# Patient Record
Sex: MALE | Race: WHITE | Employment: OTHER | URBAN - METROPOLITAN AREA
[De-identification: names, ages, dates, MRNs, and addresses within clinical notes are randomized per-mention and may not be internally consistent; named-entity substitution may affect disease eponyms.]

---

## 2017-01-09 ENCOUNTER — TELEPHONE (OUTPATIENT)
Dept: FAMILY MEDICINE CLINIC | Facility: CLINIC | Age: 62
End: 2017-01-09

## 2017-01-10 RX ORDER — FLUTICASONE PROPIONATE 50 MCG
SPRAY, SUSPENSION (ML) NASAL
Qty: 3 INHALER | Refills: 0 | Status: SHIPPED | OUTPATIENT
Start: 2017-01-10 | End: 2017-03-30

## 2017-01-10 NOTE — TELEPHONE ENCOUNTER
Refill Protocol Appointment Criteria  · Appointment scheduled in the past 12 months or in the next 3 months  Recent Visits       Provider Department Primary Dx    4 months ago Charmayne Maser, [place], [address] [facility], [facility], [place] Essential hypertension,

## 2017-01-31 RX ORDER — BUDESONIDE AND FORMOTEROL FUMARATE DIHYDRATE 160; 4.5 UG/1; UG/1
AEROSOL RESPIRATORY (INHALATION)
Qty: 30.6 INHALER | Refills: 0 | Status: SHIPPED | OUTPATIENT
Start: 2017-01-31 | End: 2017-03-20

## 2017-01-31 NOTE — TELEPHONE ENCOUNTER
Refill Protocol Appointment Criteria  · Appointment scheduled in the past 6 months or in the next 3 months  Recent Visits       Provider Department Primary Dx    4 months ago [address] [facility], [place], 303 [facility], [facility], [place] Essential hypertension,

## 2017-02-07 ENCOUNTER — OFFICE VISIT (OUTPATIENT)
Dept: SURGERY | Facility: CLINIC | Age: 62
End: 2017-02-07

## 2017-02-07 VITALS
SYSTOLIC BLOOD PRESSURE: 130 MMHG | BODY MASS INDEX: 21.34 KG/M2 | HEART RATE: 113 BPM | HEIGHT: 64 IN | TEMPERATURE: 98 F | DIASTOLIC BLOOD PRESSURE: 80 MMHG | RESPIRATION RATE: 16 BRPM | WEIGHT: 125 LBS

## 2017-02-07 DIAGNOSIS — N40.1 BENIGN NON-NODULAR PROSTATIC HYPERPLASIA WITH LOWER URINARY TRACT SYMPTOMS: Primary | ICD-10-CM

## 2017-02-07 DIAGNOSIS — N52.9 ERECTILE DYSFUNCTION, UNSPECIFIED ERECTILE DYSFUNCTION TYPE: ICD-10-CM

## 2017-02-07 PROCEDURE — 99212 OFFICE O/P EST SF 10 MIN: CPT | Performed by: UROLOGY

## 2017-02-07 PROCEDURE — 99213 OFFICE O/P EST LOW 20 MIN: CPT | Performed by: UROLOGY

## 2017-02-07 NOTE — PROGRESS NOTES
Gissell Richardson is a 59year old male.     HPI:   Patient presents with:  Urinary Symptoms (urologic)    58-year-old male with BPH and erectile dysfunction managed with Cialis 5 mg daily and finasteride 5 mg daily presents in follow-up to a previous visit No (5 mg total) by mouth daily. Disp: 90 tablet Rfl: 3   FLUVIRIN Intramuscular Suspension ADM 0.5ML IM UTD Disp:  Rfl: 0   tadalafil (CIALIS) 5 MG Oral Tab Take 1 tablet (5 mg total) by mouth daily as needed for Erectile Dysfunction.  Disp: 30 tablet Rfl: 11

## 2017-02-14 ENCOUNTER — LAB REQUISITION (OUTPATIENT)
Dept: LAB | Facility: HOSPITAL | Age: 62
End: 2017-02-14
Payer: COMMERCIAL

## 2017-02-14 DIAGNOSIS — Z01.89 ENCOUNTER FOR OTHER SPECIFIED SPECIAL EXAMINATIONS: ICD-10-CM

## 2017-02-14 PROCEDURE — 88305 TISSUE EXAM BY PATHOLOGIST: CPT | Performed by: INTERNAL MEDICINE

## 2017-02-17 ENCOUNTER — TELEPHONE (OUTPATIENT)
Dept: GASTROENTEROLOGY | Facility: CLINIC | Age: 62
End: 2017-02-17

## 2017-02-17 NOTE — TELEPHONE ENCOUNTER
The patient was contacted and results of his colonoscopy discussed. He had internal hemorrhoids, diverticulosis and 5 polyps removed. I would advise repeat colonoscopy within 3 years time. He is agreeable to this plan.     The patient will call if he has

## 2017-03-20 ENCOUNTER — OFFICE VISIT (OUTPATIENT)
Dept: FAMILY MEDICINE CLINIC | Facility: CLINIC | Age: 62
End: 2017-03-20

## 2017-03-20 VITALS
TEMPERATURE: 99 F | BODY MASS INDEX: 20.25 KG/M2 | RESPIRATION RATE: 14 BRPM | WEIGHT: 118.63 LBS | SYSTOLIC BLOOD PRESSURE: 123 MMHG | HEART RATE: 88 BPM | DIASTOLIC BLOOD PRESSURE: 77 MMHG | HEIGHT: 64 IN

## 2017-03-20 DIAGNOSIS — E78.2 MIXED HYPERLIPIDEMIA: ICD-10-CM

## 2017-03-20 DIAGNOSIS — K63.5 POLYP OF COLON, UNSPECIFIED PART OF COLON, UNSPECIFIED TYPE: ICD-10-CM

## 2017-03-20 DIAGNOSIS — J44.9 CHRONIC OBSTRUCTIVE PULMONARY DISEASE, UNSPECIFIED COPD TYPE (HCC): Primary | ICD-10-CM

## 2017-03-20 DIAGNOSIS — Z72.0 TOBACCO USE: ICD-10-CM

## 2017-03-20 DIAGNOSIS — I10 ESSENTIAL HYPERTENSION WITH GOAL BLOOD PRESSURE LESS THAN 140/90: ICD-10-CM

## 2017-03-20 PROCEDURE — 99214 OFFICE O/P EST MOD 30 MIN: CPT | Performed by: FAMILY MEDICINE

## 2017-03-20 PROCEDURE — 99406 BEHAV CHNG SMOKING 3-10 MIN: CPT | Performed by: FAMILY MEDICINE

## 2017-03-20 PROCEDURE — 99212 OFFICE O/P EST SF 10 MIN: CPT | Performed by: FAMILY MEDICINE

## 2017-03-20 RX ORDER — AMLODIPINE BESYLATE AND BENAZEPRIL HYDROCHLORIDE 10; 40 MG/1; MG/1
1 CAPSULE ORAL
Qty: 90 CAPSULE | Refills: 2 | Status: SHIPPED | OUTPATIENT
Start: 2017-03-20 | End: 2017-12-08

## 2017-03-20 RX ORDER — ATORVASTATIN CALCIUM 10 MG/1
10 TABLET, FILM COATED ORAL
Qty: 90 TABLET | Refills: 2 | Status: SHIPPED | OUTPATIENT
Start: 2017-03-20 | End: 2017-12-08

## 2017-03-20 RX ORDER — BUDESONIDE AND FORMOTEROL FUMARATE DIHYDRATE 160; 4.5 UG/1; UG/1
AEROSOL RESPIRATORY (INHALATION)
Qty: 3 INHALER | Refills: 3 | Status: SHIPPED | OUTPATIENT
Start: 2017-03-20 | End: 2018-03-25

## 2017-03-20 NOTE — PROGRESS NOTES
Patient ID: Carmella Wong is a 59year old male. HPI  Patient presents with:  Test Results: ct scan done on 9-22-16, colonoscopy done on 02-14-17     He had a CAT scan done of his lungs. Showed no cancer but did show some mild emphysema.   He has been tadalafil (CIALIS) 5 MG Oral Tab Take 1 tablet (5 mg total) by mouth daily as needed for Erectile Dysfunction. Disp: 30 tablet Rfl: 11   Atorvastatin Calcium 10 MG Oral Tab Take 1 tablet (10 mg total) by mouth once daily.  Disp: 90 tablet Rfl: 2   Amlodip 160-4.5 MCG/ACT Inhalation Aerosol; INHALE 2 PUFFS INTO THE LUNGS TWICE A DAY    Polyp of colon, unspecified part of colon, unspecified type  Repeat colonoscopy in 3 years  Tobacco use  -     Varenicline Tartrate (CHANTIX STARTING MONTH PAK) 0.5 MG X 11 &

## 2017-03-20 NOTE — PATIENT INSTRUCTIONS
See Dr. Nicole Riggs, the pulmonologist and get your cardiac stress test done. Once that is all done go ahead and make an appointment to see me for a full physical exam and we can do blood work on the same day if you come in fasting for 10 hours.   You can have w

## 2017-03-31 RX ORDER — FLUTICASONE PROPIONATE 50 MCG
SPRAY, SUSPENSION (ML) NASAL
Qty: 48 G | Refills: 0 | Status: SHIPPED | OUTPATIENT
Start: 2017-03-31 | End: 2017-06-21

## 2017-03-31 NOTE — TELEPHONE ENCOUNTER
Refill Protocol Appointment Criteria  · Appointment scheduled in the past 12 months or in the next 3 months  Recent Visits       Provider Department Primary Dx    1 week ago Riley Chairez, [address] [place] [address], [place] Chronic obstructive pulmo

## 2017-04-24 NOTE — TELEPHONE ENCOUNTER
PLEASE ADVISE ON REFILL. THANKS.   Recent Visits       Provider Department Primary Dx    1 month ago Hazel Lynne, [address] [facility], [facility], [place] Chronic obstructive pulmonary disease, unspecified COPD type ([facility]    7 months ago Hazel Lynne,

## 2017-04-24 NOTE — TELEPHONE ENCOUNTER
i would have him discuss this with Dr Silvestre as he has already been on starter pack or does he need continuation?

## 2017-04-25 RX ORDER — VARENICLINE TARTRATE 1 MG/1
1 TABLET, FILM COATED ORAL 2 TIMES DAILY
Qty: 1 PACKAGE | Refills: 2 | Status: SHIPPED | OUTPATIENT
Start: 2017-04-25 | End: 2018-04-17

## 2017-04-25 RX ORDER — VARENICLINE TARTRATE 0.5 (11)-1
KIT ORAL
Refills: 0 | OUTPATIENT
Start: 2017-04-25

## 2017-04-25 NOTE — TELEPHONE ENCOUNTER
Chart reviewed, 3/20/17  last office visit notes: Tobacco use  -     Varenicline Tartrate (CHANTIX STARTING MONTH AMEYA) 0.5 MG X 11 & 1 MG X 42 Oral Misc; Take as directed.   3 minute discussion on tobacco cessation.  We discussed options.  He has been on Ch

## 2017-06-21 RX ORDER — FLUTICASONE PROPIONATE 50 MCG
SPRAY, SUSPENSION (ML) NASAL
Qty: 1 BOTTLE | Refills: 2 | Status: SHIPPED | OUTPATIENT
Start: 2017-06-21 | End: 2017-09-12

## 2017-06-21 NOTE — TELEPHONE ENCOUNTER
Refilled per written protocol.     Refill Protocol Appointment Criteria Allergy Medication:  · Appointment scheduled in the past 12 months or in the next 3 months  Recent Visits       Provider Department Primary Dx    3 months ago DO Tigist Potter

## 2017-08-14 ENCOUNTER — TELEPHONE (OUTPATIENT)
Dept: FAMILY MEDICINE CLINIC | Facility: CLINIC | Age: 62
End: 2017-08-14

## 2017-08-14 NOTE — TELEPHONE ENCOUNTER
Roxi Rangel from patient accounts is requesting office notes that is leading to lung scan pt had on 9/22/16. Cecillia Cisco is not covering test so need to send an letter of necessity with office notes proving lung scan was needed.           fax # [phone]

## 2017-08-17 NOTE — TELEPHONE ENCOUNTER
Records requests are not completed by [facility], but by our copy service [employer]. For records request please forward patients or Ins Companies to them at ph. [phone], their fax number is [phone].  I believe they need to have request in writing faxed to

## 2017-09-12 RX ORDER — FLUTICASONE PROPIONATE 50 MCG
SPRAY, SUSPENSION (ML) NASAL
Qty: 1 INHALER | Refills: 2 | Status: SHIPPED | OUTPATIENT
Start: 2017-09-12 | End: 2017-12-08

## 2017-09-12 NOTE — TELEPHONE ENCOUNTER
Refill Protocol Appointment Criteria: Refilled per protocol    · Appointment scheduled in the past 12 months or in the next 3 months  Recent Outpatient Visits            5 months ago Chronic obstructive pulmonary disease, unspecified [facility] type [facility]    Arianna Handley

## 2017-09-17 ENCOUNTER — TELEPHONE (OUTPATIENT)
Dept: FAMILY MEDICINE CLINIC | Facility: CLINIC | Age: 62
End: 2017-09-17

## 2017-09-17 DIAGNOSIS — Z00.00 ADULT GENERAL MEDICAL EXAM: Primary | ICD-10-CM

## 2017-09-18 NOTE — TELEPHONE ENCOUNTER
Pt contacted and is asking to please have labs ordered and placed onto his chart. Pt would like to get labs done prior to making an appt. Please contact pt once labs have been ordered, please and thank you.

## 2017-09-18 NOTE — TELEPHONE ENCOUNTER
ROBYN- Please call and schedule an appointment for patient with Dr. Chichi Varela. See message below.

## 2017-09-18 NOTE — TELEPHONE ENCOUNTER
I have not seen him since March. I need to see him every 6 months I will go ahead and please make him an appointment.

## 2017-10-10 NOTE — TELEPHONE ENCOUNTER
Dr. Gilbert Camacho, pt Mouna Collins 2/7/17 pt pharmacy requesting refill for finasteride if you agree please review and sign med, thank you. I copied and pasted part of your last note below. Reviewed findings at length with patient. Recommended continued Cialis and finasteride use for the time being.

## 2017-10-12 RX ORDER — FINASTERIDE 5 MG/1
5 TABLET, FILM COATED ORAL DAILY
Qty: 90 TABLET | Refills: 3 | Status: SHIPPED | OUTPATIENT
Start: 2017-10-12 | End: 2018-07-09

## 2017-12-08 DIAGNOSIS — I10 ESSENTIAL HYPERTENSION WITH GOAL BLOOD PRESSURE LESS THAN 140/90: ICD-10-CM

## 2017-12-08 DIAGNOSIS — E78.2 MIXED HYPERLIPIDEMIA: ICD-10-CM

## 2017-12-08 RX ORDER — FLUTICASONE PROPIONATE 50 MCG
SPRAY, SUSPENSION (ML) NASAL
Qty: 1 BOTTLE | Refills: 2 | Status: SHIPPED | OUTPATIENT
Start: 2017-12-08 | End: 2018-02-27

## 2017-12-09 RX ORDER — ATORVASTATIN CALCIUM 10 MG/1
TABLET, FILM COATED ORAL
Qty: 90 TABLET | Refills: 0 | Status: SHIPPED | OUTPATIENT
Start: 2017-12-09 | End: 2018-03-23

## 2017-12-09 RX ORDER — AMLODIPINE AND BENAZEPRIL HYDROCHLORIDE 10; 40 MG/1; MG/1
1 CAPSULE ORAL
Qty: 90 CAPSULE | Refills: 0 | Status: SHIPPED | OUTPATIENT
Start: 2017-12-09 | End: 2018-03-23

## 2017-12-09 NOTE — TELEPHONE ENCOUNTER
Refill times 1 but needs appt for next script. Please call patient. I ordered labs for him in September. He is still not gotten these done. Have him get them done and then come and see me. He is long overdue for an appointment.

## 2018-02-28 RX ORDER — FLUTICASONE PROPIONATE 50 MCG
SPRAY, SUSPENSION (ML) NASAL
Qty: 1 BOTTLE | Refills: 0 | Status: SHIPPED | OUTPATIENT
Start: 2018-02-28 | End: 2018-03-25

## 2018-02-28 NOTE — TELEPHONE ENCOUNTER
Refill Protocol Appointment Criteria. Refilled x 1; needs appt.   · Appointment scheduled in the past 12 months or in the next 3 months  Recent Outpatient Visits            11 months ago Chronic obstructive pulmonary disease, unspecified COPD type ([facility]

## 2018-03-23 ENCOUNTER — TELEPHONE (OUTPATIENT)
Dept: FAMILY MEDICINE CLINIC | Facility: CLINIC | Age: 63
End: 2018-03-23

## 2018-03-23 DIAGNOSIS — I10 ESSENTIAL HYPERTENSION WITH GOAL BLOOD PRESSURE LESS THAN 140/90: ICD-10-CM

## 2018-03-23 DIAGNOSIS — E78.2 MIXED HYPERLIPIDEMIA: ICD-10-CM

## 2018-03-25 DIAGNOSIS — J44.9 CHRONIC OBSTRUCTIVE PULMONARY DISEASE, UNSPECIFIED COPD TYPE (HCC): ICD-10-CM

## 2018-03-26 RX ORDER — AMLODIPINE AND BENAZEPRIL HYDROCHLORIDE 10; 40 MG/1; MG/1
1 CAPSULE ORAL
Qty: 90 CAPSULE | Refills: 0 | Status: SHIPPED | OUTPATIENT
Start: 2018-03-26 | End: 2018-04-17

## 2018-03-26 RX ORDER — ATORVASTATIN CALCIUM 10 MG/1
TABLET, FILM COATED ORAL
Qty: 90 TABLET | Refills: 0 | Status: SHIPPED | OUTPATIENT
Start: 2018-03-26 | End: 2018-04-17

## 2018-03-26 NOTE — TELEPHONE ENCOUNTER
ROBYN- Please call and schedule an appointment for patient with Dr. Nayan George. See message below.

## 2018-03-27 ENCOUNTER — LAB ENCOUNTER (OUTPATIENT)
Dept: LAB | Age: 63
End: 2018-03-27
Attending: FAMILY MEDICINE
Payer: COMMERCIAL

## 2018-03-27 DIAGNOSIS — Z00.00 ADULT GENERAL MEDICAL EXAM: ICD-10-CM

## 2018-03-27 LAB
ALBUMIN SERPL BCP-MCNC: 4.5 G/DL (ref 3.5–4.8)
ALBUMIN/GLOB SERPL: 1.7 {RATIO} (ref 1–2)
ALP SERPL-CCNC: 50 U/L (ref 32–100)
ALT SERPL-CCNC: 16 U/L (ref 17–63)
ANION GAP SERPL CALC-SCNC: 9 MMOL/L (ref 0–18)
AST SERPL-CCNC: 23 U/L (ref 15–41)
BASOPHILS # BLD: 0 K/UL (ref 0–0.2)
BASOPHILS NFR BLD: 1 %
BILIRUB SERPL-MCNC: 0.5 MG/DL (ref 0.3–1.2)
BUN SERPL-MCNC: 9 MG/DL (ref 8–20)
BUN/CREAT SERPL: 10.8 (ref 10–20)
CALCIUM SERPL-MCNC: 9.8 MG/DL (ref 8.5–10.5)
CHLORIDE SERPL-SCNC: 99 MMOL/L (ref 95–110)
CHOLEST SERPL-MCNC: 160 MG/DL (ref 110–200)
CO2 SERPL-SCNC: 28 MMOL/L (ref 22–32)
CREAT SERPL-MCNC: 0.83 MG/DL (ref 0.5–1.5)
EOSINOPHIL # BLD: 0.5 K/UL (ref 0–0.7)
EOSINOPHIL NFR BLD: 7 %
ERYTHROCYTE [DISTWIDTH] IN BLOOD BY AUTOMATED COUNT: 13.1 % (ref 11–15)
GLOBULIN PLAS-MCNC: 2.7 G/DL (ref 2.5–3.7)
GLUCOSE SERPL-MCNC: 78 MG/DL (ref 70–99)
HCT VFR BLD AUTO: 47.5 % (ref 41–52)
HDLC SERPL-MCNC: 75 MG/DL
HGB BLD-MCNC: 16.1 G/DL (ref 13.5–17.5)
LDLC SERPL CALC-MCNC: 71 MG/DL (ref 0–99)
LYMPHOCYTES # BLD: 1.1 K/UL (ref 1–4)
LYMPHOCYTES NFR BLD: 13 %
MCH RBC QN AUTO: 30.9 PG (ref 27–32)
MCHC RBC AUTO-ENTMCNC: 33.9 G/DL (ref 32–37)
MCV RBC AUTO: 91.3 FL (ref 80–100)
MONOCYTES # BLD: 1 K/UL (ref 0–1)
MONOCYTES NFR BLD: 12 %
NEUTROPHILS # BLD AUTO: 5.6 K/UL (ref 1.8–7.7)
NEUTROPHILS NFR BLD: 67 %
NONHDLC SERPL-MCNC: 85 MG/DL
OSMOLALITY UR CALC.SUM OF ELEC: 280 MOSM/KG (ref 275–295)
PATIENT FASTING: YES
PLATELET # BLD AUTO: 319 K/UL (ref 140–400)
PMV BLD AUTO: 8.9 FL (ref 7.4–10.3)
POTASSIUM SERPL-SCNC: 4.5 MMOL/L (ref 3.3–5.1)
PROT SERPL-MCNC: 7.2 G/DL (ref 5.9–8.4)
PSA SERPL-MCNC: 1.7 NG/ML (ref 0–4)
RBC # BLD AUTO: 5.2 M/UL (ref 4.5–5.9)
SODIUM SERPL-SCNC: 136 MMOL/L (ref 136–144)
TRIGL SERPL-MCNC: 70 MG/DL (ref 1–149)
TSH SERPL-ACNC: 7.2 UIU/ML (ref 0.45–5.33)
WBC # BLD AUTO: 8.2 K/UL (ref 4–11)

## 2018-03-27 PROCEDURE — 80050 GENERAL HEALTH PANEL: CPT

## 2018-03-27 PROCEDURE — 80053 COMPREHEN METABOLIC PANEL: CPT

## 2018-03-27 PROCEDURE — 80061 LIPID PANEL: CPT

## 2018-03-27 PROCEDURE — 85025 COMPLETE CBC W/AUTO DIFF WBC: CPT

## 2018-03-27 PROCEDURE — 36415 COLL VENOUS BLD VENIPUNCTURE: CPT

## 2018-03-27 NOTE — TELEPHONE ENCOUNTER
Refill Protocol Appointment Criteria  · Appointment scheduled in the past 12 months or in the next 3 months  Recent Outpatient Visits            1 year ago Chronic obstructive pulmonary disease, unspecified COPD type [facility])    [address], [facility] for Fabian Velez MD    Office Visit    1 year ago Benign non-nodular prostatic hyperplasia with lower urinary tract symptoms    [facility] for Fabian eVlez, [place]

## 2018-03-29 DIAGNOSIS — J44.9 CHRONIC OBSTRUCTIVE PULMONARY DISEASE, UNSPECIFIED COPD TYPE (HCC): ICD-10-CM

## 2018-03-29 RX ORDER — BUDESONIDE AND FORMOTEROL FUMARATE DIHYDRATE 160; 4.5 UG/1; UG/1
AEROSOL RESPIRATORY (INHALATION)
Qty: 30.6 INHALER | Refills: 0 | Status: SHIPPED | OUTPATIENT
Start: 2018-03-29 | End: 2018-04-17

## 2018-03-29 RX ORDER — FLUTICASONE PROPIONATE 50 MCG
SPRAY, SUSPENSION (ML) NASAL
Qty: 1 INHALER | Refills: 0 | Status: SHIPPED | OUTPATIENT
Start: 2018-03-29 | End: 2018-04-17

## 2018-03-30 RX ORDER — BUDESONIDE AND FORMOTEROL FUMARATE DIHYDRATE 160; 4.5 UG/1; UG/1
AEROSOL RESPIRATORY (INHALATION)
Qty: 30.6 INHALER | Refills: 2 | OUTPATIENT
Start: 2018-03-30

## 2018-04-17 ENCOUNTER — HOSPITAL ENCOUNTER (OUTPATIENT)
Dept: GENERAL RADIOLOGY | Age: 63
Discharge: HOME | End: 2018-04-17
Attending: FAMILY MEDICINE
Payer: COMMERCIAL

## 2018-04-17 ENCOUNTER — APPOINTMENT (OUTPATIENT)
Dept: LAB | Age: 63
End: 2018-04-17
Attending: FAMILY MEDICINE
Payer: COMMERCIAL

## 2018-04-17 ENCOUNTER — OFFICE VISIT (OUTPATIENT)
Dept: FAMILY MEDICINE CLINIC | Facility: CLINIC | Age: 63
End: 2018-04-17

## 2018-04-17 VITALS
HEIGHT: 64 IN | DIASTOLIC BLOOD PRESSURE: 73 MMHG | BODY MASS INDEX: 20.66 KG/M2 | SYSTOLIC BLOOD PRESSURE: 130 MMHG | TEMPERATURE: 98 F | HEART RATE: 86 BPM | WEIGHT: 121 LBS

## 2018-04-17 DIAGNOSIS — I10 ESSENTIAL HYPERTENSION WITH GOAL BLOOD PRESSURE LESS THAN 140/90: ICD-10-CM

## 2018-04-17 DIAGNOSIS — E78.2 MIXED HYPERLIPIDEMIA: ICD-10-CM

## 2018-04-17 DIAGNOSIS — N40.1 BENIGN NON-NODULAR PROSTATIC HYPERPLASIA WITH LOWER URINARY TRACT SYMPTOMS: ICD-10-CM

## 2018-04-17 DIAGNOSIS — H91.91 DECREASED HEARING OF RIGHT EAR: ICD-10-CM

## 2018-04-17 DIAGNOSIS — R79.89 ELEVATED TSH: ICD-10-CM

## 2018-04-17 DIAGNOSIS — J30.1 ALLERGIC RHINITIS DUE TO POLLEN, UNSPECIFIED SEASONALITY: ICD-10-CM

## 2018-04-17 DIAGNOSIS — I10 ESSENTIAL HYPERTENSION, BENIGN: ICD-10-CM

## 2018-04-17 DIAGNOSIS — Z00.00 ADULT GENERAL MEDICAL EXAM: Primary | ICD-10-CM

## 2018-04-17 DIAGNOSIS — R14.2 ERUCTATION: ICD-10-CM

## 2018-04-17 DIAGNOSIS — J44.9 CHRONIC OBSTRUCTIVE PULMONARY DISEASE, UNSPECIFIED COPD TYPE (HCC): ICD-10-CM

## 2018-04-17 DIAGNOSIS — F17.200 TOBACCO USE DISORDER: ICD-10-CM

## 2018-04-17 DIAGNOSIS — R09.89 RHONCHI: ICD-10-CM

## 2018-04-17 DIAGNOSIS — H61.21 IMPACTED CERUMEN OF RIGHT EAR: ICD-10-CM

## 2018-04-17 PROCEDURE — 83013 H PYLORI (C-13) BREATH: CPT

## 2018-04-17 PROCEDURE — 99396 PREV VISIT EST AGE 40-64: CPT | Performed by: FAMILY MEDICINE

## 2018-04-17 PROCEDURE — 84443 ASSAY THYROID STIM HORMONE: CPT

## 2018-04-17 PROCEDURE — 84439 ASSAY OF FREE THYROXINE: CPT

## 2018-04-17 PROCEDURE — 93010 ELECTROCARDIOGRAM REPORT: CPT | Performed by: FAMILY MEDICINE

## 2018-04-17 PROCEDURE — 99212 OFFICE O/P EST SF 10 MIN: CPT | Performed by: FAMILY MEDICINE

## 2018-04-17 PROCEDURE — 93005 ELECTROCARDIOGRAM TRACING: CPT

## 2018-04-17 PROCEDURE — 99215 OFFICE O/P EST HI 40 MIN: CPT | Performed by: FAMILY MEDICINE

## 2018-04-17 PROCEDURE — 36415 COLL VENOUS BLD VENIPUNCTURE: CPT

## 2018-04-17 PROCEDURE — 69210 REMOVE IMPACTED EAR WAX UNI: CPT | Performed by: FAMILY MEDICINE

## 2018-04-17 PROCEDURE — 99406 BEHAV CHNG SMOKING 3-10 MIN: CPT | Performed by: FAMILY MEDICINE

## 2018-04-17 PROCEDURE — 71046 X-RAY EXAM CHEST 2 VIEWS: CPT | Performed by: FAMILY MEDICINE

## 2018-04-17 RX ORDER — FLUTICASONE PROPIONATE 50 MCG
SPRAY, SUSPENSION (ML) NASAL
Qty: 3 INHALER | Refills: 1 | Status: SHIPPED | OUTPATIENT
Start: 2018-04-17 | End: 2018-07-09

## 2018-04-17 RX ORDER — BUDESONIDE AND FORMOTEROL FUMARATE DIHYDRATE 160; 4.5 UG/1; UG/1
AEROSOL RESPIRATORY (INHALATION)
Qty: 3 INHALER | Refills: 1 | Status: SHIPPED | OUTPATIENT
Start: 2018-04-17 | End: 2018-06-21

## 2018-04-17 RX ORDER — NICOTINE POLACRILEX 4 MG/1
20 GUM, CHEWING ORAL DAILY
Qty: 90 TABLET | Refills: 0 | Status: SHIPPED | OUTPATIENT
Start: 2018-04-17 | End: 2018-05-01

## 2018-04-17 RX ORDER — ALBUTEROL SULFATE 90 UG/1
AEROSOL, METERED RESPIRATORY (INHALATION)
Qty: 1 INHALER | Refills: 2 | Status: SHIPPED | OUTPATIENT
Start: 2018-04-17 | End: 2018-10-23

## 2018-04-17 RX ORDER — VARENICLINE TARTRATE 1 MG/1
1 TABLET, FILM COATED ORAL 2 TIMES DAILY
Qty: 1 PACKAGE | Refills: 1 | Status: SHIPPED | OUTPATIENT
Start: 2018-04-17 | End: 2018-08-19

## 2018-04-17 RX ORDER — LEVOTHYROXINE SODIUM 0.03 MG/1
25 TABLET ORAL
Qty: 90 TABLET | Refills: 0 | Status: SHIPPED | OUTPATIENT
Start: 2018-04-17 | End: 2018-05-01

## 2018-04-17 RX ORDER — AMLODIPINE BESYLATE AND BENAZEPRIL HYDROCHLORIDE 10; 40 MG/1; MG/1
1 CAPSULE ORAL
Qty: 90 CAPSULE | Refills: 1 | Status: SHIPPED | OUTPATIENT
Start: 2018-04-17 | End: 2018-10-23

## 2018-04-17 RX ORDER — ATORVASTATIN CALCIUM 10 MG/1
10 TABLET, FILM COATED ORAL
Qty: 90 TABLET | Refills: 1 | Status: SHIPPED | OUTPATIENT
Start: 2018-04-17 | End: 2018-10-23

## 2018-04-17 NOTE — PROGRESS NOTES
Patient ID: Johnson Reyez is a 57year old male. HPI  Patient presents with:  Physical    I have not seen him in some time. He would like a physical but he has numerous other issues that we need to take care of.   He did have a colonoscopy by Dr. Merlin Dickens 06/23/2017  Colonoscopy,3 Years due on 02/14/2020  PSA due on 03/27/2020  Influenza Vaccine Completed  Pneumococcal PPSV23 Medium Risk Adult Completed            Lab Results  Component Value Date   WBC 8.2 03/27/2018   RBC 5.20 03/27/2018   HGB 16.1 03/27/ Results  Component Value Date   CHOLEST 160 03/27/2018   TRIG 70 03/27/2018   HDL 75 03/27/2018   LDL 71 03/27/2018   NONHDLC 85 03/27/2018   CALCNONHDL 91 03/08/2016   CALCNONHDL 82 02/23/2015   CALCNONHDL 106 09/29/2014       T4,FREE (S) (ng/dL)   Date V nausea, rectal pain and vomiting. Genitourinary: Positive for difficulty urinating. Negative for dysuria, flank pain, frequency and hematuria. Sometimes he does have a slower stream periodically at night.    Musculoskeletal: Negative for back pain 2 PUFFS INTO THE LUNGS TWICE A DAY Disp: 30.6 Inhaler Rfl: 0   ATORVASTATIN 10 MG Oral Tab TAKE 1 TABLET BY MOUTH EVERY DAY Disp: 90 tablet Rfl: 0   AMLODIPINE BESY-BENAZEPRIL HCL 10-40 MG Oral Cap TAKE 1 CAPSULE BY MOUTH ONCE DAILY.  Disp: 90 capsule Rfl: rhonchi and some slight scattered wheezing as well. Otherwise good air entry. He is not retracting. There is no nasal flaring. No respiratory distress. Abdominal: Soft. Bowel sounds are normal. There is no hepatosplenomegaly. There is no tenderness. Aero Soln; INHALE 2 PUFF BY INHALATION ROUTE EVERY 4 - 6 HOURS AS NEEDED  4 minute discussion on tobacco cessation. He really needs to quit and he cannot even pick a one cigarette after he quits.   We will start him on the Chantix starter pack and then the emphysema and numerous years of smoking. Elevated TSH  -     ASSAY, THYROID STIM HORMONE; Future  -     FREE T4 (FREE THYROXINE);  Future  We will recheck the TSH to see if he truly needs Levoxyl  Essential hypertension with goal blood pressure less than 1 CARDIOLOGY          Number of Visits Requested: 3      Audiology          Order Comments:              Referral for Evaluation              Comprehensive Hearing Evaluation              Call [phone]          Referral Priority: Routine          Request

## 2018-04-20 ENCOUNTER — TELEPHONE (OUTPATIENT)
Dept: OTHER | Age: 63
End: 2018-04-20

## 2018-04-20 NOTE — TELEPHONE ENCOUNTER
----- Message from Jessica Calabrese DO sent at 4/17/2018 11:26 PM CDT -----  Your TSH is again elevated meaning that you do have a underactive thyroid. We need to start you on a small dose of thyroid medication which I will start immediately. Start Levoxyl 25 mcg daily. Get some of the other testing done and then I will go ahead and have you come back to recheck the thyroid in the near future.

## 2018-04-21 RX ORDER — LEVOTHYROXINE SODIUM 0.03 MG/1
25 TABLET ORAL
Qty: 90 TABLET | Refills: 0 | Status: SHIPPED | OUTPATIENT
Start: 2018-04-21 | End: 2018-10-10

## 2018-04-23 ENCOUNTER — HOSPITAL ENCOUNTER (OUTPATIENT)
Dept: CT IMAGING | Age: 63
Discharge: HOME | End: 2018-04-23
Attending: FAMILY MEDICINE
Payer: COMMERCIAL

## 2018-04-23 DIAGNOSIS — J44.9 CHRONIC OBSTRUCTIVE PULMONARY DISEASE, UNSPECIFIED COPD TYPE (HCC): ICD-10-CM

## 2018-04-23 DIAGNOSIS — R09.89 RHONCHI: ICD-10-CM

## 2018-04-23 DIAGNOSIS — F17.200 TOBACCO USE DISORDER: ICD-10-CM

## 2018-04-23 PROCEDURE — 71250 CT THORAX DX C-: CPT | Performed by: FAMILY MEDICINE

## 2018-04-23 NOTE — TELEPHONE ENCOUNTER
Dr Joel Calabrese, please advise on the results from the CT chest.    Advised patient on Dr. Jeaneth Sharp information and recommendations for chest x-ray and for TSH. Patient verbalized understanding. He had the CT of chest done today. Has audiology on 5/1/18 and urology on 5/21/18. Still has to schedule with cardiology.

## 2018-04-24 PROBLEM — R91.1 PULMONARY NODULE, RIGHT: Status: ACTIVE | Noted: 2018-04-24

## 2018-04-24 PROBLEM — J84.9 INTERSTITIAL LUNG DISEASE (HCC): Status: ACTIVE | Noted: 2018-04-24

## 2018-04-24 RX ORDER — FLUTICASONE PROPIONATE 50 MCG
SPRAY, SUSPENSION (ML) NASAL
Refills: 0 | OUTPATIENT
Start: 2018-04-24

## 2018-04-27 ENCOUNTER — TELEPHONE (OUTPATIENT)
Dept: FAMILY MEDICINE CLINIC | Facility: CLINIC | Age: 63
End: 2018-04-27

## 2018-04-27 NOTE — TELEPHONE ENCOUNTER
LMTCB. May transfer to Triage. Notes recorded by Maty Stoll RN on 4/25/2018 at 4:14 PM CDT  LMTCB. May transfer to Triage.      ------    Notes recorded by Carol Engle DO on 4/24/2018 at 12:36 PM CDT  CAT scan of the chest shows a 6 mm

## 2018-05-01 ENCOUNTER — OFFICE VISIT (OUTPATIENT)
Dept: AUDIOLOGY | Facility: CLINIC | Age: 63
End: 2018-05-01

## 2018-05-01 ENCOUNTER — OFFICE VISIT (OUTPATIENT)
Dept: OTOLARYNGOLOGY | Facility: CLINIC | Age: 63
End: 2018-05-01

## 2018-05-01 VITALS
WEIGHT: 126 LBS | TEMPERATURE: 98 F | BODY MASS INDEX: 21.51 KG/M2 | DIASTOLIC BLOOD PRESSURE: 77 MMHG | SYSTOLIC BLOOD PRESSURE: 138 MMHG | HEIGHT: 64 IN

## 2018-05-01 DIAGNOSIS — H90.A21 SENSORINEURAL HEARING LOSS (SNHL) OF RIGHT EAR WITH RESTRICTED HEARING OF LEFT EAR: Primary | ICD-10-CM

## 2018-05-01 DIAGNOSIS — H90.3 SENSORINEURAL HEARING LOSS, BILATERAL: Primary | ICD-10-CM

## 2018-05-01 PROCEDURE — 99213 OFFICE O/P EST LOW 20 MIN: CPT | Performed by: OTOLARYNGOLOGY

## 2018-05-01 PROCEDURE — 92557 COMPREHENSIVE HEARING TEST: CPT | Performed by: AUDIOLOGIST

## 2018-05-01 PROCEDURE — 92567 TYMPANOMETRY: CPT | Performed by: AUDIOLOGIST

## 2018-05-01 PROCEDURE — 99212 OFFICE O/P EST SF 10 MIN: CPT | Performed by: OTOLARYNGOLOGY

## 2018-05-01 RX ORDER — OMEPRAZOLE 20 MG/1
20 CAPSULE, DELAYED RELEASE ORAL
Refills: 0 | COMMUNITY
Start: 2018-04-17 | End: 2018-10-23

## 2018-05-01 NOTE — PROGRESS NOTES
AUDIOGRAM     Aleksandra Guaman was referred for testing by Dr Gayatri Guzman due to hearing loss. 12/10/1955  [id number]    Otoscopic Inspection:  Right ear:  No cerumen  Left ear:  No cerumen    Audiometric Test Results:   Audiometric thresholds indicated a mild

## 2018-05-01 NOTE — TELEPHONE ENCOUNTER
Okay.  Have him call every other day to see if there is any cancellations to just to see if he can get in sooner.

## 2018-05-01 NOTE — TELEPHONE ENCOUNTER
Advised patient on Dr. Thor Larose information and recommendations. Patient verbalized understanding. He stated he needs to schedule with the cardiologist too, will do so. Given the phone # for pulmonary to schedule; he stated he will do so.  Advised to call b

## 2018-05-01 NOTE — PROGRESS NOTES
Johnna Hardy is a 57year old male. Patient presents with:  Hearing Loss: right ear for over 1 year       HISTORY OF PRESENT ILLNESS    He presents with a one-year history of a sensation of having more difficulty hearing through his right ear.   No other vision changes. Respiratory Negative Dyspnea and wheezing. Cardio Negative Chest pain, irregular heartbeat/palpitations and syncope. GI Negative Abdominal pain and diarrhea. Endocrine Negative Cold intolerance and heat intolerance.    Neuro Negative Release, Take 20 mg by mouth once daily. , Disp: , Rfl: 0  •  Levothyroxine Sodium (LEVOXYL) 25 MCG Oral Tab, Take 1 tablet (25 mcg total) by mouth before breakfast., Disp: 90 tablet, Rfl: 0  •  Varenicline Tartrate (CHANTIX CONTINUING MONTH AMEYA) 1 MG Oral

## 2018-05-21 ENCOUNTER — OFFICE VISIT (OUTPATIENT)
Dept: SURGERY | Facility: CLINIC | Age: 63
End: 2018-05-21

## 2018-05-21 VITALS
BODY MASS INDEX: 21.34 KG/M2 | HEART RATE: 85 BPM | DIASTOLIC BLOOD PRESSURE: 74 MMHG | WEIGHT: 125 LBS | TEMPERATURE: 98 F | HEIGHT: 64 IN | SYSTOLIC BLOOD PRESSURE: 130 MMHG

## 2018-05-21 DIAGNOSIS — N52.9 ERECTILE DYSFUNCTION, UNSPECIFIED ERECTILE DYSFUNCTION TYPE: ICD-10-CM

## 2018-05-21 DIAGNOSIS — N40.1 BENIGN NON-NODULAR PROSTATIC HYPERPLASIA WITH LOWER URINARY TRACT SYMPTOMS: Primary | ICD-10-CM

## 2018-05-21 PROCEDURE — 99212 OFFICE O/P EST SF 10 MIN: CPT | Performed by: UROLOGY

## 2018-05-21 PROCEDURE — 99213 OFFICE O/P EST LOW 20 MIN: CPT | Performed by: UROLOGY

## 2018-05-21 RX ORDER — TADALAFIL 20 MG/1
20 TABLET ORAL
Qty: 6 TABLET | Refills: 11 | Status: ON HOLD | OUTPATIENT
Start: 2018-05-21 | End: 2019-12-16

## 2018-05-22 NOTE — PROGRESS NOTES
Anthony Fernandez is a 57year old male. HPI:   Patient presents with:  Erectile Dysfuntion  Urinary Symptoms (urologic): Nocturia 2-4x per night. Patient taking finasteride and Cialis.        20-year-old male in follow-up to a previous visit February 7, 20 Sodium (LEVOXYL) 25 MCG Oral Tab Take 1 tablet (25 mcg total) by mouth before breakfast. Disp: 90 tablet Rfl: 0   Varenicline Tartrate (CHANTIX CONTINUING MONTH AMEYA) 1 MG Oral Tab Take 1 tablet (1 mg total) by mouth 2 (two) times daily.  Disp: 1 Package Rfl 1 tablet (20 mg total) by mouth daily as needed for Erectile Dysfunction.            Imaging & Referrals:  None     5/22/2018  Scott White MD

## 2018-06-14 ENCOUNTER — OFFICE VISIT (OUTPATIENT)
Dept: CARDIOLOGY CLINIC | Facility: CLINIC | Age: 63
End: 2018-06-14

## 2018-06-14 VITALS
HEART RATE: 76 BPM | DIASTOLIC BLOOD PRESSURE: 70 MMHG | BODY MASS INDEX: 20 KG/M2 | WEIGHT: 118 LBS | RESPIRATION RATE: 12 BRPM | SYSTOLIC BLOOD PRESSURE: 100 MMHG

## 2018-06-14 DIAGNOSIS — I10 ESSENTIAL HYPERTENSION, BENIGN: Primary | ICD-10-CM

## 2018-06-14 DIAGNOSIS — E78.2 MIXED HYPERLIPIDEMIA: ICD-10-CM

## 2018-06-14 DIAGNOSIS — R06.02 SHORTNESS OF BREATH: ICD-10-CM

## 2018-06-14 PROCEDURE — 99244 OFF/OP CNSLTJ NEW/EST MOD 40: CPT | Performed by: INTERNAL MEDICINE

## 2018-06-14 PROCEDURE — 99212 OFFICE O/P EST SF 10 MIN: CPT | Performed by: INTERNAL MEDICINE

## 2018-06-14 NOTE — PATIENT INSTRUCTIONS
Schedule echocardiogram  Schedule heart scan for calcium score and call us with results  After above tests are reviewed we will contact you regarding additional testing if needed

## 2018-06-14 NOTE — H&P
Tamy Fagan is a 57year old male. HPI:   This is a pleasant 23-year-old gentleman with hypertension elevated cholesterol and tobacco use who presents for cardiac assessment. We are asked by Dr. Tess Reynoso to assess with risk factors and vague dyspnea.   P AMEYA) 1 MG Oral Tab Take 1 tablet (1 mg total) by mouth 2 (two) times daily.  Disp: 1 Package Rfl: 1     Family History:  Family History   Problem Relation Age of Onset   • Dementia Mother    • Diabetes Paternal Grandmother    • Cancer Sister       Past Medi also help us assess what other tests may be useful to rule out coronary disease with risk factors. 3. Shortness of breath  He has some shortness of breath with exertion which can be explained by his tobacco use.   With risk factors however I would recomm

## 2018-06-18 ENCOUNTER — OFFICE VISIT (OUTPATIENT)
Dept: PULMONOLOGY | Facility: CLINIC | Age: 63
End: 2018-06-18

## 2018-06-18 VITALS
HEIGHT: 64 IN | RESPIRATION RATE: 18 BRPM | HEART RATE: 88 BPM | SYSTOLIC BLOOD PRESSURE: 111 MMHG | DIASTOLIC BLOOD PRESSURE: 67 MMHG | OXYGEN SATURATION: 97 % | WEIGHT: 116.38 LBS | BODY MASS INDEX: 19.87 KG/M2

## 2018-06-18 DIAGNOSIS — Z72.0 TOBACCO USE: ICD-10-CM

## 2018-06-18 DIAGNOSIS — R23.8 EASY BRUISING: ICD-10-CM

## 2018-06-18 DIAGNOSIS — R12 HEARTBURN: ICD-10-CM

## 2018-06-18 DIAGNOSIS — J30.1 SEASONAL ALLERGIC RHINITIS DUE TO POLLEN: ICD-10-CM

## 2018-06-18 DIAGNOSIS — J43.2 CENTRILOBULAR EMPHYSEMA (HCC): ICD-10-CM

## 2018-06-18 DIAGNOSIS — R93.89 ABNORMAL CT OF THE CHEST: Primary | ICD-10-CM

## 2018-06-18 PROBLEM — R06.02 SHORTNESS OF BREATH: Status: RESOLVED | Noted: 2018-06-14 | Resolved: 2018-06-18

## 2018-06-18 PROBLEM — J84.9 INTERSTITIAL LUNG DISEASE (HCC): Status: RESOLVED | Noted: 2018-04-24 | Resolved: 2018-06-18

## 2018-06-18 PROCEDURE — 99212 OFFICE O/P EST SF 10 MIN: CPT | Performed by: INTERNAL MEDICINE

## 2018-06-18 PROCEDURE — 99244 OFF/OP CNSLTJ NEW/EST MOD 40: CPT | Performed by: INTERNAL MEDICINE

## 2018-06-18 NOTE — PROGRESS NOTES
Pulmonary Consult Note    HPI:   Fulton Phalen is a 57year old male with Patient presents with:  Consult: COPD     Earmon Rist, DO    Pt states recent extensive w/u based on age per pt. Recent multiple tests and consultations.     Notes bloating and jeff 3 Inhaler Rfl: 1   atorvastatin 10 MG Oral Tab Take 1 tablet (10 mg total) by mouth once daily. Disp: 90 tablet Rfl: 1   Amlodipine Besy-Benazepril HCl 10-40 MG Oral Cap Take 1 capsule by mouth once daily.  Disp: 90 capsule Rfl: 1   Choline Fenofibrate (TRI with new 5x6 mm RML  + high risk  Counseling re ddx and possible diagnostic tests  Rec f/u CT chest 3 mo from last  Also interstitial change  No crackles  slightly worse form 2016  Plan: f/u PFTs   F/u CT chest 3 mo    (J43.2) Centrilobular emphysema ([address]

## 2018-06-20 ENCOUNTER — TELEPHONE (OUTPATIENT)
Dept: SURGERY | Facility: CLINIC | Age: 63
End: 2018-06-20

## 2018-06-20 NOTE — TELEPHONE ENCOUNTER
pt called. He needs to resched 6/22/18 uroflow with [facility]. He is going out of town for work. Please call to resched. Thank you.

## 2018-06-21 DIAGNOSIS — J44.9 CHRONIC OBSTRUCTIVE PULMONARY DISEASE, UNSPECIFIED COPD TYPE (HCC): ICD-10-CM

## 2018-06-21 RX ORDER — BUDESONIDE AND FORMOTEROL FUMARATE DIHYDRATE 160; 4.5 UG/1; UG/1
AEROSOL RESPIRATORY (INHALATION)
Qty: 30.6 INHALER | Refills: 0 | Status: SHIPPED | OUTPATIENT
Start: 2018-06-21 | End: 2018-10-09

## 2018-06-21 NOTE — TELEPHONE ENCOUNTER
Refill Protocol Appointment Criteria  · Appointment scheduled in the past 6 months or in the next 3 months  Recent Outpatient Visits            3 days ago Abnormal CT of the chest    [address] [facility] 86, [place] MD [place]    Office Visit

## 2018-06-25 ENCOUNTER — HOSPITAL ENCOUNTER (OUTPATIENT)
Dept: CARDIOLOGY CLINIC | Age: 63
Discharge: HOME | End: 2018-06-25
Attending: INTERNAL MEDICINE
Payer: COMMERCIAL

## 2018-06-25 DIAGNOSIS — I10 ESSENTIAL HYPERTENSION, BENIGN: ICD-10-CM

## 2018-06-25 DIAGNOSIS — R06.02 SHORTNESS OF BREATH: ICD-10-CM

## 2018-06-25 PROCEDURE — 93306 TTE W/DOPPLER COMPLETE: CPT | Performed by: INTERNAL MEDICINE

## 2018-06-26 ENCOUNTER — TELEPHONE (OUTPATIENT)
Dept: INTERNAL MEDICINE CLINIC | Facility: CLINIC | Age: 63
End: 2018-06-26

## 2018-06-26 NOTE — TELEPHONE ENCOUNTER
----- Message from DORY Wyman sent at 6/25/2018  4:00 PM CDT -----  Echo appears normal, if dyspnea continues recommend stress echo

## 2018-06-27 NOTE — TELEPHONE ENCOUNTER
Discussed results of echo normal and to call us if he has SOB on exertion which, he denies at this time, and then the MD may order further testing. Expressed understanding.

## 2018-07-09 DIAGNOSIS — R14.2 ERUCTATION: ICD-10-CM

## 2018-07-09 DIAGNOSIS — J30.1 ALLERGIC RHINITIS DUE TO POLLEN, UNSPECIFIED SEASONALITY: ICD-10-CM

## 2018-07-10 RX ORDER — FLUTICASONE PROPIONATE 50 MCG
SPRAY, SUSPENSION (ML) NASAL
Qty: 3 BOTTLE | Refills: 0 | Status: SHIPPED | OUTPATIENT
Start: 2018-07-10 | End: 2018-10-10

## 2018-07-10 RX ORDER — OMEPRAZOLE 20 MG/1
CAPSULE, DELAYED RELEASE ORAL
Qty: 90 CAPSULE | Refills: 0 | Status: SHIPPED | OUTPATIENT
Start: 2018-07-10 | End: 2018-10-10

## 2018-07-10 NOTE — TELEPHONE ENCOUNTER
Dr. Vianca Goodman, pt [address] [facility] 5/21/18 pt pharmacy requesting refill on finasteride if you agree please review and sign med. I copied and pasted part of your last note below. 67-year-old male in follow-up to a previous visit February 7, 2017.   He had seen me for BP

## 2018-07-10 NOTE — TELEPHONE ENCOUNTER
Refill Protocol Appointment Criteria  · Appointment scheduled in the past 12 months or in the next 3 months  Recent Outpatient Visits            3 weeks ago Abnormal CT of the chest    [address] [facility] 86, Cipriano Jones MD    Office Visi

## 2018-07-11 RX ORDER — FINASTERIDE 5 MG/1
5 TABLET, FILM COATED ORAL DAILY
Qty: 90 TABLET | Refills: 3 | Status: SHIPPED | OUTPATIENT
Start: 2018-07-11 | End: 2021-03-01

## 2018-07-16 ENCOUNTER — HOSPITAL ENCOUNTER (OUTPATIENT)
Dept: CT IMAGING | Age: 63
Discharge: HOME | End: 2018-07-16
Attending: FAMILY MEDICINE

## 2018-07-16 DIAGNOSIS — Z13.9 ENCOUNTER FOR SCREENING: ICD-10-CM

## 2018-07-16 NOTE — PROGRESS NOTES
Pt seen at [facility], [facility] for CTHS:  PRELIMINARY SCORE= 218.91  BP= 140/68  Cholestec labs as follows: nonfasting  TC= 144  HDL= 57  LDL= 52  TG= 175  GLUCOSE= 73  All results and risk factors discussed with patient; all questions and concerns addressed.

## 2018-07-17 ENCOUNTER — TELEPHONE (OUTPATIENT)
Dept: PULMONOLOGY | Facility: CLINIC | Age: 63
End: 2018-07-17

## 2018-07-18 NOTE — TELEPHONE ENCOUNTER
Informed pt of below. Explained he will need prior auth for CT Chest due 7/23/18, [facility] will obtain pa, & to make sure pa obtained prior to scheduling CT. He was given their phone #. Pt voiced understanding.

## 2018-07-20 ENCOUNTER — HOSPITAL ENCOUNTER (OUTPATIENT)
Dept: ULTRASOUND IMAGING | Age: 63
Discharge: HOME | End: 2018-07-20
Attending: FAMILY MEDICINE

## 2018-07-20 DIAGNOSIS — Z13.9 ENCOUNTER FOR SCREENING: ICD-10-CM

## 2018-07-20 NOTE — PROGRESS NOTES
Pt seen at [facility], [facility] [place] Stroke Screening tests:  PRELIMINARY results as follows:  Carotid US = right-2 mild  Left-2 mild  Abdominal Aortic US= WNL; no aneurysm noted. RJ=241/68    Bloodwork recently done on 7/16/18 with [facility].     All results and

## 2018-07-25 ENCOUNTER — HOSPITAL ENCOUNTER (OUTPATIENT)
Dept: CT IMAGING | Age: 63
Discharge: HOME | End: 2018-07-25
Attending: INTERNAL MEDICINE
Payer: COMMERCIAL

## 2018-07-25 DIAGNOSIS — R93.89 ABNORMAL CT OF THE CHEST: ICD-10-CM

## 2018-07-25 PROCEDURE — 71250 CT THORAX DX C-: CPT | Performed by: INTERNAL MEDICINE

## 2018-08-03 ENCOUNTER — TELEPHONE (OUTPATIENT)
Dept: PULMONOLOGY | Facility: CLINIC | Age: 63
End: 2018-08-03

## 2018-08-03 DIAGNOSIS — R91.1 PULMONARY NODULE: Primary | ICD-10-CM

## 2018-08-03 NOTE — TELEPHONE ENCOUNTER
----- Message from Quentin Ferro MD sent at 8/1/2018  9:05 PM CDT -----  Can you let pt know the new nodule has improved?     Can we order a f/u CT scan for pulmonary nodule(s) in 6 month from the last?  thx

## 2018-08-06 NOTE — TELEPHONE ENCOUNTER
Pt is returning call and would like a call back at work direct line please call thank you [phone]

## 2018-08-06 NOTE — TELEPHONE ENCOUNTER
LMTCB x 2, letter mailed to patient home address, repeat CT order placed in January 2019 Chronic Calendar.

## 2018-08-19 DIAGNOSIS — F17.200 TOBACCO USE DISORDER: ICD-10-CM

## 2018-08-19 DIAGNOSIS — J44.9 CHRONIC OBSTRUCTIVE PULMONARY DISEASE, UNSPECIFIED COPD TYPE (HCC): ICD-10-CM

## 2018-08-20 RX ORDER — VARENICLINE TARTRATE 1 MG/1
1 TABLET, FILM COATED ORAL 2 TIMES DAILY
Qty: 56 TABLET | Refills: 1 | Status: SHIPPED | OUTPATIENT
Start: 2018-08-20 | End: 2018-10-14

## 2018-08-20 NOTE — TELEPHONE ENCOUNTER
LOV: 4-17-18 Last Rx: 4-17-18     No protocol     Please advise in regards to refill request. Thank You

## 2018-10-09 DIAGNOSIS — J44.9 CHRONIC OBSTRUCTIVE PULMONARY DISEASE, UNSPECIFIED COPD TYPE (HCC): ICD-10-CM

## 2018-10-10 DIAGNOSIS — E78.2 MIXED HYPERLIPIDEMIA: ICD-10-CM

## 2018-10-10 DIAGNOSIS — R14.2 ERUCTATION: ICD-10-CM

## 2018-10-10 DIAGNOSIS — J30.1 ALLERGIC RHINITIS DUE TO POLLEN, UNSPECIFIED SEASONALITY: ICD-10-CM

## 2018-10-10 RX ORDER — BUDESONIDE AND FORMOTEROL FUMARATE DIHYDRATE 160; 4.5 UG/1; UG/1
AEROSOL RESPIRATORY (INHALATION)
Qty: 30.6 INHALER | Refills: 1 | Status: SHIPPED | OUTPATIENT
Start: 2018-10-10 | End: 2019-09-03

## 2018-10-11 NOTE — TELEPHONE ENCOUNTER
Refilled per protocol    Refill Protocol Appointment Criteria  · Appointment scheduled in the past 6 months or in the next 3 months  Recent Outpatient Visits            3 months ago Abnormal CT of the chest    [address], Donis REYEZ

## 2018-10-12 RX ORDER — FENOFIBRIC ACID 135 MG/1
CAPSULE, DELAYED RELEASE ORAL
Qty: 90 CAPSULE | Refills: 0 | Status: SHIPPED | OUTPATIENT
Start: 2018-10-12 | End: 2019-03-28

## 2018-10-12 RX ORDER — FLUTICASONE PROPIONATE 50 MCG
SPRAY, SUSPENSION (ML) NASAL
Qty: 3 BOTTLE | Refills: 0 | Status: SHIPPED | OUTPATIENT
Start: 2018-10-12 | End: 2018-12-18

## 2018-10-12 RX ORDER — OMEPRAZOLE 20 MG/1
CAPSULE, DELAYED RELEASE ORAL
Qty: 90 CAPSULE | Refills: 0 | Status: SHIPPED | OUTPATIENT
Start: 2018-10-12 | End: 2018-10-23

## 2018-10-13 RX ORDER — LEVOTHYROXINE SODIUM 0.03 MG/1
25 TABLET ORAL
Qty: 90 TABLET | Refills: 0 | Status: SHIPPED | OUTPATIENT
Start: 2018-10-13 | End: 2018-10-23

## 2018-10-13 NOTE — TELEPHONE ENCOUNTER
Refilled times 1 but needs appointment before the next prescription will be filled. Please call patient and set up an office visit as overdue.

## 2018-10-14 DIAGNOSIS — J44.9 CHRONIC OBSTRUCTIVE PULMONARY DISEASE, UNSPECIFIED COPD TYPE (HCC): ICD-10-CM

## 2018-10-14 DIAGNOSIS — F17.200 TOBACCO USE DISORDER: ICD-10-CM

## 2018-10-16 NOTE — TELEPHONE ENCOUNTER
Review pended refill request as it does not fall under a protocol.     Last Rx: 08/20/18  LOV: 04/17/18

## 2018-10-18 RX ORDER — VARENICLINE TARTRATE 1 MG/1
1 TABLET, FILM COATED ORAL 2 TIMES DAILY
Qty: 56 TABLET | Refills: 0 | Status: SHIPPED | OUTPATIENT
Start: 2018-10-18 | End: 2019-09-03

## 2018-10-23 ENCOUNTER — OFFICE VISIT (OUTPATIENT)
Dept: FAMILY MEDICINE CLINIC | Facility: CLINIC | Age: 63
End: 2018-10-23
Payer: COMMERCIAL

## 2018-10-23 ENCOUNTER — LAB ENCOUNTER (OUTPATIENT)
Dept: LAB | Age: 63
End: 2018-10-23
Attending: FAMILY MEDICINE
Payer: COMMERCIAL

## 2018-10-23 ENCOUNTER — HOSPITAL ENCOUNTER (OUTPATIENT)
Dept: GENERAL RADIOLOGY | Age: 63
Discharge: HOME | End: 2018-10-23
Attending: FAMILY MEDICINE
Payer: COMMERCIAL

## 2018-10-23 VITALS
HEART RATE: 103 BPM | SYSTOLIC BLOOD PRESSURE: 118 MMHG | TEMPERATURE: 98 F | HEIGHT: 64 IN | BODY MASS INDEX: 19.94 KG/M2 | DIASTOLIC BLOOD PRESSURE: 69 MMHG | WEIGHT: 116.81 LBS | RESPIRATION RATE: 16 BRPM

## 2018-10-23 DIAGNOSIS — J44.9 CHRONIC OBSTRUCTIVE PULMONARY DISEASE, UNSPECIFIED COPD TYPE (HCC): ICD-10-CM

## 2018-10-23 DIAGNOSIS — I10 ESSENTIAL HYPERTENSION, BENIGN: ICD-10-CM

## 2018-10-23 DIAGNOSIS — I10 ESSENTIAL HYPERTENSION WITH GOAL BLOOD PRESSURE LESS THAN 140/90: ICD-10-CM

## 2018-10-23 DIAGNOSIS — J30.1 ALLERGIC RHINITIS DUE TO POLLEN, UNSPECIFIED SEASONALITY: ICD-10-CM

## 2018-10-23 DIAGNOSIS — E03.9 ACQUIRED HYPOTHYROIDISM: ICD-10-CM

## 2018-10-23 DIAGNOSIS — K21.9 CHRONIC GERD: ICD-10-CM

## 2018-10-23 DIAGNOSIS — E78.2 MIXED HYPERLIPIDEMIA: ICD-10-CM

## 2018-10-23 DIAGNOSIS — R05.9 COUGH: ICD-10-CM

## 2018-10-23 DIAGNOSIS — N40.1 BENIGN NON-NODULAR PROSTATIC HYPERPLASIA WITH LOWER URINARY TRACT SYMPTOMS: ICD-10-CM

## 2018-10-23 DIAGNOSIS — F17.200 TOBACCO USE DISORDER: ICD-10-CM

## 2018-10-23 DIAGNOSIS — J44.9 CHRONIC OBSTRUCTIVE PULMONARY DISEASE, UNSPECIFIED COPD TYPE (HCC): Primary | ICD-10-CM

## 2018-10-23 PROCEDURE — 99215 OFFICE O/P EST HI 40 MIN: CPT | Performed by: FAMILY MEDICINE

## 2018-10-23 PROCEDURE — 84460 ALANINE AMINO (ALT) (SGPT): CPT

## 2018-10-23 PROCEDURE — 80048 BASIC METABOLIC PNL TOTAL CA: CPT

## 2018-10-23 PROCEDURE — 71046 X-RAY EXAM CHEST 2 VIEWS: CPT | Performed by: FAMILY MEDICINE

## 2018-10-23 PROCEDURE — 80061 LIPID PANEL: CPT

## 2018-10-23 PROCEDURE — 85025 COMPLETE CBC W/AUTO DIFF WBC: CPT

## 2018-10-23 PROCEDURE — 36415 COLL VENOUS BLD VENIPUNCTURE: CPT

## 2018-10-23 PROCEDURE — 84443 ASSAY THYROID STIM HORMONE: CPT

## 2018-10-23 PROCEDURE — 99212 OFFICE O/P EST SF 10 MIN: CPT | Performed by: FAMILY MEDICINE

## 2018-10-23 PROCEDURE — 84450 TRANSFERASE (AST) (SGOT): CPT

## 2018-10-23 RX ORDER — AMLODIPINE BESYLATE AND BENAZEPRIL HYDROCHLORIDE 10; 40 MG/1; MG/1
1 CAPSULE ORAL
Qty: 90 CAPSULE | Refills: 1 | Status: SHIPPED | OUTPATIENT
Start: 2018-10-23 | End: 2019-09-03

## 2018-10-23 RX ORDER — ALBUTEROL SULFATE 90 UG/1
AEROSOL, METERED RESPIRATORY (INHALATION)
Qty: 1 INHALER | Refills: 2 | Status: SHIPPED | OUTPATIENT
Start: 2018-10-23 | End: 2019-07-05

## 2018-10-23 RX ORDER — OMEPRAZOLE 20 MG/1
20 CAPSULE, DELAYED RELEASE ORAL
Qty: 90 CAPSULE | Refills: 1 | Status: SHIPPED | OUTPATIENT
Start: 2018-10-23 | End: 2019-09-03

## 2018-10-23 RX ORDER — ATORVASTATIN CALCIUM 10 MG/1
10 TABLET, FILM COATED ORAL
Qty: 90 TABLET | Refills: 1 | Status: SHIPPED | OUTPATIENT
Start: 2018-10-23 | End: 2019-05-23

## 2018-10-23 RX ORDER — BENZONATATE 200 MG/1
200 CAPSULE ORAL 3 TIMES DAILY PRN
Qty: 30 CAPSULE | Refills: 0 | Status: SHIPPED | OUTPATIENT
Start: 2018-10-23 | End: 2018-11-02

## 2018-10-23 RX ORDER — LEVOTHYROXINE SODIUM 0.03 MG/1
25 TABLET ORAL
Qty: 90 TABLET | Refills: 1 | Status: SHIPPED | OUTPATIENT
Start: 2018-10-23 | End: 2019-09-03

## 2018-10-23 NOTE — PROGRESS NOTES
Patient ID: Sherita Malcolm is a 57year old male. HPI  Patient presents with:  Test Results    He smoking 1/2 packs/day. He states that the Chantix does help but he is not as compliant as he should be.   He gets off the Chantix so he can start smoking 03/27/2018    CREATSERUM 0.83 03/27/2018    BUNCREA 10.8 03/27/2018    ANIONGAP 9 03/27/2018    GFRAA >60 03/27/2018    GFRNAA >60 03/27/2018    CA 9.8 03/27/2018     03/27/2018    K 4.5 03/27/2018    CL 99 03/27/2018    CO2 28 03/27/2018    All 496 100/70  05/21/18 : 130/74  05/01/18 : 138/77  04/17/18 : 130/73        Review of Systems   Constitutional: Negative for diaphoresis and fever. HENT: Positive for congestion, postnasal drip and rhinorrhea. Negative for sinus pressure and sore throat.     R 10 MG Oral Tab Take 1 tablet (10 mg total) by mouth once daily. Disp: 90 tablet Rfl: 1   Amlodipine Besy-Benazepril HCl 10-40 MG Oral Cap Take 1 capsule by mouth once daily.  Disp: 90 capsule Rfl: 1   Albuterol Sulfate HFA (PROAIR HFA) 108 (90 Base) MCG/ACT rigidity, no rebound, no guarding. Skin: Thick leathery skin due to years of smoking. Vitals reviewed.          ASSESSMENT/PLAN:     Diagnoses and all orders for this visit:    Chronic obstructive pulmonary disease, unspecified COPD type ([facility]  -     XR hypertension with goal blood pressure less than 140/90  -     Amlodipine Besy-Benazepril HCl 10-40 MG Oral Cap; Take 1 capsule by mouth once daily. Chronic GERD  -     omeprazole 20 MG Oral Capsule Delayed Release;  Take 1 capsule (20 mg total) by mouth

## 2018-12-18 DIAGNOSIS — J30.1 ALLERGIC RHINITIS DUE TO POLLEN, UNSPECIFIED SEASONALITY: ICD-10-CM

## 2018-12-18 RX ORDER — FLUTICASONE PROPIONATE 50 MCG
SPRAY, SUSPENSION (ML) NASAL
Qty: 3 BOTTLE | Refills: 0 | Status: SHIPPED | OUTPATIENT
Start: 2018-12-18 | End: 2019-04-01

## 2018-12-27 ENCOUNTER — TELEPHONE (OUTPATIENT)
Dept: PULMONOLOGY | Facility: CLINIC | Age: 63
End: 2018-12-27

## 2019-02-01 ENCOUNTER — HOSPITAL ENCOUNTER (EMERGENCY)
Facility: HOSPITAL | Age: 64
Discharge: HOME | End: 2019-02-01
Attending: PHYSICIAN ASSISTANT
Payer: COMMERCIAL

## 2019-02-01 ENCOUNTER — APPOINTMENT (OUTPATIENT)
Dept: GENERAL RADIOLOGY | Facility: HOSPITAL | Age: 64
End: 2019-02-01
Attending: PHYSICIAN ASSISTANT
Payer: COMMERCIAL

## 2019-02-01 VITALS
TEMPERATURE: 99 F | BODY MASS INDEX: 22.2 KG/M2 | SYSTOLIC BLOOD PRESSURE: 135 MMHG | WEIGHT: 130 LBS | HEIGHT: 64 IN | DIASTOLIC BLOOD PRESSURE: 95 MMHG | OXYGEN SATURATION: 96 % | HEART RATE: 95 BPM | RESPIRATION RATE: 16 BRPM

## 2019-02-01 DIAGNOSIS — M25.461 EFFUSION OF RIGHT KNEE: ICD-10-CM

## 2019-02-01 DIAGNOSIS — S86.911A KNEE STRAIN, RIGHT, INITIAL ENCOUNTER: Primary | ICD-10-CM

## 2019-02-01 DIAGNOSIS — R03.0 ELEVATED BLOOD PRESSURE READING: ICD-10-CM

## 2019-02-01 PROCEDURE — 73560 X-RAY EXAM OF KNEE 1 OR 2: CPT | Performed by: PHYSICIAN ASSISTANT

## 2019-02-01 PROCEDURE — 99283 EMERGENCY DEPT VISIT LOW MDM: CPT

## 2019-02-01 NOTE — ED PROVIDER NOTES
Patient Seen in: [facility] AND [facility] Emergency Department    History   Patient presents with:  Lower Extremity Injury (musculoskeletal)    Stated Complaint: left knee pain     HPI    HPI: Coit Pilling is a 70year old male who presents with chief complai TABLET (1 MG TOTAL) BY MOUTH 2 (TWO) TIMES DAILY. FENOFIBRIC ACID 135 MG Oral Capsule Delayed Release,  TAKE 1 CAPSULE (135 MG TOTAL) BY MOUTH ONCE DAILY.    SYMBICORT 160-4.5 MCG/ACT Inhalation Aerosol,  INHALE 2 PUFFS BY MOUTH TWICE A DAY   FINASTERIDE tenderness to the chest wall. Respiratory: Respiratory effort was normal.  There is no rales, wheezes, or rhonchi. There is no stridor. Air entry is equal.  Cardiovascular: Regular rate and rhythm, no murmurs, gallops, rubs. Capillary refill is brisk. stable over serial reexaminations as previously documented. Results reviewed and need for follow-up discussed with patient. The patient was informed of their elevated blood pressure reading in the Emergency Department.   They were informed of the danger

## 2019-02-02 ENCOUNTER — TELEPHONE (OUTPATIENT)
Dept: FAMILY MEDICINE CLINIC | Facility: CLINIC | Age: 64
End: 2019-02-02

## 2019-02-02 NOTE — TELEPHONE ENCOUNTER
Pt called in said he went to ER for his knee on 2/1 and he was advised to go to an orthopedic doctor    Hospital give him a referral already for   Dr. Mcnamara Raw would like to know if PCP can recommend a  Orthopedic doctor

## 2019-02-05 ENCOUNTER — OFFICE VISIT (OUTPATIENT)
Dept: ORTHOPEDICS CLINIC | Facility: CLINIC | Age: 64
End: 2019-02-05
Payer: COMMERCIAL

## 2019-02-05 DIAGNOSIS — M25.562 ACUTE PAIN OF LEFT KNEE: Primary | ICD-10-CM

## 2019-02-05 PROCEDURE — 99243 OFF/OP CNSLTJ NEW/EST LOW 30: CPT | Performed by: ORTHOPAEDIC SURGERY

## 2019-02-05 PROCEDURE — 99212 OFFICE O/P EST SF 10 MIN: CPT | Performed by: ORTHOPAEDIC SURGERY

## 2019-02-05 NOTE — PROGRESS NOTES
2/5/2019  Eilleen Car  12/10/1955  70year old   male  Margaret Tamayo MD    HPI:   Patient presents with:  Knee Pain: Left knee pain consult post fall on 1/30/19 and went to ER 2/1/19 with xray taken.  Pt has left knee immobilizer but takes off when FINASTERIDE 5 MG Oral Tab TAKE 1 TABLET (5 MG TOTAL) BY MOUTH DAILY. Disp: 90 tablet Rfl: 3   Tadalafil 20 MG Oral Tab Take 1 tablet (20 mg total) by mouth daily as needed for Erectile Dysfunction.  Disp: 6 tablet Rfl: 11   CHANTIX CONTINUING MONTH AMEYA 1 intact to light touch in superficial peroneal, deep peroneal, sural, saphenous, and tibial nerve distributions. The patient has 5/5 strength in tibialis anterior, gastrocsoleus complex, EHL, and FHL.   The patient has tenderness to palpation along the [facility]

## 2019-02-11 ENCOUNTER — TELEPHONE (OUTPATIENT)
Dept: ORTHOPEDICS CLINIC | Facility: CLINIC | Age: 64
End: 2019-02-11

## 2019-02-12 NOTE — TELEPHONE ENCOUNTER
Called EAMON and s/w Beverley Carlton and she states no PA required for MRI left knee, but it does require RQI through [facility].   Call VRA#[phone]    Pt procedure/Testing: MRI Left knee  Pt insurance/number to contact: [employer]  Insurance ID# and group: [id number]  Dx:

## 2019-02-14 ENCOUNTER — HOSPITAL ENCOUNTER (OUTPATIENT)
Dept: MRI IMAGING | Age: 64
Discharge: HOME | End: 2019-02-14
Attending: ORTHOPAEDIC SURGERY
Payer: COMMERCIAL

## 2019-02-14 DIAGNOSIS — M25.562 ACUTE PAIN OF LEFT KNEE: ICD-10-CM

## 2019-02-14 PROCEDURE — 73721 MRI JNT OF LWR EXTRE W/O DYE: CPT | Performed by: ORTHOPAEDIC SURGERY

## 2019-02-18 NOTE — TELEPHONE ENCOUNTER
Per referral notes: via Ryan Santana 2/14/19  Status: Authorized  Status Check Method: aim  Contact Name, Number: online   Approved CPTs: 54386  LDY#[id number]    No further action required as pt completed 2/14/19.

## 2019-02-22 ENCOUNTER — OFFICE VISIT (OUTPATIENT)
Dept: ORTHOPEDICS CLINIC | Facility: CLINIC | Age: 64
End: 2019-02-22
Payer: COMMERCIAL

## 2019-02-22 DIAGNOSIS — S83.412A SPRAIN OF MEDIAL COLLATERAL LIGAMENT OF LEFT KNEE, INITIAL ENCOUNTER: ICD-10-CM

## 2019-02-22 DIAGNOSIS — S83.242A ACUTE MEDIAL MENISCUS TEAR, LEFT, INITIAL ENCOUNTER: Primary | ICD-10-CM

## 2019-02-22 PROCEDURE — 99212 OFFICE O/P EST SF 10 MIN: CPT | Performed by: ORTHOPAEDIC SURGERY

## 2019-02-22 PROCEDURE — 99213 OFFICE O/P EST LOW 20 MIN: CPT | Performed by: ORTHOPAEDIC SURGERY

## 2019-02-25 ENCOUNTER — TELEPHONE (OUTPATIENT)
Dept: ORTHOPEDICS CLINIC | Facility: CLINIC | Age: 64
End: 2019-02-25

## 2019-02-25 DIAGNOSIS — S83.242A ACUTE MEDIAL MENISCUS TEAR, LEFT, INITIAL ENCOUNTER: Primary | ICD-10-CM

## 2019-03-01 ENCOUNTER — OFFICE VISIT (OUTPATIENT)
Dept: ORTHOPEDICS CLINIC | Facility: CLINIC | Age: 64
End: 2019-03-01
Payer: COMMERCIAL

## 2019-03-01 ENCOUNTER — TELEPHONE (OUTPATIENT)
Dept: ORTHOPEDICS CLINIC | Facility: CLINIC | Age: 64
End: 2019-03-01

## 2019-03-01 DIAGNOSIS — S83.242A ACUTE MEDIAL MENISCUS TEAR, LEFT, INITIAL ENCOUNTER: Primary | ICD-10-CM

## 2019-03-01 PROCEDURE — 99212 OFFICE O/P EST SF 10 MIN: CPT | Performed by: ORTHOPAEDIC SURGERY

## 2019-03-01 PROCEDURE — 99213 OFFICE O/P EST LOW 20 MIN: CPT | Performed by: ORTHOPAEDIC SURGERY

## 2019-03-01 NOTE — TELEPHONE ENCOUNTER
pt brought in a physician statement to be completed. [facility]. Scanned and originals sent to [facility].

## 2019-03-01 NOTE — H&P
3/1/2019  Sherita Malcolm  12/10/1955  70year old   male  Daria Willett MD    HPI:   Patient presents with: Follow - Up: left knee- pt would like ot discuss sx. This is a pleasant 15-year-old male with a chief complaint of left knee pain.   He ha • ED (erectile dysfunction)    • Esophageal reflux    • Hyperlipidemia    • Hypertension    • Hypothyroidism    • Pulmonary nodules     first noted 2016on LD screen CT   • Tobacco abuse       Past Surgical History:   Procedure Laterality Date   • COLONOS tear, left, initial encounter  (primary encounter diagnosis)    The risks, indications, benefits, and procedures of both operative and non-operative treatment were discussed with the patient.     This is a pleasant 38-year-old male that sustained a left kne

## 2019-03-01 NOTE — H&P (VIEW-ONLY)
3/1/2019  Donaldeliudjulio cesar Huff  12/10/1955  70year old   male  Alona Cockayne, MD    HPI:   Patient presents with: Follow - Up: left knee- pt would like ot discuss sx. This is a pleasant 75-year-old male with a chief complaint of left knee pain.   He ha • ED (erectile dysfunction)    • Esophageal reflux    • Hyperlipidemia    • Hypertension    • Hypothyroidism    • Pulmonary nodules     first noted 2016on LD screen CT   • Tobacco abuse       Past Surgical History:   Procedure Laterality Date   • COLONOS tear, left, initial encounter  (primary encounter diagnosis)    The risks, indications, benefits, and procedures of both operative and non-operative treatment were discussed with the patient.     This is a pleasant 77-year-old male that sustained a left kne

## 2019-03-02 NOTE — TELEPHONE ENCOUNTER
[address] form for Dr. Ning Andre received in Forms dept+ FCR+ Signed release, paid $25 w/ . Logged for processing.  NK

## 2019-03-04 ENCOUNTER — TELEPHONE (OUTPATIENT)
Dept: ORTHOPEDICS CLINIC | Facility: CLINIC | Age: 64
End: 2019-03-04

## 2019-03-04 NOTE — TELEPHONE ENCOUNTER
Call to [facility]   Surgery 3-13-19    [facility]  Out patient    Left knee arthroscopy, Partial  Medial meniscectomy  [address] O09.594J  Spoke to representative Renee  No prior authorization required  [id number] call reference number

## 2019-03-04 NOTE — TELEPHONE ENCOUNTER
Explained that I would be doing the pts insurance authorization from Chris Baker and given phone number for forms completions. For his [facility] paperwork.  Pt verbalizes understanding

## 2019-03-05 ENCOUNTER — APPOINTMENT (OUTPATIENT)
Dept: LAB | Age: 64
End: 2019-03-05
Attending: FAMILY MEDICINE
Payer: COMMERCIAL

## 2019-03-05 ENCOUNTER — OFFICE VISIT (OUTPATIENT)
Dept: FAMILY MEDICINE CLINIC | Facility: CLINIC | Age: 64
End: 2019-03-05
Payer: COMMERCIAL

## 2019-03-05 ENCOUNTER — LAB ENCOUNTER (OUTPATIENT)
Dept: LAB | Age: 64
End: 2019-03-05
Attending: FAMILY MEDICINE
Payer: COMMERCIAL

## 2019-03-05 ENCOUNTER — HOSPITAL ENCOUNTER (OUTPATIENT)
Dept: GENERAL RADIOLOGY | Age: 64
Discharge: HOME | End: 2019-03-05
Attending: FAMILY MEDICINE
Payer: COMMERCIAL

## 2019-03-05 VITALS
HEIGHT: 64 IN | SYSTOLIC BLOOD PRESSURE: 106 MMHG | BODY MASS INDEX: 19.63 KG/M2 | WEIGHT: 115 LBS | RESPIRATION RATE: 16 BRPM | TEMPERATURE: 98 F | DIASTOLIC BLOOD PRESSURE: 63 MMHG | HEART RATE: 86 BPM

## 2019-03-05 DIAGNOSIS — Z23 NEED FOR VACCINATION: ICD-10-CM

## 2019-03-05 DIAGNOSIS — I10 ESSENTIAL HYPERTENSION, BENIGN: ICD-10-CM

## 2019-03-05 DIAGNOSIS — Z01.818 PREOP EXAMINATION: ICD-10-CM

## 2019-03-05 DIAGNOSIS — E03.9 ACQUIRED HYPOTHYROIDISM: ICD-10-CM

## 2019-03-05 DIAGNOSIS — E78.2 MIXED HYPERLIPIDEMIA: ICD-10-CM

## 2019-03-05 DIAGNOSIS — S83.242A TEAR OF MEDIAL MENISCUS OF LEFT KNEE, CURRENT, UNSPECIFIED TEAR TYPE, INITIAL ENCOUNTER: Primary | ICD-10-CM

## 2019-03-05 DIAGNOSIS — F17.200 TOBACCO USE DISORDER: ICD-10-CM

## 2019-03-05 DIAGNOSIS — S83.412A SPRAIN OF MEDIAL COLLATERAL LIGAMENT OF LEFT KNEE, INITIAL ENCOUNTER: ICD-10-CM

## 2019-03-05 DIAGNOSIS — J44.9 CHRONIC OBSTRUCTIVE PULMONARY DISEASE, UNSPECIFIED COPD TYPE (HCC): ICD-10-CM

## 2019-03-05 DIAGNOSIS — M25.562 ACUTE PAIN OF LEFT KNEE: ICD-10-CM

## 2019-03-05 LAB
ALBUMIN SERPL-MCNC: 4.6 G/DL (ref 3.4–5)
ALBUMIN/GLOB SERPL: 1.4 {RATIO} (ref 1–2)
ALP LIVER SERPL-CCNC: 59 U/L (ref 45–117)
ALT SERPL-CCNC: 19 U/L (ref 16–61)
ANION GAP SERPL CALC-SCNC: 8 MMOL/L (ref 0–18)
APTT PPP: 28.1 SECONDS (ref 23.2–35.3)
AST SERPL-CCNC: 17 U/L (ref 15–37)
BACTERIA UR QL AUTO: NEGATIVE /HPF
BASOPHILS # BLD AUTO: 0.08 X10(3) UL (ref 0–0.2)
BASOPHILS NFR BLD AUTO: 0.8 %
BILIRUB SERPL-MCNC: 0.4 MG/DL (ref 0.1–2)
BILIRUB UR QL: NEGATIVE
BUN BLD-MCNC: 15 MG/DL (ref 7–18)
BUN/CREAT SERPL: 19.5 (ref 10–20)
CALCIUM BLD-MCNC: 9.5 MG/DL (ref 8.5–10.1)
CHLORIDE SERPL-SCNC: 99 MMOL/L (ref 98–107)
CLARITY UR: CLEAR
CO2 SERPL-SCNC: 27 MMOL/L (ref 21–32)
COLOR UR: YELLOW
CREAT BLD-MCNC: 0.77 MG/DL (ref 0.7–1.3)
DEPRECATED RDW RBC AUTO: 43.1 FL (ref 35.1–46.3)
EOSINOPHIL # BLD AUTO: 0.4 X10(3) UL (ref 0–0.7)
EOSINOPHIL NFR BLD AUTO: 3.9 %
ERYTHROCYTE [DISTWIDTH] IN BLOOD BY AUTOMATED COUNT: 13.2 % (ref 11–15)
GLOBULIN PLAS-MCNC: 3.2 G/DL (ref 2.8–4.4)
GLUCOSE BLD-MCNC: 91 MG/DL (ref 70–99)
GLUCOSE UR-MCNC: NEGATIVE MG/DL
HCT VFR BLD AUTO: 45.4 % (ref 39–53)
HGB BLD-MCNC: 14.9 G/DL (ref 13–17.5)
HGB UR QL STRIP.AUTO: NEGATIVE
IMM GRANULOCYTES # BLD AUTO: 0.05 X10(3) UL (ref 0–1)
IMM GRANULOCYTES NFR BLD: 0.5 %
INR BLD: 0.93 (ref 0.9–1.2)
KETONES UR-MCNC: NEGATIVE MG/DL
LEUKOCYTE ESTERASE UR QL STRIP.AUTO: NEGATIVE
LYMPHOCYTES # BLD AUTO: 1.2 X10(3) UL (ref 1–4)
LYMPHOCYTES NFR BLD AUTO: 11.8 %
M PROTEIN MFR SERPL ELPH: 7.8 G/DL (ref 6.4–8.2)
MCH RBC QN AUTO: 29.4 PG (ref 26–34)
MCHC RBC AUTO-ENTMCNC: 32.8 G/DL (ref 31–37)
MCV RBC AUTO: 89.5 FL (ref 80–100)
MONOCYTES # BLD AUTO: 0.91 X10(3) UL (ref 0.1–1)
MONOCYTES NFR BLD AUTO: 8.9 %
NEUTROPHILS # BLD AUTO: 7.54 X10 (3) UL (ref 1.5–7.7)
NEUTROPHILS # BLD AUTO: 7.54 X10(3) UL (ref 1.5–7.7)
NEUTROPHILS NFR BLD AUTO: 74.1 %
NITRITE UR QL STRIP.AUTO: NEGATIVE
OSMOLALITY SERPL CALC.SUM OF ELEC: 278 MOSM/KG (ref 275–295)
PH UR: 6 [PH] (ref 5–8)
PLATELET # BLD AUTO: 341 10(3)UL (ref 150–450)
POTASSIUM SERPL-SCNC: 4.3 MMOL/L (ref 3.5–5.1)
PROT UR-MCNC: NEGATIVE MG/DL
PROTHROMBIN TIME: 12.3 SECONDS (ref 11.8–14.5)
RBC # BLD AUTO: 5.07 X10(6)UL (ref 3.8–5.8)
RBC #/AREA URNS AUTO: <1 /HPF
SODIUM SERPL-SCNC: 134 MMOL/L (ref 136–145)
SP GR UR STRIP: 1.01 (ref 1–1.03)
TSI SER-ACNC: 3.27 MIU/ML (ref 0.36–3.74)
UROBILINOGEN UR STRIP-ACNC: <2
VIT C UR-MCNC: 40 MG/DL
WBC # BLD AUTO: 10.2 X10(3) UL (ref 4–11)
WBC #/AREA URNS AUTO: 1 /HPF

## 2019-03-05 PROCEDURE — 85610 PROTHROMBIN TIME: CPT

## 2019-03-05 PROCEDURE — 84443 ASSAY THYROID STIM HORMONE: CPT

## 2019-03-05 PROCEDURE — 36415 COLL VENOUS BLD VENIPUNCTURE: CPT

## 2019-03-05 PROCEDURE — 81003 URINALYSIS AUTO W/O SCOPE: CPT

## 2019-03-05 PROCEDURE — 99212 OFFICE O/P EST SF 10 MIN: CPT | Performed by: FAMILY MEDICINE

## 2019-03-05 PROCEDURE — 80053 COMPREHEN METABOLIC PANEL: CPT

## 2019-03-05 PROCEDURE — 85025 COMPLETE CBC W/AUTO DIFF WBC: CPT

## 2019-03-05 PROCEDURE — 99244 OFF/OP CNSLTJ NEW/EST MOD 40: CPT | Performed by: FAMILY MEDICINE

## 2019-03-05 PROCEDURE — 85730 THROMBOPLASTIN TIME PARTIAL: CPT

## 2019-03-05 PROCEDURE — 93010 ELECTROCARDIOGRAM REPORT: CPT | Performed by: FAMILY MEDICINE

## 2019-03-05 PROCEDURE — 90670 PCV13 VACCINE IM: CPT | Performed by: FAMILY MEDICINE

## 2019-03-05 PROCEDURE — 87081 CULTURE SCREEN ONLY: CPT

## 2019-03-05 PROCEDURE — 90471 IMMUNIZATION ADMIN: CPT | Performed by: FAMILY MEDICINE

## 2019-03-05 PROCEDURE — 93005 ELECTROCARDIOGRAM TRACING: CPT

## 2019-03-05 PROCEDURE — 71046 X-RAY EXAM CHEST 2 VIEWS: CPT | Performed by: FAMILY MEDICINE

## 2019-03-05 NOTE — PROGRESS NOTES
Patient ID: Osvaldo Coats is a 70year old male. HPI  Patient presents with:  Pre-Op Exam: 3/13/19 left knee arthroscopy. Pain 4/10 to left knee.  Pt requesting refill for Finasteride    Patient is here for preoperative clearance by Dr. Radha Isabel for lef Cardiovascular: Negative for chest pain. Gastrointestinal: Negative for abdominal pain. Skin: Negative for color change. Neurological: Negative for speech difficulty and weakness. Psychiatric/Behavioral: The patient is not nervous/anxious. FINASTERIDE 5 MG Oral Tab TAKE 1 TABLET (5 MG TOTAL) BY MOUTH DAILY. Disp: 90 tablet Rfl: 3   Tadalafil 20 MG Oral Tab Take 1 tablet (20 mg total) by mouth daily as needed for Erectile Dysfunction.  Disp: 6 tablet Rfl: 11     Allergies:No Known Allergies Future  -     PROTHROMBIN TIME (PT); Future  -     COMP METABOLIC PANEL (14); Future  -     MRSA CULTURE ONLY; Future    Tobacco use disorder  unfortunately continues to smoke. He seen pulmonology, cardiology.   He will continue to try to abstain from toba 3/5/2019, 1:46 PM.    I, Leonid Ford DO,  personally performed the services described in this documentation. All medical record entries made by the scribe were at my direction and in my presence.   I have reviewed the chart and discharge instructions (if

## 2019-03-06 NOTE — TELEPHONE ENCOUNTER
Call to [facility]. No answer. Left voice message. Insurance prior auth for surgery not required. Requeted call back if any questions.

## 2019-03-07 NOTE — TELEPHONE ENCOUNTER
Call to [facility]. Notified that his surgery insurance authorization is completed. No auth required.  Pt verbalizes understanding

## 2019-03-08 ENCOUNTER — TELEPHONE (OUTPATIENT)
Dept: FAMILY MEDICINE CLINIC | Facility: CLINIC | Age: 64
End: 2019-03-08

## 2019-03-08 RX ORDER — IBUPROFEN 400 MG/1
400 TABLET ORAL EVERY 6 HOURS PRN
Status: ON HOLD | COMMUNITY
End: 2019-03-13

## 2019-03-08 NOTE — TELEPHONE ENCOUNTER
Dr. Mtz Flank form: 1st day off 3/13/19  Please sign off on form:  -Highlight the patient and hit \"Chart\" button. -In Chart Review, w/in the Encounter tab - click 1 time on the Telephone call encounter for  3/8/19.  Scroll down the telephone en

## 2019-03-09 NOTE — TELEPHONE ENCOUNTER
Len,    Labs and MRSA are all stable. Patient has been approved for surgery and is medically cleared.     Thank you,    Marvin

## 2019-03-10 DIAGNOSIS — J44.9 CHRONIC OBSTRUCTIVE PULMONARY DISEASE, UNSPECIFIED COPD TYPE (HCC): ICD-10-CM

## 2019-03-10 RX ORDER — BUDESONIDE AND FORMOTEROL FUMARATE DIHYDRATE 160; 4.5 UG/1; UG/1
AEROSOL RESPIRATORY (INHALATION)
Qty: 30.6 INHALER | Refills: 1 | Status: SHIPPED | OUTPATIENT
Start: 2019-03-10 | End: 2019-09-03

## 2019-03-10 NOTE — TELEPHONE ENCOUNTER
Please review; protocol failed.   Not on active med list      Asthma & COPD:   Refill Protocol Appointment Criteria  · Appointment scheduled in the past 6 months or in the next 3 months  Recent Outpatient Visits            5 days ago Tear of medial meniscus

## 2019-03-11 NOTE — TELEPHONE ENCOUNTER
Disability faxed to [employer] [facility]. Emailed 2 separate emails to pt: 1 with just disab form, 1 with notes b/c pt needs to send Disab form to HR. Pt aware.

## 2019-03-13 ENCOUNTER — ANESTHESIA EVENT (OUTPATIENT)
Dept: SURGERY | Facility: HOSPITAL | Age: 64
End: 2019-03-13
Payer: COMMERCIAL

## 2019-03-13 ENCOUNTER — ANESTHESIA (OUTPATIENT)
Dept: SURGERY | Facility: HOSPITAL | Age: 64
End: 2019-03-13
Payer: COMMERCIAL

## 2019-03-13 ENCOUNTER — HOSPITAL ENCOUNTER (OUTPATIENT)
Facility: HOSPITAL | Age: 64
Setting detail: HOSPITAL OUTPATIENT SURGERY
Discharge: HOME | End: 2019-03-13
Attending: ORTHOPAEDIC SURGERY | Admitting: ORTHOPAEDIC SURGERY
Payer: COMMERCIAL

## 2019-03-13 VITALS
OXYGEN SATURATION: 96 % | RESPIRATION RATE: 18 BRPM | BODY MASS INDEX: 19.46 KG/M2 | TEMPERATURE: 98 F | HEART RATE: 77 BPM | SYSTOLIC BLOOD PRESSURE: 123 MMHG | HEIGHT: 64 IN | DIASTOLIC BLOOD PRESSURE: 70 MMHG | WEIGHT: 114 LBS

## 2019-03-13 DIAGNOSIS — S83.242A ACUTE MEDIAL MENISCUS TEAR OF LEFT KNEE, INITIAL ENCOUNTER: ICD-10-CM

## 2019-03-13 PROCEDURE — 0SBD4ZZ EXCISION OF LEFT KNEE JOINT, PERCUTANEOUS ENDOSCOPIC APPROACH: ICD-10-PCS | Performed by: ORTHOPAEDIC SURGERY

## 2019-03-13 RX ORDER — METOCLOPRAMIDE 10 MG/1
10 TABLET ORAL ONCE
Status: DISCONTINUED | OUTPATIENT
Start: 2019-03-13 | End: 2019-03-13 | Stop reason: HOSPADM

## 2019-03-13 RX ORDER — ONDANSETRON 2 MG/ML
4 INJECTION INTRAMUSCULAR; INTRAVENOUS EVERY 6 HOURS PRN
Status: DISCONTINUED | OUTPATIENT
Start: 2019-03-13 | End: 2019-03-13

## 2019-03-13 RX ORDER — SODIUM CHLORIDE, SODIUM LACTATE, POTASSIUM CHLORIDE, CALCIUM CHLORIDE 600; 310; 30; 20 MG/100ML; MG/100ML; MG/100ML; MG/100ML
INJECTION, SOLUTION INTRAVENOUS CONTINUOUS
Status: DISCONTINUED | OUTPATIENT
Start: 2019-03-13 | End: 2019-03-13

## 2019-03-13 RX ORDER — ONDANSETRON 2 MG/ML
INJECTION INTRAMUSCULAR; INTRAVENOUS AS NEEDED
Status: DISCONTINUED | OUTPATIENT
Start: 2019-03-13 | End: 2019-03-13 | Stop reason: SURG

## 2019-03-13 RX ORDER — BUPIVACAINE HYDROCHLORIDE AND EPINEPHRINE 2.5; 5 MG/ML; UG/ML
INJECTION, SOLUTION INFILTRATION; PERINEURAL AS NEEDED
Status: DISCONTINUED | OUTPATIENT
Start: 2019-03-13 | End: 2019-03-13 | Stop reason: HOSPADM

## 2019-03-13 RX ORDER — CEFAZOLIN SODIUM/WATER 2 G/20 ML
2 SYRINGE (ML) INTRAVENOUS ONCE
Status: COMPLETED | OUTPATIENT
Start: 2019-03-13 | End: 2019-03-13

## 2019-03-13 RX ORDER — ASPIRIN 325 MG
325 TABLET ORAL DAILY
Qty: 14 TABLET | Refills: 0 | Status: SHIPPED | OUTPATIENT
Start: 2019-03-13 | End: 2019-09-03

## 2019-03-13 RX ORDER — FAMOTIDINE 20 MG/1
20 TABLET ORAL ONCE
Status: DISCONTINUED | OUTPATIENT
Start: 2019-03-13 | End: 2019-03-13 | Stop reason: HOSPADM

## 2019-03-13 RX ORDER — MORPHINE SULFATE 2 MG/ML
2 INJECTION, SOLUTION INTRAMUSCULAR; INTRAVENOUS EVERY 10 MIN PRN
Status: DISCONTINUED | OUTPATIENT
Start: 2019-03-13 | End: 2019-03-13

## 2019-03-13 RX ORDER — MORPHINE SULFATE 10 MG/ML
6 INJECTION, SOLUTION INTRAMUSCULAR; INTRAVENOUS EVERY 10 MIN PRN
Status: DISCONTINUED | OUTPATIENT
Start: 2019-03-13 | End: 2019-03-13

## 2019-03-13 RX ORDER — ACETAMINOPHEN 500 MG
1000 TABLET ORAL ONCE
Status: COMPLETED | OUTPATIENT
Start: 2019-03-13 | End: 2019-03-13

## 2019-03-13 RX ORDER — HYDROCODONE BITARTRATE AND ACETAMINOPHEN 5; 325 MG/1; MG/1
1 TABLET ORAL AS NEEDED
Status: DISCONTINUED | OUTPATIENT
Start: 2019-03-13 | End: 2019-03-13

## 2019-03-13 RX ORDER — MORPHINE SULFATE 4 MG/ML
4 INJECTION, SOLUTION INTRAMUSCULAR; INTRAVENOUS EVERY 10 MIN PRN
Status: DISCONTINUED | OUTPATIENT
Start: 2019-03-13 | End: 2019-03-13

## 2019-03-13 RX ORDER — MAGNESIUM HYDROXIDE 1200 MG/15ML
LIQUID ORAL CONTINUOUS PRN
Status: COMPLETED | OUTPATIENT
Start: 2019-03-13 | End: 2019-03-13

## 2019-03-13 RX ORDER — HYDROCODONE BITARTRATE AND ACETAMINOPHEN 5; 325 MG/1; MG/1
2 TABLET ORAL AS NEEDED
Status: DISCONTINUED | OUTPATIENT
Start: 2019-03-13 | End: 2019-03-13

## 2019-03-13 RX ORDER — ONDANSETRON 2 MG/ML
4 INJECTION INTRAMUSCULAR; INTRAVENOUS ONCE AS NEEDED
Status: DISCONTINUED | OUTPATIENT
Start: 2019-03-13 | End: 2019-03-13

## 2019-03-13 RX ORDER — HYDROCODONE BITARTRATE AND ACETAMINOPHEN 5; 325 MG/1; MG/1
1 TABLET ORAL EVERY 6 HOURS PRN
Qty: 10 TABLET | Refills: 0 | Status: SHIPPED | OUTPATIENT
Start: 2019-03-13 | End: 2019-09-03

## 2019-03-13 RX ORDER — NALOXONE HYDROCHLORIDE 0.4 MG/ML
80 INJECTION, SOLUTION INTRAMUSCULAR; INTRAVENOUS; SUBCUTANEOUS AS NEEDED
Status: DISCONTINUED | OUTPATIENT
Start: 2019-03-13 | End: 2019-03-13

## 2019-03-13 RX ORDER — HALOPERIDOL 5 MG/ML
0.25 INJECTION INTRAMUSCULAR ONCE AS NEEDED
Status: DISCONTINUED | OUTPATIENT
Start: 2019-03-13 | End: 2019-03-13

## 2019-03-13 RX ADMIN — CEFAZOLIN SODIUM/WATER 2 G: 2 G/20 ML SYRINGE (ML) INTRAVENOUS at 07:42:00

## 2019-03-13 RX ADMIN — ONDANSETRON 4 MG: 2 INJECTION INTRAMUSCULAR; INTRAVENOUS at 07:40:00

## 2019-03-13 NOTE — BRIEF OP NOTE
Pre-Operative Diagnosis: acute medial meniscus tear of left knee intial encounter     Post-Operative Diagnosis: partial medial meniscus tear      Procedure Performed:   Procedure(s):  LEFT KNEE ARTHROSCOPY, PARTIAL MEDIAL MENISCECTOMY    Surgeon(s) and Rol

## 2019-03-13 NOTE — ANESTHESIA PREPROCEDURE EVALUATION
Anesthesia PreOp Note    HPI:     Jackie Foss is a 70year old male who presents for preoperative consultation requested by: Harshad Sher MD    Date of Surgery: 3/13/2019    Procedure(s):  KNEE ARTHROSCOPY  Indication: acute medial meniscus tear SURGERY     • OTHER SURGICAL HISTORY  2009    cyst removal, side of head         Medications Prior to Admission:  Budesonide-Formoterol Fumarate (SYMBICORT) 160-4.5 MCG/ACT Inhalation Aerosol INHALE 2 PUFFS INTO THE LUNGS TWICE A DAY Disp: 30.6 Inhaler Rfl Dysfunction.  Disp: 6 tablet Rfl: 11 Past Month at Unknown time       Current Facility-Administered Medications Ordered in Epic:  lactated ringers infusion  Intravenous Continuous Jean Vasquez MD Last Rate: 20 mL/hr at 03/13/19 0715   ceFAZolin adeel organizations: Not on file        Relationship status: Not on file      Intimate partner violence:        Fear of current or ex partner: Not on file        Emotionally abused: Not on file        Physically abused: Not on file        Forced sexual activity: Exam     Patient summary reviewed and Nursing notes reviewed    Airway   Mallampati: I  Dental - normal exam     Pulmonary - negative ROS and normal exam   Cardiovascular - negative ROS and normal exam  Exercise tolerance: good    Neuro/Psych - negative RO

## 2019-03-13 NOTE — ANESTHESIA POSTPROCEDURE EVALUATION
Patient: Romeo Flores    Procedure Summary     Date:  03/13/19 Room / Location:  [address] [place] [address] MAIN OR 12 / [address] [place] [address] MAIN OR    Anesthesia Start:  5009 Anesthesia Stop:      Procedure:  KNEE ARTHROSCOPY (Left Knee) Diagnosis:       Acute medial meniscus tear of left kn

## 2019-03-13 NOTE — OPERATIVE REPORT
[facility]    PATIENT'S NAME: Tobykaren Javed   ATTENDING PHYSICIAN: Merline Dash MD   OPERATING PHYSICIAN: Merline Dash MD   PATIENT ACCOUNT#:   [id number]    LOCATION:  [place] [address]  MEDICAL RECORD #:   [id number]       DATE OF compartment. There were no loose bodies in the medial or lateral gutters. The medial compartment was entered and there was a tear in the posterior horn of the medial meniscus that made the meniscus unstable.   The meniscus was debrided back to a stable ri

## 2019-03-13 NOTE — ANESTHESIA PROCEDURE NOTES
ANESTHESIA INTUBATION  Date/Time: 3/13/2019 7:43 AM  Urgency: elective    Airway not difficult    General Information and Staff    Patient location during procedure: OR  Anesthesiologist: Hope Fatima MD  Performed: anesthesiologist     Indications a

## 2019-03-13 NOTE — INTERVAL H&P NOTE
Pre-op Diagnosis: acute medial meniscus tear of left knee intial encounter    The above referenced H&P was reviewed by Yolanda Zambrano MD on 3/13/2019, the patient was examined and no significant changes have occurred in the patient's condition since t

## 2019-03-19 ENCOUNTER — OFFICE VISIT (OUTPATIENT)
Dept: ORTHOPEDICS CLINIC | Facility: CLINIC | Age: 64
End: 2019-03-19
Payer: COMMERCIAL

## 2019-03-19 DIAGNOSIS — S83.242A ACUTE MEDIAL MENISCUS TEAR, LEFT, INITIAL ENCOUNTER: Primary | ICD-10-CM

## 2019-03-19 PROCEDURE — 99024 POSTOP FOLLOW-UP VISIT: CPT | Performed by: ORTHOPAEDIC SURGERY

## 2019-03-19 PROCEDURE — 99212 OFFICE O/P EST SF 10 MIN: CPT | Performed by: ORTHOPAEDIC SURGERY

## 2019-03-19 NOTE — PROGRESS NOTES
This is a pleasant 68-year-old male that is 6 days status post left knee arthroscopy and partial medial meniscectomy. He had no chest pain or shortness of breath. He reports having minimal to no pain in the left knee.   He comes in today for repeat evalua

## 2019-03-28 DIAGNOSIS — E78.2 MIXED HYPERLIPIDEMIA: ICD-10-CM

## 2019-03-28 RX ORDER — FENOFIBRIC ACID 135 MG/1
CAPSULE, DELAYED RELEASE ORAL
Qty: 90 CAPSULE | Refills: 0 | Status: SHIPPED | OUTPATIENT
Start: 2019-03-28 | End: 2019-06-24

## 2019-04-01 DIAGNOSIS — J30.1 ALLERGIC RHINITIS DUE TO POLLEN, UNSPECIFIED SEASONALITY: ICD-10-CM

## 2019-04-01 RX ORDER — FLUTICASONE PROPIONATE 50 MCG
SPRAY, SUSPENSION (ML) NASAL
Qty: 3 BOTTLE | Refills: 0 | Status: SHIPPED | OUTPATIENT
Start: 2019-04-01 | End: 2019-06-27

## 2019-04-02 ENCOUNTER — OFFICE VISIT (OUTPATIENT)
Dept: PHYSICAL THERAPY | Age: 64
End: 2019-04-02
Attending: ORTHOPAEDIC SURGERY
Payer: COMMERCIAL

## 2019-04-02 DIAGNOSIS — S83.242A ACUTE MEDIAL MENISCUS TEAR, LEFT, INITIAL ENCOUNTER: ICD-10-CM

## 2019-04-02 PROCEDURE — 97162 PT EVAL MOD COMPLEX 30 MIN: CPT

## 2019-04-02 PROCEDURE — 97110 THERAPEUTIC EXERCISES: CPT

## 2019-04-02 NOTE — PROGRESS NOTES
LOWER EXTREMITY EVALUATION:   Referring Physician: Dr. Reed Gordon  Date of Onset: 3/13/19 Date of Service: 4/2/2019   Diagnosis: Acute medial meniscus tear, left, initial encounter (U60.881C)  PATIENT SUMMARY:   Gissell Richardson is a 70year old y/o male who p along medial joint line  Sensation: intact to light touch  Circumferential measurements:  Joint line: R 32 cm, L 34 cm                                      AROM:  Knee   Flexion: R 140; L 132  Extension: R 0; L -3          Accessory motion: NT    Flexibili certify the need for these services furnished under this plan of treatment and while under my care.     X___________________________________________________Date______________    Certification From: 6/1/9539  To:7/1/2019

## 2019-04-04 ENCOUNTER — OFFICE VISIT (OUTPATIENT)
Dept: PHYSICAL THERAPY | Age: 64
End: 2019-04-04
Attending: ORTHOPAEDIC SURGERY
Payer: COMMERCIAL

## 2019-04-04 DIAGNOSIS — S83.242A ACUTE MEDIAL MENISCUS TEAR, LEFT, INITIAL ENCOUNTER: ICD-10-CM

## 2019-04-04 PROCEDURE — 97110 THERAPEUTIC EXERCISES: CPT

## 2019-04-04 NOTE — PROGRESS NOTES
Diagnosis: Acute medial meniscus tear, left, initial encounter (D47.394L)  Authorized # of Visits:  8 [employer] PPO         Next MD visit: none scheduled  Referring physician: Ezio Johnson  Fall Risk: standard         Precautions:         Medication Changes since

## 2019-04-09 ENCOUNTER — OFFICE VISIT (OUTPATIENT)
Dept: PHYSICAL THERAPY | Age: 64
End: 2019-04-09
Attending: ORTHOPAEDIC SURGERY
Payer: COMMERCIAL

## 2019-04-09 DIAGNOSIS — S83.242A ACUTE MEDIAL MENISCUS TEAR, LEFT, INITIAL ENCOUNTER: ICD-10-CM

## 2019-04-09 PROCEDURE — 97110 THERAPEUTIC EXERCISES: CPT

## 2019-04-09 NOTE — PROGRESS NOTES
Diagnosis: Acute medial meniscus tear, left, initial encounter (N40.977K)  Authorized # of Visits:  8 [employer] PPO         Next MD visit: none scheduled  Referring physician: Shelly Mcgee  Fall Risk: standard         Precautions:         Medication Changes since

## 2019-04-11 ENCOUNTER — OFFICE VISIT (OUTPATIENT)
Dept: PHYSICAL THERAPY | Age: 64
End: 2019-04-11
Attending: ORTHOPAEDIC SURGERY
Payer: COMMERCIAL

## 2019-04-11 DIAGNOSIS — S83.242A ACUTE MEDIAL MENISCUS TEAR, LEFT, INITIAL ENCOUNTER: ICD-10-CM

## 2019-04-11 PROCEDURE — 97110 THERAPEUTIC EXERCISES: CPT

## 2019-04-11 NOTE — PROGRESS NOTES
Diagnosis: Acute medial meniscus tear, left, initial encounter (O76.603H)  Authorized # of Visits:  8 [employer] PPO         Next MD visit: none scheduled  Referring physician: Kannan Matta  Fall Risk: standard         Precautions:         Medication Changes since

## 2019-04-16 ENCOUNTER — OFFICE VISIT (OUTPATIENT)
Dept: PHYSICAL THERAPY | Age: 64
End: 2019-04-16
Attending: ORTHOPAEDIC SURGERY
Payer: COMMERCIAL

## 2019-04-16 DIAGNOSIS — S83.242A ACUTE MEDIAL MENISCUS TEAR, LEFT, INITIAL ENCOUNTER: ICD-10-CM

## 2019-04-16 PROCEDURE — 97110 THERAPEUTIC EXERCISES: CPT

## 2019-04-16 NOTE — PROGRESS NOTES
Diagnosis: Acute medial meniscus tear, left, initial encounter (W66.520N)  Authorized # of Visits:  8 [employer] PPO         Next MD visit: none scheduled  Referring physician: Kannan Matta  Fall Risk: standard         Precautions:         Medication Changes since with walking on uneven surfaces.      Charges: TEx3 Total Timed Treatment: 45 min  Total Treatment Time: 45 min

## 2019-04-18 ENCOUNTER — OFFICE VISIT (OUTPATIENT)
Dept: PHYSICAL THERAPY | Age: 64
End: 2019-04-18
Attending: ORTHOPAEDIC SURGERY
Payer: COMMERCIAL

## 2019-04-18 DIAGNOSIS — S83.242A ACUTE MEDIAL MENISCUS TEAR, LEFT, INITIAL ENCOUNTER: ICD-10-CM

## 2019-04-18 PROCEDURE — 97110 THERAPEUTIC EXERCISES: CPT

## 2019-04-18 NOTE — PROGRESS NOTES
Diagnosis: Acute medial meniscus tear, left, initial encounter (I28.977S)  Authorized # of Visits:  8 [employer] PPO         Next MD visit: none scheduled  Referring physician: Valentino Jacobson  Fall Risk: standard         Precautions:         Medication Changes since

## 2019-04-23 ENCOUNTER — OFFICE VISIT (OUTPATIENT)
Dept: PHYSICAL THERAPY | Age: 64
End: 2019-04-23
Attending: ORTHOPAEDIC SURGERY
Payer: COMMERCIAL

## 2019-04-23 DIAGNOSIS — S83.242A ACUTE MEDIAL MENISCUS TEAR, LEFT, INITIAL ENCOUNTER: ICD-10-CM

## 2019-04-23 PROCEDURE — 97110 THERAPEUTIC EXERCISES: CPT

## 2019-04-23 NOTE — PROGRESS NOTES
Diagnosis: Acute medial meniscus tear, left, initial encounter (B82.350F)  Authorized # of Visits:  8 [employer] PPO         Next MD visit: 4/26/19  Referring physician: Leida Syed  Fall Risk: standard         Precautions:         Medication Changes since last v

## 2019-04-25 ENCOUNTER — OFFICE VISIT (OUTPATIENT)
Dept: PHYSICAL THERAPY | Age: 64
End: 2019-04-25
Attending: ORTHOPAEDIC SURGERY
Payer: COMMERCIAL

## 2019-04-25 DIAGNOSIS — S83.242A ACUTE MEDIAL MENISCUS TEAR, LEFT, INITIAL ENCOUNTER: ICD-10-CM

## 2019-04-25 PROCEDURE — 97110 THERAPEUTIC EXERCISES: CPT

## 2019-04-25 NOTE — PROGRESS NOTES
Diagnosis: Acute medial meniscus tear, left, initial encounter (A30.019Y)  Authorized # of Visits:  8 [employer] PPO         Next MD visit: 4/26/19  Referring physician: Malaika Srivastava  Fall Risk: standard         Precautions:         Medication Changes since last v with HEP to improve therapy outcome. - MET  2. Pt will be able to tolerate walking >1 hour without left knee pain. -NOT ASSESSED, pt states he has not attempted to walk that long but is able to walk 30 min without pain  3.  Pt will improve hip/knee strength

## 2019-04-26 ENCOUNTER — OFFICE VISIT (OUTPATIENT)
Dept: ORTHOPEDICS CLINIC | Facility: CLINIC | Age: 64
End: 2019-04-26
Payer: COMMERCIAL

## 2019-04-26 DIAGNOSIS — S83.242A ACUTE MEDIAL MENISCUS TEAR, LEFT, INITIAL ENCOUNTER: Primary | ICD-10-CM

## 2019-04-26 PROCEDURE — 99212 OFFICE O/P EST SF 10 MIN: CPT | Performed by: ORTHOPAEDIC SURGERY

## 2019-04-26 PROCEDURE — 99024 POSTOP FOLLOW-UP VISIT: CPT | Performed by: ORTHOPAEDIC SURGERY

## 2019-04-26 NOTE — PROGRESS NOTES
This is a pleasant 75-year-old male that is 4 weeks status post left knee arthroscopy and partial medial meniscectomy. He has had no chest pain or shortness of breath. He completed a course of physical therapy and reports that he is pain-free.   He comes

## 2019-05-23 DIAGNOSIS — E78.2 MIXED HYPERLIPIDEMIA: ICD-10-CM

## 2019-05-23 RX ORDER — ATORVASTATIN CALCIUM 10 MG/1
TABLET, FILM COATED ORAL
Qty: 90 TABLET | Refills: 1 | Status: SHIPPED | OUTPATIENT
Start: 2019-05-23 | End: 2019-09-03

## 2019-05-31 ENCOUNTER — TELEPHONE (OUTPATIENT)
Dept: PULMONOLOGY | Facility: CLINIC | Age: 64
End: 2019-05-31

## 2019-06-24 DIAGNOSIS — E78.2 MIXED HYPERLIPIDEMIA: ICD-10-CM

## 2019-06-24 RX ORDER — FENOFIBRIC ACID 135 MG/1
CAPSULE, DELAYED RELEASE ORAL
Qty: 90 CAPSULE | Refills: 1 | Status: SHIPPED | OUTPATIENT
Start: 2019-06-24 | End: 2019-09-03

## 2019-06-24 NOTE — TELEPHONE ENCOUNTER
Refill passed per [facility] protocol.   Cholesterol Medications  Protocol Criteria:  · Appointment scheduled in the past 12 months or in the next 3 months  · ALT & LDL on file in the past 12 months  · ALT result < 80  · LDL result <130   Recent Outpat

## 2019-06-27 DIAGNOSIS — J30.1 ALLERGIC RHINITIS DUE TO POLLEN, UNSPECIFIED SEASONALITY: ICD-10-CM

## 2019-06-27 RX ORDER — FLUTICASONE PROPIONATE 50 MCG
SPRAY, SUSPENSION (ML) NASAL
Qty: 3 BOTTLE | Refills: 2 | Status: SHIPPED | OUTPATIENT
Start: 2019-06-27 | End: 2019-09-03

## 2019-06-27 NOTE — TELEPHONE ENCOUNTER
Refill Protocol Appointment Criteria  · Appointment scheduled in the past 6 months or in the next 3 months  Recent Outpatient Visits            2 months ago Acute medial meniscus tear, left, initial encounter    [facility] [place]

## 2019-07-05 DIAGNOSIS — J44.9 CHRONIC OBSTRUCTIVE PULMONARY DISEASE, UNSPECIFIED COPD TYPE (HCC): ICD-10-CM

## 2019-07-06 RX ORDER — ALBUTEROL SULFATE 90 UG/1
AEROSOL, METERED RESPIRATORY (INHALATION)
Qty: 3 INHALER | Refills: 1 | Status: SHIPPED | OUTPATIENT
Start: 2019-07-06 | End: 2019-09-03

## 2019-07-06 NOTE — TELEPHONE ENCOUNTER
Refill passed per [facility] protocol.     Refill Protocol Appointment Criteria  · Appointment scheduled in the past 6 months or in the next 3 months  Recent Outpatient Visits            2 months ago Acute medial meniscus tear, left, initial encounter

## 2019-07-31 DIAGNOSIS — J44.9 CHRONIC OBSTRUCTIVE PULMONARY DISEASE, UNSPECIFIED COPD TYPE (HCC): ICD-10-CM

## 2019-07-31 RX ORDER — ALBUTEROL SULFATE 90 UG/1
AEROSOL, METERED RESPIRATORY (INHALATION)
Qty: 8.5 INHALER | Refills: 2 | OUTPATIENT
Start: 2019-07-31

## 2019-08-01 NOTE — TELEPHONE ENCOUNTER
Duplicate request, previously addressed.         Requested Prescriptions   Pending Prescriptions Disp Refills   • Albuterol Sulfate HFA (PROAIR HFA) 108 (90 Base) MCG/ACT Inhalation Aero Soln [Pharmacy Med Name: PROAIR HFA 90 MCG INHALER] 8.5 Inhaler 2

## 2019-08-21 ENCOUNTER — TELEPHONE (OUTPATIENT)
Dept: FAMILY MEDICINE CLINIC | Facility: CLINIC | Age: 64
End: 2019-08-21

## 2019-08-21 DIAGNOSIS — Z00.00 ADULT GENERAL MEDICAL EXAM: Primary | ICD-10-CM

## 2019-08-21 NOTE — TELEPHONE ENCOUNTER
Pt is requesting for blood works order prior to his scheduled px appt on 9/3. Please call pt when order is in.

## 2019-08-27 ENCOUNTER — LAB ENCOUNTER (OUTPATIENT)
Dept: LAB | Age: 64
End: 2019-08-27
Attending: FAMILY MEDICINE
Payer: COMMERCIAL

## 2019-08-27 DIAGNOSIS — Z00.00 ADULT GENERAL MEDICAL EXAM: ICD-10-CM

## 2019-08-27 LAB
ALBUMIN SERPL-MCNC: 4.2 G/DL (ref 3.4–5)
ALBUMIN/GLOB SERPL: 1.3 {RATIO} (ref 1–2)
ALP LIVER SERPL-CCNC: 59 U/L (ref 45–117)
ALT SERPL-CCNC: 21 U/L (ref 16–61)
ANION GAP SERPL CALC-SCNC: 7 MMOL/L (ref 0–18)
AST SERPL-CCNC: 21 U/L (ref 15–37)
BASOPHILS # BLD AUTO: 0.1 X10(3) UL (ref 0–0.2)
BASOPHILS NFR BLD AUTO: 1.2 %
BILIRUB SERPL-MCNC: 0.4 MG/DL (ref 0.1–2)
BUN BLD-MCNC: 11 MG/DL (ref 7–18)
BUN/CREAT SERPL: 12.5 (ref 10–20)
CALCIUM BLD-MCNC: 9.9 MG/DL (ref 8.5–10.1)
CHLORIDE SERPL-SCNC: 99 MMOL/L (ref 98–112)
CHOLEST SMN-MCNC: 148 MG/DL (ref ?–200)
CO2 SERPL-SCNC: 29 MMOL/L (ref 21–32)
COMPLEXED PSA SERPL-MCNC: 0.86 NG/ML (ref ?–4)
CREAT BLD-MCNC: 0.88 MG/DL (ref 0.7–1.3)
DEPRECATED RDW RBC AUTO: 43.1 FL (ref 35.1–46.3)
EOSINOPHIL # BLD AUTO: 0.46 X10(3) UL (ref 0–0.7)
EOSINOPHIL NFR BLD AUTO: 5.7 %
ERYTHROCYTE [DISTWIDTH] IN BLOOD BY AUTOMATED COUNT: 13 % (ref 11–15)
GLOBULIN PLAS-MCNC: 3.3 G/DL (ref 2.8–4.4)
GLUCOSE BLD-MCNC: 90 MG/DL (ref 70–99)
HCT VFR BLD AUTO: 44.2 % (ref 39–53)
HDLC SERPL-MCNC: 71 MG/DL (ref 40–59)
HGB BLD-MCNC: 15 G/DL (ref 13–17.5)
IMM GRANULOCYTES # BLD AUTO: 0.07 X10(3) UL (ref 0–1)
IMM GRANULOCYTES NFR BLD: 0.9 %
LDLC SERPL CALC-MCNC: 66 MG/DL (ref ?–100)
LYMPHOCYTES # BLD AUTO: 0.8 X10(3) UL (ref 1–4)
LYMPHOCYTES NFR BLD AUTO: 9.9 %
M PROTEIN MFR SERPL ELPH: 7.5 G/DL (ref 6.4–8.2)
MCH RBC QN AUTO: 30.7 PG (ref 26–34)
MCHC RBC AUTO-ENTMCNC: 33.9 G/DL (ref 31–37)
MCV RBC AUTO: 90.4 FL (ref 80–100)
MONOCYTES # BLD AUTO: 1.06 X10(3) UL (ref 0.1–1)
MONOCYTES NFR BLD AUTO: 13.1 %
NEUTROPHILS # BLD AUTO: 5.59 X10 (3) UL (ref 1.5–7.7)
NEUTROPHILS # BLD AUTO: 5.59 X10(3) UL (ref 1.5–7.7)
NEUTROPHILS NFR BLD AUTO: 69.2 %
NONHDLC SERPL-MCNC: 77 MG/DL (ref ?–130)
OSMOLALITY SERPL CALC.SUM OF ELEC: 279 MOSM/KG (ref 275–295)
PATIENT FASTING: YES
PATIENT FASTING: YES
PLATELET # BLD AUTO: 358 10(3)UL (ref 150–450)
POTASSIUM SERPL-SCNC: 4.4 MMOL/L (ref 3.5–5.1)
RBC # BLD AUTO: 4.89 X10(6)UL (ref 3.8–5.8)
SODIUM SERPL-SCNC: 135 MMOL/L (ref 136–145)
TRIGL SERPL-MCNC: 54 MG/DL (ref 30–149)
TSI SER-ACNC: 3.12 MIU/ML (ref 0.36–3.74)
VLDLC SERPL CALC-MCNC: 11 MG/DL (ref 0–30)
WBC # BLD AUTO: 8.1 X10(3) UL (ref 4–11)

## 2019-08-27 PROCEDURE — 80053 COMPREHEN METABOLIC PANEL: CPT

## 2019-08-27 PROCEDURE — 85025 COMPLETE CBC W/AUTO DIFF WBC: CPT

## 2019-08-27 PROCEDURE — 36415 COLL VENOUS BLD VENIPUNCTURE: CPT

## 2019-08-27 PROCEDURE — 80061 LIPID PANEL: CPT

## 2019-08-27 PROCEDURE — 84443 ASSAY THYROID STIM HORMONE: CPT

## 2019-09-03 ENCOUNTER — OFFICE VISIT (OUTPATIENT)
Dept: FAMILY MEDICINE CLINIC | Facility: CLINIC | Age: 64
End: 2019-09-03
Payer: COMMERCIAL

## 2019-09-03 VITALS
HEIGHT: 64 IN | TEMPERATURE: 98 F | HEART RATE: 88 BPM | DIASTOLIC BLOOD PRESSURE: 70 MMHG | SYSTOLIC BLOOD PRESSURE: 124 MMHG | BODY MASS INDEX: 19.67 KG/M2 | WEIGHT: 115.19 LBS

## 2019-09-03 DIAGNOSIS — K21.9 CHRONIC GERD: ICD-10-CM

## 2019-09-03 DIAGNOSIS — J44.9 CHRONIC OBSTRUCTIVE PULMONARY DISEASE, UNSPECIFIED COPD TYPE (HCC): ICD-10-CM

## 2019-09-03 DIAGNOSIS — E78.2 MIXED HYPERLIPIDEMIA: ICD-10-CM

## 2019-09-03 DIAGNOSIS — E03.9 ACQUIRED HYPOTHYROIDISM: ICD-10-CM

## 2019-09-03 DIAGNOSIS — R91.1 PULMONARY NODULE, RIGHT: ICD-10-CM

## 2019-09-03 DIAGNOSIS — J30.89 OTHER ALLERGIC RHINITIS: ICD-10-CM

## 2019-09-03 DIAGNOSIS — I10 ESSENTIAL HYPERTENSION WITH GOAL BLOOD PRESSURE LESS THAN 140/90: ICD-10-CM

## 2019-09-03 DIAGNOSIS — N40.1 BENIGN NON-NODULAR PROSTATIC HYPERPLASIA WITH LOWER URINARY TRACT SYMPTOMS: ICD-10-CM

## 2019-09-03 DIAGNOSIS — J84.9 ILD (INTERSTITIAL LUNG DISEASE) (HCC): ICD-10-CM

## 2019-09-03 DIAGNOSIS — J30.1 ALLERGIC RHINITIS DUE TO POLLEN, UNSPECIFIED SEASONALITY: ICD-10-CM

## 2019-09-03 DIAGNOSIS — I10 ESSENTIAL HYPERTENSION, BENIGN: ICD-10-CM

## 2019-09-03 DIAGNOSIS — F17.200 TOBACCO USE DISORDER: ICD-10-CM

## 2019-09-03 DIAGNOSIS — Z00.00 ADULT GENERAL MEDICAL EXAM: Primary | ICD-10-CM

## 2019-09-03 DIAGNOSIS — R35.1 NOCTURIA: ICD-10-CM

## 2019-09-03 PROCEDURE — 99397 PER PM REEVAL EST PAT 65+ YR: CPT | Performed by: FAMILY MEDICINE

## 2019-09-03 RX ORDER — AMLODIPINE BESYLATE AND BENAZEPRIL HYDROCHLORIDE 10; 40 MG/1; MG/1
1 CAPSULE ORAL
Qty: 90 CAPSULE | Refills: 1 | Status: SHIPPED | OUTPATIENT
Start: 2019-09-03 | End: 2019-12-18

## 2019-09-03 RX ORDER — ATORVASTATIN CALCIUM 10 MG/1
10 TABLET, FILM COATED ORAL
Qty: 90 TABLET | Refills: 1 | Status: SHIPPED | OUTPATIENT
Start: 2019-09-03 | End: 2021-01-04

## 2019-09-03 RX ORDER — ALBUTEROL SULFATE 90 UG/1
AEROSOL, METERED RESPIRATORY (INHALATION)
Qty: 3 INHALER | Refills: 1 | Status: SHIPPED | OUTPATIENT
Start: 2019-09-03 | End: 2020-04-25

## 2019-09-03 RX ORDER — LEVOTHYROXINE SODIUM 0.03 MG/1
25 TABLET ORAL
Qty: 90 TABLET | Refills: 1 | Status: SHIPPED | OUTPATIENT
Start: 2019-09-03 | End: 2020-02-11

## 2019-09-03 RX ORDER — BUDESONIDE AND FORMOTEROL FUMARATE DIHYDRATE 160; 4.5 UG/1; UG/1
AEROSOL RESPIRATORY (INHALATION)
Qty: 3 INHALER | Refills: 1 | Status: SHIPPED | OUTPATIENT
Start: 2019-09-03 | End: 2020-05-08

## 2019-09-03 RX ORDER — FENOFIBRIC ACID 135 MG/1
135 CAPSULE, DELAYED RELEASE ORAL DAILY
Qty: 90 CAPSULE | Refills: 1 | Status: SHIPPED | OUTPATIENT
Start: 2019-09-03 | End: 2019-12-30

## 2019-09-03 RX ORDER — FLUTICASONE PROPIONATE 50 MCG
SPRAY, SUSPENSION (ML) NASAL
Qty: 3 BOTTLE | Refills: 2 | Status: SHIPPED | OUTPATIENT
Start: 2019-09-03 | End: 2020-10-26

## 2019-09-03 RX ORDER — OMEPRAZOLE 20 MG/1
20 CAPSULE, DELAYED RELEASE ORAL
Qty: 90 CAPSULE | Refills: 1 | Status: SHIPPED | OUTPATIENT
Start: 2019-09-03 | End: 2020-02-16

## 2019-09-03 NOTE — PROGRESS NOTES
Patient ID: Gissell Richardson is a 70year old male. HPI  Patient presents with:  Physical    Pt is  for a year since 9/7/18. He is retired and is going to switch to [employer] as well as [employer].      He smokes 2 packs p 08/27/2019    RBC 4.89 08/27/2019    HGB 15.0 08/27/2019    HCT 44.2 08/27/2019    .0 08/27/2019    MPV 8.9 10/23/2018    MCV 90.4 08/27/2019    MCH 30.7 08/27/2019    MCHC 33.9 08/27/2019    RDW 13.0 08/27/2019    NEPRELIM 5.59 08/27/2019    NEUT 7 [address] Microscopic not indicated 03/05/2019    WBCUR 1 03/05/2019    RBCUR <1 03/05/2019    BACUR Negative 03/05/2019       Lab Results   Component Value Date    CHOLEST 148 08/27/2019    TRIG 54 08/27/2019    HDL 71 (H) 08/27/2019    LDL 66 08/27/2019    VL Past Medical History:   Diagnosis Date   • Allergic rhinitis     Flonase   • BPH (benign prostatic hyperplasia)    • COPD (chronic obstructive pulmonary disease) (HCC)     moderate-severe    • ED (erectile dysfunction)    • Esophageal reflux    • H Gets together: Not on file        Attends Anabaptism service: Not on file        Active member of club or organization: Not on file        Attends meetings of clubs or organizations: Not on file        Relationship status: Not on file      Intimate partner Rfl: 1   Levothyroxine Sodium 25 MCG Oral Tab Take 1 tablet (25 mcg total) by mouth before breakfast. Disp: 90 tablet Rfl: 1   SYMBICORT 160-4.5 MCG/ACT Inhalation Aerosol INHALE 2 PUFFS BY MOUTH TWICE A DAY Disp: 30.6 Inhaler Rfl: 1   FINASTERIDE 5 MG Ora Height: 5' 4\" (1.626 m)           ASSESSMENT/PLAN:     Diagnoses and all orders for this visit:    Adult general medical exam  Labs were reviewed today.   Chronic obstructive pulmonary disease, unspecified COPD type (HCC)  -     PULMONARY - INTERNAL  - 140/90        Referrals (if applicable)  Orders Placed This Encounter      Pulmonary ([place]) - Dr Rasheed Banks          Order Comments:              Roni Kuhn.           Referral Priority:Routine          Referral Type:OFFICE VISIT

## 2019-09-13 ENCOUNTER — TELEPHONE (OUTPATIENT)
Dept: SURGERY | Facility: CLINIC | Age: 64
End: 2019-09-13

## 2019-09-13 NOTE — TELEPHONE ENCOUNTER
CHARLIE for RN - Pt. no longer wants to see WAGNER. Pt. has sched Consult appt. to see SHIMONK on 11/11/19. Pt. Was last seen by WAGNER on 5/21/18.

## 2019-10-01 ENCOUNTER — TELEPHONE (OUTPATIENT)
Dept: PULMONOLOGY | Facility: CLINIC | Age: 64
End: 2019-10-01

## 2019-10-01 DIAGNOSIS — R91.1 PULMONARY NODULE: Primary | ICD-10-CM

## 2019-10-01 NOTE — TELEPHONE ENCOUNTER
Pt requesting order for CT CHEST.  Initial order  in August. Please call once order has been entered [phone]

## 2019-10-07 NOTE — TELEPHONE ENCOUNTER
Pt informed chest CT order has been signed off by Dr. Rhonda Triplett and to call [phone] to schedule. Pt voiced understanding.

## 2019-10-12 ENCOUNTER — HOSPITAL ENCOUNTER (OUTPATIENT)
Dept: CT IMAGING | Age: 64
Discharge: HOME | End: 2019-10-12
Attending: INTERNAL MEDICINE
Payer: COMMERCIAL

## 2019-10-12 DIAGNOSIS — R91.1 PULMONARY NODULE: ICD-10-CM

## 2019-10-12 PROCEDURE — 71250 CT THORAX DX C-: CPT | Performed by: INTERNAL MEDICINE

## 2019-10-17 ENCOUNTER — TELEPHONE (OUTPATIENT)
Dept: PULMONOLOGY | Facility: CLINIC | Age: 64
End: 2019-10-17

## 2019-10-17 DIAGNOSIS — R91.8 PULMONARY NODULES: Primary | ICD-10-CM

## 2019-11-16 ENCOUNTER — HOSPITAL ENCOUNTER (OUTPATIENT)
Dept: ULTRASOUND IMAGING | Age: 64
Discharge: HOME | End: 2019-11-16
Attending: UROLOGY
Payer: COMMERCIAL

## 2019-11-16 DIAGNOSIS — N13.9 OBSTRUCTIVE UROPATHY: ICD-10-CM

## 2019-11-16 PROCEDURE — 76770 US EXAM ABDO BACK WALL COMP: CPT | Performed by: UROLOGY

## 2019-11-27 NOTE — H&P
[facility]    PATIENT'S NAME: Carolyne Zhu   ATTENDING PHYSICIAN: Zachary Navarro MD   PATIENT ACCOUNT#:   [de-identified]    LOCATION:    MEDICAL RECORD #:   [id number]       YOB: 1955  ADMISSION DATE:       12/16/2019    HISTORY A pulmonary nodule. No chest pain or shortness of breath. Gastroesophageal reflux disease. Occasional loose stools. He has nocturia, erectile dysfunction, BPH, and now a bladder mass. He has a history of osteoarthritis.   Denies any skin, neurologic, psy 19:02:49  [facility] [id number]  GJK/    cc: Jerald Negron DO

## 2019-12-13 RX ORDER — TAMSULOSIN HYDROCHLORIDE 0.4 MG/1
0.4 CAPSULE ORAL DAILY
COMMUNITY
End: 2021-03-01

## 2019-12-15 DIAGNOSIS — I10 ESSENTIAL HYPERTENSION WITH GOAL BLOOD PRESSURE LESS THAN 140/90: ICD-10-CM

## 2019-12-15 RX ORDER — AMLODIPINE BESYLATE AND BENAZEPRIL HYDROCHLORIDE 10; 40 MG/1; MG/1
CAPSULE ORAL
Qty: 30 CAPSULE | Refills: 5 | OUTPATIENT
Start: 2019-12-15

## 2019-12-16 ENCOUNTER — HOSPITAL ENCOUNTER (INPATIENT)
Facility: HOSPITAL | Age: 64
LOS: 1 days | Discharge: HOME | DRG: 669 | End: 2019-12-17
Attending: UROLOGY | Admitting: UROLOGY
Payer: COMMERCIAL

## 2019-12-16 ENCOUNTER — APPOINTMENT (OUTPATIENT)
Dept: GENERAL RADIOLOGY | Facility: HOSPITAL | Age: 64
DRG: 669 | End: 2019-12-16
Attending: UROLOGY
Payer: COMMERCIAL

## 2019-12-16 ENCOUNTER — TELEPHONE (OUTPATIENT)
Dept: GASTROENTEROLOGY | Facility: CLINIC | Age: 64
End: 2019-12-16

## 2019-12-16 ENCOUNTER — ANESTHESIA EVENT (OUTPATIENT)
Dept: SURGERY | Facility: HOSPITAL | Age: 64
DRG: 669 | End: 2019-12-16
Payer: COMMERCIAL

## 2019-12-16 ENCOUNTER — ANESTHESIA (OUTPATIENT)
Dept: SURGERY | Facility: HOSPITAL | Age: 64
DRG: 669 | End: 2019-12-16
Payer: COMMERCIAL

## 2019-12-16 PROBLEM — C67.9 BLADDER CANCER (HCC): Status: ACTIVE | Noted: 2019-12-16

## 2019-12-16 PROCEDURE — 99232 SBSQ HOSP IP/OBS MODERATE 35: CPT | Performed by: HOSPITALIST

## 2019-12-16 PROCEDURE — BT141ZZ FLUOROSCOPY OF KIDNEYS, URETERS AND BLADDER USING LOW OSMOLAR CONTRAST: ICD-10-PCS | Performed by: UROLOGY

## 2019-12-16 PROCEDURE — 0TBB8ZZ EXCISION OF BLADDER, VIA NATURAL OR ARTIFICIAL OPENING ENDOSCOPIC: ICD-10-PCS | Performed by: UROLOGY

## 2019-12-16 PROCEDURE — 3E0K805 INTRODUCTION OF OTHER ANTINEOPLASTIC INTO GENITOURINARY TRACT, VIA NATURAL OR ARTIFICIAL OPENING ENDOSCOPIC: ICD-10-PCS | Performed by: UROLOGY

## 2019-12-16 PROCEDURE — 74420 UROGRAPHY RTRGR +-KUB: CPT | Performed by: UROLOGY

## 2019-12-16 RX ORDER — FAMOTIDINE 20 MG/1
20 TABLET ORAL 2 TIMES DAILY
Status: DISCONTINUED | OUTPATIENT
Start: 2019-12-16 | End: 2019-12-16 | Stop reason: ALTCHOICE

## 2019-12-16 RX ORDER — ROCURONIUM BROMIDE 10 MG/ML
INJECTION, SOLUTION INTRAVENOUS AS NEEDED
Status: DISCONTINUED | OUTPATIENT
Start: 2019-12-16 | End: 2019-12-16 | Stop reason: SURG

## 2019-12-16 RX ORDER — MORPHINE SULFATE 4 MG/ML
4 INJECTION, SOLUTION INTRAMUSCULAR; INTRAVENOUS EVERY 2 HOUR PRN
Status: DISCONTINUED | OUTPATIENT
Start: 2019-12-16 | End: 2019-12-17

## 2019-12-16 RX ORDER — ATORVASTATIN CALCIUM 10 MG/1
10 TABLET, FILM COATED ORAL NIGHTLY
Status: DISCONTINUED | OUTPATIENT
Start: 2019-12-16 | End: 2019-12-17

## 2019-12-16 RX ORDER — HYDROCODONE BITARTRATE AND ACETAMINOPHEN 5; 325 MG/1; MG/1
1 TABLET ORAL AS NEEDED
Status: DISCONTINUED | OUTPATIENT
Start: 2019-12-16 | End: 2019-12-16 | Stop reason: HOSPADM

## 2019-12-16 RX ORDER — CIPROFLOXACIN HCL 100 MG
100 TABLET ORAL 2 TIMES DAILY
COMMUNITY
End: 2019-12-24

## 2019-12-16 RX ORDER — MORPHINE SULFATE 4 MG/ML
6 INJECTION, SOLUTION INTRAMUSCULAR; INTRAVENOUS EVERY 2 HOUR PRN
Status: DISCONTINUED | OUTPATIENT
Start: 2019-12-16 | End: 2019-12-17

## 2019-12-16 RX ORDER — MORPHINE SULFATE 4 MG/ML
4 INJECTION, SOLUTION INTRAMUSCULAR; INTRAVENOUS EVERY 10 MIN PRN
Status: DISCONTINUED | OUTPATIENT
Start: 2019-12-16 | End: 2019-12-16 | Stop reason: HOSPADM

## 2019-12-16 RX ORDER — HYDROMORPHONE HYDROCHLORIDE 1 MG/ML
0.2 INJECTION, SOLUTION INTRAMUSCULAR; INTRAVENOUS; SUBCUTANEOUS EVERY 5 MIN PRN
Status: DISCONTINUED | OUTPATIENT
Start: 2019-12-16 | End: 2019-12-16 | Stop reason: HOSPADM

## 2019-12-16 RX ORDER — DIPHENHYDRAMINE HYDROCHLORIDE 50 MG/ML
12.5 INJECTION INTRAMUSCULAR; INTRAVENOUS NIGHTLY PRN
Status: DISCONTINUED | OUTPATIENT
Start: 2019-12-16 | End: 2019-12-17

## 2019-12-16 RX ORDER — PANTOPRAZOLE SODIUM 20 MG/1
20 TABLET, DELAYED RELEASE ORAL
Status: DISCONTINUED | OUTPATIENT
Start: 2019-12-17 | End: 2019-12-17

## 2019-12-16 RX ORDER — POLYETHYLENE GLYCOL 3350 17 G/17G
17 POWDER, FOR SOLUTION ORAL DAILY PRN
Status: DISCONTINUED | OUTPATIENT
Start: 2019-12-16 | End: 2019-12-17

## 2019-12-16 RX ORDER — SODIUM CHLORIDE, SODIUM LACTATE, POTASSIUM CHLORIDE, CALCIUM CHLORIDE 600; 310; 30; 20 MG/100ML; MG/100ML; MG/100ML; MG/100ML
INJECTION, SOLUTION INTRAVENOUS CONTINUOUS
Status: DISCONTINUED | OUTPATIENT
Start: 2019-12-16 | End: 2019-12-17

## 2019-12-16 RX ORDER — HYDROCODONE BITARTRATE AND ACETAMINOPHEN 5; 325 MG/1; MG/1
2 TABLET ORAL AS NEEDED
Status: DISCONTINUED | OUTPATIENT
Start: 2019-12-16 | End: 2019-12-16 | Stop reason: HOSPADM

## 2019-12-16 RX ORDER — DEXTROSE, SODIUM CHLORIDE, AND POTASSIUM CHLORIDE 5; .9; .15 G/100ML; G/100ML; G/100ML
INJECTION INTRAVENOUS CONTINUOUS
Status: DISCONTINUED | OUTPATIENT
Start: 2019-12-16 | End: 2019-12-17

## 2019-12-16 RX ORDER — BISACODYL 10 MG
10 SUPPOSITORY, RECTAL RECTAL
Status: DISCONTINUED | OUTPATIENT
Start: 2019-12-16 | End: 2019-12-17

## 2019-12-16 RX ORDER — HALOPERIDOL 5 MG/ML
0.25 INJECTION INTRAMUSCULAR ONCE AS NEEDED
Status: DISCONTINUED | OUTPATIENT
Start: 2019-12-16 | End: 2019-12-16 | Stop reason: HOSPADM

## 2019-12-16 RX ORDER — FINASTERIDE 5 MG/1
5 TABLET, FILM COATED ORAL DAILY
Status: DISCONTINUED | OUTPATIENT
Start: 2019-12-16 | End: 2019-12-17

## 2019-12-16 RX ORDER — DEXTROSE, SODIUM CHLORIDE, AND POTASSIUM CHLORIDE 5; .45; .15 G/100ML; G/100ML; G/100ML
INJECTION INTRAVENOUS CONTINUOUS
Status: DISCONTINUED | OUTPATIENT
Start: 2019-12-16 | End: 2019-12-16

## 2019-12-16 RX ORDER — ONDANSETRON 4 MG/1
4 TABLET, FILM COATED ORAL EVERY 6 HOURS PRN
Status: DISCONTINUED | OUTPATIENT
Start: 2019-12-16 | End: 2019-12-17

## 2019-12-16 RX ORDER — AMLODIPINE BESYLATE AND BENAZEPRIL HYDROCHLORIDE 10; 40 MG/1; MG/1
1 CAPSULE ORAL
Status: DISCONTINUED | OUTPATIENT
Start: 2019-12-16 | End: 2019-12-16 | Stop reason: RX

## 2019-12-16 RX ORDER — ATROPA BELLADONNA AND OPIUM 16.2; 6 MG/1; MG/1
1 SUPPOSITORY RECTAL EVERY 6 HOURS PRN
Status: DISCONTINUED | OUTPATIENT
Start: 2019-12-16 | End: 2019-12-17

## 2019-12-16 RX ORDER — ONDANSETRON 2 MG/ML
INJECTION INTRAMUSCULAR; INTRAVENOUS AS NEEDED
Status: DISCONTINUED | OUTPATIENT
Start: 2019-12-16 | End: 2019-12-16 | Stop reason: SURG

## 2019-12-16 RX ORDER — HYDROCODONE BITARTRATE AND ACETAMINOPHEN 5; 325 MG/1; MG/1
2 TABLET ORAL EVERY 4 HOURS PRN
Status: DISCONTINUED | OUTPATIENT
Start: 2019-12-16 | End: 2019-12-17

## 2019-12-16 RX ORDER — FENOFIBRATE 67 MG/1
134 CAPSULE ORAL DAILY
Status: DISCONTINUED | OUTPATIENT
Start: 2019-12-16 | End: 2019-12-17

## 2019-12-16 RX ORDER — PROCHLORPERAZINE EDISYLATE 5 MG/ML
5 INJECTION INTRAMUSCULAR; INTRAVENOUS ONCE AS NEEDED
Status: DISCONTINUED | OUTPATIENT
Start: 2019-12-16 | End: 2019-12-16 | Stop reason: HOSPADM

## 2019-12-16 RX ORDER — MORPHINE SULFATE 4 MG/ML
2 INJECTION, SOLUTION INTRAMUSCULAR; INTRAVENOUS EVERY 10 MIN PRN
Status: DISCONTINUED | OUTPATIENT
Start: 2019-12-16 | End: 2019-12-16 | Stop reason: HOSPADM

## 2019-12-16 RX ORDER — SODIUM CHLORIDE, SODIUM LACTATE, POTASSIUM CHLORIDE, CALCIUM CHLORIDE 600; 310; 30; 20 MG/100ML; MG/100ML; MG/100ML; MG/100ML
INJECTION, SOLUTION INTRAVENOUS CONTINUOUS
Status: DISCONTINUED | OUTPATIENT
Start: 2019-12-16 | End: 2019-12-16 | Stop reason: HOSPADM

## 2019-12-16 RX ORDER — ACETAMINOPHEN 325 MG/1
650 TABLET ORAL EVERY 4 HOURS PRN
Status: DISCONTINUED | OUTPATIENT
Start: 2019-12-16 | End: 2019-12-17

## 2019-12-16 RX ORDER — LEVOTHYROXINE SODIUM 0.03 MG/1
25 TABLET ORAL
Status: DISCONTINUED | OUTPATIENT
Start: 2019-12-17 | End: 2019-12-17

## 2019-12-16 RX ORDER — HYDROMORPHONE HYDROCHLORIDE 1 MG/ML
0.6 INJECTION, SOLUTION INTRAMUSCULAR; INTRAVENOUS; SUBCUTANEOUS EVERY 5 MIN PRN
Status: DISCONTINUED | OUTPATIENT
Start: 2019-12-16 | End: 2019-12-16 | Stop reason: HOSPADM

## 2019-12-16 RX ORDER — MORPHINE SULFATE 2 MG/ML
2 INJECTION, SOLUTION INTRAMUSCULAR; INTRAVENOUS EVERY 2 HOUR PRN
Status: DISCONTINUED | OUTPATIENT
Start: 2019-12-16 | End: 2019-12-17

## 2019-12-16 RX ORDER — MORPHINE SULFATE 10 MG/ML
6 INJECTION, SOLUTION INTRAMUSCULAR; INTRAVENOUS EVERY 10 MIN PRN
Status: DISCONTINUED | OUTPATIENT
Start: 2019-12-16 | End: 2019-12-16 | Stop reason: HOSPADM

## 2019-12-16 RX ORDER — LIDOCAINE HYDROCHLORIDE 10 MG/ML
INJECTION, SOLUTION EPIDURAL; INFILTRATION; INTRACAUDAL; PERINEURAL AS NEEDED
Status: DISCONTINUED | OUTPATIENT
Start: 2019-12-16 | End: 2019-12-16 | Stop reason: SURG

## 2019-12-16 RX ORDER — CIPROFLOXACIN HCL 100 MG
100 TABLET ORAL 2 TIMES DAILY
Status: DISCONTINUED | OUTPATIENT
Start: 2019-12-16 | End: 2019-12-16

## 2019-12-16 RX ORDER — NALOXONE HYDROCHLORIDE 0.4 MG/ML
80 INJECTION, SOLUTION INTRAMUSCULAR; INTRAVENOUS; SUBCUTANEOUS AS NEEDED
Status: DISCONTINUED | OUTPATIENT
Start: 2019-12-16 | End: 2019-12-16 | Stop reason: HOSPADM

## 2019-12-16 RX ORDER — ONDANSETRON 2 MG/ML
4 INJECTION INTRAMUSCULAR; INTRAVENOUS ONCE AS NEEDED
Status: DISCONTINUED | OUTPATIENT
Start: 2019-12-16 | End: 2019-12-16 | Stop reason: HOSPADM

## 2019-12-16 RX ORDER — ALFUZOSIN HYDROCHLORIDE 10 MG/1
10 TABLET, EXTENDED RELEASE ORAL
Status: DISCONTINUED | OUTPATIENT
Start: 2019-12-17 | End: 2019-12-17

## 2019-12-16 RX ORDER — CEFAZOLIN SODIUM 1 G/3ML
INJECTION, POWDER, FOR SOLUTION INTRAMUSCULAR; INTRAVENOUS AS NEEDED
Status: DISCONTINUED | OUTPATIENT
Start: 2019-12-16 | End: 2019-12-16 | Stop reason: SURG

## 2019-12-16 RX ORDER — FLUTICASONE PROPIONATE 50 MCG
1 SPRAY, SUSPENSION (ML) NASAL DAILY
Status: DISCONTINUED | OUTPATIENT
Start: 2019-12-16 | End: 2019-12-17

## 2019-12-16 RX ORDER — PHENAZOPYRIDINE HYDROCHLORIDE 100 MG/1
200 TABLET, FILM COATED ORAL 3 TIMES DAILY PRN
Status: DISCONTINUED | OUTPATIENT
Start: 2019-12-16 | End: 2019-12-17

## 2019-12-16 RX ORDER — GENTAMICIN SULFATE 40 MG/ML
INJECTION, SOLUTION INTRAMUSCULAR; INTRAVENOUS AS NEEDED
Status: DISCONTINUED | OUTPATIENT
Start: 2019-12-16 | End: 2019-12-16 | Stop reason: HOSPADM

## 2019-12-16 RX ORDER — SODIUM PHOSPHATE, DIBASIC AND SODIUM PHOSPHATE, MONOBASIC 7; 19 G/133ML; G/133ML
1 ENEMA RECTAL ONCE AS NEEDED
Status: DISCONTINUED | OUTPATIENT
Start: 2019-12-16 | End: 2019-12-17

## 2019-12-16 RX ORDER — OXYBUTYNIN CHLORIDE 5 MG/1
5 TABLET ORAL EVERY 6 HOURS PRN
Status: DISCONTINUED | OUTPATIENT
Start: 2019-12-16 | End: 2019-12-17

## 2019-12-16 RX ORDER — HYDROCODONE BITARTRATE AND ACETAMINOPHEN 5; 325 MG/1; MG/1
1 TABLET ORAL EVERY 4 HOURS PRN
Status: DISCONTINUED | OUTPATIENT
Start: 2019-12-16 | End: 2019-12-17

## 2019-12-16 RX ORDER — ONDANSETRON 2 MG/ML
4 INJECTION INTRAMUSCULAR; INTRAVENOUS EVERY 6 HOURS PRN
Status: DISCONTINUED | OUTPATIENT
Start: 2019-12-16 | End: 2019-12-17

## 2019-12-16 RX ORDER — HYDROMORPHONE HYDROCHLORIDE 1 MG/ML
0.4 INJECTION, SOLUTION INTRAMUSCULAR; INTRAVENOUS; SUBCUTANEOUS EVERY 5 MIN PRN
Status: DISCONTINUED | OUTPATIENT
Start: 2019-12-16 | End: 2019-12-16 | Stop reason: HOSPADM

## 2019-12-16 RX ORDER — DIPHENHYDRAMINE HCL 25 MG
25 CAPSULE ORAL NIGHTLY PRN
Status: DISCONTINUED | OUTPATIENT
Start: 2019-12-16 | End: 2019-12-17

## 2019-12-16 RX ORDER — FAMOTIDINE 20 MG/1
20 TABLET ORAL ONCE
Status: DISCONTINUED | OUTPATIENT
Start: 2019-12-16 | End: 2019-12-16 | Stop reason: HOSPADM

## 2019-12-16 RX ORDER — ACETAMINOPHEN 500 MG
1000 TABLET ORAL ONCE
Status: COMPLETED | OUTPATIENT
Start: 2019-12-16 | End: 2019-12-16

## 2019-12-16 RX ORDER — SODIUM CHLORIDE 0.9 % (FLUSH) 0.9 %
10 SYRINGE (ML) INJECTION AS NEEDED
Status: DISCONTINUED | OUTPATIENT
Start: 2019-12-16 | End: 2019-12-17

## 2019-12-16 RX ORDER — DOCUSATE SODIUM 100 MG/1
100 CAPSULE, LIQUID FILLED ORAL 2 TIMES DAILY
Status: DISCONTINUED | OUTPATIENT
Start: 2019-12-16 | End: 2019-12-17

## 2019-12-16 RX ORDER — METOCLOPRAMIDE 10 MG/1
10 TABLET ORAL ONCE
Status: DISCONTINUED | OUTPATIENT
Start: 2019-12-16 | End: 2019-12-16 | Stop reason: HOSPADM

## 2019-12-16 RX ORDER — ALBUTEROL SULFATE 90 UG/1
1 AEROSOL, METERED RESPIRATORY (INHALATION) EVERY 4 HOURS PRN
Status: DISCONTINUED | OUTPATIENT
Start: 2019-12-16 | End: 2019-12-17

## 2019-12-16 RX ORDER — CEFAZOLIN SODIUM/WATER 2 G/20 ML
2 SYRINGE (ML) INTRAVENOUS EVERY 8 HOURS
Status: COMPLETED | OUTPATIENT
Start: 2019-12-16 | End: 2019-12-17

## 2019-12-16 RX ADMIN — LIDOCAINE HYDROCHLORIDE 50 MG: 10 INJECTION, SOLUTION EPIDURAL; INFILTRATION; INTRACAUDAL; PERINEURAL at 10:21:00

## 2019-12-16 RX ADMIN — ROCURONIUM BROMIDE 40 MG: 10 INJECTION, SOLUTION INTRAVENOUS at 10:21:00

## 2019-12-16 RX ADMIN — SODIUM CHLORIDE, SODIUM LACTATE, POTASSIUM CHLORIDE, CALCIUM CHLORIDE: 600; 310; 30; 20 INJECTION, SOLUTION INTRAVENOUS at 11:28:00

## 2019-12-16 RX ADMIN — ROCURONIUM BROMIDE 10 MG: 10 INJECTION, SOLUTION INTRAVENOUS at 10:53:00

## 2019-12-16 RX ADMIN — ONDANSETRON 4 MG: 2 INJECTION INTRAMUSCULAR; INTRAVENOUS at 11:20:00

## 2019-12-16 RX ADMIN — SODIUM CHLORIDE, SODIUM LACTATE, POTASSIUM CHLORIDE, CALCIUM CHLORIDE: 600; 310; 30; 20 INJECTION, SOLUTION INTRAVENOUS at 10:21:00

## 2019-12-16 RX ADMIN — CEFAZOLIN SODIUM 1 G: 1 INJECTION, POWDER, FOR SOLUTION INTRAMUSCULAR; INTRAVENOUS at 10:34:00

## 2019-12-16 NOTE — PLAN OF CARE
Received patient from PACU- VSS, CBI going- clear/yellow urine. Plan to clamp CBI at 0600 if output continues to be clear and no clots. Pt c/o cramping and pelvic pressure.  B&O suppository, pyridium and oxybutynin given for spasms and pain,  Alert and orie Notify MD/LIP if interventions unsuccessful or patient reports new pain  - Anticipate increased pain with activity and pre-medicate as appropriate  Outcome: Progressing     Problem: RISK FOR INFECTION - ADULT  Goal: Absence of fever/infection during antici

## 2019-12-16 NOTE — PROGRESS NOTES
[facility] - [facility]    Progress Note    Larry Calix Patient Status:  Hospital Outpatient Surgery    12/10/1955 MRN [id number]   Location [address] UNIT Attending Hilda Edwards MD   Hosp Day # 0 PCP Norm Wilkinson CATHETER OUT PUT, DVT PROPHYLAXIS, CBI. Essential hypertension, benign  CONT HOME MEDS, MONITOR. Mixed hyperlipidemia  CONT HOME MEDS. Benign non-nodular prostatic hyperplasia with lower urinary tract symptoms  CONT HOME MEDS.        Chron

## 2019-12-16 NOTE — TELEPHONE ENCOUNTER
----- Message from Cari Leblanc, [address] [place] [address] Su sent at 2/17/2017  2:42 PM CST -----  Regarding: Recall colon  Recall colon in 3 years per PL.  Colon done 2-14-17

## 2019-12-16 NOTE — TELEPHONE ENCOUNTER
Duplicate request, previously addressed.     Requested Prescriptions     Pending Prescriptions Disp Refills   • AMLODIPINE BESY-BENAZEPRIL HCL 10-40 MG Oral Cap [Pharmacy Med Name: AMLODIPINE-BENAZEPRIL 10-40 MG] 30 capsule 5     Sig: TAKE 1 CAPSULE BY MOUT

## 2019-12-16 NOTE — ANESTHESIA POSTPROCEDURE EVALUATION
Patient: Hannah Magaña    Procedure Summary     Date:  12/16/19 Room / Location:  [address] [place] [address] MAIN OR 14 / [address] [place] [address] MAIN OR    Anesthesia Start:  9859 Anesthesia Stop:      Procedure:  CYSTOSCOPY TRANSURETHRAL RESECTION BLADDER TUMOR (N/A ) Diagnosis:  (bladder mask)

## 2019-12-16 NOTE — ANESTHESIA PROCEDURE NOTES
Airway  Urgency: elective    Airway not difficult    General Information and Staff    Patient location during procedure: OR  Anesthesiologist: Lloyd Freed DO  Resident/CRNA: Devendra Branch CRNA  Performed: CRNA     Indications and Patien

## 2019-12-16 NOTE — ANESTHESIA PREPROCEDURE EVALUATION
Anesthesia PreOp Note    HPI:     Maty Mireles is a 75year old male who presents for preoperative consultation requested by: Allegra Abrams MD    Date of Surgery: 12/16/2019    Procedure(s):  CYSTOSCOPY TRANSURETHRAL RESECTION BLADDER TUMOR  CYSTOSC • COLONOSCOPY      02-14-17   • ELECTROCARDIOGRAM, COMPLETE  12/15/2013    scanned to media tab   • KNEE ARTHROSCOPY Left 3/13/2019    Performed by Angela Arguello MD at [address] [facility] MAIN OR   • ORAL SURGERY     • OTHER SURGICAL HISTORY  2009    cyst removal, lactated ringers infusion, , Intravenous, Continuous, Jeferson Patton MD  famoTIDine (PEPCID) tab 20 mg, 20 mg, Oral, Once, Naida Frankel, MD  Metoclopramide HCl (REGLAN) tab 10 mg, 10 mg, Oral, Once, Naida Frankel, MD    No current Epic-ordered Fear of current or ex partner: Not on file        Emotionally abused: Not on file        Physically abused: Not on file        Forced sexual activity: Not on file    Other Topics      Concerns:         Service: Not Asked        Blood Transfus abnormalities. Doppler parameters are consistent with abnormal     left ventricular relaxation (grade 1 diastolic dysfunction). There has been no change when compared with prior report.     EKG: NS    Neuro/Psych - negative ROS     GI/Hepatic/Renal    (

## 2019-12-16 NOTE — BRIEF OP NOTE
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

## 2019-12-16 NOTE — OPERATIVE REPORT
[facility]    PATIENT'S NAME: Jacque David   ATTENDING PHYSICIAN: Suraj Jimenez MD   OPERATING PHYSICIAN: Silvino Melo MD   PATIENT ACCOUNT#:   [de-identified]    LOCATION:  [facility] PACU 6 [facility] 10  MEDICAL RECORD #:   [id number]       DATE calcifications overlying the ureters or kidneys. Next, we inserted the 22-Andorran panendoscope with 30 degree lens videoscope.   The urethra was entered and was normal.  Prostatic fossa partially obstructing the verumontanum a distance of 3 cm due to mild b retroperitoneum. When all this was done, we were able to identify the left ureteral orifice into the left retrograde pyelogram, which was normal other than the tortuosity of the left ureter.   We then gently passed a 22-Slovenian 3-way Ortega with 5 mL balloon

## 2019-12-17 ENCOUNTER — APPOINTMENT (OUTPATIENT)
Dept: GENERAL RADIOLOGY | Facility: HOSPITAL | Age: 64
DRG: 669 | End: 2019-12-17
Attending: HOSPITALIST
Payer: COMMERCIAL

## 2019-12-17 VITALS
BODY MASS INDEX: 19.63 KG/M2 | RESPIRATION RATE: 18 BRPM | WEIGHT: 115 LBS | TEMPERATURE: 99 F | DIASTOLIC BLOOD PRESSURE: 73 MMHG | OXYGEN SATURATION: 94 % | HEIGHT: 64 IN | HEART RATE: 91 BPM | SYSTOLIC BLOOD PRESSURE: 126 MMHG

## 2019-12-17 PROCEDURE — 99239 HOSP IP/OBS DSCHRG MGMT >30: CPT | Performed by: HOSPITALIST

## 2019-12-17 PROCEDURE — 71045 X-RAY EXAM CHEST 1 VIEW: CPT | Performed by: HOSPITALIST

## 2019-12-17 RX ORDER — PHENAZOPYRIDINE HYDROCHLORIDE 95 MG/1
95 TABLET ORAL 3 TIMES DAILY PRN
Qty: 21 TABLET | Refills: 6 | Status: SHIPPED | OUTPATIENT
Start: 2019-12-17 | End: 2019-12-24

## 2019-12-17 RX ORDER — OXYBUTYNIN CHLORIDE 5 MG/1
5 TABLET ORAL EVERY 6 HOURS PRN
Qty: 30 TABLET | Refills: 0 | Status: SHIPPED | OUTPATIENT
Start: 2019-12-17 | End: 2019-12-24

## 2019-12-17 RX ORDER — IPRATROPIUM BROMIDE AND ALBUTEROL SULFATE 2.5; .5 MG/3ML; MG/3ML
3 SOLUTION RESPIRATORY (INHALATION)
Status: DISCONTINUED | OUTPATIENT
Start: 2019-12-17 | End: 2019-12-17

## 2019-12-17 RX ORDER — CIPROFLOXACIN 500 MG/1
500 TABLET, FILM COATED ORAL 2 TIMES DAILY
Qty: 14 TABLET | Refills: 1 | Status: SHIPPED | OUTPATIENT
Start: 2019-12-17 | End: 2019-12-24

## 2019-12-17 NOTE — PLAN OF CARE
Problem: Patient/Family Goals  Goal: Patient/Family Long Term Goal  Description  Patient's Long Term Goal:     Interventions:    - See additional Care Plan goals for specific interventions  Outcome: Progressing  Goal: Patient/Family Short Term Goal  Desc guidelines  Outcome: Progressing     Problem: SAFETY ADULT - FALL  Goal: Free from fall injury  Description  INTERVENTIONS:  - Assess pt frequently for physical needs  - Identify cognitive and physical deficits and behaviors that affect risk of falls.   - I notified, see new orders. PRN inhaler given. Patient denies SOB or chest pain. No on o2 at home.  Patient now on 3.5L o2 NC at 94%

## 2019-12-17 NOTE — DISCHARGE SUMMARY
[facility] - [facility]    Discharge Summary    Patricio Morrell Patient Status:  Inpatient    12/10/1955 MRN [id number]   Location [facility] 4W/SW/SE Attending Juan Galan MD   [facility] Day # 1 PCP Tylor Barrett DO     Date of Admission:  murmur, rub   or gallop   Abdomen:     Soft, non-tender, bowel sounds active all four quadrants,     no masses, no organomegaly   Genitalia:    Normal male without lesion, discharge or tenderness   Rectal:    Normal tone, normal prostate, no masses or tend prescription was added. Make sure you understand how and when to take each. Take 1 tablet (500 mg total) by mouth 2 (two) times daily for 7 days.    Stop taking on:  December 24, 2019  Quantity:  14 tablet  Refills:  1        CONTINUE taking these medi Tabs  · Phenazopyridine HCl 95 MG Tabs         Follow up Visits:  Follow-up with Dr. Kathy Her in 6 days    Follow up Labs: Per  in 1 week    Other Discharge Instructions: Patient and nurse and family were given complete discharge instructions on [facility] car

## 2019-12-17 NOTE — PLAN OF CARE
vss- oxygen ranging from 88-91% on RA- baseline; patient has COPD and a current smoker. MD is aware and ok wit patient discharging. Denies any pain at this time, CBI has been clamped and no hematuria or clots noted. Adequate UOP. Ambulating after set up. appropriate  Outcome: Adequate for Discharge     Problem: RISK FOR INFECTION - ADULT  Goal: Absence of fever/infection during anticipated neutropenic period  Description  INTERVENTIONS  - Monitor WBC  - Administer growth factors as ordered  - Implement eve

## 2019-12-17 NOTE — PROGRESS NOTES
Elizabeth Bojorquez is a 75year old male. Patient doing well. He is short of breath last night but this is chronic state for him is been evaluated by the medical doctors and feel he can go home. HPI:   No chief complaint on file.   disCussed his discharge pl Social History    Tobacco Use      Smoking status: Current Every Day Smoker        Packs/day: 1.50        Years: 40.00        Pack years: 61        Types: Cigarettes      Smokeless tobacco: Never Used      Tobacco comment: 2 units per day     Alcohol use: Intravenous, Q2H PRN **OR** morphINE sulfate (PF) 4 MG/ML injection 6 mg, 6 mg, Intravenous, Q2H PRN  •  diphenhydrAMINE (BENADRYL) cap/tab 25 mg, 25 mg, Oral, Nightly PRN **OR** diphenhydrAMINE HCl (BENADRYL) injection 12.5 mg, 12.5 mg, Intravenous, Night blindness, double vision, confusion  Integument/breast: negative  Hematologic/lymphatic: negative  Behavioral/Psych: negative  Endocrine: negative  Allergic/Immunologic:negative    PHYSICAL EXAM:     /73 (BP Location: Right arm)   Pulse 91   Temp 98. 12/17/2019         Coags:  Lab Results   Component Value Date    PTT 28.1 03/05/2019    INR 0.93 03/05/2019         Lab Results   Component Value Date    PSA 1.7 03/27/2018           No results for input(s): KENNY Ramirez, LISA WANG, KET available        ASSESSMENT/PLAN:   Assessment   Large bladder tumor resected  Hematuria resolved  Chronic COPD  Discussed at length  Obstructive uropathy discussed treatment for this    Principle Problem:   Bladder cancer [facility])    Active Problem:   Manan

## 2019-12-17 NOTE — DISCHARGE SUMMARY
[facility] HOSPITALIST  DISCHARGE SUMMARY     Jackie Clarity Patient Status:  Inpatient    12/10/1955 MRN [id number]   Location [facility] 4W/SW/SE Attending No att. providers found   [facility] Day # 1 PCP Aleena Jim DO     DATE OF ADMISSION:  discharge by urology. Pathology on the bladder tumor was pending at the time of discharge. He understands to follow-up with urology for this.     Patient understands return to the emergency room for increased pain, fever, discharge, shortness of breath, c CIPRO  What changed: You were already taking a medication with the same name, and this prescription was added. Make sure you understand how and when to take each. Take 1 tablet (500 mg total) by mouth 2 (two) times daily for 7 days.    Stop taking on: Bring a paper prescription for each of these medications  · Ciprofloxacin HCl 500 MG Tabs  · Oxybutynin Chloride 5 MG Tabs  · Phenazopyridine HCl 95 MG Tabs         CONSULTANTS      FOLLOW UP:  Carol Engle DO  [address]  [place]

## 2019-12-18 ENCOUNTER — PATIENT OUTREACH (OUTPATIENT)
Dept: CASE MANAGEMENT | Age: 64
End: 2019-12-18

## 2019-12-18 DIAGNOSIS — I10 ESSENTIAL HYPERTENSION WITH GOAL BLOOD PRESSURE LESS THAN 140/90: ICD-10-CM

## 2019-12-18 DIAGNOSIS — Z02.9 ENCOUNTERS FOR ADMINISTRATIVE PURPOSE: ICD-10-CM

## 2019-12-18 RX ORDER — AMLODIPINE BESYLATE AND BENAZEPRIL HYDROCHLORIDE 10; 40 MG/1; MG/1
CAPSULE ORAL
Qty: 30 CAPSULE | Refills: 5 | Status: SHIPPED | OUTPATIENT
Start: 2019-12-18 | End: 2020-06-04

## 2019-12-18 NOTE — PROGRESS NOTES
Initial Post Discharge Follow Up   Discharge Date: 12/17/19  Contact Date: 12/18/2019    Consent Verification:  Assessment Completed With: Patient  HIPAA Verified?   Yes    Discharge Dx:  Bladder cancer         General:   • How have you been since your d mouth before breakfast. 90 tablet 1   • Budesonide-Formoterol Fumarate (SYMBICORT) 160-4.5 MCG/ACT Inhalation Aerosol INHALE 2 PUFFS BY MOUTH TWICE A DAY 3 Inhaler 1   • Albuterol Sulfate HFA (PROAIR HFA) 108 (90 Base) MCG/ACT Inhalation Aero Soln TAKE 2 P (Lyndsey Gillespie7)            [facility], [facility], [facility], [address] [place] [id number] Singh, [place]   [phone]          PCP TCM/HFU appointment: scheduled at D/C within 7-14 days  no     NCM Review

## 2019-12-18 NOTE — TELEPHONE ENCOUNTER
Pharmacist Ally Blount stated, they never received the September script for 90 tab with 1 refill even though it is showing that the receipt was confirmed by the pharmacy, She stated, \"this happens all the time. \" Will re-send script for 30 days with 5 refil 03/08/2016    ANIONGAP 8 12/17/2019    All 496 277 12/17/2019

## 2019-12-18 NOTE — TELEPHONE ENCOUNTER
Per patient his medication that was sent last September 2019 to the pharmacy was not received,  Please advise patient is out of med.

## 2019-12-24 ENCOUNTER — OFFICE VISIT (OUTPATIENT)
Dept: FAMILY MEDICINE CLINIC | Facility: CLINIC | Age: 64
End: 2019-12-24
Payer: COMMERCIAL

## 2019-12-24 VITALS
SYSTOLIC BLOOD PRESSURE: 126 MMHG | BODY MASS INDEX: 19.91 KG/M2 | HEART RATE: 91 BPM | WEIGHT: 116.63 LBS | OXYGEN SATURATION: 97 % | DIASTOLIC BLOOD PRESSURE: 76 MMHG | HEIGHT: 64 IN | TEMPERATURE: 98 F

## 2019-12-24 DIAGNOSIS — F17.200 TOBACCO USE DISORDER: ICD-10-CM

## 2019-12-24 DIAGNOSIS — E03.9 ACQUIRED HYPOTHYROIDISM: ICD-10-CM

## 2019-12-24 DIAGNOSIS — J44.9 CHRONIC OBSTRUCTIVE PULMONARY DISEASE, UNSPECIFIED COPD TYPE (HCC): ICD-10-CM

## 2019-12-24 DIAGNOSIS — J84.9 ILD (INTERSTITIAL LUNG DISEASE) (HCC): ICD-10-CM

## 2019-12-24 DIAGNOSIS — C67.9 MALIGNANT NEOPLASM OF URINARY BLADDER, UNSPECIFIED SITE (HCC): Primary | ICD-10-CM

## 2019-12-24 PROCEDURE — 99495 TRANSJ CARE MGMT MOD F2F 14D: CPT | Performed by: FAMILY MEDICINE

## 2019-12-24 PROCEDURE — 99406 BEHAV CHNG SMOKING 3-10 MIN: CPT | Performed by: FAMILY MEDICINE

## 2019-12-24 RX ORDER — DUTASTERIDE 0.5 MG/1
CAPSULE, LIQUID FILLED ORAL
COMMUNITY
Start: 2019-12-23

## 2019-12-24 NOTE — PROGRESS NOTES
HPI:    Nelida Dorman is a 75year old male here today for hospital follow up.    He was discharged from Inpatient hospital, [facility]  to Home   Admission Date: 12/16/19   Discharge Date: 12/17/19  Hospital Discharge Diagnoses (since 11/24/2019) Bladder tumor    Essential hypertension, benign    Mixed hyperlipidemia    Benign non-nodular prostatic hyperplasia with lower urinary tract symptoms    Chronic obstructive pulmonary disease    Acquired hypothyroidism     POSTOPERATIVE DIAGNOSIS: [employer] Temp:  [98.5 °F (36.9 °C)-98.6 °F (37 °C)] 98.5 °F (36.9 °C)  Pulse:  [77-91] 91  Resp:  [18] 18  BP: (112-126)/(68-73) 126/73  Gen: A+Ox3. No distress. HEENT: NCAT, neck supple, no carotid bruit. CV: RRR, S1S2, and intact distal pulses.  No gallop, rub Refills:  1                    CONTINUE taking these medications      Instructions Prescription details   Albuterol Sulfate  (90 Base) MCG/ACT Aers  Commonly known as:  PROAIR HFA       TAKE 2 PUFFS BY MOUTH EVERY 4 TO 6 HOURS AS NEEDED    Quantity: [facility] [address]  [phone]     In 2 weeks  Post Discharge Followup     Mikki Carrasco MD  [address] [facility]  [address] [place] [address] [phone]     On 12/23/2019  [place] removal     The above plan and follow-up instructions were reviewed wit Levothyroxine Sodium 25 MCG Oral Tab, Take 1 tablet (25 mcg total) by mouth before breakfast.  Budesonide-Formoterol Fumarate (SYMBICORT) 160-4.5 MCG/ACT Inhalation Aerosol, INHALE 2 PUFFS BY MOUTH TWICE A DAY  Albuterol Sulfate HFA (PROAIR HFA) 108 (90 Ba HEENT: positive for nasal congestion and cough, denies rhinorrhea, sore throat, and fever, positive for nosebleeds  LUNGS: denies shortness of breath or chest tightness  CARDIOVASCULAR: denies chest painor palpitations  GI: denies abdominal pain  NEURO: de ILD (interstitial lung disease) [facility])  Sees pulmonology. Is on his inhalers. Chronic obstructive pulmonary disease, unspecified COPD type ([address]  Really must try to decrease tobacco use.   He understands at this malignant neoplasm of the urinary bladder wa By signing my name below, Deanna Adam,  attest that this documentation has been prepared under the direction and in the presence of Arnol Rodriguez DO.    Electronically Signed: Aden Biswas, 12/24/2019, 8:47 AM.    I, Leonid Ford DO,  personall

## 2019-12-24 NOTE — PATIENT INSTRUCTIONS
We will most likely switch you over to Advair and Ventolin at the beginning of the year due to your insurance change with Medicare part D as I know they will not cover Symbicort or Pro-air.   Quitting Smoking    Quitting smoking is the most important step y

## 2019-12-26 ENCOUNTER — TELEPHONE (OUTPATIENT)
Dept: FAMILY MEDICINE CLINIC | Facility: CLINIC | Age: 64
End: 2019-12-26

## 2019-12-26 DIAGNOSIS — J44.9 CHRONIC OBSTRUCTIVE PULMONARY DISEASE, UNSPECIFIED COPD TYPE (HCC): ICD-10-CM

## 2019-12-26 DIAGNOSIS — J84.9 ILD (INTERSTITIAL LUNG DISEASE) (HCC): Primary | ICD-10-CM

## 2019-12-26 RX ORDER — FLUTICASONE PROPIONATE AND SALMETEROL 250; 50 UG/1; UG/1
1 POWDER RESPIRATORY (INHALATION) EVERY 12 HOURS SCHEDULED
Qty: 3 EACH | Refills: 1 | Status: SHIPPED | OUTPATIENT
Start: 2020-01-01 | End: 2020-05-08

## 2019-12-26 RX ORDER — ALBUTEROL SULFATE 90 UG/1
AEROSOL, METERED RESPIRATORY (INHALATION)
Qty: 3 INHALER | Refills: 1 | Status: SHIPPED | OUTPATIENT
Start: 2020-01-01 | End: 2020-12-11

## 2019-12-26 NOTE — TELEPHONE ENCOUNTER
Patient is currently taking Symbicort & ProAir, but starting the new year, his insurance won't be covering those 2 medication. The alternative are Advair & Ventolin.     Patient would like to know the strength for Advair & Ventolin, so he can know how much

## 2019-12-27 NOTE — TELEPHONE ENCOUNTER
Call transferred by CSS: Pt informed both meds sent as requested by Dr Chichi Varela. Pt wanted to know dosages and this RN went over that with pt. Pt thankful and denies further questions.

## 2019-12-30 ENCOUNTER — TELEPHONE (OUTPATIENT)
Dept: FAMILY MEDICINE CLINIC | Facility: CLINIC | Age: 64
End: 2019-12-30

## 2019-12-30 RX ORDER — FENOFIBRATE 160 MG/1
160 TABLET ORAL DAILY
Qty: 90 TABLET | Refills: 1 | Status: SHIPPED | OUTPATIENT
Start: 2019-12-30 | End: 2020-05-08

## 2019-12-30 NOTE — TELEPHONE ENCOUNTER
Patient states that his insurance does not cover the fenofibrate medication. Pt would like to know if the doctor can prescribe a generic medication. Please, send it to pharmacy: [facility]. Pt would like to know if this can be done today and would like to speak with the nurse before this gets sent to the pharmacy.

## 2019-12-30 NOTE — TELEPHONE ENCOUNTER
Spoke to patient. He states he will be starting Medicare Supplement insurance on January 3rd and his Fenofibric 135 mg will no longer be covered. He states he was informed by his insurance that generic Fenofibrate will be covered. Please advise if you would like to change his script to Fenofibrate. Staff, per patient he requests a call back to verify dosage of new medication.

## 2020-02-11 DIAGNOSIS — E03.9 ACQUIRED HYPOTHYROIDISM: ICD-10-CM

## 2020-02-11 RX ORDER — LEVOTHYROXINE SODIUM 0.03 MG/1
25 TABLET ORAL
Qty: 90 TABLET | Refills: 1 | Status: SHIPPED | OUTPATIENT
Start: 2020-02-11 | End: 2020-03-09

## 2020-02-12 NOTE — TELEPHONE ENCOUNTER
Refill passed per [facility] protocol.   Hypothyroid Medications  Protocol Criteria:  Appointment scheduled in the past 12 months or the next 3 months  TSH resulted in the past 12 months that is normal  Recent Outpatient Visits            1 month ago M

## 2020-02-16 DIAGNOSIS — K21.9 CHRONIC GERD: ICD-10-CM

## 2020-02-16 RX ORDER — OMEPRAZOLE 20 MG/1
CAPSULE, DELAYED RELEASE ORAL
Qty: 90 CAPSULE | Refills: 1 | Status: SHIPPED | OUTPATIENT
Start: 2020-02-16 | End: 2020-05-08

## 2020-03-04 ENCOUNTER — OFFICE VISIT (OUTPATIENT)
Dept: GASTROENTEROLOGY | Facility: CLINIC | Age: 65
End: 2020-03-04
Payer: MEDICARE

## 2020-03-04 ENCOUNTER — TELEPHONE (OUTPATIENT)
Dept: GASTROENTEROLOGY | Facility: CLINIC | Age: 65
End: 2020-03-04

## 2020-03-04 VITALS
HEART RATE: 104 BPM | BODY MASS INDEX: 19.81 KG/M2 | WEIGHT: 116 LBS | DIASTOLIC BLOOD PRESSURE: 72 MMHG | SYSTOLIC BLOOD PRESSURE: 124 MMHG | HEIGHT: 64 IN

## 2020-03-04 DIAGNOSIS — Z86.010 HISTORY OF COLON POLYPS: Primary | ICD-10-CM

## 2020-03-04 PROCEDURE — 99202 OFFICE O/P NEW SF 15 MIN: CPT | Performed by: INTERNAL MEDICINE

## 2020-03-04 PROCEDURE — G0463 HOSPITAL OUTPT CLINIC VISIT: HCPCS | Performed by: INTERNAL MEDICINE

## 2020-03-04 NOTE — TELEPHONE ENCOUNTER
Scheduled for:  Colonoscopy 08[address]  Provider Name: Dr. Kimber Jerez  Date:  5/11/20  Location:  [facility]  Sedation:  MAC  Time:   8119 (pt is aware to arrive at 1215)   Prep:  Colyte  Meds/Allergies Reconciled?:  Physician reviewed   Diagnosis with codes:  History of c

## 2020-03-04 NOTE — PROGRESS NOTES
Cece Cox is a 75year old male.     HPI:   Patient presents with:  Colonoscopy Screening    The patient is a 75-year-old male who has a history of BPH, bladder cancer status post urologic treatment who is seen today in the office for history of colon Years: 40.00        Pack years: 61        Types: Cigarettes      Smokeless tobacco: Never Used      Tobacco comment: 2 units per day     Alcohol use:  Yes      Alcohol/week: 5.0 standard drinks      Types: 5 Cans of beer per week      Drinks per session: 3 BY MOUTH DAILY. (Patient not taking: Reported on 3/4/2020 ) 90 tablet 3       Allergies:  No Known Allergies      ROS:   The patient denies any chest pain or shortness of breath,  No neurologic or dermatologic symptoms.      PHYSICAL EXAM:   Blood pressure

## 2020-03-07 DIAGNOSIS — E03.9 ACQUIRED HYPOTHYROIDISM: ICD-10-CM

## 2020-03-09 RX ORDER — LEVOTHYROXINE SODIUM 0.03 MG/1
25 TABLET ORAL
Qty: 90 TABLET | Refills: 1 | Status: SHIPPED | OUTPATIENT
Start: 2020-03-09 | End: 2020-05-08

## 2020-03-10 NOTE — TELEPHONE ENCOUNTER
Refill passed per [facility] El[place] protocol.   Hypothyroid Medications  Protocol Criteria:  Appointment scheduled in the past 12 months or the next 3 months  TSH resulted in the past 12 months that is normal  Recent Outpatient Visits            5 days ago Hi

## 2020-03-14 ENCOUNTER — MED REC SCAN ONLY (OUTPATIENT)
Dept: FAMILY MEDICINE CLINIC | Facility: CLINIC | Age: 65
End: 2020-03-14

## 2020-04-25 DIAGNOSIS — J44.9 CHRONIC OBSTRUCTIVE PULMONARY DISEASE, UNSPECIFIED COPD TYPE (HCC): ICD-10-CM

## 2020-04-25 RX ORDER — ALBUTEROL SULFATE 90 UG/1
AEROSOL, METERED RESPIRATORY (INHALATION)
Qty: 18 INHALER | Refills: 3 | Status: SHIPPED | OUTPATIENT
Start: 2020-04-25 | End: 2020-10-26

## 2020-04-26 ENCOUNTER — TELEPHONE (OUTPATIENT)
Dept: FAMILY MEDICINE CLINIC | Facility: CLINIC | Age: 65
End: 2020-04-26

## 2020-04-26 NOTE — TELEPHONE ENCOUNTER
Patient paged provider at 1:01 a.m. for one episode of vomiting that occurred this morning. States it was clear with no color or blood. Did eat shrimp yesterday. Has been having a chest cold and sinus infection. Hx of COPD and smoking.  Likely vomiting was

## 2020-05-01 ENCOUNTER — TELEPHONE (OUTPATIENT)
Dept: FAMILY MEDICINE CLINIC | Facility: CLINIC | Age: 65
End: 2020-05-01

## 2020-05-07 ENCOUNTER — NURSE TRIAGE (OUTPATIENT)
Dept: FAMILY MEDICINE CLINIC | Facility: CLINIC | Age: 65
End: 2020-05-07

## 2020-05-07 NOTE — TELEPHONE ENCOUNTER
Please reply to pool: EM RN TRIAGE      Action Requested: Summary for Provider     []  Critical Lab, Recommendations Needed  [] Need Additional Advice  []   FYI    []   Need Orders  [] Need Medications Sent to Pharmacy  []  Other     SUMMARY: Offered o

## 2020-05-08 ENCOUNTER — OFFICE VISIT (OUTPATIENT)
Dept: FAMILY MEDICINE CLINIC | Facility: CLINIC | Age: 65
End: 2020-05-08
Payer: MEDICARE

## 2020-05-08 ENCOUNTER — LAB ENCOUNTER (OUTPATIENT)
Dept: LAB | Age: 65
End: 2020-05-08
Attending: FAMILY MEDICINE
Payer: MEDICARE

## 2020-05-08 ENCOUNTER — HOSPITAL ENCOUNTER (OUTPATIENT)
Dept: GENERAL RADIOLOGY | Age: 65
Discharge: HOME | End: 2020-05-08
Attending: FAMILY MEDICINE
Payer: MEDICARE

## 2020-05-08 VITALS
DIASTOLIC BLOOD PRESSURE: 60 MMHG | HEIGHT: 64 IN | WEIGHT: 115 LBS | TEMPERATURE: 98 F | HEART RATE: 93 BPM | SYSTOLIC BLOOD PRESSURE: 122 MMHG | BODY MASS INDEX: 19.63 KG/M2

## 2020-05-08 DIAGNOSIS — K21.9 CHRONIC GERD: ICD-10-CM

## 2020-05-08 DIAGNOSIS — R14.2 ERUCTATION: ICD-10-CM

## 2020-05-08 DIAGNOSIS — R13.19 ESOPHAGEAL DYSPHAGIA: ICD-10-CM

## 2020-05-08 DIAGNOSIS — E87.1 HYPONATREMIA: ICD-10-CM

## 2020-05-08 DIAGNOSIS — I10 ESSENTIAL HYPERTENSION: ICD-10-CM

## 2020-05-08 DIAGNOSIS — J44.9 CHRONIC OBSTRUCTIVE PULMONARY DISEASE, UNSPECIFIED COPD TYPE (HCC): ICD-10-CM

## 2020-05-08 DIAGNOSIS — F17.200 TOBACCO USE DISORDER: ICD-10-CM

## 2020-05-08 DIAGNOSIS — R14.2 ERUCTATION: Primary | ICD-10-CM

## 2020-05-08 DIAGNOSIS — F10.10 ALCOHOL ABUSE: ICD-10-CM

## 2020-05-08 DIAGNOSIS — E03.9 ACQUIRED HYPOTHYROIDISM: ICD-10-CM

## 2020-05-08 DIAGNOSIS — E78.2 MIXED HYPERLIPIDEMIA: ICD-10-CM

## 2020-05-08 PROCEDURE — 36415 COLL VENOUS BLD VENIPUNCTURE: CPT

## 2020-05-08 PROCEDURE — 99215 OFFICE O/P EST HI 40 MIN: CPT | Performed by: FAMILY MEDICINE

## 2020-05-08 PROCEDURE — G0463 HOSPITAL OUTPT CLINIC VISIT: HCPCS | Performed by: FAMILY MEDICINE

## 2020-05-08 PROCEDURE — 84443 ASSAY THYROID STIM HORMONE: CPT

## 2020-05-08 PROCEDURE — 71046 X-RAY EXAM CHEST 2 VIEWS: CPT | Performed by: FAMILY MEDICINE

## 2020-05-08 PROCEDURE — 85025 COMPLETE CBC W/AUTO DIFF WBC: CPT

## 2020-05-08 PROCEDURE — 80053 COMPREHEN METABOLIC PANEL: CPT

## 2020-05-08 RX ORDER — OMEPRAZOLE 40 MG/1
40 CAPSULE, DELAYED RELEASE ORAL DAILY
Qty: 90 CAPSULE | Refills: 0 | Status: SHIPPED | OUTPATIENT
Start: 2020-05-08 | End: 2020-07-30

## 2020-05-08 RX ORDER — LEVOTHYROXINE SODIUM 0.05 MG/1
50 TABLET ORAL
Qty: 90 TABLET | Refills: 0 | Status: SHIPPED | OUTPATIENT
Start: 2020-05-08 | End: 2020-06-24

## 2020-05-08 RX ORDER — FENOFIBRATE 160 MG/1
160 TABLET ORAL DAILY
Qty: 90 TABLET | Refills: 1 | Status: SHIPPED | OUTPATIENT
Start: 2020-05-08 | End: 2020-12-21

## 2020-05-08 RX ORDER — FLUTICASONE PROPIONATE AND SALMETEROL 250; 50 UG/1; UG/1
1 POWDER RESPIRATORY (INHALATION) EVERY 12 HOURS SCHEDULED
Qty: 3 EACH | Refills: 1 | Status: SHIPPED | OUTPATIENT
Start: 2020-05-08 | End: 2020-08-31

## 2020-05-08 RX ORDER — LEVOTHYROXINE SODIUM 0.05 MG/1
25 TABLET ORAL
Qty: 90 TABLET | Refills: 0 | Status: SHIPPED | OUTPATIENT
Start: 2020-05-08 | End: 2020-05-08

## 2020-05-08 NOTE — PROGRESS NOTES
Patient ID: Tereasa Lefort is a 75year old male. HPI  Patient presents with:   Follow - Up    Telephone message from 5/7/20:  Manuel Perla RN   Registered Nurse      Telephone Encounter   Signed   Encounter Date:  5/7/2020                 P colonoscopy per Dr. Alexis Jacobson but this was canceled due to COVID-19. He also had an appointment with urology which was canceled. He drinks 6 beers per day. He smokes less than a pack per day for the past 2 weeks. He does not think he is an alcoholic.  States 126/76  12/17/19 : 126/73  09/03/19 : 124/70  03/13/19 : 123/70        Review of Systems   Constitutional: Negative for chills and fever. HENT: Negative for voice change. Respiratory: Negative for chest tightness and shortness of breath.     Cardiovasc EVERY DAY 90 capsule 1   • Fenofibrate 160 MG Oral Tab Take 1 tablet (160 mg total) by mouth daily. 90 tablet 1   • Albuterol Sulfate HFA (PROAIR HFA) 108 (90 Base) MCG/ACT Inhalation Aero Soln 2 puffs 3-4 times daily as needed.  3 Inhaler 1   • Dutasteride Oral    Weight: 115 lb (52.2 kg)    Height: 5' 4\" (1.626 m)              Assessment/Plan:      Diagnoses and all orders for this visit:    Eructation  -     GASTRO - INTERNAL  -     Omeprazole 40 MG Oral Capsule Delayed Release;  Take 1 capsule (40 mg tota for COPD and seeing what would come up and what would not with regard to medications being covered and what were not  covered and of course what tier.    Tobacco use disorder  -     GASTRO - INTERNAL  -     fluticasone-salmeterol (WIXELA INHUB) 250-50 MCG/D and sometimes he will have spontaneous clear drainage that comes up out of his throat when he belches. I am going to have him take 2 of the omeprazole daily to equal 40 mg at the same time instead of the 20 mg dose.           Referral Priority:Routine

## 2020-05-09 ENCOUNTER — TELEPHONE (OUTPATIENT)
Dept: FAMILY MEDICINE CLINIC | Facility: CLINIC | Age: 65
End: 2020-05-09

## 2020-05-09 NOTE — TELEPHONE ENCOUNTER
Advised patient on Dr. Carl Wheeler information and recommendations. Gave patient phone # to call the lab. Advised ER if confusion/vomiting. Patient verbalized understanding.       Notes recorded by DANNY Barry on 5/9/2020 at 9:34 AM WILL CASE  ------

## 2020-05-11 ENCOUNTER — APPOINTMENT (OUTPATIENT)
Dept: LAB | Age: 65
End: 2020-05-11
Attending: FAMILY MEDICINE
Payer: MEDICARE

## 2020-05-11 DIAGNOSIS — E87.1 HYPONATREMIA: ICD-10-CM

## 2020-05-11 PROCEDURE — 84300 ASSAY OF URINE SODIUM: CPT

## 2020-05-11 PROCEDURE — 36415 COLL VENOUS BLD VENIPUNCTURE: CPT

## 2020-05-11 PROCEDURE — 83935 ASSAY OF URINE OSMOLALITY: CPT

## 2020-05-11 PROCEDURE — 82088 ASSAY OF ALDOSTERONE: CPT

## 2020-05-11 PROCEDURE — 84588 ASSAY OF VASOPRESSIN: CPT

## 2020-05-11 PROCEDURE — 80048 BASIC METABOLIC PNL TOTAL CA: CPT

## 2020-05-14 ENCOUNTER — TELEPHONE (OUTPATIENT)
Dept: GASTROENTEROLOGY | Facility: CLINIC | Age: 65
End: 2020-05-14

## 2020-05-14 NOTE — TELEPHONE ENCOUNTER
I spoke with this patient to schedule on 5/18 but he decided to decline since he has other health issues he needs to attend to first. He will CB once to schedule once he finished with other testing.

## 2020-06-04 ENCOUNTER — TELEPHONE (OUTPATIENT)
Dept: FAMILY MEDICINE CLINIC | Facility: CLINIC | Age: 65
End: 2020-06-04

## 2020-06-04 DIAGNOSIS — I10 ESSENTIAL HYPERTENSION WITH GOAL BLOOD PRESSURE LESS THAN 140/90: ICD-10-CM

## 2020-06-04 RX ORDER — AMLODIPINE BESYLATE AND BENAZEPRIL HYDROCHLORIDE 10; 40 MG/1; MG/1
CAPSULE ORAL
Qty: 90 CAPSULE | Refills: 1 | Status: SHIPPED | OUTPATIENT
Start: 2020-06-04 | End: 2020-12-22

## 2020-06-16 ENCOUNTER — APPOINTMENT (OUTPATIENT)
Dept: LAB | Age: 65
End: 2020-06-16
Attending: FAMILY MEDICINE
Payer: MEDICARE

## 2020-06-16 DIAGNOSIS — E03.9 ACQUIRED HYPOTHYROIDISM: ICD-10-CM

## 2020-06-16 DIAGNOSIS — E87.1 HYPONATREMIA: ICD-10-CM

## 2020-06-16 PROCEDURE — 84443 ASSAY THYROID STIM HORMONE: CPT

## 2020-06-16 PROCEDURE — 80048 BASIC METABOLIC PNL TOTAL CA: CPT

## 2020-06-16 PROCEDURE — 36415 COLL VENOUS BLD VENIPUNCTURE: CPT

## 2020-07-30 DIAGNOSIS — K21.9 CHRONIC GERD: ICD-10-CM

## 2020-07-30 DIAGNOSIS — R14.2 ERUCTATION: ICD-10-CM

## 2020-07-30 DIAGNOSIS — R13.19 ESOPHAGEAL DYSPHAGIA: ICD-10-CM

## 2020-07-30 RX ORDER — OMEPRAZOLE 40 MG/1
40 CAPSULE, DELAYED RELEASE ORAL DAILY
Qty: 90 CAPSULE | Refills: 0 | Status: SHIPPED | OUTPATIENT
Start: 2020-07-30 | End: 2020-10-26

## 2020-08-20 ENCOUNTER — TELEPHONE (OUTPATIENT)
Dept: FAMILY MEDICINE CLINIC | Facility: CLINIC | Age: 65
End: 2020-08-20

## 2020-08-20 NOTE — TELEPHONE ENCOUNTER
I called the patient and he stated the Gastroenterology department stated that they do not have a referral for the endoscopy. I stated it is on the referral given to them. He will call them back. Upon hanging up he stated I want Dr. Aurelio Ramírez to know. Last night he aspirated. The parametics came and he was able to get the fluid out of his lungs. He stated the paramedics told him he is ok. He has been sleeping a one flat pillow. Instructed need to elevated and should us a rest to sleep.

## 2020-08-29 DIAGNOSIS — J44.9 CHRONIC OBSTRUCTIVE PULMONARY DISEASE, UNSPECIFIED COPD TYPE (HCC): ICD-10-CM

## 2020-08-29 DIAGNOSIS — F17.200 TOBACCO USE DISORDER: ICD-10-CM

## 2020-08-29 DIAGNOSIS — K21.9 CHRONIC GERD: ICD-10-CM

## 2020-08-31 RX ORDER — OMEPRAZOLE 20 MG/1
CAPSULE, DELAYED RELEASE ORAL
Qty: 90 CAPSULE | Refills: 1 | Status: SHIPPED | OUTPATIENT
Start: 2020-08-31 | End: 2020-12-11 | Stop reason: DRUGHIGH

## 2020-08-31 RX ORDER — FLUTICASONE PROPIONATE AND SALMETEROL 250; 50 UG/1; UG/1
POWDER RESPIRATORY (INHALATION)
Qty: 3 EACH | Refills: 1 | Status: SHIPPED | OUTPATIENT
Start: 2020-08-31 | End: 2021-03-01

## 2020-09-01 ENCOUNTER — LAB ENCOUNTER (OUTPATIENT)
Dept: LAB | Age: 65
End: 2020-09-01
Attending: FAMILY MEDICINE
Payer: MEDICARE

## 2020-09-01 DIAGNOSIS — E87.1 HYPONATREMIA: ICD-10-CM

## 2020-09-01 DIAGNOSIS — E03.9 ACQUIRED HYPOTHYROIDISM: ICD-10-CM

## 2020-09-01 LAB
ANION GAP SERPL CALC-SCNC: 6 MMOL/L (ref 0–18)
BUN BLD-MCNC: 8 MG/DL (ref 7–18)
BUN/CREAT SERPL: 12.5 (ref 10–20)
CALCIUM BLD-MCNC: 10 MG/DL (ref 8.5–10.1)
CHLORIDE SERPL-SCNC: 96 MMOL/L (ref 98–112)
CO2 SERPL-SCNC: 29 MMOL/L (ref 21–32)
CREAT BLD-MCNC: 0.64 MG/DL (ref 0.7–1.3)
GLUCOSE BLD-MCNC: 83 MG/DL (ref 70–99)
OSMOLALITY SERPL CALC.SUM OF ELEC: 269 MOSM/KG (ref 275–295)
PATIENT FASTING Y/N/NP: YES
POTASSIUM SERPL-SCNC: 4.3 MMOL/L (ref 3.5–5.1)
SODIUM SERPL-SCNC: 131 MMOL/L (ref 136–145)
TSI SER-ACNC: 2.78 MIU/ML (ref 0.36–3.74)

## 2020-09-01 PROCEDURE — 84443 ASSAY THYROID STIM HORMONE: CPT

## 2020-09-01 PROCEDURE — 36415 COLL VENOUS BLD VENIPUNCTURE: CPT

## 2020-09-01 PROCEDURE — 80048 BASIC METABOLIC PNL TOTAL CA: CPT

## 2020-10-01 ENCOUNTER — TELEPHONE (OUTPATIENT)
Dept: FAMILY MEDICINE CLINIC | Facility: CLINIC | Age: 65
End: 2020-10-01

## 2020-10-01 NOTE — TELEPHONE ENCOUNTER
Patient is having surgery on 10/8 and the hospital called patient where he's having his surgery to let him know he needs to request a Covid testing from his PCP  Before he can get the surgery done.        Please advise Patient/Caregiver provided printed discharge information.

## 2020-10-02 ENCOUNTER — TELEPHONE (OUTPATIENT)
Dept: PULMONOLOGY | Facility: CLINIC | Age: 65
End: 2020-10-02

## 2020-10-02 NOTE — TELEPHONE ENCOUNTER
What hospital is pt having surgery at and what type of surgery-we have no records? Usually they are tested at hospital where surgery is being performed.

## 2020-10-05 ENCOUNTER — LAB REQUISITION (OUTPATIENT)
Dept: LAB | Facility: HOSPITAL | Age: 65
End: 2020-10-05
Payer: MEDICARE

## 2020-10-05 DIAGNOSIS — Z01.818 ENCOUNTER FOR OTHER PREPROCEDURAL EXAMINATION: ICD-10-CM

## 2020-10-05 NOTE — TELEPHONE ENCOUNTER
Spoke to patient and he is having exploratory surgery on his bladder at [facility] by Adolph Pozo.      COVID test is being done today at 9:30 ordered by Adolph Pozo

## 2020-10-08 ENCOUNTER — LAB REQUISITION (OUTPATIENT)
Dept: LAB | Facility: HOSPITAL | Age: 65
End: 2020-10-08
Payer: MEDICARE

## 2020-10-08 DIAGNOSIS — Z85.51 PERSONAL HISTORY OF MALIGNANT NEOPLASM OF BLADDER: ICD-10-CM

## 2020-10-08 PROCEDURE — 88305 TISSUE EXAM BY PATHOLOGIST: CPT | Performed by: UROLOGY

## 2020-10-21 DIAGNOSIS — J30.1 ALLERGIC RHINITIS DUE TO POLLEN, UNSPECIFIED SEASONALITY: ICD-10-CM

## 2020-10-21 RX ORDER — FLUTICASONE PROPIONATE 50 MCG
SPRAY, SUSPENSION (ML) NASAL
Refills: 2 | OUTPATIENT
Start: 2020-10-21

## 2020-10-22 ENCOUNTER — TELEPHONE (OUTPATIENT)
Dept: FAMILY MEDICINE CLINIC | Facility: CLINIC | Age: 65
End: 2020-10-22

## 2020-10-22 ENCOUNTER — TELEPHONE (OUTPATIENT)
Dept: GASTROENTEROLOGY | Facility: CLINIC | Age: 65
End: 2020-10-22

## 2020-10-22 ENCOUNTER — OFFICE VISIT (OUTPATIENT)
Dept: GASTROENTEROLOGY | Facility: CLINIC | Age: 65
End: 2020-10-22
Payer: MEDICARE

## 2020-10-22 VITALS
HEIGHT: 64 IN | WEIGHT: 118 LBS | HEART RATE: 93 BPM | BODY MASS INDEX: 20.14 KG/M2 | DIASTOLIC BLOOD PRESSURE: 72 MMHG | SYSTOLIC BLOOD PRESSURE: 108 MMHG

## 2020-10-22 DIAGNOSIS — I10 ESSENTIAL HYPERTENSION WITH GOAL BLOOD PRESSURE LESS THAN 140/90: Primary | ICD-10-CM

## 2020-10-22 DIAGNOSIS — Z86.010 HISTORY OF COLON POLYPS: ICD-10-CM

## 2020-10-22 DIAGNOSIS — R13.19 ESOPHAGEAL DYSPHAGIA: Primary | ICD-10-CM

## 2020-10-22 DIAGNOSIS — Z12.5 SCREENING PSA (PROSTATE SPECIFIC ANTIGEN): ICD-10-CM

## 2020-10-22 DIAGNOSIS — R13.10 DYSPHAGIA, UNSPECIFIED TYPE: ICD-10-CM

## 2020-10-22 DIAGNOSIS — E78.2 MIXED HYPERLIPIDEMIA: ICD-10-CM

## 2020-10-22 DIAGNOSIS — Z86.010 HX OF COLONIC POLYPS: Primary | ICD-10-CM

## 2020-10-22 PROCEDURE — G0463 HOSPITAL OUTPT CLINIC VISIT: HCPCS | Performed by: INTERNAL MEDICINE

## 2020-10-22 PROCEDURE — 99213 OFFICE O/P EST LOW 20 MIN: CPT | Performed by: INTERNAL MEDICINE

## 2020-10-22 NOTE — TELEPHONE ENCOUNTER
Dr. Juan Miguel Kelsey--    This patient came into see you today which I informed him that you would like to schedule him on 11/28 but he stated that he was going deer hunting and will not miss it.  Please advise if this patient is to be schedule urgent or your n

## 2020-10-22 NOTE — PATIENT INSTRUCTIONS
1.  Continue omeprazole. 2.  Schedule colonoscopy for a history of polyps and an EGD and possible dilatation for dysphagia following a split dose MiraLAX/Gatorade preparation and monitored anesthesia care.

## 2020-10-22 NOTE — TELEPHONE ENCOUNTER
Patient requesting labs for physical, scheduled for 10/29. Will also like to have shingles vaccine on same day along with flu and pneumonia vaccine.  Please advise

## 2020-10-23 DIAGNOSIS — J44.9 CHRONIC OBSTRUCTIVE PULMONARY DISEASE, UNSPECIFIED COPD TYPE (HCC): ICD-10-CM

## 2020-10-23 NOTE — TELEPHONE ENCOUNTER
Dr. Megan Garduno--    Yes he can come in on that date. Since that date is closed for a vacation day you would have to send an email to [place] to have that date reopened. Thank you.

## 2020-10-24 NOTE — PROGRESS NOTES
HPI:    Patient ID: Osvaldo Coats is a 75year old male. HPI  The patient presents today to discuss difficulty swallowing. I care for the patient's significant other Claudene Newborn [facility]).   The patient has been under the gastrointestinal care of Dr. Ronald Hines MCG/DOSE Inhalation Aerosol Powder, Breath Activated INHALE 1 PUFF BY MOUTH EVERY 12 HOURS 3 each 1   • OMEPRAZOLE 40 MG Oral Capsule Delayed Release TAKE 1 CAPSULE (40 MG TOTAL) BY MOUTH DAILY. FOR ACID REFLUX OR GASTRITIS.  90 capsule 0   • Levothyroxine prolonged air entry without signs of consolidation). He has no rales. Abdominal: Soft. Bowel sounds are normal. He exhibits no distension and no mass. There is no hepatosplenomegaly. There is no abdominal tenderness. There is no rebound and no guarding. 1. Esophageal dysphagia  The patient presents with 2 isolated episodes of solid food dysphagia over the past year. The symptoms have been nonprogressive. The symptoms suggest an esophageal structural cause.   The development of a peptic stricture or rin

## 2020-10-25 DIAGNOSIS — R14.2 ERUCTATION: ICD-10-CM

## 2020-10-25 DIAGNOSIS — K21.9 CHRONIC GERD: ICD-10-CM

## 2020-10-25 DIAGNOSIS — R13.19 ESOPHAGEAL DYSPHAGIA: ICD-10-CM

## 2020-10-26 DIAGNOSIS — J30.1 ALLERGIC RHINITIS DUE TO POLLEN, UNSPECIFIED SEASONALITY: ICD-10-CM

## 2020-10-26 RX ORDER — FLUTICASONE PROPIONATE 50 MCG
SPRAY, SUSPENSION (ML) NASAL
Qty: 1 BOTTLE | Refills: 2 | Status: SHIPPED | OUTPATIENT
Start: 2020-10-26 | End: 2021-01-16

## 2020-10-26 RX ORDER — ALBUTEROL SULFATE 90 UG/1
AEROSOL, METERED RESPIRATORY (INHALATION)
Qty: 18 INHALER | Refills: 3 | Status: SHIPPED | OUTPATIENT
Start: 2020-10-26 | End: 2021-03-01

## 2020-10-26 RX ORDER — OMEPRAZOLE 40 MG/1
40 CAPSULE, DELAYED RELEASE ORAL DAILY
Qty: 90 CAPSULE | Refills: 1 | Status: SHIPPED | OUTPATIENT
Start: 2020-10-26 | End: 2021-11-16

## 2020-10-26 NOTE — TELEPHONE ENCOUNTER
Pharmacy contacted, they dispensed 3 inhalers Mateo Rader on 9/1/20 too soon to fill   Contacted patient he needs the albuterol inhaler refilled, which has been sent in today.  He does also need fluticasone nasal spray refilled

## 2020-10-26 NOTE — TELEPHONE ENCOUNTER
Scheduled for:  Colonoscopy 48753 EGD 63018  Provider Name:  Dr Tiffany Valenzuela   Date:  12/28/2020  Location:  [facility]  Sedation: MAC  Time:  0730 (pt is aware to arrive at 0630)  Prep:  Miralax + Gatorade  Meds/Allergies Reconciled?:  Physician reviewed  Delio Gorman

## 2020-10-27 ENCOUNTER — APPOINTMENT (OUTPATIENT)
Dept: LAB | Age: 65
End: 2020-10-27
Attending: FAMILY MEDICINE
Payer: MEDICARE

## 2020-10-27 ENCOUNTER — HOSPITAL ENCOUNTER (OUTPATIENT)
Dept: CT IMAGING | Age: 65
Discharge: HOME | End: 2020-10-27
Attending: INTERNAL MEDICINE
Payer: MEDICARE

## 2020-10-27 DIAGNOSIS — R91.8 PULMONARY NODULES: ICD-10-CM

## 2020-10-27 PROCEDURE — 71250 CT THORAX DX C-: CPT | Performed by: INTERNAL MEDICINE

## 2020-10-28 ENCOUNTER — LAB ENCOUNTER (OUTPATIENT)
Dept: LAB | Age: 65
End: 2020-10-28
Attending: FAMILY MEDICINE
Payer: MEDICARE

## 2020-10-28 DIAGNOSIS — Z12.5 SCREENING PSA (PROSTATE SPECIFIC ANTIGEN): ICD-10-CM

## 2020-10-28 DIAGNOSIS — E78.2 MIXED HYPERLIPIDEMIA: ICD-10-CM

## 2020-10-28 DIAGNOSIS — I10 ESSENTIAL HYPERTENSION WITH GOAL BLOOD PRESSURE LESS THAN 140/90: ICD-10-CM

## 2020-10-28 PROCEDURE — 80053 COMPREHEN METABOLIC PANEL: CPT

## 2020-10-28 PROCEDURE — 80061 LIPID PANEL: CPT

## 2020-10-28 PROCEDURE — 36415 COLL VENOUS BLD VENIPUNCTURE: CPT

## 2020-10-28 PROCEDURE — 85025 COMPLETE CBC W/AUTO DIFF WBC: CPT

## 2020-11-03 ENCOUNTER — OFFICE VISIT (OUTPATIENT)
Dept: FAMILY MEDICINE CLINIC | Facility: CLINIC | Age: 65
End: 2020-11-03
Payer: MEDICARE

## 2020-11-03 VITALS — BODY MASS INDEX: 19.81 KG/M2 | WEIGHT: 116 LBS | HEIGHT: 64 IN

## 2020-11-03 DIAGNOSIS — F17.200 TOBACCO USE DISORDER: ICD-10-CM

## 2020-11-03 DIAGNOSIS — E87.1 HYPONATREMIA: ICD-10-CM

## 2020-11-03 DIAGNOSIS — E78.2 MIXED HYPERLIPIDEMIA: Primary | ICD-10-CM

## 2020-11-03 DIAGNOSIS — H91.91 DECREASED HEARING OF RIGHT EAR: ICD-10-CM

## 2020-11-03 DIAGNOSIS — R91.1 PULMONARY NODULE, RIGHT: ICD-10-CM

## 2020-11-03 DIAGNOSIS — I70.0 CALCIFICATION OF AORTA (HCC): ICD-10-CM

## 2020-11-03 DIAGNOSIS — E03.9 ACQUIRED HYPOTHYROIDISM: ICD-10-CM

## 2020-11-03 DIAGNOSIS — J43.9 PULMONARY EMPHYSEMA, UNSPECIFIED EMPHYSEMA TYPE (HCC): ICD-10-CM

## 2020-11-03 DIAGNOSIS — N40.1 BENIGN NON-NODULAR PROSTATIC HYPERPLASIA WITH LOWER URINARY TRACT SYMPTOMS: ICD-10-CM

## 2020-11-03 DIAGNOSIS — I10 ESSENTIAL HYPERTENSION, BENIGN: ICD-10-CM

## 2020-11-03 DIAGNOSIS — C67.9 MALIGNANT NEOPLASM OF URINARY BLADDER, UNSPECIFIED SITE (HCC): ICD-10-CM

## 2020-11-03 DIAGNOSIS — K44.9 HIATAL HERNIA: ICD-10-CM

## 2020-11-03 PROCEDURE — 90662 IIV NO PRSV INCREASED AG IM: CPT | Performed by: NURSE PRACTITIONER

## 2020-11-03 PROCEDURE — G0008 ADMIN INFLUENZA VIRUS VAC: HCPCS | Performed by: NURSE PRACTITIONER

## 2020-11-03 PROCEDURE — G0438 PPPS, INITIAL VISIT: HCPCS | Performed by: NURSE PRACTITIONER

## 2020-11-03 NOTE — PROGRESS NOTES
HPI:   Nimo Jones is a 75year old male who presents for a Medicare Subsequent Annual Wellness visit (Pt already had Initial Annual Wellness).       His last annual assessment has been over 1 year: Annual Physical due on 09/03/2020       Smokes about + General (Family Medicine)    Patient Active Problem List:     Essential hypertension, benign     Mixed hyperlipidemia     Nocturia     Benign non-nodular prostatic hyperplasia with lower urinary tract symptoms     Chronic obstructive pulmonary disease ([address] Cancer in his sister; Dementia in his mother; Diabetes in his paternal grandmother. SOCIAL HISTORY:   He  reports that he has been smoking cigarettes. He has a 60.00 pack-year smoking history.  He has never used smokeless tobacco. He reports current alcoh light. Conjunctivae and EOM are normal. Right eye exhibits no discharge. Left eye exhibits no discharge. Neck: Normal range of motion. Neck supple. No thyromegaly present. Cardiovascular: Normal rate, regular rhythm and normal heart sounds.     No murmu tract symptoms    Calcification of aorta (HCC)    Decreased hearing of right ear    Hiatal hernia    Hyponatremia    Other orders  -     FLU VACC HIGH DOSE PRSV FREE     Jostin Chaves was seen today for routine physical.    Diagnoses and all orders for this visit (mg/dL)   Date Value   10/28/2020 76        EKG - w/ Initial Preventative Physical Exam only, or if medically necessary Electrocardiogram date03/05/2019    Colorectal Cancer Screening      Colonoscopy Screen every 10 years Colonoscopy due on 02/14/2020 Upd anticonvulsants.)    Potassium  Annually Potassium (mmol/L)   Date Value   10/28/2020 4.6     POTASSIUM (P) (mmol/L)   Date Value   03/08/2016 3.8    No flowsheet data found.     Creatinine  Annually Creatinine (mg/dL)   Date Value   10/28/2020 0.83    No f mediastinum and anna. There is a stable 2 millimeter nodule in the right lung apex anteriorly (series   8, image 26).   The numerous nodule seen along the right hemidiaphragm are slightly improved from prior (see series 3, image 89 as compared with series Numerous pulmonary micronodules along the right hemidiaphragm are slightly improved from prior, probably related to chronic inflammation of the small airways. No new or enlarging pulmonary nodules are   present. 2. Small hiatal hernia.   Thickening a Discussed smoking cessation 3 min  Enc use of chantix  Discussed many effects smoking is having on body-copd, htn, bladder ca            Endocrine    Acquired hypothyroidism     cpm            Fluid/Electrolyte    Hyponatremia     Sodium levels remain sl l

## 2020-11-03 NOTE — PATIENT INSTRUCTIONS
Prevention Guidelines, Men Ages 72 and Older  Screening tests and vaccines are an important part of managing your health. A screening test is done to find possible disorders or diseases in people who don't have any symptoms.  The goal is to find a disease If you choose a test other than a colonoscopy and have an abnormal test result, you will need to have a colonoscopy. Screening recommendations vary among expert groups. Talk with your healthcare provider about which tests are best for you.   Some men should Vision All men in this age group Every 1 to 2 years; if you have a chronic health condition, ask your healthcare provider if you need exams more often   Vaccine Who needs it How often   Chickenpox (varicella) All men in this age group who have no record of Diet and exercise Men who are overweight or obese When diagnosed, and then at routine exams   Fall prevention (exercise, vitamin D supplements) All men in this age group At yearly routine exams   Sexually transmitted infection Men at increased risk for inf

## 2020-11-04 PROBLEM — K44.9 HIATAL HERNIA: Status: ACTIVE | Noted: 2020-11-04

## 2020-11-04 PROBLEM — I70.0 CALCIFICATION OF AORTA: Status: ACTIVE | Noted: 2020-11-04

## 2020-11-04 PROBLEM — I70.0 CALCIFICATION OF AORTA (HCC): Status: ACTIVE | Noted: 2020-11-04

## 2020-11-04 NOTE — ASSESSMENT & PLAN NOTE
Chronic; has seen ENT in past for cerumen impaction  Does not appear to have seen audiology for further evaluation.

## 2020-11-04 NOTE — ASSESSMENT & PLAN NOTE
con't atorvastatin  Please aim to eat a diet high in fresh fruits and vegetables, lean protein sources, complex carbohydrates and limited processed and fast foods.   Try to get at least 150 minutes of exercise per week-a combination of weight resistance and

## 2020-11-04 NOTE — ASSESSMENT & PLAN NOTE
Discussed smoking cessation 3 min  Enc use of chantix  Discussed many effects smoking is having on body-copd, htn, bladder ca

## 2020-11-10 ENCOUNTER — TELEPHONE (OUTPATIENT)
Dept: FAMILY MEDICINE CLINIC | Facility: CLINIC | Age: 65
End: 2020-11-10

## 2020-11-10 NOTE — TELEPHONE ENCOUNTER
Pt requesting order for covid test -need prior to hunting 11/20/20- advised to review [place].gov web site to find testing site close to his resident  Pt verbalized understanding

## 2020-11-13 ENCOUNTER — TELEPHONE (OUTPATIENT)
Dept: FAMILY MEDICINE CLINIC | Facility: CLINIC | Age: 65
End: 2020-11-13

## 2020-11-13 DIAGNOSIS — Z20.822 ENCOUNTER FOR SCREENING LABORATORY TESTING FOR COVID-19 VIRUS IN ASYMPTOMATIC PATIENT: Primary | ICD-10-CM

## 2020-11-13 NOTE — TELEPHONE ENCOUNTER
Pt requesting an order for COVID testing.  He is going deer hunting and they require a COVID test.   Needs results by 11-20-20  Please advise

## 2020-11-16 ENCOUNTER — APPOINTMENT (OUTPATIENT)
Dept: LAB | Age: 65
End: 2020-11-16
Attending: NURSE PRACTITIONER
Payer: MEDICARE

## 2020-11-16 DIAGNOSIS — Z20.822 ENCOUNTER FOR SCREENING LABORATORY TESTING FOR COVID-19 VIRUS IN ASYMPTOMATIC PATIENT: ICD-10-CM

## 2020-12-17 DIAGNOSIS — E03.9 ACQUIRED HYPOTHYROIDISM: ICD-10-CM

## 2020-12-17 RX ORDER — LEVOTHYROXINE SODIUM 0.07 MG/1
75 TABLET ORAL
Qty: 90 TABLET | Refills: 1 | Status: SHIPPED | OUTPATIENT
Start: 2020-12-17 | End: 2021-03-01

## 2020-12-18 DIAGNOSIS — E78.2 MIXED HYPERLIPIDEMIA: ICD-10-CM

## 2020-12-21 RX ORDER — FENOFIBRATE 160 MG/1
TABLET ORAL
Qty: 90 TABLET | Refills: 1 | Status: SHIPPED | OUTPATIENT
Start: 2020-12-21 | End: 2021-03-01

## 2020-12-22 DIAGNOSIS — I10 ESSENTIAL HYPERTENSION WITH GOAL BLOOD PRESSURE LESS THAN 140/90: ICD-10-CM

## 2020-12-22 RX ORDER — AMLODIPINE BESYLATE AND BENAZEPRIL HYDROCHLORIDE 10; 40 MG/1; MG/1
CAPSULE ORAL
Qty: 90 CAPSULE | Refills: 1 | Status: SHIPPED | OUTPATIENT
Start: 2020-12-22 | End: 2021-03-01

## 2020-12-26 ENCOUNTER — LAB ENCOUNTER (OUTPATIENT)
Dept: LAB | Age: 65
End: 2020-12-26
Attending: INTERNAL MEDICINE
Payer: MEDICARE

## 2020-12-26 DIAGNOSIS — Z01.818 PRE-OP TESTING: ICD-10-CM

## 2020-12-28 ENCOUNTER — ANESTHESIA (OUTPATIENT)
Dept: ENDOSCOPY | Facility: HOSPITAL | Age: 65
End: 2020-12-28
Payer: MEDICARE

## 2020-12-28 ENCOUNTER — ANESTHESIA EVENT (OUTPATIENT)
Dept: ENDOSCOPY | Facility: HOSPITAL | Age: 65
End: 2020-12-28
Payer: MEDICARE

## 2020-12-28 ENCOUNTER — HOSPITAL ENCOUNTER (OUTPATIENT)
Facility: HOSPITAL | Age: 65
Setting detail: HOSPITAL OUTPATIENT SURGERY
Discharge: HOME | End: 2020-12-28
Attending: INTERNAL MEDICINE | Admitting: INTERNAL MEDICINE
Payer: MEDICARE

## 2020-12-28 VITALS
BODY MASS INDEX: 21.34 KG/M2 | OXYGEN SATURATION: 97 % | DIASTOLIC BLOOD PRESSURE: 72 MMHG | SYSTOLIC BLOOD PRESSURE: 123 MMHG | HEIGHT: 64 IN | HEART RATE: 79 BPM | WEIGHT: 125 LBS | RESPIRATION RATE: 18 BRPM | TEMPERATURE: 98 F

## 2020-12-28 DIAGNOSIS — R13.10 DYSPHAGIA, UNSPECIFIED TYPE: ICD-10-CM

## 2020-12-28 DIAGNOSIS — Z01.818 PRE-OP TESTING: Primary | ICD-10-CM

## 2020-12-28 DIAGNOSIS — Z86.010 HX OF COLONIC POLYPS: ICD-10-CM

## 2020-12-28 PROCEDURE — 0DBM8ZX EXCISION OF DESCENDING COLON, VIA NATURAL OR ARTIFICIAL OPENING ENDOSCOPIC, DIAGNOSTIC: ICD-10-PCS | Performed by: INTERNAL MEDICINE

## 2020-12-28 PROCEDURE — 45385 COLONOSCOPY W/LESION REMOVAL: CPT | Performed by: INTERNAL MEDICINE

## 2020-12-28 PROCEDURE — 43249 ESOPH EGD DILATION <30 MM: CPT | Performed by: INTERNAL MEDICINE

## 2020-12-28 PROCEDURE — 0DB38ZX EXCISION OF LOWER ESOPHAGUS, VIA NATURAL OR ARTIFICIAL OPENING ENDOSCOPIC, DIAGNOSTIC: ICD-10-PCS | Performed by: INTERNAL MEDICINE

## 2020-12-28 PROCEDURE — 0DB18ZX EXCISION OF UPPER ESOPHAGUS, VIA NATURAL OR ARTIFICIAL OPENING ENDOSCOPIC, DIAGNOSTIC: ICD-10-PCS | Performed by: INTERNAL MEDICINE

## 2020-12-28 PROCEDURE — 45380 COLONOSCOPY AND BIOPSY: CPT | Performed by: INTERNAL MEDICINE

## 2020-12-28 PROCEDURE — 0D748ZZ DILATION OF ESOPHAGOGASTRIC JUNCTION, VIA NATURAL OR ARTIFICIAL OPENING ENDOSCOPIC: ICD-10-PCS | Performed by: INTERNAL MEDICINE

## 2020-12-28 PROCEDURE — 0D738ZZ DILATION OF LOWER ESOPHAGUS, VIA NATURAL OR ARTIFICIAL OPENING ENDOSCOPIC: ICD-10-PCS | Performed by: INTERNAL MEDICINE

## 2020-12-28 PROCEDURE — 0DBL8ZX EXCISION OF TRANSVERSE COLON, VIA NATURAL OR ARTIFICIAL OPENING ENDOSCOPIC, DIAGNOSTIC: ICD-10-PCS | Performed by: INTERNAL MEDICINE

## 2020-12-28 PROCEDURE — 43239 EGD BIOPSY SINGLE/MULTIPLE: CPT | Performed by: INTERNAL MEDICINE

## 2020-12-28 RX ORDER — SODIUM CHLORIDE, SODIUM LACTATE, POTASSIUM CHLORIDE, CALCIUM CHLORIDE 600; 310; 30; 20 MG/100ML; MG/100ML; MG/100ML; MG/100ML
INJECTION, SOLUTION INTRAVENOUS CONTINUOUS
Status: CANCELLED | OUTPATIENT
Start: 2020-12-28

## 2020-12-28 RX ORDER — LIDOCAINE HYDROCHLORIDE 10 MG/ML
INJECTION, SOLUTION EPIDURAL; INFILTRATION; INTRACAUDAL; PERINEURAL AS NEEDED
Status: DISCONTINUED | OUTPATIENT
Start: 2020-12-28 | End: 2020-12-28 | Stop reason: SURG

## 2020-12-28 RX ORDER — SODIUM CHLORIDE, SODIUM LACTATE, POTASSIUM CHLORIDE, CALCIUM CHLORIDE 600; 310; 30; 20 MG/100ML; MG/100ML; MG/100ML; MG/100ML
INJECTION, SOLUTION INTRAVENOUS CONTINUOUS
Status: DISCONTINUED | OUTPATIENT
Start: 2020-12-28 | End: 2020-12-28

## 2020-12-28 RX ORDER — NALOXONE HYDROCHLORIDE 0.4 MG/ML
80 INJECTION, SOLUTION INTRAMUSCULAR; INTRAVENOUS; SUBCUTANEOUS AS NEEDED
Status: CANCELLED | OUTPATIENT
Start: 2020-12-28 | End: 2020-12-28

## 2020-12-28 RX ADMIN — LIDOCAINE HYDROCHLORIDE 25 MG: 10 INJECTION, SOLUTION EPIDURAL; INFILTRATION; INTRACAUDAL; PERINEURAL at 07:30:00

## 2020-12-28 RX ADMIN — SODIUM CHLORIDE, SODIUM LACTATE, POTASSIUM CHLORIDE, CALCIUM CHLORIDE: 600; 310; 30; 20 INJECTION, SOLUTION INTRAVENOUS at 08:21:00

## 2020-12-28 RX ADMIN — SODIUM CHLORIDE, SODIUM LACTATE, POTASSIUM CHLORIDE, CALCIUM CHLORIDE: 600; 310; 30; 20 INJECTION, SOLUTION INTRAVENOUS at 07:26:00

## 2020-12-28 NOTE — ANESTHESIA PREPROCEDURE EVALUATION
Anesthesia PreOp Note    HPI:     Larry Calix is a 77year old male who presents for preoperative consultation requested by: Wilbur Goldberg, MD    Date of Surgery: 12/28/2020    Procedure(s):  COLONOSCOPY  ESOPHAGOGASTRODUODENOSCOPY (EGD)  Indicat reflux    • High blood pressure    • High cholesterol    • Hyperlipidemia    • Hypertension    • Hypothyroidism    • Injury 01/29/2019    Left knee injury, from fall   • Osteoarthritis     in knee's, hip's   • Pulmonary nodules     first noted irwin vaughan BREAKFAST. DISREGARD PRIOR PRESCRIPTION., Disp: 90 tablet, Rfl: 1, 12/28/2020 at 0500    •  Fluticasone Propionate 50 MCG/ACT Nasal Suspension, SPRAY 2 SPRAYS INTO EACH NOSTRIL EVERY DAY, Disp: 1 Bottle, Rfl: 2    •  tamsulosin HCl 0.4 MG Oral Cap, Take 0. file        Attends meetings of clubs or organizations: Not on file        Relationship status: Not on file      Intimate partner violence        Fear of current or ex partner: Not on file        Emotionally abused: Not on file        Physically abused: No anesthetic complications   Airway   Mallampati: I  TM distance: >3 FB  Neck ROM: full  Dental    (+) upper dentures    Pulmonary - normal exam   (+) COPD,   Cardiovascular - normal exam  Exercise tolerance: good  (+) hypertension,     ECG reviewed  ROS com

## 2020-12-28 NOTE — ANESTHESIA POSTPROCEDURE EVALUATION
Patient: Onel Alfonso    Procedure Summary     Date: 12/28/20 Room / Location: [address] [place] [address] ENDOSCOPY 04 / [address] [place] [address] ENDOSCOPY    Anesthesia Start: 6911 Anesthesia Stop: 0827    Procedures:       COLONOSCOPY (N/A )      ESOPHAGOGASTRODUODENOSCOPY (EGD) (N/A ) Diagnosis

## 2020-12-28 NOTE — OPERATIVE REPORT
[facility] Report      Date of Procedure:  12/28/20      Preoperative Diagnosis:  1. Colorectal cancer screening  2. Personal history of adenomatous colon polyps  3. Dysphagia      Postoperative Diagnosis:  1. Colon polyps  2. was withdrawn. The procedures were well tolerated without immediate complication. Findings:  The preparation of the colon was excellent. The terminal ileum was examined for a few cm and visually normal.  The ileocecal valve was well preserved.  The v withdrawn. There was no trauma to the junction or to the distal esophagus. Impression:  1. Diminutive/small colon polyps  2. Sigmoid colon diverticulosis, currently uncomplicated  3. Hiatal hernia  4.   Probable proximal esophageal candidiasis    R

## 2020-12-28 NOTE — H&P
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 Any changes noted above.     Andre Kang MD  12/28/2020  7:24 AM

## 2021-01-04 ENCOUNTER — TELEPHONE (OUTPATIENT)
Dept: GASTROENTEROLOGY | Facility: CLINIC | Age: 66
End: 2021-01-04

## 2021-01-04 DIAGNOSIS — E78.2 MIXED HYPERLIPIDEMIA: ICD-10-CM

## 2021-01-04 RX ORDER — ATORVASTATIN CALCIUM 10 MG/1
TABLET, FILM COATED ORAL
Qty: 90 TABLET | Refills: 1 | Status: SHIPPED | OUTPATIENT
Start: 2021-01-04 | End: 2021-06-25

## 2021-01-04 NOTE — TELEPHONE ENCOUNTER
----- Message from Jose Ca MD sent at 12/31/2020  6:07 PM CST -----  I spoke to Rah Campbell and Flori Olivreos. He is feeling well. He had #7 polyps removed. #2 were adenomas. The remainder were prominent lymphoid aggregates.   I discussed the significanc

## 2021-01-04 NOTE — TELEPHONE ENCOUNTER
Recall colon in 5 years per Dr. Tiffany Valenzuela. Last colon: 12/28/20  Next due: 12/28/2025    Updated health maintenance and pt outreach.

## 2021-01-14 DIAGNOSIS — J30.1 ALLERGIC RHINITIS DUE TO POLLEN, UNSPECIFIED SEASONALITY: ICD-10-CM

## 2021-01-16 RX ORDER — FLUTICASONE PROPIONATE 50 MCG
SPRAY, SUSPENSION (ML) NASAL
Qty: 3 BOTTLE | Refills: 3 | Status: SHIPPED | OUTPATIENT
Start: 2021-01-16 | End: 2021-11-11

## 2021-01-27 DIAGNOSIS — Z23 NEED FOR VACCINATION: ICD-10-CM

## 2021-02-01 ENCOUNTER — LAB REQUISITION (OUTPATIENT)
Dept: LAB | Facility: HOSPITAL | Age: 66
End: 2021-02-01
Payer: MEDICARE

## 2021-02-01 DIAGNOSIS — Z01.818 ENCOUNTER FOR OTHER PREPROCEDURAL EXAMINATION: ICD-10-CM

## 2021-02-02 LAB — SARS-COV-2 RNA RESP QL NAA+PROBE: NOT DETECTED

## 2021-02-18 ENCOUNTER — TELEPHONE (OUTPATIENT)
Dept: FAMILY MEDICINE CLINIC | Facility: CLINIC | Age: 66
End: 2021-02-18

## 2021-02-18 DIAGNOSIS — E55.9 VITAMIN D DEFICIENCY: ICD-10-CM

## 2021-02-18 DIAGNOSIS — I10 ESSENTIAL HYPERTENSION WITH GOAL BLOOD PRESSURE LESS THAN 140/90: ICD-10-CM

## 2021-02-18 DIAGNOSIS — E78.2 MIXED HYPERLIPIDEMIA: Primary | ICD-10-CM

## 2021-02-18 DIAGNOSIS — E03.9 ACQUIRED HYPOTHYROIDISM: ICD-10-CM

## 2021-02-18 NOTE — TELEPHONE ENCOUNTER
Patient called and advised he would like to do his Follow Up Blood work Prior to his appointment coming up on 3/01    Please Call Patient once ordered so he can get that done. Please Advise.

## 2021-02-19 NOTE — TELEPHONE ENCOUNTER
Dr. Cody Mckeon, pt has an appt with you on 3/1/21    Last labs done were 10/28/20, do you need new labs?      PSA  Lipid  CMP  CBC      Your Note about those labs:     Viewed by Deysi Henderson on 11/18/2020 11:29 AM  Written by Kenneth Diaz DO on 10/28/2020

## 2021-02-22 NOTE — TELEPHONE ENCOUNTER
Patient returning called. Advised him that labs will be needed before 3/1 appt with Dr. Rodrigo Jarrell. Patient verbalizes understanding and agrees with plan.

## 2021-02-23 ENCOUNTER — LAB ENCOUNTER (OUTPATIENT)
Dept: LAB | Age: 66
End: 2021-02-23
Attending: FAMILY MEDICINE
Payer: MEDICARE

## 2021-02-23 DIAGNOSIS — E55.9 VITAMIN D DEFICIENCY: ICD-10-CM

## 2021-02-23 DIAGNOSIS — I10 ESSENTIAL HYPERTENSION WITH GOAL BLOOD PRESSURE LESS THAN 140/90: ICD-10-CM

## 2021-02-23 DIAGNOSIS — E03.9 ACQUIRED HYPOTHYROIDISM: ICD-10-CM

## 2021-02-23 LAB
ALBUMIN SERPL-MCNC: 4.1 G/DL (ref 3.4–5)
ALBUMIN/GLOB SERPL: 1.1 {RATIO} (ref 1–2)
ALP LIVER SERPL-CCNC: 60 U/L
ALT SERPL-CCNC: 25 U/L
ANION GAP SERPL CALC-SCNC: 4 MMOL/L (ref 0–18)
AST SERPL-CCNC: 15 U/L (ref 15–37)
BASOPHILS # BLD AUTO: 0.09 X10(3) UL (ref 0–0.2)
BASOPHILS NFR BLD AUTO: 1.3 %
BILIRUB SERPL-MCNC: 0.4 MG/DL (ref 0.1–2)
BUN BLD-MCNC: 12 MG/DL (ref 7–18)
BUN/CREAT SERPL: 11.4 (ref 10–20)
CALCIUM BLD-MCNC: 9.8 MG/DL (ref 8.5–10.1)
CHLORIDE SERPL-SCNC: 99 MMOL/L (ref 98–112)
CO2 SERPL-SCNC: 29 MMOL/L (ref 21–32)
CREAT BLD-MCNC: 1.05 MG/DL
DEPRECATED RDW RBC AUTO: 44.4 FL (ref 35.1–46.3)
EOSINOPHIL # BLD AUTO: 0.49 X10(3) UL (ref 0–0.7)
EOSINOPHIL NFR BLD AUTO: 7.1 %
ERYTHROCYTE [DISTWIDTH] IN BLOOD BY AUTOMATED COUNT: 13.3 % (ref 11–15)
GLOBULIN PLAS-MCNC: 3.7 G/DL (ref 2.8–4.4)
GLUCOSE BLD-MCNC: 95 MG/DL (ref 70–99)
HCT VFR BLD AUTO: 44.2 %
HGB BLD-MCNC: 14.9 G/DL
IMM GRANULOCYTES # BLD AUTO: 0.06 X10(3) UL (ref 0–1)
IMM GRANULOCYTES NFR BLD: 0.9 %
LYMPHOCYTES # BLD AUTO: 0.91 X10(3) UL (ref 1–4)
LYMPHOCYTES NFR BLD AUTO: 13.2 %
M PROTEIN MFR SERPL ELPH: 7.8 G/DL (ref 6.4–8.2)
MCH RBC QN AUTO: 30.5 PG (ref 26–34)
MCHC RBC AUTO-ENTMCNC: 33.7 G/DL (ref 31–37)
MCV RBC AUTO: 90.4 FL
MONOCYTES # BLD AUTO: 0.91 X10(3) UL (ref 0.1–1)
MONOCYTES NFR BLD AUTO: 13.2 %
NEUTROPHILS # BLD AUTO: 4.43 X10 (3) UL (ref 1.5–7.7)
NEUTROPHILS # BLD AUTO: 4.43 X10(3) UL (ref 1.5–7.7)
NEUTROPHILS NFR BLD AUTO: 64.3 %
OSMOLALITY SERPL CALC.SUM OF ELEC: 274 MOSM/KG (ref 275–295)
PATIENT FASTING Y/N/NP: YES
PLATELET # BLD AUTO: 360 10(3)UL (ref 150–450)
POTASSIUM SERPL-SCNC: 4.6 MMOL/L (ref 3.5–5.1)
RBC # BLD AUTO: 4.89 X10(6)UL
SODIUM SERPL-SCNC: 132 MMOL/L (ref 136–145)
TSI SER-ACNC: 2.18 MIU/ML (ref 0.36–3.74)
WBC # BLD AUTO: 6.9 X10(3) UL (ref 4–11)

## 2021-02-23 PROCEDURE — 82306 VITAMIN D 25 HYDROXY: CPT

## 2021-02-23 PROCEDURE — 80053 COMPREHEN METABOLIC PANEL: CPT

## 2021-02-23 PROCEDURE — 85025 COMPLETE CBC W/AUTO DIFF WBC: CPT

## 2021-02-23 PROCEDURE — 84443 ASSAY THYROID STIM HORMONE: CPT

## 2021-02-23 PROCEDURE — 36415 COLL VENOUS BLD VENIPUNCTURE: CPT

## 2021-02-24 LAB — 25(OH)D3 SERPL-MCNC: 34.9 NG/ML (ref 30–100)

## 2021-03-01 ENCOUNTER — HOSPITAL ENCOUNTER (OUTPATIENT)
Dept: GENERAL RADIOLOGY | Age: 66
Discharge: HOME | End: 2021-03-01
Attending: FAMILY MEDICINE
Payer: MEDICARE

## 2021-03-01 ENCOUNTER — OFFICE VISIT (OUTPATIENT)
Dept: FAMILY MEDICINE CLINIC | Facility: CLINIC | Age: 66
End: 2021-03-01
Payer: MEDICARE

## 2021-03-01 VITALS
DIASTOLIC BLOOD PRESSURE: 60 MMHG | TEMPERATURE: 98 F | BODY MASS INDEX: 20.18 KG/M2 | WEIGHT: 118.19 LBS | HEIGHT: 64 IN | SYSTOLIC BLOOD PRESSURE: 118 MMHG | HEART RATE: 89 BPM

## 2021-03-01 DIAGNOSIS — M25.551 BILATERAL HIP PAIN: ICD-10-CM

## 2021-03-01 DIAGNOSIS — I70.0 CALCIFICATION OF AORTA (HCC): ICD-10-CM

## 2021-03-01 DIAGNOSIS — Z23 NEED FOR VACCINATION: ICD-10-CM

## 2021-03-01 DIAGNOSIS — M25.552 BILATERAL HIP PAIN: ICD-10-CM

## 2021-03-01 DIAGNOSIS — J43.9 PULMONARY EMPHYSEMA, UNSPECIFIED EMPHYSEMA TYPE (HCC): ICD-10-CM

## 2021-03-01 DIAGNOSIS — I10 ESSENTIAL HYPERTENSION WITH GOAL BLOOD PRESSURE LESS THAN 140/90: Primary | ICD-10-CM

## 2021-03-01 DIAGNOSIS — F17.210 CIGARETTE SMOKER: ICD-10-CM

## 2021-03-01 DIAGNOSIS — E55.9 VITAMIN D DEFICIENCY: ICD-10-CM

## 2021-03-01 DIAGNOSIS — F10.10 EXCESSIVE DRINKING ALCOHOL: ICD-10-CM

## 2021-03-01 DIAGNOSIS — F17.200 TOBACCO USE DISORDER: ICD-10-CM

## 2021-03-01 DIAGNOSIS — J44.9 CHRONIC OBSTRUCTIVE PULMONARY DISEASE, UNSPECIFIED COPD TYPE (HCC): ICD-10-CM

## 2021-03-01 DIAGNOSIS — E03.9 ACQUIRED HYPOTHYROIDISM: ICD-10-CM

## 2021-03-01 DIAGNOSIS — E78.2 MIXED HYPERLIPIDEMIA: ICD-10-CM

## 2021-03-01 PROCEDURE — 99406 BEHAV CHNG SMOKING 3-10 MIN: CPT | Performed by: FAMILY MEDICINE

## 2021-03-01 PROCEDURE — 72170 X-RAY EXAM OF PELVIS: CPT | Performed by: FAMILY MEDICINE

## 2021-03-01 PROCEDURE — G0009 ADMIN PNEUMOCOCCAL VACCINE: HCPCS | Performed by: FAMILY MEDICINE

## 2021-03-01 PROCEDURE — 99214 OFFICE O/P EST MOD 30 MIN: CPT | Performed by: FAMILY MEDICINE

## 2021-03-01 PROCEDURE — 90732 PPSV23 VACC 2 YRS+ SUBQ/IM: CPT | Performed by: FAMILY MEDICINE

## 2021-03-01 RX ORDER — FENOFIBRATE 160 MG/1
160 TABLET ORAL DAILY
Qty: 90 TABLET | Refills: 1 | Status: SHIPPED | OUTPATIENT
Start: 2021-03-01 | End: 2021-11-11

## 2021-03-01 RX ORDER — AMLODIPINE BESYLATE AND BENAZEPRIL HYDROCHLORIDE 10; 40 MG/1; MG/1
1 CAPSULE ORAL DAILY
Qty: 90 CAPSULE | Refills: 1 | Status: SHIPPED | OUTPATIENT
Start: 2021-03-01 | End: 2021-11-11

## 2021-03-01 RX ORDER — LEVOTHYROXINE SODIUM 0.07 MG/1
75 TABLET ORAL
Qty: 90 TABLET | Refills: 1 | Status: SHIPPED | OUTPATIENT
Start: 2021-03-01 | End: 2021-09-06

## 2021-03-01 RX ORDER — FLUTICASONE PROPIONATE AND SALMETEROL 250; 50 UG/1; UG/1
1 POWDER RESPIRATORY (INHALATION) EVERY 12 HOURS
Qty: 3 EACH | Refills: 1 | Status: SHIPPED | OUTPATIENT
Start: 2021-03-01 | End: 2021-09-16

## 2021-03-01 RX ORDER — ALBUTEROL SULFATE 90 UG/1
2 AEROSOL, METERED RESPIRATORY (INHALATION) 3 TIMES DAILY PRN
Qty: 3 INHALER | Refills: 1 | Status: SHIPPED | OUTPATIENT
Start: 2021-03-01 | End: 2021-08-21

## 2021-03-01 NOTE — PROGRESS NOTES
Patient ID: Romeo Flores is a 77year old male. HPI  Patient presents with:  Hypertension: follow up    Last seen by me on 5/8/2020. Pt reports he is smoking about 3/4 of a pack of cigarettes a day.  He has Chantix at home but is not using it at th LYMABS 0.91 (L) 02/23/2021    MOABSO 0.91 02/23/2021    EOABSO 0.49 02/23/2021    BAABSO 0.09 02/23/2021       Lab Results   Component Value Date    GLU 95 02/23/2021    BUN 12 02/23/2021    BUNCREA 11.4 02/23/2021    CREATSERUM 1.05 02/23/2021    ANIONGAP 2.180     TSH (S) (uIU/mL)   Date Value   03/08/2016 5.18       Lab Results   Component Value Date    VITB12 455 06/22/2013     Lab Results   Component Value Date    VITD 34.9 02/23/2021     Lab Results   Component Value Date    PSA 1.7 03/27/2018    TOTPS ESOPHAGOGASTRODUODENOSCOPY (EGD) N/A 12/28/2020    Performed by Alonso Gandhi MD at [facility] ENDOSCOPY   • KNEE ARTHROSCOPY Left 3/13/2019    Performed by Miriam Rashid MD at [facility] MAIN OR   • ORAL SURGERY     • OTHER SURGICAL HISTORY  2009    cyst Eyes: Conjunctivae and EOM are normal.   Neck: Normal range of motion. No thyromegaly present. Cardiovascular: Normal rate, regular rhythm and normal heart sounds. Pulmonary/Chest: Effort normal. No respiratory distress.  Clear breath sounds but prol covered under insurance. -     fluticasone-salmeterol (Cong Amis) 250-50 MCG/DOSE Inhalation Aerosol Powder, Breath Activated;  Inhale 1 puff into the lungs Q12H.  -     BEHAV CHNG SMOKING GR THAN 3 UP TO 10 MIN    Calcification of aorta (HCC)  Continue and vehicles smoke-free. Free resources for additional support:  · National tobacco quitline: 3-800-QUIT-NOW (0-314.857.3452). · SmokefreeTXT is a free text program to assist you in quitting.  Visit http://Jogli/ for more inform

## 2021-03-01 NOTE — PROGRESS NOTES
Tobacco Cessation Documentation (Smoking and Smokeless included): I had an in depth therapy session with Paco Morgan about his tobacco use risks and options using the USPSTF's Five A's approach:    Ask: Jostin Chaves is using tobacco products. Assess:  We

## 2021-06-04 ENCOUNTER — LAB REQUISITION (OUTPATIENT)
Dept: LAB | Facility: HOSPITAL | Age: 66
End: 2021-06-04
Payer: MEDICARE

## 2021-06-04 DIAGNOSIS — Z01.818 ENCOUNTER FOR OTHER PREPROCEDURAL EXAMINATION: ICD-10-CM

## 2021-06-07 ENCOUNTER — LAB REQUISITION (OUTPATIENT)
Dept: LAB | Facility: HOSPITAL | Age: 66
End: 2021-06-07
Payer: MEDICARE

## 2021-06-07 DIAGNOSIS — C67.9 MALIGNANT NEOPLASM OF BLADDER, UNSPECIFIED (HCC): ICD-10-CM

## 2021-06-07 PROCEDURE — 88305 TISSUE EXAM BY PATHOLOGIST: CPT | Performed by: UROLOGY

## 2021-06-24 DIAGNOSIS — E78.2 MIXED HYPERLIPIDEMIA: ICD-10-CM

## 2021-06-25 RX ORDER — ATORVASTATIN CALCIUM 10 MG/1
TABLET, FILM COATED ORAL
Qty: 90 TABLET | Refills: 1 | Status: SHIPPED | OUTPATIENT
Start: 2021-06-25 | End: 2021-11-11

## 2021-08-20 DIAGNOSIS — J43.9 PULMONARY EMPHYSEMA, UNSPECIFIED EMPHYSEMA TYPE (HCC): ICD-10-CM

## 2021-08-20 DIAGNOSIS — F17.200 TOBACCO USE DISORDER: ICD-10-CM

## 2021-08-21 RX ORDER — ALBUTEROL SULFATE 90 UG/1
2 AEROSOL, METERED RESPIRATORY (INHALATION) EVERY 6 HOURS PRN
Qty: 3 EACH | Refills: 1 | Status: SHIPPED | OUTPATIENT
Start: 2021-08-21 | End: 2021-11-27

## 2021-09-01 NOTE — ADDENDUM NOTE
Addended by: Irene Earing on: 10/7/2019 11:29 AM     Modules accepted: Luan Epidermal Autograft Text: The defect edges were debeveled with a #15 scalpel blade.  Given the location of the defect, shape of the defect and the proximity to free margins an epidermal autograft was deemed most appropriate.  Using a sterile surgical marker, the primary defect shape was transferred to the donor site. The epidermal graft was then harvested.  The skin graft was then placed in the primary defect and oriented appropriately.

## 2021-09-05 DIAGNOSIS — E03.9 ACQUIRED HYPOTHYROIDISM: ICD-10-CM

## 2021-09-06 RX ORDER — LEVOTHYROXINE SODIUM 0.07 MG/1
75 TABLET ORAL
Qty: 90 TABLET | Refills: 0 | Status: SHIPPED | OUTPATIENT
Start: 2021-09-06 | End: 2021-11-11

## 2021-09-06 NOTE — TELEPHONE ENCOUNTER
[facility]. Requested Prescriptions   Pending Prescriptions Disp Refills    LEVOTHYROXINE 75 MCG Oral Tab [Pharmacy Med Name: LEVOTHYROXINE 75 MCG TABLET] 90 tablet 1     Sig: Take 1 tablet (75 mcg total) by mouth before breakfast. Disregard prior prescription.         Thyroid Medication Protocol Passed - 9/5/2021 12:14 AM        Passed - TSH in past 12 months        Passed - Last TSH value is normal     Lab Results   Component Value Date    TSH 2.180 02/23/2021                 Passed - Appointment in past 12 or next 3 months            Recent Outpatient Visits              6 months ago Essential hypertension with goal blood pressure less than 140/90    [facility], [address], [address], [place],     Office Visit    10 months ago Mixed hyperlipidemia    [facility], [address], [address], Jose Patch, APRN    Office Visit    10 months ago Esophageal dysphagia    [facility], [address] [facility], Agnieszka Sung MD    Office Visit    1 year ago [facility], [address], [address], [place],     Office Visit    1 year ago History of colon Virgie Lindsay MD    Office Visit

## 2021-09-16 DIAGNOSIS — J44.9 CHRONIC OBSTRUCTIVE PULMONARY DISEASE, UNSPECIFIED COPD TYPE (HCC): ICD-10-CM

## 2021-09-16 DIAGNOSIS — F17.200 TOBACCO USE DISORDER: ICD-10-CM

## 2021-09-16 RX ORDER — FLUTICASONE PROPIONATE AND SALMETEROL 250; 50 UG/1; UG/1
POWDER RESPIRATORY (INHALATION)
Qty: 180 EACH | Refills: 1 | Status: SHIPPED | OUTPATIENT
Start: 2021-09-16

## 2021-09-16 NOTE — TELEPHONE ENCOUNTER
Protocol failed or has No Protocol, please review    LOV 3/1/2021        Requested Prescriptions   Pending Prescriptions Disp Refills    Marilu Martell 250-50 MCG/DOSE Inhalation Aerosol Powder, Breath Activated [Pharmacy Med Name: Re[employer] Fee 250-50 INHUB] 180 e

## 2021-10-05 ENCOUNTER — TELEPHONE (OUTPATIENT)
Dept: FAMILY MEDICINE CLINIC | Facility: CLINIC | Age: 66
End: 2021-10-05

## 2021-10-05 DIAGNOSIS — E03.9 ACQUIRED HYPOTHYROIDISM: Primary | ICD-10-CM

## 2021-10-05 DIAGNOSIS — Z11.59 NEED FOR HEPATITIS C SCREENING TEST: ICD-10-CM

## 2021-10-05 DIAGNOSIS — E78.2 MIXED HYPERLIPIDEMIA: ICD-10-CM

## 2021-10-05 DIAGNOSIS — Z12.5 SCREENING PSA (PROSTATE SPECIFIC ANTIGEN): ICD-10-CM

## 2021-10-05 DIAGNOSIS — I10 ESSENTIAL HYPERTENSION WITH GOAL BLOOD PRESSURE LESS THAN 140/90: ICD-10-CM

## 2021-10-05 NOTE — TELEPHONE ENCOUNTER
Patient called an advised he is coming in for his Medicare Wellness Check on 11/05. He is requesting a PSA Test along with his other annual labs. Please call patient once ordered so he can get those. He would Like Dr to have the results when he comes in for appt. Please Advise.

## 2021-10-09 NOTE — TELEPHONE ENCOUNTER
Spoke with patient verified name and  advised patient of message below and patient understood. No further questions.

## 2021-10-12 ENCOUNTER — LAB ENCOUNTER (OUTPATIENT)
Dept: LAB | Age: 66
End: 2021-10-12
Attending: FAMILY MEDICINE
Payer: MEDICARE

## 2021-10-12 DIAGNOSIS — E78.2 MIXED HYPERLIPIDEMIA: ICD-10-CM

## 2021-10-12 DIAGNOSIS — Z12.5 SCREENING PSA (PROSTATE SPECIFIC ANTIGEN): ICD-10-CM

## 2021-10-12 DIAGNOSIS — E03.9 ACQUIRED HYPOTHYROIDISM: ICD-10-CM

## 2021-10-12 DIAGNOSIS — Z11.59 NEED FOR HEPATITIS C SCREENING TEST: ICD-10-CM

## 2021-10-12 DIAGNOSIS — I10 ESSENTIAL HYPERTENSION WITH GOAL BLOOD PRESSURE LESS THAN 140/90: ICD-10-CM

## 2021-10-12 PROCEDURE — 80061 LIPID PANEL: CPT

## 2021-10-12 PROCEDURE — 84443 ASSAY THYROID STIM HORMONE: CPT

## 2021-10-12 PROCEDURE — 86803 HEPATITIS C AB TEST: CPT

## 2021-10-12 PROCEDURE — 80053 COMPREHEN METABOLIC PANEL: CPT

## 2021-10-12 PROCEDURE — 85025 COMPLETE CBC W/AUTO DIFF WBC: CPT

## 2021-10-12 PROCEDURE — 36415 COLL VENOUS BLD VENIPUNCTURE: CPT

## 2021-10-18 ENCOUNTER — LAB REQUISITION (OUTPATIENT)
Dept: SURGERY | Age: 66
End: 2021-10-18
Payer: MEDICARE

## 2021-10-18 DIAGNOSIS — Z01.818 PREOP EXAMINATION: ICD-10-CM

## 2021-11-02 ENCOUNTER — OFFICE VISIT (OUTPATIENT)
Dept: FAMILY MEDICINE CLINIC | Facility: CLINIC | Age: 66
End: 2021-11-02
Payer: MEDICARE

## 2021-11-02 ENCOUNTER — LAB ENCOUNTER (OUTPATIENT)
Dept: LAB | Age: 66
End: 2021-11-02
Attending: FAMILY MEDICINE
Payer: MEDICARE

## 2021-11-02 VITALS
TEMPERATURE: 98 F | SYSTOLIC BLOOD PRESSURE: 135 MMHG | WEIGHT: 112 LBS | DIASTOLIC BLOOD PRESSURE: 77 MMHG | HEIGHT: 64 IN | BODY MASS INDEX: 19.12 KG/M2 | HEART RATE: 96 BPM

## 2021-11-02 DIAGNOSIS — H61.21 IMPACTED CERUMEN OF RIGHT EAR: ICD-10-CM

## 2021-11-02 DIAGNOSIS — E78.2 MIXED HYPERLIPIDEMIA: ICD-10-CM

## 2021-11-02 DIAGNOSIS — H61.22 IMPACTED CERUMEN OF LEFT EAR: Primary | ICD-10-CM

## 2021-11-02 DIAGNOSIS — E03.9 ACQUIRED HYPOTHYROIDISM: ICD-10-CM

## 2021-11-02 DIAGNOSIS — I10 ESSENTIAL HYPERTENSION WITH GOAL BLOOD PRESSURE LESS THAN 140/90: ICD-10-CM

## 2021-11-02 DIAGNOSIS — Z12.5 SCREENING PSA (PROSTATE SPECIFIC ANTIGEN): ICD-10-CM

## 2021-11-02 DIAGNOSIS — H91.93 HEARING DECREASED, BILATERAL: ICD-10-CM

## 2021-11-02 DIAGNOSIS — H61.23 BILATERAL IMPACTED CERUMEN: ICD-10-CM

## 2021-11-02 PROCEDURE — 69210 REMOVE IMPACTED EAR WAX UNI: CPT | Performed by: FAMILY MEDICINE

## 2021-11-02 PROCEDURE — 36415 COLL VENOUS BLD VENIPUNCTURE: CPT

## 2021-11-02 PROCEDURE — 99212 OFFICE O/P EST SF 10 MIN: CPT | Performed by: FAMILY MEDICINE

## 2021-11-02 NOTE — PROGRESS NOTES
Patient ID: Tamy Fagan is a 77year old male. HPI  Patient presents with:  Ear Problem    Last seen by me on 3/1/2021. Pt c/o bilateral cerumen impaction. He has noticed his hearing worsening and will be tested for hearing aids.  He reports he w COMPLETE  12/15/2013    scanned to media tab   • ORAL SURGERY     • OTHER SURGICAL HISTORY  2009    cyst removal, side of head          Current Outpatient Medications   Medication Sig Dispense Refill   • Carolina Monday 250-50 MCG/DOSE Inhalation Aerosol Powd and affect. Vitals reviewed. Assessment/Plan:      Right ear: Tympanic membrane is visualized and clear with normal EAC after ear irrigation. Left ear: Ear irrigation was not successful.   Curette needed to be used to dislodge large amount of reflects my personal performance and is accurate and complete.   Lokesh Fountain DO, 11/2/2021, 5:02 PM

## 2021-11-11 ENCOUNTER — OFFICE VISIT (OUTPATIENT)
Dept: FAMILY MEDICINE CLINIC | Facility: CLINIC | Age: 66
End: 2021-11-11
Payer: MEDICARE

## 2021-11-11 VITALS
HEART RATE: 93 BPM | TEMPERATURE: 98 F | SYSTOLIC BLOOD PRESSURE: 118 MMHG | BODY MASS INDEX: 19.12 KG/M2 | HEIGHT: 64 IN | WEIGHT: 112 LBS | DIASTOLIC BLOOD PRESSURE: 73 MMHG

## 2021-11-11 DIAGNOSIS — F17.200 TOBACCO USE DISORDER: ICD-10-CM

## 2021-11-11 DIAGNOSIS — J44.9 CHRONIC OBSTRUCTIVE PULMONARY DISEASE, UNSPECIFIED COPD TYPE (HCC): ICD-10-CM

## 2021-11-11 DIAGNOSIS — J43.9 PULMONARY EMPHYSEMA, UNSPECIFIED EMPHYSEMA TYPE (HCC): ICD-10-CM

## 2021-11-11 DIAGNOSIS — J30.1 ALLERGIC RHINITIS DUE TO POLLEN, UNSPECIFIED SEASONALITY: ICD-10-CM

## 2021-11-11 DIAGNOSIS — I10 ESSENTIAL HYPERTENSION WITH GOAL BLOOD PRESSURE LESS THAN 140/90: ICD-10-CM

## 2021-11-11 DIAGNOSIS — E03.9 ACQUIRED HYPOTHYROIDISM: ICD-10-CM

## 2021-11-11 DIAGNOSIS — E87.1 DILUTIONAL HYPONATREMIA: ICD-10-CM

## 2021-11-11 DIAGNOSIS — Z00.00 ENCOUNTER FOR ANNUAL HEALTH EXAMINATION: Primary | ICD-10-CM

## 2021-11-11 DIAGNOSIS — F10.10 ALCOHOL ABUSE: ICD-10-CM

## 2021-11-11 DIAGNOSIS — E78.2 MIXED HYPERLIPIDEMIA: ICD-10-CM

## 2021-11-11 PROCEDURE — G0439 PPPS, SUBSEQ VISIT: HCPCS | Performed by: FAMILY MEDICINE

## 2021-11-11 PROCEDURE — 99213 OFFICE O/P EST LOW 20 MIN: CPT | Performed by: FAMILY MEDICINE

## 2021-11-11 RX ORDER — LEVOTHYROXINE SODIUM 0.07 MG/1
75 TABLET ORAL
Qty: 90 TABLET | Refills: 2 | Status: SHIPPED | OUTPATIENT
Start: 2021-11-11

## 2021-11-11 RX ORDER — AMLODIPINE BESYLATE AND BENAZEPRIL HYDROCHLORIDE 10; 40 MG/1; MG/1
1 CAPSULE ORAL DAILY
Qty: 90 CAPSULE | Refills: 2 | Status: SHIPPED | OUTPATIENT
Start: 2021-11-11

## 2021-11-11 RX ORDER — ATORVASTATIN CALCIUM 10 MG/1
10 TABLET, FILM COATED ORAL DAILY
Qty: 90 TABLET | Refills: 2 | Status: SHIPPED | OUTPATIENT
Start: 2021-11-11

## 2021-11-11 RX ORDER — FLUTICASONE PROPIONATE 50 MCG
SPRAY, SUSPENSION (ML) NASAL
Qty: 3 EACH | Refills: 2 | Status: SHIPPED | OUTPATIENT
Start: 2021-11-11

## 2021-11-11 RX ORDER — FENOFIBRATE 160 MG/1
160 TABLET ORAL DAILY
Qty: 90 TABLET | Refills: 2 | Status: SHIPPED | OUTPATIENT
Start: 2021-11-11

## 2021-11-11 NOTE — PROGRESS NOTES
HPI:   Collette Dimmer is a 77year old male who presents for a Medicare Subsequent Annual Wellness visit (Pt already had Initial Annual Wellness).     His last annual assessment has been over 1 year: Annual Physical due on 11/03/2021       Last seen by me o patient and Family/surrogate (if present), and forms available to patient in AVS     He does NOT have a Power of  for [employer] on file in [place].    Advance care planning including the explanation and discussion of advance directives standard forms 2.200 10/12/2021    CREATSERUM 0.73 10/12/2021    GLU 99 10/12/2021        CBC  (most recent labs)   Lab Results   Component Value Date    WBC 9.1 10/12/2021    HGB 17.2 10/12/2021    .0 10/12/2021        ALLERGIES:   He has No Known Allergies.     C reports current alcohol use of about 5.0 standard drinks of alcohol per week. He reports that he does not use drugs. REVIEW OF SYSTEMS:   Review of Systems   HENT: Positive for congestion. Respiratory: Negative for shortness of breath.     Cardiovasc Acuity: 20/20   Able To Tolerate Visual Acuity: Yes      Physical Exam   Physical Exam   Constitutional: Patient is oriented to person, place, and time. Patient appears well-developed and well-nourished. No distress. Head: Normocephalic.    Right Ear: Tym alcohol. He needs to cut down on the beer and probably go from 6 glasses of water immediately in the morning to 4 while he is reading the paper. Also given information on power of  process.   Mixed hyperlipidemia  -     atorvastatin 10 MG Oral T daily physical activity?: Moderate  How would you describe your current health state?: Good  How do you maintain positive mental well-being?: Social Interaction;Puzzles; Visiting Family     Supplementary Documentation:   [address] Lab Results   Component Value Date    PSA 1.7 03/27/2018     PSA due on 11/02/2023   Immunizations    Influenza Covered once per flu season  Please get every year 10/30/2021  No recommendations at this time    Pneumococcal Each vaccine (Ktoiluf39 & Pneumov personal performance and is accurate and complete.   Torito Frazier DO, 11/11/2021, 12:44 PM

## 2021-11-11 NOTE — PATIENT INSTRUCTIONS
Maty Hanna's SCREENING SCHEDULE   Tests on this list are recommended by your physician but may not be covered, or covered at this frequency, by your insurer. Please check with your insurance carrier before scheduling to verify coverage.    PREVENTAT Pneumococcal Each vaccine (Snbinlq33 & Dsczhevqi30) covered once after 65 Prevnar 13: 03/05/2019    Ytvijlaim06: 03/01/2021     No recommendations at this time    Hepatitis B One screening covered for patients with certain risk factors   -  No recommendati

## 2021-11-16 DIAGNOSIS — R14.2 ERUCTATION: ICD-10-CM

## 2021-11-16 DIAGNOSIS — K21.9 CHRONIC GERD: ICD-10-CM

## 2021-11-16 DIAGNOSIS — R13.19 ESOPHAGEAL DYSPHAGIA: ICD-10-CM

## 2021-11-16 RX ORDER — OMEPRAZOLE 40 MG/1
40 CAPSULE, DELAYED RELEASE ORAL DAILY
Qty: 90 CAPSULE | Refills: 1 | Status: SHIPPED | OUTPATIENT
Start: 2021-11-16 | End: 2022-05-13

## 2021-11-16 NOTE — TELEPHONE ENCOUNTER
Refill passed per [facility] protocol. Patient confirmed he still takes medication. Omeprazole 40 MG Oral Capsule Delayed Release Take 1 capsule (40 mg total) by mouth daily. For acid reflux or gastritis. 90 capsule     Dx code for acid reflux is K21.9. Requested Prescriptions   Pending Prescriptions Disp Refills    OMEPRAZOLE 40 MG Oral Capsule Delayed Release [Pharmacy Med Name: OMEPRAZOLE DR 40 MG CAPSULE] 90 capsule 1     Sig: Take 1 capsule (40 mg total) by mouth daily. For acid reflux or gastritis.         Gastrointestional Medication Protocol Passed - 11/16/2021  2:48 PM        Passed - Appointment in past 12 or next 3 months             Recent Outpatient Visits              5 days ago Encounter for annual health examination    [place] [facility], [address], [address], [place], [place]    Office Visit    2 weeks ago Impacted cerumen of left ear    [place] [facility], [address], [address], [place], [place]    Office Visit    8 months ago Essential hypertension with goal blood pressure less than 140/90    [place] [facility], [address], [address], [place], [place]    Office Visit    1 year ago Mixed hyperlipidemia    [place] [facility], [address], Santy Davis, APRN    Office Visit    1 year ago Esophageal dysphagia    Albert Rico MD    Office Visit

## 2021-11-23 DIAGNOSIS — J43.9 PULMONARY EMPHYSEMA, UNSPECIFIED EMPHYSEMA TYPE (HCC): ICD-10-CM

## 2021-11-23 DIAGNOSIS — F17.200 TOBACCO USE DISORDER: ICD-10-CM

## 2022-02-14 ENCOUNTER — OFFICE VISIT (OUTPATIENT)
Dept: PULMONOLOGY | Facility: CLINIC | Age: 67
End: 2022-02-14
Payer: MEDICARE

## 2022-02-14 VITALS
DIASTOLIC BLOOD PRESSURE: 77 MMHG | OXYGEN SATURATION: 99 % | WEIGHT: 110 LBS | HEIGHT: 64 IN | HEART RATE: 84 BPM | SYSTOLIC BLOOD PRESSURE: 158 MMHG | RESPIRATION RATE: 16 BRPM | BODY MASS INDEX: 18.78 KG/M2

## 2022-02-14 DIAGNOSIS — Z72.0 TOBACCO ABUSE: ICD-10-CM

## 2022-02-14 DIAGNOSIS — J43.2 CENTRILOBULAR EMPHYSEMA (HCC): Primary | ICD-10-CM

## 2022-02-14 DIAGNOSIS — R91.8 PULMONARY NODULES: ICD-10-CM

## 2022-02-14 DIAGNOSIS — I10 PRIMARY HYPERTENSION: ICD-10-CM

## 2022-02-14 DIAGNOSIS — J30.1 ALLERGIC RHINITIS DUE TO POLLEN, UNSPECIFIED SEASONALITY: ICD-10-CM

## 2022-02-14 PROCEDURE — 99214 OFFICE O/P EST MOD 30 MIN: CPT | Performed by: INTERNAL MEDICINE

## 2022-02-21 ENCOUNTER — LAB REQUISITION (OUTPATIENT)
Dept: SURGERY | Age: 67
End: 2022-02-21
Payer: MEDICARE

## 2022-02-22 LAB — SARS-COV-2 RNA RESP QL NAA+PROBE: DETECTED

## 2022-03-08 RX ORDER — FLUTICASONE PROPIONATE AND SALMETEROL 250; 50 UG/1; UG/1
1 POWDER RESPIRATORY (INHALATION) EVERY 12 HOURS
Qty: 180 EACH | Refills: 1 | Status: SHIPPED | OUTPATIENT
Start: 2022-03-08

## 2022-03-08 NOTE — TELEPHONE ENCOUNTER
Refill passed per [address] [place] protocol.     Requested Prescriptions   Pending Prescriptions Disp Refills    WIXELA INHUB 250-50 MCG/DOSE Inhalation Aerosol Powder, Breath Activated [Pharmacy Med Name: Kirstie Foss 250-50 INHUB] 180 each 1     Sig: INHALE 1 PUFF INTO THE LUNGS EVERY 12 HOURS        Asthma & COPD Medication Protocol Passed - 3/8/2022 12:02 AM        Passed - Appointment in past 6 or next 3 months            Recent Outpatient Visits              3 weeks ago Centrilobular emphysema [facility])    [facility], [address] [place] [address] MD Oc    Office Visit    3 months ago Encounter for annual health examination    [address], [address], [place], DO    Office Visit    4 months ago Impacted cerumen of left ear    [address], [address], [place], [place]    Office Visit    1 year ago Essential hypertension with goal blood pressure less than 140/90    [address], [address], [place], [place]    Office Visit    1 year ago Mixed hyperlipidemia    [address], [address], [facility], APRN    Office Visit

## 2022-03-17 ENCOUNTER — TELEPHONE (OUTPATIENT)
Dept: PULMONOLOGY | Facility: CLINIC | Age: 67
End: 2022-03-17

## 2022-03-17 NOTE — TELEPHONE ENCOUNTER
Patient is overdue for CT lung low dose screening as ordered by Dr. Spring Gibbs. First letter sent to patient.

## 2022-03-23 ENCOUNTER — LAB ENCOUNTER (OUTPATIENT)
Dept: LAB | Age: 67
End: 2022-03-23
Attending: INTERNAL MEDICINE
Payer: MEDICARE

## 2022-03-23 DIAGNOSIS — J43.2 CENTRILOBULAR EMPHYSEMA (HCC): ICD-10-CM

## 2022-03-23 LAB — SARS-COV-2 RNA RESP QL NAA+PROBE: NOT DETECTED

## 2022-03-26 ENCOUNTER — HOSPITAL ENCOUNTER (OUTPATIENT)
Dept: CT IMAGING | Age: 67
Discharge: HOME | End: 2022-03-26
Attending: INTERNAL MEDICINE
Payer: MEDICARE

## 2022-03-26 DIAGNOSIS — Z72.0 TOBACCO ABUSE: ICD-10-CM

## 2022-03-26 PROCEDURE — 71271 CT THORAX LUNG CANCER SCR C-: CPT | Performed by: INTERNAL MEDICINE

## 2022-03-30 ENCOUNTER — TELEPHONE (OUTPATIENT)
Dept: PULMONOLOGY | Facility: CLINIC | Age: 67
End: 2022-03-30

## 2022-03-30 NOTE — TELEPHONE ENCOUNTER
Spoke with patient inquiring what Dr. Gin Gary meant by Mendez Concepcion" on the 3/26/22 CT lung LD screening result note. Informed patient that remarkable is a good result, no evidence of primary lung cancer, patient verbalized understanding.

## 2022-04-14 ENCOUNTER — LAB REQUISITION (OUTPATIENT)
Dept: SURGERY | Age: 67
End: 2022-04-14
Payer: MEDICARE

## 2022-04-14 PROCEDURE — 88307 TISSUE EXAM BY PATHOLOGIST: CPT | Performed by: UROLOGY

## 2022-04-16 ENCOUNTER — NURSE TRIAGE (OUTPATIENT)
Dept: FAMILY MEDICINE CLINIC | Facility: CLINIC | Age: 67
End: 2022-04-16

## 2022-04-16 NOTE — TELEPHONE ENCOUNTER
Pt should be using over the counter meds; no antibiotics w/o office visit for evaluation.  May need to consider covid testing

## 2022-04-19 ENCOUNTER — OFFICE VISIT (OUTPATIENT)
Dept: FAMILY MEDICINE CLINIC | Facility: CLINIC | Age: 67
End: 2022-04-19
Payer: MEDICARE

## 2022-04-19 ENCOUNTER — HOSPITAL ENCOUNTER (OUTPATIENT)
Dept: GENERAL RADIOLOGY | Age: 67
Discharge: HOME | End: 2022-04-19
Attending: FAMILY MEDICINE
Payer: MEDICARE

## 2022-04-19 VITALS
WEIGHT: 109.81 LBS | DIASTOLIC BLOOD PRESSURE: 71 MMHG | SYSTOLIC BLOOD PRESSURE: 123 MMHG | HEIGHT: 64 IN | HEART RATE: 92 BPM | TEMPERATURE: 98 F | BODY MASS INDEX: 18.75 KG/M2

## 2022-04-19 DIAGNOSIS — R06.2 WHEEZE: ICD-10-CM

## 2022-04-19 DIAGNOSIS — R05.9 COUGH: ICD-10-CM

## 2022-04-19 DIAGNOSIS — F17.200 TOBACCO USE DISORDER: ICD-10-CM

## 2022-04-19 DIAGNOSIS — R05.9 COUGH: Primary | ICD-10-CM

## 2022-04-19 DIAGNOSIS — L72.3 SEBACEOUS CYST: ICD-10-CM

## 2022-04-19 PROCEDURE — 99214 OFFICE O/P EST MOD 30 MIN: CPT | Performed by: FAMILY MEDICINE

## 2022-04-19 PROCEDURE — 71046 X-RAY EXAM CHEST 2 VIEWS: CPT | Performed by: FAMILY MEDICINE

## 2022-04-19 RX ORDER — AMOXICILLIN AND CLAVULANATE POTASSIUM 875; 125 MG/1; MG/1
1 TABLET, FILM COATED ORAL 2 TIMES DAILY
Qty: 20 TABLET | Refills: 0 | Status: SHIPPED | OUTPATIENT
Start: 2022-04-19 | End: 2022-04-29

## 2022-05-12 DIAGNOSIS — R13.19 ESOPHAGEAL DYSPHAGIA: ICD-10-CM

## 2022-05-12 DIAGNOSIS — K21.9 CHRONIC GERD: ICD-10-CM

## 2022-05-12 DIAGNOSIS — R14.2 ERUCTATION: ICD-10-CM

## 2022-05-13 RX ORDER — OMEPRAZOLE 40 MG/1
40 CAPSULE, DELAYED RELEASE ORAL DAILY
Qty: 90 CAPSULE | Refills: 1 | Status: SHIPPED | OUTPATIENT
Start: 2022-05-13

## 2022-05-13 NOTE — TELEPHONE ENCOUNTER
Refill passed per [facility] protocol. Requested Prescriptions   Pending Prescriptions Disp Refills    OMEPRAZOLE 40 MG Oral Capsule Delayed Release [Pharmacy Med Name: OMEPRAZOLE DR 40 MG CAPSULE] 90 capsule 1     Sig: TAKE 1 CAPSULE BY MOUTH DAILY. FOR ACID REFLUX OR GASTRITIS.         Gastrointestional Medication Protocol Passed - 5/13/2022  9:16 AM        Passed - Appointment in past 12 or next 3 months            Recent Outpatient Visits              3 weeks ago Cough    [address], [place]    Office Visit    2 months ago Centrilobular emphysema [facility])    [address] Pura Merritt MD    Office Visit    6 months ago Encounter for annual health examination    [address], [place]    Office Visit    6 months ago Impacted cerumen of left ear    [place] [facility] [employer] [facility], [address], [address], [place]    Office Visit    1 year ago Essential hypertension with goal blood pressure less than 140/90    [facility], [address], [address], [place], [place]    Office Visit

## 2022-05-15 RX ORDER — OMEPRAZOLE 40 MG/1
40 CAPSULE, DELAYED RELEASE ORAL DAILY
Qty: 90 CAPSULE | Refills: 2 | OUTPATIENT
Start: 2022-05-15 | End: 2023-05-10

## 2022-07-26 DIAGNOSIS — J30.1 ALLERGIC RHINITIS DUE TO POLLEN, UNSPECIFIED SEASONALITY: ICD-10-CM

## 2022-07-26 DIAGNOSIS — F17.200 TOBACCO USE DISORDER: ICD-10-CM

## 2022-07-26 DIAGNOSIS — J44.9 CHRONIC OBSTRUCTIVE PULMONARY DISEASE, UNSPECIFIED COPD TYPE (HCC): ICD-10-CM

## 2022-07-26 DIAGNOSIS — J43.9 PULMONARY EMPHYSEMA, UNSPECIFIED EMPHYSEMA TYPE (HCC): ICD-10-CM

## 2022-07-27 RX ORDER — FLUTICASONE PROPIONATE AND SALMETEROL 250; 50 UG/1; UG/1
POWDER RESPIRATORY (INHALATION)
Qty: 180 EACH | Refills: 1 | Status: SHIPPED | OUTPATIENT
Start: 2022-07-27

## 2022-07-27 RX ORDER — FLUTICASONE PROPIONATE 50 MCG
SPRAY, SUSPENSION (ML) NASAL
Qty: 48 ML | Refills: 2 | Status: SHIPPED | OUTPATIENT
Start: 2022-07-27

## 2022-08-16 ENCOUNTER — MED REC SCAN ONLY (OUTPATIENT)
Dept: FAMILY MEDICINE CLINIC | Facility: CLINIC | Age: 67
End: 2022-08-16

## 2022-08-22 ENCOUNTER — LAB REQUISITION (OUTPATIENT)
Dept: SURGERY | Age: 67
End: 2022-08-22
Payer: MEDICARE

## 2022-08-22 DIAGNOSIS — Z01.818 PREOP EXAMINATION: ICD-10-CM

## 2022-08-23 LAB — SARS-COV-2 RNA RESP QL NAA+PROBE: NOT DETECTED

## 2022-08-25 ENCOUNTER — LAB REQUISITION (OUTPATIENT)
Dept: SURGERY | Age: 67
End: 2022-08-25
Payer: MEDICARE

## 2022-08-25 DIAGNOSIS — C67.9 BLADDER CANCER (HCC): ICD-10-CM

## 2022-08-25 PROCEDURE — 88305 TISSUE EXAM BY PATHOLOGIST: CPT | Performed by: UROLOGY

## 2022-08-29 DIAGNOSIS — E78.2 MIXED HYPERLIPIDEMIA: ICD-10-CM

## 2022-08-29 RX ORDER — ATORVASTATIN CALCIUM 10 MG/1
10 TABLET, FILM COATED ORAL DAILY
Qty: 90 TABLET | Refills: 1 | Status: SHIPPED | OUTPATIENT
Start: 2022-08-29 | End: 2023-01-31

## 2022-08-29 NOTE — TELEPHONE ENCOUNTER
Refill passed per [address] [place] protocol.      Requested Prescriptions   Pending Prescriptions Disp Refills    ATORVASTATIN 10 MG Oral Tab [Pharmacy Med Name: ATORVASTATIN 10 MG TABLET] 90 tablet 2     Sig: TAKE 1 TABLET BY MOUTH EVERY DAY        Cholesterol Medication Protocol Passed - 8/29/2022 12:01 AM        Passed - ALT in past 12 months        Passed - LDL in past 12 months        Passed - Last ALT < 80       Lab Results   Component Value Date    ALT 27 10/12/2021             Passed - Last LDL < 130     Lab Results   Component Value Date    LDL 66 10/12/2021               Passed - In person appointment or virtual visit in the past 12 mos or appointment in next 3 mos       Recent Outpatient Visits              4 months ago Cough    [address], [address], [place]    Office Visit    6 months ago Centrilobular emphysema [facility])    [address], Radha Villeda MD    Office Visit    9 months ago Encounter for annual health examination    [address], [address], [place], [place]    Office Visit    10 months ago Impacted cerumen of left ear    [address] [address], [address], [place]    Office Visit    1 year ago Essential hypertension with goal blood pressure less than 140/90    [address], [address], [place]    Office Visit                       @[email or web address]      @[email or web address]

## 2022-08-30 DIAGNOSIS — I10 ESSENTIAL HYPERTENSION WITH GOAL BLOOD PRESSURE LESS THAN 140/90: ICD-10-CM

## 2022-08-30 NOTE — TELEPHONE ENCOUNTER
Please review. Protocol failed or has no protocol. Requested Prescriptions   Pending Prescriptions Disp Refills    AMLODIPINE BESY-BENAZEPRIL HCL 10-40 MG Oral Cap [Pharmacy Med Name: AMLODIPINE-BENAZEPRIL 10-40 MG] 90 capsule 2     Sig: TAKE 1 CAPSULE BY MOUTH EVERY DAY        Hypertensive Medications Protocol Failed - 8/30/2022 12:00 AM        Failed - CMP or BMP in past 6 months     No results found for this or any previous visit (from the past 4392 hour(s)).               Passed - In person appointment in the past 12 or next 3 months       Recent Outpatient Visits              4 months ago Cough    [address], [address], [place], [place]    Office Visit    6 months ago Centrilobular emphysema [facility])    [address]    Office Visit    9 months ago Encounter for annual health examination    [address], [address], [place], [place]    Office Visit    10 months ago Impacted cerumen of left ear    [address], [address], [place], [place]    Office Visit    1 year ago Essential hypertension with goal blood pressure less than 140/90    [address], [address], [place], DO    Office Visit                 Passed - Last BP reading less than 140/90     BP Readings from Last 1 Encounters:  04/19/22 : 123/71                Passed - In person appointment or virtual visit in the past 6 months       Recent Outpatient Visits              4 months ago Cough    [address], [address], [place], [place]    Office Visit    6 months ago Centrilobular emphysema [facility])    [address] Ophelia Neumann MD    Office Visit    9 months ago Encounter for annual health examination    [address], [address], [place], [place]    Office Visit    10 months ago Impacted cerumen of left ear    [facility] [address] [place], DO    Office Visit    1 year ago Essential hypertension with goal blood pressure less than 140/90    [facility], [address], [address], [place], [place]    Office Visit                 Passed - GFR > 50     No results found for: [facility]                    Recent Outpatient Visits              4 months ago Cough    [facility], [address], [address], [place], [place]    Office Visit    6 months ago Centrilobular emphysema [facility])    [facility], [address] [place] [address] Patricio Noyola MD    Office Visit    9 months ago Encounter for annual health examination    [facility], [address], [address] [place], [place]    Office Visit    10 months ago Impacted cerumen of left ear    [facility], [address], [address], [place], [place]    Office Visit    1 year ago Essential hypertension with goal blood pressure less than 140/90    [facility], [address], [address], [place], [place]    Office Visit

## 2022-08-31 RX ORDER — AMLODIPINE BESYLATE AND BENAZEPRIL HYDROCHLORIDE 10; 40 MG/1; MG/1
1 CAPSULE ORAL DAILY
Qty: 90 CAPSULE | Refills: 3 | Status: SHIPPED | OUTPATIENT
Start: 2022-08-31

## 2022-10-29 ENCOUNTER — HOSPITAL ENCOUNTER (OUTPATIENT)
Age: 67
Discharge: HOME | End: 2022-10-29
Payer: MEDICARE

## 2022-10-29 ENCOUNTER — APPOINTMENT (OUTPATIENT)
Dept: GENERAL RADIOLOGY | Age: 67
End: 2022-10-29
Attending: NURSE PRACTITIONER
Payer: MEDICARE

## 2022-10-29 VITALS
HEART RATE: 100 BPM | DIASTOLIC BLOOD PRESSURE: 67 MMHG | SYSTOLIC BLOOD PRESSURE: 128 MMHG | OXYGEN SATURATION: 96 % | RESPIRATION RATE: 16 BRPM | TEMPERATURE: 98 F

## 2022-10-29 DIAGNOSIS — J02.0 STREPTOCOCCAL SORE THROAT: Primary | ICD-10-CM

## 2022-10-29 DIAGNOSIS — R93.89 ABNORMAL CHEST X-RAY: ICD-10-CM

## 2022-10-29 LAB
S PYO AG THROAT QL: POSITIVE
SARS-COV-2 RNA RESP QL NAA+PROBE: NOT DETECTED

## 2022-10-29 PROCEDURE — U0002 COVID-19 LAB TEST NON-CDC: HCPCS | Performed by: NURSE PRACTITIONER

## 2022-10-29 PROCEDURE — 99213 OFFICE O/P EST LOW 20 MIN: CPT | Performed by: NURSE PRACTITIONER

## 2022-10-29 PROCEDURE — 87880 STREP A ASSAY W/OPTIC: CPT | Performed by: NURSE PRACTITIONER

## 2022-10-29 PROCEDURE — 71046 X-RAY EXAM CHEST 2 VIEWS: CPT | Performed by: NURSE PRACTITIONER

## 2022-10-29 RX ORDER — PENICILLIN V POTASSIUM 500 MG/1
500 TABLET ORAL 2 TIMES DAILY
Qty: 20 TABLET | Refills: 0 | Status: SHIPPED | OUTPATIENT
Start: 2022-10-29 | End: 2022-11-08

## 2022-10-29 NOTE — ED INITIAL ASSESSMENT (HPI)
Pt here c/o productive cough with yellow/green mucus, states worse at night. States feeling tired, no fever. No sore throat. States wife just tested positive for strep.

## 2022-10-29 NOTE — DISCHARGE INSTRUCTIONS
Please take medications as prescribed. Salt water gargles and tea with honey may help soothe throat. Discard the toothbrush after being on the antibiotics for 24 hours. Follow up with primary care provider.

## 2022-11-04 DIAGNOSIS — R13.19 ESOPHAGEAL DYSPHAGIA: ICD-10-CM

## 2022-11-04 DIAGNOSIS — K21.9 CHRONIC GERD: ICD-10-CM

## 2022-11-04 DIAGNOSIS — R14.2 ERUCTATION: ICD-10-CM

## 2022-11-04 RX ORDER — OMEPRAZOLE 40 MG/1
40 CAPSULE, DELAYED RELEASE ORAL DAILY
Qty: 90 CAPSULE | Refills: 1 | Status: SHIPPED | OUTPATIENT
Start: 2022-11-04

## 2022-11-04 NOTE — TELEPHONE ENCOUNTER
Orthostatics  Lying HR 66 /41  Sitting HR 70 /73  Standing HR 74 /67   Pt denies worsening dizziness with change of position.    Refill passed per [address] [place] protocol. Requested Prescriptions   Pending Prescriptions Disp Refills    OMEPRAZOLE 40 MG Oral Capsule Delayed Release [Pharmacy Med Name: OMEPRAZOLE DR 40 MG CAPSULE] 90 capsule 1     Sig: Take 1 capsule (40 mg total) by mouth daily.        Gastrointestional Medication Protocol Passed - 11/4/2022  1:18 AM        Passed - In person appointment or virtual visit in the past 12 mos or appointment in next 3 mos     Recent Outpatient Visits              6 months ago Cough    [address], [address], [address], [place], DO    Office Visit    8 months ago Centrilobular emphysema [facility])    [address] [address], Jennifer Neumann MD    Office Visit    11 months ago Encounter for annual health examination    [address], [place], [place]    Office Visit    1 year ago Impacted cerumen of left ear    [address] [place], [address], [address], [place], [place]    Office Visit    1 year ago Essential hypertension with goal blood pressure less than 140/90    [address] [place], [address], [address], [place], [place]    Office Visit                            Recent Outpatient Visits              6 months ago Cough    [address] [place], [address], [address], [place], DO    Office Visit    8 months ago Centrilobular emphysema [facility])    [address], Jennifer Neumann MD    Office Visit    11 months ago Encounter for annual health examination    [address] [place], [address], [address], [place], [place]    Office Visit    1 year ago Impacted cerumen of left ear    [address] [place], [address], [address], [place], [place]    Office Visit    1 year ago Essential hypertension with goal blood pressure less than 140/90    [address], [place], [place]    Office Visit

## 2023-01-26 ENCOUNTER — LAB ENCOUNTER (OUTPATIENT)
Dept: LAB | Age: 68
End: 2023-01-26
Attending: FAMILY MEDICINE
Payer: MEDICARE

## 2023-01-26 DIAGNOSIS — Z12.5 SCREENING PSA (PROSTATE SPECIFIC ANTIGEN): ICD-10-CM

## 2023-01-26 DIAGNOSIS — I10 ESSENTIAL HYPERTENSION WITH GOAL BLOOD PRESSURE LESS THAN 140/90: ICD-10-CM

## 2023-01-26 DIAGNOSIS — E78.2 MIXED HYPERLIPIDEMIA: ICD-10-CM

## 2023-01-26 DIAGNOSIS — E03.9 ACQUIRED HYPOTHYROIDISM: ICD-10-CM

## 2023-01-26 DIAGNOSIS — Z00.00 ADULT GENERAL MEDICAL EXAM: ICD-10-CM

## 2023-01-26 LAB
ALBUMIN SERPL-MCNC: 4.5 G/DL (ref 3.4–5)
ALBUMIN/GLOB SERPL: 1.3 {RATIO} (ref 1–2)
ALP LIVER SERPL-CCNC: 59 U/L
ALT SERPL-CCNC: 22 U/L
ANION GAP SERPL CALC-SCNC: 6 MMOL/L (ref 0–18)
AST SERPL-CCNC: 23 U/L (ref 15–37)
BASOPHILS # BLD AUTO: 0.07 X10(3) UL (ref 0–0.2)
BASOPHILS NFR BLD AUTO: 0.7 %
BILIRUB SERPL-MCNC: 0.6 MG/DL (ref 0.1–2)
BUN BLD-MCNC: 11 MG/DL (ref 7–18)
BUN/CREAT SERPL: 13.9 (ref 10–20)
CALCIUM BLD-MCNC: 10.3 MG/DL (ref 8.5–10.1)
CHLORIDE SERPL-SCNC: 93 MMOL/L (ref 98–112)
CHOLEST SERPL-MCNC: 145 MG/DL (ref ?–200)
CO2 SERPL-SCNC: 28 MMOL/L (ref 21–32)
COMPLEXED PSA SERPL-MCNC: 0.61 NG/ML (ref ?–4)
CREAT BLD-MCNC: 0.79 MG/DL
DEPRECATED RDW RBC AUTO: 43.9 FL (ref 35.1–46.3)
EOSINOPHIL # BLD AUTO: 0.72 X10(3) UL (ref 0–0.7)
EOSINOPHIL NFR BLD AUTO: 6.8 %
ERYTHROCYTE [DISTWIDTH] IN BLOOD BY AUTOMATED COUNT: 13.4 % (ref 11–15)
FASTING PATIENT LIPID ANSWER: YES
FASTING STATUS PATIENT QL REPORTED: YES
GFR SERPLBLD BASED ON 1.73 SQ M-ARVRAT: 96 ML/MIN/1.73M2 (ref 60–?)
GLOBULIN PLAS-MCNC: 3.5 G/DL (ref 2.8–4.4)
GLUCOSE BLD-MCNC: 93 MG/DL (ref 70–99)
HCT VFR BLD AUTO: 44.1 %
HDLC SERPL-MCNC: 70 MG/DL (ref 40–59)
HGB BLD-MCNC: 15 G/DL
IMM GRANULOCYTES # BLD AUTO: 0.07 X10(3) UL (ref 0–1)
IMM GRANULOCYTES NFR BLD: 0.7 %
LDLC SERPL CALC-MCNC: 60 MG/DL (ref ?–100)
LYMPHOCYTES # BLD AUTO: 0.92 X10(3) UL (ref 1–4)
LYMPHOCYTES NFR BLD AUTO: 8.7 %
MCH RBC QN AUTO: 30.1 PG (ref 26–34)
MCHC RBC AUTO-ENTMCNC: 34 G/DL (ref 31–37)
MCV RBC AUTO: 88.4 FL
MONOCYTES # BLD AUTO: 1.03 X10(3) UL (ref 0.1–1)
MONOCYTES NFR BLD AUTO: 9.8 %
NEUTROPHILS # BLD AUTO: 7.74 X10 (3) UL (ref 1.5–7.7)
NEUTROPHILS # BLD AUTO: 7.74 X10(3) UL (ref 1.5–7.7)
NEUTROPHILS NFR BLD AUTO: 73.3 %
NONHDLC SERPL-MCNC: 75 MG/DL (ref ?–130)
OSMOLALITY SERPL CALC.SUM OF ELEC: 263 MOSM/KG (ref 275–295)
PLATELET # BLD AUTO: 431 10(3)UL (ref 150–450)
POTASSIUM SERPL-SCNC: 4.9 MMOL/L (ref 3.5–5.1)
PROT SERPL-MCNC: 8 G/DL (ref 6.4–8.2)
RBC # BLD AUTO: 4.99 X10(6)UL
SODIUM SERPL-SCNC: 127 MMOL/L (ref 136–145)
TRIGL SERPL-MCNC: 77 MG/DL (ref 30–149)
TSI SER-ACNC: 1.3 MIU/ML (ref 0.36–3.74)
VLDLC SERPL CALC-MCNC: 11 MG/DL (ref 0–30)
WBC # BLD AUTO: 10.6 X10(3) UL (ref 4–11)

## 2023-01-26 PROCEDURE — 85025 COMPLETE CBC W/AUTO DIFF WBC: CPT

## 2023-01-26 PROCEDURE — 80061 LIPID PANEL: CPT

## 2023-01-26 PROCEDURE — 80053 COMPREHEN METABOLIC PANEL: CPT

## 2023-01-26 PROCEDURE — 36415 COLL VENOUS BLD VENIPUNCTURE: CPT

## 2023-01-26 PROCEDURE — 84443 ASSAY THYROID STIM HORMONE: CPT

## 2023-01-27 DIAGNOSIS — J43.9 PULMONARY EMPHYSEMA, UNSPECIFIED EMPHYSEMA TYPE (HCC): ICD-10-CM

## 2023-01-27 DIAGNOSIS — F17.200 TOBACCO USE DISORDER: ICD-10-CM

## 2023-01-27 RX ORDER — ALBUTEROL SULFATE 90 UG/1
2 AEROSOL, METERED RESPIRATORY (INHALATION) EVERY 6 HOURS PRN
Qty: 54 G | Refills: 1 | Status: SHIPPED | OUTPATIENT
Start: 2023-01-27

## 2023-01-27 NOTE — TELEPHONE ENCOUNTER
Refill passed per [facility] Elissa protocol. Requested Prescriptions   Pending Prescriptions Disp Refills    albuterol (VENTOLIN HFA) 108 (90 Base) MCG/ACT Inhalation Aero Soln 54 g 1     Sig: Inhale 2 puffs into the lungs every 6 (six) hours as needed for Wheezing.        Asthma & COPD Medication Protocol Passed - 1/27/2023 12:01 AM        Passed - In person appointment or virtual visit in the past 6 mos or appointment in next 3 mos     Recent Outpatient Visits              9 months ago Cough    [facility], [address], [place], DO    Office Visit    11 months ago Centrilobular emphysema [facility])    [address], [address] [place] [address], [facility] MD Alicia    Office Visit    1 year ago Encounter for annual health examination    [address] [place], [place] [address], [place], DO    Office Visit    1 year ago Impacted cerumen of left ear    [address] [place] [address].[address], [place], DO    Office Visit    1 year ago Essential hypertension with goal blood pressure less than 140/90    [facility], [address], [address], [place], DO    Office Visit          Future Appointments         Provider Department Appt Notes    In 4 days Si DO Kathie [address], [address], [place] [facility]  \"policy informed\"                     Recent Outpatient Visits              9 months ago Cough    [facility], [address], [place], DO    Office Visit    11 months ago Centrilobular emphysema [facility])    Ba Rock MD    Office Visit    1 year ago Encounter for annual health examination    [address] [place] [address], [place], DO    Office Visit    1 year ago Impacted cerumen of left ear    [facility], [address], [place] Kathie, DO    Office Visit    1 year ago Essential hypertension with goal blood pressure less than 140/90    [facility], [address], Santy Mireles, DO    Office Visit            Future Appointments         Provider Department Appt Notes    In 4 days Kim Noriega DO Dorette Goodwill, [facility]  \"policy informed\"

## 2023-01-31 ENCOUNTER — TELEPHONE (OUTPATIENT)
Dept: FAMILY MEDICINE CLINIC | Facility: CLINIC | Age: 68
End: 2023-01-31

## 2023-01-31 ENCOUNTER — OFFICE VISIT (OUTPATIENT)
Dept: FAMILY MEDICINE CLINIC | Facility: CLINIC | Age: 68
End: 2023-01-31

## 2023-01-31 VITALS
BODY MASS INDEX: 19.29 KG/M2 | SYSTOLIC BLOOD PRESSURE: 127 MMHG | DIASTOLIC BLOOD PRESSURE: 60 MMHG | TEMPERATURE: 97 F | WEIGHT: 113 LBS | HEIGHT: 64 IN | HEART RATE: 81 BPM

## 2023-01-31 DIAGNOSIS — E03.9 ACQUIRED HYPOTHYROIDISM: ICD-10-CM

## 2023-01-31 DIAGNOSIS — J43.9 PULMONARY EMPHYSEMA, UNSPECIFIED EMPHYSEMA TYPE (HCC): ICD-10-CM

## 2023-01-31 DIAGNOSIS — N40.1 BENIGN NON-NODULAR PROSTATIC HYPERPLASIA WITH LOWER URINARY TRACT SYMPTOMS: ICD-10-CM

## 2023-01-31 DIAGNOSIS — K21.9 CHRONIC GERD: ICD-10-CM

## 2023-01-31 DIAGNOSIS — Z00.00 ENCOUNTER FOR ANNUAL HEALTH EXAMINATION: ICD-10-CM

## 2023-01-31 DIAGNOSIS — I10 ESSENTIAL HYPERTENSION: ICD-10-CM

## 2023-01-31 DIAGNOSIS — J44.9 CHRONIC OBSTRUCTIVE PULMONARY DISEASE, UNSPECIFIED COPD TYPE (HCC): ICD-10-CM

## 2023-01-31 DIAGNOSIS — I70.0 CALCIFICATION OF AORTA (HCC): ICD-10-CM

## 2023-01-31 DIAGNOSIS — F17.200 TOBACCO USE DISORDER: ICD-10-CM

## 2023-01-31 DIAGNOSIS — R91.1 PULMONARY NODULE, RIGHT: ICD-10-CM

## 2023-01-31 DIAGNOSIS — C67.9 MALIGNANT NEOPLASM OF URINARY BLADDER, UNSPECIFIED SITE (HCC): ICD-10-CM

## 2023-01-31 DIAGNOSIS — E78.2 MIXED HYPERLIPIDEMIA: ICD-10-CM

## 2023-01-31 DIAGNOSIS — E83.52 HYPERCALCEMIA: ICD-10-CM

## 2023-01-31 DIAGNOSIS — Z00.00 ADULT GENERAL MEDICAL EXAM: Primary | ICD-10-CM

## 2023-01-31 DIAGNOSIS — J43.9 PULMONARY EMPHYSEMA, UNSPECIFIED EMPHYSEMA TYPE (HCC): Primary | ICD-10-CM

## 2023-01-31 DIAGNOSIS — Z87.891 PERSONAL HISTORY OF TOBACCO USE, PRESENTING HAZARDS TO HEALTH: ICD-10-CM

## 2023-01-31 DIAGNOSIS — R42 DIZZINESS: ICD-10-CM

## 2023-01-31 DIAGNOSIS — F10.10 ALCOHOL ABUSE: ICD-10-CM

## 2023-01-31 DIAGNOSIS — J30.1 ALLERGIC RHINITIS DUE TO POLLEN, UNSPECIFIED SEASONALITY: ICD-10-CM

## 2023-01-31 DIAGNOSIS — E87.1 HYPONATREMIA: ICD-10-CM

## 2023-01-31 DIAGNOSIS — R14.2 ERUCTATION: ICD-10-CM

## 2023-01-31 DIAGNOSIS — R13.19 ESOPHAGEAL DYSPHAGIA: ICD-10-CM

## 2023-01-31 DIAGNOSIS — I10 ESSENTIAL HYPERTENSION WITH GOAL BLOOD PRESSURE LESS THAN 140/90: ICD-10-CM

## 2023-01-31 PROCEDURE — 1126F AMNT PAIN NOTED NONE PRSNT: CPT | Performed by: FAMILY MEDICINE

## 2023-01-31 PROCEDURE — 99213 OFFICE O/P EST LOW 20 MIN: CPT | Performed by: FAMILY MEDICINE

## 2023-01-31 PROCEDURE — G0439 PPPS, SUBSEQ VISIT: HCPCS | Performed by: FAMILY MEDICINE

## 2023-01-31 RX ORDER — OMEPRAZOLE 40 MG/1
40 CAPSULE, DELAYED RELEASE ORAL DAILY
Qty: 90 CAPSULE | Refills: 2 | Status: SHIPPED | OUTPATIENT
Start: 2023-01-31

## 2023-01-31 RX ORDER — AMLODIPINE BESYLATE AND BENAZEPRIL HYDROCHLORIDE 10; 40 MG/1; MG/1
1 CAPSULE ORAL DAILY
Qty: 90 CAPSULE | Refills: 2 | Status: SHIPPED | OUTPATIENT
Start: 2023-01-31

## 2023-01-31 RX ORDER — FLUTICASONE PROPIONATE AND SALMETEROL 250; 50 UG/1; UG/1
1 POWDER RESPIRATORY (INHALATION) EVERY 12 HOURS
Qty: 180 EACH | Refills: 2 | Status: SHIPPED | OUTPATIENT
Start: 2023-01-31 | End: 2023-02-01

## 2023-01-31 RX ORDER — ATORVASTATIN CALCIUM 10 MG/1
10 TABLET, FILM COATED ORAL DAILY
Qty: 90 TABLET | Refills: 2 | Status: SHIPPED | OUTPATIENT
Start: 2023-01-31

## 2023-01-31 RX ORDER — FLUTICASONE PROPIONATE 50 MCG
2 SPRAY, SUSPENSION (ML) NASAL DAILY
Qty: 48 ML | Refills: 2 | Status: SHIPPED | OUTPATIENT
Start: 2023-01-31

## 2023-01-31 RX ORDER — LEVOTHYROXINE SODIUM 0.07 MG/1
75 TABLET ORAL
Qty: 90 TABLET | Refills: 2 | Status: SHIPPED | OUTPATIENT
Start: 2023-01-31

## 2023-01-31 RX ORDER — FENOFIBRATE 160 MG/1
160 TABLET ORAL DAILY
Qty: 90 TABLET | Refills: 2 | Status: SHIPPED | OUTPATIENT
Start: 2023-01-31

## 2023-02-01 RX ORDER — BUDESONIDE AND FORMOTEROL FUMARATE DIHYDRATE 160; 4.5 UG/1; UG/1
2 AEROSOL RESPIRATORY (INHALATION) 2 TIMES DAILY
Qty: 3 EACH | Refills: 3 | Status: SHIPPED | OUTPATIENT
Start: 2023-02-01 | End: 2024-02-01

## 2023-02-01 NOTE — TELEPHONE ENCOUNTER
I sent in Symbicort considering that the Advair was not covered. Do 2 puffs twice daily for your lungs.

## 2023-02-01 NOTE — TELEPHONE ENCOUNTER
Patient called (identified name and ),   Notified of Dr Kerstin Collet message about medication change. Verbalized understanding.

## 2023-02-06 DIAGNOSIS — F17.200 TOBACCO USE DISORDER: ICD-10-CM

## 2023-02-06 DIAGNOSIS — J44.9 CHRONIC OBSTRUCTIVE PULMONARY DISEASE, UNSPECIFIED COPD TYPE (HCC): ICD-10-CM

## 2023-02-06 NOTE — TELEPHONE ENCOUNTER
Please review.  Protocol failed/No protocol      Requested Prescriptions   Pending Prescriptions Disp Refills    FLUTICASONE-SALMETEROL 250-50 MCG/ACT Inhalation Aerosol Powder, Breath Activated [Pharmacy Med Name: Katarzyna Marcial 250-50 INHUB] 180 each 1     Sig: INHALE 1 PUFF INTO THE LUNGS EVERY 12 HOURS       Asthma & COPD Medication Protocol Passed - 2/6/2023 10:33 AM        Passed - In person appointment or virtual visit in the past 6 mos or appointment in next 3 mos     Recent Outpatient Visits              6 days ago Adult general medical exam    [address], [address], [address], [place], DO    Office Visit    9 months ago Cough    [address], [address], [address], [place], DO    Office Visit    11 months ago Centrilobular emphysema [facility])    Jean-Pierre Giron MD    Office Visit    1 year ago Encounter for annual health examination    [address] [facility], [address], [place], DO    Office Visit    1 year ago Impacted cerumen of left ear    [address], [address], Santy Sultana, DO    Office Visit          Future Appointments         Provider Department Appt Notes    In 2 days [address]     In 1 week [address]     In 2 months Serjio Morales MD [address], [address] results                    Recent Outpatient Visits              6 days ago Adult general medical exam    [address], [address], Manpreet Em, DO    Office Visit    9 months ago Cough    [address], [address], [address], [place], DO    Office Visit    11 months ago Centrilobular emphysema [facility])    Jean-Pierre Giron MD    Office Visit    1 year ago [facility] for annual health examination    [facility] [address], [place], DO    Office Visit    1 year ago Impacted cerumen of left ear    [facility], [address], [place], DO    Office Visit            Future Appointments         Provider Department Appt Notes    In 2 days [address]     In 1 week [address]     In 2 months Syeda Buckley MD [employer], [address] [place] [address], [place] [address] ct results

## 2023-02-06 NOTE — TELEPHONE ENCOUNTER
Please review.  Protocol failed/No protocol      Requested Prescriptions   Pending Prescriptions Disp Refills    FLUTICASONE-SALMETEROL 250-50 MCG/ACT Inhalation Aerosol Powder, Breath Activated [Pharmacy Med Name: Radha Lee 250-50 INHUB] 180 each 1     Sig: INHALE 1 PUFF INTO THE LUNGS EVERY 12 HOURS       Asthma & COPD Medication Protocol Passed - 2/6/2023 10:33 AM        Passed - In person appointment or virtual visit in the past 6 mos or appointment in next 3 mos     Recent Outpatient Visits              6 days ago Adult general medical exam    [address], [address], [address], [place], DO    Office Visit    9 months ago Cough    [address], [address], [address], [place], DO    Office Visit    11 months ago Centrilobular emphysema [facility])    Marisol Zelaya MD    Office Visit    1 year ago Encounter for annual health examination    Hayden Ribeiro, [address], [place], DO    Office Visit    1 year ago Impacted cerumen of left ear    [address], [address], [place] Ortega, DO    Office Visit          Future Appointments         Provider Department Appt Notes    In 2 days [address]     In 1 week [address]     In 2 months Cristo Canales MD [address], [address] [facility] ct results                    Recent Outpatient Visits              6 days ago Adult general medical exam    [address], [address], Joellen Black, DO    Office Visit    9 months ago Cough    [address], [address], [address], [place], DO    Office Visit    11 months ago Centrilobular emphysema [facility])    Marisol Zelaya MD    Office Visit    1 year ago Encounter for annual health examination    [facility], [address], Santy Molina, DO    Office Visit    1 year ago Impacted cerumen of left ear    [facility], [address], [place]y Molina, DO    Office Visit            Future Appointments         Provider Department Appt Notes    In 2 days [address]     In 1 week [address]     In 2 months Davi March MD [address], [address] [place] [address] results

## 2023-02-07 RX ORDER — FLUTICASONE PROPIONATE AND SALMETEROL 250; 50 UG/1; UG/1
POWDER RESPIRATORY (INHALATION)
Qty: 180 EACH | Refills: 1 | Status: SHIPPED | OUTPATIENT
Start: 2023-02-07

## 2023-02-08 ENCOUNTER — HOSPITAL ENCOUNTER (OUTPATIENT)
Dept: CT IMAGING | Age: 68
Discharge: HOME | End: 2023-02-08
Attending: FAMILY MEDICINE
Payer: MEDICARE

## 2023-02-08 DIAGNOSIS — F17.200 TOBACCO USE DISORDER: ICD-10-CM

## 2023-02-08 DIAGNOSIS — R91.1 PULMONARY NODULE, RIGHT: ICD-10-CM

## 2023-02-08 DIAGNOSIS — C67.9 MALIGNANT NEOPLASM OF URINARY BLADDER, UNSPECIFIED SITE (HCC): ICD-10-CM

## 2023-02-08 PROCEDURE — 71260 CT THORAX DX C+: CPT | Performed by: FAMILY MEDICINE

## 2023-02-09 ENCOUNTER — TELEPHONE (OUTPATIENT)
Dept: PULMONOLOGY | Facility: CLINIC | Age: 68
End: 2023-02-09

## 2023-02-09 NOTE — TELEPHONE ENCOUNTER
andrez from dr. Manny Velazco office is calling to request a sooner appointment due to patient's CT results

## 2023-02-09 NOTE — TELEPHONE ENCOUNTER
Dr. Kennedy Alberto office requesting sooner than first available CONSULT for possible malignancy. Previously seen by Dr. Emmanuelle Virk but patient insisting on changing to you.

## 2023-02-13 ENCOUNTER — HOSPITAL ENCOUNTER (OUTPATIENT)
Dept: ULTRASOUND IMAGING | Age: 68
Discharge: HOME | End: 2023-02-13
Attending: FAMILY MEDICINE
Payer: MEDICARE

## 2023-02-13 ENCOUNTER — OFFICE VISIT (OUTPATIENT)
Dept: PULMONOLOGY | Facility: CLINIC | Age: 68
End: 2023-02-13

## 2023-02-13 VITALS
SYSTOLIC BLOOD PRESSURE: 127 MMHG | HEIGHT: 64 IN | BODY MASS INDEX: 18.44 KG/M2 | OXYGEN SATURATION: 100 % | DIASTOLIC BLOOD PRESSURE: 75 MMHG | HEART RATE: 96 BPM | WEIGHT: 108 LBS

## 2023-02-13 DIAGNOSIS — R91.8 LUNG MASS: Primary | ICD-10-CM

## 2023-02-13 DIAGNOSIS — F17.200 SMOKING ADDICTION: ICD-10-CM

## 2023-02-13 DIAGNOSIS — E78.2 MIXED HYPERLIPIDEMIA: ICD-10-CM

## 2023-02-13 DIAGNOSIS — I10 ESSENTIAL HYPERTENSION: ICD-10-CM

## 2023-02-13 DIAGNOSIS — J44.9 CHRONIC OBSTRUCTIVE PULMONARY DISEASE, UNSPECIFIED COPD TYPE (HCC): ICD-10-CM

## 2023-02-13 DIAGNOSIS — F17.200 TOBACCO USE DISORDER: ICD-10-CM

## 2023-02-13 DIAGNOSIS — I70.0 CALCIFICATION OF AORTA (HCC): ICD-10-CM

## 2023-02-13 DIAGNOSIS — R42 DIZZINESS: ICD-10-CM

## 2023-02-13 DIAGNOSIS — J43.9 PULMONARY EMPHYSEMA, UNSPECIFIED EMPHYSEMA TYPE (HCC): ICD-10-CM

## 2023-02-13 PROCEDURE — 93880 EXTRACRANIAL BILAT STUDY: CPT | Performed by: FAMILY MEDICINE

## 2023-02-17 ENCOUNTER — HOSPITAL ENCOUNTER (OUTPATIENT)
Dept: NUCLEAR MEDICINE | Facility: HOSPITAL | Age: 68
Discharge: HOME | End: 2023-02-17
Attending: INTERNAL MEDICINE
Payer: MEDICARE

## 2023-02-17 DIAGNOSIS — R91.8 LUNG MASS: ICD-10-CM

## 2023-02-17 LAB — GLUCOSE BLDC GLUCOMTR-MCNC: 99 MG/DL (ref 70–99)

## 2023-02-17 PROCEDURE — 78815 PET IMAGE W/CT SKULL-THIGH: CPT | Performed by: INTERNAL MEDICINE

## 2023-02-17 PROCEDURE — 82962 GLUCOSE BLOOD TEST: CPT

## 2023-02-18 ENCOUNTER — LAB ENCOUNTER (OUTPATIENT)
Dept: LAB | Age: 68
End: 2023-02-18
Attending: INTERNAL MEDICINE
Payer: MEDICARE

## 2023-02-18 DIAGNOSIS — J44.9 CHRONIC OBSTRUCTIVE PULMONARY DISEASE, UNSPECIFIED COPD TYPE (HCC): ICD-10-CM

## 2023-02-18 DIAGNOSIS — E87.1 HYPONATREMIA: ICD-10-CM

## 2023-02-18 DIAGNOSIS — E83.52 HYPERCALCEMIA: ICD-10-CM

## 2023-02-18 DIAGNOSIS — R91.8 LUNG MASS: ICD-10-CM

## 2023-02-18 LAB
ANION GAP SERPL CALC-SCNC: 8 MMOL/L (ref 0–18)
BUN BLD-MCNC: 9 MG/DL (ref 7–18)
BUN/CREAT SERPL: 11.5 (ref 10–20)
CALCIUM BLD-MCNC: 10.3 MG/DL (ref 8.5–10.1)
CHLORIDE SERPL-SCNC: 94 MMOL/L (ref 98–112)
CO2 SERPL-SCNC: 30 MMOL/L (ref 21–32)
CREAT BLD-MCNC: 0.78 MG/DL
FASTING STATUS PATIENT QL REPORTED: YES
GFR SERPLBLD BASED ON 1.73 SQ M-ARVRAT: 97 ML/MIN/1.73M2 (ref 60–?)
GLUCOSE BLD-MCNC: 97 MG/DL (ref 70–99)
OSMOLALITY SERPL CALC.SUM OF ELEC: 273 MOSM/KG (ref 275–295)
POTASSIUM SERPL-SCNC: 4.2 MMOL/L (ref 3.5–5.1)
PTH-INTACT SERPL-MCNC: 12.7 PG/ML (ref 18.5–88)
SODIUM SERPL-SCNC: 132 MMOL/L (ref 136–145)

## 2023-02-18 PROCEDURE — 82306 VITAMIN D 25 HYDROXY: CPT | Performed by: FAMILY MEDICINE

## 2023-02-18 PROCEDURE — 36415 COLL VENOUS BLD VENIPUNCTURE: CPT

## 2023-02-18 PROCEDURE — 80048 BASIC METABOLIC PNL TOTAL CA: CPT

## 2023-02-18 PROCEDURE — 83970 ASSAY OF PARATHORMONE: CPT

## 2023-02-19 LAB — SARS-COV-2 RNA RESP QL NAA+PROBE: NOT DETECTED

## 2023-02-20 DIAGNOSIS — F17.200 TOBACCO USE DISORDER: ICD-10-CM

## 2023-02-20 DIAGNOSIS — J43.9 PULMONARY EMPHYSEMA, UNSPECIFIED EMPHYSEMA TYPE (HCC): ICD-10-CM

## 2023-02-20 RX ORDER — ALBUTEROL SULFATE 90 UG/1
2 AEROSOL, METERED RESPIRATORY (INHALATION) EVERY 6 HOURS PRN
Qty: 54 G | Refills: 1 | Status: SHIPPED | OUTPATIENT
Start: 2023-02-20

## 2023-02-20 NOTE — TELEPHONE ENCOUNTER
Patient is requesting the following medication refill and mentions he is running low:    Ventolin - albuterol    Please advise.

## 2023-02-20 NOTE — TELEPHONE ENCOUNTER
Refill passed per [place] [employer] [facility], [facility] protocol. Requested Prescriptions   Pending Prescriptions Disp Refills    albuterol (VENTOLIN HFA) 108 (90 Base) MCG/ACT Inhalation Aero Soln 54 g 1     Sig: Inhale 2 puffs into the lungs every 6 (six) hours as needed for Wheezing.        Asthma & COPD Medication Protocol Passed - 2/20/2023  2:56 PM        Passed - In person appointment or virtual visit in the past 6 mos or appointment in next 3 mos     Recent Outpatient Visits              1 week ago Lung mass    [address], [facility], Beulah Cavazos MD    Office Visit    2 weeks ago Adult general medical exam    [address] [facility], [address], [place], DO    Office Visit    10 months ago Cough    [address], [address], [address], [place], DO    Office Visit    1 year ago Centrilobular emphysema [facility])    [address], [facility], Gay Neumann MD    Office Visit    1 year ago Encounter for annual health examination    [address] [facility], [address], [place], DO    Office Visit          Future Appointments         Provider Department Appt Notes    Tomorrow [address] P[address] Respiratory Therapy     In 3 weeks Tanner Kunz MD [address], [address] 3 weeks                     Recent Outpatient Visits              1 week ago Lung mass    [address], [address] [facility], [place] Tanner Knuz MD    Office Visit    2 weeks ago Adult general medical exam    [address] [facility], [address], [place], [place]    Office Visit    10 months ago Cough    [address], [address], [address], [place], DO    Office Visit    1 year ago Centrilobular emphysema [facility])    Prisca Salcedo MD    Office Visit    1 year ago Encounter for annual health examination    [address] [facility], [place], [place],     Office Visit            Future Appointments         Provider Department Appt Notes    Tomorrow [address] Respiratory Therapy     In 3 weeks [facility], [address] [place] [address] 3 weeks

## 2023-02-21 ENCOUNTER — TELEPHONE (OUTPATIENT)
Dept: FAMILY MEDICINE CLINIC | Facility: CLINIC | Age: 68
End: 2023-02-21

## 2023-02-21 ENCOUNTER — HOSPITAL ENCOUNTER (OUTPATIENT)
Dept: RESPIRATORY THERAPY | Facility: HOSPITAL | Age: 68
Discharge: HOME | End: 2023-02-21
Attending: INTERNAL MEDICINE
Payer: MEDICARE

## 2023-02-21 ENCOUNTER — TELEPHONE (OUTPATIENT)
Dept: PULMONOLOGY | Facility: CLINIC | Age: 68
End: 2023-02-21

## 2023-02-21 DIAGNOSIS — R91.1 LUNG NODULE: Primary | ICD-10-CM

## 2023-02-21 DIAGNOSIS — J44.9 CHRONIC OBSTRUCTIVE PULMONARY DISEASE, UNSPECIFIED COPD TYPE (HCC): ICD-10-CM

## 2023-02-21 DIAGNOSIS — F17.200 SMOKING ADDICTION: ICD-10-CM

## 2023-02-21 DIAGNOSIS — R91.8 LUNG MASS: ICD-10-CM

## 2023-02-21 PROCEDURE — 94060 EVALUATION OF WHEEZING: CPT | Performed by: INTERNAL MEDICINE

## 2023-02-21 PROCEDURE — 94729 DIFFUSING CAPACITY: CPT | Performed by: INTERNAL MEDICINE

## 2023-02-21 PROCEDURE — 94726 PLETHYSMOGRAPHY LUNG VOLUMES: CPT | Performed by: INTERNAL MEDICINE

## 2023-02-22 ENCOUNTER — TELEPHONE (OUTPATIENT)
Dept: PULMONOLOGY | Facility: CLINIC | Age: 68
End: 2023-02-22

## 2023-02-22 NOTE — TELEPHONE ENCOUNTER
Spoke with patient. Provided number to ENT [id number]. Patient verbalized understanding. Confirmed appointment with Dr. Jerson Soliman on 3/13/23 at 12:15PM.    Order added to chronic calender.      Dr. Jerson Soliman: Patient requesting PFT results

## 2023-02-22 NOTE — PROCEDURES
[id number] 12/10/1955 MRN [id number]   Height  59 inh  Age 69year old   Weight  108 lbs  Sex Male         Spirometry:   FEV1 1.19 L which is 44% which is severely reduced  FEV1/FVC ratio 50% which is severely reduced    Significant bronchodilator response by improving FEV1 14% and greater than 200 cc      FVL:   Decreased expiratory flow in the mid and low volume area indicating obstructive pattern      Lung Volume:   TLC 5.82 L which is 100%  Vital capacity 2.62 L or 72%  Increased RV/TLC ratio indicating air trapping      DLCO:   65%      Impression:   Severe COPD        Thank you for allowing me to participate in the care of your patient. Albino Kelly.  Adriano Martin MD  2023  11:49 AM

## 2023-02-22 NOTE — TELEPHONE ENCOUNTER
----- Message from Clarence Valderrama MD sent at 2/21/2023  5:27 PM CST -----  I already spoke to the patient regarding his PET scan  The lung nodule is negative on the PET scan . Incidental parotid gland activity .   Please give the patient referral to ENT    I placed an order for chest CT in 3 months interval  Please give the patient follow-up with me in 2 to 3 weeks

## 2023-02-22 NOTE — TELEPHONE ENCOUNTER
Spoke with patient and informed of Dr. Mayda Mcneal result note below. Patient verbalized understanding.

## 2023-03-01 ENCOUNTER — OFFICE VISIT (OUTPATIENT)
Dept: OTOLARYNGOLOGY | Facility: CLINIC | Age: 68
End: 2023-03-01

## 2023-03-01 ENCOUNTER — ORDER TRANSCRIPTION (OUTPATIENT)
Dept: ADMINISTRATIVE | Facility: HOSPITAL | Age: 68
End: 2023-03-01

## 2023-03-01 DIAGNOSIS — Z11.59 SCREENING FOR VIRAL DISEASE: ICD-10-CM

## 2023-03-01 DIAGNOSIS — Z01.818 PREOPERATIVE CLEARANCE: Primary | ICD-10-CM

## 2023-03-01 DIAGNOSIS — K11.8 MASS OF RIGHT PAROTID GLAND: Primary | ICD-10-CM

## 2023-03-01 DIAGNOSIS — K11.8 PAROTID MASS: Primary | ICD-10-CM

## 2023-03-01 PROCEDURE — 99204 OFFICE O/P NEW MOD 45 MIN: CPT | Performed by: STUDENT IN AN ORGANIZED HEALTH CARE EDUCATION/TRAINING PROGRAM

## 2023-03-13 ENCOUNTER — OFFICE VISIT (OUTPATIENT)
Dept: PULMONOLOGY | Facility: CLINIC | Age: 68
End: 2023-03-13

## 2023-03-13 VITALS
WEIGHT: 109 LBS | HEART RATE: 94 BPM | BODY MASS INDEX: 18.61 KG/M2 | DIASTOLIC BLOOD PRESSURE: 60 MMHG | HEIGHT: 64 IN | SYSTOLIC BLOOD PRESSURE: 131 MMHG

## 2023-03-13 DIAGNOSIS — J44.9 CHRONIC OBSTRUCTIVE PULMONARY DISEASE, UNSPECIFIED COPD TYPE (HCC): Primary | ICD-10-CM

## 2023-03-13 DIAGNOSIS — R91.1 LUNG NODULE: ICD-10-CM

## 2023-03-13 PROCEDURE — 99214 OFFICE O/P EST MOD 30 MIN: CPT | Performed by: INTERNAL MEDICINE

## 2023-03-13 PROCEDURE — 1126F AMNT PAIN NOTED NONE PRSNT: CPT | Performed by: INTERNAL MEDICINE

## 2023-03-21 NOTE — DISCHARGE INSTRUCTIONS
Procedure performed by Dr. Tera Becerra of [address] [place] [address]. DISCHARGE INSTRUCTIONS                                Call your physician immediately if you experience increased swelling in your face or                              neck, difficulty breathing, or difficulty swallowing. DO NOT TAKE aspirin-containing products, Ibuprofen, Vitamin E, or                              blood thinning products for three (3) days after the procedure. You may                             take Tylenol (1 or 2 tablets every 4-6 hours) for mild discomfort at the                             biopsy site. Report any bleeding at aspiration/biopsy site, redness,                             swelling, odor, discharge, pain or fever that does not lessen after one                              day, to your physician. Resume a regular diet. Call your physician with                             questions or test results. Also you may contact the Radiology Nurse                             at [phone] with any additional questions or concerns. Other: AVOID HOT LIQUIDS/FOOD FOR ONE HOUR. YOU MAY USE A COLD PACK IF NEEDED FOR COMFORT. BRING THIS SHEET WITH YOU SHOULD YOU HAVE TO VISIT AN EMERGENCY ROOM OR SEE YOUR DOCTOR IN THE NEXT 24 HOURS.

## 2023-03-25 ENCOUNTER — LAB ENCOUNTER (OUTPATIENT)
Dept: LAB | Age: 68
End: 2023-03-25
Attending: STUDENT IN AN ORGANIZED HEALTH CARE EDUCATION/TRAINING PROGRAM
Payer: MEDICARE

## 2023-03-25 DIAGNOSIS — Z11.59 SCREENING FOR VIRAL DISEASE: ICD-10-CM

## 2023-03-25 DIAGNOSIS — Z01.818 PREOPERATIVE CLEARANCE: ICD-10-CM

## 2023-03-26 LAB — SARS-COV-2 RNA RESP QL NAA+PROBE: NOT DETECTED

## 2023-03-28 ENCOUNTER — HOSPITAL ENCOUNTER (OUTPATIENT)
Dept: ULTRASOUND IMAGING | Facility: HOSPITAL | Age: 68
Discharge: HOME | End: 2023-03-28
Attending: STUDENT IN AN ORGANIZED HEALTH CARE EDUCATION/TRAINING PROGRAM
Payer: MEDICARE

## 2023-03-28 DIAGNOSIS — K11.8 PAROTID MASS: ICD-10-CM

## 2023-03-28 DIAGNOSIS — K11.8 MASS OF RIGHT PAROTID GLAND: ICD-10-CM

## 2023-03-28 PROCEDURE — 88172 CYTP DX EVAL FNA 1ST EA SITE: CPT | Performed by: STUDENT IN AN ORGANIZED HEALTH CARE EDUCATION/TRAINING PROGRAM

## 2023-03-28 PROCEDURE — 88177 CYTP FNA EVAL EA ADDL: CPT | Performed by: STUDENT IN AN ORGANIZED HEALTH CARE EDUCATION/TRAINING PROGRAM

## 2023-03-28 PROCEDURE — 76942 ECHO GUIDE FOR BIOPSY: CPT | Performed by: STUDENT IN AN ORGANIZED HEALTH CARE EDUCATION/TRAINING PROGRAM

## 2023-03-28 PROCEDURE — 42400 BIOPSY OF SALIVARY GLAND: CPT | Performed by: STUDENT IN AN ORGANIZED HEALTH CARE EDUCATION/TRAINING PROGRAM

## 2023-03-28 PROCEDURE — 88305 TISSUE EXAM BY PATHOLOGIST: CPT | Performed by: STUDENT IN AN ORGANIZED HEALTH CARE EDUCATION/TRAINING PROGRAM

## 2023-03-28 PROCEDURE — 88173 CYTOPATH EVAL FNA REPORT: CPT | Performed by: STUDENT IN AN ORGANIZED HEALTH CARE EDUCATION/TRAINING PROGRAM

## 2023-03-28 NOTE — IMAGING NOTE
1415  Pt arrived to ultrasound room     1415 Scans taken by [facility]  ultrasound  sonographer     1400 History taken and as follows:  FOUND NODULE ON PET SCAN RADOMLY . 1334 Procedure explained questions answered. PER CESAR CHAVEZ    [id number] Consent verified and obtained      1421  scans reviewed by Dr. Eloy Forbes     Site marked RIGHT PAROTID      1423 Time out taken      1425 Area cleaned sterile towels  to site. Pathology  was  notified. 1426 Lidocaine 1% 10 milligrams per ml  from kit  was given 2ml        FNA # 1 taken at  1427 with 22 g needle    FNA # 2 taken at 1428  with 22 g needle    FNA # 3 taken at  1432 with 22 g needle    1435 Procedure completed area re scanned . Area cleaned band aid to site ice pack to site. 1438  Post instructions given  verbal et written with AVS summary sheet provided to patient. Also instructed patient to refrain from drinking or eating anything hot for several hours after biopsy  to prevent increase bleeding from occurring. 1441  Pt  discharged .

## 2023-03-29 ENCOUNTER — TELEPHONE (OUTPATIENT)
Dept: OTOLARYNGOLOGY | Facility: CLINIC | Age: 68
End: 2023-03-29

## 2023-03-29 DIAGNOSIS — K11.8 PAROTID MASS: Primary | ICD-10-CM

## 2023-04-03 NOTE — TELEPHONE ENCOUNTER
Spoke to patient and appointment scheduled Monday 2/13 at 12:30 pm. Verified appointment details. Implemented All Fall Risk Interventions:  [place] to call system. Call bell, personal items and telephone within reach. Instruct patient to call for assistance. Room bathroom lighting operational. Non-slip footwear when patient is off stretcher. Physically safe environment: no spills, clutter or unnecessary equipment. Stretcher in lowest position, wheels locked, appropriate side rails in place. Provide visual cue, wrist band, yellow gown, etc. Monitor gait and stability. Monitor for mental status changes and reorient to person, place, and time. Review medications for side effects contributing to fall risk. Reinforce activity limits and safety measures with patient and family.

## 2023-04-04 ENCOUNTER — TELEPHONE (OUTPATIENT)
Dept: PULMONOLOGY | Facility: CLINIC | Age: 68
End: 2023-04-04

## 2023-04-21 ENCOUNTER — HOSPITAL ENCOUNTER (OUTPATIENT)
Dept: CT IMAGING | Age: 68
Discharge: HOME | End: 2023-04-21
Attending: INTERNAL MEDICINE
Payer: MEDICARE

## 2023-04-21 DIAGNOSIS — I70.0 CALCIFICATION OF AORTA (HCC): ICD-10-CM

## 2023-04-21 DIAGNOSIS — I77.9 DISORDER OF ARTERIES AND ARTERIOLES, UNSPECIFIED (HCC): ICD-10-CM

## 2023-04-21 DIAGNOSIS — I10 BENIGN ESSENTIAL HTN: ICD-10-CM

## 2023-04-21 LAB
CREAT BLD-MCNC: 0.9 MG/DL
GFR SERPLBLD BASED ON 1.73 SQ M-ARVRAT: 92 ML/MIN/1.73M2 (ref 60–?)

## 2023-04-21 PROCEDURE — 82565 ASSAY OF CREATININE: CPT

## 2023-04-21 PROCEDURE — 75635 CT ANGIO ABDOMINAL ARTERIES: CPT | Performed by: INTERNAL MEDICINE

## 2023-04-27 ENCOUNTER — OFFICE VISIT (OUTPATIENT)
Dept: ORTHOPEDICS CLINIC | Facility: CLINIC | Age: 68
End: 2023-04-27

## 2023-04-27 ENCOUNTER — HOSPITAL ENCOUNTER (OUTPATIENT)
Dept: GENERAL RADIOLOGY | Facility: HOSPITAL | Age: 68
Discharge: HOME | End: 2023-04-27
Attending: ORTHOPAEDIC SURGERY
Payer: MEDICARE

## 2023-04-27 DIAGNOSIS — M25.551 BILATERAL HIP PAIN: ICD-10-CM

## 2023-04-27 DIAGNOSIS — M25.552 BILATERAL HIP PAIN: ICD-10-CM

## 2023-04-27 DIAGNOSIS — M70.61 TROCHANTERIC BURSITIS OF BOTH HIPS: Primary | ICD-10-CM

## 2023-04-27 DIAGNOSIS — M70.62 TROCHANTERIC BURSITIS OF BOTH HIPS: Primary | ICD-10-CM

## 2023-04-27 PROCEDURE — 99204 OFFICE O/P NEW MOD 45 MIN: CPT | Performed by: ORTHOPAEDIC SURGERY

## 2023-04-27 PROCEDURE — 1126F AMNT PAIN NOTED NONE PRSNT: CPT | Performed by: ORTHOPAEDIC SURGERY

## 2023-04-27 PROCEDURE — 73523 X-RAY EXAM HIPS BI 5/> VIEWS: CPT | Performed by: ORTHOPAEDIC SURGERY

## 2023-05-17 ENCOUNTER — ORDER TRANSCRIPTION (OUTPATIENT)
Dept: ADMINISTRATIVE | Facility: HOSPITAL | Age: 68
End: 2023-05-17

## 2023-05-17 DIAGNOSIS — R06.02 SOB (SHORTNESS OF BREATH): Primary | ICD-10-CM

## 2023-05-22 ENCOUNTER — HOSPITAL ENCOUNTER (OUTPATIENT)
Dept: CT IMAGING | Age: 68
Discharge: HOME | End: 2023-05-22
Attending: INTERNAL MEDICINE
Payer: MEDICARE

## 2023-05-22 DIAGNOSIS — R91.1 LUNG NODULE: ICD-10-CM

## 2023-05-22 PROCEDURE — 71250 CT THORAX DX C-: CPT | Performed by: INTERNAL MEDICINE

## 2023-05-24 ENCOUNTER — TELEPHONE (OUTPATIENT)
Dept: PULMONOLOGY | Facility: CLINIC | Age: 68
End: 2023-05-24

## 2023-05-24 DIAGNOSIS — R91.1 LUNG NODULE: Primary | ICD-10-CM

## 2023-05-25 ENCOUNTER — OFFICE VISIT (OUTPATIENT)
Dept: ORTHOPEDICS CLINIC | Facility: CLINIC | Age: 68
End: 2023-05-25

## 2023-05-25 VITALS — HEART RATE: 100 BPM | SYSTOLIC BLOOD PRESSURE: 113 MMHG | DIASTOLIC BLOOD PRESSURE: 66 MMHG

## 2023-05-25 DIAGNOSIS — M70.62 TROCHANTERIC BURSITIS OF BOTH HIPS: Primary | ICD-10-CM

## 2023-05-25 DIAGNOSIS — M70.61 TROCHANTERIC BURSITIS OF BOTH HIPS: Primary | ICD-10-CM

## 2023-05-25 PROCEDURE — 1126F AMNT PAIN NOTED NONE PRSNT: CPT | Performed by: ORTHOPAEDIC SURGERY

## 2023-05-25 PROCEDURE — 20610 DRAIN/INJ JOINT/BURSA W/O US: CPT | Performed by: ORTHOPAEDIC SURGERY

## 2023-05-25 PROCEDURE — 99213 OFFICE O/P EST LOW 20 MIN: CPT | Performed by: ORTHOPAEDIC SURGERY

## 2023-05-25 RX ORDER — TRIAMCINOLONE ACETONIDE 40 MG/ML
40 INJECTION, SUSPENSION INTRA-ARTICULAR; INTRAMUSCULAR
Status: COMPLETED | OUTPATIENT
Start: 2023-05-25 | End: 2023-05-25

## 2023-05-25 RX ADMIN — TRIAMCINOLONE ACETONIDE 40 MG: 40 INJECTION, SUSPENSION INTRA-ARTICULAR; INTRAMUSCULAR at 16:14:00

## 2023-05-25 RX ADMIN — TRIAMCINOLONE ACETONIDE 40 MG: 40 INJECTION, SUSPENSION INTRA-ARTICULAR; INTRAMUSCULAR at 16:15:00

## 2023-05-25 NOTE — PROGRESS NOTES
Per verbal order from Dr. Birmingham Locket draw up 3ml of 0.5% Marcaine & 2ml 1% lidocaine and 1ml of Kenalog 40 for cortisone injection to bilateral hip. Leidy Owen    Patient provided education handout for cortisone injection.

## 2023-05-31 ENCOUNTER — OFFICE VISIT (OUTPATIENT)
Dept: PULMONOLOGY | Facility: CLINIC | Age: 68
End: 2023-05-31

## 2023-05-31 VITALS
SYSTOLIC BLOOD PRESSURE: 121 MMHG | DIASTOLIC BLOOD PRESSURE: 65 MMHG | RESPIRATION RATE: 16 BRPM | BODY MASS INDEX: 19 KG/M2 | OXYGEN SATURATION: 98 % | HEART RATE: 81 BPM | WEIGHT: 109 LBS

## 2023-05-31 DIAGNOSIS — J44.9 CHRONIC OBSTRUCTIVE PULMONARY DISEASE, UNSPECIFIED COPD TYPE (HCC): Primary | ICD-10-CM

## 2023-05-31 DIAGNOSIS — R91.1 LUNG NODULE: ICD-10-CM

## 2023-05-31 PROCEDURE — 1125F AMNT PAIN NOTED PAIN PRSNT: CPT | Performed by: INTERNAL MEDICINE

## 2023-05-31 PROCEDURE — 99214 OFFICE O/P EST MOD 30 MIN: CPT | Performed by: INTERNAL MEDICINE

## 2023-06-13 ENCOUNTER — HOSPITAL ENCOUNTER (OUTPATIENT)
Dept: CT IMAGING | Facility: HOSPITAL | Age: 68
Discharge: HOME | End: 2023-06-13
Attending: INTERNAL MEDICINE
Payer: MEDICARE

## 2023-06-13 VITALS
HEART RATE: 75 BPM | RESPIRATION RATE: 19 BRPM | SYSTOLIC BLOOD PRESSURE: 139 MMHG | DIASTOLIC BLOOD PRESSURE: 74 MMHG | HEIGHT: 64.5 IN | WEIGHT: 110 LBS | BODY MASS INDEX: 18.55 KG/M2

## 2023-06-13 DIAGNOSIS — R06.02 SOB (SHORTNESS OF BREATH): ICD-10-CM

## 2023-06-13 DIAGNOSIS — R91.1 LUNG NODULE: ICD-10-CM

## 2023-06-13 LAB
CREAT BLD-MCNC: 0.8 MG/DL
GFR SERPLBLD BASED ON 1.73 SQ M-ARVRAT: 96 ML/MIN/1.73M2 (ref 60–?)

## 2023-06-13 PROCEDURE — 82565 ASSAY OF CREATININE: CPT

## 2023-06-13 PROCEDURE — 71250 CT THORAX DX C-: CPT | Performed by: INTERNAL MEDICINE

## 2023-06-13 PROCEDURE — 0503T CTA FRACTIONAL FLOW RESERVE ANALYSIS (CPT=0503T/0502T): CPT | Performed by: INTERNAL MEDICINE

## 2023-06-13 PROCEDURE — 75574 CT ANGIO HRT W/3D IMAGE: CPT | Performed by: INTERNAL MEDICINE

## 2023-06-13 PROCEDURE — 0502T CTA FRACTIONAL FLOW RESERVE ANALYSIS (CPT=0503T/0502T): CPT | Performed by: INTERNAL MEDICINE

## 2023-06-13 RX ORDER — METOPROLOL TARTRATE 5 MG/5ML
5 INJECTION INTRAVENOUS SEE ADMIN INSTRUCTIONS
Status: DISCONTINUED | OUTPATIENT
Start: 2023-06-13 | End: 2023-06-15

## 2023-06-13 RX ORDER — DILTIAZEM HYDROCHLORIDE 5 MG/ML
INJECTION INTRAVENOUS
Status: DISCONTINUED
Start: 2023-06-13 | End: 2023-06-13

## 2023-06-13 RX ORDER — DILTIAZEM HYDROCHLORIDE 5 MG/ML
5 INJECTION INTRAVENOUS SEE ADMIN INSTRUCTIONS
Status: DISCONTINUED | OUTPATIENT
Start: 2023-06-13 | End: 2023-06-15

## 2023-06-13 RX ORDER — NITROGLYCERIN 0.4 MG/1
0.4 TABLET SUBLINGUAL ONCE
Status: COMPLETED | OUTPATIENT
Start: 2023-06-13 | End: 2023-06-13

## 2023-06-13 RX ORDER — METOPROLOL TARTRATE 50 MG/1
50 TABLET, FILM COATED ORAL AS DIRECTED
COMMUNITY
Start: 2023-05-18

## 2023-06-13 RX ORDER — METOPROLOL TARTRATE 5 MG/5ML
INJECTION INTRAVENOUS
Status: DISCONTINUED
Start: 2023-06-13 | End: 2023-06-13

## 2023-06-13 RX ADMIN — DILTIAZEM HYDROCHLORIDE 5 MG: 5 INJECTION INTRAVENOUS at 11:56:00

## 2023-06-13 RX ADMIN — NITROGLYCERIN 0.4 MG: 0.4 TABLET SUBLINGUAL at 12:15:00

## 2023-06-13 RX ADMIN — DILTIAZEM HYDROCHLORIDE 5 MG: 5 INJECTION INTRAVENOUS at 10:57:00

## 2023-06-13 RX ADMIN — DILTIAZEM HYDROCHLORIDE 5 MG: 5 INJECTION INTRAVENOUS at 11:47:00

## 2023-06-13 RX ADMIN — DILTIAZEM HYDROCHLORIDE 5 MG: 5 INJECTION INTRAVENOUS at 11:19:00

## 2023-06-13 RX ADMIN — METOPROLOL TARTRATE 5 MG: 5 INJECTION INTRAVENOUS at 10:50:00

## 2023-06-13 RX ADMIN — METOPROLOL TARTRATE 5 MG: 5 INJECTION INTRAVENOUS at 10:40:00

## 2023-06-13 RX ADMIN — DILTIAZEM HYDROCHLORIDE 5 MG: 5 INJECTION INTRAVENOUS at 11:14:00

## 2023-06-13 RX ADMIN — METOPROLOL TARTRATE 5 MG: 5 INJECTION INTRAVENOUS at 10:45:00

## 2023-06-13 RX ADMIN — DILTIAZEM HYDROCHLORIDE 5 MG: 5 INJECTION INTRAVENOUS at 11:09:00

## 2023-06-13 RX ADMIN — Medication 50 MG: at 09:47:00

## 2023-06-13 RX ADMIN — METOPROLOL TARTRATE 5 MG: 5 INJECTION INTRAVENOUS at 10:35:00

## 2023-06-13 RX ADMIN — DILTIAZEM HYDROCHLORIDE 5 MG: 5 INJECTION INTRAVENOUS at 11:03:00

## 2023-06-13 RX ADMIN — Medication 100 MG: at 08:50:00

## 2023-06-13 NOTE — IMAGING NOTE
TO RAD HOLDING AT 0825      HX TAKEN: ABNORMAL CARDIAC IMAGING    PT CONSENTED AT [facility] SIGNS HR 75  /74 BMI 18.6 / LB    CTA ORDERED BY ROE GARCIA MD; WAS PT GIVEN CTA  PREMEDS, YES METOPROLOL 50 MG X2, LAST DOSE 0600     METOPROLOL PO GIVEN 100 MILLIGRAMS  AT 0850    18 GAUGE IV STARTED AT 0855, POC TESTING COMPLETED GFR = 96 CREATINE = 0.8    0943 HR 69 /72, METOPROLOL PO GIVEN  50 MILLIGRAMS AT 0947    1026 HR 69 /71; PT ASSISTED UP TO BR    1034 HR 66 /69,  METOPROLOL 5 MILLIGRAMS GIVEN IV PUSH  SEE  PROTOCOL    1040 HR 66 /72, METOPROLOL 5 MILLIGRAMS GIVEN IV PUSH     1045 HR 69 /69, METOPROLOL 5 MILLIGRAMS GIVEN IV PUSH    1050 HR 66 /69, METOPROLOL 5 MILLIGRAMS  GIVEN IV PUSH    1055 HR 69 /72, CARDIZEM 5 MILLIGRAMS GIVEN IV PUSH AT 1057    1103 HR 65 /71, CARDIZEM 5 MILLIGRAMS GIVEN IV PUSH     1109 HR 65 /73, CARDIZEM 5 MILLIGRAMS  GIVEN IV PUSH     1114 HR 64 /66, CARDIZEM 5 MILLIGRAMS  GIVEN IV PUSH     1119 HR 62 /72, CARDIZEM 5 MILLIGRAMS  GIVEN IV PUSH     1126 HR 61 /67    1129 CALLED DR LONDONO, READING CARDIOLOGIST, REACHED VM    1131 SENT TEXT TO MD REQUESTING CALL    1141 CALL TO DR LONDONO, INFORMED OF ALL INFO, MD ORDERED TO GIVE 2-3 ADDITIONAL DOSES CARDIZEM 5 MG EVERY 5 MIN FOR HR >60, BUT MAY SCAN IF HR <65    1146 HR 62 /69, CARDIZEM 5 MG GIVEN IV PUSH    1156 HR 61 /73, CARDIZEM 5 MG GIVEN IV PUSH    1202  HR 58 /67    TO CT TABLE @ 1207    CONNECT TO MONITOR  VS HR 61 /61; CT TECH WILL DO CHEST CT PRIOR TO CTA      NITROGLYCERIN 0.4 MILLIGRAMS SUBLINGUAL GIVEN AT 1215     CALCIUM SCORE COMPLETED AT 1217     INJECTION STARTED AT 1221, HR 57 DURING SCAN, PROCEDURE COMPLETE    POST SCAN VS HR 58 /59 AT 1222    PT TO HOLDING AREA, VS HR 57 /65 AT 1229     AVS  PROVIDED      VS HR 58 /65  [address]

## 2023-06-27 ENCOUNTER — TELEPHONE (OUTPATIENT)
Dept: PHYSICAL MEDICINE AND REHAB | Facility: CLINIC | Age: 68
End: 2023-06-27

## 2023-06-27 ENCOUNTER — OFFICE VISIT (OUTPATIENT)
Dept: PHYSICAL MEDICINE AND REHAB | Facility: CLINIC | Age: 68
End: 2023-06-27
Payer: MEDICARE

## 2023-06-27 VITALS — WEIGHT: 110 LBS | HEIGHT: 64.5 IN | BODY MASS INDEX: 18.55 KG/M2

## 2023-06-27 DIAGNOSIS — M70.61 GREATER TROCHANTERIC BURSITIS OF BOTH HIPS: Primary | ICD-10-CM

## 2023-06-27 DIAGNOSIS — M70.62 GREATER TROCHANTERIC BURSITIS OF BOTH HIPS: Primary | ICD-10-CM

## 2023-06-27 PROCEDURE — 99204 OFFICE O/P NEW MOD 45 MIN: CPT | Performed by: PHYSICAL MEDICINE & REHABILITATION

## 2023-06-27 PROCEDURE — 1125F AMNT PAIN NOTED PAIN PRSNT: CPT | Performed by: PHYSICAL MEDICINE & REHABILITATION

## 2023-06-27 PROCEDURE — 20611 DRAIN/INJ JOINT/BURSA W/US: CPT | Performed by: PHYSICAL MEDICINE & REHABILITATION

## 2023-06-27 RX ORDER — TRIAMCINOLONE ACETONIDE 40 MG/ML
80 INJECTION, SUSPENSION INTRA-ARTICULAR; INTRAMUSCULAR ONCE
Status: DISCONTINUED | OUTPATIENT
Start: 2023-06-27 | End: 2023-06-27

## 2023-06-27 RX ORDER — LIDOCAINE HYDROCHLORIDE 10 MG/ML
14 INJECTION, SOLUTION INFILTRATION; PERINEURAL ONCE
Status: DISCONTINUED | OUTPATIENT
Start: 2023-06-27 | End: 2023-06-27

## 2023-06-27 RX ADMIN — TRIAMCINOLONE ACETONIDE 80 MG: 40 INJECTION, SUSPENSION INTRA-ARTICULAR; INTRAMUSCULAR at 14:55:00

## 2023-06-27 RX ADMIN — LIDOCAINE HYDROCHLORIDE 14 ML: 10 INJECTION, SOLUTION INFILTRATION; PERINEURAL at 14:55:00

## 2023-06-29 ENCOUNTER — LAB ENCOUNTER (OUTPATIENT)
Dept: LAB | Age: 68
End: 2023-06-29
Attending: FAMILY MEDICINE
Payer: MEDICARE

## 2023-06-29 ENCOUNTER — TELEPHONE (OUTPATIENT)
Dept: FAMILY MEDICINE CLINIC | Facility: CLINIC | Age: 68
End: 2023-06-29

## 2023-06-29 ENCOUNTER — OFFICE VISIT (OUTPATIENT)
Dept: FAMILY MEDICINE CLINIC | Facility: CLINIC | Age: 68
End: 2023-06-29

## 2023-06-29 ENCOUNTER — NURSE TRIAGE (OUTPATIENT)
Dept: FAMILY MEDICINE CLINIC | Facility: CLINIC | Age: 68
End: 2023-06-29

## 2023-06-29 VITALS
HEART RATE: 88 BPM | HEIGHT: 64.5 IN | TEMPERATURE: 97 F | BODY MASS INDEX: 17.37 KG/M2 | WEIGHT: 103 LBS | DIASTOLIC BLOOD PRESSURE: 68 MMHG | SYSTOLIC BLOOD PRESSURE: 125 MMHG

## 2023-06-29 DIAGNOSIS — E87.1 HYPONATREMIA: ICD-10-CM

## 2023-06-29 DIAGNOSIS — M25.472 ANKLE EDEMA, BILATERAL: Primary | ICD-10-CM

## 2023-06-29 DIAGNOSIS — R26.81 UNSTEADINESS: ICD-10-CM

## 2023-06-29 DIAGNOSIS — I10 ESSENTIAL HYPERTENSION: ICD-10-CM

## 2023-06-29 DIAGNOSIS — M25.472 ANKLE EDEMA, BILATERAL: ICD-10-CM

## 2023-06-29 DIAGNOSIS — L60.2 NAIL THICKENING: ICD-10-CM

## 2023-06-29 DIAGNOSIS — F17.200 TOBACCO USE DISORDER: ICD-10-CM

## 2023-06-29 DIAGNOSIS — R91.1 PULMONARY NODULE: Primary | ICD-10-CM

## 2023-06-29 DIAGNOSIS — F10.10 ALCOHOL ABUSE: ICD-10-CM

## 2023-06-29 DIAGNOSIS — M25.471 ANKLE EDEMA, BILATERAL: Primary | ICD-10-CM

## 2023-06-29 DIAGNOSIS — R29.898 LEG WEAKNESS, BILATERAL: ICD-10-CM

## 2023-06-29 DIAGNOSIS — M25.471 ANKLE EDEMA, BILATERAL: ICD-10-CM

## 2023-06-29 DIAGNOSIS — R14.2 ERUCTATION: ICD-10-CM

## 2023-06-29 LAB
ALT SERPL-CCNC: 33 U/L
ANION GAP SERPL CALC-SCNC: 8 MMOL/L (ref 0–18)
AST SERPL-CCNC: 27 U/L (ref 15–37)
BASOPHILS # BLD AUTO: 0.02 X10(3) UL (ref 0–0.2)
BASOPHILS NFR BLD AUTO: 0.1 %
BUN BLD-MCNC: 9 MG/DL (ref 7–18)
CALCIUM BLD-MCNC: 10.2 MG/DL (ref 8.5–10.1)
CHLORIDE SERPL-SCNC: 86 MMOL/L (ref 98–112)
CO2 SERPL-SCNC: 28 MMOL/L (ref 21–32)
CREAT BLD-MCNC: 0.72 MG/DL
EOSINOPHIL # BLD AUTO: 0.01 X10(3) UL (ref 0–0.7)
EOSINOPHIL NFR BLD AUTO: 0.1 %
ERYTHROCYTE [DISTWIDTH] IN BLOOD BY AUTOMATED COUNT: 13 %
FASTING STATUS PATIENT QL REPORTED: NO
FOLATE SERPL-MCNC: 39.2 NG/ML (ref 8.7–?)
GFR SERPLBLD BASED ON 1.73 SQ M-ARVRAT: 99 ML/MIN/1.73M2 (ref 60–?)
GGT SERPL-CCNC: 36 U/L
GLUCOSE BLD-MCNC: 103 MG/DL (ref 70–99)
HCT VFR BLD AUTO: 37.4 %
HGB BLD-MCNC: 13.5 G/DL
IMM GRANULOCYTES # BLD AUTO: 0.29 X10(3) UL (ref 0–1)
IMM GRANULOCYTES NFR BLD: 2 %
LYMPHOCYTES # BLD AUTO: 0.5 X10(3) UL (ref 1–4)
LYMPHOCYTES NFR BLD AUTO: 3.4 %
MCH RBC QN AUTO: 31.1 PG (ref 26–34)
MCHC RBC AUTO-ENTMCNC: 36.1 G/DL (ref 31–37)
MCV RBC AUTO: 86.2 FL
MONOCYTES # BLD AUTO: 1.3 X10(3) UL (ref 0.1–1)
MONOCYTES NFR BLD AUTO: 8.9 %
NEUTROPHILS # BLD AUTO: 12.42 X10 (3) UL (ref 1.5–7.7)
NEUTROPHILS # BLD AUTO: 12.42 X10(3) UL (ref 1.5–7.7)
NEUTROPHILS NFR BLD AUTO: 85.5 %
OSMOLALITY SERPL CALC.SUM OF ELEC: 253 MOSM/KG (ref 275–295)
PLATELET # BLD AUTO: 430 10(3)UL (ref 150–450)
POTASSIUM SERPL-SCNC: 4.6 MMOL/L (ref 3.5–5.1)
RBC # BLD AUTO: 4.34 X10(6)UL
SODIUM SERPL-SCNC: 122 MMOL/L (ref 136–145)
VIT B12 SERPL-MCNC: 656 PG/ML (ref 193–986)
WBC # BLD AUTO: 14.5 X10(3) UL (ref 4–11)

## 2023-06-29 PROCEDURE — 82607 VITAMIN B-12: CPT

## 2023-06-29 PROCEDURE — 80048 BASIC METABOLIC PNL TOTAL CA: CPT

## 2023-06-29 PROCEDURE — 36415 COLL VENOUS BLD VENIPUNCTURE: CPT

## 2023-06-29 PROCEDURE — 82746 ASSAY OF FOLIC ACID SERUM: CPT

## 2023-06-29 PROCEDURE — 84450 TRANSFERASE (AST) (SGOT): CPT

## 2023-06-29 PROCEDURE — 84460 ALANINE AMINO (ALT) (SGPT): CPT

## 2023-06-29 PROCEDURE — 82977 ASSAY OF GGT: CPT

## 2023-06-29 PROCEDURE — 85025 COMPLETE CBC W/AUTO DIFF WBC: CPT

## 2023-06-29 PROCEDURE — 99215 OFFICE O/P EST HI 40 MIN: CPT | Performed by: FAMILY MEDICINE

## 2023-06-29 RX ORDER — OLMESARTAN MEDOXOMIL 40 MG/1
40 TABLET ORAL DAILY
Qty: 90 TABLET | Refills: 1 | Status: SHIPPED | OUTPATIENT
Start: 2023-06-29

## 2023-06-29 RX ORDER — BUPROPION HYDROCHLORIDE 150 MG/1
150 TABLET, EXTENDED RELEASE ORAL 2 TIMES DAILY
Qty: 180 TABLET | Refills: 0 | Status: SHIPPED | OUTPATIENT
Start: 2023-06-29

## 2023-06-30 ENCOUNTER — HOSPITAL ENCOUNTER (OUTPATIENT)
Facility: HOSPITAL | Age: 68
Setting detail: OBSERVATION
LOS: 1 days | Discharge: HOME | End: 2023-07-01
Attending: EMERGENCY MEDICINE | Admitting: HOSPITALIST
Payer: MEDICARE

## 2023-06-30 DIAGNOSIS — E87.1 HYPONATREMIA: Primary | ICD-10-CM

## 2023-06-30 LAB
ALBUMIN SERPL-MCNC: 4.2 G/DL (ref 3.4–5)
ALBUMIN/GLOB SERPL: 1.2 {RATIO} (ref 1–2)
ALP LIVER SERPL-CCNC: 49 U/L
ALT SERPL-CCNC: 36 U/L
ANION GAP SERPL CALC-SCNC: 11 MMOL/L (ref 0–18)
AST SERPL-CCNC: 25 U/L (ref 15–37)
BASOPHILS # BLD AUTO: 0.03 X10(3) UL (ref 0–0.2)
BASOPHILS NFR BLD AUTO: 0.2 %
BILIRUB SERPL-MCNC: 0.4 MG/DL (ref 0.1–2)
BUN BLD-MCNC: 9 MG/DL (ref 7–18)
BUN/CREAT SERPL: 13.2 (ref 10–20)
CALCIUM BLD-MCNC: 9.7 MG/DL (ref 8.5–10.1)
CHLORIDE SERPL-SCNC: 91 MMOL/L (ref 98–112)
CO2 SERPL-SCNC: 24 MMOL/L (ref 21–32)
CREAT BLD-MCNC: 0.68 MG/DL
DEPRECATED RDW RBC AUTO: 43.4 FL (ref 35.1–46.3)
EOSINOPHIL # BLD AUTO: 0.01 X10(3) UL (ref 0–0.7)
EOSINOPHIL NFR BLD AUTO: 0.1 %
ERYTHROCYTE [DISTWIDTH] IN BLOOD BY AUTOMATED COUNT: 13.3 % (ref 11–15)
GFR SERPLBLD BASED ON 1.73 SQ M-ARVRAT: 101 ML/MIN/1.73M2 (ref 60–?)
GLOBULIN PLAS-MCNC: 3.6 G/DL (ref 2.8–4.4)
GLUCOSE BLD-MCNC: 137 MG/DL (ref 70–99)
HCT VFR BLD AUTO: 40.1 %
HGB BLD-MCNC: 14.3 G/DL
IMM GRANULOCYTES # BLD AUTO: 0.28 X10(3) UL (ref 0–1)
IMM GRANULOCYTES NFR BLD: 2.2 %
LYMPHOCYTES # BLD AUTO: 0.56 X10(3) UL (ref 1–4)
LYMPHOCYTES NFR BLD AUTO: 4.4 %
MCH RBC QN AUTO: 31.5 PG (ref 26–34)
MCHC RBC AUTO-ENTMCNC: 35.7 G/DL (ref 31–37)
MCV RBC AUTO: 88.3 FL
MONOCYTES # BLD AUTO: 1.04 X10(3) UL (ref 0.1–1)
MONOCYTES NFR BLD AUTO: 8.1 %
NEUTROPHILS # BLD AUTO: 10.91 X10 (3) UL (ref 1.5–7.7)
NEUTROPHILS # BLD AUTO: 10.91 X10(3) UL (ref 1.5–7.7)
NEUTROPHILS NFR BLD AUTO: 85 %
OSMOLALITY SERPL CALC.SUM OF ELEC: 263 MOSM/KG (ref 275–295)
PLATELET # BLD AUTO: 402 10(3)UL (ref 150–450)
POTASSIUM SERPL-SCNC: 3.6 MMOL/L (ref 3.5–5.1)
PROT SERPL-MCNC: 7.8 G/DL (ref 6.4–8.2)
RBC # BLD AUTO: 4.54 X10(6)UL
SODIUM SERPL-SCNC: 126 MMOL/L (ref 136–145)
TSI SER-ACNC: 1.33 MIU/ML (ref 0.36–3.74)
WBC # BLD AUTO: 12.8 X10(3) UL (ref 4–11)

## 2023-06-30 PROCEDURE — 99222 1ST HOSP IP/OBS MODERATE 55: CPT | Performed by: HOSPITALIST

## 2023-06-30 RX ORDER — LEVOTHYROXINE SODIUM 0.07 MG/1
75 TABLET ORAL
Status: DISCONTINUED | OUTPATIENT
Start: 2023-07-01 | End: 2023-07-01

## 2023-06-30 RX ORDER — FLUTICASONE FUROATE AND VILANTEROL 200; 25 UG/1; UG/1
1 POWDER RESPIRATORY (INHALATION) NIGHTLY
Status: DISCONTINUED | OUTPATIENT
Start: 2023-06-30 | End: 2023-06-30

## 2023-06-30 RX ORDER — TEMAZEPAM 15 MG/1
15 CAPSULE ORAL NIGHTLY PRN
Status: DISCONTINUED | OUTPATIENT
Start: 2023-06-30 | End: 2023-07-01

## 2023-06-30 RX ORDER — ONDANSETRON 2 MG/ML
4 INJECTION INTRAMUSCULAR; INTRAVENOUS EVERY 6 HOURS PRN
Status: DISCONTINUED | OUTPATIENT
Start: 2023-06-30 | End: 2023-07-01

## 2023-06-30 RX ORDER — HEPARIN SODIUM 5000 [USP'U]/ML
5000 INJECTION, SOLUTION INTRAVENOUS; SUBCUTANEOUS EVERY 12 HOURS SCHEDULED
Status: DISCONTINUED | OUTPATIENT
Start: 2023-06-30 | End: 2023-07-01

## 2023-06-30 RX ORDER — SODIUM CHLORIDE 9 MG/ML
INJECTION, SOLUTION INTRAVENOUS CONTINUOUS
Status: DISCONTINUED | OUTPATIENT
Start: 2023-06-30 | End: 2023-07-01

## 2023-06-30 RX ORDER — FLUTICASONE FUROATE AND VILANTEROL 200; 25 UG/1; UG/1
1 POWDER RESPIRATORY (INHALATION)
Status: DISCONTINUED | OUTPATIENT
Start: 2023-07-01 | End: 2023-07-01

## 2023-06-30 RX ORDER — BUPROPION HYDROCHLORIDE 150 MG/1
150 TABLET, EXTENDED RELEASE ORAL 2 TIMES DAILY
Status: DISCONTINUED | OUTPATIENT
Start: 2023-06-30 | End: 2023-07-01

## 2023-06-30 RX ORDER — ATORVASTATIN CALCIUM 10 MG/1
10 TABLET, FILM COATED ORAL NIGHTLY
Status: DISCONTINUED | OUTPATIENT
Start: 2023-06-30 | End: 2023-07-01

## 2023-06-30 RX ORDER — NICOTINE 21 MG/24HR
1 PATCH, TRANSDERMAL 24 HOURS TRANSDERMAL DAILY
Status: DISCONTINUED | OUTPATIENT
Start: 2023-06-30 | End: 2023-07-01

## 2023-06-30 RX ORDER — LOSARTAN POTASSIUM 100 MG/1
100 TABLET ORAL DAILY
Status: DISCONTINUED | OUTPATIENT
Start: 2023-07-01 | End: 2023-07-01

## 2023-06-30 RX ORDER — PANTOPRAZOLE SODIUM 40 MG/1
40 TABLET, DELAYED RELEASE ORAL
Status: DISCONTINUED | OUTPATIENT
Start: 2023-07-01 | End: 2023-07-01

## 2023-06-30 RX ORDER — METOCLOPRAMIDE HYDROCHLORIDE 5 MG/ML
10 INJECTION INTRAMUSCULAR; INTRAVENOUS EVERY 8 HOURS PRN
Status: DISCONTINUED | OUTPATIENT
Start: 2023-06-30 | End: 2023-07-01

## 2023-06-30 RX ORDER — ACETAMINOPHEN 500 MG
500 TABLET ORAL EVERY 4 HOURS PRN
Status: DISCONTINUED | OUTPATIENT
Start: 2023-06-30 | End: 2023-07-01

## 2023-06-30 RX ORDER — FINASTERIDE 5 MG/1
5 TABLET, FILM COATED ORAL DAILY
Status: DISCONTINUED | OUTPATIENT
Start: 2023-07-01 | End: 2023-07-01

## 2023-06-30 RX ORDER — ALBUTEROL SULFATE 90 UG/1
2 AEROSOL, METERED RESPIRATORY (INHALATION) EVERY 6 HOURS PRN
Status: DISCONTINUED | OUTPATIENT
Start: 2023-06-30 | End: 2023-07-01

## 2023-06-30 NOTE — ED INITIAL ASSESSMENT (HPI)
Patient arrives through triage with c/o of abnormal labs. Patient states that he was told by his Dr. Edda Chávez he has an abnormal sodium level.

## 2023-06-30 NOTE — H&P
[facility]    PATIENT'S NAME: Philomena Calvert   ATTENDING PHYSICIAN: Conchita Ganser, MD   PATIENT ACCOUNT#:   [de-identified]    LOCATION:  [address]  MEDICAL RECORD #:   [id number]       YOB: 1955  ADMISSION DATE:       06/30/2023    HISTORY AND PHYSICAL EXAMINATION    CHIEF COMPLAINT:  Hypo-osmolar hyponatremia and generalized weakness. HISTORY OF PRESENT ILLNESS:  Patient is a 55-year-old  male who has been feeling weaker in the last 2 to 3 days and was seen by his primary care physician, had an outpatient blood test yesterday which showed 122 and calculated osmolality of 253. Today he continued to feel weak and he was sent to the emergency department for evaluation. Repeat chemistry showed sodium 126, calculated osmolality of 263. White blood cell count of 12.8. Patient will be admitted to the hospital for further management. PAST MEDICAL HISTORY:  Patient had history of chronic obstructive pulmonary disease, gastroesophageal reflux disease, hypothyroidism, osteoarthritis, hyperlipidemia, hypertension, hiatal hernia. He had history of benign prostate hypertrophy, lung nodules showing stability on imaging studies including PET scan. PAST SURGICAL HISTORY:  Transurethral resection of bladder tumor with mitomycin instillation for bladder cancer. MEDICATIONS:  Please see medication reconciliation list.      ALLERGIES:  No known drug allergies. FAMILY HISTORY:  Mother with dementia. Sister with lung cancer. SOCIAL HISTORY:  Smokes 1 pack a day. Drinks 6 to 8 beers on a daily basis. No drug use. Lives with his family. Independent in his basic activities of daily living. REVIEW OF SYSTEMS:  Patient reports generalized progressive weakness in the last week or so. He feels his legs are shaking and sometimes would give out. He denies any fever or chills. No wheezing. No cough.   Patient reported that he drinks large amount of water in the morning as a habit because he was told water is good for him. He also drinks 6 to 8 beers on a daily basis. He has been drinking more beer recently when the weather got better and he starts going outside. He reports poor oral intake in terms of food. PHYSICAL EXAMINATION:    GENERAL:  Alert and oriented to time, place, and person. No acute distress, fatigued. VITAL SIGNS:  Temperature 98.5, pulse 90, respiratory rate 18, blood pressure 114/64, pulse ox 97% on room air. HEENT:  Atraumatic. Oropharynx clear. Moist mucous membranes. Normal hard and soft palate. Eyes:  Anicteric sclerae. Pupils equal, round, reactive. NECK:  Supple. No lymphadenopathy. Trachea midline. Full range of motion. LUNGS:  Clear to auscultation bilaterally. Normal respiratory effort. HEART:  Regular rate, rhythm. S1 and S2 auscultated. No murmur. ABDOMEN:  Soft, nondistended. No tenderness. Positive bowel sounds. EXTREMITIES:  No edema, clubbing, or cyanosis. NEUROLOGIC:  Motor and sensory intact. ASSESSMENT AND PLAN:  Hypo-osmolar hyponatremia with generalized weakness, multifactorial secondary to combination of chronic lung disease. Could be a component of mild SIADH. Poor calorie intake and large amount of free water and beer consumption on a daily basis. Patient will be admitted to general medical floor, remote telemetry. Restrict free water to 1200 mL daily. Gentle hydration with normal saline and monitor his electrolytes. Obtain TSH with reflex T4. Fall precautions. Patient had a recent CT coronary angiogram with was negative for obstructive disease. Further recommendations to follow.      Dictated By Lance Montes MD  d: 06/30/2023 16:52:26  t: 06/30/2023 17:47:27  Job [id number]  YZ/

## 2023-06-30 NOTE — PLAN OF CARE
Received patient from ED, Vital signs stable with no complaints of pain. Oriented to unit and assessments completed. Assisted with ordering meal and sitting in chair comfortably with fall precautions in place and call light within reach.

## 2023-06-30 NOTE — ED QUICK NOTES
Orders for admission, patient is aware of plan and ready to go upstairs. Any questions, please call ED RN MELODY at extension [phone].      Patient Covid vaccination status: Fully vaccinated     COVID Test Ordered in ED: None    COVID Suspicion at Admission: N/A    Running Infusions:  None    Mental Status/LOC at time of transport: A & O X 4    Other pertinent information:   CIWA score: N/A   NIH score:  N/A

## 2023-07-01 VITALS
BODY MASS INDEX: 18 KG/M2 | TEMPERATURE: 99 F | HEART RATE: 85 BPM | RESPIRATION RATE: 18 BRPM | DIASTOLIC BLOOD PRESSURE: 69 MMHG | OXYGEN SATURATION: 96 % | SYSTOLIC BLOOD PRESSURE: 137 MMHG | WEIGHT: 106.63 LBS

## 2023-07-01 LAB
ANION GAP SERPL CALC-SCNC: 7 MMOL/L (ref 0–18)
BASOPHILS # BLD AUTO: 0.02 X10(3) UL (ref 0–0.2)
BASOPHILS NFR BLD AUTO: 0.2 %
BUN BLD-MCNC: 9 MG/DL (ref 7–18)
BUN/CREAT SERPL: 15.3 (ref 10–20)
CALCIUM BLD-MCNC: 8.8 MG/DL (ref 8.5–10.1)
CHLORIDE SERPL-SCNC: 97 MMOL/L (ref 98–112)
CO2 SERPL-SCNC: 28 MMOL/L (ref 21–32)
CREAT BLD-MCNC: 0.59 MG/DL
DEPRECATED RDW RBC AUTO: 43.1 FL (ref 35.1–46.3)
EOSINOPHIL # BLD AUTO: 0.02 X10(3) UL (ref 0–0.7)
EOSINOPHIL NFR BLD AUTO: 0.2 %
ERYTHROCYTE [DISTWIDTH] IN BLOOD BY AUTOMATED COUNT: 13.3 % (ref 11–15)
GFR SERPLBLD BASED ON 1.73 SQ M-ARVRAT: 105 ML/MIN/1.73M2 (ref 60–?)
GLUCOSE BLD-MCNC: 108 MG/DL (ref 70–99)
HCT VFR BLD AUTO: 32.8 %
HGB BLD-MCNC: 11.6 G/DL
IMM GRANULOCYTES # BLD AUTO: 0.21 X10(3) UL (ref 0–1)
IMM GRANULOCYTES NFR BLD: 2.3 %
LYMPHOCYTES # BLD AUTO: 0.55 X10(3) UL (ref 1–4)
LYMPHOCYTES NFR BLD AUTO: 6.2 %
MCH RBC QN AUTO: 31 PG (ref 26–34)
MCHC RBC AUTO-ENTMCNC: 35.4 G/DL (ref 31–37)
MCV RBC AUTO: 87.7 FL
MONOCYTES # BLD AUTO: 0.99 X10(3) UL (ref 0.1–1)
MONOCYTES NFR BLD AUTO: 11.1 %
NEUTROPHILS # BLD AUTO: 7.15 X10 (3) UL (ref 1.5–7.7)
NEUTROPHILS # BLD AUTO: 7.15 X10(3) UL (ref 1.5–7.7)
NEUTROPHILS NFR BLD AUTO: 80 %
OSMOLALITY SERPL CALC.SUM OF ELEC: 273 MOSM/KG (ref 275–295)
PLATELET # BLD AUTO: 322 10(3)UL (ref 150–450)
POTASSIUM SERPL-SCNC: 4 MMOL/L (ref 3.5–5.1)
RBC # BLD AUTO: 3.74 X10(6)UL
SODIUM SERPL-SCNC: 132 MMOL/L (ref 136–145)
WBC # BLD AUTO: 8.9 X10(3) UL (ref 4–11)

## 2023-07-01 PROCEDURE — 99239 HOSP IP/OBS DSCHRG MGMT >30: CPT | Performed by: HOSPITALIST

## 2023-07-01 NOTE — PLAN OF CARE
Medications given per MAR. Patient walking halls with PCT. Patient still a little shaky and unsteady on feet. Patient reports feeling stronger then yesterday. Will continue to monitor and follow plan of care. Problem: SAFETY ADULT - FALL  Goal: Free from fall injury  Description: INTERVENTIONS:  - Assess pt frequently for physical needs  - Identify cognitive and physical deficits and behaviors that affect risk of falls.   - [place] fall precautions as indicated by assessment.  - Educate pt/family on patient safety including physical limitations  - Instruct pt to call for assistance with activity based on assessment  - Modify environment to reduce risk of injury  - Provide assistive devices as appropriate  - Consider OT/PT consult to assist with strengthening/mobility  - Encourage toileting schedule  Outcome: Progressing     Problem: DISCHARGE PLANNING  Goal: Discharge to home or other facility with appropriate resources  Description: INTERVENTIONS:  - Identify barriers to discharge w/pt and caregiver  - Include patient/family/discharge partner in discharge planning  - Arrange for needed discharge resources and transportation as appropriate  - Identify discharge learning needs (meds, wound care, etc)  - Arrange for interpreters to assist at discharge as needed  - Consider post-discharge preferences of patient/family/discharge partner  - Complete POLST form as appropriate  - Assess patient's ability to be responsible for managing their own health  - Refer to Case Management Department for coordinating discharge planning if the patient needs post-hospital services based on physician/LIP order or complex needs related to functional status, cognitive ability or social support system  Outcome: Progressing     Problem: Patient/Family Goals  Goal: Patient/Family Long Term Goal  Description: Patient's Long Term Goal: to be discharged    Interventions:  - monitor labs  Monitor vital signs  Follow MD order  Administer medications as ordered  Update patient on plan of care  - See additional Care Plan goals for specific interventions  Outcome: Progressing  Goal: Patient/Family Short Term Goal  Description: Patient's Short Term Goal: \"I want my sodium level to improve so I can go home. \"    Interventions:   - monitor labs  Monitor vital signs  Follow MD order  Administer medications as ordered  Update patient on plan of care  - See additional Care Plan goals for specific interventions  Outcome: Progressing     Problem: Patient Centered Care  Goal: Patient preferences are identified and integrated in the patient's plan of care  Description: Interventions:  - What would you like us to know as we care for you?  Lives at home with wife  - Provide timely, complete, and accurate information to patient/family  - Incorporate patient and family knowledge, values, beliefs, and cultural backgrounds into the planning and delivery of care  - Encourage patient/family to participate in care and decision-making at the level they choose  - Honor patient and family perspectives and choices  Outcome: Progressing

## 2023-07-01 NOTE — DISCHARGE INSTRUCTIONS
Stop drinking beer. Moderate water intake. Have your sodium level checked by your primary care physician in about 1 week.

## 2023-07-01 NOTE — PLAN OF CARE
Patient up with standby assist to bathroom, weakness improving. Fall precautions in place and safety maintained. Call light and all belongings within reach, patient able to make needs known. Frequent rounding done by nursing staff. Problem: DISCHARGE PLANNING  Goal: Discharge to home or other facility with appropriate resources  Description: INTERVENTIONS:  - Identify barriers to discharge w/pt and caregiver  - Include patient/family/discharge partner in discharge planning  - Arrange for needed discharge resources and transportation as appropriate  - Identify discharge learning needs (meds, wound care, etc)  - Arrange for interpreters to assist at discharge as needed  - Consider post-discharge preferences of patient/family/discharge partner  - Complete POLST form as appropriate  - Assess patient's ability to be responsible for managing their own health  - Refer to Case Management Department for coordinating discharge planning if the patient needs post-hospital services based on physician/LIP order or complex needs related to functional status, cognitive ability or social support system  Outcome: Progressing     Problem: Patient/Family Goals  Goal: Patient/Family Long Term Goal  Description: Patient's Long Term Goal: to be discharged    Interventions:  - monitor labs  Monitor vital signs  Follow MD order  Administer medications as ordered  Update patient on plan of care  - See additional Care Plan goals for specific interventions  Outcome: Progressing  Goal: Patient/Family Short Term Goal  Description: Patient's Short Term Goal: \"I want my sodium level to improve so I can go home. \"    Interventions:   - monitor labs  Monitor vital signs  Follow MD order  Administer medications as ordered  Update patient on plan of care  - See additional Care Plan goals for specific interventions  Outcome: Progressing     Problem: Patient Centered Care  Goal: Patient preferences are identified and integrated in the patient's plan of care  Description: Interventions:  - What would you like us to know as we care for you?  Lives at home with wife  - Provide timely, complete, and accurate information to patient/family  - Incorporate patient and family knowledge, values, beliefs, and cultural backgrounds into the planning and delivery of care  - Encourage patient/family to participate in care and decision-making at the level they choose  - Honor patient and family perspectives and choices  Outcome: Progressing

## 2023-07-01 NOTE — PLAN OF CARE
Printed discharge instructions provided. IV removed. Patient and spouse expressed understanding. Patient has outpatient PT already set up. Patient walked with PT prior to discharge. Patient ate all lunch. Problem: SAFETY ADULT - FALL  Goal: Free from fall injury  Description: INTERVENTIONS:  - Assess pt frequently for physical needs  - Identify cognitive and physical deficits and behaviors that affect risk of falls.   - [place] fall precautions as indicated by assessment.  - Educate pt/family on patient safety including physical limitations  - Instruct pt to call for assistance with activity based on assessment  - Modify environment to reduce risk of injury  - Provide assistive devices as appropriate  - Consider OT/PT consult to assist with strengthening/mobility  - Encourage toileting schedule  7/1/2023 1237 by Nora Watson  Outcome: Adequate for Discharge  7/1/2023 1015 by Nora Watson  Outcome: Progressing     Problem: DISCHARGE PLANNING  Goal: Discharge to home or other facility with appropriate resources  Description: INTERVENTIONS:  - Identify barriers to discharge w/pt and caregiver  - Include patient/family/discharge partner in discharge planning  - Arrange for needed discharge resources and transportation as appropriate  - Identify discharge learning needs (meds, wound care, etc)  - Arrange for interpreters to assist at discharge as needed  - Consider post-discharge preferences of patient/family/discharge partner  - Complete POLST form as appropriate  - Assess patient's ability to be responsible for managing their own health  - Refer to Case Management Department for coordinating discharge planning if the patient needs post-hospital services based on physician/LIP order or complex needs related to functional status, cognitive ability or social support system  7/1/2023 1237 by Nora Watson  Outcome: Adequate for Discharge  7/1/2023 1015 by Nora Watson  Outcome: Progressing     Problem: Patient/Family Goals  Goal: Patient/Family Long Term Goal  Description: Patient's Long Term Goal: to be discharged    Interventions:  - monitor labs  Monitor vital signs  Follow MD order  Administer medications as ordered  Update patient on plan of care  - See additional Care Plan goals for specific interventions  7/1/2023 1237 by Nora Yepez  Outcome: Adequate for Discharge  7/1/2023 1015 by Nora Yepez  Outcome: Progressing  Goal: Patient/Family Short Term Goal  Description: Patient's Short Term Goal: \"I want my sodium level to improve so I can go home. \"    Interventions:   - monitor labs  Monitor vital signs  Follow MD order  Administer medications as ordered  Update patient on plan of care  - See additional Care Plan goals for specific interventions  7/1/2023 1237 by Nora Yepez  Outcome: Adequate for Discharge  7/1/2023 1015 by Nora Yepez  Outcome: Progressing     Problem: Patient Centered Care  Goal: Patient preferences are identified and integrated in the patient's plan of care  Description: Interventions:  - What would you like us to know as we care for you?  Lives at home with wife  - Provide timely, complete, and accurate information to patient/family  - Incorporate patient and family knowledge, values, beliefs, and cultural backgrounds into the planning and delivery of care  - Encourage patient/family to participate in care and decision-making at the level they choose  - Honor patient and family perspectives and choices  7/1/2023 1237 by Nora Ypeez  Outcome: Adequate for Discharge  7/1/2023 1015 by Nora Yepez  Outcome: Progressing

## 2023-07-01 NOTE — PROGRESS NOTES
06/30/23 2016   Clinical Encounter Type   Visited With Patient   Routine Visit Introduction   Referral From Other (Comment)  ([facility])   Referral To    Taxonomy   Intended Effects Aligning care plan with patient's values   Methods Encourage self reflection   Interventions Acknowledge current situation; Active listening; Facilitate advance care planning     Discussion: Pt seen bedside, sitting up in bed; alert, coughing.  inquired about pt's interest in completing POAH. Pt indicated he did not want to complete it at time of visit, but accepted blank copy and explanation of document. Advised pt to request  visit if/ when ready to complete paperwork. Pt denied need for support at that time.  follow-up visit available prn and can be contacted at [phone].     Viri Childs Chaplain Resident

## 2023-07-01 NOTE — PHYSICAL THERAPY NOTE
PHYSICAL THERAPY EVALUATION - INPATIENT    Room Number: 529/529-A  Evaluation Date: 2023  Presenting Problem: hypo-osmolar     Physician Order: PT Eval and Treat    Problem List  Principal Problem:    Hyponatremia      Past Medical History  Past Medical History:   Diagnosis Date    Allergic rhinitis     Flonase    Bladder cancer ([facility] [address] 2019    f/u [facility] Constant    BPH (benign prostatic hyperplasia)     COPD ([facility] [address]     moderate-severe     Disorder of thyroid     ED (erectile dysfunction)     Esophageal reflux     Hiatal hernia     High blood pressure     High cholesterol     Hypothyroidism     Injury 2019    Left knee injury, from fall    Osteoarthritis     in knee's, hip's    Pulmonary nodules     first noted 2016on LD screen CT    Tobacco abuse        Past Surgical History  Past Surgical History:   Procedure Laterality Date    COLONOSCOPY      15    COLONOSCOPY N/A 2020    Procedure: COLONOSCOPY;  Surgeon: Jonah Mckenzie MD;  Location: [address] [place] [address] ENDOSCOPY    ELECTROCARDIOGRAM, COMPLETE  12/15/2013    scanned to media tab    ORAL SURGERY      OTHER SURGICAL HISTORY  2009    cyst removal, side of head       HOME SITUATION  Home Situation  Type of Home: House  Stairs to Enter : 3  Lives With: Spouse  Drives: Yes  Patient Owned Equipment: None     Prior Level of [place]: Lives with wife in home with 3 steps.  Indep with ADLs    SUBJECTIVE  Ready to go home    OBJECTIVE     Fall Risk: Standard fall risk    WEIGHT BEARING RESTRICTION                PAIN ASSESSMENT  Ratin         RANGE OF MOTION AND STRENGTH ASSESSMENT  Upper extremity ROM and strength are within functional limits   Lower extremity ROM is within functional limits   Lower extremity strength is within functional limits     NEUROLOGICAL FINDINGS                      ACTIVITY TOLERANCE                         O2 WALK       AM-PAC '6-Clicks' INPATIENT SHORT FORM - BASIC MOBILITY  How much difficulty does the patient currently have... Patient Difficulty: Turning over in bed (including adjusting bedclothes, sheets and blankets)?: A Little   Patient Difficulty: Sitting down on and standing up from a chair with arms (e.g., wheelchair, bedside commode, etc.): None   Patient Difficulty: Moving from lying on back to sitting on the side of the bed?: None   How much help from another person does the patient currently need. .. Help from Another: Moving to and from a bed to a chair (including a wheelchair)?: None   Help from Another: Need to walk in hospital room?: None   Help from Another: Climbing 3-5 steps with a railing?: A Little     AM-PAC Score:  Raw Score: 22   Approx Degree of Impairment: 20.91%   Standardized Score (AM-PAC Scale): 53.28   CMS Modifier (G-Code):     FUNCTIONAL ABILITY STATUS  Functional Mobility/Gait Assessment  Gait Assistance: Independent  Distance (ft): 500  Assistive Device: Other (Comment) (IV pole)  Pattern: Shuffle    Skilled Therapy Provided: Chart reviewed. RN aware. Wore mask and gloves. Patient up in chair. Sit to stand, walking 500ft + with IV pole indep. Reviewed home safety awareness. All needs left within reach. RN aware. Patient End of Session: Up in chair;Needs met;Call light within reach;RN aware of session/findings; All patient questions and concerns addressed; Family present    ASSESSMENT   Patient is a 69year old male admitted on 6/30/2023 for hypo-osmolar syndrome, weakness. Pertinent comorbidities and personal factors impacting therapy include EtOH, COPD, weakness, recent fall. Functional outcome measures completed include AM-PAC. Based on this evaluation, patient's clinical presentation is evolving and overall evaluation complexity is considered moderate. PT Discharge Recommendations: Home    PLAN  Patient has been evaluated and presents with no skilled Physical Therapy needs at this time. Patient discharged from Physical Therapy services.   Please re-order if a new functional limitation presents during this admission. GOALS  Patient was able to achieve the following goals . ..     Patient was able to transfer At previous, functional level   Patient able to ambulate on level surfaces At previous, functional level

## 2023-07-01 NOTE — CM/SW NOTE
Patient failed Inpatient criteria. Second level of review completed and supports Observation. UR committee in agreement. Discussed with Dr Sarah Beyer, approves. This is a Cond 40.   [place] SCOTT Estrada, 07/01/23, 12:32 PM

## 2023-07-03 ENCOUNTER — PATIENT OUTREACH (OUTPATIENT)
Dept: CASE MANAGEMENT | Age: 68
End: 2023-07-03

## 2023-07-03 DIAGNOSIS — Z02.9 ENCOUNTERS FOR UNSPECIFIED ADMINISTRATIVE PURPOSE: Primary | ICD-10-CM

## 2023-07-03 PROCEDURE — 1111F DSCHRG MED/CURRENT MED MERGE: CPT

## 2023-07-03 NOTE — PROGRESS NOTES
Left message on mailbox for pt to call [facility] back for TCM. [facility] contact information [phone] included in message. Discharge Dx:     Hyponatremia. Hypoosmolar  Weakness  COPD  GERD  Hypothyroidism  Osteoarthritis  Dyslipidemia  Hypertension  BPH  Lung nodules  Possible alcohol abuse    Follow up appointments:    Stop drinking beer. Moderate water intake. Have your sodium level  checked by your primary care physician in about 1 week.     Follow up [facility] MedicineFollow upPost Discharge [facility] [address] [place] [address]  [phone]    Future Appointments   Date Time Provider Flori Crawford   7/27/2023 10:15 AM Marylou Walters MD THE [facility]   8/23/2023 10:00 AM Meshulam, Sinda Breath, DPM [facility] [place]

## 2023-07-05 NOTE — PROGRESS NOTES
Left message on mailbox for pt to call NCM back for TCM. [facility] contact information [phone] included in message.         Future Appointments   Date Time Provider Flori Crawford   7/6/2023 10:00 AM MEENAKSHI Bose [facility]   7/27/2023 10:15 AM Kamila Ni MD THE [facility]   8/23/2023 10:00 AM [facility] [place]

## 2023-07-06 ENCOUNTER — LAB ENCOUNTER (OUTPATIENT)
Dept: LAB | Age: 68
End: 2023-07-06
Attending: NURSE PRACTITIONER
Payer: MEDICARE

## 2023-07-06 ENCOUNTER — OFFICE VISIT (OUTPATIENT)
Dept: FAMILY MEDICINE CLINIC | Facility: CLINIC | Age: 68
End: 2023-07-06

## 2023-07-06 VITALS
HEART RATE: 97 BPM | HEIGHT: 64.5 IN | BODY MASS INDEX: 16.86 KG/M2 | WEIGHT: 100 LBS | DIASTOLIC BLOOD PRESSURE: 77 MMHG | SYSTOLIC BLOOD PRESSURE: 149 MMHG

## 2023-07-06 DIAGNOSIS — E87.1 HYPONATREMIA: Primary | ICD-10-CM

## 2023-07-06 DIAGNOSIS — E87.1 HYPONATREMIA: ICD-10-CM

## 2023-07-06 DIAGNOSIS — E46 PROTEIN-CALORIE MALNUTRITION, UNSPECIFIED SEVERITY (HCC): ICD-10-CM

## 2023-07-06 DIAGNOSIS — I10 ESSENTIAL HYPERTENSION, BENIGN: ICD-10-CM

## 2023-07-06 DIAGNOSIS — F10.10 ALCOHOL ABUSE: ICD-10-CM

## 2023-07-06 DIAGNOSIS — F17.200 TOBACCO USE DISORDER: ICD-10-CM

## 2023-07-06 LAB
ANION GAP SERPL CALC-SCNC: 9 MMOL/L (ref 0–18)
BUN BLD-MCNC: 18 MG/DL (ref 7–18)
CALCIUM BLD-MCNC: 9.7 MG/DL (ref 8.5–10.1)
CHLORIDE SERPL-SCNC: 89 MMOL/L (ref 98–112)
CO2 SERPL-SCNC: 27 MMOL/L (ref 21–32)
CREAT BLD-MCNC: 0.81 MG/DL
FASTING STATUS PATIENT QL REPORTED: YES
GFR SERPLBLD BASED ON 1.73 SQ M-ARVRAT: 95 ML/MIN/1.73M2 (ref 60–?)
GLUCOSE BLD-MCNC: 100 MG/DL (ref 70–99)
OSMOLALITY SERPL CALC.SUM OF ELEC: 262 MOSM/KG (ref 275–295)
POTASSIUM SERPL-SCNC: 4.3 MMOL/L (ref 3.5–5.1)
SODIUM SERPL-SCNC: 125 MMOL/L (ref 136–145)

## 2023-07-06 PROCEDURE — 1111F DSCHRG MED/CURRENT MED MERGE: CPT | Performed by: NURSE PRACTITIONER

## 2023-07-06 PROCEDURE — 80048 BASIC METABOLIC PNL TOTAL CA: CPT

## 2023-07-06 PROCEDURE — 36415 COLL VENOUS BLD VENIPUNCTURE: CPT

## 2023-07-06 PROCEDURE — 99496 TRANSJ CARE MGMT HIGH F2F 7D: CPT | Performed by: NURSE PRACTITIONER

## 2023-07-06 NOTE — ASSESSMENT & PLAN NOTE
Check bmp today  Encourage alternating electrolyte fluids (gatorade, propel, etc).  With water  Avoid alcoholic beverages  Encourage nutritionally dense foods

## 2023-07-06 NOTE — ASSESSMENT & PLAN NOTE
Pt has started wellbutrin to help quit smoking  Encourage eating raw vegetables, sugarless gum and candies

## 2023-07-06 NOTE — PROGRESS NOTES
Multiple attempts to reach pt and messages left with no return call. Patient went in for HFU appt with PCP office on 7/6/23. Encounter closing.

## 2023-07-06 NOTE — ASSESSMENT & PLAN NOTE
Discussed w pt the need to cut back or eliminate drinking beer  Encourage healthy diet and hydration with non alcoholic liquids

## 2023-07-07 DIAGNOSIS — E87.1 HYPONATREMIA: Primary | ICD-10-CM

## 2023-07-09 ENCOUNTER — HOSPITAL ENCOUNTER (EMERGENCY)
Facility: HOSPITAL | Age: 68
Discharge: HOME | End: 2023-07-09
Attending: STUDENT IN AN ORGANIZED HEALTH CARE EDUCATION/TRAINING PROGRAM
Payer: MEDICARE

## 2023-07-09 VITALS
TEMPERATURE: 99 F | HEIGHT: 64 IN | BODY MASS INDEX: 18.61 KG/M2 | WEIGHT: 109 LBS | OXYGEN SATURATION: 97 % | DIASTOLIC BLOOD PRESSURE: 80 MMHG | HEART RATE: 87 BPM | RESPIRATION RATE: 19 BRPM | SYSTOLIC BLOOD PRESSURE: 117 MMHG

## 2023-07-09 DIAGNOSIS — E87.1 HYPONATREMIA: Primary | ICD-10-CM

## 2023-07-09 LAB
ALBUMIN SERPL-MCNC: 4 G/DL (ref 3.4–5)
ALP LIVER SERPL-CCNC: 48 U/L
ALT SERPL-CCNC: 29 U/L
ANION GAP SERPL CALC-SCNC: 8 MMOL/L (ref 0–18)
AST SERPL-CCNC: 21 U/L (ref 15–37)
BASOPHILS # BLD AUTO: 0.05 X10(3) UL (ref 0–0.2)
BASOPHILS NFR BLD AUTO: 0.4 %
BILIRUB DIRECT SERPL-MCNC: 0.1 MG/DL (ref 0–0.2)
BILIRUB SERPL-MCNC: 0.7 MG/DL (ref 0.1–2)
BUN BLD-MCNC: 13 MG/DL (ref 7–18)
BUN/CREAT SERPL: 17.1 (ref 10–20)
CALCIUM BLD-MCNC: 9.6 MG/DL (ref 8.5–10.1)
CHLORIDE SERPL-SCNC: 93 MMOL/L (ref 98–112)
CO2 SERPL-SCNC: 25 MMOL/L (ref 21–32)
CREAT BLD-MCNC: 0.76 MG/DL
DEPRECATED RDW RBC AUTO: 42.4 FL (ref 35.1–46.3)
EOSINOPHIL # BLD AUTO: 0.04 X10(3) UL (ref 0–0.7)
EOSINOPHIL NFR BLD AUTO: 0.3 %
ERYTHROCYTE [DISTWIDTH] IN BLOOD BY AUTOMATED COUNT: 13 % (ref 11–15)
GFR SERPLBLD BASED ON 1.73 SQ M-ARVRAT: 97 ML/MIN/1.73M2 (ref 60–?)
GLUCOSE BLD-MCNC: 108 MG/DL (ref 70–99)
HCT VFR BLD AUTO: 39.6 %
HGB BLD-MCNC: 14 G/DL
IMM GRANULOCYTES # BLD AUTO: 0.43 X10(3) UL (ref 0–1)
IMM GRANULOCYTES NFR BLD: 3.6 %
LYMPHOCYTES # BLD AUTO: 0.56 X10(3) UL (ref 1–4)
LYMPHOCYTES NFR BLD AUTO: 4.7 %
MCH RBC QN AUTO: 31.4 PG (ref 26–34)
MCHC RBC AUTO-ENTMCNC: 35.4 G/DL (ref 31–37)
MCV RBC AUTO: 88.8 FL
MONOCYTES # BLD AUTO: 1.43 X10(3) UL (ref 0.1–1)
MONOCYTES NFR BLD AUTO: 12 %
NEUTROPHILS # BLD AUTO: 9.4 X10 (3) UL (ref 1.5–7.7)
NEUTROPHILS # BLD AUTO: 9.4 X10(3) UL (ref 1.5–7.7)
NEUTROPHILS NFR BLD AUTO: 79 %
OSMOLALITY SERPL CALC.SUM OF ELEC: 263 MOSM/KG (ref 275–295)
OSMOLALITY SERPL: 258 MOSM/KG (ref 275–295)
PLATELET # BLD AUTO: 422 10(3)UL (ref 150–450)
POTASSIUM SERPL-SCNC: 4.4 MMOL/L (ref 3.5–5.1)
PROT SERPL-MCNC: 7.3 G/DL (ref 6.4–8.2)
RBC # BLD AUTO: 4.46 X10(6)UL
SODIUM SERPL-SCNC: 126 MMOL/L (ref 136–145)
WBC # BLD AUTO: 11.9 X10(3) UL (ref 4–11)

## 2023-07-09 PROCEDURE — 99284 EMERGENCY DEPT VISIT MOD MDM: CPT

## 2023-07-09 PROCEDURE — 96360 HYDRATION IV INFUSION INIT: CPT

## 2023-07-09 PROCEDURE — 99285 EMERGENCY DEPT VISIT HI MDM: CPT

## 2023-07-09 PROCEDURE — 80076 HEPATIC FUNCTION PANEL: CPT | Performed by: STUDENT IN AN ORGANIZED HEALTH CARE EDUCATION/TRAINING PROGRAM

## 2023-07-09 PROCEDURE — 83930 ASSAY OF BLOOD OSMOLALITY: CPT | Performed by: STUDENT IN AN ORGANIZED HEALTH CARE EDUCATION/TRAINING PROGRAM

## 2023-07-09 PROCEDURE — 80048 BASIC METABOLIC PNL TOTAL CA: CPT | Performed by: STUDENT IN AN ORGANIZED HEALTH CARE EDUCATION/TRAINING PROGRAM

## 2023-07-09 PROCEDURE — 85025 COMPLETE CBC W/AUTO DIFF WBC: CPT | Performed by: STUDENT IN AN ORGANIZED HEALTH CARE EDUCATION/TRAINING PROGRAM

## 2023-07-09 NOTE — DISCHARGE INSTRUCTIONS
You should not have any water more than 48 ounces per day. If you want to drink, drink electrolyte containing fluids such as Gatorade or Pedialyte.   Do your best to stop drinking beer, if you are going to drink, have foods with salt with it

## 2023-07-09 NOTE — ED INITIAL ASSESSMENT (HPI)
Pt ambulatory to ED via private vehicle, pt states hx of 122 sodium, discharged from hospital on Saturday admitted for low sodium and then has redraw outpatient of NA+ 125, wife believes he may be withdrawing from ETOH, has been having 1-3 beers over the past few days, feels weak and cannot walk right per wife. AXOX4. [place] : Mallampati 2 Airway : Mallampati 2

## 2023-07-09 NOTE — ED QUICK NOTES
Pt A&OX4, pt denies dizziness/lightheadedness, cp, sob, n/v. Discharge paperwork and follow-up discussed with pt, pt verbally understands them. Pt discharged via wheelchair in no acute distress.

## 2023-07-10 ENCOUNTER — TELEPHONE (OUTPATIENT)
Dept: NEPHROLOGY | Facility: CLINIC | Age: 68
End: 2023-07-10

## 2023-07-10 NOTE — TELEPHONE ENCOUNTER
MD Chuyita Jo, RN  Please make an appt for hyponatremia in the next 1-2 weeks. Was in ED and discharged on 7/9.     Thanks  Dr Osvaldo Alberts

## 2023-07-11 ENCOUNTER — PATIENT OUTREACH (OUTPATIENT)
Dept: CASE MANAGEMENT | Age: 68
End: 2023-07-11

## 2023-07-11 NOTE — PROGRESS NOTES
1st attempt ED f/up apt request  NEPHR-existing apt (8/8)  PCP -decline, pt stated he just saw PCP (7/6), doesn't want to make another apt  Closing encounter

## 2023-08-07 DIAGNOSIS — J43.9 PULMONARY EMPHYSEMA, UNSPECIFIED EMPHYSEMA TYPE (HCC): ICD-10-CM

## 2023-08-07 DIAGNOSIS — F17.200 TOBACCO USE DISORDER: ICD-10-CM

## 2023-08-08 ENCOUNTER — OFFICE VISIT (OUTPATIENT)
Dept: NEPHROLOGY | Facility: CLINIC | Age: 68
End: 2023-08-08

## 2023-08-08 VITALS
DIASTOLIC BLOOD PRESSURE: 66 MMHG | SYSTOLIC BLOOD PRESSURE: 116 MMHG | HEART RATE: 98 BPM | WEIGHT: 101 LBS | BODY MASS INDEX: 17 KG/M2

## 2023-08-08 DIAGNOSIS — E87.1 HYPONATREMIA: Primary | ICD-10-CM

## 2023-08-08 PROCEDURE — 1126F AMNT PAIN NOTED NONE PRSNT: CPT | Performed by: INTERNAL MEDICINE

## 2023-08-08 PROCEDURE — 99205 OFFICE O/P NEW HI 60 MIN: CPT | Performed by: INTERNAL MEDICINE

## 2023-08-08 RX ORDER — ALBUTEROL SULFATE 90 UG/1
2 AEROSOL, METERED RESPIRATORY (INHALATION) EVERY 6 HOURS PRN
Qty: 54 EACH | Refills: 5 | Status: SHIPPED | OUTPATIENT
Start: 2023-08-08

## 2023-08-08 NOTE — TELEPHONE ENCOUNTER
Refill passed per [place] [facility], [facility] protocol.   Requested Prescriptions   Pending Prescriptions Disp Refills    VENTOLIN  (90 Base) MCG/ACT Inhalation Aero Soln [Pharmacy Med Name: VENTOLIN HFA 90 MCG [id number]] 96 each 1     Sig: INHALE 2 PUFFS INTO THE LUNGS EVERY 6 HOURS AS NEEDED FOR WHEEZING       Asthma & COPD Medication Protocol Passed - 8/7/2023 12:58 AM        Passed - In person appointment or virtual visit in the past 6 mos or appointment in next 3 mos     Recent Outpatient Visits              Today     Israel Childers, [facility], Nichol Delgado MD    Office Visit    1 month ago Hyponatremia    [address] [facility], [place] Rives Daggett, APRN    Office Visit    1 month ago [facility]le edema, bilateral    [facility], [address], [address], [place],     Office Visit    1 month ago Greater trochanteric bursitis of both hips    Israel Childers, [address], [place], [place]    Office Visit    2 months ago Chronic obstructive pulmonary disease, unspecified COPD type [facility])    [place], Jamia Hernandez MD    Office Visit          Future Appointments         Provider Department Appt Notes    In 2 weeks Stu Schmidt DPM [facility], [address], [place] nail care    In 2 weeks MD Israel Knutson, [address], [address]     In 2 months MD Israel Mcconnell, [address], [place] 3 mos

## 2023-08-09 NOTE — PROGRESS NOTES
08/08/23        Patient: Lauren Shafer   YOB: 1955   Date of Visit: 8/8/2023       Dear  Dr. Carol Hoyos,      Thank you for referring Lauren Shafer to my practice. Please find my assessment and plan below. As you know he is a 78-year-old male with a history of hypertension, bladder cancer, severe COPD, history of hypothyroidism, GERD and hypercholesterolemia who I now had the pleasure of seeing for evaluation of chronic hyponatremia. The patient's laboratory studies have been reviewed in epic. Back in September 2020 was sodium was 131. He typically runs in the low 130 range but will occasionally drop into the 120 range and when that occurs she becomes symptomatic with increased lethargy. Of note is that his sodium is 122 on June 29, 2023 resulting in a brief hospitalization. His sodium was 132 at time of discharge. However on July 6 again dropped down to 125 resulting in an ER visit. Renal consultation has now been advised. His past medical history is significant for hypertension for at least 12 or more years. He is a cigarette smoker and and has severe COPD followed by Dr. Shilpa Levi. Has seen cardiology but there is no history of coronary artery disease. There is a history of bladder cancer currently in remission followed by urology. Patient does admit to chronic alcohol use as well. He has been drinking 6-7 beers per day but try to cut back and is now down to 2/day. Medications are as listed. He was started on Wellbutrin to help him quit smoking cigarettes but only took it for about a week as he thought it might be causing diarrhea. He has not been using this medication chronically. Social history patient is a chronic cigarette smoker. He has been trying to cut down his works his way down to less than a pack per day. Alcohol history as stated above. Family history is negative for renal pathology.   Review of systems at the present time patient states he is feeling okay without any chest pain, increased shortness of breath, GI or urinary tract symptoms. On physical exam his blood pressure is 116/66 with a pulse of 98 and he weighed 101 pounds. His neck was supple without JVD. Lungs were clear. Heart revealed a regular rate and rhythm with an S4 but no gallops, murmurs or rubs. Abdomen was soft, flat, nontender without organomegaly, masses or bruits. Extremities revealed no clubbing, cyanosis or edema. Again reviewed his most recent laboratory studies done on July 9, 2023. Sodium 126 with a creatinine of 0.76. Blood sugar is 108. Hemoglobin 14.0. Liver panel was normal.    I therefore informed the patient that he does have chronic hyponatremia. He seems to be asymptomatic until his sodium drops  into the 120 range. I suspect he may have SIADH secondary to severe COPD. Excess alcohol intake may also be exacerbating factors. When he drinks light he tends not to eat very much. He states his appetite though has improved and he is eating better. Currently recommend a quart and 1/2/day fluid restriction. Will have him repeat a CBC, renal panel, urinalysis, urine for sodium, creatinine and osmolality. TSH and a.m. cortisol for completion sake. Reinforced the need to quit smoking cigarettes and discontinue alcohol consumption completely. Further impressions and recommendations will be forthcoming after reviewing the above. Thank you very much for allowing me to participate in the care of your patient. If you have any questions please were free to call.            Sincerely,   Marita Blue MD   [facility] MEDICAL [facility], [address] [place] [address]  [place] [address] [place] [phone]  [id number] [facility] [phone]    Document electronically generated by:  Marita Blue MD

## 2023-08-09 NOTE — PATIENT INSTRUCTIONS
Continue to follow a quart and 1/2/day fluid restriction. Please do laboratory studies as ordered. Continue to work on quitting smoking and alcohol consumption.

## 2023-08-12 ENCOUNTER — LAB ENCOUNTER (OUTPATIENT)
Dept: LAB | Age: 68
End: 2023-08-12
Attending: INTERNAL MEDICINE
Payer: MEDICARE

## 2023-08-12 DIAGNOSIS — E87.1 HYPONATREMIA: ICD-10-CM

## 2023-08-12 LAB
ALBUMIN SERPL-MCNC: 4.1 G/DL (ref 3.4–5)
ANION GAP SERPL CALC-SCNC: 1 MMOL/L (ref 0–18)
BASOPHILS # BLD AUTO: 0.05 X10(3) UL (ref 0–0.2)
BASOPHILS NFR BLD AUTO: 0.6 %
BILIRUB UR QL STRIP.AUTO: NEGATIVE
BUN BLD-MCNC: 8 MG/DL (ref 7–18)
CALCIUM BLD-MCNC: 9.6 MG/DL (ref 8.5–10.1)
CHLORIDE SERPL-SCNC: 98 MMOL/L (ref 98–112)
CLARITY UR REFRACT.AUTO: CLEAR
CO2 SERPL-SCNC: 31 MMOL/L (ref 21–32)
CORTIS SERPL-MCNC: 12.7 UG/DL
CREAT BLD-MCNC: 0.72 MG/DL
CREAT UR-SCNC: 15.6 MG/DL
CREAT UR-SCNC: 15.6 MG/DL
EGFRCR SERPLBLD CKD-EPI 2021: 99 ML/MIN/1.73M2 (ref 60–?)
EOSINOPHIL # BLD AUTO: 0.07 X10(3) UL (ref 0–0.7)
EOSINOPHIL NFR BLD AUTO: 0.9 %
ERYTHROCYTE [DISTWIDTH] IN BLOOD BY AUTOMATED COUNT: 12.8 %
GLUCOSE BLD-MCNC: 91 MG/DL (ref 70–99)
GLUCOSE UR STRIP.AUTO-MCNC: NEGATIVE MG/DL
HCT VFR BLD AUTO: 38.1 %
HGB BLD-MCNC: 13.2 G/DL
IMM GRANULOCYTES # BLD AUTO: 0.17 X10(3) UL (ref 0–1)
IMM GRANULOCYTES NFR BLD: 2.1 %
KETONES UR STRIP.AUTO-MCNC: NEGATIVE MG/DL
LEUKOCYTE ESTERASE UR QL STRIP.AUTO: NEGATIVE
LYMPHOCYTES # BLD AUTO: 0.69 X10(3) UL (ref 1–4)
LYMPHOCYTES NFR BLD AUTO: 8.6 %
MCH RBC QN AUTO: 31 PG (ref 26–34)
MCHC RBC AUTO-ENTMCNC: 34.6 G/DL (ref 31–37)
MCV RBC AUTO: 89.4 FL
MICROALBUMIN UR-MCNC: 4.37 MG/DL
MICROALBUMIN/CREAT 24H UR-RTO: 280.1 UG/MG (ref ?–30)
MONOCYTES # BLD AUTO: 0.94 X10(3) UL (ref 0.1–1)
MONOCYTES NFR BLD AUTO: 11.7 %
NEUTROPHILS # BLD AUTO: 6.1 X10 (3) UL (ref 1.5–7.7)
NEUTROPHILS # BLD AUTO: 6.1 X10(3) UL (ref 1.5–7.7)
NEUTROPHILS NFR BLD AUTO: 76.1 %
NITRITE UR QL STRIP.AUTO: NEGATIVE
OSMOLALITY SERPL CALC.SUM OF ELEC: 268 MOSM/KG (ref 275–295)
OSMOLALITY UR: 196 MOSM/KG (ref 300–1300)
PH UR STRIP.AUTO: 8 [PH] (ref 5–8)
PHOSPHATE SERPL-MCNC: 3.1 MG/DL (ref 2.5–4.9)
PLATELET # BLD AUTO: 431 10(3)UL (ref 150–450)
POTASSIUM SERPL-SCNC: 4.4 MMOL/L (ref 3.5–5.1)
PROT UR STRIP.AUTO-MCNC: NEGATIVE MG/DL
RBC # BLD AUTO: 4.26 X10(6)UL
RBC UR QL AUTO: NEGATIVE
SODIUM SERPL-SCNC: 130 MMOL/L (ref 136–145)
SODIUM SERPL-SCNC: 55 MMOL/L
SP GR UR STRIP.AUTO: 1 (ref 1–1.03)
TSI SER-ACNC: 0.7 MIU/ML (ref 0.36–3.74)
UROBILINOGEN UR STRIP.AUTO-MCNC: <2 MG/DL
WBC # BLD AUTO: 8 X10(3) UL (ref 4–11)

## 2023-08-12 PROCEDURE — 80069 RENAL FUNCTION PANEL: CPT

## 2023-08-12 PROCEDURE — 82533 TOTAL CORTISOL: CPT

## 2023-08-12 PROCEDURE — 82043 UR ALBUMIN QUANTITATIVE: CPT

## 2023-08-12 PROCEDURE — 85025 COMPLETE CBC W/AUTO DIFF WBC: CPT

## 2023-08-12 PROCEDURE — 82570 ASSAY OF URINE CREATININE: CPT

## 2023-08-12 PROCEDURE — 81001 URINALYSIS AUTO W/SCOPE: CPT

## 2023-08-12 PROCEDURE — 84300 ASSAY OF URINE SODIUM: CPT

## 2023-08-12 PROCEDURE — 83935 ASSAY OF URINE OSMOLALITY: CPT

## 2023-08-12 PROCEDURE — 84443 ASSAY THYROID STIM HORMONE: CPT

## 2023-08-12 PROCEDURE — 36415 COLL VENOUS BLD VENIPUNCTURE: CPT

## 2023-08-16 ENCOUNTER — OFFICE VISIT (OUTPATIENT)
Dept: PULMONOLOGY | Facility: CLINIC | Age: 68
End: 2023-08-16

## 2023-08-16 VITALS
HEART RATE: 83 BPM | HEIGHT: 64 IN | BODY MASS INDEX: 17.42 KG/M2 | DIASTOLIC BLOOD PRESSURE: 69 MMHG | WEIGHT: 102 LBS | SYSTOLIC BLOOD PRESSURE: 118 MMHG | OXYGEN SATURATION: 95 % | RESPIRATION RATE: 20 BRPM

## 2023-08-16 DIAGNOSIS — J44.9 CHRONIC OBSTRUCTIVE PULMONARY DISEASE, UNSPECIFIED COPD TYPE (HCC): ICD-10-CM

## 2023-08-16 DIAGNOSIS — R91.1 LUNG NODULE: Primary | ICD-10-CM

## 2023-08-16 PROCEDURE — 1126F AMNT PAIN NOTED NONE PRSNT: CPT | Performed by: INTERNAL MEDICINE

## 2023-08-16 PROCEDURE — 99214 OFFICE O/P EST MOD 30 MIN: CPT | Performed by: INTERNAL MEDICINE

## 2023-08-16 RX ORDER — PREDNISONE 10 MG/1
TABLET ORAL
Qty: 18 TABLET | Refills: 0 | Status: SHIPPED | OUTPATIENT
Start: 2023-08-16

## 2023-08-16 RX ORDER — AMOXICILLIN AND CLAVULANATE POTASSIUM 875; 125 MG/1; MG/1
1 TABLET, FILM COATED ORAL 2 TIMES DAILY
Qty: 10 TABLET | Refills: 0 | Status: SHIPPED | OUTPATIENT
Start: 2023-08-16

## 2023-08-23 ENCOUNTER — OFFICE VISIT (OUTPATIENT)
Dept: PODIATRY CLINIC | Facility: CLINIC | Age: 68
End: 2023-08-23

## 2023-08-23 ENCOUNTER — OFFICE VISIT (OUTPATIENT)
Dept: NEUROLOGY | Facility: CLINIC | Age: 68
End: 2023-08-23
Payer: MEDICARE

## 2023-08-23 VITALS — SYSTOLIC BLOOD PRESSURE: 120 MMHG | DIASTOLIC BLOOD PRESSURE: 72 MMHG | HEART RATE: 92 BPM | OXYGEN SATURATION: 93 %

## 2023-08-23 DIAGNOSIS — M79.671 BILATERAL FOOT PAIN: Primary | ICD-10-CM

## 2023-08-23 DIAGNOSIS — M79.672 BILATERAL FOOT PAIN: Primary | ICD-10-CM

## 2023-08-23 DIAGNOSIS — M21.622 TAILOR'S BUNION OF BOTH FEET: ICD-10-CM

## 2023-08-23 DIAGNOSIS — M21.621 TAILOR'S BUNION OF BOTH FEET: ICD-10-CM

## 2023-08-23 DIAGNOSIS — F10.10 ALCOHOL ABUSE: ICD-10-CM

## 2023-08-23 DIAGNOSIS — B35.1 ONYCHOMYCOSIS: ICD-10-CM

## 2023-08-23 DIAGNOSIS — R26.89 BALANCE PROBLEM: Primary | ICD-10-CM

## 2023-08-23 DIAGNOSIS — E87.1 HYPONATREMIA: ICD-10-CM

## 2023-08-23 DIAGNOSIS — L84 FOOT CALLUS: ICD-10-CM

## 2023-08-23 PROCEDURE — 99204 OFFICE O/P NEW MOD 45 MIN: CPT | Performed by: STUDENT IN AN ORGANIZED HEALTH CARE EDUCATION/TRAINING PROGRAM

## 2023-08-23 PROCEDURE — 99203 OFFICE O/P NEW LOW 30 MIN: CPT | Performed by: PODIATRIST

## 2023-08-23 PROCEDURE — 1126F AMNT PAIN NOTED NONE PRSNT: CPT | Performed by: PODIATRIST

## 2023-08-23 NOTE — PROGRESS NOTES
[facility], [facility]  Progress Note    Adriana Dhillon is a 69year old male. Patient presents with:  Toenail Care: Consult has  some issues with trimming his toenails - has also a hard callus on the R foot - no pain         HPI:     This is a pleasant male with COPD, alcohol abuse, PMH of bladder cancer, in remission, and tobacco use disorder. He presents to clinic today due to long thick painful toenails and painful right foot callus. Allergies: Patient has no known allergies. Current Outpatient Medications   Medication Sig Dispense Refill    amoxicillin clavulanate 875-125 MG Oral Tab Take 1 tablet by mouth 2 (two) times daily. 10 tablet 0    predniSONE 10 MG Oral Tab Take 3 tabs (30mg) daily for 3 days, then take 2 tabs (20mg) daily for 3 days, then take 1 tab (10mg) daily for 3 days. 18 tablet 0    albuterol (VENTOLIN HFA) 108 (90 Base) MCG/ACT Inhalation Aero Soln Inhale 2 puffs into the lungs every 6 (six) hours as needed for Wheezing. 54 each 5    Olmesartan Medoxomil 40 MG Oral Tab Take 1 tablet (40 mg total) by mouth daily. For blood pressure 90 tablet 1    buPROPion  MG Oral Tablet 12 Hr Take 1 tablet (150 mg total) by mouth 2 (two) times daily. For 7 days just take it in the morning and then after that he will take the second dose around 3 PM daily and do twice daily. (Patient taking differently: Take 1 tablet (150 mg total) by mouth 2 (two) times daily. For 7 days just take it in the morning and then after that he will take the second dose around 3 PM daily and do twice daily. ) 180 tablet 0    fluticasone-salmeterol (Edgardo Clarion) 250-50 MCG/ACT Inhalation Aerosol Powder, Breath Activated Inhale 1 puff into the lungs every 12 (twelve) hours. 3 each 3    atorvastatin 10 MG Oral Tab Take 1 tablet (10 mg total) by mouth daily. 90 tablet 2    Fenofibrate 160 MG Oral Tab Take 1 tablet (160 mg total) by mouth daily.  90 tablet 2    levothyroxine 75 MCG Oral Tab Take 1 tablet (75 mcg total) by mouth before breakfast. Due for follow up 90 tablet 2    Omeprazole 40 MG Oral Capsule Delayed Release Take 1 capsule (40 mg total) by mouth daily. 90 capsule 2    fluticasone propionate 50 MCG/ACT Nasal Suspension 2 sprays by Nasal route daily. 48 mL 2    Multiple Vitamins-Minerals (CENTRUM SILVER 50+MEN OR) Take by mouth. Dutasteride 0.5 MG Oral Cap         Past Medical History:   Diagnosis Date    Allergic rhinitis     Flonase    Bladder cancer ([facility] [address] 11/2019    f/u Sofia Sosa    BPH (benign prostatic hyperplasia)     COPD ([facility] [address]     moderate-severe     Disorder of thyroid     ED (erectile dysfunction)     Esophageal reflux     Essential hypertension     Hiatal hernia     High blood pressure     High cholesterol     Hypothyroidism     Injury 01/29/2019    Left knee injury, from fall    Osteoarthritis     in knee's, hip's    Pulmonary nodules     first noted 2016on LD screen CT    Tobacco abuse       Past Surgical History:   Procedure Laterality Date    COLONOSCOPY      02-14-17    COLONOSCOPY N/A 12/28/2020    Procedure: COLONOSCOPY;  Surgeon: Gladis Turner MD;  Location: [address] [place] [address] ENDOSCOPY    ELECTROCARDIOGRAM, COMPLETE  12/15/2013    scanned to media tab    ORAL SURGERY      OTHER SURGICAL HISTORY  2009    cyst removal, side of head      Family History   Problem Relation Age of Onset    Dementia Mother     Diabetes Paternal Grandmother     Cancer Sister         lung cancer      Social History    Socioeconomic History      Marital status:     Tobacco Use      Smoking status: Every Day        Packs/day: 1.00        Years: 40.00        Pack years: 40        Types: Cigarettes      Smokeless tobacco: Never      Tobacco comments: 2/2022: less than 1 pack per day    Vaping Use      Vaping Use: Never used    Substance and Sexual Activity      Alcohol use:  Yes        Alcohol/week: 2.0 standard drinks of alcohol        Types: 2 Cans of beer per week        Comment: 2 beer per day      Drug use: No    Other Topics      Concerns:        Caffeine Concern: Yes          coffee-1 cup/day          REVIEW OF SYSTEMS:   Denies nausea, fever, chills  No calf pain  No other muscle or joint aches  Denies chest pain or shortness of breath. EXAM:   There were no vitals taken for this visit. Constitutional:   Patient in no apparent distress. Well kept. Of normal body habitus. Alert and oriented to person, place, and time. Vascular Examination:  DP pulse is NP  PT pulse is palpable  Capillary refill is adequate  Integumentary Examination:   The patient's nails appear incurvated, thickened, elongated, dystrophic, discolored with subungual debris 1-5 right, 1-5  left nails. Callus right sub 5th met head  Skin is of diminished texture and decreased turgor. Neurological Examination:  Sharp/dull is present to right and is present to left. Parasthesias absent. Musculoskeletal Examination:  Muscle Strength is 5/5. B/L tailor bunion  Pain on palpation to nails and right foot callus      LABS & IMAGING:     Lab Results   Component Value Date    GLU 91 08/12/2023    BUN 8 08/12/2023    CREATSERUM 0.72 08/12/2023    BUNCREA 17.1 07/09/2023    ANIONGAP 1 08/12/2023    GFRAA 111 10/12/2021    GFRNAA 96 10/12/2021    CA 9.6 08/12/2023     (L) 08/12/2023    K 4.4 08/12/2023    CL 98 08/12/2023    CO2 31.0 08/12/2023    OSMOCALC 268 (L) 08/12/2023        No results found for: EAG, A1C     No results found. ASSESSMENT AND PLAN:   Diagnoses and all orders for this visit:    Bilateral foot pain    Onychomycosis    Foot callus    Tailor's bunion of both feet        Plan:     Evaluated patient. Discussed treatment options with patient. Discussed proper hygiene and care for feet as well as use of emollient creams (ie Urea based creams)  Answered all patient questions. Discussed offloading hyperkeratotic lesions with proper shoe gear, offloading pads, and insoles.     Procedures: (CPT 72206 Paring or cutting of benign hyperkeratotic lesion)  One lesion pared utilizing #15 blade right foot without incident. Evaluated the patient. Discussed treatment options with the patient. Discussed with patient proper care and hygiene for their feet. Patient tolerated procedures well, without incident. Instrumentation used includes nail nippers and electric  where appropriate. Procedure: (21478 Debridement of toenails 6-10) Surgically debrided and mechanically reduced 6 or more toenails. Hemmorhage occurred none. Nails that were debrided appeared dystrophic and caused the patient pain in shoe gear. Nails 5 Left & 5 Right. RTC 3 months for nail and callus care    No follow-ups on file.     Pilar Shin DPM  8/23/2023

## 2023-08-23 NOTE — PROGRESS NOTES
[address]  [address] [facility], [address] [place] [address]  [phone]              Date: August 23, 2023  Patient Name: Briscoe Galeazzi   MRN: [id number]    Reason for Evaluation: Bilateral leg weakness and balance problem. HPI:     Briscoe Galeazzi is a 69year old male with past medical history of hypertension, hyperlipidemia, COPD, lung mass, hypothyroidism, alcohol abuse, hyponatremia, bladder cancer, right ear hearing impairment was referred to the clinic for bilateral leg weakness and balance problem. At the end of June 2023, patient saw his primary care physician Dr. Silvestre for bilateral lower extremity weakness and balance problem. He was referred to neurology for further evaluation. But at that time he got admitted in the hospital and was diagnosed to have hyponatremia with a sodium of 126, he was treated for that and once his sodium was corrected patient endorses significant improvement in his leg weakness within couple of days. As per patient he was back to his baseline, denies any weakness or balance problem at this time. At baseline patient endorses some difficulty with climbing the ladder, he says that whenever he is standing up on the ladder he feels unsteady, not sure if he is afraid of the heights. But denies any balance problem or gait unsteadiness while walking. Denies any focal tingling numbness or muscle weakness in bilateral lower extremities. Denies any back pain or radicular symptoms in bilateral lower extremities. Denies any neck pain or radicular symptoms in bilateral upper extremities. Denies any focal muscle weakness or tingling numbness in his hands. Denies any mechanical fall. Denies any problem with the bowel bladder function. At baseline he has hearing impairment and uses hearing aids. He also has a history of recurrent ear infection, denies any tinnitus. Denies any vertigo or spinning sensation.   Currently he is undergoing physical therapy for his hip problem, he is also undergoing balance therapy over there. Denies any active neurologic concerns at present. Pt presents today for evaluation. States his spouse & nephew told him sometimes when he is sitting his mouth is open. He does not realize he is doing it. Denies staring off. He states he responds when they say something to him. Denies tremors. Denies gait problems. )       OUTPATIENT MEDICATIONS  atorvastatin 10 MG Oral Tab, Take 1 tablet (10 mg total) by mouth daily. , Disp: 90 tablet, Rfl: 2  Fenofibrate 160 MG Oral Tab, Take 1 tablet (160 mg total) by mouth daily. , Disp: 90 tablet, Rfl: 2  amoxicillin clavulanate 875-125 MG Oral Tab, Take 1 tablet by mouth 2 (two) times daily. (Patient not taking: Reported on 8/23/2023), Disp: 10 tablet, Rfl: 0  predniSONE 10 MG Oral Tab, Take 3 tabs (30mg) daily for 3 days, then take 2 tabs (20mg) daily for 3 days, then take 1 tab (10mg) daily for 3 days. (Patient not taking: Reported on 8/23/2023), Disp: 18 tablet, Rfl: 0  albuterol (VENTOLIN HFA) 108 (90 Base) MCG/ACT Inhalation Aero Soln, Inhale 2 puffs into the lungs every 6 (six) hours as needed for Wheezing., Disp: 54 each, Rfl: 5  Olmesartan Medoxomil 40 MG Oral Tab, Take 1 tablet (40 mg total) by mouth daily. For blood pressure, Disp: 90 tablet, Rfl: 1  buPROPion  MG Oral Tablet 12 Hr, Take 1 tablet (150 mg total) by mouth 2 (two) times daily. For 7 days just take it in the morning and then after that he will take the second dose around 3 PM daily and do twice daily. (Patient not taking: Reported on 8/23/2023), Disp: 180 tablet, Rfl: 0  fluticasone-salmeterol (Alecia Pillow) 250-50 MCG/ACT Inhalation Aerosol Powder, Breath Activated, Inhale 1 puff into the lungs every 12 (twelve) hours. , Disp: 3 each, Rfl: 3  levothyroxine 75 MCG Oral Tab, Take 1 tablet (75 mcg total) by mouth before breakfast. Due for follow up, Disp: 90 tablet, Rfl: 2  Omeprazole 40 MG Oral Capsule Delayed Release, Take 1 capsule (40 mg total) by mouth daily. , Disp: 90 capsule, Rfl: 2  fluticasone propionate 50 MCG/ACT Nasal Suspension, 2 sprays by Nasal route daily. , Disp: 48 mL, Rfl: 2  Multiple Vitamins-Minerals (CENTRUM SILVER 50+MEN OR), Take by mouth., Disp: , Rfl:   Dutasteride 0.5 MG Oral Cap, , Disp: , Rfl:     No current facility-administered medications on file prior to visit. MEDICAL HISTORY  Past Medical History:   Diagnosis Date    Allergic rhinitis     Flonase    Bladder cancer ([facility] [address] 11/2019    f/u Rupert Robles    BPH (benign prostatic hyperplasia)     COPD ([facility] [address]     moderate-severe     Disorder of thyroid     ED (erectile dysfunction)     Esophageal reflux     Essential hypertension     Hiatal hernia     High blood pressure     High cholesterol     Hypothyroidism     Injury 01/29/2019    Left knee injury, from fall    Osteoarthritis     in knee's, hip's    Pulmonary nodules     first noted 2016on LD screen CT    Tobacco abuse        SURGICAL HISTORY  Past Surgical History:   Procedure Laterality Date    COLONOSCOPY      02-14-17    COLONOSCOPY N/A 12/28/2020    Procedure: COLONOSCOPY;  Surgeon: Ysabel Low MD;  Location: [address] [place] [address] ENDOSCOPY    ELECTROCARDIOGRAM, COMPLETE  12/15/2013    scanned to media tab    ORAL SURGERY      OTHER SURGICAL HISTORY  2009    cyst removal, side of head       SOCIAL HISTORY  Social History    Socioeconomic History      Marital status:     Tobacco Use      Smoking status: Every Day        Packs/day: 1.00        Years: 40.00        Pack years: 40        Types: Cigarettes      Smokeless tobacco: Never      Tobacco comments: 2/2022: less than 1 pack per day    Vaping Use      Vaping Use: Never used    Substance and Sexual Activity      Alcohol use:  Yes        Alcohol/week: 2.0 standard drinks of alcohol        Types: 2 Cans of beer per week        Comment: 2 beer per day      Drug use: No    Other Topics      Concerns:        Caffeine Concern: Yes          coffee-1 cup/day      FAMILY HISTORY  Family History   Problem Relation Age of Onset    Dementia Mother     Diabetes Paternal Grandmother     Cancer Sister         lung cancer       ALLERGIES  No Known Allergies    REVIEW OF SYSTEMS:   13-point review of systems was done and is negative unless otherwise stated in HPI. PHYSICAL EXAM:   /72   Pulse 92   SpO2 93%   General appearance: Well appearing, alert and in no acute distress  Skin: skin color normal.  No rashes or lesions. Head: Normocephalic, atraumatic. Neurological exam:    Mental status   Alert and oriented   Attention/Concentration: intact attention on bedside test   Fund of knowledge: intact  Speech: no dysarthria or aphasia      Cranial nerves   II - visual fields intact to confrontation                                                III, IV, VI - extra-ocular muscles full: no pupillary defect; no MICHELLE, no nystagmus, no ptosis   V - normal facial sensation                                                               VII - normal facial symmetry                                                             VIII - intact hearing                                                                             IX, X - symmetrical palate                                                                  XI - symmetrical shoulder shrug                                                       XII - midline tongue without atrophy or fasciculation     Motor function  Normal muscle bulk and tone  Muscle strength: normal power 5/5     Sensory function Intact to touch in bilateral upper and lower extremities. Cerebellar Finger to nose: no ataxia or dysmetria   Intermittent postural tremors noted in bilateral upper extremities, which improved with distraction     Reflex function Intact 2+ DTR and symmetric. Negative Babinski     Gait                  Arises independently;   normal posture; gait stable with normal stride length and arm swing.    Heel, toe walking performed without difficulty  Tandem gait with some difficulty         LABS/DATA:  8/12/2023  TSH 0.699    Vitamin B12 656  AST  27    IMAGING:  none      ASSESSMENT:    Nando Givens is a 69year old male with past medical history of hypertension, hyperlipidemia, COPD, lung mass, hypothyroidism, alcohol abuse, hyponatremia, bladder cancer, bilateral hearing impairment was referred to the clinic for bilateral leg weakness and balance problem. Patient was complaining of bilateral leg weakness and balance problem at the end of June 2023, he was found to have hyponatremia with a sodium of 126 at that time, had significant improvement in the symptoms after correction of hyponatremia. Denies any weakness at present. Did have some mild difficulty performing tandem and balance problem which may be secondary to his chronic alcohol abuse history. Denies any neck pain or back pain or radicular symptoms or focal tingling numbness or muscle weakness. History of alcohol abuse  History of hyponatremia  History of bladder cancer  History of hearing impairment uses hearing aids bilaterally      Plan    Patient was referred by his PCP for bilateral leg weakness and balance problem which improved significantly after correction of hyponatremia. Most likely his symptoms were secondary to hyponatremia. At present patient denies any weakness or gait unsteadiness. We will continue to monitor the symptoms, instructed patient to call the clinic if symptoms recur or if he develops any new neurologic concerns. On exam patient was noted to have some difficulty with tandem walking, recommend doing physical therapy for the balance problem. As per patient currently is undergoing physical therapy for the hip joints and they are also working on his balance. If it does not help we will order physical therapy for the balance problem separately. Instructed patient to call the clinic for that.     Patient is following up with nephrologist for hyponatremia. Alcohol cessation counseling was done, patient is cutting down his alcohol intake from 5 to 6 cans/day to 1 to 2 cans/day. We will follow-up in clinic as needed  Instructed patient to call the clinic if symptoms worsen or do not any new neurologic concerns or if develop any side effects. This note was prepared using [employer] voice recognition dictation software and as a result, errors may occur. When identified, these errors have been corrected.  While every attempt is made to correct errors during dictation, discrepancies may still exist    Marcela Blount MD,

## 2023-09-05 ENCOUNTER — OFFICE VISIT (OUTPATIENT)
Dept: NEPHROLOGY | Facility: CLINIC | Age: 68
End: 2023-09-05

## 2023-09-05 VITALS
SYSTOLIC BLOOD PRESSURE: 130 MMHG | DIASTOLIC BLOOD PRESSURE: 66 MMHG | HEART RATE: 78 BPM | BODY MASS INDEX: 18 KG/M2 | WEIGHT: 104 LBS

## 2023-09-05 DIAGNOSIS — E87.1 HYPONATREMIA: Primary | ICD-10-CM

## 2023-09-05 PROCEDURE — 1126F AMNT PAIN NOTED NONE PRSNT: CPT | Performed by: INTERNAL MEDICINE

## 2023-09-05 PROCEDURE — 99213 OFFICE O/P EST LOW 20 MIN: CPT | Performed by: INTERNAL MEDICINE

## 2023-09-06 NOTE — PATIENT INSTRUCTIONS
Make sure you restrict all fluid intake to a quart and 1/2/day. Repeat your sodium level in 1 month. Orders are in the computer.

## 2023-09-06 NOTE — PROGRESS NOTES
09/06/23        Patient: Briscoe Galeazzi   YOB: 1955   Date of Visit: 9/5/2023       Dear  Dr. Bryant Freed,      Thank you for referring Briscoe Galeazzi to my practice. Please find my assessment and plan below. As you know he is a 68-year-old male with a history of hypertension, bladder cancer, severe COPD, history of hypothyroidism, GERD and hypercholesterolemia who I now had the pleasure of seeing for follow-up of chronic hyponatremia. The patient just underwent a recent renal evaluation. Of note is that a cortisol and TSH level were normal.  His urine osmolality was quite low at 196 with a random urine sodium of 55. Finally a repeat sodium on August 12, 2023 was a bit better at 130. Creatinine normal at 0.72. Urinalysis was normal and his specific gravity again was quite low at 1004. I therefore informed patient he does have chronic hyponatremia. His urine is quite dilute and therefore polydipsia is playing a role. Patient states that he drank 5 beers the day before this test was done. He may also have a component of SIADH secondary to his severe COPD. Strongly advised him to follow a quart and 1/2/day fluid restriction. Cut back on alcohol consumption. Work on quitting smoking cigarettes. Discussed the potential seriousness of severe hyponatremia. Signs and symptoms were reviewed. He states he will work on all the above. Repeat renal panel in 1 month to ensure stability. He states he was on Wellbutrin but stopped it secondary to nightmares. He has resumed it recently. He was made aware that Wellbutrin can cause hyponatremia. We will see again in 6 months for follow-up or sooner if clinically indicated. Thank you again for allowing me to participate in the care of your patient. If you have any questions please feel free to call.            Sincerely,   Dann Nicolas MD   [facility], [address] [place] [facility] [address]  [address] [facility] [id number]    Document electronically generated by:  Shawnee Mccormick MD

## 2023-09-19 ENCOUNTER — NURSE TRIAGE (OUTPATIENT)
Dept: FAMILY MEDICINE CLINIC | Facility: CLINIC | Age: 68
End: 2023-09-19

## 2023-09-19 ENCOUNTER — OFFICE VISIT (OUTPATIENT)
Dept: FAMILY MEDICINE CLINIC | Facility: CLINIC | Age: 68
End: 2023-09-19

## 2023-09-19 VITALS
HEART RATE: 78 BPM | DIASTOLIC BLOOD PRESSURE: 90 MMHG | SYSTOLIC BLOOD PRESSURE: 160 MMHG | WEIGHT: 105 LBS | BODY MASS INDEX: 18 KG/M2

## 2023-09-19 DIAGNOSIS — I10 ESSENTIAL HYPERTENSION, BENIGN: ICD-10-CM

## 2023-09-19 DIAGNOSIS — J30.1 SEASONAL ALLERGIC RHINITIS DUE TO POLLEN: Primary | ICD-10-CM

## 2023-09-19 DIAGNOSIS — J30.1 ALLERGIC RHINITIS DUE TO POLLEN, UNSPECIFIED SEASONALITY: ICD-10-CM

## 2023-09-19 DIAGNOSIS — R42 DIZZINESS: ICD-10-CM

## 2023-09-19 DIAGNOSIS — H61.21 RIGHT EAR IMPACTED CERUMEN: ICD-10-CM

## 2023-09-19 DIAGNOSIS — F17.200 TOBACCO USE DISORDER: ICD-10-CM

## 2023-09-19 PROCEDURE — 99214 OFFICE O/P EST MOD 30 MIN: CPT | Performed by: NURSE PRACTITIONER

## 2023-09-19 RX ORDER — FLUTICASONE PROPIONATE 50 MCG
2 SPRAY, SUSPENSION (ML) NASAL DAILY
Qty: 48 ML | Refills: 2 | Status: SHIPPED | OUTPATIENT
Start: 2023-09-19

## 2023-09-19 NOTE — ASSESSMENT & PLAN NOTE
Treat for allergy symptoms  Please call if symptoms worsen or are not resolving.   Check bp twice a day x 3 days and call results

## 2023-09-19 NOTE — TELEPHONE ENCOUNTER
Please reply to pool: EM RN TRIAGE  Action Requested: Summary for Provider     []  Critical Lab, Recommendations Needed  [] Need Additional Advice  [x]   FYI    []   Need Orders  [] Need Medications Sent to Pharmacy  []  Other     SUMMARY: Patient contacts clinic reporting dizziness for several days. Feels off balance when he turns his head to the side. Denies fever, body aches, chills or congestion. Denies facial numbness, weakness or tingling. Denies unilateral weakness. Patient states he can be in the office in 5 minutes. Acute visit booked today.     Reason for call: Acute  Onset: Data Unavailable                       Reason for Disposition   Spinning or tilting sensation (vertigo) present now    Protocols used: Dizziness-A-OH

## 2023-09-20 ENCOUNTER — OFFICE VISIT (OUTPATIENT)
Dept: OTOLARYNGOLOGY | Facility: CLINIC | Age: 68
End: 2023-09-20

## 2023-09-20 DIAGNOSIS — H61.23 BILATERAL IMPACTED CERUMEN: ICD-10-CM

## 2023-09-20 DIAGNOSIS — R42 DIZZINESS: Primary | ICD-10-CM

## 2023-09-20 PROCEDURE — 69210 REMOVE IMPACTED EAR WAX UNI: CPT | Performed by: STUDENT IN AN ORGANIZED HEALTH CARE EDUCATION/TRAINING PROGRAM

## 2023-09-20 PROCEDURE — 99213 OFFICE O/P EST LOW 20 MIN: CPT | Performed by: STUDENT IN AN ORGANIZED HEALTH CARE EDUCATION/TRAINING PROGRAM

## 2023-09-20 NOTE — PROGRESS NOTES
Deyvi Ramon is a 69year old male. Patient presents with:  Dizziness: Patient has been having dizziness for about 1 week. Right ear needs cleaning. ASSESSMENT AND PLAN:   1. Dizziness  69year-old presents with dizziness. He describes it as unsteadiness. Not necessarily vertigo. This has been going on for about 1 to 2 weeks. Was told that he had a right ear cerumen impaction. When he walks such as toe to toe he has a harder time keeping his balance. Also becomes dizzy when he moves his head more quickly. Denies any headache. No speech or vision issues or muscle weakness or numbness. No medication changes. He states that his blood pressure is actually slightly high lately not low. Denies any cardiac issues. On exam he had cerumen impaction that was debrided. Otherwise normal otologic exam.  No gross nystagmus. Cranial nerve exam grossly intact. Muscle strength and sensory grossly intact. I did have him walk he was able to walk okay normally but heel-to-toe he had more difficulty. Had a normal rhythm on examination of his pulse. His symptoms appear to be more related to an equilibrium issue possibly musculoskeletal related or age-related rather than inner ear and vertigo. We will refer him to vestibular rehab for evaluation and I gave him home exercises. If he does not see improvement in the next 1 to 2 weeks he should touch base with his neurologist that he is established with. We will defer to them if MRI of his brain is needed. No other neurologic symptoms to suggest a stroke at this time. Discussed that he needs to be careful with his activities to avoid falling until we know what is going on or he recovers. 2. Bilateral impacted cerumen  Debrided      The patient indicates understanding of these issues and agrees to the plan. EXAM:   There were no vitals taken for this visit.     Pertinent exam findings may also be noted above in assessment and plan     System Details   Skin Inspection - Normal.   Constitutional Overall appearance - Normal.   Head/Face Symmetric, TMJ tenderness not present    Eyes EOMI, PERRL   Right ear:  Canal clear, TM intact, no LELAND   Left ear:  Canal clear, TM intact, no LELAND   Nose: Septum midline, inferior turbinates not enlarged, nasal valves without collapse    Oral cavity/Oropharynx: No lesions or masses on inspection or palpation, tonsils symmetric    Neck: Soft without LAD, thyroid not enlarged  Voice clear/ no stridor   Other:      Scopes and Procedures:     Canals:  Left: Canal with cerumen preventing adequate view of TM, debrided with instrumentation  Right: Canal with cerumen preventing adequate view of TM, debrided with instrumentation    Tympanic Membranes:  Left: Normal tympanic membrane. Right: Normal tympanic membrane. TM Visualized Method:   Left TM examined via otomicroscopy. Right TM examined via otomicroscopy. PROCEDURE:   Removal of cerumen impaction   The cerumen impaction was completely removed on the left and right sides using microscopy as necessary. Removal was completed by using a curette and suction. Current Outpatient Medications   Medication Sig Dispense Refill    fluticasone propionate 50 MCG/ACT Nasal Suspension 2 sprays by Nasal route daily. 48 mL 2    albuterol (VENTOLIN HFA) 108 (90 Base) MCG/ACT Inhalation Aero Soln Inhale 2 puffs into the lungs every 6 (six) hours as needed for Wheezing. 54 each 5    Olmesartan Medoxomil 40 MG Oral Tab Take 1 tablet (40 mg total) by mouth daily. For blood pressure 90 tablet 1    buPROPion  MG Oral Tablet 12 Hr Take 1 tablet (150 mg total) by mouth 2 (two) times daily. For 7 days just take it in the morning and then after that he will take the second dose around 3 PM daily and do twice daily.  180 tablet 0    fluticasone-salmeterol (WIXELA INHUB) 250-50 MCG/ACT Inhalation Aerosol Powder, Breath Activated Inhale 1 puff into the lungs every 12 (twelve) hours. 3 each 3    atorvastatin 10 MG Oral Tab Take 1 tablet (10 mg total) by mouth daily. 90 tablet 2    Fenofibrate 160 MG Oral Tab Take 1 tablet (160 mg total) by mouth daily. 90 tablet 2    levothyroxine 75 MCG Oral Tab Take 1 tablet (75 mcg total) by mouth before breakfast. Due for follow up 90 tablet 2    Omeprazole 40 MG Oral Capsule Delayed Release Take 1 capsule (40 mg total) by mouth daily. 90 capsule 2    Multiple Vitamins-Minerals (CENTRUM SILVER 50+MEN OR) Take by mouth. Dutasteride 0.5 MG Oral Cap         Past Medical History:   Diagnosis Date    Allergic rhinitis     Flonase    Bladder cancer ([facility] [address] 11/2019    f/u Aleisha Blight    BPH (benign prostatic hyperplasia)     COPD ([facility] [address]     moderate-severe     Disorder of thyroid     ED (erectile dysfunction)     Esophageal reflux     Essential hypertension     Hiatal hernia     High blood pressure     High cholesterol     Hypothyroidism     Injury 01/29/2019    Left knee injury, from fall    Osteoarthritis     in knee's, hip's    Pulmonary nodules     first noted 2016on LD screen CT    Tobacco abuse       Social History:  Social History     Socioeconomic History    Marital status:    Tobacco Use    Smoking status: Every Day     Packs/day: 1.00     Years: 40.00     Additional pack years: 0.00     Total pack years: 40.00     Types: Cigarettes    Smokeless tobacco: Never    Tobacco comments:     2/2022: less than 1 pack per day   Vaping Use    Vaping Use: Never used   Substance and Sexual Activity    Alcohol use:  Yes     Alcohol/week: 2.0 standard drinks of alcohol     Types: 2 Cans of beer per week     Comment: 2 beer per day    Drug use: No   Other Topics Concern    Caffeine Concern Yes     Comment: coffee-1 cup/day   Social History Narrative    Live with fianace    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          Maria Esther Dior MD  9/20/2023  9:33 AM

## 2023-09-28 DIAGNOSIS — F17.200 TOBACCO USE DISORDER: ICD-10-CM

## 2023-09-28 RX ORDER — BUPROPION HYDROCHLORIDE 150 MG/1
150 TABLET, EXTENDED RELEASE ORAL 2 TIMES DAILY
Qty: 180 TABLET | Refills: 3 | Status: SHIPPED | OUTPATIENT
Start: 2023-09-28

## 2023-09-28 NOTE — TELEPHONE ENCOUNTER
Refill passed per [place] [facility] protocol    Requested Prescriptions   Pending Prescriptions Disp Refills    BUPROPION  MG Oral Tablet 12 Hr [Pharmacy Med Name: BUPROPION HCL  MG TABLET] 180 tablet 0     Sig: Take 1 tablet (150 mg total) by mouth 2 (two) times daily. For 7 days just take it in the morning and then after that he will take the second dose around 3 PM daily and do twice daily.        Psychiatric Non-Scheduled (Anti-Anxiety) Passed - 9/28/2023 12:54 AM        Passed - In person appointment or virtual visit in the past 6 mos or appointment in next 3 mos     Recent Outpatient Visits              1 week ago Doris De La Paz, [address], Cornelia Severs, MD    Office Visit    1 week ago Seasonal allergic rhinitis due to pollen    [address] [facility], MEENAKSHI Michel    Office Visit    3 weeks ago Hyponatremia    Landy Newton MD    Office Visit    1 month ago Balance problem    [address] [facility], Augustin Villa MD    Office Visit    1 month ago Bilateral foot pain    [facility], [address], [place] [facility]    Office Visit          Future Appointments         Provider Department Appt Notes    In 1 week [place] order from Dr. Estela Kim 6/29/23 - [address]    In 1 month Uzma Berman MD [address], [address], [place]     In 2 months Savannah Solis DPM [facility], [address], [place] 3 mo f/u nailcare

## 2023-10-09 ENCOUNTER — HOSPITAL ENCOUNTER (OUTPATIENT)
Dept: CT IMAGING | Age: 68
Discharge: HOME | End: 2023-10-09
Attending: INTERNAL MEDICINE
Payer: MEDICARE

## 2023-10-09 DIAGNOSIS — R91.1 PULMONARY NODULE: ICD-10-CM

## 2023-10-09 PROCEDURE — 71250 CT THORAX DX C-: CPT | Performed by: INTERNAL MEDICINE

## 2023-10-13 ENCOUNTER — HOSPITAL ENCOUNTER (OUTPATIENT)
Age: 68
Discharge: HOME | End: 2023-10-13
Payer: MEDICARE

## 2023-10-19 ENCOUNTER — MED REC SCAN ONLY (OUTPATIENT)
Dept: NEPHROLOGY | Facility: CLINIC | Age: 68
End: 2023-10-19

## 2023-10-27 ENCOUNTER — LAB ENCOUNTER (OUTPATIENT)
Dept: LAB | Age: 68
End: 2023-10-27
Attending: UROLOGY

## 2023-10-27 DIAGNOSIS — E87.1 HYPONATREMIA: ICD-10-CM

## 2023-10-27 DIAGNOSIS — C67.9 BLADDER CANCER (HCC): Primary | ICD-10-CM

## 2023-10-27 LAB
ALBUMIN SERPL-MCNC: 4.2 G/DL (ref 3.4–5)
ANION GAP SERPL CALC-SCNC: 2 MMOL/L (ref 0–18)
BUN BLD-MCNC: 16 MG/DL (ref 7–18)
BUN/CREAT SERPL: 19.5 (ref 10–20)
CALCIUM BLD-MCNC: 9.8 MG/DL (ref 8.5–10.1)
CHLORIDE SERPL-SCNC: 99 MMOL/L (ref 98–112)
CO2 SERPL-SCNC: 31 MMOL/L (ref 21–32)
CREAT BLD-MCNC: 0.82 MG/DL
EGFRCR SERPLBLD CKD-EPI 2021: 95 ML/MIN/1.73M2 (ref 60–?)
GLUCOSE BLD-MCNC: 93 MG/DL (ref 70–99)
OSMOLALITY SERPL CALC.SUM OF ELEC: 275 MOSM/KG (ref 275–295)
PHOSPHATE SERPL-MCNC: 3.1 MG/DL (ref 2.5–4.9)
POTASSIUM SERPL-SCNC: 4.8 MMOL/L (ref 3.5–5.1)
PSA SERPL-MCNC: 0.98 NG/ML (ref ?–4)
SODIUM SERPL-SCNC: 132 MMOL/L (ref 136–145)

## 2023-10-27 PROCEDURE — 80069 RENAL FUNCTION PANEL: CPT

## 2023-10-27 PROCEDURE — 36415 COLL VENOUS BLD VENIPUNCTURE: CPT

## 2023-10-27 PROCEDURE — 84153 ASSAY OF PSA TOTAL: CPT

## 2023-10-28 ENCOUNTER — LAB ENCOUNTER (OUTPATIENT)
Dept: LAB | Age: 68
End: 2023-10-28
Attending: FAMILY MEDICINE
Payer: MEDICARE

## 2023-10-28 ENCOUNTER — TELEPHONE (OUTPATIENT)
Dept: FAMILY MEDICINE CLINIC | Facility: CLINIC | Age: 68
End: 2023-10-28

## 2023-10-28 DIAGNOSIS — Z20.822 CLOSE EXPOSURE TO COVID-19 VIRUS: ICD-10-CM

## 2023-10-28 DIAGNOSIS — Z20.822 CLOSE EXPOSURE TO COVID-19 VIRUS: Primary | ICD-10-CM

## 2023-10-28 PROCEDURE — 87635 SARS-COV-2 COVID-19 AMP PRB: CPT

## 2023-10-28 NOTE — TELEPHONE ENCOUNTER
Patient contacts clinic reporting covid exposure. Wife tested positiv and patient has procedure scheduled Tuesday. Patient is asymptomatic. Order for PCR placed per protocol.

## 2023-10-29 LAB — SARS-COV-2 RNA RESP QL NAA+PROBE: NOT DETECTED

## 2023-11-10 ENCOUNTER — OFFICE VISIT (OUTPATIENT)
Dept: PULMONOLOGY | Facility: CLINIC | Age: 68
End: 2023-11-10
Payer: MEDICARE

## 2023-11-10 VITALS
SYSTOLIC BLOOD PRESSURE: 127 MMHG | WEIGHT: 113 LBS | BODY MASS INDEX: 19.06 KG/M2 | DIASTOLIC BLOOD PRESSURE: 77 MMHG | HEIGHT: 64.5 IN | OXYGEN SATURATION: 97 % | HEART RATE: 85 BPM

## 2023-11-10 DIAGNOSIS — R91.1 LUNG NODULE: Primary | ICD-10-CM

## 2023-11-10 DIAGNOSIS — J44.9 CHRONIC OBSTRUCTIVE PULMONARY DISEASE, UNSPECIFIED COPD TYPE (HCC): ICD-10-CM

## 2023-11-10 PROCEDURE — 99214 OFFICE O/P EST MOD 30 MIN: CPT | Performed by: INTERNAL MEDICINE

## 2023-11-10 PROCEDURE — 1126F AMNT PAIN NOTED NONE PRSNT: CPT | Performed by: INTERNAL MEDICINE

## 2023-11-10 NOTE — PROGRESS NOTES
Subjective:   Patient ID: Robby Cohn is a 69year old male. HPI  Overall doing very well and cut down on smoking to 10 cigarettes a day  No active cough or sputum or hemoptysis  No fever or chills  No weight loss or night sweats and gained about 10 pounds in the last few months  Dyspnea upon exertion and overall feels better with inhalers with good exercise tolerance  History/Other:   Review of Systems   Constitutional: Negative. HENT: Negative. Respiratory: Negative. Cardiovascular: Negative. Gastrointestinal: Negative. Musculoskeletal: Negative. Skin: Negative. Neurological: Negative. Hematological: Negative. Psychiatric/Behavioral: Negative. Current Outpatient Medications   Medication Sig Dispense Refill    phenazopyridine HCl (AZO TABS) 95 MG Oral Tab Take 1 tablet (95 mg total) by mouth 3 (three) times daily as needed for Pain. 21 tablet 6    fluticasone propionate 50 MCG/ACT Nasal Suspension 2 sprays by Nasal route daily. 48 mL 2    Olmesartan Medoxomil 40 MG Oral Tab Take 1 tablet (40 mg total) by mouth daily. For blood pressure 90 tablet 1    fluticasone-salmeterol (WIXELA INHUB) 250-50 MCG/ACT Inhalation Aerosol Powder, Breath Activated Inhale 1 puff into the lungs every 12 (twelve) hours. 3 each 3    atorvastatin 10 MG Oral Tab Take 1 tablet (10 mg total) by mouth daily. 90 tablet 2    Fenofibrate 160 MG Oral Tab Take 1 tablet (160 mg total) by mouth daily. 90 tablet 2    levothyroxine 75 MCG Oral Tab Take 1 tablet (75 mcg total) by mouth before breakfast. Due for follow up 90 tablet 2    Omeprazole 40 MG Oral Capsule Delayed Release Take 1 capsule (40 mg total) by mouth daily. 90 capsule 2    Multiple Vitamins-Minerals (CENTRUM SILVER 50+MEN OR) Take by mouth. Dutasteride 0.5 MG Oral Cap       albuterol (VENTOLIN HFA) 108 (90 Base) MCG/ACT Inhalation Aero Soln Inhale 2 puffs into the lungs every 6 (six) hours as needed for Wheezing.  (Patient not taking: Reported on 11/10/2023) 54 each 5     Allergies:No Known Allergies    Objective:   Physical Exam  Constitutional:       General: He is not in acute distress. HENT:      Head: Normocephalic and atraumatic. Nose: Nose normal.      Mouth/Throat:      Mouth: Mucous membranes are moist.   Eyes:      General: No scleral icterus. Pupils: Pupils are equal, round, and reactive to light. Cardiovascular:      Rate and Rhythm: Normal rate and regular rhythm. Heart sounds: No murmur heard. No gallop. Pulmonary:      Effort: Pulmonary effort is normal. No respiratory distress. Breath sounds: Normal breath sounds. No stridor. No wheezing, rhonchi or rales. Chest:      Chest wall: No tenderness. Abdominal:      General: Abdomen is flat. There is no distension. Palpations: Abdomen is soft. There is no mass. Tenderness: There is no guarding or rebound. Musculoskeletal:      Cervical back: Normal range of motion. No rigidity. Right lower leg: No edema. Left lower leg: No edema. Lymphadenopathy:      Cervical: No cervical adenopathy. Skin:     General: Skin is dry. Neurological:      General: No focal deficit present. Mental Status: He is oriented to person, place, and time. Mental status is at baseline. Chest ct 10/9/2023 reviewed independently  FINDINGS:  CARDIAC: The heart is within normal limits of size. There are coronary artery calcifications. No pericardial effusion. MEDIASTINUM/MEG: No mass or enlarged adenopathy. LUNGS: There is a 1.0 x 0.9 cm right upper lobe pulmonary nodule (2:32), previously 1.3 x 1.1 cm when measured similarly on the CT chest dated 06/13/2023. There is an enlarging focus of air involving the anterior aspect of this lesion. There is  unchanged upper lobe predominant emphysema. Minimal increase in size in the small area of nodularity involving the anterior left upper lobe (2:51).   There is diffuse peribronchial thickening with mucous plugging. No significant change in the 0.9 x 0.8  cm cardiac bronchus arising from the bronchus intermedius (5:36). There unchanged minimal peripheral reticulations, most notable in the right lower lobe (2:76). There is minimal linear left basilar atelectasis. VASCULATURE: The main pulmonary artery is normal in caliber. THORACIC AORTA: The ascending aorta is normal in caliber. Mild atherosclerotic calcification of the aortic arch and proximal arch vessels. PLEURA:   No mass or effusion. No pneumothorax  CHEST WALL: No axillary mass or enlarged adenopathy. LIMITED ABDOMEN: There is unchanged subcentimeter hypodense lesion involving the right hepatic lobe (2:91). .  BONES:   The bones are demineralized. There are multilevel degenerative changes of the thoracic spine. No acute fracture. No aggressive osseous lesion. OTHER: Negative. Impression   CONCLUSION:     1. Continued slight decrease in size in the 10 mm right upper lobe pulmonary nodule, previously measuring 13 mm on the CT chest dated 06/13/2023. This finding is again favored to be infectious/inflammatory. 2. Minimal increase in the subcentimeter nodularity involving the anterior left upper lobe, which is likely infectious/inflammatory. 3. Diffuse small airways disease. 4. No significant change in the pulmonary emphysema and peripheral reticulations in both lungs, which likely reflects mild combined pulmonary fibrosis and emphysema. 5. Lesser incidental findings described above. Assessment & Plan:   1. Lung nodule    2. Chronic obstructive pulmonary disease, unspecified COPD type ([facility] [address]        1- lung nodule      Seen on chest ct 2/8/2023  ( 1.7 x 1.4 cm RUL )     PET scan 2/17/2023 negative      Smaller on chest ct 6/14/2023 around 12 mm      smaller on last chest ct 10/13/2023  around 10 mm      The decrease in size of the nodule and  previously negative PET scan more suggestive for benign nodule . F/u chest ct in 6 months interval         2-severe COPD   lifelong history of smoking / continue to smoke   FEV1 44 % 1.19 L     Stable with no evidence of exacerbation  Complete smoking cessation and extensive counseling provided  Continue with ICS/LABA with Wixela  Albuterol as needed  Recommend flu, RSV, COVID-19 vaccination        F/u in 6  months             Meds This Visit:  Requested Prescriptions      No prescriptions requested or ordered in this encounter       Imaging & Referrals:  None

## 2023-11-29 ENCOUNTER — OFFICE VISIT (OUTPATIENT)
Dept: PODIATRY CLINIC | Facility: CLINIC | Age: 68
End: 2023-11-29
Payer: MEDICARE

## 2023-11-29 DIAGNOSIS — B35.1 ONYCHOMYCOSIS: ICD-10-CM

## 2023-11-29 DIAGNOSIS — L84 FOOT CALLUS: ICD-10-CM

## 2023-11-29 DIAGNOSIS — M21.621 TAILOR'S BUNION OF BOTH FEET: ICD-10-CM

## 2023-11-29 DIAGNOSIS — M79.671 BILATERAL FOOT PAIN: Primary | ICD-10-CM

## 2023-11-29 DIAGNOSIS — M79.672 BILATERAL FOOT PAIN: Primary | ICD-10-CM

## 2023-11-29 DIAGNOSIS — M21.622 TAILOR'S BUNION OF BOTH FEET: ICD-10-CM

## 2023-11-29 PROCEDURE — 1126F AMNT PAIN NOTED NONE PRSNT: CPT | Performed by: PODIATRIST

## 2023-11-29 PROCEDURE — 99213 OFFICE O/P EST LOW 20 MIN: CPT | Performed by: PODIATRIST

## 2023-11-29 NOTE — PROGRESS NOTES
[facility], [facility] Podiatry  Progress Note    Steven Lemons is a 69year old male. Chief Complaint   Patient presents with    Toenail Care     3 mo f/u - has no c/o regarding his feet         HPI:     This is a pleasant male with COPD, alcohol abuse, PMH of bladder cancer, in remission, and tobacco use disorder. He presents to clinic today due to long thick painful toenails and painful right foot callus. Allergies: Patient has no known allergies. Current Outpatient Medications   Medication Sig Dispense Refill    phenazopyridine HCl (AZO TABS) 95 MG Oral Tab Take 1 tablet (95 mg total) by mouth 3 (three) times daily as needed for Pain. 21 tablet 6    fluticasone propionate 50 MCG/ACT Nasal Suspension 2 sprays by Nasal route daily. 48 mL 2    albuterol (VENTOLIN HFA) 108 (90 Base) MCG/ACT Inhalation Aero Soln Inhale 2 puffs into the lungs every 6 (six) hours as needed for Wheezing. (Patient not taking: Reported on 11/10/2023) 54 each 5    Olmesartan Medoxomil 40 MG Oral Tab Take 1 tablet (40 mg total) by mouth daily. For blood pressure 90 tablet 1    fluticasone-salmeterol (WIXELA INHUB) 250-50 MCG/ACT Inhalation Aerosol Powder, Breath Activated Inhale 1 puff into the lungs every 12 (twelve) hours. 3 each 3    atorvastatin 10 MG Oral Tab Take 1 tablet (10 mg total) by mouth daily. 90 tablet 2    Fenofibrate 160 MG Oral Tab Take 1 tablet (160 mg total) by mouth daily. 90 tablet 2    levothyroxine 75 MCG Oral Tab Take 1 tablet (75 mcg total) by mouth before breakfast. Due for follow up 90 tablet 2    Omeprazole 40 MG Oral Capsule Delayed Release Take 1 capsule (40 mg total) by mouth daily. 90 capsule 2    Multiple Vitamins-Minerals (CENTRUM SILVER 50+MEN OR) Take by mouth.       Dutasteride 0.5 MG Oral Cap         Past Medical History:   Diagnosis Date    Allergic rhinitis     Flonase    Bladder cancer ([facility] [address] 11/2019    f/u Chanda Vazquez    BPH (benign prostatic hyperplasia)     COPD ([address] moderate-severe     Disorder of thyroid     ED (erectile dysfunction)     Esophageal reflux     Essential hypertension     Hiatal hernia     High blood pressure     High cholesterol     Hypothyroidism     Injury 01/29/2019    Left knee injury, from fall    Osteoarthritis     in knee's, hip's    Pulmonary nodules     first noted 2016on LD screen CT    Tobacco abuse       Past Surgical History:   Procedure Laterality Date    COLONOSCOPY      02-14-17    COLONOSCOPY N/A 12/28/2020    Procedure: COLONOSCOPY;  Surgeon: Yudi Funes MD;  Location: [address] [place] [address] ENDOSCOPY    ELECTROCARDIOGRAM, COMPLETE  12/15/2013    scanned to media tab    ORAL SURGERY      OTHER SURGICAL HISTORY  2009    cyst removal, side of head      Family History   Problem Relation Age of Onset    Dementia Mother     Diabetes Paternal Grandmother     Cancer Sister         lung cancer      Social History     Socioeconomic History    Marital status:    Tobacco Use    Smoking status: Every Day     Packs/day: 1.00     Years: 40.00     Additional pack years: 0.00     Total pack years: 40.00     Types: Cigarettes    Smokeless tobacco: Never    Tobacco comments:     2/2022: less than 1 pack per day   Vaping Use    Vaping Use: Never used   Substance and Sexual Activity    Alcohol use: Yes     Alcohol/week: 2.0 standard drinks of alcohol     Types: 2 Cans of beer per week     Comment: 2 beer per day    Drug use: No   Other Topics Concern    Caffeine Concern Yes     Comment: coffee-1 cup/day           REVIEW OF SYSTEMS:   Denies nausea, fever, chills  No calf pain  No other muscle or joint aches  Denies chest pain or shortness of breath. EXAM:   There were no vitals taken for this visit. Constitutional:   Patient in no apparent distress. Well kept. Of normal body habitus. Alert and oriented to person, place, and time. Vascular Examination:  DP pulse is NP  PT pulse is palpable  Capillary refill is adequate  Integumentary Examination:    The patient's nails appear incurvated, thickened, elongated, dystrophic, discolored with subungual debris 1-5 right, 1-5  left nails. Callus right sub 5th met head  Skin is of diminished texture and decreased turgor. Neurological Examination:  Sharp/dull is present to right and is present to left. Parasthesias absent. Musculoskeletal Examination:  Muscle Strength is 5/5. B/L tailor bunion  Pain on palpation to nails and right foot callus      LABS & IMAGING:     Lab Results   Component Value Date    GLU 93 10/27/2023    BUN 16 10/27/2023    CREATSERUM 0.82 10/27/2023    BUNCREA 19.5 10/27/2023    ANIONGAP 2 10/27/2023    GFRAA 111 10/12/2021    GFRNAA 96 10/12/2021    CA 9.8 10/27/2023     (L) 10/27/2023    K 4.8 10/27/2023    CL 99 10/27/2023    CO2 31.0 10/27/2023    OSMOCALC 275 10/27/2023        No results found for: \"EAG\", \"A1C\"     No results found. ASSESSMENT AND PLAN:   Diagnoses and all orders for this visit:    Bilateral foot pain    Onychomycosis    Foot callus    Tailor's bunion of both feet          Plan:     Evaluated patient. Discussed treatment options with patient. Discussed proper hygiene and care for feet as well as use of emollient creams (ie Urea based creams)  Answered all patient questions. Discussed offloading hyperkeratotic lesions with proper shoe gear, offloading pads, and insoles. Procedures: (CPT 87887 Paring or cutting of benign hyperkeratotic lesion)  One lesion pared utilizing #15 blade right foot without incident. Evaluated the patient. Discussed treatment options with the patient. Discussed with patient proper care and hygiene for their feet. Patient tolerated procedures well, without incident. Instrumentation used includes nail nippers and electric  where appropriate. Procedure: (81305 Debridement of toenails 6-10) Surgically debrided and mechanically reduced 6 or more toenails. Hemmorhage occurred none. Nails that were debrided appeared dystrophic and caused the patient pain in shoe gear. Nails 5 Left & 5 Right. RTC 3 months for nail and callus care    No follow-ups on file.     Edel Mccall DPM  11/29/23

## 2023-12-19 DIAGNOSIS — E03.9 ACQUIRED HYPOTHYROIDISM: ICD-10-CM

## 2023-12-19 DIAGNOSIS — I10 ESSENTIAL HYPERTENSION: ICD-10-CM

## 2023-12-19 RX ORDER — OLMESARTAN MEDOXOMIL 40 MG/1
40 TABLET ORAL DAILY
Qty: 90 TABLET | Refills: 3 | Status: SHIPPED | OUTPATIENT
Start: 2023-12-19

## 2023-12-19 RX ORDER — LEVOTHYROXINE SODIUM 0.07 MG/1
75 TABLET ORAL
Qty: 90 TABLET | Refills: 3 | Status: SHIPPED | OUTPATIENT
Start: 2023-12-19

## 2023-12-20 NOTE — TELEPHONE ENCOUNTER
Refill passed per [place] [facility], [facility] protocol.     Requested Prescriptions   Pending Prescriptions Disp Refills    OLMESARTAN MEDOXOMIL 40 MG Oral Tab [Pharmacy Med Name: OLMESARTAN MEDOXOMIL 40 MG TAB] 90 tablet 1     Sig: TAKE 1 TABLET (40 MG TOTAL) BY MOUTH DAILY FOR BLOOD PRESSURE       Hypertensive Medications Protocol Passed - 12/19/2023 12:44 AM        Passed - In person appointment in the past 12 or next 3 months     Recent Outpatient Visits              2 weeks ago Bilateral foot pain    [facility], [address], [place], [place]    Office Visit    1 month ago Lung nodule    [address], [address] [place] [address] Fay Figueredo MD    Office Visit    3 months ago Dizziness    [address] [facility], [place], Megan Johnson MD    Office Visit    3 months ago Seasonal allergic rhinitis due to pollen    [address] [facility], Santy Axtell Pack, APRN    Office Visit    3 months ago Hyponatremia    Jose Cheng MD    Office Visit          Future Appointments         Provider Department Appt Notes    In 2 months Benji Vincent DPM [facility], [address], [place] follow up    In 4 months Fay Figueredo MD [facility], [address] [place] [address] 6 month follow up               Passed - Last BP reading less than 140/90     BP Readings from Last 1 Encounters:   11/10/23 127/77               Passed - CMP or BMP in past 6 months     Recent Results (from the past 4392 hour(s))   Basic Metabolic Panel (8)    Collection Time: 07/09/23 10:37 AM   Result Value Ref Range    Glucose 108 (H) 70 - 99 mg/dL    Sodium 126 (L) 136 - 145 mmol/L    Potassium 4.4 3.5 - 5.1 mmol/L    Chloride 93 (L) 98 - 112 mmol/L    CO2 25.0 21.0 - 32.0 mmol/L    Anion Gap 8 0 - 18 mmol/L    BUN 13 7 - 18 mg/dL    Creatinine 0.76 0.70 - 1.30 mg/dL BUN/CREA Ratio 17.1 10.0 - 20.0    Calcium, Total 9.6 8.5 - 10.1 mg/dL    Calculated Osmolality 263 (L) 275 - 295 mOsm/kg    eGFR-Cr 97 >=60 mL/min/1.73m2     *Note: Due to a large number of results and/or encounters for the requested time period, some results have not been displayed. A complete set of results can be found in Results Review. Passed - In person appointment or virtual visit in the past 6 months     Recent Outpatient Visits              2 weeks ago Bilateral foot pain    [facility], [address], [place], Chiquita Armour, [place]    Office Visit    1 month ago Lung nodule    [address],[facility], [address] [place] [address] Edel Sexton MD    Office Visit    3 months ago Dizziness    Shoaib Scales Turner MD [place]    Office Visit    3 months ago Seasonal allergic rhinitis due to pollen    Santy Scales APRN    Office Visit    3 months ago Hyponatremia    John Frank MD    Office Visit          Future Appointments         Provider Department Appt Notes    In 2 months Daniel Fatima DPM [facility], [address], [place] follow up    In 4 months Edel Sexton MD [address],[place] [address], [address] [place] [address] 6 month follow up               Passed [facility] or GFRNAA > 50     GFR Evaluation  EGFRCR: 95 , resulted on 10/27/2023            LEVOTHYROXINE 75 MCG Oral Tab [Pharmacy Med Name: LEVOTHYROXINE 75 MCG TABLET] 90 tablet 2     Sig: TAKE 1 TABLET (75 MCG TOTAL) BY MOUTH BEFORE BREAKFAST.  DUE FOR FOLLOW UP       Thyroid Medication Protocol Passed - 12/19/2023 12:44 AM        Passed - TSH in past 12 months        Passed - Last TSH value is normal     Lab Results   Component Value Date    TSH 0.699 08/12/2023                 Passed - In person appointment or virtual visit in the past 12 mos or appointment in next 3 mos     Recent Outpatient Visits              2 weeks ago Bilateral foot pain    [facility], [address], [place]    Office Visit    1 month ago Lung nodule    [facility], [address] [place] [address] Tanner Kunz MD    Office Visit    3 months ago Dizziness    Cesia Bello, [address] [facility],[address], [facility] Marcos Villarreal MD    Office Visit    3 months ago Seasonal allergic rhinitis due to pollen    [address] [facility], [facility] Hermilo, APR    Office Visit    3 months ago Hyponatremia    True Cota MD    Office Visit          Future Appointments         Provider Department Appt Notes    In 2 months Chandana Lopez DPM [facility], [address], [place] follow up    In 4 months MD Cesia Ervin, [address] [place] [address] 6 month follow up

## 2023-12-23 RX ORDER — FLUTICASONE PROPIONATE AND SALMETEROL 250; 50 UG/1; UG/1
1 POWDER RESPIRATORY (INHALATION) EVERY 12 HOURS
Qty: 3 EACH | Refills: 3 | Status: SHIPPED | OUTPATIENT
Start: 2023-12-23

## 2023-12-23 NOTE — TELEPHONE ENCOUNTER
Refill passed per [facility] protocol. Requested Prescriptions   Pending Prescriptions Disp Refills    FLUTICASONE-SALMETEROL 250-50 MCG/ACT Inhalation Aerosol Powder, Breath Activated [Pharmacy Med Name: Darylene Fritz 250-50 INHUB] 60 each 5     Sig: INHALE 1 PUFF INTO THE LUNGS EVERY 12 HOURS.        Asthma & COPD Medication Protocol Passed - 12/22/2023 12:33 PM        Passed - In person appointment or virtual visit in the past 6 mos or appointment in next 3 mos     Recent Outpatient Visits              3 weeks ago Bilateral foot pain    [facility], [address], [place]    Office Visit    1 month ago Lung nodule    [facility], [address] [place] [address] Liv Brown MD    Office Visit    3 months ago Dizziness    Vero Nelson, [address] [facility],[address], [place], Zoë Holland MD    Office Visit    3 months ago Seasonal allergic rhinitis due to pollen    Barnetta Huxford, [place] Howard Coffman, APRN    Office Visit    3 months ago Hyponatremia    Lori Savage MD    Office Visit          Future Appointments         Provider Department Appt Notes    In 2 months Adolph Arias DPM [facility], [address], [place] follow up    In 4 months MD Vero Lin, [address] [place] [address] 6 month follow up                  Recent Outpatient Visits              3 weeks ago Bilateral foot pain    [facility], [address], [place] Dione Wolfe DPM    Office Visit    1 month ago Lung nodule    Fabienne Brandon MD    Office Visit    3 months ago Mandie Lilly, [place]    Office Visit    3 months ago Seasonal allergic rhinitis due to pollen    [address] [facility] [address], [place] MEENAKSHI Lewis    Office Visit    3 months ago Hyponatremia    [facility], Tena Cai MD    Office Visit          Future Appointments         Provider Department Appt Notes    In 2 months Reynold Pinzon DPM [facility], [address], [place] follow up    In 4 months Davi March MD [facility], [address] [place] [address] 6 month follow up

## 2023-12-24 DIAGNOSIS — E78.2 MIXED HYPERLIPIDEMIA: ICD-10-CM

## 2023-12-26 RX ORDER — FENOFIBRATE 160 MG/1
160 TABLET ORAL DAILY
Qty: 90 TABLET | Refills: 1 | Status: SHIPPED | OUTPATIENT
Start: 2023-12-26

## 2023-12-26 NOTE — TELEPHONE ENCOUNTER
Refill passed per [facility] [place] protocol.     Requested Prescriptions   Pending Prescriptions Disp Refills    FENOFIBRATE 160 MG Oral Tab [Pharmacy Med Name: FENOFIBRATE 160 MG TABLET] 90 tablet 2     Sig: TAKE 1 TABLET BY MOUTH EVERY DAY       Cholesterol Medication Protocol Passed - 12/24/2023 12:47 AM        Passed - ALT in past 12 months        Passed - LDL in past 12 months        Passed - Last ALT < 80     Lab Results   Component Value Date    ALT 29 07/09/2023             Passed - Last LDL < 130     Lab Results   Component Value Date    LDL 60 01/26/2023             Passed - In person appointment or virtual visit in the past 12 mos or appointment in next 3 mos     Recent Outpatient Visits              3 weeks ago Bilateral foot pain    [facility], [address], [place], [place]    Office Visit    1 month ago Lung nodule    [facility], [address] [place] [address] Oswaldo Armas MD    Office Visit    3 months ago Dizziness    [facility], [address] [facility],[address], [facility] Mitchell Johnson MD    Office Visit    3 months ago Seasonal allergic rhinitis due to pollen    [address] [facility], [facility]    Office Visit    3 months ago Hyponatremia    Benito Do MD    Office Visit          Future Appointments         Provider Department Appt Notes    In 2 months Xena Wilson DPM [facility], [address], [place] follow up    In 4 months MD Brittny Wang, [address] [place] [address] 6 month follow up                  Future Appointments         Provider Department Appt Notes    In 2 months NIKITA Edwards, [address], [place] follow up    In 4 months MD Brittny Wang, [address] [place] [address] 6 month follow up          Recent Outpatient Visits              3 weeks ago Bilateral foot pain    [facility], [facility], [place] R[place]a Wolfe [facility]    Office Visit    1 month ago Lung nodule    [facility], [address] [facility] [place] Neal Alberto MD    Office Visit    3 months ago Dizziness    [facility], [address] [place] [address],[address], [place], Luda Holland MD    Office Visit    3 months ago Seasonal allergic rhinitis due to pollen    Santy Taylor APRN    Office Visit    3 months ago Hyponatremia    Claudia Mosher MD    Office Visit

## 2024-01-25 DIAGNOSIS — R14.2 ERUCTATION: ICD-10-CM

## 2024-01-25 DIAGNOSIS — K21.9 CHRONIC GERD: ICD-10-CM

## 2024-01-25 DIAGNOSIS — R13.19 ESOPHAGEAL DYSPHAGIA: ICD-10-CM

## 2024-01-25 RX ORDER — OMEPRAZOLE 40 MG/1
40 CAPSULE, DELAYED RELEASE ORAL DAILY
Qty: 90 CAPSULE | Refills: 3 | Status: SHIPPED | OUTPATIENT
Start: 2024-01-25

## 2024-01-25 NOTE — TELEPHONE ENCOUNTER
Refill passed per [facility] protocol.  Requested Prescriptions   Pending Prescriptions Disp Refills    OMEPRAZOLE 40 MG Oral Capsule Delayed Release [Pharmacy Med Name: OMEPRAZOLE DR 40 MG CAPSULE] 90 capsule 2     Sig: Take 1 capsule (40 mg total) by mouth daily.       Gastrointestional Medication Protocol Passed - 1/25/2024 12:37 AM        Passed - In person appointment or virtual visit in the past 12 mos or appointment in next 3 mos     Recent Outpatient Visits              1 month ago Bilateral foot pain    [facility], [address], [place] Kian James, DPM    Office Visit    2 months ago Lung nodule    [facility] Ian Mathews MD    Office Visit    4 months ago Dizziness    [facility], [place] Mohr MD    Office Visit    4 months ago Seasonal allergic rhinitis due to pollen    [facility] Ofelia Robledo APRN    Office Visit    4 months ago Hyponatremia    [facility] Faisal Villar MD    Office Visit          Future Appointments         Provider Department Appt Notes    In 1 month Yaneth Kian James, DPM [facility], [address], [place] follow up    In 3 months Ian Mathews MD [facility] 6 month follow up                  Recent Outpatient Visits              1 month ago Bilateral foot pain    [facility], [address], [place], Kian James, DPM    Office Visit    2 months ago Lung nodule    [facility] Ian Mathews MD    Office Visit    4 months ago Dizziness    [facility] Jerald Anne MD    Office Visit    4 months ago Seasonal allergic rhinitis due to pollen    [facility]  Ofelia Robledo APRN    Office Visit    4 months ago Hyponatremia    [facility], [facility] Faisal Villar MD    Office Visit          Future Appointments         Provider Department Appt Notes    In 1 month Kian Wolfe DPM [facility], [address], [place] follow up    In 3 months Ian Mathews MD [facility], [facility] 6 month follow up

## 2024-02-06 DIAGNOSIS — E78.2 MIXED HYPERLIPIDEMIA: ICD-10-CM

## 2024-02-07 NOTE — TELEPHONE ENCOUNTER
Please review.  Protocol failed / Has no protocol.     Requested Prescriptions   Pending Prescriptions Disp Refills    ATORVASTATIN 10 MG Oral Tab [Pharmacy Med Name: ATORVASTATIN 10 MG TABLET] 90 tablet 2     Sig: TAKE 1 TABLET BY MOUTH EVERY DAY       Cholesterol Medication Protocol Failed - 2/7/2024  4:45 PM        Failed - Lipid panel within past 12 months     Lab Results   Component Value Date    CHOLEST 145 01/26/2023    TRIG 77 01/26/2023    HDL 70 (H) 01/26/2023    LDL 60 01/26/2023    VLDL 11 01/26/2023    NONHDLC 75 01/26/2023             Passed - ALT < 80     Lab Results   Component Value Date    ALT 29 07/09/2023             Passed - ALT resulted within past year        Passed - In person appointment or virtual visit in the past 12 mos or appointment in next 3 mos     Recent Outpatient Visits              2 months ago Bilateral foot pain    [facility], [address], [place] Kian Wolfe DPM    Office Visit    2 months ago Lung nodule    [facility] Ian Mathews MD    Office Visit    4 months ago Dizziness    [facility] Jerald Anne MD    Office Visit    4 months ago Seasonal allergic rhinitis due to pollen    [facility] Ofelia Robledo APRN    Office Visit    5 months ago Hyponatremia    [facility] Faisal Villar MD    Office Visit          Future Appointments         Provider Department Appt Notes    In 1 week Leonid Ford DO [facility] medicare px last px was 01/31/2023    In 3 weeks Kian Wolfe DPM [facility], [address], [place] follow up    In 3 months Ian Mathews MD [facility] 6 month follow up                  Future Appointments         Provider Department Appt Notes     In 1 week Leonid Ford DO [facility] [place] medicare px last px was 01/31/2023    In 3 weeks Kian Wolfe DPM [facility], [address], [place] follow up    In 3 months Ian Mathews MD [facility] 6 month follow up           Recent Outpatient Visits              2 months ago Bilateral foot pain    [facility], [address], [place] Kian Wolfe DPM    Office Visit    2 months ago Lung nodule    [facility] Ian Mathews MD    Office Visit    4 months ago Dizziness    [facility] Jerald Anne MD    Office Visit    4 months ago Seasonal allergic rhinitis due to pollen    [facility] Ofelia Robledo APRN    Office Visit    5 months ago Hyponatremia    [facility] Faisal Villar MD    Office Visit

## 2024-02-08 ENCOUNTER — MED REC SCAN ONLY (OUTPATIENT)
Dept: FAMILY MEDICINE CLINIC | Facility: CLINIC | Age: 69
End: 2024-02-08

## 2024-02-08 RX ORDER — ATORVASTATIN CALCIUM 10 MG/1
10 TABLET, FILM COATED ORAL DAILY
Qty: 90 TABLET | Refills: 3 | Status: SHIPPED | OUTPATIENT
Start: 2024-02-08

## 2024-02-15 ENCOUNTER — OFFICE VISIT (OUTPATIENT)
Dept: FAMILY MEDICINE CLINIC | Facility: CLINIC | Age: 69
End: 2024-02-15
Payer: MEDICARE

## 2024-02-15 ENCOUNTER — HOSPITAL ENCOUNTER (OUTPATIENT)
Dept: GENERAL RADIOLOGY | Age: 69
Discharge: HOME | End: 2024-02-15
Attending: FAMILY MEDICINE
Payer: MEDICARE

## 2024-02-15 ENCOUNTER — LAB ENCOUNTER (OUTPATIENT)
Dept: LAB | Age: 69
End: 2024-02-15
Attending: FAMILY MEDICINE
Payer: MEDICARE

## 2024-02-15 VITALS
HEART RATE: 91 BPM | HEIGHT: 64.5 IN | SYSTOLIC BLOOD PRESSURE: 130 MMHG | BODY MASS INDEX: 19.06 KG/M2 | DIASTOLIC BLOOD PRESSURE: 62 MMHG | TEMPERATURE: 97 F | WEIGHT: 113 LBS

## 2024-02-15 DIAGNOSIS — R93.1 ELEVATED CORONARY ARTERY CALCIUM SCORE: ICD-10-CM

## 2024-02-15 DIAGNOSIS — I10 ESSENTIAL HYPERTENSION, BENIGN: ICD-10-CM

## 2024-02-15 DIAGNOSIS — Z00.00 ADULT GENERAL MEDICAL EXAM: ICD-10-CM

## 2024-02-15 DIAGNOSIS — E78.2 MIXED HYPERLIPIDEMIA: ICD-10-CM

## 2024-02-15 DIAGNOSIS — C67.9 MALIGNANT NEOPLASM OF URINARY BLADDER, UNSPECIFIED SITE (HCC): ICD-10-CM

## 2024-02-15 DIAGNOSIS — K21.9 CHRONIC GERD: ICD-10-CM

## 2024-02-15 DIAGNOSIS — R91.8 LUNG MASS: ICD-10-CM

## 2024-02-15 DIAGNOSIS — E03.9 ACQUIRED HYPOTHYROIDISM: ICD-10-CM

## 2024-02-15 DIAGNOSIS — R09.89 CHEST CONGESTION: ICD-10-CM

## 2024-02-15 DIAGNOSIS — I70.0 CALCIFICATION OF AORTA (HCC): ICD-10-CM

## 2024-02-15 DIAGNOSIS — J30.1 ALLERGIC RHINITIS DUE TO POLLEN, UNSPECIFIED SEASONALITY: ICD-10-CM

## 2024-02-15 DIAGNOSIS — J44.9 CHRONIC OBSTRUCTIVE PULMONARY DISEASE, UNSPECIFIED COPD TYPE (HCC): ICD-10-CM

## 2024-02-15 DIAGNOSIS — E87.1 HYPONATREMIA: ICD-10-CM

## 2024-02-15 DIAGNOSIS — N40.1 BENIGN NON-NODULAR PROSTATIC HYPERPLASIA WITH LOWER URINARY TRACT SYMPTOMS: ICD-10-CM

## 2024-02-15 DIAGNOSIS — R14.2 ERUCTATION: ICD-10-CM

## 2024-02-15 DIAGNOSIS — Z00.00 ENCOUNTER FOR ANNUAL HEALTH EXAMINATION: Primary | ICD-10-CM

## 2024-02-15 DIAGNOSIS — H91.91 DECREASED HEARING OF RIGHT EAR: ICD-10-CM

## 2024-02-15 DIAGNOSIS — J41.0 SMOKERS' COUGH (HCC): ICD-10-CM

## 2024-02-15 DIAGNOSIS — F17.200 TOBACCO USE DISORDER: ICD-10-CM

## 2024-02-15 LAB
ALBUMIN SERPL-MCNC: 4.8 G/DL (ref 3.2–4.8)
ALBUMIN/GLOB SERPL: 1.7 {RATIO} (ref 1–2)
ALP LIVER SERPL-CCNC: 60 U/L
ALT SERPL-CCNC: 14 U/L
ANION GAP SERPL CALC-SCNC: 7 MMOL/L (ref 0–18)
AST SERPL-CCNC: 22 U/L (ref ?–34)
BASOPHILS # BLD AUTO: 0.08 X10(3) UL (ref 0–0.2)
BASOPHILS NFR BLD AUTO: 0.7 %
BILIRUB SERPL-MCNC: 0.4 MG/DL (ref 0.2–1.1)
BUN BLD-MCNC: 16 MG/DL (ref 9–23)
BUN/CREAT SERPL: 16.7 (ref 10–20)
CALCIUM BLD-MCNC: 9.9 MG/DL (ref 8.7–10.4)
CHLORIDE SERPL-SCNC: 99 MMOL/L (ref 98–112)
CHOLEST SERPL-MCNC: 147 MG/DL (ref ?–200)
CO2 SERPL-SCNC: 28 MMOL/L (ref 21–32)
CREAT BLD-MCNC: 0.96 MG/DL
DEPRECATED RDW RBC AUTO: 42.5 FL (ref 35.1–46.3)
EGFRCR SERPLBLD CKD-EPI 2021: 85 ML/MIN/1.73M2 (ref 60–?)
EOSINOPHIL # BLD AUTO: 0.52 X10(3) UL (ref 0–0.7)
EOSINOPHIL NFR BLD AUTO: 4.3 %
ERYTHROCYTE [DISTWIDTH] IN BLOOD BY AUTOMATED COUNT: 13.3 % (ref 11–15)
FASTING PATIENT LIPID ANSWER: YES
FASTING STATUS PATIENT QL REPORTED: YES
GLOBULIN PLAS-MCNC: 2.9 G/DL (ref 2.8–4.4)
GLUCOSE BLD-MCNC: 93 MG/DL (ref 70–99)
HCT VFR BLD AUTO: 41 %
HDLC SERPL-MCNC: 55 MG/DL (ref 40–59)
HGB BLD-MCNC: 14 G/DL
IMM GRANULOCYTES # BLD AUTO: 0.24 X10(3) UL (ref 0–1)
IMM GRANULOCYTES NFR BLD: 2 %
LDLC SERPL CALC-MCNC: 77 MG/DL (ref ?–100)
LYMPHOCYTES # BLD AUTO: 1.19 X10(3) UL (ref 1–4)
LYMPHOCYTES NFR BLD AUTO: 9.9 %
MCH RBC QN AUTO: 29.7 PG (ref 26–34)
MCHC RBC AUTO-ENTMCNC: 34.1 G/DL (ref 31–37)
MCV RBC AUTO: 87 FL
MONOCYTES # BLD AUTO: 1.11 X10(3) UL (ref 0.1–1)
MONOCYTES NFR BLD AUTO: 9.3 %
NEUTROPHILS # BLD AUTO: 8.83 X10 (3) UL (ref 1.5–7.7)
NEUTROPHILS # BLD AUTO: 8.83 X10(3) UL (ref 1.5–7.7)
NEUTROPHILS NFR BLD AUTO: 73.8 %
NONHDLC SERPL-MCNC: 92 MG/DL (ref ?–130)
OSMOLALITY SERPL CALC.SUM OF ELEC: 279 MOSM/KG (ref 275–295)
PLATELET # BLD AUTO: 427 10(3)UL (ref 150–450)
POTASSIUM SERPL-SCNC: 4.1 MMOL/L (ref 3.5–5.1)
PROT SERPL-MCNC: 7.7 G/DL (ref 5.7–8.2)
RBC # BLD AUTO: 4.71 X10(6)UL
SODIUM SERPL-SCNC: 134 MMOL/L (ref 136–145)
TRIGL SERPL-MCNC: 77 MG/DL (ref 30–149)
TSI SER-ACNC: 6.63 MIU/ML (ref 0.55–4.78)
VLDLC SERPL CALC-MCNC: 12 MG/DL (ref 0–30)
WBC # BLD AUTO: 12 X10(3) UL (ref 4–11)

## 2024-02-15 PROCEDURE — 80061 LIPID PANEL: CPT

## 2024-02-15 PROCEDURE — 36415 COLL VENOUS BLD VENIPUNCTURE: CPT

## 2024-02-15 PROCEDURE — 85025 COMPLETE CBC W/AUTO DIFF WBC: CPT

## 2024-02-15 PROCEDURE — G0439 PPPS, SUBSEQ VISIT: HCPCS | Performed by: FAMILY MEDICINE

## 2024-02-15 PROCEDURE — 84443 ASSAY THYROID STIM HORMONE: CPT

## 2024-02-15 PROCEDURE — 71046 X-RAY EXAM CHEST 2 VIEWS: CPT | Performed by: FAMILY MEDICINE

## 2024-02-15 PROCEDURE — 99214 OFFICE O/P EST MOD 30 MIN: CPT | Performed by: FAMILY MEDICINE

## 2024-02-15 PROCEDURE — 80053 COMPREHEN METABOLIC PANEL: CPT

## 2024-02-15 RX ORDER — LEVOFLOXACIN 750 MG/1
750 TABLET, FILM COATED ORAL DAILY
Qty: 5 TABLET | Refills: 0 | Status: SHIPPED | OUTPATIENT
Start: 2024-02-15 | End: 2024-02-20

## 2024-02-15 NOTE — PATIENT INSTRUCTIONS
Alejandro Hanna's SCREENING SCHEDULE   Tests on this list are recommended by your physician but may not be covered, or covered at this frequency, by your insurer.   Please check with your insurance carrier before scheduling to verify coverage.   PREVENTATIVE SERVICES FREQUENCY &  COVERAGE DETAILS LAST COMPLETION DATE   Diabetes Screening    Fasting Blood Sugar / Glucose    One screening every 12 months if never tested or if previously tested but not diagnosed with pre-diabetes   One screening every 6 months if diagnosed with pre-diabetes Lab Results   Component Value Date    GLU 93 10/27/2023        Cardiovascular Disease Screening    Lipid Panel  Cholesterol  Lipoprotein (HDL)  Triglycerides Covered every 5 years for all Medicare beneficiaries without apparent signs or symptoms of cardiovascular disease Lab Results   Component Value Date    CHOLEST 145 01/26/2023    HDL 70 (H) 01/26/2023    LDL 60 01/26/2023    TRIG 77 01/26/2023         Electrocardiogram (EKG)   Covered if needed at Welcome to Medicare, and non-screening if indicated for medical reasons 03/05/2019      Ultrasound Screening for Abdominal Aortic Aneurysm (AAA) Covered once in a lifetime for one of the following risk factors   • Men who are 65-75 years old and have ever smoked   • Anyone with a family history -     Colorectal Cancer Screening  Covered for ages 50-85; only need ONE of the following:    Colonoscopy   Covered every 10 years    Covered every 2 years if patient is at high risk or previous colonoscopy was abnormal 12/28/2020    Health Maintenance   Topic Date Due   • Colorectal Cancer Screening  12/28/2025       Flexible Sigmoidoscopy   Covered every 4 years -    Fecal Occult Blood Test Covered annually 10/16/2023   Prostate Cancer Screening    Prostate-Specific Antigen (PSA) Annually Lab Results   Component Value Date    PSA 0.98 10/27/2023     Health Maintenance   Topic Date Due   • PSA  10/27/2025      Immunizations    Influenza  Covered once per flu season  Please get every year 10/13/2023  No recommendations at this time    Pneumococcal Each vaccine ([id number] & [id number]) covered once after 65 Prevnar 13: 03/05/2019    [id number]: 03/01/2021     No recommendations at this time    Hepatitis B One screening covered for patients with certain risk factors   -  No recommendations at this time    Tetanus Toxoid Not covered by Medicare Part B unless medically necessary (cut with metal); may be covered with your pharmacy prescription benefits -    Tetanus, Diptheria and Pertusis TD and TDaP Not covered by Medicare Part B -  No recommendations at this time    Zoster Not covered by Medicare Part B; may be covered with your pharmacy  prescription benefits 03/10/2016  Zoster Vaccines(1 of 2) due on 05/05/2016     Annual Monitoring of Persistent Medications (ACE/ARB, digoxin diuretics, anticonvulsants)    Potassium Annually Lab Results   Component Value Date    K 4.8 10/27/2023         Creatinine   Annually Lab Results   Component Value Date    CREATSERUM 0.82 10/27/2023         BUN Annually Lab Results   Component Value Date    BUN 16 10/27/2023       Drug Serum Conc Annually No results found for: \"DIGOXIN\", \"DIG\", \"VALP\"         Chronic Obstructive Pulmonary Disease (COPD)    Spirometry Annually Spirometry date: 02/21/2023

## 2024-02-15 NOTE — PROGRESS NOTES
Subjective:   Alejandro Hanna is a 68 year old male who presents for a Medicare Subsequent Annual Wellness visit (Pt already had Initial Annual Wellness) and scheduled follow up of multiple significant but stable problems.     Last physical on 1/31/2023.    Patient smokes a little over half of pack of cigarettes daily. He took Wellbutrin in the past but it caused dizziness upon looking left and right.  Used to smoke more than 2 packs/day.    Pt follows up with Dr. Shaffer, urology for Hx bladder CA. I reviewed his labs ordered per Dr. Villar, nephrologist in 2023. Pt takes 2-3 beers daily. He is compliant with his mediations without side effects. Pt stats his BP at Dr. Shaffer's office last week was 120/70. Pt is scheduled to follow up with a podiatrist on 2/28/2024 and his pulmonologist in May 2024.  He has a CAT scan of his chest ordered for May due to his history of smoking and has COPD and a nodule.    He c/o chest congestion that began 1 month ago; he coughs up phlegm more in the morning but also sometimes during the day. He feels his symptoms are mostly resolved but recurs occasionally. Pt saw Dr. Leon, cardiologist and completed a CTA of the heart vessels on 6/13/2023 which showed no significant lesions. Pt also completed a coronary calcium score on 6/13/2023 which was 230.  He is on a statin.  He states he exercises 3 times a week and walks a mile and also does about 6-7 exercises on the machines.  He has no angina when he exercises.    History/Other:   Fall Risk Assessment:   He has been screened for Falls and is low risk.      Cognitive Assessment:   He had a completely normal cognitive assessment - see flowsheet entries       Functional Ability/Status:   Alejandro Hanna has some abnormal functions as listed below:  He has Hearing problems based on screening of functional status.He has Vision problems based on screening of functional status.       Depression Screening (PHQ-2/PHQ-9): PHQ-2 SCORE:  0  , done 2/15/2024             Advanced Directives:   He does NOT have a Living Will. [Do you have a living will?: No]  He does NOT have a Power of  for Health Care. [Do you have a healthcare power of ?: No]  Discussed Advance Care Planning with patient (and family/surrogate if present). Standard forms made available to patient in After Visit Summary.      Patient Active Problem List   Diagnosis    Essential hypertension, benign    Mixed hyperlipidemia    Nocturia    Benign non-nodular prostatic hyperplasia with lower urinary tract symptoms    Chronic obstructive pulmonary disease (HCC)    Decreased hearing of right ear    Eructation    Allergic rhinitis due to pollen    Lung mass    Acquired hypothyroidism    Tobacco use disorder    Bladder cancer (HCC)    Alcohol abuse    Hyponatremia    Calcification of aorta (HCC)    Hiatal hernia    Protein-calorie malnutrition, unspecified severity (HCC)    Dizziness    Right ear impacted cerumen     Allergies:  He has No Known Allergies.    Current Medications:  Outpatient Medications Marked as Taking for the 2/15/24 encounter (Office Visit) with Leonid Ford DO   Medication Sig    atorvastatin 10 MG Oral Tab Take 1 tablet (10 mg total) by mouth daily.    Omeprazole 40 MG Oral Capsule Delayed Release Take 1 capsule (40 mg total) by mouth daily.    Fenofibrate 160 MG Oral Tab Take 1 tablet (160 mg total) by mouth daily.    fluticasone-salmeterol 250-50 MCG/ACT Inhalation Aerosol Powder, Breath Activated Inhale 1 puff into the lungs Q12H.    Olmesartan Medoxomil 40 MG Oral Tab TAKE 1 TABLET (40 MG TOTAL) BY MOUTH DAILY FOR BLOOD PRESSURE    levothyroxine 75 MCG Oral Tab TAKE 1 TABLET (75 MCG TOTAL) BY MOUTH BEFORE BREAKFAST. DUE FOR FOLLOW UP    phenazopyridine HCl (AZO TABS) 95 MG Oral Tab Take 1 tablet (95 mg total) by mouth 3 (three) times daily as needed for Pain.    fluticasone propionate 50 MCG/ACT Nasal Suspension 2 sprays by Nasal route daily.     albuterol (VENTOLIN HFA) 108 (90 Base) MCG/ACT Inhalation Aero Soln Inhale 2 puffs into the lungs every 6 (six) hours as needed for Wheezing.    Multiple Vitamins-Minerals (CENTRUM SILVER 50+MEN OR) Take by mouth.    Dutasteride 0.5 MG Oral Cap        Medical History:  He  has a past medical history of Allergic rhinitis, Bladder cancer (HCC) (11/2019), BPH (benign prostatic hyperplasia), COPD (HCC), Disorder of thyroid, ED (erectile dysfunction), Esophageal reflux, Essential hypertension, Hiatal hernia, High blood pressure, High cholesterol, Hypothyroidism, Injury (01/29/2019), Osteoarthritis, Pulmonary nodules, and Tobacco abuse.  Surgical History:  He  has a past surgical history that includes electrocardiogram, complete (12/15/2013); other surgical history (2009); colonoscopy; oral surgery; and colonoscopy (N/A, 12/28/2020).   Family History:  His family history includes Cancer in his sister; Dementia in his mother; Diabetes in his paternal grandmother.  Social History:  He  reports that he has been smoking cigarettes. He has a 40 pack-year smoking history. He has never used smokeless tobacco. He reports current alcohol use of about 2.0 standard drinks of alcohol per week. He reports that he does not use drugs.    Tobacco:  He currently smokes tobacco.  Social History    Tobacco Use      Smoking status: Every Day        Packs/day: 1.00        Years: 40.00        Additional pack years: 0.00        Total pack years: 40.00        Types: Cigarettes      Smokeless tobacco: Never      Tobacco comments: 2/2022: less than 1 pack per day     Tobacco Cessation Documentation (Smoking and Smokeless included):  I had an in depth therapy session with Alejandro Abrams Cyndi about his tobacco use risks and options using the [employer]PSTF's Five A's approach:    Ask: Alejandro is using tobacco products.  Assess: We asked about/assessed behavioral health risk and factors affecting choice of behavior change goals/methods.  Specifically I asked  about readiness to quit tobacco.  Advise: We gave clear, specific, and personalized behavior change advice, including information about personal health harms and benefits. Specifically, he was told that Quitting tobacco is one of the best things he can do for his health. I strongly encouraged him to quit.      Agree: We collaboratively selected appropriate treatment goals and methods based on the patient’s interest in and willingness to change the behavior.   Assist: We used behavior change techniques (self-help and/or counseling), to aid Alejandro in achieving agreed-upon goals by acquiring the skills, confidence, and social/environmental supports for behavior change, supplemented with adjunctive medical treatments when appropriate. Additionally, national quitting tobacco aides were discussed.   Arrange: Follow up at next visit- see specific follow up below.    Time Counseled: 3 minutes       CAGE Alcohol Screen:   CAGE screening score of 0 on 2/15/2024, showing low risk of alcohol abuse.      Patient Care Team:  Leonid Ford DO as PCP - General (Family Medicine)    Review of Systems   Respiratory:  Negative for shortness of breath.    Cardiovascular:  Negative for chest pain.          Objective:   Physical Exam     Physical Exam   Constitutional: He appears well-developed and well-nourished. No distress.   Head: Normocephalic.   Right Ear: Tympanic membrane and ear canal normal.   Left Ear: Tympanic membrane and ear canal normal.   Nose: No mucosal edema or rhinorrhea.  Mouth/Throat: Oropharynx is clear and moist and mucous membranes are normal.   Eyes: Conjunctivae and EOM are normal. Pupils are equal, round, and reactive to light.   Neck: Normal range of motion. Neck supple. No thyromegaly present.   Cardiovascular: Normal rate, regular rhythm and no murmur heard.   Pulmonary/Chest: Effort normal. No respiratory distress. End inspiratory rales and end expiratory wheezing, both which are scattered.  Otherwise he  is not tachypneic and does not look winded when he speaks..   Abdominal: Soft. Bowel sounds are normal. There is no hepatosplenomegaly. There is no tenderness.   Lymphadenopathy: He has no cervical adenopathy.   Neurological: He is alert and oriented to person, place, and time. He has normal reflexes. No cranial nerve deficit.   Skin: Skin is warm and dry. No rash noted.   Psychiatric: He has a normal mood and affect.  Lower legs: No edema of the legs bilaterally. 2+ pedal pulses bilaterally.      Vitals reviewed.    /90   Pulse 91   Temp 97.3 °F (36.3 °C) (Temporal)   Ht 5' 4.5\" (1.638 m)   Wt 113 lb   BMI 19.10 kg/m²  Estimated body mass index is 19.1 kg/m² as calculated from the following:    Height as of this encounter: 5' 4.5\" (1.638 m).    Weight as of this encounter: 113 lb.    Vitals:    02/15/24 0922 02/15/24 0942   BP: 151/90 130/62   Pulse: 91    Temp: 97.3 °F (36.3 °C)    TempSrc: Temporal    Weight: 113 lb    Height: 5' 4.5\" (1.638 m)        Medicare Hearing Assessment:   Hearing Screening    Screening Method: Questionnaire  I have a problem hearing over the telephone: No I have trouble following the conversations when two or more people are talking at the same time: No   I have trouble understanding things on the TV: No I have to strain to understand conversations: No   I have to worry about missing the telephone ring or doorbell: No I have trouble hearing conversations in a noisy background such as a crowded room or restaurant: No   I get confused about where sounds come from: No I misunderstand some words in a sentence and need to ask people to repeat themselves: No   I especially have trouble understanding the speech of women and children: No I have trouble understanding the speaker in a large room such as at a meeting or place of Spiritism: No   Many people I talk to seem to mumble (or don't speak clearly): No People get annoyed because I misunderstand what they say: No   I misunderstand  what others are saying and make inappropriate responses: No I avoid social activities because I cannot hear well and fear I will reply improperly: No   Family members and friends have told me they think I may have hearing loss: No             Visual Acuity:   Right Eye Visual Acuity: Uncorrected Right Eye Chart Acuity: 20/25   Left Eye Visual Acuity: Uncorrected Left Eye Chart Acuity: 20/25   Both Eyes Visual Acuity: Uncorrected Both Eyes Chart Acuity: 20/25   Able To Tolerate Visual Acuity: Yes        Assessment & Plan:   Alejandro Hanna is a 68 year old male who presents for a Medicare Assessment.     Diagnoses and all orders for this visit:    Encounter for annual health examination  Medicare exam done  Adult general medical exam    Acquired hypothyroidism  -     Assay, Thyroid Stim Hormone; Future    Benign non-nodular prostatic hyperplasia with lower urinary tract symptoms  Continue to follow-up with urology  Chronic obstructive pulmonary disease, unspecified COPD type (HCC)  -     XR CHEST PA + LAT CHEST (CPT=71046); Future  Because of the chest congestion we will go ahead and do a chest x-ray  Calcification of aorta (HCC)  Has already seen cardiology and is on a statin  Malignant neoplasm of urinary bladder, unspecified site (HCC)  Continues to follow-up with urology  Essential hypertension, benign  -     CBC With Differential With Platelet; Future  -     Comp Metabolic Panel (14); Future    Decreased hearing of right ear  Chronic.  Mixed hyperlipidemia  -     Comp Metabolic Panel (14); Future  -     Lipid Panel; Future    Tobacco use disorder  He is trying to quit and has decreased from more than 2 packs a day to perhaps a half a pack a day  Allergic rhinitis due to pollen, unspecified seasonality  Takes allergy medications  Lung mass  Has a CT of his chest ordered by pulmonology  Chest congestion  -     XR CHEST PA + LAT CHEST (CPT=71046); Future    Smokers' cough (HCC)  -     XR CHEST PA + LAT CHEST  (CPT=71046); Future    Elevated coronary artery calcium score  Has seen cardiology and is on a statin  Eructation  Takes omeprazole daily  Chronic GERD  As above  Hyponatremia  -     NEPHROLOGY - INTERNAL  He is seeing nephrology and we talked about avoiding excessive alcohol and fluids on a regular basis.      Referrals (if applicable)  Orders Placed This Encounter   Procedures    NEPHROLOGY - INTERNAL     Referral Priority:   Routine     Referral Type:   OFFICE VISIT     Referred to Provider:   Faisal Villar MD     Requested Specialty:   NEPHROLOGY     Number of Visits Requested:   3         Follow up if symptoms persist.  Take medicine (if given) as prescribed.  Approach to treatment discussed and patient/family member understands and agrees to plan.     No follow-ups on file.    The patient indicates understanding of these issues and agrees to the plan.  Reinforced healthy diet, lifestyle, and exercise.      No follow-ups on file.     Valarie Edgar, 2/15/2024     By signing my name below, IValarie,  attest that this documentation has been prepared under the direction and in the presence of Leonid Ford DO.   Electronically Signed: Valarie Edgar, 2/15/2024, 9:23 AM.    I, Leonid Ford DO,  personally performed the services described in this documentation. All medical record entries made by the scribe were at my direction and in my presence.  I have reviewed the chart and discharge instructions (if applicable) and agree that the record reflects my personal performance and is accurate and complete.  Leonid Ford DO, 2/15/2024, 10:05 AM    Supplementary Documentation:   General Health:  In the past six months, have you lost more than 10 pounds without trying?: 2 - No  Has your appetite been poor?: No  Type of Diet: Balanced  How does the patient maintain a good energy level?: Appropriate Exercise  How would you describe your daily physical activity?: Moderate  How would you describe your current health  state?: Good  How do you maintain positive mental well-being?: Social Interaction;Puzzles;Visiting Friends;Visiting Family  On a scale of 0 to 10, with 0 being no pain and 10 being severe pain, what is your pain level?: 0 - (None)  In the past six months, have you experienced urine leakage?: 0-No  At any time do you feel concerned for the safety/well-being of yourself and/or your children, in your home or elsewhere?: No  Have you had any immunizations at another office such as Influenza, Hepatitis B, Tetanus, or Pneumococcal?: Yes        Alejandro Hanna's SCREENING SCHEDULE   Tests on this list are recommended by your physician but may not be covered, or covered at this frequency, by your insurer.   Please check with your insurance carrier before scheduling to verify coverage.   PREVENTATIVE SERVICES FREQUENCY &  COVERAGE DETAILS LAST COMPLETION DATE   Diabetes Screening    Fasting Blood Sugar / Glucose    One screening every 12 months if never tested or if previously tested but not diagnosed with pre-diabetes   One screening every 6 months if diagnosed with pre-diabetes Lab Results   Component Value Date    GLU 93 10/27/2023        Cardiovascular Disease Screening    Lipid Panel  Cholesterol  Lipoprotein (HDL)  Triglycerides Covered every 5 years for all Medicare beneficiaries without apparent signs or symptoms of cardiovascular disease Lab Results   Component Value Date    CHOLEST 145 01/26/2023    HDL 70 (H) 01/26/2023    LDL 60 01/26/2023    TRIG 77 01/26/2023         Electrocardiogram (EKG)   Covered if needed at Welcome to Medicare, and non-screening if indicated for medical reasons 03/05/2019      Ultrasound Screening for Abdominal Aortic Aneurysm (AAA) Covered once in a lifetime for one of the following risk factors    Men who are 65-75 years old and have ever smoked    Anyone with a family history -     Colorectal Cancer Screening  Covered for ages 50-85; only need ONE of the following:     Colonoscopy   Covered every 10 years    Covered every 2 years if patient is at high risk or previous colonoscopy was abnormal 12/28/2020    Health Maintenance   Topic Date Due    Colorectal Cancer Screening  12/28/2025       Flexible Sigmoidoscopy   Covered every 4 years -    Fecal Occult Blood Test Covered annually 10/16/2023   Prostate Cancer Screening    Prostate-Specific Antigen (PSA) Annually Lab Results   Component Value Date    PSA 0.98 10/27/2023     Health Maintenance   Topic Date Due    PSA  10/27/2025      Immunizations    Influenza Covered once per flu season  Please get every year 10/13/2023  No recommendations at this time    Pneumococcal Each vaccine ([id number] & [id number]) covered once after 65 Prevnar 13: 03/05/2019    [id number]: 03/01/2021     No recommendations at this time    Hepatitis B One screening covered for patients with certain risk factors   -  No recommendations at this time    Tetanus Toxoid Not covered by Medicare Part B unless medically necessary (cut with metal); may be covered with your pharmacy prescription benefits -    Tetanus, Diptheria and Pertusis TD and TDaP Not covered by Medicare Part B -  No recommendations at this time    Zoster Not covered by Medicare Part B; may be covered with your pharmacy  prescription benefits 03/10/2016  No recommendations at this time     Annual Monitoring of Persistent Medications (ACE/ARB, digoxin diuretics, anticonvulsants)    Potassium Annually Lab Results   Component Value Date    K 4.8 10/27/2023         Creatinine   Annually Lab Results   Component Value Date    CREATSERUM 0.82 10/27/2023         BUN Annually Lab Results   Component Value Date    BUN 16 10/27/2023       Drug Serum Conc Annually No results found for: \"DIGOXIN\", \"DIG\", \"VALP\"           Chronic Obstructive Pulmonary Disease (COPD)    Spirometry Annually Spirometry date: 02/21/2023

## 2024-02-19 DIAGNOSIS — E03.9 ACQUIRED HYPOTHYROIDISM: ICD-10-CM

## 2024-02-19 DIAGNOSIS — E03.8 OTHER SPECIFIED HYPOTHYROIDISM: Primary | ICD-10-CM

## 2024-02-19 RX ORDER — LEVOTHYROXINE SODIUM 88 UG/1
88 TABLET ORAL
Qty: 90 TABLET | Refills: 1 | Status: SHIPPED | OUTPATIENT
Start: 2024-02-19

## 2024-02-25 ENCOUNTER — APPOINTMENT (OUTPATIENT)
Dept: GENERAL RADIOLOGY | Age: 69
End: 2024-02-25
Attending: Physician Assistant
Payer: MEDICARE

## 2024-02-25 ENCOUNTER — APPOINTMENT (OUTPATIENT)
Dept: ULTRASOUND IMAGING | Age: 69
End: 2024-02-25
Attending: Physician Assistant
Payer: MEDICARE

## 2024-02-25 ENCOUNTER — HOSPITAL ENCOUNTER (OUTPATIENT)
Age: 69
Discharge: HOME | End: 2024-02-25
Attending: Physician Assistant
Payer: MEDICARE

## 2024-02-25 VITALS
RESPIRATION RATE: 20 BRPM | TEMPERATURE: 97 F | DIASTOLIC BLOOD PRESSURE: 83 MMHG | OXYGEN SATURATION: 96 % | SYSTOLIC BLOOD PRESSURE: 157 MMHG | HEART RATE: 100 BPM

## 2024-02-25 DIAGNOSIS — M76.61 ACHILLES TENDINITIS OF RIGHT LOWER EXTREMITY: ICD-10-CM

## 2024-02-25 DIAGNOSIS — Z85.9 HISTORY OF CANCER: Primary | ICD-10-CM

## 2024-02-25 PROCEDURE — L4350 ANKLE CONTROL ORTHO PRE OTS: HCPCS | Performed by: PHYSICIAN ASSISTANT

## 2024-02-25 PROCEDURE — 93971 EXTREMITY STUDY: CPT | Performed by: PHYSICIAN ASSISTANT

## 2024-02-25 PROCEDURE — 73610 X-RAY EXAM OF ANKLE: CPT | Performed by: PHYSICIAN ASSISTANT

## 2024-02-25 PROCEDURE — 99213 OFFICE O/P EST LOW 20 MIN: CPT | Performed by: PHYSICIAN ASSISTANT

## 2024-02-25 NOTE — ED PROVIDER NOTES
Chief Complaint   Patient presents with    Musculoskeletal Problem       History obtained from: Patient, wife at bedside   services not used    HPI:     Alejandro Hanna is a 68 year old male who presents with right ankle and calf pain x 4 days.  Patient states he normally walks 1 mile on walking track at gym several times a week.  Patient states 4 days ago he walked 3 laps and then developed acute onset leg pain.  Patient localizes pain to back of ankle and Achilles tendon with radiation into calf.  Patient endorses pain with walking and \"tightness in calf.\"  Patient has taken ibuprofen with some improvement in pain.  Denies falls, swelling, wound, change in skin color/temperature, numbness, weakness.  Patient has a history of bladder cancer.  No history of blood clots.    PMH  Past Medical History:   Diagnosis Date    Allergic rhinitis     Flonase    Bladder cancer (HCC) 11/2019    f/u [facility]    BPH (benign prostatic hyperplasia)     COPD (HCC)     moderate-severe     Disorder of thyroid     ED (erectile dysfunction)     Esophageal reflux     Essential hypertension     Hiatal hernia     High blood pressure     High cholesterol     Hypothyroidism     Injury 01/29/2019    Left knee injury, from fall    Osteoarthritis     in knee's, hip's    Pulmonary nodules     first noted 2016on LD screen CT    Tobacco abuse        PFSH    PFS asessment screens reviewed and agree.  Nurses notes reviewed I agree with documentation.    Family History   Problem Relation Age of Onset    Dementia Mother     Diabetes Paternal Grandmother     Cancer Sister         lung cancer     Family history reviewed with patient/caregiver and is not pertinent to presenting problem.  Social History     Socioeconomic History    Marital status:      Spouse name: Not on file    Number of children: Not on file    Years of education: Not on file    Highest education level: Not on file   Occupational History    Not on file   Tobacco  Use    Smoking status: Every Day     Packs/day: 1.00     Years: 40.00     Additional pack years: 0.00     Total pack years: 40.00     Types: Cigarettes    Smokeless tobacco: Never    Tobacco comments:     2/2022: less than 1 pack per day   Vaping Use    Vaping Use: Never used   Substance and Sexual Activity    Alcohol use: Yes     Alcohol/week: 2.0 standard drinks of alcohol     Types: 2 Cans of beer per week     Comment: 2 beer per day    Drug use: No    Sexual activity: Not on file   Other Topics Concern     Service Not Asked    Blood Transfusions Not Asked    Caffeine Concern Yes     Comment: coffee-1 cup/day    Occupational Exposure Not Asked    Hobby Hazards Not Asked    Sleep Concern Not Asked    Stress Concern Not Asked    Weight Concern Not Asked    Special Diet Not Asked    Back Care Not Asked    Exercise Not Asked    Bike Helmet Not Asked    Seat Belt Not Asked    Self-Exams Not Asked   Social History Narrative    Live with fianace    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  Constitutional and Vital Signs Reviewed.    Physical Exam:     Findings:    /83   Pulse 100   Temp 97.3 °F (36.3 °C) (Temporal)   Resp 20   SpO2 96%   GENERAL: well developed, no acute distress, non-toxic appearing   SKIN: good skin turgor, no obvious rashes  HEAD: normocephalic, atraumatic  EYES: sclera non-icteric bilaterally, conjunctiva clear  OROPHARYNX: MMM, pharynx clear, airway patent  NECK: no nuchal rigidity, no trismus, no edema, phonation normal    CARDIO: Regular rate, DP pulse 2+,  cap refill < 2 sec  LUNGS: no increased WOB  EXTREMITIES: Tenderness to medial and lateral right ankle.  Tenderness to Achilles tendon insertion and posterior distal calf.  No obvious swelling or deformity.  ROM somewhat limited due to pain.  Compartments soft.  CMS intact.  Skin intact without overlying changes  NEURO: no focal deficits  PSYCH: alert and oriented x3.  Answering questions appropriately.  Mood appropriate.    MDM/Assessment/Plan:   Orders for this encounter:    Orders Placed This Encounter    XR ANKLE (MIN 3 VIEWS), RIGHT (CPT=73610)     Foot Pain Pt reports right achilles tendon pain x Wednesday after walking laps at   the gym. Denies foot pain.        Order Specific Question:   What is the Relevant Clinical Indication / Reason for Exam?     Answer:   ankle and achilles pain after walking    US VENOUS DOPPLER LEG RIGHT - DIAG IMG (CPT=93971)     Foot Pain     Order Specific Question:   What is the Relevant Clinical Indication / Reason for Exam?     Answer:   posterior ankle and achilles pain, calf pain, hx of cancer     Order Specific Question:   Release to patient     Answer:   Immediate    Ankle splint       Labs performed this visit:  No results found for this or any previous visit (from the past 10 hour(s)).    Imaging performed this visit:  US VENOUS DOPPLER LEG RIGHT - DIAG IMG (CPT=93971)   Final Result   PROCEDURE: US VENOUS DOPPLER LEG RIGHT-DIAG IMG (CPT=93971)       COMPARISON: None.       INDICATIONS: Posterior ankle and achilles pain, calf pain, hx of cancer       TECHNIQUE: Color duplex Doppler venous ultrasound of the right lower    extremity was performed in the usual manner.       FINDINGS: The femoral and popliteal veins appear normal.  Normal flow was    demonstrated with color and pulsed Doppler.  Visualized portions of the    great and small saphenous, posterior tibial, and peroneal veins appear    normal.         THROMBI: None visible.   COMPRESSIBILITY: Normal.    OTHER: Negative.                   =====   CONCLUSION: No DVT.               Dictated by (CST): Octavio Westbrook MD on 2/25/2024 at 11:35 AM        Finalized by (CST): Octavio Westbrook MD on 2/25/2024 at 11:36 AM               XR ANKLE (MIN 3 VIEWS), RIGHT (CPT=73610)   Final Result               =====   PROCEDURE: XR ANKLE (MIN 3 VIEWS), RIGHT (CPT=73610)       COMPARISON: None.       INDICATIONS: Generalized right ankle pain x4 days. No known injury.       TECHNIQUE: Three-view   Findings and impression:       Normal alignment with no fracture       No significant DJD                 Dictated by (CST): Octavio Westbrook MD on 2/25/2024 at 10:26 AM        Finalized by (CST): Octavio Westbrook MD on 2/25/2024 at 10:27 AM                   MDM:  DDx includes Achilles tendinitis versus ankle sprain versus osteoarthritis versus contusion versus DVT versus other.  Patient is overall very well-appearing with stable vitals.  No signs of neurovascular compromise or compartment syndrome.  X-ray reviewed, no evidence of acute osseous abnormality.  Aircast ankle splint applied to right ankle and patient sent to [facility] for ultrasound to rule out DVT.    1135: Ultrasound reviewed, no evidence of DVT.  Discussed these results with patient via phone.  Discussed supportive care including rest, ice, elevation, compression, and OTC Tylenol/ibuprofen as needed for pain.  Instructed patient to go directly to nearest ER with any worsening or concerning symptoms.  Follow-up with ortho.  Patient verbalizes excellent understanding of these instructions and feels comfortable with this plan.    Diagnosis:    ICD-10-CM    1. History of cancer  Z85.9 US VENOUS DOPPLER LEG RIGHT - DIAG IMG (CPT=93971)     US VENOUS DOPPLER LEG RIGHT - DIAG IMG (CPT=93971)      2. Achilles tendinitis of right lower extremity  M76.61           All results reviewed and discussed with patient/patient's family. Patient/patient's family verbalize understanding of  instructions. All of patient's/patient's family's questions were addressed.   See AVS for detailed discharge instructions for your condition today.    Follow Up with:  Sharath Blackwood MD  [address] [address]  [phone]    Schedule an appointment as soon as possible for a visit   Orthopedics         Sofy Rocha PA-C

## 2024-02-25 NOTE — DISCHARGE INSTRUCTIONS
Rest, ice, and elevate ankle   Alternate Motrin/Tylenol as needed for pain   Drink plenty of fluids   Get plenty of rest   Wear aircast when up and walking   Avoid excessive twisting, bending, or lifting   Follow up with ortho

## 2024-02-25 NOTE — ED INITIAL ASSESSMENT (HPI)
Pt reports right achilles tendon pain x Wednesday after walking laps at the gym. Denies foot pain.

## 2024-02-25 NOTE — ED QUICK NOTES
Pt given instructions/directions to [facility] for ultrasound. He verbalized understanding.

## 2024-02-28 ENCOUNTER — OFFICE VISIT (OUTPATIENT)
Dept: PODIATRY CLINIC | Facility: CLINIC | Age: 69
End: 2024-02-28
Payer: MEDICARE

## 2024-02-28 DIAGNOSIS — M79.671 BILATERAL FOOT PAIN: Primary | ICD-10-CM

## 2024-02-28 DIAGNOSIS — B35.1 ONYCHOMYCOSIS: ICD-10-CM

## 2024-02-28 DIAGNOSIS — M21.621 TAILOR'S BUNION OF BOTH FEET: ICD-10-CM

## 2024-02-28 DIAGNOSIS — M79.672 BILATERAL FOOT PAIN: Primary | ICD-10-CM

## 2024-02-28 DIAGNOSIS — L84 FOOT CALLUS: ICD-10-CM

## 2024-02-28 DIAGNOSIS — M21.622 TAILOR'S BUNION OF BOTH FEET: ICD-10-CM

## 2024-02-28 PROBLEM — M17.12 PRIMARY OSTEOARTHRITIS OF LEFT KNEE: Status: ACTIVE | Noted: 2024-02-28

## 2024-02-28 PROBLEM — M23.92 INTERNAL DERANGEMENT OF LEFT KNEE: Status: ACTIVE | Noted: 2024-02-28

## 2024-02-28 PROCEDURE — 99213 OFFICE O/P EST LOW 20 MIN: CPT | Performed by: PODIATRIST

## 2024-03-05 NOTE — TELEPHONE ENCOUNTER
----- Message from Mrac Burnett MD sent at 10/17/2019  5:47 PM CDT -----  Can you let him know the nodules are all stable    Can we order a f/u CT scan for pulmonary nodule(s) in 12 month from the last?  thx
Dr. Marshall Del please advise on CT chest results performed 10/12 thanks.
Order added to chronic calendar.
Pt called for CT results. Please call.
Pt informed of Dr. Annabelle Trejo message/results/orders below. Pt verbalized understanding.
Pt returned call. Please call.
[Annual] : an annual visit.

## 2024-03-26 ENCOUNTER — HOSPITAL ENCOUNTER (OUTPATIENT)
Dept: ULTRASOUND IMAGING | Age: 69
Discharge: HOME | End: 2024-03-26
Attending: STUDENT IN AN ORGANIZED HEALTH CARE EDUCATION/TRAINING PROGRAM
Payer: MEDICARE

## 2024-03-26 DIAGNOSIS — K11.8 PAROTID MASS: ICD-10-CM

## 2024-03-26 PROCEDURE — 76536 US EXAM OF HEAD AND NECK: CPT | Performed by: STUDENT IN AN ORGANIZED HEALTH CARE EDUCATION/TRAINING PROGRAM

## 2024-03-26 RX ORDER — FLUTICASONE PROPIONATE AND SALMETEROL 250; 50 UG/1; UG/1
1 POWDER RESPIRATORY (INHALATION) EVERY 12 HOURS
Qty: 3 EACH | Refills: 3 | Status: CANCELLED | OUTPATIENT
Start: 2024-03-26

## 2024-03-26 NOTE — TELEPHONE ENCOUNTER
Pharmacy requesting refill     fluticasone-salmeterol 250-50 MCG/ACT Inhalation Aerosol Powder, Breath Activated Inhale 1 puff into the lungs Q12H. 3 each 3

## 2024-03-28 NOTE — TELEPHONE ENCOUNTER
Please review; protocol failed/No Protocol    Pharmacy stated that Fluticasone-salmeterol is not on preferred list. They said Breo should be covered.     I pended for Breo Ellipta [id number] review if appropriate?    Requested Prescriptions   Pending Prescriptions Disp Refills    fluticasone furoate-vilanterol (BREO ELLIPTA) 200-25 MCG/ACT Inhalation Aerosol Powder, Breath Activated 180 each 3     Sig: Inhale 1 puff into the lungs daily.       Asthma & COPD Medication Protocol Failed - 3/28/2024 12:35 PM        Failed - Asthma Action Score greater than or equal to 20        Failed - AAP/ACT given in last 12 months     No data recorded  No data recorded  No data recorded  No data recorded          Passed - Appointment in past 6 or next 3 months      Recent Outpatient Visits              4 weeks ago Bilateral foot pain    [facility], [address], [place] Kian Wolfe, DPM    Office Visit    1 month ago Encounter for annual health examination    [facility] Leonid Ford DO    Office Visit    4 months ago Bilateral foot pain    [facility], [address], [place] Kian Wolfe, DPM    Office Visit    4 months ago Lung nodule    [facility] Ian Mathews MD    Office Visit    6 months ago Dizziness    [facility] [place] Jerald Anne MD    Office Visit          Future Appointments         Provider Department Appt Notes    In 3 weeks ADO SCHEDULED RESOURCE [facility]     In 1 month LMB CT RM[facility] CT - [place]     In 1 month Ian Mathews MD [facility] 6 month follow up    In 2 months Kian Wolfe, DPM [facility], [address], [place] follow up                  Future Appointments         Provider Department Appt Notes    In 3  weeks ADO SCHEDULED RESOURCE [facility]     In 1 month LM CT 1 [facility] CT - [place]     In 1 month Ian Mathews MD [facility] 6 month follow up    In 2 months Kian Wolfe, DPM [facility], [address], [place] follow up          Recent Outpatient Visits              4 weeks ago Bilateral foot pain    [facility], [address], [place] Kian Wolfe, DPM    Office Visit    1 month ago Encounter for annual health examination    [facility] Leonid Ford DO    Office Visit    4 months ago Bilateral foot pain    [facility], [address], [place] Kian Wolfe, DPM    Office Visit    4 months ago Lung nodule    [facility] Ian Mathews MD    Office Visit    6 months ago Dizziness    [facility] Jerald Anne MD    Office Visit

## 2024-03-28 NOTE — TELEPHONE ENCOUNTER
Verified with pharmacy Fluticasone-salmeterol is not a preferred inhaler. Pharmacist mentioned Breo.

## 2024-03-30 RX ORDER — FLUTICASONE FUROATE AND VILANTEROL 200; 25 UG/1; UG/1
1 POWDER RESPIRATORY (INHALATION) DAILY
Qty: 180 EACH | Refills: 3 | Status: SHIPPED | OUTPATIENT
Start: 2024-03-30

## 2024-04-02 ENCOUNTER — TELEPHONE (OUTPATIENT)
Dept: FAMILY MEDICINE CLINIC | Facility: CLINIC | Age: 69
End: 2024-04-02

## 2024-04-02 DIAGNOSIS — J44.9 CHRONIC OBSTRUCTIVE PULMONARY DISEASE, UNSPECIFIED COPD TYPE (HCC): Primary | ICD-10-CM

## 2024-04-02 RX ORDER — MOMETASONE FUROATE AND FORMOTEROL FUMARATE DIHYDRATE 200; 5 UG/1; UG/1
1 AEROSOL RESPIRATORY (INHALATION) DAILY
Qty: 13 G | Refills: 3 | Status: SHIPPED | OUTPATIENT
Start: 2024-04-02

## 2024-04-02 NOTE — TELEPHONE ENCOUNTER
Patient called and states that his insurance recommends DULERA , $90 cheaper than Breo.   Requesting to send the prescription NOW to [facility] on file, he only has couple of puffs  left .       FROM 3/26/24 refill encounter ;  Ofelia Mg RN Registered Nurse Nevscp00:54 AM     Copy     Patient states he only has 2 puffs of medication available. He would like Wixela prescribed. Wixela is not covered under insurance   Breo  is covered but will cost him $545.      Patient will call his insurance company and will call back with name of covered medication.

## 2024-04-11 ENCOUNTER — OFFICE VISIT (OUTPATIENT)
Dept: OTOLARYNGOLOGY | Facility: CLINIC | Age: 69
End: 2024-04-11
Payer: MEDICARE

## 2024-04-11 VITALS — HEIGHT: 64 IN | BODY MASS INDEX: 20.49 KG/M2 | WEIGHT: 120 LBS

## 2024-04-11 DIAGNOSIS — L72.0 EPIDERMAL CYST OF NECK: ICD-10-CM

## 2024-04-11 DIAGNOSIS — K11.8 PAROTID MASS: ICD-10-CM

## 2024-04-11 DIAGNOSIS — H61.21 IMPACTED CERUMEN, RIGHT EAR: Primary | ICD-10-CM

## 2024-04-11 PROCEDURE — 99214 OFFICE O/P EST MOD 30 MIN: CPT | Performed by: STUDENT IN AN ORGANIZED HEALTH CARE EDUCATION/TRAINING PROGRAM

## 2024-04-11 PROCEDURE — 69210 REMOVE IMPACTED EAR WAX UNI: CPT | Performed by: STUDENT IN AN ORGANIZED HEALTH CARE EDUCATION/TRAINING PROGRAM

## 2024-04-11 NOTE — PROGRESS NOTES
Alejandro Hanna is a 68 year old male.   Chief Complaint   Patient presents with    Results     Pt is here for ultrasound results.    Bump     Pt reports bump on right side of back of neck.    Ear Wax     Pt is here for bilateral ear wax cleaning results.       ASSESSMENT AND PLAN:   1. Impacted cerumen, right ear      2. Epidermal cyst of neck  68-year-old presents with multiple complaints including follow-up regarding ultrasound results of his parotid on each side.  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  Had a possible new mass of the left parotid gland that is in the superficial lobe that measures 1.2 cm in maximum dimension.  He is completely asymptomatic.  Also reports a small cyst on the back of his neck mostly concerned about the appearance    On exam had cerumen on the right that was debrided.  Has a small 1 x 1 superficial cyst of his right posterior lateral neck skin.  Parotid gland tumors were not palpable no facial nerve weakness    Discussed his options regarding the parotid mass particularly the new one on the left.  The ones on the right are stable.  He would like to currently observe this left parotid mass as opposed to biopsy.  Can obtain a new parotid ultrasound in 6 months for close follow-up.  Offered excision under local anesthesia of his right neck cyst.  He is going to think about this and call back if interested.    3. Parotid mass        The patient indicates understanding of these issues and agrees to the plan.      EXAM:   Ht 5' 4\" (1.626 m)   Wt 120 lb (54.4 kg)   BMI 20.60 kg/m²     Pertinent exam findings may also be noted above in assessment and plan     System Details   Skin Inspection - Normal.   Constitutional Overall appearance - Normal.   Head/Face Symmetric, TMJ tenderness not present    Eyes EOMI, PERRL   Right ear:  Canal clear, TM intact, no LELAND   Left ear:  Canal clear, TM intact,  no LELAND   Nose: Septum midline, inferior turbinates not enlarged, nasal valves without collapse    Oral cavity/Oropharynx: No lesions or masses on inspection or palpation, tonsils symmetric    Neck: Soft without LAD, thyroid not enlarged  Voice clear/ no stridor   Other:      Scopes and Procedures:       Canals:  Right: Canal with cerumen preventing adequate view of TM, debrided with instrumentation    Tympanic Membranes:  Right: Normal tympanic membrane.     TM Visualized Method:   Right TM examined via otomicroscopy.      PROCEDURE:   Removal of cerumen impaction   The cerumen impaction was completely removed on the right side using microscopy as necessary.   Removal was completed by using a curette and suction.       Current Outpatient Medications   Medication Sig Dispense Refill    Mometasone Furo-Formoterol Fum (DULERA) 200-5 MCG/ACT Inhalation Aerosol Inhale 1 puff into the lungs daily. 13 g 3    fluticasone furoate-vilanterol (BREO ELLIPTA) 200-25 MCG/ACT Inhalation Aerosol Powder, Breath Activated Inhale 1 puff into the lungs daily. 180 each 3    phenazopyridine HCl (AZO TABS) 95 MG Oral Tab Take 1 tablet (95 mg total) by mouth 3 (three) times daily as needed for Pain. 21 tablet 5    levothyroxine 88 MCG Oral Tab Take 1 tablet (88 mcg total) by mouth before breakfast. 90 tablet 1    atorvastatin 10 MG Oral Tab Take 1 tablet (10 mg total) by mouth daily. 90 tablet 3    Omeprazole 40 MG Oral Capsule Delayed Release Take 1 capsule (40 mg total) by mouth daily. 90 capsule 3    Fenofibrate 160 MG Oral Tab Take 1 tablet (160 mg total) by mouth daily. 90 tablet 1    fluticasone-salmeterol 250-50 MCG/ACT Inhalation Aerosol Powder, Breath Activated Inhale 1 puff into the lungs Q12H. 3 each 3    Olmesartan Medoxomil 40 MG Oral Tab TAKE 1 TABLET (40 MG TOTAL) BY MOUTH DAILY FOR BLOOD PRESSURE 90 tablet 3    phenazopyridine HCl (AZO TABS) 95 MG Oral Tab Take 1 tablet (95 mg total) by mouth 3 (three) times daily as needed  for Pain. 21 tablet 6    fluticasone propionate 50 MCG/ACT Nasal Suspension 2 sprays by Nasal route daily. 48 mL 2    albuterol (VENTOLIN HFA) 108 (90 Base) MCG/ACT Inhalation Aero Soln Inhale 2 puffs into the lungs every 6 (six) hours as needed for Wheezing. 54 each 5    Multiple Vitamins-Minerals (CENTRUM SILVER 50+MEN OR) Take by mouth.      Dutasteride 0.5 MG Oral Cap         Past Medical History:    Allergic rhinitis    Flonase    Bladder cancer (HCC)    f/u [facility]    BPH (benign prostatic hyperplasia)    COPD (HCC)    moderate-severe     Disorder of kidney and ureter    Disorder of thyroid    ED (erectile dysfunction)    Esophageal reflux    Essential hypertension    Hearing impairment    Hepatomegaly    Hiatal hernia    High blood pressure    High cholesterol    Hypothyroidism    Injury    Left knee injury, from fall    Osteoarthritis    in knee's, hip's    Pulmonary nodules    first noted 2016on LD screen CT    Tobacco abuse      Social History:  Social History     Socioeconomic History    Marital status:    Tobacco Use    Smoking status: Every Day     Current packs/day: 1.00     Average packs/day: 1 pack/day for 40.0 years (40.0 ttl pk-yrs)     Types: Cigarettes    Smokeless tobacco: Never    Tobacco comments:     2/2022: less than 1 pack per day   Vaping Use    Vaping status: Never Used   Substance and Sexual Activity    Alcohol use: Yes     Alcohol/week: 2.0 standard drinks of alcohol     Types: 2 Cans of beer per week     Comment: 2 beer per day    Drug use: No   Other Topics Concern    Caffeine Concern Yes     Comment: coffee-1 cup/day   Social History Narrative    Live with fianace    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

## 2024-04-19 ENCOUNTER — LAB ENCOUNTER (OUTPATIENT)
Dept: LAB | Age: 69
End: 2024-04-19
Attending: FAMILY MEDICINE
Payer: MEDICARE

## 2024-04-19 DIAGNOSIS — E87.1 HYPONATREMIA: ICD-10-CM

## 2024-04-19 DIAGNOSIS — E03.8 OTHER SPECIFIED HYPOTHYROIDISM: ICD-10-CM

## 2024-04-19 LAB
ALBUMIN SERPL-MCNC: 4.9 G/DL (ref 3.2–4.8)
ANION GAP SERPL CALC-SCNC: 3 MMOL/L (ref 0–18)
BUN BLD-MCNC: 11 MG/DL (ref 9–23)
BUN/CREAT SERPL: 13.4 (ref 10–20)
CALCIUM BLD-MCNC: 10.5 MG/DL (ref 8.7–10.4)
CHLORIDE SERPL-SCNC: 101 MMOL/L (ref 98–112)
CO2 SERPL-SCNC: 30 MMOL/L (ref 21–32)
CREAT BLD-MCNC: 0.82 MG/DL
EGFRCR SERPLBLD CKD-EPI 2021: 94 ML/MIN/1.73M2 (ref 60–?)
GLUCOSE BLD-MCNC: 97 MG/DL (ref 70–99)
OSMOLALITY SERPL CALC.SUM OF ELEC: 277 MOSM/KG (ref 275–295)
PHOSPHATE SERPL-MCNC: 2.9 MG/DL (ref 2.4–5.1)
POTASSIUM SERPL-SCNC: 4.6 MMOL/L (ref 3.5–5.1)
SODIUM SERPL-SCNC: 134 MMOL/L (ref 136–145)
TSI SER-ACNC: 0.26 MIU/ML (ref 0.55–4.78)

## 2024-04-19 PROCEDURE — 80069 RENAL FUNCTION PANEL: CPT

## 2024-04-19 PROCEDURE — 84443 ASSAY THYROID STIM HORMONE: CPT

## 2024-04-19 PROCEDURE — 36415 COLL VENOUS BLD VENIPUNCTURE: CPT

## 2024-04-20 DIAGNOSIS — E03.9 ACQUIRED HYPOTHYROIDISM: ICD-10-CM

## 2024-04-20 RX ORDER — LEVOTHYROXINE SODIUM 88 UG/1
TABLET ORAL
Qty: 90 TABLET | Refills: 1 | Status: SHIPPED | OUTPATIENT
Start: 2024-04-20

## 2024-05-07 ENCOUNTER — OFFICE VISIT (OUTPATIENT)
Facility: CLINIC | Age: 69
End: 2024-05-07
Payer: MEDICARE

## 2024-05-07 VITALS — HEIGHT: 64 IN | RESPIRATION RATE: 20 BRPM | BODY MASS INDEX: 20.49 KG/M2 | WEIGHT: 120 LBS

## 2024-05-07 DIAGNOSIS — I65.21 STENOSIS OF RIGHT CAROTID ARTERY: ICD-10-CM

## 2024-05-07 DIAGNOSIS — I73.9 PAD (PERIPHERAL ARTERY DISEASE) (HCC): Primary | ICD-10-CM

## 2024-05-07 RX ORDER — ASPIRIN 81 MG/1
81 TABLET ORAL DAILY
COMMUNITY
Start: 2024-04-23

## 2024-05-07 NOTE — PROGRESS NOTES
Samer F. Najjar, MD  Vascular Surgery  [facility]      VASCULAR SURGERY   CLINIC CONSULT NOTE        Name: Alejandro Hanna   :   12/10/1955  [id number]     REFERRING PHYSICIAN:  No ref. provider found  PRIMARY CARE PHYSICIAN:  Leonid Ford DO    HISTORY OF PRESENT ILLNESS:   Patient is a 68 year old male who has been referred regarding assessment for PAD.  He has just come back from a turkey hunting trip where he noticed calf pain when walking on uneven surfaces. Patient localizes pain to back of ankle and Achilles tendon with radiation into calf.  He states that he could walk up to a mile a day and he rides his bike on a regular basis.  He has previously undergone a CTA of the abdomen pelvis with runoffs that was ordered by his cardiologist that essentially revealed presence of atherosclerotic disease but no areas of severe stenosis.  The patient is a lifelong smoker but has recently cut down from smoking about a pack a day to about 10 cigarettes.  I have treated his wife and he is trying to avoid any arterial interventions. The patient also has a history of moderate right carotid artery disease ([facility] at [facility]) but he has been asymptomatic.  He is on aspirin and a statin.      PAST MEDICAL HISTORY:    Past Medical History:    Allergic rhinitis    Flonase    Bladder cancer (HCC)    f/u [facility]    BPH (benign prostatic hyperplasia)    COPD (HCC)    moderate-severe     Disorder of kidney and ureter    Disorder of thyroid    ED (erectile dysfunction)    Esophageal reflux    Essential hypertension    Hearing impairment    Hepatomegaly    Hiatal hernia    High blood pressure    High cholesterol    Hypothyroidism    Injury    Left knee injury, from fall    Osteoarthritis    in knee's, hip's    Pulmonary nodules    first noted 2016on LD screen CT    Tobacco abuse       PAST SURGICAL HISTORY:   Past Surgical History:   Procedure Laterality Date    Colonoscopy      15    Colonoscopy N/A  12/28/2020    Procedure: COLONOSCOPY;  Surgeon: Real Abreu MD;  Location: [facility] ENDOSCOPY    Electrocardiogram, complete  12/15/2013    scanned to media tab    Oral surgery      Other surgical history  2009    cyst removal, side of head        MEDICATIONS:     Current Outpatient Medications:     aspirin 81 MG Oral Tab EC, Take 1 tablet (81 mg total) by mouth daily., Disp: , Rfl:     levothyroxine 88 MCG Oral Tab, Take 1 tablet by mouth Monday through Friday but do not take any on Saturday and Sunday., Disp: 90 tablet, Rfl: 1    fluticasone furoate-vilanterol (BREO ELLIPTA) 200-25 MCG/ACT Inhalation Aerosol Powder, Breath Activated, Inhale 1 puff into the lungs daily., Disp: 180 each, Rfl: 3    phenazopyridine HCl (AZO TABS) 95 MG Oral Tab, Take 1 tablet (95 mg total) by mouth 3 (three) times daily as needed for Pain., Disp: 21 tablet, Rfl: 5    atorvastatin 10 MG Oral Tab, Take 1 tablet (10 mg total) by mouth daily., Disp: 90 tablet, Rfl: 3    Omeprazole 40 MG Oral Capsule Delayed Release, Take 1 capsule (40 mg total) by mouth daily., Disp: 90 capsule, Rfl: 3    Fenofibrate 160 MG Oral Tab, Take 1 tablet (160 mg total) by mouth daily., Disp: 90 tablet, Rfl: 1    fluticasone-salmeterol 250-50 MCG/ACT Inhalation Aerosol Powder, Breath Activated, Inhale 1 puff into the lungs Q12H., Disp: 3 each, Rfl: 3    Olmesartan Medoxomil 40 MG Oral Tab, TAKE 1 TABLET (40 MG TOTAL) BY MOUTH DAILY FOR BLOOD PRESSURE, Disp: 90 tablet, Rfl: 3    phenazopyridine HCl (AZO TABS) 95 MG Oral Tab, Take 1 tablet (95 mg total) by mouth 3 (three) times daily as needed for Pain., Disp: 21 tablet, Rfl: 6    fluticasone propionate 50 MCG/ACT Nasal Suspension, 2 sprays by Nasal route daily., Disp: 48 mL, Rfl: 2    albuterol (VENTOLIN HFA) 108 (90 Base) MCG/ACT Inhalation Aero Soln, Inhale 2 puffs into the lungs every 6 (six) hours as needed for Wheezing., Disp: 54 each, Rfl: 5    Multiple Vitamins-Minerals (CENTRUM SILVER 50+MEN OR), Take  by mouth., Disp: , Rfl:     Dutasteride 0.5 MG Oral Cap, , Disp: , Rfl:     Mometasone Furo-Formoterol Fum (DULERA) 200-5 MCG/ACT Inhalation Aerosol, Inhale 1 puff into the lungs daily., Disp: 13 g, Rfl: 3    ALLERGIES:    He has No Known Allergies.    SOCIAL HISTORY:    Patient  reports that he has been smoking cigarettes. He has a 40 pack-year smoking history. He has never used smokeless tobacco. He reports current alcohol use of about 2.0 standard drinks of alcohol per week. He reports that he does not use drugs.    FAMILY HISTORY:    Patient's family history includes Cancer in his sister; Dementia in his mother; Diabetes in his paternal grandmother.    ROS:     A 12 point review of systems with pertinent positives and negatives listed in the HPI.    EXAM:    Resp 20   Ht 5' 4\" (1.626 m)   Wt 120 lb (54.4 kg)   BMI 20.60 kg/m²   GENERAL: alert and orientated X 3, well developed, well nourished, in no apparent distress  PSYCH: normal mood and affect  HEENT: ears and throat are clear  NECK: supple, no lymphadenopathy, thyroid wnl  CAROTID: no bruit   RESPIRATORY: no rales, rhonchi, or wheezes B  CARDIO: RRR without murmur, no murmur, no gallop   ABDOMEN: soft, non-tender with no palpable aneurysm or masses  BACK: normal, no tenderness  SKIN: no rashes, warm and dry  EXTREMITIES: no tenderness  NEURO: no sensory or motor deficits  VASCULAR:      Femoral Popliteal DP PT Peroneal   Right 2+       palpable, weak biphasic signal    Left 2+       palpable, weak biphasic signal        LABS:   No results found for: \"EAG\", \"A1C\"   Lab Results   Component Value Date    GLU 97 04/19/2024    BUN 11 04/19/2024    CREATSERUM 0.82 04/19/2024    BUNCREA 13.4 04/19/2024    ANIONGAP 3 04/19/2024    GFRAA 111 10/12/2021    GFRNAA 96 10/12/2021    CA 10.5 (H) 04/19/2024     (L) 04/19/2024    K 4.6 04/19/2024     04/19/2024    CO2 30.0 04/19/2024    OSMOCALC 277 04/19/2024      Lab Results   Component Value Date    WBC  12.0 (H) 02/15/2024    RBC 4.71 02/15/2024    HGB 14.0 02/15/2024    HCT 41.0 02/15/2024    MCV 87.0 02/15/2024    MCH 29.7 02/15/2024    MCHC 34.1 02/15/2024    RDW 13.3 02/15/2024    .0 02/15/2024    MPV 8.9 10/23/2018        Lab Results   Component Value Date    HGB 14.0 02/15/2024    HGB 13.2 08/12/2023    HGB 14.0 07/09/2023    HGB 11.6 (L) 07/01/2023    HGB 14.3 06/30/2023    HGB 13.5 06/29/2023    CREATSERUM 0.82 04/19/2024    CREATSERUM 0.96 02/15/2024    CREATSERUM 0.82 10/27/2023    CREATSERUM 0.72 08/12/2023    CREATSERUM 0.76 07/09/2023    CREATSERUM 0.81 07/06/2023       IMAGING:       ASSESSMENT  Diagnoses and all orders for this visit:    PAD (peripheral artery disease) (HCC)    Stenosis of right carotid artery          The patient has evidence of peripheral arterial disease but he is essentially asymptomatic.  I think the difficulty with walking on uneven surfaces can be attributed to some degree of peripheral arterial disease and a neurogenic cause but I think that he can coexist with this.  The fact that he is able to walk about a mile a day signifies adequate flow to both legs including palpable pulses on physical exam today.  Therefore, I have recommended that he continue with his daily exercises continue to decrease his smoking and continue with his risk factor reduction with aspirin and a statin.  As for his moderate right carotid disease, he will require a carotid duplex in 1 year.  He seems to be happy with this conservative approach. I encouraged him to continue with his efforts at smoking cessation.  He wants to pursue his vascular care with us and we will order a carotid duplex in 1 year.    PLAN:  Conservative management for asymptomatic peripheral arterial disease  Carotid duplex in 1 year      The patient indicated an understanding of these issues and agreed to the plan and all questions were answered during the clinic visit.      Thank you for allowing me to participate in your  patient's care.   Please do not hesitate to contact me with any questions.    Sincerely,  Samer F. Najjar, MD    Please note: Dragon speech recognition software was used to prepare this note. If a word or phrase is confusing, it is likely do to a failure of recognition.   Please contact me with any questions or clarifications.

## 2024-05-10 ENCOUNTER — HOSPITAL ENCOUNTER (OUTPATIENT)
Dept: CT IMAGING | Age: 69
Discharge: HOME | End: 2024-05-10
Attending: INTERNAL MEDICINE

## 2024-05-10 DIAGNOSIS — R91.1 LUNG NODULE: ICD-10-CM

## 2024-05-10 PROCEDURE — 71250 CT THORAX DX C-: CPT | Performed by: INTERNAL MEDICINE

## 2024-05-13 ENCOUNTER — OFFICE VISIT (OUTPATIENT)
Dept: PULMONOLOGY | Facility: CLINIC | Age: 69
End: 2024-05-13
Payer: MEDICARE

## 2024-05-13 VITALS
WEIGHT: 110 LBS | SYSTOLIC BLOOD PRESSURE: 138 MMHG | HEIGHT: 64 IN | BODY MASS INDEX: 18.78 KG/M2 | HEART RATE: 88 BPM | DIASTOLIC BLOOD PRESSURE: 80 MMHG | OXYGEN SATURATION: 98 %

## 2024-05-13 DIAGNOSIS — R91.1 LUNG NODULE: ICD-10-CM

## 2024-05-13 DIAGNOSIS — J44.9 CHRONIC OBSTRUCTIVE PULMONARY DISEASE, UNSPECIFIED COPD TYPE (HCC): Primary | ICD-10-CM

## 2024-05-13 PROCEDURE — 99214 OFFICE O/P EST MOD 30 MIN: CPT | Performed by: INTERNAL MEDICINE

## 2024-05-13 RX ORDER — FLUTICASONE FUROATE, UMECLIDINIUM BROMIDE AND VILANTEROL TRIFENATATE 100; 62.5; 25 UG/1; UG/1; UG/1
1 POWDER RESPIRATORY (INHALATION) DAILY
Qty: 1 EACH | Refills: 5 | Status: SHIPPED | OUTPATIENT
Start: 2024-05-13

## 2024-05-13 NOTE — PROGRESS NOTES
Subjective:   Patient ID: Alejandro Hanna is a 68 year old male.    HPI   continues smoke close to a pack a day,   no active cough or sputum or fever  Dyspnea upon exertion but stable baseline  Occasional cough in the morning  No fever or chills  Fair appetite and overall stable weight  No chest pain  No pleuritic pain  History/Other:   Review of Systems   Constitutional: Negative.    HENT: Negative.     Eyes: Negative.    Cardiovascular: Negative.    Gastrointestinal: Negative.    Skin: Negative.    Neurological: Negative.    Hematological: Negative.    Psychiatric/Behavioral: Negative.       Current Outpatient Medications   Medication Sig Dispense Refill    aspirin 81 MG Oral Tab EC Take 1 tablet (81 mg total) by mouth daily.      levothyroxine 88 MCG Oral Tab Take 1 tablet by mouth Monday through Friday but do not take any on Saturday and Sunday. 90 tablet 1    fluticasone furoate-vilanterol (BREO ELLIPTA) 200-25 MCG/ACT Inhalation Aerosol Powder, Breath Activated Inhale 1 puff into the lungs daily. 180 each 3    atorvastatin 10 MG Oral Tab Take 1 tablet (10 mg total) by mouth daily. 90 tablet 3    Omeprazole 40 MG Oral Capsule Delayed Release Take 1 capsule (40 mg total) by mouth daily. 90 capsule 3    Fenofibrate 160 MG Oral Tab Take 1 tablet (160 mg total) by mouth daily. 90 tablet 1    fluticasone-salmeterol 250-50 MCG/ACT Inhalation Aerosol Powder, Breath Activated Inhale 1 puff into the lungs Q12H. 3 each 3    Olmesartan Medoxomil 40 MG Oral Tab TAKE 1 TABLET (40 MG TOTAL) BY MOUTH DAILY FOR BLOOD PRESSURE 90 tablet 3    fluticasone propionate 50 MCG/ACT Nasal Suspension 2 sprays by Nasal route daily. 48 mL 2    albuterol (VENTOLIN HFA) 108 (90 Base) MCG/ACT Inhalation Aero Soln Inhale 2 puffs into the lungs every 6 (six) hours as needed for Wheezing. 54 each 5    Multiple Vitamins-Minerals (CENTRUM SILVER 50+MEN OR) Take by mouth.      Dutasteride 0.5 MG Oral Cap       Mometasone Furo-Formoterol Fum  (DULERA) 200-5 MCG/ACT Inhalation Aerosol Inhale 1 puff into the lungs daily. 13 g 3    phenazopyridine HCl (AZO TABS) 95 MG Oral Tab Take 1 tablet (95 mg total) by mouth 3 (three) times daily as needed for Pain. 21 tablet 5    phenazopyridine HCl (AZO TABS) 95 MG Oral Tab Take 1 tablet (95 mg total) by mouth 3 (three) times daily as needed for Pain. 21 tablet 6     Allergies:No Known Allergies    Objective:   Physical Exam  Constitutional:       General: He is not in acute distress.  HENT:      Head: Normocephalic and atraumatic.      Nose: Nose normal.   Eyes:      General: No scleral icterus.  Cardiovascular:      Rate and Rhythm: Normal rate.      Heart sounds:      No gallop.   Pulmonary:      Effort: No respiratory distress.      Breath sounds: No stridor. No wheezing, rhonchi or rales.   Chest:      Chest wall: No tenderness.   Abdominal:      General: Abdomen is flat. Bowel sounds are normal.      Palpations: Abdomen is soft. There is no mass.      Tenderness: There is no guarding.   Musculoskeletal:      Cervical back: Normal range of motion.      Right lower leg: No edema.      Left lower leg: No edema.   Skin:     General: Skin is dry.   Neurological:      General: No focal deficit present.      Mental Status: He is oriented to person, place, and time.               Chest ct 5/10/2024 reviewed   Impression   CONCLUSION:  1. Noncalcified 1.1 cm right upper lobe nodule has decreased in size over serial prior exams and was not metabolic on PET scan from February, 2023. This likely relates to a benign infectious/inflammatory process, but requires continued surveillance to  document at least 2 years of stability or resolution.  2. Noncalcified subpleural 1 cm nodule in the left upper lobe has significantly increased in size since comparison chest CT from October, 2023. Although this could conceivably relate to an ongoing/progressive infectious/inflammatory process, a developing   neoplasm cannot be excluded  given interval change.  Therefore, suggest further evaluation with either repeat PET-CT or short interval 3 month follow-up chest CT.  3. 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  4. Secretions or debris throughout the tracheobronchial tree.  5. Multi-vessel coronary artery calcification.  6. Stable probable subcentimeter cyst in the hepatic dome.  7. Lesser incidental findings as above.         Assessment & Plan:   1. Chronic obstructive pulmonary disease, unspecified COPD type (HCC)    2. Lung nodule        1- RUL lung nodule ( 1.1 cm )      Seen on chest ct 2/8/2023  ( 1.7 x 1.4 cm RUL )     PET scan 2/17/2023 negative      Smaller on chest ct 6/14/2023 around 12 mm      smaller on last chest ct 10/13/2023  around 10 mm      Stable on last chest ct 5/10/2024      The decrease in size of the nodule and  previously negative PET scan more suggestive for benign nodule .     2- WOODY / pleural based nodule 1 cm / new or increased in size from previous small    Plan :   F/u ct in 3 months /if persist or increase in size we will do biopsy or PET scan        3-severe COPD   lifelong history of smoking / continue to smoke   FEV1 44 % 1.19 L      Stable with no evidence of exacerbation  Complete smoking cessation and extensive counseling provided  Change Breo to Trelegy   Albuterol as needed  Keep updated on vaccinations        Chest ct in 3 months   F/u in 4  months                Meds This Visit:  Requested Prescriptions      No prescriptions requested or ordered in this encounter       Imaging & Referrals:  None

## 2024-05-16 ENCOUNTER — OFFICE VISIT (OUTPATIENT)
Dept: PHYSICAL MEDICINE AND REHAB | Facility: CLINIC | Age: 69
End: 2024-05-16

## 2024-05-16 VITALS — WEIGHT: 113 LBS | RESPIRATION RATE: 18 BRPM | BODY MASS INDEX: 19.29 KG/M2 | HEIGHT: 64 IN

## 2024-05-16 DIAGNOSIS — M67.959 TENDINOPATHY OF GLUTEUS MEDIUS: Primary | ICD-10-CM

## 2024-05-16 DIAGNOSIS — J30.1 ALLERGIC RHINITIS DUE TO POLLEN, UNSPECIFIED SEASONALITY: ICD-10-CM

## 2024-05-16 PROCEDURE — 99214 OFFICE O/P EST MOD 30 MIN: CPT | Performed by: PHYSICAL MEDICINE & REHABILITATION

## 2024-05-16 RX ORDER — MELOXICAM 15 MG/1
15 TABLET ORAL DAILY
Qty: 14 TABLET | Refills: 0 | Status: SHIPPED | OUTPATIENT
Start: 2024-05-16 | End: 2024-05-30

## 2024-05-16 NOTE — PATIENT INSTRUCTIONS
-Start physical therapy and home exercises  -Mobic daily for the next 7-10 days and then as needed  -Ice/Heat as tolerated  -Please stop the medication if you have any side effects and call the office if you have any questions or concerns  -If no better will consider injection   -Follow up in 4 weeks

## 2024-05-16 NOTE — PROGRESS NOTES
[facility]  OFFICE FOLLOW UP EVALUATION      HISTORY OF PRESENT ILLNESS:     Chief Complaint   Patient presents with    Follow - Up     Returning patient: LOV 6/27/23 where a Ultrasound guided bilateral greater trochanteric bursa injection was performed. Today, reports flare 1-2 weeks, L>R. Triggered after hunting, prolonged walking and squatting. Works out at gym 3 times weekly, walks a mile each time. Denies N/T or radiation. Describes as burning sensation to side of buttocks. CPL 0/10. Denies pain medication.        Patient is following up for bilateral hip pain.  Pain is located in the glutes.  He notices aching sensation that started after he went on a hunting trip recently.  A lot of squatting and walking has aggravated the symptoms.  He denies any further radiating symptoms in the legs, any change in strength or bowel bladder.  He rates his pain to be 0 out of 10 today but at times with activity is more flared up left hip is worse than the right.  He denies any numbness tingling or weakness.  He is not taking any medication for the pain at this time.  He has not had any recent physical therapy but has done PT in the past.  He does like to work out and stay active but this is interfering with his ability to do that.    PHYSICAL EXAM:   Resp 18   Ht 64\"   Wt 113 lb (51.3 kg)   BMI 19.40 kg/m²     LUMBAR SPINE:  Inspection: no erythema, swelling, or obvious deformity.  Their iliac crest and shoulder heights are symmetrical.     Palpation: Non tender to palpation of the spinous process.  Tenderness to palpation bilateral glutes   ROM: FAROM  Strength: 5/5 in bilateral lower extremities  Sensation: Intact to light touch in all dermatomes of the lower extremities  Facet Loading: no specific facet pain  Byron: Positive bilateral    IMAGING:     X-ray bilateral hips completed on 4/27/2023     Personally reviewed x-ray imaging which is notable for mild osteoarthritis  of both hips.    All imaging results were reviewed and discussed with patient.      ASSESSMENT/PLAN:     1. Tendinopathy of gluteus medius        Alejandro Hanna is a 68 year old male following up for bilateral hip pain secondary to gluteus medius tendinopathy.  I recommend starting a PT program and icing the hips on a daily basis.  I have advised him to start meloxicam 15 mg daily for the next 7 to 10 days and follow-up in 4 weeks.  If pain is no better we will perform ultrasound-guided corticosteroid injection.  Patient states he did respond well to this in the past as well.      The patient verbalized understanding with the plan and was in agreement. All questions/concerns were addressed and there were no barriers to learning.  Please note Dragon dictation software was used to dictate this note and may result in inadvertent typos.    Yovani Gerardo DO, FAAPMR & CAQSM  Physical Medicine and Rehabilitation  Sports and Spine Medicine    PAST MEDICAL HISTORY:     Past Medical History:    Allergic rhinitis    Flonase    Bladder cancer (HCC)    f/u [facility]    BPH (benign prostatic hyperplasia)    COPD (HCC)    moderate-severe     Disorder of kidney and ureter    Disorder of thyroid    ED (erectile dysfunction)    Esophageal reflux    Essential hypertension    Hearing impairment    Hepatomegaly    Hiatal hernia    High blood pressure    High cholesterol    Hypothyroidism    Injury    Left knee injury, from fall    Osteoarthritis    in knee's, hip's    Pulmonary nodules    first noted 2016on LD screen CT    Tobacco abuse         PAST SURGICAL HISTORY:     Past Surgical History:   Procedure Laterality Date    Colonoscopy      02-14-17    Colonoscopy N/A 12/28/2020    Procedure: COLONOSCOPY;  Surgeon: Real Abreu MD;  Location: [facility] ENDOSCOPY    Electrocardiogram, complete  12/15/2013    scanned to media tab    Oral surgery      Other surgical history  2009    cyst removal, side of head         CURRENT  MEDICATIONS:     Current Outpatient Medications   Medication Sig Dispense Refill    Meloxicam (MOBIC) 15 MG Oral Tab Take 1 tablet (15 mg total) by mouth daily for 14 days. 14 tablet 0    fluticasone-umeclidin-vilant (TRELEGY ELLIPTA) 100-62.5-25 MCG/ACT Inhalation Aerosol Powder, Breath Activated Inhale 1 puff into the lungs daily. 1 each 5    aspirin 81 MG Oral Tab EC Take 1 tablet (81 mg total) by mouth daily.      levothyroxine 88 MCG Oral Tab Take 1 tablet by mouth Monday through Friday but do not take any on Saturday and Sunday. 90 tablet 1    atorvastatin 10 MG Oral Tab Take 1 tablet (10 mg total) by mouth daily. 90 tablet 3    Omeprazole 40 MG Oral Capsule Delayed Release Take 1 capsule (40 mg total) by mouth daily. 90 capsule 3    Fenofibrate 160 MG Oral Tab Take 1 tablet (160 mg total) by mouth daily. 90 tablet 1    Olmesartan Medoxomil 40 MG Oral Tab TAKE 1 TABLET (40 MG TOTAL) BY MOUTH DAILY FOR BLOOD PRESSURE 90 tablet 3    fluticasone propionate 50 MCG/ACT Nasal Suspension 2 sprays by Nasal route daily. 48 mL 2    albuterol (VENTOLIN HFA) 108 (90 Base) MCG/ACT Inhalation Aero Soln Inhale 2 puffs into the lungs every 6 (six) hours as needed for Wheezing. 54 each 5    Multiple Vitamins-Minerals (CENTRUM SILVER 50+MEN OR) Take by mouth.      Dutasteride 0.5 MG Oral Cap            ALLERGIES:   No Known Allergies      FAMILY HISTORY:     Family History   Problem Relation Age of Onset    Dementia Mother     Diabetes Paternal Grandmother     Cancer Sister         lung cancer          SOCIAL HISTORY:     Social History     Socioeconomic History    Marital status:    Tobacco Use    Smoking status: Every Day     Current packs/day: 1.00     Average packs/day: 1 pack/day for 40.0 years (40.0 ttl pk-yrs)     Types: Cigarettes    Smokeless tobacco: Never    Tobacco comments:     2/2022: less than 1 pack per day   Vaping Use    Vaping status: Never Used   Substance and Sexual Activity    Alcohol use: Yes      Alcohol/week: 2.0 standard drinks of alcohol     Types: 2 Cans of beer per week     Comment: 2 beer per day    Drug use: No   Other Topics Concern    Caffeine Concern Yes     Comment: coffee-1 cup/day   Social History Narrative    Live with fianace    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  Pertinent positives and negatives noted in the the HPI.      LABS:   No results found for: \"EAG\", \"A1C\"  Lab Results   Component Value Date    WBC 12.0 (H) 02/15/2024    RBC 4.71 02/15/2024    HGB 14.0 02/15/2024    HCT 41.0 02/15/2024    MCV 87.0 02/15/2024    MCH 29.7 02/15/2024    MCHC 34.1 02/15/2024    RDW 13.3 02/15/2024    .0 02/15/2024    MPV 8.9 10/23/2018     Lab Results   Component Value Date    GLU 97 04/19/2024    BUN 11 04/19/2024    BUNCREA 13.4 04/19/2024    CREATSERUM 0.82 04/19/2024    ANIONGAP 3 04/19/2024    GFRNAA 96 10/12/2021    GFRAA 111 10/12/2021    CA 10.5 (H) 04/19/2024    OSMOCALC 277 04/19/2024    ALKPHO 60 02/15/2024    AST 22 02/15/2024    ALT 14 02/15/2024    ALKPHOS 68 03/08/2016    BILT 0.4 02/15/2024    TP 7.7 02/15/2024    ALB 4.9 (H) 04/19/2024    GLOBULIN 2.9 02/15/2024    AGRATIO 1.5 03/08/2016     (L) 04/19/2024    K 4.6 04/19/2024     04/19/2024    CO2 30.0 04/19/2024     Lab Results   Component Value Date    PTP 12.3 03/05/2019    INR 0.93 03/05/2019     Lab Results   Component Value Date    VITD 28.3 (L) 02/18/2023

## 2024-05-20 RX ORDER — FLUTICASONE PROPIONATE 50 MCG
2 SPRAY, SUSPENSION (ML) NASAL DAILY
Qty: 48 ML | Refills: 3 | Status: SHIPPED | OUTPATIENT
Start: 2024-05-20

## 2024-05-20 NOTE — TELEPHONE ENCOUNTER
Refill Passed Per Protocol    Requested Prescriptions   Pending Prescriptions Disp Refills    FLUTICASONE PROPIONATE 50 MCG/ACT Nasal Suspension [Pharmacy Med Name: FLUTICASONE PROP 50 MCG SPRAY] 48 mL 2     Sig: INSTILL 2 SPRAYS BY NASAL ROUTE DAILY       Allergy Medication Protocol Passed - 5/16/2024 12:44 PM        Passed - In person appointment or virtual visit in the past 12 mos or appointment in next 3 mos     Recent Outpatient Visits              4 days ago Tendinopathy of gluteus medius    [facility], [address], [place] Yovani Gerardo DO    Office Visit    1 week ago Chronic obstructive pulmonary disease, unspecified COPD type ([facility])    [facility] Ian Mathews MD    Office Visit    1 week ago PAD (peripheral artery disease) ([facility])    [facility] Najjar, Samer F, MD    Office Visit    1 month ago Impacted cerumen, right ear    [facility] [place], Jerald Cota MD    Office Visit    2 months ago Bilateral foot pain    [facility], [address], [place] Meshulacheryl, Kian James, DPM    Office Visit          Future Appointments         Provider Department Appt Notes    In 1 week Meshchristiano, Kian James, DPM [facility], [address], [place] follow up    In 1 month Yovani Gerardo,  [facility], [address], [place]     In 3 months Ian Mathews MD [facility] 3 months                         Future Appointments         Provider Department Appt Notes    In 1 week MeshJorge L alvaradoic James, DPM [facility], [address], [place] follow up    In 1 month Yovani Gerardo,  [facility], [address], [place]     In 3 months Ian Mathews MD [facility] 3 months          Recent Outpatient Visits              4 days  ago Tendinopathy of gluteus medius    [facility], [address], [place] Yovani Gerardo DO    Office Visit    1 week ago Chronic obstructive pulmonary disease, unspecified COPD type ([facility])    [facility], [facility], Ian Riddle MD    Office Visit    1 week ago PAD (peripheral artery disease) ([facility])    [facility], [facility] [place] Najjar, Samer F, MD    Office Visit    1 month ago Impacted cerumen, right ear    [facility], [facility], Jerald Cota MD    Office Visit    2 months ago Bilateral foot pain    [facility], [place] Kian Wolfe DPM    Office Visit

## 2024-05-28 ENCOUNTER — OFFICE VISIT (OUTPATIENT)
Dept: PODIATRY CLINIC | Facility: CLINIC | Age: 69
End: 2024-05-28

## 2024-05-28 DIAGNOSIS — M79.672 BILATERAL FOOT PAIN: Primary | ICD-10-CM

## 2024-05-28 DIAGNOSIS — M21.622 TAILOR'S BUNION OF BOTH FEET: ICD-10-CM

## 2024-05-28 DIAGNOSIS — M79.671 BILATERAL FOOT PAIN: Primary | ICD-10-CM

## 2024-05-28 DIAGNOSIS — M21.621 TAILOR'S BUNION OF BOTH FEET: ICD-10-CM

## 2024-05-28 DIAGNOSIS — B35.1 ONYCHOMYCOSIS: ICD-10-CM

## 2024-05-28 DIAGNOSIS — L84 FOOT CALLUS: ICD-10-CM

## 2024-05-28 PROCEDURE — 99213 OFFICE O/P EST LOW 20 MIN: CPT | Performed by: PODIATRIST

## 2024-05-28 NOTE — PROGRESS NOTES
[facility] Podiatry  Progress Note    Alejandro Hanna is a 68 year old male.   Chief Complaint   Patient presents with    Toenail Care     Nail trim and foot check f/u- denies pain         HPI:     This is a pleasant male with COPD, alcohol abuse, PMH of bladder cancer, in remission, and tobacco use disorder.       He presents to clinic today due to long thick painful toenails and painful right foot callus.        Allergies: Patient has no known allergies.   Current Outpatient Medications   Medication Sig Dispense Refill    fluticasone propionate 50 MCG/ACT Nasal Suspension 2 sprays by Nasal route daily. 48 mL 3    Meloxicam (MOBIC) 15 MG Oral Tab Take 1 tablet (15 mg total) by mouth daily for 14 days. 14 tablet 0    fluticasone-umeclidin-vilant (TRELEGY ELLIPTA) 100-62.5-25 MCG/ACT Inhalation Aerosol Powder, Breath Activated Inhale 1 puff into the lungs daily. 1 each 5    aspirin 81 MG Oral Tab EC Take 1 tablet (81 mg total) by mouth daily.      levothyroxine 88 MCG Oral Tab Take 1 tablet by mouth Monday through Friday but do not take any on Saturday and Sunday. 90 tablet 1    atorvastatin 10 MG Oral Tab Take 1 tablet (10 mg total) by mouth daily. 90 tablet 3    Omeprazole 40 MG Oral Capsule Delayed Release Take 1 capsule (40 mg total) by mouth daily. 90 capsule 3    Fenofibrate 160 MG Oral Tab Take 1 tablet (160 mg total) by mouth daily. 90 tablet 1    Olmesartan Medoxomil 40 MG Oral Tab TAKE 1 TABLET (40 MG TOTAL) BY MOUTH DAILY FOR BLOOD PRESSURE 90 tablet 3    albuterol (VENTOLIN HFA) 108 (90 Base) MCG/ACT Inhalation Aero Soln Inhale 2 puffs into the lungs every 6 (six) hours as needed for Wheezing. 54 each 5    Multiple Vitamins-Minerals (CENTRUM SILVER 50+MEN OR) Take by mouth.      Dutasteride 0.5 MG Oral Cap         Past Medical History:    Allergic rhinitis    Flonase    Bladder cancer (HCC)    f/u [facility]    BPH (benign prostatic hyperplasia)    COPD (HCC)    moderate-severe     Disorder of  kidney and ureter    Disorder of thyroid    ED (erectile dysfunction)    Esophageal reflux    Essential hypertension    Hearing impairment    Hepatomegaly    Hiatal hernia    High blood pressure    High cholesterol    Hypothyroidism    Injury    Left knee injury, from fall    Osteoarthritis    in knee's, hip's    Pulmonary nodules    first noted 2016on LD screen CT    Tobacco abuse      Past Surgical History:   Procedure Laterality Date    Colonoscopy      02-14-17    Colonoscopy N/A 12/28/2020    Procedure: COLONOSCOPY;  Surgeon: Real Abreu MD;  Location: [facility] ENDOSCOPY    Electrocardiogram, complete  12/15/2013    scanned to media tab    Oral surgery      Other surgical history  2009    cyst removal, side of head      Family History   Problem Relation Age of Onset    Dementia Mother     Diabetes Paternal Grandmother     Cancer Sister         lung cancer      Social History     Socioeconomic History    Marital status:    Tobacco Use    Smoking status: Every Day     Current packs/day: 1.00     Average packs/day: 1 pack/day for 40.0 years (40.0 ttl pk-yrs)     Types: Cigarettes    Smokeless tobacco: Never    Tobacco comments:     2/2022: less than 1 pack per day   Vaping Use    Vaping status: Never Used   Substance and Sexual Activity    Alcohol use: Yes     Alcohol/week: 2.0 standard drinks of alcohol     Types: 2 Cans of beer per week     Comment: 2 beer per day    Drug use: No   Other Topics Concern    Caffeine Concern Yes     Comment: coffee-1 cup/day           REVIEW OF SYSTEMS:   Denies nausea, fever, chills  No calf pain  No other muscle or joint aches  Denies chest pain or shortness of breath.      EXAM:   There were no vitals taken for this visit.    Constitutional:   Patient in no apparent distress. Well kept. Of normal body habitus. Alert and oriented to person, place, and time.  Vascular Examination:  DP pulse is NP  PT pulse is palpable  Capillary refill is adequate  Integumentary  Examination:   The patient's nails appear incurvated, thickened, elongated, dystrophic, discolored with subungual debris 1-5 right, 1-5  left nails.  Callus right sub 5th met head  Skin is of diminished texture and decreased turgor.  Neurological Examination:  Sharp/dull is present to right and is present to left.  Parasthesias absent.  Musculoskeletal Examination:  Muscle Strength is 5/5.  B/L tailor bunion  Pain on palpation to nails and right foot callus      LABS & IMAGING:     Lab Results   Component Value Date    GLU 97 04/19/2024    BUN 11 04/19/2024    CREATSERUM 0.82 04/19/2024    BUNCREA 13.4 04/19/2024    ANIONGAP 3 04/19/2024    GFRAA 111 10/12/2021    GFRNAA 96 10/12/2021    CA 10.5 (H) 04/19/2024     (L) 04/19/2024    K 4.6 04/19/2024     04/19/2024    CO2 30.0 04/19/2024    OSMOCALC 277 04/19/2024        No results found for: \"EAG\", \"A1C\"     No results found.     ASSESSMENT AND PLAN:   Diagnoses and all orders for this visit:    Bilateral foot pain    Onychomycosis    Foot callus    Tailor's bunion of both feet              Plan:     Evaluated patient. Discussed treatment options with patient.  Discussed proper hygiene and care for feet as well as use of emollient creams (ie Urea based creams)  Answered all patient questions. Discussed offloading hyperkeratotic lesions with proper shoe gear, offloading pads, and insoles.    Procedures: (CPT 17270 Paring or cutting of benign hyperkeratotic lesion)  One lesion pared utilizing #15 blade right foot without incident.    Evaluated the patient. Discussed treatment options with the patient.  Discussed with patient proper care and hygiene for their feet.  Patient tolerated procedures well, without incident.    Instrumentation used includes nail nippers and electric  where appropriate.  Procedure: (66115 Debridement of toenails 6-10) Surgically debrided and mechanically reduced 6 or more toenails.Hemmorhage occurred none.Nails that were  debrided appeared dystrophic and caused the patient pain in shoe gear.Nails 5 Left & 5 Right.       RTC 3 months for nail and callus care    No follow-ups on file.    Kian Wolfe DPM  5/28/24

## 2024-06-18 ENCOUNTER — LAB ENCOUNTER (OUTPATIENT)
Dept: LAB | Age: 69
End: 2024-06-18
Attending: FAMILY MEDICINE

## 2024-06-18 ENCOUNTER — TELEPHONE (OUTPATIENT)
Dept: NEPHROLOGY | Facility: CLINIC | Age: 69
End: 2024-06-18

## 2024-06-18 DIAGNOSIS — E87.1 HYPONATREMIA: ICD-10-CM

## 2024-06-18 DIAGNOSIS — E03.9 ACQUIRED HYPOTHYROIDISM: ICD-10-CM

## 2024-06-18 LAB
ALBUMIN SERPL-MCNC: 5 G/DL (ref 3.2–4.8)
ANION GAP SERPL CALC-SCNC: 5 MMOL/L (ref 0–18)
BUN BLD-MCNC: 10 MG/DL (ref 9–23)
BUN/CREAT SERPL: 11.5 (ref 10–20)
CALCIUM BLD-MCNC: 10.2 MG/DL (ref 8.7–10.4)
CHLORIDE SERPL-SCNC: 96 MMOL/L (ref 98–112)
CO2 SERPL-SCNC: 28 MMOL/L (ref 21–32)
CREAT BLD-MCNC: 0.87 MG/DL
EGFRCR SERPLBLD CKD-EPI 2021: 93 ML/MIN/1.73M2 (ref 60–?)
GLUCOSE BLD-MCNC: 87 MG/DL (ref 70–99)
OSMOLALITY SERPL CALC.SUM OF ELEC: 266 MOSM/KG (ref 275–295)
PHOSPHATE SERPL-MCNC: 3.3 MG/DL (ref 2.4–5.1)
POTASSIUM SERPL-SCNC: 4.6 MMOL/L (ref 3.5–5.1)
SODIUM SERPL-SCNC: 129 MMOL/L (ref 136–145)
TSI SER-ACNC: 18.02 MIU/ML (ref 0.55–4.78)

## 2024-06-18 PROCEDURE — 84443 ASSAY THYROID STIM HORMONE: CPT

## 2024-06-18 PROCEDURE — 36415 COLL VENOUS BLD VENIPUNCTURE: CPT

## 2024-06-18 PROCEDURE — 80069 RENAL FUNCTION PANEL: CPT

## 2024-06-19 DIAGNOSIS — E03.9 ACQUIRED HYPOTHYROIDISM: Primary | ICD-10-CM

## 2024-06-19 RX ORDER — LEVOTHYROXINE SODIUM 0.07 MG/1
75 TABLET ORAL
Qty: 90 TABLET | Refills: 1 | Status: SHIPPED | OUTPATIENT
Start: 2024-06-19

## 2024-06-19 NOTE — TELEPHONE ENCOUNTER
Sodium is still too low.  Make sure he is following a quart and half per day fluid restriction.  Also his thyroid is still underactive.  He needs to work with Dr. Ford on that.  An underactive thyroid can also cause low sodiums.  Also see me soon for routine follow-up

## 2024-06-19 NOTE — TELEPHONE ENCOUNTER
Related message; appointment scheduled 7/5/24  He is following fluid restrictions.  Thyroid level followed up by Dr. Ford- instructed patient to read lab notes from Dr. Ford.      See note:    Your TSH went from being very low to very high.  I am going to change your thyroid medication to 75 mcg and I want you to take it Monday through Sunday so every day and then I am going to put in a TSH for you for 6 weeks from now and no need to fast.  Do this test around July 31, 2024.   Written by Leonid Ford DO on 6/19/2024  2:21 PM CDT

## 2024-06-20 ENCOUNTER — OFFICE VISIT (OUTPATIENT)
Dept: PHYSICAL MEDICINE AND REHAB | Facility: CLINIC | Age: 69
End: 2024-06-20

## 2024-06-20 ENCOUNTER — TELEPHONE (OUTPATIENT)
Dept: PHYSICAL MEDICINE AND REHAB | Facility: CLINIC | Age: 69
End: 2024-06-20

## 2024-06-20 DIAGNOSIS — M67.959 TENDINOPATHY OF GLUTEUS MEDIUS: Primary | ICD-10-CM

## 2024-06-20 DIAGNOSIS — M70.62 GREATER TROCHANTERIC BURSITIS OF BOTH HIPS: ICD-10-CM

## 2024-06-20 DIAGNOSIS — M70.61 GREATER TROCHANTERIC BURSITIS OF BOTH HIPS: ICD-10-CM

## 2024-06-20 PROCEDURE — 99214 OFFICE O/P EST MOD 30 MIN: CPT | Performed by: PHYSICAL MEDICINE & REHABILITATION

## 2024-06-20 NOTE — TELEPHONE ENCOUNTER
Per CMS Guidelines -no authorization is required for Ultrasound guided bilateral greater trochanteric bursa injection with corticosteroid CPT 46473-18,  x2    Status: Authorization is not required based on medical necessity however is not a guarantee of payment and may be subject to review once claim is submitted-Covered Benefit

## 2024-06-21 DIAGNOSIS — E78.2 MIXED HYPERLIPIDEMIA: ICD-10-CM

## 2024-06-24 RX ORDER — FENOFIBRATE 160 MG/1
160 TABLET ORAL DAILY
Qty: 90 TABLET | Refills: 3 | Status: SHIPPED | OUTPATIENT
Start: 2024-06-24

## 2024-06-24 NOTE — TELEPHONE ENCOUNTER
Refill passed per [facility] protocol.  Requested Prescriptions   Pending Prescriptions Disp Refills    FENOFIBRATE 160 MG Oral Tab [Pharmacy Med Name: FENOFIBRATE 160 MG TABLET] 90 tablet 1     Sig: TAKE 1 TABLET BY MOUTH EVERY DAY       Cholesterol Medication Protocol Passed - 6/21/2024 12:45 AM        Passed - ALT < 80     Lab Results   Component Value Date    ALT 14 02/15/2024             Passed - ALT resulted within past year        Passed - Lipid panel within past 12 months     Lab Results   Component Value Date    CHOLEST 147 02/15/2024    TRIG 77 02/15/2024    HDL 55 02/15/2024    LDL 77 02/15/2024    VLDL 12 02/15/2024    NONHDLC 92 02/15/2024             Passed - In person appointment or virtual visit in the past 12 mos or appointment in next 3 mos     Recent Outpatient Visits              4 days ago Tendinopathy of gluteus medius    [facility], [address], [place] Yovani Gerardo,     Office Visit    3 weeks ago Bilateral foot pain    [facility], [address], [place] Kian Wolfe, DPM    Office Visit    1 month ago Tendinopathy of gluteus medius    [facility], [address], [place] Yovani Gerardo,     Office Visit    1 month ago Chronic obstructive pulmonary disease, unspecified COPD type ([facility])    [facility] Ian Mathews MD    Office Visit    1 month ago PAD (peripheral artery disease) ([facility])    [facility] Najjar, Samer F, MD    Office Visit          Future Appointments         Provider Department Appt Notes    In 1 week Faisal Villar MD [facility] 6 month follow up    In 3 weeks Yovani Gerardo DO [facility], [address], [place] Ultrasound guided bilateral greater trochanteric bursa injection with corticosteroid    In 1 month [facility] CT RM1 [facility] CT - [place]     In 1  month Ian Mathews MD [facility] 3 months    In 2 months Sierra Encarnacion DPM [facility] 3 month                       Recent Outpatient Visits              4 days ago Tendinopathy of gluteus medius    [facility], [address], [place] Gerardo, Yogen Y, DO    Office Visit    3 weeks ago Bilateral foot pain    [facility], [address], [place] Kian Wolfe, DPM    Office Visit    1 month ago Tendinopathy of gluteus medius    [facility], [address], [place] Gerardo, Yogen Y, DO    Office Visit    1 month ago Chronic obstructive pulmonary disease, unspecified COPD type ([facility])    [facility] Ian Mathews MD    Office Visit    1 month ago PAD (peripheral artery disease) ([facility])    [facility] Najjar, Samer F, MD    Office Visit          Future Appointments         Provider Department Appt Notes    In 1 week Faisal Villar MD [facility] 6 month follow up    In 3 weeks Akin, Yovani Y, DO [facility], [address], [place] Ultrasound guided bilateral greater trochanteric bursa injection with corticosteroid    In 1 month [facility] CT RM1 [facility] CT - [place]     In 1 month Ian Mathews MD [facility] 3 months    In 2 months Sierra Encarnacion DPM [facility] 3 month

## 2024-07-02 ENCOUNTER — LAB ENCOUNTER (OUTPATIENT)
Dept: LAB | Age: 69
End: 2024-07-02
Attending: INTERNAL MEDICINE
Payer: MEDICARE

## 2024-07-02 DIAGNOSIS — E87.1 HYPONATREMIA: ICD-10-CM

## 2024-07-02 LAB
ALBUMIN SERPL-MCNC: 4.7 G/DL (ref 3.2–4.8)
ANION GAP SERPL CALC-SCNC: 5 MMOL/L (ref 0–18)
BUN BLD-MCNC: 9 MG/DL (ref 9–23)
BUN/CREAT SERPL: 9.7 (ref 10–20)
CALCIUM BLD-MCNC: 10.2 MG/DL (ref 8.7–10.4)
CHLORIDE SERPL-SCNC: 98 MMOL/L (ref 98–112)
CO2 SERPL-SCNC: 31 MMOL/L (ref 21–32)
CREAT BLD-MCNC: 0.93 MG/DL
EGFRCR SERPLBLD CKD-EPI 2021: 88 ML/MIN/1.73M2 (ref 60–?)
GLUCOSE BLD-MCNC: 90 MG/DL (ref 70–99)
OSMOLALITY SERPL CALC.SUM OF ELEC: 276 MOSM/KG (ref 275–295)
PHOSPHATE SERPL-MCNC: 3 MG/DL (ref 2.4–5.1)
POTASSIUM SERPL-SCNC: 4.3 MMOL/L (ref 3.5–5.1)
SODIUM SERPL-SCNC: 134 MMOL/L (ref 136–145)

## 2024-07-02 PROCEDURE — 80069 RENAL FUNCTION PANEL: CPT

## 2024-07-02 PROCEDURE — 36415 COLL VENOUS BLD VENIPUNCTURE: CPT

## 2024-07-05 ENCOUNTER — OFFICE VISIT (OUTPATIENT)
Dept: NEPHROLOGY | Facility: CLINIC | Age: 69
End: 2024-07-05
Payer: MEDICARE

## 2024-07-05 VITALS
HEART RATE: 83 BPM | DIASTOLIC BLOOD PRESSURE: 75 MMHG | SYSTOLIC BLOOD PRESSURE: 131 MMHG | WEIGHT: 116 LBS | BODY MASS INDEX: 20 KG/M2

## 2024-07-05 DIAGNOSIS — E87.1 HYPONATREMIA: Primary | ICD-10-CM

## 2024-07-05 PROCEDURE — 99214 OFFICE O/P EST MOD 30 MIN: CPT | Performed by: INTERNAL MEDICINE

## 2024-07-05 NOTE — PATIENT INSTRUCTIONS
Continue a 48 ounce per day fluid restriction.  Repeat your kidney blood test in 3 months.  Orders are in the computer.

## 2024-07-05 NOTE — PROGRESS NOTES
07/05/24        Patient: Alejandro Hanna   YOB: 1955   Date of Visit: 7/5/2024       Dear  Dr. Ford, DO,      Thank you for referring Alejandro Hanna to my practice.  Please find my assessment and plan below.      As you know he is a 68-year-old male with a history of hypertension, bladder cancer followed by Dr. Shaffer, severe COPD, hypothyroidism, GERD and hypercholesterolemia who I now the pleasure of seeing for follow-up of chronic hyponatremia.  Overall the patient states he is doing well without any chest pain, increased shortness of breath, GI or urinary tract symptoms.  Nocturia x 1-2.  No evidence for bladder cancer recurrence.  Unfortunately still smoking cigarettes.  Following a 48 ounces per day fluid restriction well.    On physical exam his blood pressure is 131/75 with a pulse of 83 and he weighed 116 pounds.  His neck was supple without JVD.  Lungs were clear but with diminished breath sounds.  Heart revealed a regular rate and rhythm with an S4 but no gallops or murmurs.  Abdomen was soft, flat, nontender without organomegaly, masses or bruits.  Extremities revealed no edema.    Reviewed recent labs.  Sodium was down to 129 June 18, 2024.  However at that time his TSH was up to 18.  Now on levothyroxine 75 mcg daily.  Just had repeat labs done on July 2, 2024.  Sodium better up to 134.  Creatinine normal 0.93.    I therefore informed the patient that he still has mild hyponatremia but is in a safe range.  This is related to his COPD and hypothyroidism.  He knows to continue a 48 ounces per day fluid restriction.  Repeat labs in 3 months.  Will see again in 6 months for follow-up or sooner if clinically indicated.    Thank you again for allowing me to participate in the care of your patient.  If you have any questions please feel free to call.           Sincerely,   Faisal Villar MD   [facility], [facility], [place]  [address]  [address]  [facility] [id number]    Document electronically generated by:  Faisal Villar MD

## 2024-07-18 ENCOUNTER — OFFICE VISIT (OUTPATIENT)
Dept: PHYSICAL MEDICINE AND REHAB | Facility: CLINIC | Age: 69
End: 2024-07-18
Payer: MEDICARE

## 2024-07-18 DIAGNOSIS — M70.61 GREATER TROCHANTERIC BURSITIS OF BOTH HIPS: Primary | ICD-10-CM

## 2024-07-18 DIAGNOSIS — M70.62 GREATER TROCHANTERIC BURSITIS OF BOTH HIPS: Primary | ICD-10-CM

## 2024-07-18 PROCEDURE — 20611 DRAIN/INJ JOINT/BURSA W/US: CPT | Performed by: PHYSICAL MEDICINE & REHABILITATION

## 2024-07-18 RX ORDER — TRIAMCINOLONE ACETONIDE 40 MG/ML
80 INJECTION, SUSPENSION INTRA-ARTICULAR; INTRAMUSCULAR ONCE
Status: COMPLETED | OUTPATIENT
Start: 2024-07-18 | End: 2024-07-18

## 2024-07-18 RX ORDER — LIDOCAINE HYDROCHLORIDE 10 MG/ML
14 INJECTION, SOLUTION INFILTRATION; PERINEURAL ONCE
Status: COMPLETED | OUTPATIENT
Start: 2024-07-18 | End: 2024-07-18

## 2024-07-18 NOTE — PROCEDURES
Procedure:  Ultrasound guided bilateral greater trochanteric bursa injection  The risks, benefits and anticipated outcomes of the procedure, the risks and benefits of the alternatives to the procedure, and the roles and tasks of the personnel to be involved, were discussed with the patient, and the patient consents to the procedure and agrees to proceed.  UNIVERSAL PROTOCOL / SAFETY CHECKLIST  Sign in Communication: Completed  Time Out:  Team Confirms the Correct Patient, Correct Procedure, Correct Site and Site Marking, Correct Position (if applicable), Prep and Dry Time (if applicable).    Affirmation of Time Out: YES  Sign Out Discussion: Completed if applicable  The procedure was carried out under sterile prep  with sterile gel.  A 27 ga 1&1/4in needle was introduced and advanced with ultrasound guidance for skin anesthesia with 3 cc of 1% lidocaine.  Then, again with real time ultrasound guidance, a 22 gauge 3.5 in needle was advanced from lateral to medial and posterior to anterior with the transducer in transverse orientation into the greater trochanteric bursa.  Following negative aspiration, a mixture of 4 cc of 1% lidocaine and 1 cc of Kenalog (40mg/ml) was injected.  Permanent images were taken and stored in the PACS system.     Ultrasound interpretation was performed prior to the procedure to identify the target and any adjacent neurovascular structures.  Subsequently, interpretation was performed during real-time needle guidance confirming placement.  Post-intervention interpretation was also performed confirming appropriate injectate flow and hemostasis. The patient tolerated the procedure without complication and was instructed in post-procedure precautions.  See patient instructions.     Yovani Gerardo DO, FAAPMR & CAQSM  Physical Medicine and Rehabilitation/Sports Medicine  [facility]

## 2024-07-25 ENCOUNTER — LAB ENCOUNTER (OUTPATIENT)
Dept: LAB | Age: 69
End: 2024-07-25
Attending: INTERNAL MEDICINE
Payer: MEDICARE

## 2024-07-25 DIAGNOSIS — E03.9 ACQUIRED HYPOTHYROIDISM: ICD-10-CM

## 2024-07-25 LAB — TSI SER-ACNC: 9.07 MIU/ML (ref 0.55–4.78)

## 2024-07-25 PROCEDURE — 84443 ASSAY THYROID STIM HORMONE: CPT

## 2024-07-25 PROCEDURE — 36415 COLL VENOUS BLD VENIPUNCTURE: CPT

## 2024-08-02 ENCOUNTER — OFFICE VISIT (OUTPATIENT)
Dept: FAMILY MEDICINE CLINIC | Facility: CLINIC | Age: 69
End: 2024-08-02

## 2024-08-02 VITALS
SYSTOLIC BLOOD PRESSURE: 124 MMHG | HEIGHT: 64 IN | WEIGHT: 114.38 LBS | TEMPERATURE: 97 F | BODY MASS INDEX: 19.53 KG/M2 | DIASTOLIC BLOOD PRESSURE: 81 MMHG | HEART RATE: 83 BPM

## 2024-08-02 DIAGNOSIS — E03.9 ACQUIRED HYPOTHYROIDISM: Primary | ICD-10-CM

## 2024-08-02 DIAGNOSIS — D72.829 LEUKOCYTOSIS, UNSPECIFIED TYPE: ICD-10-CM

## 2024-08-02 PROCEDURE — 99214 OFFICE O/P EST MOD 30 MIN: CPT | Performed by: FAMILY MEDICINE

## 2024-08-02 RX ORDER — LEVOTHYROXINE SODIUM 88 UG/1
88 TABLET ORAL
Qty: 90 TABLET | Refills: 1 | Status: SHIPPED | OUTPATIENT
Start: 2024-08-02

## 2024-08-02 NOTE — PROGRESS NOTES
Patient ID: Alejandro Hanna is a 68 year old male.    HPI  Chief Complaint   Patient presents with    Test Results     TSH levels      TSH levels did come down but are still quite high.  He has been compliant on medication.  He does not feel fatigued but I did explain that we need to get the TSH down to the 1.5 or 2 level.  He still has the 88 mcg dose at home and we will try this again.  Although once he was on it daily his TSH went too low so we had to cut the 88 mcg dose to daily Monday through Friday and then a half a tablet Saturday and Sunday but it was still too low and then we switched him over to 75 mcg but now it is too high.    He has his CAT scan for his chest mid August and then will be seeing the pulmonologist .        Wt Readings from Last 6 Encounters:   08/02/24 114 lb 6.4 oz (51.9 kg)   07/05/24 116 lb (52.6 kg)   05/16/24 113 lb (51.3 kg)   05/13/24 110 lb (49.9 kg)   05/07/24 120 lb (54.4 kg)   04/11/24 120 lb (54.4 kg)       BMI Readings from Last 6 Encounters:   08/02/24 19.64 kg/m²   07/05/24 19.91 kg/m²   05/16/24 19.40 kg/m²   05/13/24 18.88 kg/m²   05/07/24 20.60 kg/m²   04/11/24 20.60 kg/m²       BP Readings from Last 6 Encounters:   08/02/24 124/81   07/05/24 131/75   05/13/24 138/80   03/07/24 (!) 155/91   02/25/24 157/83   02/15/24 130/62         Review of Systems  He has no chest pain.  He is not short of breath.        Medical History:      Past Medical History:    Allergic rhinitis    Flonase    Bladder cancer (HCC)    f/u [facility]    BPH (benign prostatic hyperplasia)    COPD (HCC)    moderate-severe     Disorder of kidney and ureter    Disorder of thyroid    ED (erectile dysfunction)    Esophageal reflux    Essential hypertension    Hearing impairment    Hepatomegaly    Hiatal hernia    High blood pressure    High cholesterol    Hypothyroidism    Injury    Left knee injury, from fall    Osteoarthritis    in knee's, hip's    Pulmonary nodules    first noted 2016on LD screen CT     Tobacco abuse       Past Surgical History:   Procedure Laterality Date    Colonoscopy      02-14-17    Colonoscopy N/A 12/28/2020    Procedure: COLONOSCOPY;  Surgeon: Real Abreu MD;  Location: [facility] ENDOSCOPY    Electrocardiogram, complete  12/15/2013    scanned to media tab    Oral surgery      Other surgical history  2009    cyst removal, side of head          Current Outpatient Medications   Medication Sig Dispense Refill    Fenofibrate 160 MG Oral Tab Take 1 tablet (160 mg total) by mouth daily. 90 tablet 3    levothyroxine (LEVOXYL) 75 MCG Oral Tab Take 1 tablet (75 mcg total) by mouth before breakfast. For underactive thyroid. 90 tablet 1    fluticasone propionate 50 MCG/ACT Nasal Suspension 2 sprays by Nasal route daily. 48 mL 3    fluticasone-umeclidin-vilant (TRELEGY ELLIPTA) 100-62.5-25 MCG/ACT Inhalation Aerosol Powder, Breath Activated Inhale 1 puff into the lungs daily. 1 each 5    aspirin 81 MG Oral Tab EC Take 1 tablet (81 mg total) by mouth daily.      atorvastatin 10 MG Oral Tab Take 1 tablet (10 mg total) by mouth daily. 90 tablet 3    Omeprazole 40 MG Oral Capsule Delayed Release Take 1 capsule (40 mg total) by mouth daily. 90 capsule 3    Olmesartan Medoxomil 40 MG Oral Tab TAKE 1 TABLET (40 MG TOTAL) BY MOUTH DAILY FOR BLOOD PRESSURE 90 tablet 3    albuterol (VENTOLIN HFA) 108 (90 Base) MCG/ACT Inhalation Aero Soln Inhale 2 puffs into the lungs every 6 (six) hours as needed for Wheezing. 54 each 5    Multiple Vitamins-Minerals (CENTRUM SILVER 50+MEN OR) Take by mouth.      Dutasteride 0.5 MG Oral Cap        Allergies:No Known Allergies     Physical Exam:       Physical Exam  Blood pressure 124/81, pulse 83, temperature 96.9 °F (36.1 °C), temperature source Temporal, height 5' 4\" (1.626 m), weight 114 lb 6.4 oz (51.9 kg).  Physical Exam   Constitutional: Patient is oriented to person, place, and time. Patient appears well-developed and well-nourished. No distress.   HENT:   Head:  Normocephalic and atraumatic.   Neck: Normal range of motion. Neck supple. No thyromegaly present.   Lymphadenopathy:     Has  no cervical adenopathy.   Cardiovascular: Normal rate, regular rhythm and no murmur heard.  Pulmonary/Chest: Effort normal and breath sounds normal. No respiratory distress.   Neurological: Patient is alert and oriented to person, place, and time.   Skin: Skin is warm and dry.   Psychiatric: Patient has a normal mood and affect.   Lower legs: No edema of the legs bilaterally.    Nursing note and vitals reviewed.           Assessment/Plan:      Diagnoses and all orders for this visit:    Acquired hypothyroidism  -     levothyroxine (LEVOXYL) 88 MCG Oral Tab; Take 1 tablet (88 mcg total) by mouth before breakfast. For underactive thyroid.  -     TSH W Reflex To Free T4; Future      Try the 88 mcg dose again and then recheck TSH and CBC in the future.  If the TSH goes into a hyperthyroid level I will most likely have him see endocrinology.  Leukocytosis, unspecified type  -     CBC With Differential With Platelet; Future  He has some mild leukocytosis in the past and I want to recheck this.  No anemia.      Referrals (if applicable)  No orders of the defined types were placed in this encounter.        Follow up if symptoms persist.  Take medicine (if given) as prescribed.  Approach to treatment discussed and patient/family member understands and agrees to plan.     No follow-ups on file.        Leonid Ford DO  8/2/2024

## 2024-08-13 ENCOUNTER — HOSPITAL ENCOUNTER (OUTPATIENT)
Dept: CT IMAGING | Age: 69
Discharge: HOME | End: 2024-08-13
Attending: INTERNAL MEDICINE
Payer: MEDICARE

## 2024-08-13 DIAGNOSIS — R91.1 LUNG NODULE: ICD-10-CM

## 2024-08-13 PROCEDURE — 71250 CT THORAX DX C-: CPT | Performed by: INTERNAL MEDICINE

## 2024-08-15 ENCOUNTER — TELEMEDICINE (OUTPATIENT)
Dept: PHYSICAL MEDICINE AND REHAB | Facility: CLINIC | Age: 69
End: 2024-08-15
Payer: MEDICARE

## 2024-08-15 DIAGNOSIS — M67.959 TENDINOPATHY OF GLUTEUS MEDIUS: ICD-10-CM

## 2024-08-15 DIAGNOSIS — M70.61 GREATER TROCHANTERIC BURSITIS OF BOTH HIPS: Primary | ICD-10-CM

## 2024-08-15 DIAGNOSIS — M70.62 GREATER TROCHANTERIC BURSITIS OF BOTH HIPS: Primary | ICD-10-CM

## 2024-08-15 PROCEDURE — 99213 OFFICE O/P EST LOW 20 MIN: CPT | Performed by: PHYSICAL MEDICINE & REHABILITATION

## 2024-08-15 NOTE — PROGRESS NOTES
[facility]    Telemedicine Visit - Follow Up Evaluation    Telehealth Verbal Consent   I conducted a telehealth visit with Alejandro Hanna today, 08/15/24, which was completed using two-way, real-time interactive audio and video communication. This has been done in good michelle to provide continuity of care in the best interest of the provider-patient relationship, due to the COVID - public health crisis/national emergency where restrictions of face-to-face office visits are ongoing. Every conscious effort was taken to allow for sufficient and adequate time to complete the visit.  The patient was made aware of the limitations of the telehealth visit, including treatment limitations as no physical exam could be performed.  The patient was advised to call 911 or to go to the ER in case there was an emergency.  The patient was also advised of the potential privacy & security concerns related to the telehealth platform.   The patient was made aware of where to find [facility]'s notice of privacy practices, telehealth consent form and other related consent forms and documents.  which are located on the [facility] website. The patient verbally agreed to telehealth consent form, related consents and the risks discussed.    Lastly, the patient confirmed that they were in [place].   Included in this visit, time may have been spent reviewing labs, medications, radiology tests and decision making. Appropriate medical decision-making and tests are ordered as detailed in the plan of care above.  Coding/billing information is submitted for this visit based on complexity of care and/or time spent for the visit.      HISTORY OF PRESENT ILLNESS:     Patient is following up bilateral lateral hip pain.  He had ultrasound-guided bilateral greater trochanteric bursa injections which has provided great improvement.  He denies taking any medication for the pain at this time.  Continues home  exercises and topical treatment as needed.  He is very happy with his progress.      IMAGING:   No new imaging    All imaging results were reviewed and discussed with patient.      ASSESSMENT:     1. Greater trochanteric bursitis of both hips    2. Tendinopathy of gluteus medius          PLAN:   Alejandro Hanna is a 68 year old male following up for bilateral hip pain secondary greater trochanteric bursitis and gluteus medius tendinopathy.  He has responded really well to the greater trochanteric bursa injection under ultrasound guidance.  Recommend he follow-up in 3 months in the office and continue home exercise program topical treatment Tylenol as needed.      Follow-up:  3 months    We discussed that a telemedicine visit is in place of an office visit; however, this limits the ability to perform a thorough physical examination which may affect objective findings related to a specific condition and can affect treatment.    The patient verbalized understanding with this plan and was in agreement.  There are no barriers to learning.  All questions were answered.  Please note Dragon dictation software was used to dictate this note which may result in inadvertent typos.    Yovani Gerardo D.O. FAAPMR & CAQSM  Physical Medicine and Rehabilitation/Sports Medicine    PAST MEDICAL HISTORY:     Past Medical History:    Allergic rhinitis    Flonase    Bladder cancer (HCC)    f/u [facility]    BPH (benign prostatic hyperplasia)    COPD (HCC)    moderate-severe     Disorder of kidney and ureter    Disorder of thyroid    ED (erectile dysfunction)    Esophageal reflux    Essential hypertension    Hearing impairment    Hepatomegaly    Hiatal hernia    High blood pressure    High cholesterol    Hypothyroidism    Injury    Left knee injury, from fall    Osteoarthritis    in knee's, hip's    Pulmonary nodules    first noted 2016on LD screen CT    Tobacco abuse         PAST SURGICAL HISTORY:     Past Surgical History:   Procedure  Laterality Date    Colonoscopy      02-14-17    Colonoscopy N/A 12/28/2020    Procedure: COLONOSCOPY;  Surgeon: Real Abreu MD;  Location: [facility] ENDOSCOPY    Electrocardiogram, complete  12/15/2013    scanned to media tab    Oral surgery      Other surgical history  2009    cyst removal, side of head         CURRENT MEDICATIONS:     Current Outpatient Medications   Medication Sig Dispense Refill    levothyroxine (LEVOXYL) 88 MCG Oral Tab Take 1 tablet (88 mcg total) by mouth before breakfast. For underactive thyroid. 90 tablet 1    Fenofibrate 160 MG Oral Tab Take 1 tablet (160 mg total) by mouth daily. 90 tablet 3    fluticasone propionate 50 MCG/ACT Nasal Suspension 2 sprays by Nasal route daily. 48 mL 3    fluticasone-umeclidin-vilant (TRELEGY ELLIPTA) 100-62.5-25 MCG/ACT Inhalation Aerosol Powder, Breath Activated Inhale 1 puff into the lungs daily. 1 each 5    aspirin 81 MG Oral Tab EC Take 1 tablet (81 mg total) by mouth daily.      atorvastatin 10 MG Oral Tab Take 1 tablet (10 mg total) by mouth daily. 90 tablet 3    Omeprazole 40 MG Oral Capsule Delayed Release Take 1 capsule (40 mg total) by mouth daily. 90 capsule 3    Olmesartan Medoxomil 40 MG Oral Tab TAKE 1 TABLET (40 MG TOTAL) BY MOUTH DAILY FOR BLOOD PRESSURE 90 tablet 3    albuterol (VENTOLIN HFA) 108 (90 Base) MCG/ACT Inhalation Aero Soln Inhale 2 puffs into the lungs every 6 (six) hours as needed for Wheezing. 54 each 5    Multiple Vitamins-Minerals (CENTRUM SILVER 50+MEN OR) Take by mouth.      Dutasteride 0.5 MG Oral Cap            ALLERGIES:   No Known Allergies      FAMILY HISTORY:     Family History   Problem Relation Age of Onset    Dementia Mother     Diabetes Paternal Grandmother     Cancer Sister         lung cancer          SOCIAL HISTORY:     Social History     Socioeconomic History    Marital status:    Tobacco Use    Smoking status: Every Day     Current packs/day: 1.00     Average packs/day: 1 pack/day for 40.0 years  (40.0 ttl pk-yrs)     Types: Cigarettes    Smokeless tobacco: Never    Tobacco comments:     2/2022: less than 1 pack per day   Vaping Use    Vaping status: Never Used   Substance and Sexual Activity    Alcohol use: Yes     Alcohol/week: 2.0 standard drinks of alcohol     Types: 2 Cans of beer per week     Comment: 2 beer per day    Drug use: No   Other Topics Concern    Caffeine Concern Yes     Comment: coffee-1 cup/day   Social History Narrative    Live with fianace    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          REVIEW OF SYSTEMS:   As noted in HPI      PHYSICAL EXAM:   General: No immediate distress  Head: Normocephalic/ Atraumatic  Eyes: Extra-occular movements intact  Ears/Nose/Throat:  External appearance identifies normal appearance without obvious deformity  Cardiovascular: No cyanosis, clubbing or edema  Respiratory: Non-labored respirations  Skin: No lesions noted   Neurological: alert & oriented x 3, attentive, able to follow commands, comprehention intact, spontaneous speech intact  Psychiatric: Mood and affect appropriate  Musculoskeletal Exam:  No change since last exam        LABS:   No results found for: \"EAG\", \"A1C\"  Lab Results   Component Value Date    WBC 12.0 (H) 02/15/2024    RBC 4.71 02/15/2024    HGB 14.0 02/15/2024    HCT 41.0 02/15/2024    MCV 87.0 02/15/2024    MCH 29.7 02/15/2024    MCHC 34.1 02/15/2024    RDW 13.3 02/15/2024    .0 02/15/2024    MPV 8.9 10/23/2018     Lab Results   Component Value Date    GLU 90 07/02/2024    BUN 9 07/02/2024    BUNCREA 9.7 (L) 07/02/2024    CREATSERUM 0.93 07/02/2024    ANIONGAP 5 07/02/2024    GFRNAA 96 10/12/2021    GFRAA 111 10/12/2021    CA 10.2 07/02/2024    OSMOCALC 276 07/02/2024    ALKPHO 60 02/15/2024    AST 22 02/15/2024    ALT 14 02/15/2024    ALKPHOS 68 03/08/2016    BILT 0.4 02/15/2024    TP 7.7 02/15/2024    ALB 4.7 07/02/2024    GLOBULIN 2.9 02/15/2024    AGRATIO 1.5 03/08/2016     (L)  07/02/2024    K 4.3 07/02/2024    CL 98 07/02/2024    CO2 31.0 07/02/2024     Lab Results   Component Value Date    PTP 12.3 03/05/2019    INR 0.93 03/05/2019     Lab Results   Component Value Date    VITD 28.3 (L) 02/18/2023

## 2024-08-19 ENCOUNTER — OFFICE VISIT (OUTPATIENT)
Dept: PULMONOLOGY | Facility: CLINIC | Age: 69
End: 2024-08-19
Payer: MEDICARE

## 2024-08-19 VITALS
HEART RATE: 82 BPM | HEIGHT: 64 IN | BODY MASS INDEX: 19.46 KG/M2 | OXYGEN SATURATION: 98 % | DIASTOLIC BLOOD PRESSURE: 78 MMHG | SYSTOLIC BLOOD PRESSURE: 150 MMHG | WEIGHT: 114 LBS

## 2024-08-19 DIAGNOSIS — J44.9 CHRONIC OBSTRUCTIVE PULMONARY DISEASE, UNSPECIFIED COPD TYPE (HCC): Primary | ICD-10-CM

## 2024-08-19 DIAGNOSIS — R91.8 LUNG NODULES: ICD-10-CM

## 2024-08-19 PROCEDURE — 99214 OFFICE O/P EST MOD 30 MIN: CPT | Performed by: INTERNAL MEDICINE

## 2024-08-19 NOTE — PROGRESS NOTES
Subjective:   Patient ID: Alejandro Hanna is a 68 year old male.    HPI  Continued smoke over half a pack a day  Using the Breo inhaler and albuterol as needed  Denied active cough or sputum  Chronic smoker cough in the morning  No hemoptysis  No chest pain  No weight loss or night sweats  Overall fair functional status and he can walk few blocks every day  History/Other:   Review of Systems   Constitutional:  Negative for fatigue, fever and unexpected weight change.   Cardiovascular:  Negative for chest pain.   Gastrointestinal: Negative.    Neurological: Negative.    Hematological: Negative.    Psychiatric/Behavioral: Negative.       Current Outpatient Medications   Medication Sig Dispense Refill    levothyroxine (LEVOXYL) 88 MCG Oral Tab Take 1 tablet (88 mcg total) by mouth before breakfast. For underactive thyroid. 90 tablet 1    Fenofibrate 160 MG Oral Tab Take 1 tablet (160 mg total) by mouth daily. 90 tablet 3    fluticasone propionate 50 MCG/ACT Nasal Suspension 2 sprays by Nasal route daily. 48 mL 3    fluticasone-umeclidin-vilant (TRELEGY ELLIPTA) 100-62.5-25 MCG/ACT Inhalation Aerosol Powder, Breath Activated Inhale 1 puff into the lungs daily. 1 each 5    aspirin 81 MG Oral Tab EC Take 1 tablet (81 mg total) by mouth daily.      atorvastatin 10 MG Oral Tab Take 1 tablet (10 mg total) by mouth daily. 90 tablet 3    Omeprazole 40 MG Oral Capsule Delayed Release Take 1 capsule (40 mg total) by mouth daily. 90 capsule 3    Olmesartan Medoxomil 40 MG Oral Tab TAKE 1 TABLET (40 MG TOTAL) BY MOUTH DAILY FOR BLOOD PRESSURE 90 tablet 3    albuterol (VENTOLIN HFA) 108 (90 Base) MCG/ACT Inhalation Aero Soln Inhale 2 puffs into the lungs every 6 (six) hours as needed for Wheezing. 54 each 5    Multiple Vitamins-Minerals (CENTRUM SILVER 50+MEN OR) Take by mouth.      Dutasteride 0.5 MG Oral Cap        Allergies:No Known Allergies    Objective:   Physical Exam  Constitutional:       General: He is not in acute  distress.     Appearance: He is ill-appearing.   HENT:      Head: Normocephalic and atraumatic.      Mouth/Throat:      Mouth: Mucous membranes are moist.   Eyes:      General: No scleral icterus.     Pupils: Pupils are equal, round, and reactive to light.   Cardiovascular:      Rate and Rhythm: Normal rate.      Heart sounds:      No gallop.   Pulmonary:      Comments: Distant breath sounds  Few expiratory wheezes in the bases otherwise clear  No rales or rhonchi  Abdominal:      General: Abdomen is flat. Bowel sounds are normal.      Palpations: Abdomen is soft.      Tenderness: There is no guarding.   Musculoskeletal:      Right lower leg: No edema.      Left lower leg: No edema.   Skin:     General: Skin is dry.   Neurological:      General: No focal deficit present.      Mental Status: He is oriented to person, place, and time. Mental status is at baseline.             Chest ct 8/13/2024 reviewed   SUPPORT DEVICES: None.  CARDIAC: The heart is normal size.  Coronary artery calcifications are seen.  THORACIC AORTA: No aneurysm.  Mild calcified atherosclerosis.  VASCULATURE: Thrombus cannot be excluded without intravenous contrast.    MEDIASTINUM/MEG: No mass or enlarged adenopathy.    ESOPHAGUS: Unremarkable.  NECK BASE: No suspicious lymph nodes.  CHEST WALL: No suspicious axillary lymph nodes.  BONES: Mild osteopenia.  No acute fracture.  Mild spondylosis.  DIAPHRAGM: Unremarkable.  UPPER ABDOMEN: Limited evaluation is unremarkable.  PLEURA: No mass or effusion.  LUNGS: The trachea and central airways are clear.  There are no acute consolidations.  Peripheral reticulation and emphysema are again seen.  Left upper lobe pulmonary nodule measuring 1.0 x 1.0 cm series 3, image 53 previously 1.0 x 0.7 cm by my  remeasurement. Stable right upper lobe nodule measuring 0.9 x 0.7 cm series 3, image 34. Stable 0.3 cm left upper lobe pulmonary nodule series 3, image 20. No new nodules are identified.                     Impression   CONCLUSION:     1. The left upper lobe pulmonary nodule of concern has increased in size since the prior exam now measuring 1.0 x 1.0 cm.  Recommend evaluation with PET-CT.     2. Stable or slightly decreased right upper lobe pulmonary nodule measuring 1.0 cm.  Additional smaller nodules are stable.     3. Fibrosis and emphysema are again noted.     4. Additional chronic or incidental findings are described in the body of this report.          Assessment & Plan:   1. Chronic obstructive pulmonary disease, unspecified COPD type (HCC)    2. Lung nodules           1- WOODY / pleural based nodule 1 x 0.6  cm / new on ct from may/2024   Persist and Slightly increased in size now  on f/u chest ct 8/13/2024   Now measuring 1 x 1 cm      Plan :   PET scan     2-RUL lung nodule ( 1.1 cm )      Seen on chest ct 2/8/2023  ( 1.7 x 1.4 cm RUL )     PET scan 2/17/2023 negative      Smaller on chest ct 6/14/2023 around 12 mm      Now stable and slightly decreased in size     The decrease in size of the nodule and  previously negative PET scan more suggestive for benign nodule .        3-severe COPD   lifelong history of smoking / continue to smoke   FEV1 44 % 1.19 L      Stable with no evidence of exacerbation  Breo inhaler  Albuterol as needed  Recommend complete smoking cessation        PET scan   F/u in 4 weeks        Meds This Visit:  Requested Prescriptions      No prescriptions requested or ordered in this encounter       Imaging & Referrals:  None

## 2024-08-30 ENCOUNTER — OFFICE VISIT (OUTPATIENT)
Dept: PODIATRY CLINIC | Facility: CLINIC | Age: 69
End: 2024-08-30
Payer: MEDICARE

## 2024-08-30 DIAGNOSIS — M79.672 BILATERAL FOOT PAIN: ICD-10-CM

## 2024-08-30 DIAGNOSIS — M79.671 BILATERAL FOOT PAIN: ICD-10-CM

## 2024-08-30 DIAGNOSIS — L84 FOOT CALLUS: ICD-10-CM

## 2024-08-30 DIAGNOSIS — B35.1 ONYCHOMYCOSIS: Primary | ICD-10-CM

## 2024-08-30 PROCEDURE — 99213 OFFICE O/P EST LOW 20 MIN: CPT | Performed by: STUDENT IN AN ORGANIZED HEALTH CARE EDUCATION/TRAINING PROGRAM

## 2024-08-30 NOTE — PROGRESS NOTES
[facility] Podiatry  Progress Note      Alejandro Hanna is a 68 year old male.   Chief Complaint   Patient presents with    Toenail Care     3MO f/u- Former pt of Dr Wolfe Pt here for deuce trim and foot check. Denies any pain or concerns.             HPI:     Patient is a pleasant 68-year-old male who presents to clinic for bilateral foot evaluation.  Admits to elongated toenails and a callus on the plantar aspect of the right foot.    Allergies: Patient has no known allergies.    Current Outpatient Medications   Medication Sig Dispense Refill    levothyroxine (LEVOXYL) 88 MCG Oral Tab Take 1 tablet (88 mcg total) by mouth before breakfast. For underactive thyroid. 90 tablet 1    Fenofibrate 160 MG Oral Tab Take 1 tablet (160 mg total) by mouth daily. 90 tablet 3    fluticasone propionate 50 MCG/ACT Nasal Suspension 2 sprays by Nasal route daily. 48 mL 3    fluticasone-umeclidin-vilant (TRELEGY ELLIPTA) 100-62.5-25 MCG/ACT Inhalation Aerosol Powder, Breath Activated Inhale 1 puff into the lungs daily. 1 each 5    aspirin 81 MG Oral Tab EC Take 1 tablet (81 mg total) by mouth daily.      atorvastatin 10 MG Oral Tab Take 1 tablet (10 mg total) by mouth daily. 90 tablet 3    Omeprazole 40 MG Oral Capsule Delayed Release Take 1 capsule (40 mg total) by mouth daily. 90 capsule 3    Olmesartan Medoxomil 40 MG Oral Tab TAKE 1 TABLET (40 MG TOTAL) BY MOUTH DAILY FOR BLOOD PRESSURE 90 tablet 3    albuterol (VENTOLIN HFA) 108 (90 Base) MCG/ACT Inhalation Aero Soln Inhale 2 puffs into the lungs every 6 (six) hours as needed for Wheezing. 54 each 5    Multiple Vitamins-Minerals (CENTRUM SILVER 50+MEN OR) Take by mouth.      Dutasteride 0.5 MG Oral Cap         Past Medical History:    Allergic rhinitis    Flonase    Bladder cancer (HCC)    f/u [facility]    BPH (benign prostatic hyperplasia)    COPD (HCC)    moderate-severe     Disorder of kidney and ureter    Disorder of thyroid    ED (erectile dysfunction)     Esophageal reflux    Essential hypertension    Hearing impairment    Hepatomegaly    Hiatal hernia    High blood pressure    High cholesterol    Hypothyroidism    Injury    Left knee injury, from fall    Osteoarthritis    in knee's, hip's    Pulmonary nodules    first noted 2016on LD screen CT    Tobacco abuse      Past Surgical History:   Procedure Laterality Date    Colonoscopy      02-14-17    Colonoscopy N/A 12/28/2020    Procedure: COLONOSCOPY;  Surgeon: Real Abreu MD;  Location: [facility] ENDOSCOPY    Electrocardiogram, complete  12/15/2013    scanned to media tab    Oral surgery      Other surgical history  2009    cyst removal, side of head      Family History   Problem Relation Age of Onset    Dementia Mother     Diabetes Paternal Grandmother     Cancer Sister         lung cancer      Social History     Socioeconomic History    Marital status:    Tobacco Use    Smoking status: Every Day     Current packs/day: 1.00     Average packs/day: 1 pack/day for 40.0 years (40.0 ttl pk-yrs)     Types: Cigarettes    Smokeless tobacco: Never    Tobacco comments:     2/2022: less than 1 pack per day   Vaping Use    Vaping status: Never Used   Substance and Sexual Activity    Alcohol use: Yes     Alcohol/week: 2.0 standard drinks of alcohol     Types: 2 Cans of beer per week     Comment: 2 beer per day    Drug use: No   Other Topics Concern    Caffeine Concern Yes     Comment: coffee-1 cup/day           REVIEW OF SYSTEMS:     Denies nause, fever, chills  No calf pain  Denies chest pain or SOB      EXAM:   There were no vitals taken for this visit.  GENERAL: well developed, well nourished, in no apparent distress  EXTREMITIES:   1. Integument: Normal skin temperature and turgor. Toenails x10 are elongated, thickened and discolored with subungal derbi. HPK to right plantar foot.     2. Vascular: Dorsalis pedis two out of four bilateral and posterior tibial pulses two out of   four bilateral, capillary refill  normal.   3. Musculoskeletal: All muscle groups are graded 5 out of 5 in the foot and ankle.   4. Neurological: Normal sharp dull sensation; reflexes normal.             ASSESSMENT AND PLAN:   Diagnoses and all orders for this visit:    Onychomycosis    Foot callus    Bilateral foot pain        Plan:       -Patient examined, chart history reviewed.  -Discussed importance of proper pedal hygiene, regular foot checks, and tight glucose control.  -Sharply debrided nails x10 with a sterile nail nipper achieving a 20% reduction in thickness and length, without incident.   -Ambulate with supportive shoes and inserts and avoid walking barefoot.  -Educated patient on acute signs of infection advised patient to seek immediate medical attention if symptoms arise.    RTC in 3 months  The patient indicates understanding of these issues and agrees to the plan.        Sierra Encarnacion DPM

## 2024-09-04 ENCOUNTER — HOSPITAL ENCOUNTER (OUTPATIENT)
Dept: NUCLEAR MEDICINE | Facility: HOSPITAL | Age: 69
Discharge: HOME | End: 2024-09-04
Attending: INTERNAL MEDICINE
Payer: MEDICARE

## 2024-09-04 DIAGNOSIS — R91.8 LUNG NODULES: ICD-10-CM

## 2024-09-04 LAB — GLUCOSE BLDC GLUCOMTR-MCNC: 116 MG/DL (ref 70–99)

## 2024-09-04 PROCEDURE — 78815 PET IMAGE W/CT SKULL-THIGH: CPT | Performed by: INTERNAL MEDICINE

## 2024-09-04 PROCEDURE — 82962 GLUCOSE BLOOD TEST: CPT

## 2024-09-08 ENCOUNTER — HOSPITAL ENCOUNTER (OUTPATIENT)
Age: 69
Discharge: HOME | End: 2024-09-08
Payer: MEDICARE

## 2024-09-08 VITALS
SYSTOLIC BLOOD PRESSURE: 156 MMHG | OXYGEN SATURATION: 97 % | RESPIRATION RATE: 20 BRPM | DIASTOLIC BLOOD PRESSURE: 72 MMHG | TEMPERATURE: 98 F | HEART RATE: 99 BPM

## 2024-09-08 DIAGNOSIS — R05.1 ACUTE COUGH: Primary | ICD-10-CM

## 2024-09-08 DIAGNOSIS — J06.9 VIRAL UPPER RESPIRATORY TRACT INFECTION WITH COUGH: ICD-10-CM

## 2024-09-08 LAB
POCT INFLUENZA A: NEGATIVE
POCT INFLUENZA B: NEGATIVE
SARS-COV-2 RNA RESP QL NAA+PROBE: NOT DETECTED

## 2024-09-08 PROCEDURE — U0002 COVID-19 LAB TEST NON-CDC: HCPCS | Performed by: NURSE PRACTITIONER

## 2024-09-08 PROCEDURE — 99213 OFFICE O/P EST LOW 20 MIN: CPT | Performed by: NURSE PRACTITIONER

## 2024-09-08 PROCEDURE — 87502 INFLUENZA DNA AMP PROBE: CPT | Performed by: NURSE PRACTITIONER

## 2024-09-08 RX ORDER — BENZONATATE 100 MG/1
100 CAPSULE ORAL 3 TIMES DAILY PRN
Qty: 30 CAPSULE | Refills: 0 | Status: SHIPPED | OUTPATIENT
Start: 2024-09-08 | End: 2024-10-08

## 2024-09-08 NOTE — ED PROVIDER NOTES
Patient Seen in: [facility]      History     Chief Complaint   Patient presents with    Cough/URI    Runny Nose     Stated Complaint: Cold    Subjective:   HPI    68 yr old male here for evaluation of cough, runny nose for 2 days. He denies fever or chills, chest pain or shortness of breath, ear fullness, sore throat or difficulty swallowing. He denies abd pain, vomiting, diarrhea. He denies known sick contacts or travel. He has been using Nyquil at night for cough.     Objective:   No pertinent past medical history.            No pertinent past surgical history.              No pertinent social history.            Review of Systems    Positive for stated Chief Complaint: Cough/URI and Runny Nose    Other systems are as noted in HPI.  Constitutional and vital signs reviewed.      All other systems reviewed and negative except as noted above.    Physical Exam     ED Triage Vitals [09/08/24 0812]   /72   Pulse 99   Resp 20   Temp 97.5 °F (36.4 °C)   Temp src Temporal   SpO2 97 %   O2 Device None (Room air)       Current Vitals:   Vital Signs  BP: 156/72  Pulse: 99  Resp: 20  Temp: 97.5 °F (36.4 °C)  Temp src: Temporal    Oxygen Therapy  SpO2: 97 %  O2 Device: None (Room air)            Physical Exam  Vitals and nursing note reviewed.   Constitutional:       General: He is not in acute distress.     Appearance: Normal appearance. He is not ill-appearing, toxic-appearing or diaphoretic.   HENT:      Right Ear: Tympanic membrane, ear canal and external ear normal.      Left Ear: Tympanic membrane, ear canal and external ear normal.      Nose: Rhinorrhea present.      Mouth/Throat:      Mouth: Mucous membranes are moist.      Pharynx: Oropharynx is clear. No oropharyngeal exudate or posterior oropharyngeal erythema.   Eyes:      General: No scleral icterus.        Right eye: No discharge.         Left eye: No discharge.      Extraocular Movements: Extraocular movements intact.      Conjunctiva/sclera:  Conjunctivae normal.      Pupils: Pupils are equal, round, and reactive to light.   Cardiovascular:      Rate and Rhythm: Normal rate.      Pulses: Normal pulses.   Pulmonary:      Effort: Pulmonary effort is normal. No respiratory distress.      Breath sounds: Normal breath sounds. No stridor. No wheezing, rhonchi or rales.   Musculoskeletal:         General: Normal range of motion.      Cervical back: Normal range of motion and neck supple. No tenderness.   Lymphadenopathy:      Cervical: No cervical adenopathy.   Skin:     General: Skin is warm and dry.   Neurological:      Mental Status: He is alert and oriented to person, place, and time.      Motor: No weakness.   Psychiatric:         Mood and Affect: Mood normal.         Behavior: Behavior normal.             ED Course     Labs Reviewed   POCT FLU TEST - Normal    Narrative:     This assay is a rapid molecular in vitro test utilizing nucleic acid amplification of influenza A and B viral RNA.   RAPID SARS-COV-2 BY PCR - Normal          ED Course as of 09/08/24 0906  ------------------------------------------------------------  Time: 09/08 0841  Value: POCT Flu Test  Comment: (Reviewed)  ------------------------------------------------------------  Time: 09/08 0841  Value: Rapid SARS-CoV-2 by PCR  Comment: (Reviewed)              MDM     68 yr old male here for evaluation of cough and runny nose x 2 days. Denies fever, sob, cp, n/v/d, sore throat or ear pain.    ON exam, pt well appearing, lungs clear with no wheezing or crackles. Good air movement. 97% RA. BL TM normal. Pharynx clear with no erythema or swelling. Talking in full sentences with no tachypnea or hypoxia.    Differential diagnoses reflecting the complexity of care include but are not limited to URI w cough, COVID, FLU, pneumonia.    Comorbidities that add complexity to management include: COPD, BPH, HLD, OA, thyroid, GERD, HTN, hepatomegaly, bladder CA HX  History obtained by an independent source  was from: patient  My independent interpretations of studies include: COVID NEG  FLU NEG  Shared decision making was done by: patient, myself  Discussions of management was done with: patient    Patient is well appearing, non-toxic and in no acute distress.  Vital signs are stable.   Discussed viral URI process. Advised as this is day 2 of symptoms to continue supportive care and there is no need for antibiotic he asked about. Discussed if worsening sob, cp, fever, chills, cough, return for re-evaluation. Tessalon perles to pharm on file for cough, nyquil at night, PO fluids, warm teas throat lozenges.  All questions answered. Return and ER precautions given.    Counseled: Patient, regarding diagnosis, regarding treatment plan, regarding diagnostic results, regarding prescription, I have discussed with the patient the results of tests, differential diagnosis, and warning signs and symptoms that should prompt immediate return. The patient understands these instructions and agrees to the follow-up plan provided. There is no barriers to learning. Appropriate f/u given. Patient agrees to return for any concerns/ problems/complications.                                     Medical Decision Making      Disposition and Plan     Clinical Impression:  1. Acute cough    2. Viral upper respiratory tract infection with cough         Disposition:  Discharge  9/8/2024  8:45 am    Follow-up:  Leonid Ford DO  [address] [place] [address]  [phone]    Schedule an appointment as soon as possible for a visit in 1 week  If symptoms worsen          Medications Prescribed:  Discharge Medication List as of 9/8/2024  8:48 AM        START taking these medications    Details   benzonatate 100 MG Oral Cap Take 1 capsule (100 mg total) by mouth 3 (three) times daily as needed for cough., Normal, Disp-30 capsule, R-0

## 2024-09-08 NOTE — DISCHARGE INSTRUCTIONS
Viral infections are usually the worst days 3-5, but can last up to 14 days.  You may develop or continue to cough for up to 3 weeks after.  Viral infections do not improve with the use of antibiotics.  [place] diet, increase water intake; gatorade or pedialyte if not eating well.  Runny Nose/Congestion:  Humidifier at bedside   Vicks vaporub under nose and chest wall  - Flonase nasal spray to each nostril daily  - Antihistamine (Allegra, Zyrtec, Claritin) once daily  Cough  - Nyquil at night time for cough  -Tessalon perles three times a day as needed  - Warm tea w/honey  - Cough drops/throat lozenges  - Humidifier in bedroom at night  Sore Throat:  - Salt water gargle  - Tylenol 500-650mg every 6-8 hours as needed for pain  Fever:  Tylenol or Motrin every 6 hours for fever > 100.4. You can alternate between the two as well if fever high.  Follow up with your primary care provider in the next 1-2 weeks if symptoms persist.  Go to the emergency room with:  - Fever > 102 that does not respond to medications.  - Shortness of breath, difficulty breathing.  - Chest pain.  - Vomiting and unable to keep down fluids or medications

## 2024-09-09 ENCOUNTER — TELEPHONE (OUTPATIENT)
Dept: PULMONOLOGY | Facility: CLINIC | Age: 69
End: 2024-09-09

## 2024-09-09 NOTE — TELEPHONE ENCOUNTER
Patient wife is calling for PET scan results.  Patient can be reached at his mobile #.  Please call

## 2024-09-09 NOTE — TELEPHONE ENCOUNTER
Ian Mathews MD  You16 minutes ago (3:07 PM)     I spoke to the patient at length regarding his PET scan which showed positive but on the left upper lobe nodule  Please ask Daria to add this case to the chest tumor board on this coming Thursday  Thank you

## 2024-09-09 NOTE — TELEPHONE ENCOUNTER
Ally is calling from Dr. Mathews's office requesting this patient be added to tumor board. Ally is reachable at ext [phone]. Called 9/9/24 GA

## 2024-09-13 ENCOUNTER — TELEPHONE (OUTPATIENT)
Dept: PULMONOLOGY | Facility: CLINIC | Age: 69
End: 2024-09-13

## 2024-09-13 DIAGNOSIS — R91.1 LUNG NODULE: ICD-10-CM

## 2024-09-13 DIAGNOSIS — J44.9 CHRONIC OBSTRUCTIVE PULMONARY DISEASE, UNSPECIFIED COPD TYPE (HCC): Primary | ICD-10-CM

## 2024-09-13 NOTE — TELEPHONE ENCOUNTER
I spoke to the patient at length today  I told him about the outcome of the chest tumor board regarding his left upper lobe 1 cm nodule was positive on a PET scan with high possibility of lung cancer    Recommended thoracic surgical consultation with Dr. Maria   PFTs    Patient voiced complete understanding  Refer patient to Dr. Maria

## 2024-09-13 NOTE — TELEPHONE ENCOUNTER
Spoke to Dr. Mathews and referral placed to Dr. Maria.  Spoke to patient and phone number given for his office.

## 2024-09-19 ENCOUNTER — HOSPITAL ENCOUNTER (OUTPATIENT)
Dept: RESPIRATORY THERAPY | Facility: HOSPITAL | Age: 69
Discharge: HOME | End: 2024-09-19
Attending: INTERNAL MEDICINE
Payer: MEDICARE

## 2024-09-19 DIAGNOSIS — R91.1 LUNG NODULE: ICD-10-CM

## 2024-09-19 DIAGNOSIS — J44.9 CHRONIC OBSTRUCTIVE PULMONARY DISEASE, UNSPECIFIED COPD TYPE (HCC): ICD-10-CM

## 2024-09-19 PROCEDURE — 94060 EVALUATION OF WHEEZING: CPT | Performed by: INTERNAL MEDICINE

## 2024-09-19 PROCEDURE — 94726 PLETHYSMOGRAPHY LUNG VOLUMES: CPT | Performed by: INTERNAL MEDICINE

## 2024-09-19 PROCEDURE — 94729 DIFFUSING CAPACITY: CPT | Performed by: INTERNAL MEDICINE

## 2024-09-19 NOTE — PROCEDURES
PFT INTERPRETATION        Spirometry:  Forced vital capacity (FVC) is: normal   Forced expiratory volume in 1 second (FEV1) is: decreased   FEV1/FVC ratio: decreased   Significant bronchodilator response? No        Flow Volume Loop:  Decreased expiratory flows consistent with obstructive ventilatory pattern       Lung Volume:  Total lung capacity (TLC) is: normal   Residual volume (RV) is: increased        Diffusing Capacity:  Decreased        Interpretation:  Moderate obstructive ventilatory pattern.  There is no significant bronchodilator response.  This does not preclude the use of bronchodilator therapy if clinically indicated.  Air trapping is present.  Moderate decrease in diffusing capacity maybe seen in conditions such as emphysema, interstitial lung disease, pulmonary vascular disorders, anemia.   Correlate clinically.  PFT raw data is available in docTrackr under procedure tab. Clinic on the the PFT and attached hyperlink to see the actual raw data.         Electronically signed by: BANDAR Patel

## 2024-09-23 ENCOUNTER — TELEPHONE (OUTPATIENT)
Dept: PULMONOLOGY | Facility: CLINIC | Age: 69
End: 2024-09-23

## 2024-09-23 NOTE — TELEPHONE ENCOUNTER
Received fax from Dr Shaffer with visit note dated 9/17/24. Placed in Dr Mathews's folder for review

## 2024-09-24 ENCOUNTER — MED REC SCAN ONLY (OUTPATIENT)
Dept: NEPHROLOGY | Facility: CLINIC | Age: 69
End: 2024-09-24

## 2024-09-26 ENCOUNTER — OFFICE VISIT (OUTPATIENT)
Dept: HEMATOLOGY/ONCOLOGY | Facility: HOSPITAL | Age: 69
End: 2024-09-26
Attending: THORACIC SURGERY (CARDIOTHORACIC VASCULAR SURGERY)
Payer: MEDICARE

## 2024-09-26 VITALS
OXYGEN SATURATION: 96 % | HEIGHT: 64 IN | WEIGHT: 109 LBS | HEART RATE: 91 BPM | TEMPERATURE: 98 F | DIASTOLIC BLOOD PRESSURE: 80 MMHG | BODY MASS INDEX: 18.61 KG/M2 | SYSTOLIC BLOOD PRESSURE: 154 MMHG | RESPIRATION RATE: 16 BRPM

## 2024-09-26 DIAGNOSIS — R91.1 LEFT UPPER LOBE PULMONARY NODULE: Primary | ICD-10-CM

## 2024-09-26 NOTE — H&P (VIEW-ONLY)
Thoracic Surgery Consult Note     Name: Alejandro Hanna   Age: 68 year old   Sex: male.   MRN: [id number]    Reason for Consultation: left upper lobe nodule     Consulting Physician: Dr. Mathews    Subjective:     Chief Complaint: \"A nodule in my lung grew\"     History of Present Illness:   Mr. Hanna is a 68 year old male current smoker (1ppd, 57pyh) presenting with an enlarging left upper lobe nodule.     This was found on surveillance imaging and has been slowly enlarging since 10/2023. CT chest from 8/13/24 showed an enlarging left upper lobe nodule measuring 1.0x1.0cm, stable 0.9cm right upper lobe nodule, and stable 0.3cm left upper lobe nodule. PET from 9/4/24 showed the nodule had a max SUV of 8.79 and the right upper lobe nodule had a max SUV of 1.79. Patient was referred by Dr. Mathews to discuss options.     Patient feels well. He is active and walks a mile on the track daily without dyspnea. He has a chronic nonproductive cough. He denies chest pain, fevers, chills,  changes in vision, headache, or new bone pain. He has lost 6lbs over the past few weeks.     PMH includes bladder cancer 5 years ago, COPD, HTN, HLD. He denies history of heart disease. He takes a baby aspirin. No prior thoracic surgeries. Patient has a family history of lung cancer with his sister.     Review Of Systems:   10 point review of systems was conducted and was negative except for the pertinent positives listed in the above HPI.    Past Medical History:   Past Medical History:    Allergic rhinitis    Flonase    Bladder cancer (HCC)    f/u [facility]    BPH (benign prostatic hyperplasia)    COPD (HCC)    moderate-severe     Disorder of kidney and ureter    Disorder of thyroid    ED (erectile dysfunction)    Esophageal reflux    Essential hypertension    Hearing impairment    Hepatomegaly    Hiatal hernia    High blood pressure    High cholesterol    Hypothyroidism    Injury    Left knee injury, from fall    Osteoarthritis    in  knee's, hip's    Pulmonary nodules    first noted 2016on LD screen CT    Tobacco abuse       Past Surgical History:   Past Surgical History:   Procedure Laterality Date    Colonoscopy      02-14-17    Colonoscopy N/A 12/28/2020    Procedure: COLONOSCOPY;  Surgeon: Real Abreu MD;  Location: [facility] ENDOSCOPY    Electrocardiogram, complete  12/15/2013    scanned to media tab    Oral surgery      Other surgical history  2009    cyst removal, side of head       Social History:   Social History     Socioeconomic History    Marital status:      Spouse name: Not on file    Number of children: Not on file    Years of education: Not on file    Highest education level: Not on file   Occupational History    Not on file   Tobacco Use    Smoking status: Every Day     Current packs/day: 1.00     Average packs/day: 1 pack/day for 40.0 years (40.0 ttl pk-yrs)     Types: Cigarettes    Smokeless tobacco: Never    Tobacco comments:     2/2022: less than 1 pack per day   Vaping Use    Vaping status: Never Used   Substance and Sexual Activity    Alcohol use: Yes     Alcohol/week: 2.0 standard drinks of alcohol     Types: 2 Cans of beer per week     Comment: 2 beer per day    Drug use: No    Sexual activity: Not on file   Other Topics Concern     Service Not Asked    Blood Transfusions Not Asked    Caffeine Concern Yes     Comment: coffee-1 cup/day    Occupational Exposure Not Asked    Hobby Hazards Not Asked    Sleep Concern Not Asked    Stress Concern Not Asked    Weight Concern Not Asked    Special Diet Not Asked    Back Care Not Asked    Exercise Not Asked    Bike Helmet Not Asked    Seat Belt Not Asked    Self-Exams Not Asked   Social History Narrative    Live with fianace    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     Social Determinants of Health     Financial Resource Strain: Not on file   Food Insecurity: Not on file   Transportation Needs: Not on file   Physical Activity: Not on  file   Stress: Not on file   Social Connections: Not on file   Housing Stability: Not on file       Family History:   Family History   Problem Relation Age of Onset    Dementia Mother     Diabetes Paternal Grandmother     Cancer Sister         lung cancer       Allergies:  No Known Allergies    Medications:   Current Outpatient Medications on File Prior to Visit   Medication Sig Dispense Refill    phenazopyridine HCl (AZO TABS) 95 MG Oral Tab Take 1 tablet (95 mg total) by mouth 3 (three) times daily as needed for Pain. 21 tablet 3    benzonatate 100 MG Oral Cap Take 1 capsule (100 mg total) by mouth 3 (three) times daily as needed for cough. 30 capsule 0    levothyroxine (LEVOXYL) 88 MCG Oral Tab Take 1 tablet (88 mcg total) by mouth before breakfast. For underactive thyroid. 90 tablet 1    Fenofibrate 160 MG Oral Tab Take 1 tablet (160 mg total) by mouth daily. 90 tablet 3    fluticasone propionate 50 MCG/ACT Nasal Suspension 2 sprays by Nasal route daily. 48 mL 3    fluticasone-umeclidin-vilant (TRELEGY ELLIPTA) 100-62.5-25 MCG/ACT Inhalation Aerosol Powder, Breath Activated Inhale 1 puff into the lungs daily. 1 each 5    aspirin 81 MG Oral Tab EC Take 1 tablet (81 mg total) by mouth daily.      atorvastatin 10 MG Oral Tab Take 1 tablet (10 mg total) by mouth daily. 90 tablet 3    Omeprazole 40 MG Oral Capsule Delayed Release Take 1 capsule (40 mg total) by mouth daily. 90 capsule 3    Olmesartan Medoxomil 40 MG Oral Tab TAKE 1 TABLET (40 MG TOTAL) BY MOUTH DAILY FOR BLOOD PRESSURE 90 tablet 3    albuterol (VENTOLIN HFA) 108 (90 Base) MCG/ACT Inhalation Aero Soln Inhale 2 puffs into the lungs every 6 (six) hours as needed for Wheezing. 54 each 5    Multiple Vitamins-Minerals (CENTRUM SILVER 50+MEN OR) Take by mouth.      Dutasteride 0.5 MG Oral Cap        Current Facility-Administered Medications on File Prior to Visit   Medication Dose Route Frequency Provider Last Rate Last Admin    [] acetaminophen  (Tylenol) tab 650 mg  650 mg Oral Once PRN Fadia Mccracken MD        [] HYDROcodone-acetaminophen (Norco) 5-325 MG per tab 1 tablet  1 tablet Oral Once PRN Fadia Mccracken MD         No current facility-administered medications on file as of 2024.         Objective:      Vital Signs:  /80 (BP Location: Right arm, Patient Position: Sitting, Cuff Size: adult)   Pulse 91   Temp 98.4 °F (36.9 °C) (Oral)   Resp 16   Ht 1.626 m (5' 4\")   Wt 49.4 kg (109 lb)   SpO2 96%   BMI 18.71 kg/m²     Physical Exam:  General: well appearing male in no acute distress  HEENT: Normocephalic, PERRL, EOMI, no scleral icterus  Neck: Supple, trachea midline, no JVD, no masses. Thyroid not grossly enlarged  Nodes: no cervical or supraclavicular lymphadenopathy appreciated  Heart: regular rate and rhythm. No murmurs, rubs or gallops. No lower extremity edema.  Lungs: Normal respiratory effort. Bilateral rhonchi.   Abdomen: Soft, Non-tender, non-distended. No hepatosplenomegaly noted.  Extremities: No clubbing or cyanosis. No lateralizing weakness  Neuro: No gross cranial nerve defects, no loss of sensation  Psych:  oriented to person place and time, normal mood and affect      Labs:   Lab Results   Component Value Date/Time    WBC 12.0 (H) 02/15/2024 10:03 AM    HGB 14.0 02/15/2024 10:03 AM    HCT 41.0 02/15/2024 10:03 AM    .0 02/15/2024 10:03 AM    MCV 87.0 02/15/2024 10:03 AM     Lab Results   Component Value Date/Time     (L) 2024 08:59 AM    K 4.3 2024 08:59 AM    CL 98 2024 08:59 AM    CO2 31.0 2024 08:59 AM    BUN 9 2024 08:59 AM    GLU 90 2024 08:59 AM    CA 10.2 2024 08:59 AM    PHOS 3.0 2024 08:59 AM     Lab Results   Component Value Date/Time    INR 0.93 2019 02:58 PM    PTT 28.1 2019 02:58 PM     No components found for: \"TROPI\"  Lab Results   Component Value Date/Time    ALB 4.7 2024 08:59 AM    TP 7.7 02/15/2024 10:03 AM    ALT 14  02/15/2024 10:03 AM    AST 22 02/15/2024 10:03 AM         Review of Data:   CT chest 8/13/24  FINDINGS:     SUPPORT DEVICES: None.  CARDIAC: The heart is normal size.  Coronary artery calcifications are seen.  THORACIC AORTA: No aneurysm.  Mild calcified atherosclerosis.  VASCULATURE: Thrombus cannot be excluded without intravenous contrast.    MEDIASTINUM/MEG: No mass or enlarged adenopathy.    ESOPHAGUS: Unremarkable.  NECK BASE: No suspicious lymph nodes.  CHEST WALL: No suspicious axillary lymph nodes.  BONES: Mild osteopenia.  No acute fracture.  Mild spondylosis.  DIAPHRAGM: Unremarkable.  UPPER ABDOMEN: Limited evaluation is unremarkable.  PLEURA: No mass or effusion.  LUNGS: The trachea and central airways are clear.  There are no acute consolidations.  Peripheral reticulation and emphysema are again seen.  Left upper lobe pulmonary nodule measuring 1.0 x 1.0 cm series 3, image 53 previously 1.0 x 0.7 cm by my  remeasurement. Stable right upper lobe nodule measuring 0.9 x 0.7 cm series 3, image 34. Stable 0.3 cm left upper lobe pulmonary nodule series 3, image 20. No new nodules are identified.                    Impression   CONCLUSION:     1. The left upper lobe pulmonary nodule of concern has increased in size since the prior exam now measuring 1.0 x 1.0 cm.  Recommend evaluation with PET-CT.     2. Stable or slightly decreased right upper lobe pulmonary nodule measuring 1.0 cm.  Additional smaller nodules are stable.     3. Fibrosis and emphysema are again noted.     4. Additional chronic or incidental findings are described in the body of this report.     PET 9/4/24  FINDINGS:  Mediastinal blood pool is 1.52 max SUV, and hepatic blood pool is 2.57 max SUV.     HEAD/NECK: Hypermetabolic nodules in the right parotid gland are again seen with the more superior nodule with a maximum SUV of 5.38 fused axial image 248 and the more inferior nodule with a maximum SUV of 6.50 fused axial image 239.  LUNGS:  Right upper lobe pulmonary nodule series 3, image 119 measures 1.1 cm with mildly elevated metabolic activity, maximum SUV of 1.79, not significantly changed from the prior exam.  Left upper lobe/lingular pulmonary nodule measures 1.2 cm series 3,   image 146 with maximum SUV of 8.79.  MEDIASTINUM/MEG: No pathologic FDG activity.    CHEST WALL/AXILLA: No pathologic FDG activity.    ABDOMEN/PELVIS: No pathologic FDG activity.    MUSCULOSKELETAL: No pathologic FDG activity.     CT FINDINGS: Mild emphysema of the lungs.  Coronary artery calcifications are seen.  Mild cardiomegaly.  Mild calcified atherosclerosis.  Mild spondylosis throughout the spine..                 Impression   CONCLUSION:     1. New left upper lobe/lingular nodule has markedly elevated metabolic activity with maximum SUV of 8.79.  This is consistent with malignancy unless proven otherwise by biopsy.     2. Nodule in the right upper lobe has decreased in size since the prior exam of February 2023 and has minimal activity, similar to the prior exam.     3. No evidence of metastatic disease in the chest, abdomen, or pelvis.  No enlarged or hypermetabolic lymph nodes in the chest.       4. Hypermetabolic nodules in the right parotid gland are again seen suggesting diagnosis of Warthin's tumor.  If not already performed, this could be confirmed with FNA.     PFTs 9/19/24: FEV1 56%, DLCO 64%    Assessment/Plan:     Mr. Hanna is a 68 year old male current smoker (1ppd, 57pyh) presenting with an enlarging left upper lobe nodule.     This was found on surveillance imaging and has been slowly enlarging since 10/2023. CT chest from 8/13/24 showed an enlarging left upper lobe nodule measuring 1.0x1.0cm, stable 0.9cm right upper lobe nodule, and stable 0.3cm left upper lobe nodule. PET from 9/4/24 showed the nodule had a max SUV of 8.79 and the right upper lobe nodule had a max SUV of 1.79. Patient was referred by Dr. Mathews to discuss options.     Findings  are concerning for malignancy. Discussed options including left VATS upper lobe wedge resection vs radiation. Discussed the risks and benefits of surgery. Patient expressed understanding and would like to proceed with surgery.    -Encouraged smoking cessation  -Scheduled for a left VATS upper lobe wedge resection on 10/24/24 at [place] with Dr. Maria.   -Pre op labs ordered  -Hold ASA 7 days prior to surgery     Jeannine Saucedo PA-C  Thoracic Surgery    I have seen and examined that patient and agree with the above assessment and plan.  I have personally reviewed all the pertinent imaging, discussed the case in thoracic oncology tumor board and with the consulting physician.     Mr. Hanna  is a 68 year old male who had a left upper lobe lung nodule found on a CT scan done for surveillance.  This 1cm lesion was further evaluated by PET scan which showed it to have an SUV of 8.8 with no signs of local or regional spread of disease.  There was a small right upper lobe nodule which was stable in size with minimal FDG uptake-- this can be safely watched. These findings are concerning for a early stage lung cancer.  I described for Mr. Hanna a minimally invasive wedge resection for diagnosis and treatment.  The size and his lung function suggest that a lobectomy is not indicated.  The location by the division of the lingula make a segmentectomy impractical to get margins.  His pulmonary function tests suggest that he has adequate lung function to undergo a wedge alone.  I went over the alternatives (surveillance, needle biopsy with possible radiation) and the risks, including: bleeding, infection, prolonged air leak, nerve injury, MI, stroke, arrhythmia, pneumonia and death. He understands these risks and wishes to proceed.  We will plan on surgery October 24th at [facility].    Jagdish Maria MD  Thoracic Surgery  Pager [phone]    I spent 60 minutes on this visit which included: review of tests,  independently interpreting results, obtaining history, counseling on additional tests and procedures, communicating with the consulting physician,  care coordination and surgery, its risks and alternatives as described in the above assessment and plan.

## 2024-09-26 NOTE — CONSULTS
Thoracic Surgery Consult Note     Name: Alejandro Hanna   Age: 68 year old   Sex: male.   MRN: [id number]    Reason for Consultation: left upper lobe nodule     Consulting Physician: Dr. Mathews    Subjective:     Chief Complaint: \"A nodule in my lung grew\"     History of Present Illness:   Mr. Hanna is a 68 year old male current smoker (1ppd, 57pyh) presenting with an enlarging left upper lobe nodule.     This was found on surveillance imaging and has been slowly enlarging since 10/2023. CT chest from 8/13/24 showed an enlarging left upper lobe nodule measuring 1.0x1.0cm, stable 0.9cm right upper lobe nodule, and stable 0.3cm left upper lobe nodule. PET from 9/4/24 showed the nodule had a max SUV of 8.79 and the right upper lobe nodule had a max SUV of 1.79. Patient was referred by Dr. Mathews to discuss options.     Patient feels well. He is active and walks a mile on the track daily without dyspnea. He has a chronic nonproductive cough. He denies chest pain, fevers, chills,  changes in vision, headache, or new bone pain. He has lost 6lbs over the past few weeks.     PMH includes bladder cancer 5 years ago, COPD, HTN, HLD. He denies history of heart disease. He takes a baby aspirin. No prior thoracic surgeries. Patient has a family history of lung cancer with his sister.     Review Of Systems:   10 point review of systems was conducted and was negative except for the pertinent positives listed in the above HPI.    Past Medical History:   Past Medical History:    Allergic rhinitis    Flonase    Bladder cancer (HCC)    f/u Sarah    BPH (benign prostatic hyperplasia)    COPD (HCC)    moderate-severe     Disorder of kidney and ureter    Disorder of thyroid    ED (erectile dysfunction)    Esophageal reflux    Essential hypertension    Hearing impairment    Hepatomegaly    Hiatal hernia    High blood pressure    High cholesterol    Hypothyroidism    Injury    Left knee injury, from fall    Osteoarthritis    in  knee's, hip's    Pulmonary nodules    first noted 2016on LD screen CT    Tobacco abuse       Past Surgical History:   Past Surgical History:   Procedure Laterality Date    Colonoscopy      02-14-17    Colonoscopy N/A 12/28/2020    Procedure: COLONOSCOPY;  Surgeon: Real Abreu MD;  Location: [facility] ENDOSCOPY    Electrocardiogram, complete  12/15/2013    scanned to media tab    Oral surgery      Other surgical history  2009    cyst removal, side of head       Social History:   Social History     Socioeconomic History    Marital status:      Spouse name: Not on file    Number of children: Not on file    Years of education: Not on file    Highest education level: Not on file   Occupational History    Not on file   Tobacco Use    Smoking status: Every Day     Current packs/day: 1.00     Average packs/day: 1 pack/day for 40.0 years (40.0 ttl pk-yrs)     Types: Cigarettes    Smokeless tobacco: Never    Tobacco comments:     2/2022: less than 1 pack per day   Vaping Use    Vaping status: Never Used   Substance and Sexual Activity    Alcohol use: Yes     Alcohol/week: 2.0 standard drinks of alcohol     Types: 2 Cans of beer per week     Comment: 2 beer per day    Drug use: No    Sexual activity: Not on file   Other Topics Concern     Service Not Asked    Blood Transfusions Not Asked    Caffeine Concern Yes     Comment: coffee-1 cup/day    Occupational Exposure Not Asked    Hobby Hazards Not Asked    Sleep Concern Not Asked    Stress Concern Not Asked    Weight Concern Not Asked    Special Diet Not Asked    Back Care Not Asked    Exercise Not Asked    Bike Helmet Not Asked    Seat Belt Not Asked    Self-Exams Not Asked   Social History Narrative    Live with fianace    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     Social Determinants of Health     Financial Resource Strain: Not on file   Food Insecurity: Not on file   Transportation Needs: Not on file   Physical Activity: Not on  file   Stress: Not on file   Social Connections: Not on file   Housing Stability: Not on file       Family History:   Family History   Problem Relation Age of Onset    Dementia Mother     Diabetes Paternal Grandmother     Cancer Sister         lung cancer       Allergies:  No Known Allergies    Medications:   Current Outpatient Medications on File Prior to Visit   Medication Sig Dispense Refill    phenazopyridine HCl (AZO TABS) 95 MG Oral Tab Take 1 tablet (95 mg total) by mouth 3 (three) times daily as needed for Pain. 21 tablet 3    benzonatate 100 MG Oral Cap Take 1 capsule (100 mg total) by mouth 3 (three) times daily as needed for cough. 30 capsule 0    levothyroxine (LEVOXYL) 88 MCG Oral Tab Take 1 tablet (88 mcg total) by mouth before breakfast. For underactive thyroid. 90 tablet 1    Fenofibrate 160 MG Oral Tab Take 1 tablet (160 mg total) by mouth daily. 90 tablet 3    fluticasone propionate 50 MCG/ACT Nasal Suspension 2 sprays by Nasal route daily. 48 mL 3    fluticasone-umeclidin-vilant (TRELEGY ELLIPTA) 100-62.5-25 MCG/ACT Inhalation Aerosol Powder, Breath Activated Inhale 1 puff into the lungs daily. 1 each 5    aspirin 81 MG Oral Tab EC Take 1 tablet (81 mg total) by mouth daily.      atorvastatin 10 MG Oral Tab Take 1 tablet (10 mg total) by mouth daily. 90 tablet 3    Omeprazole 40 MG Oral Capsule Delayed Release Take 1 capsule (40 mg total) by mouth daily. 90 capsule 3    Olmesartan Medoxomil 40 MG Oral Tab TAKE 1 TABLET (40 MG TOTAL) BY MOUTH DAILY FOR BLOOD PRESSURE 90 tablet 3    albuterol (VENTOLIN HFA) 108 (90 Base) MCG/ACT Inhalation Aero Soln Inhale 2 puffs into the lungs every 6 (six) hours as needed for Wheezing. 54 each 5    Multiple Vitamins-Minerals (CENTRUM SILVER 50+MEN OR) Take by mouth.      Dutasteride 0.5 MG Oral Cap        Current Facility-Administered Medications on File Prior to Visit   Medication Dose Route Frequency Provider Last Rate Last Admin    [] acetaminophen  (Tylenol) tab 650 mg  650 mg Oral Once PRN Fadia Mccracken MD        [] HYDROcodone-acetaminophen (Norco) 5-325 MG per tab 1 tablet  1 tablet Oral Once PRN Fadia Mccracken MD         No current facility-administered medications on file as of 2024.         Objective:      Vital Signs:  /80 (BP Location: Right arm, Patient Position: Sitting, Cuff Size: adult)   Pulse 91   Temp 98.4 °F (36.9 °C) (Oral)   Resp 16   Ht 1.626 m (5' 4\")   Wt 49.4 kg (109 lb)   SpO2 96%   BMI 18.71 kg/m²     Physical Exam:  General: well appearing male in no acute distress  HEENT: Normocephalic, PERRL, EOMI, no scleral icterus  Neck: Supple, trachea midline, no JVD, no masses. Thyroid not grossly enlarged  Nodes: no cervical or supraclavicular lymphadenopathy appreciated  Heart: regular rate and rhythm. No murmurs, rubs or gallops. No lower extremity edema.  Lungs: Normal respiratory effort. Bilateral rhonchi.   Abdomen: Soft, Non-tender, non-distended. No hepatosplenomegaly noted.  Extremities: No clubbing or cyanosis. No lateralizing weakness  Neuro: No gross cranial nerve defects, no loss of sensation  Psych:  oriented to person place and time, normal mood and affect      Labs:   Lab Results   Component Value Date/Time    WBC 12.0 (H) 02/15/2024 10:03 AM    HGB 14.0 02/15/2024 10:03 AM    HCT 41.0 02/15/2024 10:03 AM    .0 02/15/2024 10:03 AM    MCV 87.0 02/15/2024 10:03 AM     Lab Results   Component Value Date/Time     (L) 2024 08:59 AM    K 4.3 2024 08:59 AM    CL 98 2024 08:59 AM    CO2 31.0 2024 08:59 AM    BUN 9 2024 08:59 AM    GLU 90 2024 08:59 AM    CA 10.2 2024 08:59 AM    PHOS 3.0 2024 08:59 AM     Lab Results   Component Value Date/Time    INR 0.93 2019 02:58 PM    PTT 28.1 2019 02:58 PM     No components found for: \"TROPI\"  Lab Results   Component Value Date/Time    ALB 4.7 2024 08:59 AM    TP 7.7 02/15/2024 10:03 AM    ALT 14  02/15/2024 10:03 AM    AST 22 02/15/2024 10:03 AM         Review of Data:   CT chest 8/13/24  FINDINGS:     SUPPORT DEVICES: None.  CARDIAC: The heart is normal size.  Coronary artery calcifications are seen.  THORACIC AORTA: No aneurysm.  Mild calcified atherosclerosis.  VASCULATURE: Thrombus cannot be excluded without intravenous contrast.    MEDIASTINUM/MEG: No mass or enlarged adenopathy.    ESOPHAGUS: Unremarkable.  NECK BASE: No suspicious lymph nodes.  CHEST WALL: No suspicious axillary lymph nodes.  BONES: Mild osteopenia.  No acute fracture.  Mild spondylosis.  DIAPHRAGM: Unremarkable.  UPPER ABDOMEN: Limited evaluation is unremarkable.  PLEURA: No mass or effusion.  LUNGS: The trachea and central airways are clear.  There are no acute consolidations.  Peripheral reticulation and emphysema are again seen.  Left upper lobe pulmonary nodule measuring 1.0 x 1.0 cm series 3, image 53 previously 1.0 x 0.7 cm by my  remeasurement. Stable right upper lobe nodule measuring 0.9 x 0.7 cm series 3, image 34. Stable 0.3 cm left upper lobe pulmonary nodule series 3, image 20. No new nodules are identified.                    Impression   CONCLUSION:     1. The left upper lobe pulmonary nodule of concern has increased in size since the prior exam now measuring 1.0 x 1.0 cm.  Recommend evaluation with PET-CT.     2. Stable or slightly decreased right upper lobe pulmonary nodule measuring 1.0 cm.  Additional smaller nodules are stable.     3. Fibrosis and emphysema are again noted.     4. Additional chronic or incidental findings are described in the body of this report.     PET 9/4/24  FINDINGS:  Mediastinal blood pool is 1.52 max SUV, and hepatic blood pool is 2.57 max SUV.     HEAD/NECK: Hypermetabolic nodules in the right parotid gland are again seen with the more superior nodule with a maximum SUV of 5.38 fused axial image 248 and the more inferior nodule with a maximum SUV of 6.50 fused axial image 239.  LUNGS:  Right upper lobe pulmonary nodule series 3, image 119 measures 1.1 cm with mildly elevated metabolic activity, maximum SUV of 1.79, not significantly changed from the prior exam.  Left upper lobe/lingular pulmonary nodule measures 1.2 cm series 3,   image 146 with maximum SUV of 8.79.  MEDIASTINUM/MEG: No pathologic FDG activity.    CHEST WALL/AXILLA: No pathologic FDG activity.    ABDOMEN/PELVIS: No pathologic FDG activity.    MUSCULOSKELETAL: No pathologic FDG activity.     CT FINDINGS: Mild emphysema of the lungs.  Coronary artery calcifications are seen.  Mild cardiomegaly.  Mild calcified atherosclerosis.  Mild spondylosis throughout the spine..                 Impression   CONCLUSION:     1. New left upper lobe/lingular nodule has markedly elevated metabolic activity with maximum SUV of 8.79.  This is consistent with malignancy unless proven otherwise by biopsy.     2. Nodule in the right upper lobe has decreased in size since the prior exam of February 2023 and has minimal activity, similar to the prior exam.     3. No evidence of metastatic disease in the chest, abdomen, or pelvis.  No enlarged or hypermetabolic lymph nodes in the chest.       4. Hypermetabolic nodules in the right parotid gland are again seen suggesting diagnosis of Warthin's tumor.  If not already performed, this could be confirmed with FNA.     PFTs 9/19/24: FEV1 56%, DLCO 64%    Assessment/Plan:     Mr. Hanna is a 68 year old male current smoker (1ppd, 57pyh) presenting with an enlarging left upper lobe nodule.     This was found on surveillance imaging and has been slowly enlarging since 10/2023. CT chest from 8/13/24 showed an enlarging left upper lobe nodule measuring 1.0x1.0cm, stable 0.9cm right upper lobe nodule, and stable 0.3cm left upper lobe nodule. PET from 9/4/24 showed the nodule had a max SUV of 8.79 and the right upper lobe nodule had a max SUV of 1.79. Patient was referred by Dr. Mathews to discuss options.     Findings  are concerning for malignancy. Discussed options including left VATS upper lobe wedge resection vs radiation. Discussed the risks and benefits of surgery. Patient expressed understanding and would like to proceed with surgery.    -Encouraged smoking cessation  -Scheduled for a left VATS upper lobe wedge resection on 10/24/24 at [facility] with Dr. Maria.   -Pre op labs ordered  -Hold ASA 7 days prior to surgery     Jeannine Saucedo PA-C  Thoracic Surgery       independently interpreting results, obtaining history, counseling on additional tests and procedures, communicating with the consulting physician,  care coordination and surgery, its risks and alternatives as described in the above assessment and plan.

## 2024-10-04 ENCOUNTER — OFFICE VISIT (OUTPATIENT)
Dept: PULMONOLOGY | Facility: CLINIC | Age: 69
End: 2024-10-04
Payer: MEDICARE

## 2024-10-04 VITALS
WEIGHT: 111 LBS | RESPIRATION RATE: 12 BRPM | OXYGEN SATURATION: 98 % | BODY MASS INDEX: 18.95 KG/M2 | SYSTOLIC BLOOD PRESSURE: 130 MMHG | HEIGHT: 64 IN | HEART RATE: 88 BPM | DIASTOLIC BLOOD PRESSURE: 58 MMHG

## 2024-10-04 DIAGNOSIS — J44.9 CHRONIC OBSTRUCTIVE PULMONARY DISEASE, UNSPECIFIED COPD TYPE (HCC): Primary | ICD-10-CM

## 2024-10-04 DIAGNOSIS — R91.8 LUNG MASS: ICD-10-CM

## 2024-10-04 PROCEDURE — 99214 OFFICE O/P EST MOD 30 MIN: CPT | Performed by: INTERNAL MEDICINE

## 2024-10-04 RX ORDER — PREDNISONE 10 MG/1
TABLET ORAL
Qty: 18 TABLET | Refills: 0 | Status: SHIPPED | OUTPATIENT
Start: 2024-10-04

## 2024-10-04 RX ORDER — AZITHROMYCIN 250 MG/1
TABLET, FILM COATED ORAL
Qty: 6 TABLET | Refills: 0 | Status: SHIPPED | OUTPATIENT
Start: 2024-10-04

## 2024-10-04 NOTE — PROGRESS NOTES
Subjective:   Patient ID: Alejandro Hanna is a 68 year old male.    HPI    History/Other:   Review of Systems   Constitutional: Negative.    HENT:  Positive for congestion and rhinorrhea.    Eyes: Negative.    Respiratory:  Positive for wheezing. Negative for apnea, cough, choking, chest tightness, shortness of breath and stridor.    Cardiovascular: Negative.    Gastrointestinal: Negative.    Musculoskeletal: Negative.    Skin: Negative.    Neurological: Negative.    Hematological: Negative.    Psychiatric/Behavioral: Negative.       Current Outpatient Medications   Medication Sig Dispense Refill    phenazopyridine HCl (AZO TABS) 95 MG Oral Tab Take 1 tablet (95 mg total) by mouth 3 (three) times daily as needed for Pain. 21 tablet 3    benzonatate 100 MG Oral Cap Take 1 capsule (100 mg total) by mouth 3 (three) times daily as needed for cough. 30 capsule 0    levothyroxine (LEVOXYL) 88 MCG Oral Tab Take 1 tablet (88 mcg total) by mouth before breakfast. For underactive thyroid. 90 tablet 1    Fenofibrate 160 MG Oral Tab Take 1 tablet (160 mg total) by mouth daily. 90 tablet 3    fluticasone propionate 50 MCG/ACT Nasal Suspension 2 sprays by Nasal route daily. 48 mL 3    fluticasone-umeclidin-vilant (TRELEGY ELLIPTA) 100-62.5-25 MCG/ACT Inhalation Aerosol Powder, Breath Activated Inhale 1 puff into the lungs daily. 1 each 5    aspirin 81 MG Oral Tab EC Take 1 tablet (81 mg total) by mouth daily.      atorvastatin 10 MG Oral Tab Take 1 tablet (10 mg total) by mouth daily. 90 tablet 3    Omeprazole 40 MG Oral Capsule Delayed Release Take 1 capsule (40 mg total) by mouth daily. 90 capsule 3    Olmesartan Medoxomil 40 MG Oral Tab TAKE 1 TABLET (40 MG TOTAL) BY MOUTH DAILY FOR BLOOD PRESSURE 90 tablet 3    albuterol (VENTOLIN HFA) 108 (90 Base) MCG/ACT Inhalation Aero Soln Inhale 2 puffs into the lungs every 6 (six) hours as needed for Wheezing. 54 each 5    Multiple Vitamins-Minerals (CENTRUM SILVER 50+MEN OR) Take  by mouth.      Dutasteride 0.5 MG Oral Cap        Allergies:No Known Allergies    Objective:   Physical Exam  Constitutional:       General: He is not in acute distress.     Appearance: Normal appearance. He is ill-appearing.   HENT:      Head: Normocephalic and atraumatic.      Nose: Nose normal.      Mouth/Throat:      Mouth: Mucous membranes are moist.   Eyes:      General: No scleral icterus.  Cardiovascular:      Rate and Rhythm: Normal rate.      Heart sounds: No murmur heard.     No gallop.   Pulmonary:      Effort: No respiratory distress.      Breath sounds: No stridor. Wheezing present. No rhonchi or rales.   Chest:      Chest wall: No tenderness.   Abdominal:      General: Abdomen is flat. Bowel sounds are normal. There is no distension.      Palpations: Abdomen is soft.      Tenderness: There is no guarding.   Musculoskeletal:      Cervical back: Normal range of motion and neck supple. No rigidity.      Right lower leg: No edema.      Left lower leg: No edema.   Lymphadenopathy:      Cervical: No cervical adenopathy.   Skin:     General: Skin is dry.   Neurological:      Mental Status: He is oriented to person, place, and time.             PET scan :    Impression   CONCLUSION:     1. New left upper lobe/lingular nodule has markedly elevated metabolic activity with maximum SUV of 8.79.  This is consistent with malignancy unless proven otherwise by biopsy.     2. Nodule in the right upper lobe has decreased in size since the prior exam of February 2023 and has minimal activity, similar to the prior exam.     3. No evidence of metastatic disease in the chest, abdomen, or pelvis.  No enlarged or hypermetabolic lymph nodes in the chest.       4. Hypermetabolic nodules in the right parotid gland are again seen suggesting diagnosis of Warthin's tumor.  If not already performed, this could be confirmed with FNA.        Assessment & Plan:   1. Chronic obstructive pulmonary disease, unspecified COPD type (HCC)     2. Lung mass        1- WOODY nodule 1 x 0.6  cm   Positive on PET scan SUV 8.7 likely malignancy     Discussed the chest tumor board and recommended surgical resection  Scheduled to have VATS and resection on 10/20/2024          2- h/o RUL lung nodule ( 1.1 cm )      Seen on chest ct 2/8/2023  ( 1.7 x 1.4 cm RUL )     PET scan 2/17/2023 negative      Smaller on chest ct 6/14/2023 around 12 mm also negative on PET 9/4/24 and smaller      The decrease in size of the nodule and negative PET scan more suggestive for benign nodule .        3- Moderate COPD with FEV1 1.32 L 56 %   Mild acute exacerbation   lifelong history of smoking / continue to smoke 8 cigarettes a day     Z-[facility] Short course of prednisone  Breo inhaler  Albuterol as needed  complete smoking cessation    Follow-up 3               Meds This Visit:  Requested Prescriptions      No prescriptions requested or ordered in this encounter       Imaging & Referrals:  None

## 2024-10-19 ENCOUNTER — EKG ENCOUNTER (OUTPATIENT)
Dept: LAB | Age: 69
End: 2024-10-19
Attending: STUDENT IN AN ORGANIZED HEALTH CARE EDUCATION/TRAINING PROGRAM
Payer: MEDICARE

## 2024-10-19 DIAGNOSIS — E87.1 HYPONATREMIA: ICD-10-CM

## 2024-10-19 DIAGNOSIS — Z01.818 PREOPERATIVE TESTING: ICD-10-CM

## 2024-10-19 DIAGNOSIS — E03.9 ACQUIRED HYPOTHYROIDISM: ICD-10-CM

## 2024-10-19 DIAGNOSIS — R91.1 LEFT UPPER LOBE PULMONARY NODULE: ICD-10-CM

## 2024-10-19 DIAGNOSIS — D72.829 LEUKOCYTOSIS, UNSPECIFIED TYPE: ICD-10-CM

## 2024-10-19 LAB
ALBUMIN SERPL-MCNC: 4.9 G/DL (ref 3.2–4.8)
ANION GAP SERPL CALC-SCNC: 4 MMOL/L (ref 0–18)
ANTIBODY SCREEN: NEGATIVE
BASOPHILS # BLD AUTO: 0.07 X10(3) UL (ref 0–0.2)
BASOPHILS NFR BLD AUTO: 0.8 %
BUN BLD-MCNC: 12 MG/DL (ref 9–23)
BUN/CREAT SERPL: 14.6 (ref 10–20)
CALCIUM BLD-MCNC: 10.5 MG/DL (ref 8.7–10.4)
CHLORIDE SERPL-SCNC: 98 MMOL/L (ref 98–112)
CO2 SERPL-SCNC: 33 MMOL/L (ref 21–32)
CREAT BLD-MCNC: 0.82 MG/DL
DEPRECATED RDW RBC AUTO: 46.9 FL (ref 35.1–46.3)
EGFRCR SERPLBLD CKD-EPI 2021: 94 ML/MIN/1.73M2 (ref 60–?)
EOSINOPHIL # BLD AUTO: 0.31 X10(3) UL (ref 0–0.7)
EOSINOPHIL NFR BLD AUTO: 3.5 %
ERYTHROCYTE [DISTWIDTH] IN BLOOD BY AUTOMATED COUNT: 13.8 % (ref 11–15)
GLUCOSE BLD-MCNC: 97 MG/DL (ref 70–99)
HCT VFR BLD AUTO: 41.1 %
HGB BLD-MCNC: 14.1 G/DL
IMM GRANULOCYTES # BLD AUTO: 0.25 X10(3) UL (ref 0–1)
IMM GRANULOCYTES NFR BLD: 2.8 %
LYMPHOCYTES # BLD AUTO: 0.86 X10(3) UL (ref 1–4)
LYMPHOCYTES NFR BLD AUTO: 9.6 %
MCH RBC QN AUTO: 31.6 PG (ref 26–34)
MCHC RBC AUTO-ENTMCNC: 34.3 G/DL (ref 31–37)
MCV RBC AUTO: 92.2 FL
MONOCYTES # BLD AUTO: 0.83 X10(3) UL (ref 0.1–1)
MONOCYTES NFR BLD AUTO: 9.3 %
NEUTROPHILS # BLD AUTO: 6.62 X10 (3) UL (ref 1.5–7.7)
NEUTROPHILS # BLD AUTO: 6.62 X10(3) UL (ref 1.5–7.7)
NEUTROPHILS NFR BLD AUTO: 74 %
OSMOLALITY SERPL CALC.SUM OF ELEC: 280 MOSM/KG (ref 275–295)
PHOSPHATE SERPL-MCNC: 3.4 MG/DL (ref 2.4–5.1)
PLATELET # BLD AUTO: 375 10(3)UL (ref 150–450)
POTASSIUM SERPL-SCNC: 4.6 MMOL/L (ref 3.5–5.1)
RBC # BLD AUTO: 4.46 X10(6)UL
RH BLOOD TYPE: POSITIVE
SODIUM SERPL-SCNC: 135 MMOL/L (ref 136–145)
TSI SER-ACNC: 2.05 MIU/ML (ref 0.55–4.78)
WBC # BLD AUTO: 8.9 X10(3) UL (ref 4–11)

## 2024-10-19 PROCEDURE — 93010 ELECTROCARDIOGRAM REPORT: CPT | Performed by: INTERNAL MEDICINE

## 2024-10-19 PROCEDURE — 36415 COLL VENOUS BLD VENIPUNCTURE: CPT

## 2024-10-19 PROCEDURE — 86901 BLOOD TYPING SEROLOGIC RH(D): CPT

## 2024-10-19 PROCEDURE — 85025 COMPLETE CBC W/AUTO DIFF WBC: CPT

## 2024-10-19 PROCEDURE — 86850 RBC ANTIBODY SCREEN: CPT

## 2024-10-19 PROCEDURE — 80069 RENAL FUNCTION PANEL: CPT

## 2024-10-19 PROCEDURE — 93005 ELECTROCARDIOGRAM TRACING: CPT

## 2024-10-19 PROCEDURE — 86900 BLOOD TYPING SEROLOGIC ABO: CPT

## 2024-10-19 PROCEDURE — 87641 MR-STAPH DNA AMP PROBE: CPT

## 2024-10-19 PROCEDURE — 84443 ASSAY THYROID STIM HORMONE: CPT

## 2024-10-20 ENCOUNTER — TELEPHONE (OUTPATIENT)
Dept: NEPHROLOGY | Facility: CLINIC | Age: 69
End: 2024-10-20

## 2024-10-20 DIAGNOSIS — N28.9 DISORDER OF KIDNEY AND URETER: ICD-10-CM

## 2024-10-20 DIAGNOSIS — R89.9 ABNORMAL LABORATORY TEST RESULT: ICD-10-CM

## 2024-10-20 DIAGNOSIS — E87.1 HYPONATREMIA: Primary | ICD-10-CM

## 2024-10-20 DIAGNOSIS — E78.2 MIXED HYPERLIPIDEMIA: ICD-10-CM

## 2024-10-20 DIAGNOSIS — E03.9 ACQUIRED HYPOTHYROIDISM: ICD-10-CM

## 2024-10-20 DIAGNOSIS — R79.9 ABNORMAL BLOOD CHEMISTRY: ICD-10-CM

## 2024-10-20 DIAGNOSIS — N28.9 KIDNEY DISORDER: ICD-10-CM

## 2024-10-20 DIAGNOSIS — Z00.00 HEALTHCARE MAINTENANCE: ICD-10-CM

## 2024-10-20 DIAGNOSIS — E83.52 HYPERCALCEMIA: ICD-10-CM

## 2024-10-20 LAB
ATRIAL RATE: 80 BPM
MRSA DNA SPEC QL NAA+PROBE: NEGATIVE
P AXIS: 71 DEGREES
P-R INTERVAL: 130 MS
Q-T INTERVAL: 358 MS
QRS DURATION: 92 MS
QTC CALCULATION (BEZET): 412 MS
R AXIS: 62 DEGREES
RH BLOOD TYPE: POSITIVE
T AXIS: 64 DEGREES
VENTRICULAR RATE: 80 BPM

## 2024-10-20 NOTE — TELEPHONE ENCOUNTER
Kidney function remains normal.  Sodium was just borderline low but in a safe range.  Continue 48 ounce per day fluid restriction.  Calcium that was also mildly elevated.  Is he taking any calcium or vitamin D supplements?  If not in 3 months repeat renal panel, PTH, vitamin D, serum protein electrophoresis with reflex and then see for follow-up.

## 2024-10-21 NOTE — TELEPHONE ENCOUNTER
Dr Villar informed patient of note below  verified ,verbalized understanding reported only takes Natures multivitamin(no vit d ,only 12%Calcium in it)Rn generated order for labs.

## 2024-10-24 ENCOUNTER — ANESTHESIA (OUTPATIENT)
Dept: SURGERY | Facility: HOSPITAL | Age: 69
End: 2024-10-24
Payer: MEDICARE

## 2024-10-24 ENCOUNTER — HOSPITAL ENCOUNTER (INPATIENT)
Facility: HOSPITAL | Age: 69
LOS: 1 days | Discharge: HOME | End: 2024-10-25
Attending: THORACIC SURGERY (CARDIOTHORACIC VASCULAR SURGERY) | Admitting: THORACIC SURGERY (CARDIOTHORACIC VASCULAR SURGERY)
Payer: MEDICARE

## 2024-10-24 ENCOUNTER — HOSPITAL ENCOUNTER (INPATIENT)
Facility: HOSPITAL | Age: 69
LOS: 1 days | Discharge: HOME | End: 2024-10-25
Attending: THORACIC SURGERY (CARDIOTHORACIC VASCULAR SURGERY)
Payer: MEDICARE

## 2024-10-24 ENCOUNTER — ANESTHESIA EVENT (OUTPATIENT)
Dept: SURGERY | Facility: HOSPITAL | Age: 69
End: 2024-10-24
Payer: MEDICARE

## 2024-10-24 DIAGNOSIS — C34.12 CANCER OF UPPER LOBE OF LEFT LUNG (HCC): Primary | ICD-10-CM

## 2024-10-24 DIAGNOSIS — Z01.818 PREOPERATIVE TESTING: ICD-10-CM

## 2024-10-24 PROBLEM — R91.8 MASS OF UPPER LOBE OF LEFT LUNG: Status: ACTIVE | Noted: 2018-04-24

## 2024-10-24 LAB — GLUCOSE BLDC GLUCOMTR-MCNC: 107 MG/DL (ref 70–99)

## 2024-10-24 PROCEDURE — 0BBP4ZX EXCISION OF LEFT PLEURA, PERCUTANEOUS ENDOSCOPIC APPROACH, DIAGNOSTIC: ICD-10-PCS | Performed by: THORACIC SURGERY (CARDIOTHORACIC VASCULAR SURGERY)

## 2024-10-24 PROCEDURE — 0B918ZZ DRAINAGE OF TRACHEA, VIA NATURAL OR ARTIFICIAL OPENING ENDOSCOPIC: ICD-10-PCS | Performed by: THORACIC SURGERY (CARDIOTHORACIC VASCULAR SURGERY)

## 2024-10-24 PROCEDURE — 07T74ZZ RESECTION OF THORAX LYMPHATIC, PERCUTANEOUS ENDOSCOPIC APPROACH: ICD-10-PCS | Performed by: THORACIC SURGERY (CARDIOTHORACIC VASCULAR SURGERY)

## 2024-10-24 PROCEDURE — 0B938ZZ DRAINAGE OF RIGHT MAIN BRONCHUS, VIA NATURAL OR ARTIFICIAL OPENING ENDOSCOPIC: ICD-10-PCS | Performed by: THORACIC SURGERY (CARDIOTHORACIC VASCULAR SURGERY)

## 2024-10-24 PROCEDURE — 3E0T3BZ INTRODUCTION OF ANESTHETIC AGENT INTO PERIPHERAL NERVES AND PLEXI, PERCUTANEOUS APPROACH: ICD-10-PCS | Performed by: THORACIC SURGERY (CARDIOTHORACIC VASCULAR SURGERY)

## 2024-10-24 PROCEDURE — 99223 1ST HOSP IP/OBS HIGH 75: CPT | Performed by: INTERNAL MEDICINE

## 2024-10-24 PROCEDURE — 99223 1ST HOSP IP/OBS HIGH 75: CPT | Performed by: HOSPITALIST

## 2024-10-24 PROCEDURE — 0BBG4ZZ EXCISION OF LEFT UPPER LUNG LOBE, PERCUTANEOUS ENDOSCOPIC APPROACH: ICD-10-PCS | Performed by: THORACIC SURGERY (CARDIOTHORACIC VASCULAR SURGERY)

## 2024-10-24 PROCEDURE — 0B978ZZ DRAINAGE OF LEFT MAIN BRONCHUS, VIA NATURAL OR ARTIFICIAL OPENING ENDOSCOPIC: ICD-10-PCS | Performed by: THORACIC SURGERY (CARDIOTHORACIC VASCULAR SURGERY)

## 2024-10-24 RX ORDER — POLYETHYLENE GLYCOL 3350 17 G/17G
17 POWDER, FOR SOLUTION ORAL DAILY PRN
Status: DISCONTINUED | OUTPATIENT
Start: 2024-10-24 | End: 2024-10-25

## 2024-10-24 RX ORDER — PANTOPRAZOLE SODIUM 40 MG/1
40 TABLET, DELAYED RELEASE ORAL
Status: DISCONTINUED | OUTPATIENT
Start: 2024-10-25 | End: 2024-10-25

## 2024-10-24 RX ORDER — OXYCODONE HYDROCHLORIDE 5 MG/1
5 TABLET ORAL EVERY 4 HOURS PRN
Status: DISCONTINUED | OUTPATIENT
Start: 2024-10-24 | End: 2024-10-25

## 2024-10-24 RX ORDER — HYDROMORPHONE HYDROCHLORIDE 1 MG/ML
0.4 INJECTION, SOLUTION INTRAMUSCULAR; INTRAVENOUS; SUBCUTANEOUS EVERY 5 MIN PRN
Status: DISCONTINUED | OUTPATIENT
Start: 2024-10-24 | End: 2024-10-24 | Stop reason: HOSPADM

## 2024-10-24 RX ORDER — SENNOSIDES 8.6 MG
17.2 TABLET ORAL NIGHTLY PRN
Status: DISCONTINUED | OUTPATIENT
Start: 2024-10-24 | End: 2024-10-25

## 2024-10-24 RX ORDER — HYDROMORPHONE HYDROCHLORIDE 1 MG/ML
0.4 INJECTION, SOLUTION INTRAMUSCULAR; INTRAVENOUS; SUBCUTANEOUS EVERY 2 HOUR PRN
Status: DISCONTINUED | OUTPATIENT
Start: 2024-10-24 | End: 2024-10-25

## 2024-10-24 RX ORDER — BISACODYL 10 MG
10 SUPPOSITORY, RECTAL RECTAL
Status: DISCONTINUED | OUTPATIENT
Start: 2024-10-24 | End: 2024-10-25

## 2024-10-24 RX ORDER — FLUTICASONE PROPIONATE 50 MCG
2 SPRAY, SUSPENSION (ML) NASAL DAILY
Status: DISCONTINUED | OUTPATIENT
Start: 2024-10-25 | End: 2024-10-25

## 2024-10-24 RX ORDER — FLUTICASONE PROPIONATE 50 MCG
2 SPRAY, SUSPENSION (ML) NASAL DAILY
Status: DISCONTINUED | OUTPATIENT
Start: 2024-10-24 | End: 2024-10-24

## 2024-10-24 RX ORDER — SODIUM CHLORIDE, SODIUM LACTATE, POTASSIUM CHLORIDE, CALCIUM CHLORIDE 600; 310; 30; 20 MG/100ML; MG/100ML; MG/100ML; MG/100ML
INJECTION, SOLUTION INTRAVENOUS CONTINUOUS
Status: DISCONTINUED | OUTPATIENT
Start: 2024-10-24 | End: 2024-10-25

## 2024-10-24 RX ORDER — HYDROMORPHONE HYDROCHLORIDE 1 MG/ML
0.2 INJECTION, SOLUTION INTRAMUSCULAR; INTRAVENOUS; SUBCUTANEOUS EVERY 5 MIN PRN
Status: DISCONTINUED | OUTPATIENT
Start: 2024-10-24 | End: 2024-10-24 | Stop reason: HOSPADM

## 2024-10-24 RX ORDER — FLUTICASONE PROPIONATE AND SALMETEROL 250; 50 UG/1; UG/1
1 POWDER RESPIRATORY (INHALATION) 2 TIMES DAILY
Status: DISCONTINUED | OUTPATIENT
Start: 2024-10-24 | End: 2024-10-25

## 2024-10-24 RX ORDER — CALCIUM CARBONATE 500 MG/1
1000 TABLET, CHEWABLE ORAL 3 TIMES DAILY PRN
Status: DISCONTINUED | OUTPATIENT
Start: 2024-10-24 | End: 2024-10-25

## 2024-10-24 RX ORDER — ONDANSETRON 2 MG/ML
4 INJECTION INTRAMUSCULAR; INTRAVENOUS EVERY 6 HOURS PRN
Status: DISCONTINUED | OUTPATIENT
Start: 2024-10-24 | End: 2024-10-25

## 2024-10-24 RX ORDER — MORPHINE SULFATE 4 MG/ML
4 INJECTION, SOLUTION INTRAMUSCULAR; INTRAVENOUS EVERY 10 MIN PRN
Status: DISCONTINUED | OUTPATIENT
Start: 2024-10-24 | End: 2024-10-24 | Stop reason: HOSPADM

## 2024-10-24 RX ORDER — SODIUM CHLORIDE 9 MG/ML
INJECTION, SOLUTION INTRAVENOUS CONTINUOUS
Status: DISCONTINUED | OUTPATIENT
Start: 2024-10-24 | End: 2024-10-25

## 2024-10-24 RX ORDER — ROCURONIUM BROMIDE 10 MG/ML
INJECTION, SOLUTION INTRAVENOUS AS NEEDED
Status: DISCONTINUED | OUTPATIENT
Start: 2024-10-24 | End: 2024-10-24 | Stop reason: SURG

## 2024-10-24 RX ORDER — HYDROMORPHONE HYDROCHLORIDE 1 MG/ML
0.8 INJECTION, SOLUTION INTRAMUSCULAR; INTRAVENOUS; SUBCUTANEOUS EVERY 2 HOUR PRN
Status: DISCONTINUED | OUTPATIENT
Start: 2024-10-24 | End: 2024-10-25

## 2024-10-24 RX ORDER — IPRATROPIUM BROMIDE AND ALBUTEROL SULFATE 2.5; .5 MG/3ML; MG/3ML
3 SOLUTION RESPIRATORY (INHALATION)
Status: DISCONTINUED | OUTPATIENT
Start: 2024-10-24 | End: 2024-10-25

## 2024-10-24 RX ORDER — FINASTERIDE 5 MG/1
5 TABLET, FILM COATED ORAL DAILY
Status: DISCONTINUED | OUTPATIENT
Start: 2024-10-25 | End: 2024-10-25

## 2024-10-24 RX ORDER — FLUTICASONE PROPIONATE AND SALMETEROL 100; 50 UG/1; UG/1
1 POWDER RESPIRATORY (INHALATION) ONCE
COMMUNITY
End: 2024-10-28 | Stop reason: ALTCHOICE

## 2024-10-24 RX ORDER — DEXAMETHASONE SODIUM PHOSPHATE 4 MG/ML
VIAL (ML) INJECTION AS NEEDED
Status: DISCONTINUED | OUTPATIENT
Start: 2024-10-24 | End: 2024-10-24 | Stop reason: SURG

## 2024-10-24 RX ORDER — FLUTICASONE PROPIONATE AND SALMETEROL 100; 50 UG/1; UG/1
1 POWDER RESPIRATORY (INHALATION) ONCE
Status: DISCONTINUED | OUTPATIENT
Start: 2024-10-24 | End: 2024-10-24

## 2024-10-24 RX ORDER — BUPIVACAINE HYDROCHLORIDE AND EPINEPHRINE 2.5; 5 MG/ML; UG/ML
INJECTION, SOLUTION INFILTRATION; PERINEURAL AS NEEDED
Status: DISCONTINUED | OUTPATIENT
Start: 2024-10-24 | End: 2024-10-24 | Stop reason: HOSPADM

## 2024-10-24 RX ORDER — HYDROMORPHONE HYDROCHLORIDE 1 MG/ML
0.6 INJECTION, SOLUTION INTRAMUSCULAR; INTRAVENOUS; SUBCUTANEOUS EVERY 5 MIN PRN
Status: DISCONTINUED | OUTPATIENT
Start: 2024-10-24 | End: 2024-10-24 | Stop reason: HOSPADM

## 2024-10-24 RX ORDER — LEVOTHYROXINE SODIUM 88 UG/1
88 TABLET ORAL
Status: DISCONTINUED | OUTPATIENT
Start: 2024-10-25 | End: 2024-10-25

## 2024-10-24 RX ORDER — ONDANSETRON 2 MG/ML
INJECTION INTRAMUSCULAR; INTRAVENOUS AS NEEDED
Status: DISCONTINUED | OUTPATIENT
Start: 2024-10-24 | End: 2024-10-24 | Stop reason: SURG

## 2024-10-24 RX ORDER — HEPARIN SODIUM 5000 [USP'U]/ML
5000 INJECTION, SOLUTION INTRAVENOUS; SUBCUTANEOUS EVERY 8 HOURS SCHEDULED
Status: DISCONTINUED | OUTPATIENT
Start: 2024-10-24 | End: 2024-10-25

## 2024-10-24 RX ORDER — ACETAMINOPHEN 500 MG
1000 TABLET ORAL ONCE
Status: COMPLETED | OUTPATIENT
Start: 2024-10-24 | End: 2024-10-24

## 2024-10-24 RX ORDER — SODIUM PHOSPHATE, DIBASIC AND SODIUM PHOSPHATE, MONOBASIC 7; 19 G/230ML; G/230ML
1 ENEMA RECTAL ONCE AS NEEDED
Status: DISCONTINUED | OUTPATIENT
Start: 2024-10-24 | End: 2024-10-25

## 2024-10-24 RX ORDER — MORPHINE SULFATE 10 MG/ML
6 INJECTION, SOLUTION INTRAMUSCULAR; INTRAVENOUS EVERY 10 MIN PRN
Status: DISCONTINUED | OUTPATIENT
Start: 2024-10-24 | End: 2024-10-24 | Stop reason: HOSPADM

## 2024-10-24 RX ORDER — ONDANSETRON 2 MG/ML
4 INJECTION INTRAMUSCULAR; INTRAVENOUS EVERY 6 HOURS PRN
Status: DISCONTINUED | OUTPATIENT
Start: 2024-10-24 | End: 2024-10-24 | Stop reason: HOSPADM

## 2024-10-24 RX ORDER — PHENYLEPHRINE HCL 10 MG/ML
VIAL (ML) INJECTION AS NEEDED
Status: DISCONTINUED | OUTPATIENT
Start: 2024-10-24 | End: 2024-10-24 | Stop reason: SURG

## 2024-10-24 RX ORDER — METOCLOPRAMIDE HYDROCHLORIDE 5 MG/ML
10 INJECTION INTRAMUSCULAR; INTRAVENOUS EVERY 8 HOURS PRN
Status: DISCONTINUED | OUTPATIENT
Start: 2024-10-24 | End: 2024-10-25

## 2024-10-24 RX ORDER — ATORVASTATIN CALCIUM 10 MG/1
10 TABLET, FILM COATED ORAL DAILY
Status: DISCONTINUED | OUTPATIENT
Start: 2024-10-24 | End: 2024-10-25

## 2024-10-24 RX ORDER — ACETAMINOPHEN 10 MG/ML
750 INJECTION, SOLUTION INTRAVENOUS EVERY 6 HOURS
Status: DISCONTINUED | OUTPATIENT
Start: 2024-10-24 | End: 2024-10-25

## 2024-10-24 RX ORDER — MORPHINE SULFATE 4 MG/ML
2 INJECTION, SOLUTION INTRAMUSCULAR; INTRAVENOUS EVERY 10 MIN PRN
Status: DISCONTINUED | OUTPATIENT
Start: 2024-10-24 | End: 2024-10-24 | Stop reason: HOSPADM

## 2024-10-24 RX ORDER — NALOXONE HYDROCHLORIDE 0.4 MG/ML
0.08 INJECTION, SOLUTION INTRAMUSCULAR; INTRAVENOUS; SUBCUTANEOUS AS NEEDED
Status: DISCONTINUED | OUTPATIENT
Start: 2024-10-24 | End: 2024-10-24 | Stop reason: HOSPADM

## 2024-10-24 RX ORDER — MIDAZOLAM HYDROCHLORIDE 1 MG/ML
INJECTION INTRAMUSCULAR; INTRAVENOUS AS NEEDED
Status: DISCONTINUED | OUTPATIENT
Start: 2024-10-24 | End: 2024-10-24 | Stop reason: SURG

## 2024-10-24 RX ORDER — SODIUM CHLORIDE, SODIUM LACTATE, POTASSIUM CHLORIDE, CALCIUM CHLORIDE 600; 310; 30; 20 MG/100ML; MG/100ML; MG/100ML; MG/100ML
INJECTION, SOLUTION INTRAVENOUS CONTINUOUS
Status: DISCONTINUED | OUTPATIENT
Start: 2024-10-24 | End: 2024-10-24 | Stop reason: HOSPADM

## 2024-10-24 RX ORDER — LOSARTAN POTASSIUM 100 MG/1
100 TABLET ORAL DAILY
Status: DISCONTINUED | OUTPATIENT
Start: 2024-10-25 | End: 2024-10-25

## 2024-10-24 RX ORDER — OXYCODONE HYDROCHLORIDE 5 MG/1
10 TABLET ORAL EVERY 4 HOURS PRN
Status: DISCONTINUED | OUTPATIENT
Start: 2024-10-24 | End: 2024-10-25

## 2024-10-24 RX ORDER — IPRATROPIUM BROMIDE AND ALBUTEROL SULFATE 2.5; .5 MG/3ML; MG/3ML
3 SOLUTION RESPIRATORY (INHALATION) EVERY 6 HOURS PRN
Status: DISCONTINUED | OUTPATIENT
Start: 2024-10-24 | End: 2024-10-25

## 2024-10-24 RX ORDER — METOCLOPRAMIDE HYDROCHLORIDE 5 MG/ML
5 INJECTION INTRAMUSCULAR; INTRAVENOUS EVERY 8 HOURS PRN
Status: DISCONTINUED | OUTPATIENT
Start: 2024-10-24 | End: 2024-10-24 | Stop reason: HOSPADM

## 2024-10-24 RX ORDER — KETOROLAC TROMETHAMINE 15 MG/ML
15 INJECTION, SOLUTION INTRAMUSCULAR; INTRAVENOUS EVERY 6 HOURS
Status: DISCONTINUED | OUTPATIENT
Start: 2024-10-24 | End: 2024-10-25

## 2024-10-24 RX ADMIN — ROCURONIUM BROMIDE 50 MG: 10 INJECTION, SOLUTION INTRAVENOUS at 14:06:00

## 2024-10-24 RX ADMIN — ONDANSETRON 4 MG: 2 INJECTION INTRAMUSCULAR; INTRAVENOUS at 14:06:00

## 2024-10-24 RX ADMIN — PHENYLEPHRINE HCL 50 MCG: 10 MG/ML VIAL (ML) INJECTION at 15:04:00

## 2024-10-24 RX ADMIN — DEXAMETHASONE SODIUM PHOSPHATE 8 MG: 4 MG/ML VIAL (ML) INJECTION at 14:06:00

## 2024-10-24 RX ADMIN — MIDAZOLAM HYDROCHLORIDE 2 MG: 1 INJECTION INTRAMUSCULAR; INTRAVENOUS at 14:00:00

## 2024-10-24 NOTE — ANESTHESIA POSTPROCEDURE EVALUATION
Patient: Alejandro Hanna    Procedure Summary       Date: 10/24/24 Room / Location: [facility] MAIN OR 16 / [facility] MAIN OR    Anesthesia Start: 1356 Anesthesia Stop: 1533    Procedure: Flexible bronchoscopy, left video assisted thoracoscopic upper lobe wedge resection, left pleural biopsy, mediastinal lymph node dissection (Left: Chest) Diagnosis: (left upperlobe pulmonary nodule)    Surgeons: Candace Murcia, Jagdish BARTH MD Anesthesiologist: Toñito Dunn MD    Anesthesia Type: general ASA Status: 3            Anesthesia Type: general    Vitals Value Taken Time   BP 92/80 10/24/24 1530   Temp 98.3 10/24/24 1533   Pulse 67 10/24/24 1532   Resp 21 10/24/24 1532   SpO2 97 % 10/24/24 1532   Vitals shown include unfiled device data.    [facility] AN Post Evaluation:   Patient Evaluated in PACU  Patient Participation: complete - patient participated  Level of Consciousness: awake and awake and alert  Pain Score: 2  Pain Management: adequate  Airway Patency:patent  Yes    Nausea/Vomiting: none  Cardiovascular Status: acceptable, blood pressure returned to baseline and hemodynamically stable  Respiratory Status: acceptable, unassisted and spontaneous ventilation  Postoperative Hydration stable      Toñito Dunn MD  10/24/2024 3:33 PM

## 2024-10-24 NOTE — CONSULTS
[facility]    PATIENT'S NAME: FRANCESCO NIELSON   ATTENDING PHYSICIAN: Jagdish Maria Jr., MD   CONSULTING PHYSICIAN: Sandie Mendoza MD   PATIENT ACCOUNT#:   [id number]    LOCATION:  [facility] PACU 5 [facility] 10  MEDICAL RECORD #:   [id number]       YOB: 1955  ADMISSION DATE:       10/24/2024      CONSULT DATE:  10/24/2024    REPORT OF CONSULTATION      REASON FOR ADMISSION:  Status post VATS, left upper lobe wedge resection.    HISTORY OF PRESENT ILLNESS:  Patient is a 68-year-old  male with enlarging left upper lobe nodule positive on PET scan, highly suggestive of malignancy.  No metastatic disease on imaging studies.  He was referred to Thoracic Surgery and scheduled today for above-mentioned procedure by his thoracic surgeon, Dr. Jagdish Maria.  Postoperatively, transferred to PACU for further monitoring.      PAST MEDICAL HISTORY:  Essential hypertension, hypothyroidism, hyperlipidemia, osteoarthritis, chronic obstructive pulmonary disease, benign prostatic hypertrophy, gastroesophageal reflux disease, and hiatal hernia.    PAST SURGICAL HISTORY:  Transurethral resection of bladder tumor for urothelial carcinoma without recurrent on multiple followup cystoscopies, left knee medial meniscectomy.    MEDICATIONS:  Please see medication reconciliation list.     ALLERGIES:  No known drug allergies.    SOCIAL HISTORY:  Chronic tobacco use.  Consumes 2 alcoholic drinks daily.  No drug use.  Usually independent in his basic activities of daily living.     FAMILY HISTORY:  Mother had diabetes mellitus type 2.    REVIEW OF SYSTEMS:  Currently resting in bed.  Cough with wheezing and some shortness of breath.  Chest pain is controlled.  Other 12-point review of systems is negative.       PHYSICAL EXAMINATION:    GENERAL:  Alert and oriented to time, place and person.  Moderate distress.   VITAL SIGNS:  Temperature 97.2, pulse 72, respiratory rate 20, blood pressure 152/80, pulse ox  100% on room air.  HEENT:  Atraumatic.  Oropharynx clear.  Dry mucous membranes.  Ears and nose normal.  Eyes:  Anicteric sclerae.   NECK:  Supple.  No lymphadenopathy.  Trachea midline.  Full range of motion.   LUNGS:  Rhonchi and wheezing noted on both lung fields.  Left lateral chest tube connected to an [facility].   HEART:  Regular rate and rhythm.  S1 and S2 auscultated.  No murmur.    ABDOMEN:  Soft, nondistended.  No tenderness.  Positive bowel sounds.   EXTREMITIES:  No edema, clubbing or cyanosis.   NEUROLOGIC:  Motor and sensory intact.      ASSESSMENT AND PLAN:    1.   Left upper lobe lung nodule, positive for cancer on frozen section, status post flexible bronchoscopy, left video-assisted thoracoscopic upper lobe wedge resection, left pleural biopsy, mediastinal lymph node dissection.  Continue to monitor chest tube output.  Monitor hemodynamic status via arterial line.  DVT prophylaxis.  Full pathology report still pending.  2.   Chronic obstructive pulmonary disease.  DuoNebs.  Oxygen support.  Monitor respiratory status.  3.   Essential hypertension.  Continue home medications and monitor.  4.   Hyperlipidemia.  Continue home medications.  5.   Benign prostatic hypertrophy.  Continue home medications.  6.   Hypothyroidism.  Continue home medications.    Dictated By Sandie Mendoza MD  d: 10/24/2024 16:18:17  t: 10/24/2024 17:55:56  Job [id number]  FB/

## 2024-10-24 NOTE — INTERVAL H&P NOTE
Pre-op Diagnosis: left upperlobe pulmonary nodule    The above referenced H&P was reviewed by Jeannine Saucedo PA-C on 10/24/2024, the patient was examined and no significant changes have occurred in the patient's condition since the H&P was performed.  We discussed with the patient and/or legal representative the potential benefits, risks and side effects of this procedure; the likelihood of the patient achieving goals; and potential problems that might occur during recuperation.  We discussed reasonable alternatives to the procedure, including risks, benefits and side effects related to the alternatives and risks related to not receiving this procedure.  We will proceed with procedure as planned.

## 2024-10-24 NOTE — ANESTHESIA PROCEDURE NOTES
Arterial Line    Date/Time: 10/24/2024 2:01 PM    Performed by: Toñito Dunn MD  Authorized by: Toñito Dunn MD    General Information and Staff    Procedure Start:  10/24/2024 2:01 PM  Procedure End:  10/24/2024 2:03 PM  Anesthesiologist:  Toñito Dunn MD  Performed By:  Anesthesiologist  Patient Location:  OR  Indication: continuous blood pressure monitoring and blood sampling needed    Site Identification: surface landmarks    Preanesthetic Checklist: 2 patient identifiers, IV checked, risks and benefits discussed, monitors and equipment checked, pre-op evaluation, timeout performed, anesthesia consent and sterile technique used    Procedure Details    Catheter Size:  20 G  Catheter Length:  1 and 3/4 inch  Catheter Type:  Arrow  Seldinger Technique?: Yes    Laterality:  Right  Site:  Radial artery  Site Prep: chlorhexidine    Line Secured:  Wrist Brace, tape and Tegaderm    Assessment    Events: patient tolerated procedure well with no complications      Medications  10/24/2024 2:01 PM      Additional Comments

## 2024-10-24 NOTE — ANESTHESIA PREPROCEDURE EVALUATION
Anesthesia PreOp Note    HPI:     Alejandro Hanna is a 68 year old male who presents for preoperative consultation requested by: Candace Murcia, Jagdish BARTH MD    Date of Surgery: 10/24/2024    Procedure(s):  Flexible bronchoscopy, left video assisted thoracoscopic upper lobe wedge resection, mediastinal lymph node dissection  Indication: left upperlobe pulmonary nodule    Relevant Problems   No relevant active problems       NPO:  Last Liquid Consumption Date: 10/23/24  Last Liquid Consumption Time: 2100  Last Solid Consumption Date: 10/23/24  Last Solid Consumption Time: 2100  Last Liquid Consumption Date: 10/23/24          History Review:  Patient Active Problem List    Diagnosis Date Noted    Internal derangement of left knee 02/28/2024    Primary osteoarthritis of left knee 02/28/2024    Dizziness 09/19/2023    Right ear impacted cerumen 09/19/2023    Protein-calorie malnutrition, unspecified severity (HCC) 07/06/2023    Calcification of aorta (HCC) 11/04/2020    Hiatal hernia 11/04/2020    Alcohol abuse 05/08/2020    Hyponatremia 05/08/2020    Bladder cancer (HCC) 12/16/2019    Acquired hypothyroidism 10/23/2018    Tobacco use disorder 10/23/2018    Lung mass 04/24/2018    Decreased hearing of right ear 04/17/2018    Eructation 04/17/2018    Allergic rhinitis due to pollen 04/17/2018    Benign non-nodular prostatic hyperplasia with lower urinary tract symptoms 03/10/2016    Chronic obstructive pulmonary disease (HCC) 03/10/2016    Nocturia 06/23/2015    Essential hypertension, benign 10/07/2014    Mixed hyperlipidemia 10/07/2014    Abnormal results of liver function studies 10/01/2012    Abnormal clinical finding 07/28/2012    Abnormal blood chemistry 03/26/2012    Disorder of kidney and ureter 03/14/2012    Cough 03/05/2012    Hepatomegaly 05/13/2010    Impotence of organic origin 05/13/2010       Past Medical History:    Allergic rhinitis    Flonase    Bladder cancer (HCC)    f/u [facility]    BPH (benign  prostatic hyperplasia)    COPD (HCC)    moderate-severe     Disorder of kidney and ureter    Disorder of thyroid    hypothyroid    ED (erectile dysfunction)    Esophageal reflux    Essential hypertension    Hearing impairment    Hepatomegaly    Hiatal hernia    High blood pressure    High cholesterol    Hypothyroidism    Injury    Left knee injury, from fall    Osteoarthritis    in knee's, hip's    Pulmonary nodules    first noted 2016on LD screen CT    Tobacco abuse    Visual impairment    glasses       Past Surgical History:   Procedure Laterality Date    Colonoscopy      02-14-17    Colonoscopy N/A 12/28/2020    Procedure: COLONOSCOPY;  Surgeon: Real Abreu MD;  Location: [facility] ENDOSCOPY    Electrocardiogram, complete  12/15/2013    scanned to media tab    Oral surgery      Other surgical history  2009    cyst removal, side of head       Prescriptions Prior to Admission[1]  Current Medications and Prescriptions Ordered in Epic[2]    Allergies[3]    Family History   Problem Relation Age of Onset    Dementia Mother     Diabetes Paternal Grandmother     Cancer Sister         lung cancer     Social History     Socioeconomic History    Marital status:    Tobacco Use    Smoking status: Every Day     Current packs/day: 0.50     Average packs/day: 1 pack/day for 40.8 years (40.4 ttl pk-yrs)     Types: Cigarettes     Start date: 2024    Smokeless tobacco: Never    Tobacco comments:     2/2022: less than 1 pack per day     10/2024: smokes 8-9 cigarettes a day   Vaping Use    Vaping status: Never Used   Substance and Sexual Activity    Alcohol use: Yes     Alcohol/week: 2.0 standard drinks of alcohol     Types: 2 Cans of beer per week     Comment: 2 beer per day    Drug use: No   Other Topics Concern    Caffeine Concern Yes     Comment: coffee-1 cup/day       Available pre-op labs reviewed.  Lab Results   Component Value Date    WBC 8.9 10/19/2024    RBC 4.46 10/19/2024    HGB 14.1 10/19/2024    HCT 41.1  10/19/2024    MCV 92.2 10/19/2024    MCH 31.6 10/19/2024    MCHC 34.3 10/19/2024    RDW 13.8 10/19/2024    .0 10/19/2024     Lab Results   Component Value Date     (L) 10/19/2024    K 4.6 10/19/2024    CL 98 10/19/2024    CO2 33.0 (H) 10/19/2024    BUN 12 10/19/2024    CREATSERUM 0.82 10/19/2024    GLU 97 10/19/2024    PGLU 116 (H) 09/04/2024    CA 10.5 (H) 10/19/2024          Vital Signs:  Body mass index is 18.37 kg/m².   height is 1.626 m (5' 4\") and weight is 48.5 kg (107 lb). His oral temperature is 98.6 °F (37 °C). His blood pressure is 129/74 and his pulse is 89. His respiration is 18 and oxygen saturation is 98%.   Vitals:    10/17/24 1152 10/24/24 1242   BP:  129/74   Pulse:  89   Resp:  18   Temp:  98.6 °F (37 °C)   TempSrc:  Oral   SpO2:  98%   Weight: 54 kg (119 lb) 48.5 kg (107 lb)   Height: 1.626 m (5' 4\") 1.626 m (5' 4\")        Anesthesia Evaluation     Patient summary reviewed and Nursing notes reviewed    Airway   Mallampati: II  TM distance: >3 FB  Neck ROM: full  Dental - Dentition appears grossly intact     Pulmonary - normal exam    breath sounds clear to auscultation  (+) COPD  Cardiovascular   (+) hypertension    Rhythm: regular  Rate: normal    Neuro/Psych    (+)  neuromuscular disease,        GI/Hepatic/Renal    (+) GERD    Endo/Other    Abdominal                  Anesthesia Plan:   ASA:  3  Plan:   General  Monitors and Lines:   A-line and Double Lumen -ETT  Airway:  Double lumen ETT  Post-op Pain Management: IV analgesics      I have informed Alejandro Mckinneykaren and/or legal guardian or family member of the nature of the anesthetic plan, benefits, risks including possible dental damage if relevant, major complications, and any alternative forms of anesthetic management.   All of the patient's questions were answered to the best of my ability. The patient desires the anesthetic management as planned.  Toñito Dunn MD  10/24/2024 1:01 PM  Present on Admission:  **None**            [1]   Medications Prior to Admission   Medication Sig Dispense Refill Last Dose/Taking    fluticasone-salmeterol 100-50 MCG/ACT Inhalation Aerosol Powder, Breath Activated Inhale 1 puff into the lungs one time.   10/24/2024 at  9:00 AM    levothyroxine (LEVOXYL) 88 MCG Oral Tab Take 1 tablet (88 mcg total) by mouth before breakfast. For underactive thyroid. 90 tablet 1 10/24/2024 at  9:00 AM    Fenofibrate 160 MG Oral Tab Take 1 tablet (160 mg total) by mouth daily. (Patient taking differently: Take 1 tablet (160 mg total) by mouth every morning.) 90 tablet 3 10/23/2024 at  7:00 AM    fluticasone propionate 50 MCG/ACT Nasal Suspension 2 sprays by Nasal route daily. 48 mL 3 10/23/2024 at  8:00 PM    atorvastatin 10 MG Oral Tab Take 1 tablet (10 mg total) by mouth daily. 90 tablet 3 10/23/2024 at  7:00 AM    Omeprazole 40 MG Oral Capsule Delayed Release Take 1 capsule (40 mg total) by mouth daily. (Patient taking differently: Take 1 capsule (40 mg total) by mouth every morning.) 90 capsule 3 10/24/2024 at  9:00 AM    Olmesartan Medoxomil 40 MG Oral Tab TAKE 1 TABLET (40 MG TOTAL) BY MOUTH DAILY FOR BLOOD PRESSURE 90 tablet 3 10/23/2024 at  7:00 AM    albuterol (VENTOLIN HFA) 108 (90 Base) MCG/ACT Inhalation Aero Soln Inhale 2 puffs into the lungs every 6 (six) hours as needed for Wheezing. 54 each 5 10/23/2024 at 10:00 PM    Multiple Vitamins-Minerals (CENTRUM SILVER 50+MEN OR) Take by mouth.   10/23/2024 at  7:00 AM    Dutasteride 0.5 MG Oral Cap Take 1 capsule (0.5 mg total) by mouth every morning.   10/24/2024 at  9:00 AM    fluticasone-umeclidin-vilant (TRELEGY ELLIPTA) 100-62.5-25 MCG/ACT Inhalation Aerosol Powder, Breath Activated Inhale 1 puff into the lungs daily. 1 each 5    [2]   Current Facility-Administered Medications Ordered in Epic   Medication Dose Route Frequency Provider Last Rate Last Admin    lactated ringers infusion   Intravenous Continuous Candace Murcia, Jagdish BARTH MD 20 mL/hr at 10/24/24 7244  New Bag at 10/24/24 1244    ceFAZolin (Ancef) 2g in 10mL IV syringe premix  2 g Intravenous Once Candace Murcia, Jagdish BARTH MD         No current [facility]-ordered outpatient medications on file.   [3] No Known Allergies

## 2024-10-24 NOTE — CONSULTS
Initial Pulmonary/ICU/Critical Care Consultation        Reason for Consultation: Post op wedge resection  Referring Physician: Dr. Maria       HPI: 69 yo male with hx of moderate COPD, WOODY lung nodule (1x0.6 cm PET pos), hx of RUL nodule (PET neg) underwent video-assisted thoracoscopic left upper lobe wedge resection, left pleural biopsy, mediastinal lymph node dissection this afternoon.   Seen post op and feels well. On 2 LNC. Chest tube with min output.      REVIEW OF SYSTEMS:  Positives and negatives as noted in HPI. All other review of systems otherwise are either limited (due to pt/family inability to provide) or negative.      PAST MEDICAL HISTORY:  Past Medical History:   Diagnosis Date    Allergic rhinitis     Flonase    Bladder cancer (HCC) 11/2019    f/u [facility]    BPH (benign prostatic hyperplasia)     COPD (HCC)     moderate-severe     Disorder of kidney and ureter 03/14/2012    Disorder of thyroid     hypothyroid    ED (erectile dysfunction)     Esophageal reflux     Essential hypertension     Hearing impairment     Hepatomegaly 05/13/2010    Hiatal hernia     High blood pressure     High cholesterol     Hypothyroidism     Injury 01/29/2019    Left knee injury, from fall    Osteoarthritis     in knee's, hip's    Pulmonary nodules     first noted 2016on LD screen CT    Tobacco abuse     Visual impairment     glasses         PAST SURGICAL HISTORY:  Past Surgical History:   Procedure Laterality Date    Colonoscopy      02-14-17    Colonoscopy N/A 12/28/2020    Procedure: COLONOSCOPY;  Surgeon: Real Abreu MD;  Location: [facility] ENDOSCOPY    Electrocardiogram, complete  12/15/2013    scanned to media tab    Oral surgery      Other surgical history  2009    cyst removal, side of head         PAST SOCIAL HISTORY:  Social History     Socioeconomic History    Marital status:    Tobacco Use    Smoking status: Every Day     Current packs/day: 0.50     Average packs/day: 1 pack/day for 40.8 years  (40.4 ttl pk-yrs)     Types: Cigarettes     Start date: 2024    Smokeless tobacco: Never    Tobacco comments:     2/2022: less than 1 pack per day     10/2024: smokes 8-9 cigarettes a day   Vaping Use    Vaping status: Never Used   Substance and Sexual Activity    Alcohol use: Yes     Alcohol/week: 2.0 standard drinks of alcohol     Types: 2 Cans of beer per week     Comment: 2 beer per day    Drug use: No   Other Topics Concern    Caffeine Concern Yes     Comment: coffee-1 cup/day         PAST FAMILY HISTORY:  Family History   Problem Relation Age of Onset    Dementia Mother     Diabetes Paternal Grandmother     Cancer Sister         lung cancer         ALLERGIES:  Allergies[1]      MEDS:  Home Medications:  Medications Taking[2]    Scheduled Medication:    Continuous Infusing Medication:   lactated ringers 20 mL/hr at 10/24/24 1244    sodium chloride 60 mL/hr at 10/24/24 1548     PRN Medications:    ipratropium-albuterol       PHYSICAL EXAM:  /82   Pulse 82   Temp 98 °F (36.7 °C)   Resp 20   Ht 5' 4\" (1.626 m)   Wt 107 lb (48.5 kg)   SpO2 99%   BMI 18.37 kg/m²      CONSTITUTIONAL: alert, oriented, no apparent distress  HEENT: atraumatic normocephalic  MOUTH: mucous membranes are moist. No OP exudates  NECK/THROAT: no JVD. Trachea midline. No obvious thyromegaly  LUNG: no wheezing, + left crackles and subcutaneous emphysema. Chest symmetric with respiratory motion. + left chest tube  HEART: regular rate and rhythm, no obvious murmers or gallops noted  ABD: soft non tender. + bowel sounds. No organomegaly noted  EXT: no clubbing, cyanosis, or edema noted. Pulses intact grossly  NEURO/MUSCULOSKELETAL: no gross deficits  SKIN: warm, dry. No obvious lesions noted  LYMPH: no obvious LAD      IMAGES:   PET scan 9/2024  CONCLUSION:   1. New left upper lobe/lingular nodule has markedly elevated metabolic activity with maximum SUV of 8.79.  This is consistent with malignancy unless proven otherwise by biopsy.    2. Nodule in the right upper lobe has decreased in size since the prior exam of February 2023 and has minimal activity, similar to the prior exam.   3. No evidence of metastatic disease in the chest, abdomen, or pelvis.  No enlarged or hypermetabolic lymph nodes in the chest.    4. Hypermetabolic nodules in the right parotid gland are again seen suggesting diagnosis of Warthin's tumor.  If not already performed, this could be confirmed with FNA.       LABS:  Recent Labs   Lab 10/19/24  0848   RBC 4.46   HGB 14.1   HCT 41.1   MCV 92.2   MCH 31.6   MCHC 34.3   RDW 13.8   NEPRELIM 6.62   WBC 8.9   .0       Recent Labs   Lab 10/19/24  0848   GLU 97   BUN 12   CREATSERUM 0.82   EGFRCR 94   CA 10.5*   ALB 4.9*   *   K 4.6   CL 98   CO2 33.0*       ASSESSMENT/PLAN:  1.S/p video-assisted thoracoscopic left upper lobe wedge resection, left pleural biopsy, mediastinal lymph node dissection for WOODY PET + lung nodule  -continue chest tube as per CT surgery  -routine labs in am  -CXR in am    2.COPD  -no exac  -advair in lieu of home breo  -duonebs and PRN supp 02  -IS    3. HTN  -resume home meds  -remove art line if ok with CT surgery    4.Hypothyroid  -resume home meds    5.Proph:  -DVT: hep subcutaneous  -GI: PPI      Thank you for the opportunity to care for Alejandro Hanna.      BANDAR Patel DO, MPH  Pulmonary Critical Care Medicine  [facility] Pulmonary and Critical Care Medicine                         [1] No Known Allergies  [2]   Outpatient Medications Marked as Taking for the 10/24/24 encounter (Hospital Encounter)   Medication Sig Dispense Refill    fluticasone-salmeterol 100-50 MCG/ACT Inhalation Aerosol Powder, Breath Activated Inhale 1 puff into the lungs one time.      levothyroxine (LEVOXYL) 88 MCG Oral Tab Take 1 tablet (88 mcg total) by mouth before breakfast. For underactive thyroid. 90 tablet 1    Fenofibrate 160 MG Oral Tab Take 1 tablet (160 mg total) by mouth daily. (Patient  taking differently: Take 1 tablet (160 mg total) by mouth every morning.) 90 tablet 3    fluticasone propionate 50 MCG/ACT Nasal Suspension 2 sprays by Nasal route daily. 48 mL 3    atorvastatin 10 MG Oral Tab Take 1 tablet (10 mg total) by mouth daily. 90 tablet 3    Omeprazole 40 MG Oral Capsule Delayed Release Take 1 capsule (40 mg total) by mouth daily. (Patient taking differently: Take 1 capsule (40 mg total) by mouth every morning.) 90 capsule 3    Olmesartan Medoxomil 40 MG Oral Tab TAKE 1 TABLET (40 MG TOTAL) BY MOUTH DAILY FOR BLOOD PRESSURE 90 tablet 3    albuterol (VENTOLIN HFA) 108 (90 Base) MCG/ACT Inhalation Aero Soln Inhale 2 puffs into the lungs every 6 (six) hours as needed for Wheezing. 54 each 5    Multiple Vitamins-Minerals (CENTRUM SILVER 50+MEN OR) Take by mouth.      Dutasteride 0.5 MG Oral Cap Take 1 capsule (0.5 mg total) by mouth every morning.

## 2024-10-24 NOTE — BRIEF OP NOTE
Pre-Operative Diagnosis: left upperlobe pulmonary nodule     Post-Operative Diagnosis: left upper lobe cancer     Procedure Performed:   Flexible bronchoscopy, left video assisted thoracoscopic upper lobe wedge resection, left pleural biopsy, mediastinal lymph node dissection    Surgeons and Role:     * Jagdish Maria Jr., MD - Primary    Assistant(s):  PA: Jeannine Saucedo PA-C     Surgical Findings: nodule positive for malignancy on frozen, pleural nodularity biopsied      Specimen:   ID Type Source Tests Collected by Time Destination   1 : 1. Left upper lobe partial excision Tissue Lung left SURGICAL PATHOLOGY TISSUE Jagdish Maria Jr., MD 10/24/2024 1437    2 : 2. Left pleural biopsy Tissue Pleura left SURGICAL PATHOLOGY TISSUE Jagdish Maria Jr., MD 10/24/2024 1446    3 : 3. Lymph node level 5 Tissue Lymph node SURGICAL PATHOLOGY TISSUE Jagdish Maria Jr., MD 10/24/2024 1448    4 : 4. Lymph node level 6 Tissue Lymph node SURGICAL PATHOLOGY TISSUE Jagdish Maria Jr., MD 10/24/2024 1449    5 : 5. Lymph node level 9 Tissue Lymph node SURGICAL PATHOLOGY TISSUE Jagdish Maria Jr., MD 10/24/2024 1452      Estimated Blood Loss: Blood Output: 10 mL (10/24/2024  3:23 PM)    Disposition: PACU to ICU    Jeannine Saucedo PA-C  10/24/2024  3:27 PM

## 2024-10-24 NOTE — ANESTHESIA PROCEDURE NOTES
Airway  Date/Time: 10/24/2024 2:06 PM  Urgency: Elective      General Information and Staff    Patient location during procedure: OR  Anesthesiologist: Toñito Dunn MD  Performed: anesthesiologist   Performed by: Toñito Dunn MD  Authorized by: Toñito Dunn MD      Indications and Patient Condition  Indications for airway management: anesthesia  Sedation level: deep  Preoxygenated: yes  Patient position: sniffing  Mask difficulty assessment: 1 - vent by mask    Final Airway Details  Final airway type: endotracheal airway      Successful airway: ETT - double lumen left  Cuffed: yes   Successful intubation technique: direct laryngoscopy  Endotracheal tube insertion site: oral  Blade: Yamila  Blade size: #4  ETT DL size (fr): 37    Cormack-Lehane Classification: grade IIB - view of arytenoids or posterior of glottis only  Placement verified by: capnometry   Measured from: teeth  Number of attempts at approach: 1

## 2024-10-24 NOTE — ANESTHESIA PROCEDURE NOTES
Peripheral IV  Date/Time: 10/24/2024 2:17 PM  Inserted by: Toñito Dunn MD    Placement  Needle size: 16 G  Laterality: left  Location: antecubital  Site prep: alcohol  Technique: anatomical landmarks  Attempts: 1

## 2024-10-25 ENCOUNTER — APPOINTMENT (OUTPATIENT)
Dept: GENERAL RADIOLOGY | Facility: HOSPITAL | Age: 69
End: 2024-10-25
Attending: INTERNAL MEDICINE
Payer: MEDICARE

## 2024-10-25 VITALS
WEIGHT: 106.94 LBS | SYSTOLIC BLOOD PRESSURE: 152 MMHG | TEMPERATURE: 97 F | DIASTOLIC BLOOD PRESSURE: 75 MMHG | RESPIRATION RATE: 17 BRPM | OXYGEN SATURATION: 96 % | HEIGHT: 64 IN | BODY MASS INDEX: 18.26 KG/M2 | HEART RATE: 86 BPM

## 2024-10-25 LAB
ANION GAP SERPL CALC-SCNC: 7 MMOL/L (ref 0–18)
BASOPHILS # BLD AUTO: 0.01 X10(3) UL (ref 0–0.2)
BASOPHILS NFR BLD AUTO: 0.1 %
BUN BLD-MCNC: 9 MG/DL (ref 9–23)
BUN/CREAT SERPL: 11 (ref 10–20)
CALCIUM BLD-MCNC: 9.1 MG/DL (ref 8.7–10.4)
CHLORIDE SERPL-SCNC: 101 MMOL/L (ref 98–112)
CO2 SERPL-SCNC: 25 MMOL/L (ref 21–32)
CREAT BLD-MCNC: 0.82 MG/DL
DEPRECATED RDW RBC AUTO: 43.8 FL (ref 35.1–46.3)
EGFRCR SERPLBLD CKD-EPI 2021: 94 ML/MIN/1.73M2 (ref 60–?)
EOSINOPHIL # BLD AUTO: 0.01 X10(3) UL (ref 0–0.7)
EOSINOPHIL NFR BLD AUTO: 0.1 %
ERYTHROCYTE [DISTWIDTH] IN BLOOD BY AUTOMATED COUNT: 13.4 % (ref 11–15)
GLUCOSE BLD-MCNC: 118 MG/DL (ref 70–99)
HCT VFR BLD AUTO: 33.3 %
HGB BLD-MCNC: 11.5 G/DL
IMM GRANULOCYTES # BLD AUTO: 0.12 X10(3) UL (ref 0–1)
IMM GRANULOCYTES NFR BLD: 0.9 %
LYMPHOCYTES # BLD AUTO: 0.39 X10(3) UL (ref 1–4)
LYMPHOCYTES NFR BLD AUTO: 2.9 %
MCH RBC QN AUTO: 30.7 PG (ref 26–34)
MCHC RBC AUTO-ENTMCNC: 34.5 G/DL (ref 31–37)
MCV RBC AUTO: 88.8 FL
MONOCYTES # BLD AUTO: 0.9 X10(3) UL (ref 0.1–1)
MONOCYTES NFR BLD AUTO: 6.8 %
NEUTROPHILS # BLD AUTO: 11.85 X10 (3) UL (ref 1.5–7.7)
NEUTROPHILS # BLD AUTO: 11.85 X10(3) UL (ref 1.5–7.7)
NEUTROPHILS NFR BLD AUTO: 89.2 %
OSMOLALITY SERPL CALC.SUM OF ELEC: 276 MOSM/KG (ref 275–295)
PLATELET # BLD AUTO: 292 10(3)UL (ref 150–450)
POTASSIUM SERPL-SCNC: 4 MMOL/L (ref 3.5–5.1)
RBC # BLD AUTO: 3.75 X10(6)UL
SODIUM SERPL-SCNC: 133 MMOL/L (ref 136–145)
WBC # BLD AUTO: 13.3 X10(3) UL (ref 4–11)

## 2024-10-25 PROCEDURE — 99239 HOSP IP/OBS DSCHRG MGMT >30: CPT | Performed by: HOSPITALIST

## 2024-10-25 PROCEDURE — 99233 SBSQ HOSP IP/OBS HIGH 50: CPT | Performed by: INTERNAL MEDICINE

## 2024-10-25 PROCEDURE — 71045 X-RAY EXAM CHEST 1 VIEW: CPT | Performed by: INTERNAL MEDICINE

## 2024-10-25 RX ORDER — IPRATROPIUM BROMIDE AND ALBUTEROL SULFATE 2.5; .5 MG/3ML; MG/3ML
3 SOLUTION RESPIRATORY (INHALATION) 2 TIMES DAILY
Status: DISCONTINUED | OUTPATIENT
Start: 2024-10-25 | End: 2024-10-25

## 2024-10-25 RX ORDER — OXYCODONE HYDROCHLORIDE 5 MG/1
5 TABLET ORAL EVERY 4 HOURS PRN
Qty: 15 TABLET | Refills: 0 | Status: SHIPPED | OUTPATIENT
Start: 2024-10-25

## 2024-10-25 NOTE — PROGRESS NOTES
[facility]  part of [facility]    Progress Note    Alejandro Hanna Patient Status:  Inpatient    12/10/1955 MRN [id number]   Location [facility] 2W/SW Attending Ashley Hall,*   Hosp Day # 1 PCP Leonid Ford DO         Subjective:     Constitutional: Negative.    HENT:  Negative for congestion.    Eyes: Negative.    Respiratory: Negative.     Cardiovascular: Negative.    Gastrointestinal: Negative.    Neurological: Negative.    Psychiatric/Behavioral: Negative.       Doing very well  Up to the chair and comfortable on room air  Denied cough or dyspnea or wheezes or pain  Ambulatory  No abdominal pain  Positive flatus and tolerating diet  Objective:   Blood pressure 152/75, pulse 81, temperature 98.7 °F (37.1 °C), temperature source Temporal, resp. rate 23, height 5' 4\" (1.626 m), weight 106 lb 14.8 oz (48.5 kg), SpO2 96%.  Physical Exam  Constitutional:       General: He is not in acute distress.  HENT:      Head: Atraumatic.      Nose: Nose normal.      Mouth/Throat:      Mouth: Mucous membranes are moist.   Eyes:      General: No scleral icterus.  Cardiovascular:      Rate and Rhythm: Normal rate.      Heart sounds:      No gallop.   Pulmonary:      Effort: No respiratory distress.      Breath sounds: No stridor. No wheezing, rhonchi or rales.      Comments: Good air change to both lungs and clear bilaterally  Chest:      Chest wall: No tenderness.   Abdominal:      General: Abdomen is flat. Bowel sounds are normal. There is no distension.      Palpations: Abdomen is soft.      Tenderness: There is no guarding.   Musculoskeletal:      Cervical back: Normal range of motion.      Right lower leg: No edema.      Left lower leg: No edema.   Lymphadenopathy:      Cervical: No cervical adenopathy.   Skin:     General: Skin is dry.   Neurological:      General: No focal deficit present.      Mental Status: He is oriented to person, place, and time.         Results:   Lab  Results   Component Value Date    WBC 13.3 (H) 10/25/2024    HGB 11.5 (L) 10/25/2024    HCT 33.3 (L) 10/25/2024    .0 10/25/2024    CREATSERUM 0.82 10/25/2024    BUN 9 10/25/2024     (L) 10/25/2024    K 4.0 10/25/2024     10/25/2024    CO2 25.0 10/25/2024     (H) 10/25/2024    CA 9.1 10/25/2024    ALB 4.9 (H) 10/19/2024    ALKPHO 60 02/15/2024    BILT 0.4 02/15/2024    TP 7.7 02/15/2024    AST 22 02/15/2024    ALT 14 02/15/2024    PTT 28.1 03/05/2019    INR 0.93 03/05/2019    T4F 0.68 04/17/2018    TSH 2.049 10/19/2024    GGT 36 06/29/2023    PSA 0.98 10/27/2023    PHOS 3.4 10/19/2024    B12 656 06/29/2023       XR CHEST AP PORTABLE  (CPT=71045)    Result Date: 10/25/2024  CONCLUSION:  1. Small left apical pneumothorax measuring approximately 10% (18 mm). 2. Left thoracotomy changes with partial wedge resection left upper lobe with surgical sutures and atelectatic changes in the left suprahilar region.  Left-sided chest tube in place. 3. Extensive soft tissue emphysematous changes left hemithorax.    Dictated by (CST): Elmer Dias MD on 10/25/2024 at 7:44 AM     Finalized by (CST): Elmer Dias MD on 10/25/2024 at 7:49 AM               Assessment & Plan:      1- s/p VATS and wedge resection for WOODY nodule 1 x 0.6  cm  positive on PET   POD # 1 and doing very well and chest tube was pulled   Pain control / IS     2- Moderate COPD with FEV1 1.32 L 56 %   Stable and doing well on room air / good functional status   lifelong history of smoking  Continue with Trelegy at home     3- DVT prophylaxis   Heparin subcutaneous       Doing well / ok with me to discharge if ok with thoracic surgery .                 Ian Mathews MD  10/25/2024

## 2024-10-25 NOTE — DISCHARGE INSTRUCTIONS
Thoracic Surgery Post-Operative Appointment     Follow up appointment scheduled: with Dr. Maria on November 7th at 12:45pm located at the [facility].  Thank you.      Thoracic Surgery Discharge Instructions     Pain Management  You will be sent home with a prescription for pain medicine.  This will likely be a narcotic pain medicine such as Oxycodone or Norco (hydrocodone/acetaminophen).  If no contraindications, start with extra strength acetaminophen (Tylenol) or ibuprofen (Advil/Motrin) scheduled, and use narcotics for breakthrough pain. Ice packs can be helpful as well for surface level, skin incision pain.      - Take extra strength acetaminophen (Tylenol) 500 mg every 4 to 6 hours, as long as you have no known liver conditions.   - **Max dose for acetaminophen (Tylenol) is 4000 mg per day. If taking Norco, please note that each tab contains 325 mg of acetaminophen (Tylenol).  - Unless you have kidney problems, ibuprofen 600 mg every 6 hours can be used along with Tylenol for additional pain control.    Restrictions  Upon discharge home, do not get in an airplane or soak in a tub/pool until after your first follow up to see me in the office. You may shower, washing incisions gently with soap and water.    Other than that, no activity restrictions. You should attempt to be as active as possible. What ever you feel up to doing, you can do. Walking is strongly encouraged. You may drive (not within 4 hours of taking prescription pain medications).     No dietary restrictions. If taking prescription pain medications you may become constipated. Fluids, fiber and over the counter stool softeners are helpful for this.    Exercises  Do range of motion exercises twice a day with the arm on the side of your operation. The easiest is to \"walk\" your hand up the wall with your fingers. Eventually you should be able to get your arm straight up over your head.    You will be sent home with your breathing \"toys\"  -- like your incentive spirometer. Use this frequently at home. 10 times per hour while awake, if possible. If watching TV, use it at every commercial break.    Follow-up Appointment  Our office will reach out to you regarding a follow up appointment, if not already scheduled. Appointment will be approximately 1-2 weeks after discharge.     If you are experiencing any of these symptoms, please call our office at [phone]. Office hours are Monday through Friday, 8 am to 4:30 pm.  If you are calling the office after hours, please call the Thoracic Surgery On-Call number [phone], and state that you are a patient from [facility].      - Persistent fevers of 101.5 or greater  - Worsening pain  - Worsening shortness of breath  - Signs of infection in your wound such as drainage, worsening of redness, swelling or     pain.  - Anything else that concerns you.    Ok to go home when ok with others  Fu surg for all wound care and activity orders  Please send home with [facility] [place]     Medication List        START taking these medications      oxyCODONE 5 MG Tabs  Take 1 tablet (5 mg total) by mouth every 4 (four) hours as needed for Pain.            CHANGE how you take these medications      Fenofibrate 160 MG Tabs  Take 1 tablet (160 mg total) by mouth daily.  What changed: when to take this     Omeprazole 40 MG Cpdr  Take 1 capsule (40 mg total) by mouth daily.  What changed: when to take this            CONTINUE taking these medications      albuterol 108 (90 Base) MCG/ACT Aers  Commonly known as: Ventolin HFA  Inhale 2 puffs into the lungs every 6 (six) hours as needed for Wheezing.     atorvastatin 10 MG Tabs  Commonly known as: Lipitor  Take 1 tablet (10 mg total) by mouth daily.     CENTRUM SILVER 50+MEN OR     dutasteride 0.5 MG Caps  Commonly known as: Avodart     fluticasone propionate 50 MCG/ACT Susp  Commonly known as: Flonase  2 sprays by Nasal route daily.     fluticasone-salmeterol 100-50  MCG/ACT Aepb  Commonly known as: Advair Diskus     levothyroxine 88 MCG Tabs  Commonly known as: Levoxyl  Take 1 tablet (88 mcg total) by mouth before breakfast. For underactive thyroid.     Olmesartan Medoxomil 40 MG Tabs  Commonly known as: BENICAR  TAKE 1 TABLET (40 MG TOTAL) BY MOUTH DAILY FOR BLOOD PRESSURE               Where to Get Your Medications        These medications were sent to [place] [id number] [place] [facility] [place], [place] - [address] [phone], [phone]  [address], [facility] [address]      Phone: [phone]   oxyCODONE 5 MG Tabs

## 2024-10-25 NOTE — PLAN OF CARE
AxO x4, on RA this morning. L chest tube in place, continuous suction maintained with minimal output. Subcutaneous emphysema noted to the chest wall. VSS. Arterial line per MD order, barahona removed per protocol. Pain well managed. Up in chair this morning. All safety measures in place, frequent nursing rounds made.     Problem: Patient Centered Care  Goal: Patient preferences are identified and integrated in the patient's plan of care  Description: Interventions:  - What would you like us to know as we care for you?   - Provide timely, complete, and accurate information to patient/family  - Incorporate patient and family knowledge, values, beliefs, and cultural backgrounds into the planning and delivery of care  - Encourage patient/family to participate in care and decision-making at the level they choose  - Honor patient and family perspectives and choices  Outcome: Progressing     Problem: Patient/Family Goals  Goal: Patient/Family Long Term Goal  Description: Patient's Long Term Goal:     Interventions:  -   - See additional Care Plan goals for specific interventions  Outcome: Progressing  Goal: Patient/Family Short Term Goal  Description: Patient's Short Term Goal:     Interventions:   -   - See additional Care Plan goals for specific interventions  Outcome: Progressing     Problem: CARDIOVASCULAR - ADULT  Goal: Maintains optimal cardiac output and hemodynamic stability  Description: INTERVENTIONS:  - Monitor vital signs, rhythm, and trends  - Monitor for bleeding, hypotension and signs of decreased cardiac output  - Evaluate effectiveness of vasoactive medications to optimize hemodynamic stability  - Monitor arterial and/or venous puncture sites for bleeding and/or hematoma  - Assess quality of pulses, skin color and temperature  - Assess for signs of decreased coronary artery perfusion - ex. Angina  - Evaluate fluid balance, assess for edema, trend weights  Outcome: Progressing     Problem: RESPIRATORY -  ADULT  Goal: Achieves optimal ventilation and oxygenation  Description: INTERVENTIONS:  - Assess for changes in respiratory status  - Assess for changes in mentation and behavior  - Position to facilitate oxygenation and minimize respiratory effort  - Oxygen supplementation based on oxygen saturation or ABGs  - Provide Smoking Cessation handout, if applicable  - Encourage broncho-pulmonary hygiene including cough, deep breathe, Incentive Spirometry  - Assess the need for suctioning and perform as needed  - Assess and instruct to report SOB or any respiratory difficulty  - Respiratory Therapy support as indicated  - Manage/alleviate anxiety  - Monitor for signs/symptoms of CO2 retention  Outcome: Progressing     Problem: SKIN/TISSUE INTEGRITY - ADULT  Goal: Incision(s), wounds(s) or drain site(s) healing without S/S of infection  Description: INTERVENTIONS:  - Assess and document risk factors for pressure ulcer development  - Assess and document skin integrity  - Assess and document dressing/incision, wound bed, drain sites and surrounding tissue  - Implement wound care per orders  - Initiate isolation precautions as appropriate  - Initiate Pressure Ulcer prevention bundle as indicated  Outcome: Progressing

## 2024-10-25 NOTE — PLAN OF CARE
When over DC papers with patient, schedule meds and appointments.  Pain control medications. Incision site on left flank area dry intact, open to air but gave patient gauze and tape.  Problem: Patient Centered Care  Goal: Patient preferences are identified and integrated in the patient's plan of care  Description: Interventions:  - What would you like us to know as we care for you? smoker  - Provide timely, complete, and accurate information to patient/family  - Incorporate patient and family knowledge, values, beliefs, and cultural backgrounds into the planning and delivery of care  - Encourage patient/family to participate in care and decision-making at the level they choose  - Honor patient and family perspectives and choices  Outcome: Completed     Problem: Patient/Family Goals  Goal: Patient/Family Long Term Goal  Description: Patient's Long Term Goal: fu with specialist(s)    Interventions:  - fu appointments  - See additional Care Plan goals for specific interventions  Outcome: Completed  Goal: Patient/Family Short Term Goal  Description: Patient's Short Term Goal: wound healing    Interventions:   - shower until Nov 7th, no soaking in water such as bathing or swiming  - See additional Care Plan goals for specific interventions  Outcome: Completed     Problem: CARDIOVASCULAR - ADULT  Goal: Maintains optimal cardiac output and hemodynamic stability  Description: INTERVENTIONS:  - Monitor vital signs, rhythm, and trends  - Monitor for bleeding, hypotension and signs of decreased cardiac output  - Evaluate effectiveness of vasoactive medications to optimize hemodynamic stability  - Monitor arterial and/or venous puncture sites for bleeding and/or hematoma  - Assess quality of pulses, skin color and temperature  - Assess for signs of decreased coronary artery perfusion - ex. Angina  - Evaluate fluid balance, assess for edema, trend weights  Outcome: Completed     Problem: RESPIRATORY - ADULT  Goal: Achieves  optimal ventilation and oxygenation  Description: INTERVENTIONS:  - Assess for changes in respiratory status  - Assess for changes in mentation and behavior  - Position to facilitate oxygenation and minimize respiratory effort  - Oxygen supplementation based on oxygen saturation or ABGs  - Provide Smoking Cessation handout, if applicable  - Encourage broncho-pulmonary hygiene including cough, deep breathe, Incentive Spirometry  - Assess the need for suctioning and perform as needed  - Assess and instruct to report SOB or any respiratory difficulty  - Respiratory Therapy support as indicated  - Manage/alleviate anxiety  - Monitor for signs/symptoms of CO2 retention  Outcome: Completed     Problem: SKIN/TISSUE INTEGRITY - ADULT  Goal: Incision(s), wounds(s) or drain site(s) healing without S/S of infection  Description: INTERVENTIONS:  - Assess and document risk factors for pressure ulcer development  - Assess and document skin integrity  - Assess and document dressing/incision, wound bed, drain sites and surrounding tissue  - Implement wound care per orders  - Initiate isolation precautions as appropriate  - Initiate Pressure Ulcer prevention bundle as indicated  Outcome: Completed

## 2024-10-25 NOTE — DISCHARGE SUMMARY
Dc summary#[id number]  > 30 min spent on dc    Hospital Discharge Diagnoses: sp left lung nodule resection vats    Lace+ Score: 52  59-90 High Risk  29-58 Medium Risk  0-28   Low Risk.    TCM Follow-Up Recommendation:  LACE < 29: Low Risk of readmission after discharge from the hospital. No TCM follow-up needed.

## 2024-10-25 NOTE — PROGRESS NOTES
Thoracic Surgery Progress Note     Alejandro Hanna is a 68 year old male. MRN [id number]. Admitted 10/24/2024    SUBJECTIVE/24H EVENTS:     No acute events overnight. Feels great. Using IS and eager to walk in the halls. Tolerating a diet.       Objective:     VITALS:     Temp (24hrs), Av.2 °F (36.8 °C), Min:97.2 °F (36.2 °C), Max:98.7 °F (37.1 °C)   /75 (BP Location: Left arm)   Pulse 81   Temp 98.7 °F (37.1 °C) (Temporal)   Resp 23   Ht 162.6 cm (5' 4\")   Wt 106 lb 14.8 oz (48.5 kg)   SpO2 96%   BMI 18.35 kg/m²     EXAM:   General: well appearing male in no acute distress  Heart: regular rate and rhythm.   Lungs: Normal respiratory effort. Equal chest rise bilaterally  Incisions: c/d/i   Chest tube:   Air Leak: none  Output: serosanguineous   24 hour: 100 cc  Suction: yes  Abdomen: Soft, Non-tender, non-distended.  Extremities: No clubbing or cyanosis. No lateralizing weakness  Neuro: no focal defects  Psych:  oriented to person place and time, normal mood and affect      Intake/Output Summary (Last 24 hours) at 10/25/2024 0845  Last data filed at 10/25/2024 0614  Gross per 24 hour   Intake 593 ml   Output 1060 ml   Net -467 ml         MEDS:   ipratropium-albuterol  3 mL Nebulization BID    atorvastatin  10 mg Oral Daily    finasteride  5 mg Oral Daily    levothyroxine  88 mcg Oral Before breakfast    losartan  100 mg Oral Daily    pantoprazole  40 mg Oral QAM AC    heparin  5,000 Units Subcutaneous Q8H KIKO    acetaminophen  750 mg Intravenous Q6H    ketorolac  15 mg Intravenous Q6H    fluticasone-salmeterol  1 puff Inhalation BID    fluticasone propionate  2 spray Nasal Daily       LABS:  Lab Results   Component Value Date    WBC 13.3 10/25/2024    HGB 11.5 10/25/2024    HCT 33.3 10/25/2024    .0 10/25/2024    CREATSERUM 0.82 10/25/2024    BUN 9 10/25/2024     10/25/2024    K 4.0 10/25/2024     10/25/2024    CO2 25.0 10/25/2024     10/25/2024    CA 9.1 10/25/2024          Assessment/Plan:     68 year old male 1 day post op from left VATS upper lobe wedge resection and pleural biopsy.     -Chest tube removed.   -D/c fluids, d/c barahona, d/c art line.   -General diet.   -Transfer to floor.   -Pain control.   -Maintain saturations above 90%.  -Ambulate 3-4 times per day in the halls. Spend majority of day out of bed, in chair. Encouraged cough and IS use 10x hourly.     -Okay to discharge from thoracic surgery standpoint once cleared with other services. Follow up in 1-2 weeks.     Patient discussed with Dr. Maria.     JAMAR OrrC  Thoracic Surgery  Pager: [phone]

## 2024-10-25 NOTE — OPERATIVE REPORT
[facility]    PATIENT'S NAME: FRANCESCO NIELSON   ATTENDING PHYSICIAN: Jagdish Maria Jr., MD   OPERATING PHYSICIAN: Jagdish Maria Jr., MD   PATIENT ACCOUNT#:   [id number]    LOCATION:  [address] [place] [address]  MEDICAL RECORD #:   [id number]       YOB: 1955  ADMISSION DATE:       10/24/2024      OPERATION DATE:  10/24/2024    OPERATIVE REPORT    PREOPERATIVE DIAGNOSIS:  Left upper lobe nodule.  POSTOPERATIVE DIAGNOSIS:    1.   Left upper lobe lung cancer.  2.   Solitary pleural nodule.  PROCEDURE:    1.   Flexible bronchoscopy with therapeutic aspiration.  2.   Left video-assisted thoracoscopic exploration.  3.   Left upper lobe wedge resection.  4.   Mediastinal lymph node dissection.  5.   Pleural biopsy.  6.   Multilevel intercostal nerve block.    ASSISTANT:  Jeannine Saucedo PA-C    ANESTHESIA:  General dual-lumen endotracheal anesthesia.    ANESTHESIOLOGIST:  Toñito Dunn MD    INDICATIONS:  The patient is a 68-year-old man who had right upper lobe and left upper lobe lung nodules found on chest imaging.  PET scan showed the right upper lobe to be stable and with low PET avidity.  The left upper lobe had high PET avidity concerning for cancer.  He was brought to the operating room for resection.  He had borderline lung function for a lobectomy, and the position was not amenable to a segmentectomy as it was on the border between the upper division and the lingula.  Given a small-sized peripheral location, the patient and I agreed that a wedge resection was the best option.      SPECIMENS:  Left upper lobe wedge; level 5, 6, and 9 lymph nodes.    DRAINS:  A 24-Maori chest tube x1.    IMPLANTS:  None.    ESTIMATED BLOOD LOSS:  10 mL.    COMPLICATIONS:  None.    CONDITION:  Stable, to PACU.    FINDINGS:  The nodule was found in the expected location and were resected with good margins.  It was noted to be a carcinoma on frozen section.  There was 1 solitary nodule in the pleura, which  was biopsied as well and sent for permanent section.    OPERATIVE TECHNIQUE:  The patient was brought to the operating room, laid supine, underwent general dual-lumen endotracheal anesthesia.  I performed a flexible bronchoscopy.  The trachea, mainstem bronchi, lobar and segmental bronchi were examined bilaterally.  He had fair amount of thin secretions which I spent time suctioning out.  Of note, he also had an accessory bronchus medially that ended in a blind pouch.  There was no sign of endobronchial tumor.  He was then placed in right lateral decubitus position.  All pressure points were padded.  He was prepped and draped in sterile fashion.  Time-out was performed to confirm patient, side, and site of surgery.  I made a 2 cm incision in the inframammary crease midclavicular line into the chest bluntly.  Camera through this incision revealed I was safely in the chest space.  I then made 2 additional incisions, my standard incisions for left-sided chest exploration.  Gonzalez wound retractor was placed at the superior-most incision for removal of specimens.  I then began palpating the lung.  The only lesion appreciated was in the left upper lobe in the expected location.  I used several gold loads of the Ethicon 45 mm Irrigon stapler to wedge this out.  It was sent for frozen section.  While I was waiting, I did a multilevel intercostal nerve block, 60 mL of 0.25% Marcaine, 5 mL was injected in each interspace 2 through 9 under direct visualization with thoracoscope for postoperative pain control.  Frozen section came back as a carcinoma with margins of at least 1 cm.  Satisfied that we had gotten the correct spot, I began a lymph node dissection.  I first opened up the mediastinal pleura over the AP window and dissected out level 5 lymph nodes.  Working close in the direction of the aorta, I found level 6 lymph nodes, which were likewise bluntly dissected free and sent for pathology.  I left a Nu-Knit at this site  for additional hemostasis.  I dissected out the anterior hilum including the area between the superior and inferior veins.  We did not find any hilar lymph nodes.  I then took down the inferior pulmonary ligament using electrocautery and found a level 9 lymph node which was bluntly dissected free and sent for pathology.  I then dissected out the posterior aspect of the hilum, once again looking for hilar lymph nodes.  Once again, I did not see any.  Satisfied that I had done a thorough lymph node dissection, I then began looking for any bleeding.  No bleeding was seen.  I then placed a 24-Arabic chest tube posteriorly and secured it using 0 silk sutures.  Finally, I asked the anesthesiologist to re-inflate the lung.  It re-inflated well and filled the chest space.  I withdrew my camera and all instruments and closed remaining incisions in 3 layers with 2 layers of 2-0 Vicryl and one of 4-0 Monocryl.  Patient procedure well.  I was present for the entirety of the procedure.    Dictated By Jagdish Maria Jr., MD  d: 10/24/2024 15:28:46  t: 10/25/2024 03:13:49  Job [id number]  JLLARY/

## 2024-10-25 NOTE — PLAN OF CARE
Pt arrived from PACU, with Left side chest tube and SubQ Emphysema. Chest tube output 10cc, serosanguinous. On 2LNC,. VSS. Safety measures maintained. Call light within reach.     Problem: Patient Centered Care  Goal: Patient preferences are identified and integrated in the patient's plan of care  Description: Interventions:  - What would you like us to know as we care for you? Patient is motivated to get better and go home.   - Provide timely, complete, and accurate information to patient/family  - Incorporate patient and family knowledge, values, beliefs, and cultural backgrounds into the planning and delivery of care  - Encourage patient/family to participate in care and decision-making at the level they choose  - Honor patient and family perspectives and choices  Outcome: Progressing

## 2024-10-28 ENCOUNTER — PATIENT OUTREACH (OUTPATIENT)
Dept: CASE MANAGEMENT | Age: 69
End: 2024-10-28

## 2024-10-28 DIAGNOSIS — R91.8 MASS OF UPPER LOBE OF LEFT LUNG: ICD-10-CM

## 2024-10-28 DIAGNOSIS — Z02.9 ENCOUNTERS FOR UNSPECIFIED ADMINISTRATIVE PURPOSE: ICD-10-CM

## 2024-10-28 DIAGNOSIS — C34.12 CANCER OF UPPER LOBE OF LEFT LUNG (HCC): Primary | ICD-10-CM

## 2024-10-28 PROCEDURE — 1111F DSCHRG MED/CURRENT MED MERGE: CPT

## 2024-10-28 RX ORDER — FLUTICASONE FUROATE AND VILANTEROL TRIFENATATE 200; 25 UG/1; UG/1
1 POWDER RESPIRATORY (INHALATION) DAILY
COMMUNITY
Start: 2024-08-15

## 2024-10-28 NOTE — PROGRESS NOTES
Transitional Care Management   Discharge Date: 10/25/24  Contact Date: 10/28/2024    Assessment:  TCM Initial Assessment    General:  Assessment completed with: Patient  Patient Subjective: The patient reports doing well since hospital discharge. The patient did admit to rhinorrhea/worsened allergies and wheezing, but the patient denies any difficulty breathing, or chest pain. The patient rated the post-operative pain at 1/10. The patient has not needed any pain medication since hospital discharge. The patient stated, \"I have quit smoking.\" The patient reported incisions have been healing well and denies any redness, pain, edema, discharge/bleeding, foul odor or heat coming from the incision sites. The incisions were described as intact and soft to the touch. The patient also denies any malaise or fever. The patient reported chest tube incision site has been pink in color, but denies any changes since the hospital stay. The patient denies any bilateral calf edema, pain, warmth, redness or tenderness. The patient denies any dizziness/syncope, irregular heart beat, or sweats. The patient has not checked blood pressure since being home but denies any headache, pulsating in the ears, nosebleeds, tingling/numbness, or vision changes.  Chief Complaint: Status post lung resection    Hypertension.    COPD    Hyperlipidemia.    BPH.    Hypothyroidism  Verify patient name and  with patient/ caregiver: Yes    Hospital Stay/Discharge:  Tell me what you understand of why you were in the hospital or emergency department: I had lung surgery to remove a cancerous nodule.  Prior to leaving the hospital were your Discharge Instructions reviewed with you?: Yes  Did you receive a copy of your written Discharge Instructions?: Yes  Do you feel better or worse since you left the hospital or emergency department?: Better    Follow - Up Appointment:  Do you have a follow-up appointment?: Yes  Date: 24  Physician: Dr. Maria  Are  there any barriers to getting to your follow-up appointment?: No    Home Health/DME:  Prior to leaving the hospital was Home Health (HH) arranged for you?: N/A  Are HH needs identified by staff during the assessment?: No     Prior to leaving the hospital or emergency department was Durable Medical Equipment (DME), medical supplies, or infusions arranged for you?: Yes (incentive spirometer)  Have you received your DME/supplies/infusions?: Yes  Do you have questions about using your DME/supplies/infusions?: No     Medications/Diet:  Did any of your medications change, during or after your hospital stay or ED visit?: Yes  Do you have your new or updated medications?: Yes (The patient has not needed oxycodone since being home.)  Do you understand what your medications are for and possible side effects?: Yes  Are there any reasons that keep you from taking your medication as prescribed?: No  Any concerns about medication refills?: No    Were you given a different diet per your Discharge Instructions?: Yes  Diet Type: low sodium diet     Questions/Concerns:  Do you have any questions or concerns that have not been discussed?: No         Nursing Interventions:    Patient symptoms were assessed, education was completed for signs/symptoms to monitor, and reviewed when to contact providers versus go to the emergency department/call 911.   Reviewed all discharge instructions with a focus on post-operative directions including wound care and activity/airplane/soaking restrictions.   All medications were reviewed and educated on the importance of taking the medications as directed.   Appointments were reviewed and stressed the importance of a close follow up with providers.   An outreach was sent to the [facility] Scheduling Team.  Confirmed patient has DME (medical equipment) and understands proper use. (Incentive spirometer)  Nurse care manager reviewed the importance of continuing deep breathing exercises with the  incentive spirometer as directed. Nurse care manager reviewed recommendation for range of motion exercises.    A chronic care management referral was entered.  The patient verbalized understanding and will contact the office with any further questions or concerns.       Medication Review:   Current Outpatient Medications   Medication Sig Dispense Refill    oxyCODONE 5 MG Oral Tab Take 1 tablet (5 mg total) by mouth every 4 (four) hours as needed for Pain. 15 tablet 0    fluticasone-salmeterol 100-50 MCG/ACT Inhalation Aerosol Powder, Breath Activated Inhale 1 puff into the lungs one time.      levothyroxine (LEVOXYL) 88 MCG Oral Tab Take 1 tablet (88 mcg total) by mouth before breakfast. For underactive thyroid. 90 tablet 1    Fenofibrate 160 MG Oral Tab Take 1 tablet (160 mg total) by mouth daily. (Patient taking differently: Take 1 tablet (160 mg total) by mouth every morning.) 90 tablet 3    fluticasone propionate 50 MCG/ACT Nasal Suspension 2 sprays by Nasal route daily. 48 mL 3    atorvastatin 10 MG Oral Tab Take 1 tablet (10 mg total) by mouth daily. 90 tablet 3    Omeprazole 40 MG Oral Capsule Delayed Release Take 1 capsule (40 mg total) by mouth daily. (Patient taking differently: Take 1 capsule (40 mg total) by mouth every morning.) 90 capsule 3    Olmesartan Medoxomil 40 MG Oral Tab TAKE 1 TABLET (40 MG TOTAL) BY MOUTH DAILY FOR BLOOD PRESSURE 90 tablet 3    albuterol (VENTOLIN HFA) 108 (90 Base) MCG/ACT Inhalation Aero Soln Inhale 2 puffs into the lungs every 6 (six) hours as needed for Wheezing. 54 each 5    Multiple Vitamins-Minerals (CENTRUM SILVER 50+MEN OR) Take by mouth.      Dutasteride 0.5 MG Oral Cap Take 1 capsule (0.5 mg total) by mouth every morning.       Did patient review medications using current pill bottles and not just a medication list?  Yes  Discharge medications reviewed/discussed/and reconciled against outpatient medications with patient.  Any changes or updates to medications sent  to primary care provider.  Patient Acknowledged    SDOH:   Transportation:   Transportation Needs: No Transportation Needs (10/28/2024)    Transportation Needs     Lack of Transportation: No     Car Seat: Not on file       Financial strain:   Financial Resource Strain: Low Risk  (10/28/2024)    Financial Resource Strain     Difficulty of Paying Living Expenses: Not hard at all     Med Affordability: No   Diagnosis specifics:  COPD:  We reviewed your medications/inhalers, how often are you using your inhaler? The patient reported using the albuterol inhaler twice daily and the Breo inhaler daily as prescribed.   Are you currently on oxygen?no  Are you familiar with the signs and symptoms of worsening COPD?Yes   Who do you call with worsening COPD symptoms? Dr. Mathews   Do you know when to call with COPD symptoms? Yes   Do you have any of the following potential risk factors for COPD in your home environment?  Primary or secondary tobacco smoke   no; The patient reported quitting smoking.   Occupational dusts and chemicals organic and inorganic    no  Indoor air pollution from heating and cooking with poor ventilation   no    Mold      no    Pets        no    Extreme heat no         Follow-up Appointments:  Your appointments       Date & Time Appointment Department (Center)    Oct 29, 2024 10:00 AM CDT Hospital Follow Up with Leonid Ford DO [facility] ([facility])        Nov 07, 2024 12:45 PM CST HEMATOLOGY ONCOLOGY FOLLOW UP with ELIZABETH RODRIGUES [facility] Hematology Oncology ([facility])        Dec 30, 2024 9:10 AM CST Follow Up Visit with Sierra Encarnacion DPM [facility] ([facility])        Jan 17, 2025 1:15 PM CST Follow Up Visit with Ian Mathews MD [facility], [place] ([facility] [place]) [facility] Hematology  Oncology  [facility]  [address]  [facility] [address]  [phone] [facility]  [address]  [facility] [id number] [phone] [facility]  [address]  [facility] [id number] [phone]    [facility], [place] [facility] [place]  [address]  [place] [id number] [phone]            Transitional [facility] Clinic  Was TCC Ordered: No      Primary Care Provider (If no TCC appointment)  Does patient already have a PCP appointment scheduled? Yes; The patient is scheduled with Dr. Ford on 10/29/2024.   Nurse Care Manager Confirmed PCP office TCM appointment with patient      Specialist  Does the patient have any other follow-up appointment(s) that need to be scheduled? Yes   -If yes: Nurse Care Manager reviewed upcoming specialist appointments with patient: Yes; The patient is scheduled with Dr. Maria on 11/7/2024.    -Does the patient need assistance scheduling appointment(s): Yes, message sent to TST team; An outreach was sent for pulmonology.     CCM referral placed:  No    Book By Date: 11/1/2024

## 2024-10-28 NOTE — DISCHARGE SUMMARY
[facility]    PATIENT'S NAME: FRANCESCO NIELSON   ATTENDING PHYSICIAN: Ashley Hall MD   PATIENT ACCOUNT#:   [id number]    LOCATION:  [address] [place] [address]  MEDICAL RECORD #:   [id number]       YOB: 1955  ADMISSION DATE:       10/24/2024      DISCHARGE DATE:  10/25/2024    DISCHARGE SUMMARY      About 35 minutes were spent preparing this discharge.    DISCHARGE DIAGNOSIS:  Status post VATS of left lung nodule.    HISTORY AND HOSPITAL COURSE:  This is a very pleasant 68-year-old white male who presents with a history of a lung nodule.  He was electively admitted by Dr. Maria and had a VATS resection of a probable left upper lobe lung cancer and a solitary pleural nodule.  He did very well postoperatively.  His chest tube was removed.  Pulmonary and Surgery cleared him and he felt that he had strong help at home who was able to assist him and he was discharged.    PHYSICAL EXAMINATION ON DISCHARGE:    VITAL SIGNS:  Temperature 97.1, pulse 86, respirations 17, O2 saturation 96%, blood pressure 152/75.  LUNGS:  Occasional rhonchi.  HEART:  Normal S1, S2.  No S3.  ABDOMEN:  Soft.  EXTREMITIES:  Without calf tenderness.  NEUROLOGIC:  Alert and oriented, friendly and cooperative.    LABORATORY STUDIES:  Please see chart.    ASSESSMENT AND PLAN:    1.   Status post lung resection.  The patient is doing well.  The patient's pathology for this lesion was suspicious for malignancy of the left upper lobe.  He will follow up with Dr. Maria.  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  2.   Hypertension.  3.   COPD.  Continue oxygen as needed.  4.   Hyperlipidemia.  5.   BPH.  6.   Hypothyroidism.  The patient's TSH was 2.049 on October 19.    CONDITION ON DISCHARGE:  Stable.    CODE STATUS:  Not discussed during this hospitalization.  Full code per records.    DISCHARGE MEDICATIONS:    1.   Ventolin 2 puffs every 6 hours as  needed wheezing.  2.   Lipitor 200 mg nightly.  Watch for weakness.  3.   Dutasteride 0.5 mg every morning.  4.   Fenofibrate 160 mg daily.  5.   Fluticasone 2 sprays each nostril daily.  6.   Fluticasone/salmeterol 100/50 one puff daily.  7.   Levothyroxine 88 mcg daily.  8.   Multivitamin once a day.  9.   Olmesartan 40 mg daily.  10.   Omeprazole 40 mg every morning.  11.   Oxycodone 5 mg every 4 hours as needed for pain.  Watch drowsiness.  No alcohol.     DISCHARGE INSTRUCTIONS:  Activity is no heavy exercise until cleared by Dr. Maria.  Diet is low salt.  Followup is with Dr. Maria in 1 week and for all wound care and activity orders.  Follow up with Dr. Ford on Monday for followup advice.  Follow up with Dr. Mathews in 1 week.    RISK OF READMISSION:  Low.  TCM followup is not recommended.    Dictated By Ashley Hall MD  d: 10/25/2024 17:34:05  t: 10/28/2024 08:51:09  [facility] [id number]  [facility]/

## 2024-10-28 NOTE — PROGRESS NOTES
Voicemail received; Patient returning call  Called patient back    TCM request (discharged 10/25)     Dr Ian Mathews  Pulmonary Disease; Critical Care Medicine  [address] [place], [place] [id number] [phone]  Apt made:  Mon 12/09 @9:45am - [facility] & on waitlist for [place]  Confirmed w/pt  Closing encounter

## 2024-10-28 NOTE — PROGRESS NOTES
TCM request (discharged 10/25)    Dr Ian Mathews  Pulmonary Disease; Critical Care Medicine  [address] [place] [address]  [phone]    Attempt #1:  Left message on voicemail for patient to call transitions specialist back to schedule follow up appointments. Provided Transitions specialist scheduling phone number [phone].

## 2024-10-28 NOTE — PROGRESS NOTES
TCM has contacted patient and patient needs additional appointments.  Please contact patient for assistance with scheduling a follow-up appointment for COPD/Pneumonia.     The patient was recommended to have a 1 week hospital follow up appointment with Dr. Mathews.       [facility] [address] [place] [address] [phone]    The patient is requesting a morning appointment at the [place] office. Thank you!

## 2024-10-29 ENCOUNTER — OFFICE VISIT (OUTPATIENT)
Dept: FAMILY MEDICINE CLINIC | Facility: CLINIC | Age: 69
End: 2024-10-29
Payer: MEDICARE

## 2024-10-29 VITALS
WEIGHT: 111 LBS | HEIGHT: 64 IN | BODY MASS INDEX: 18.95 KG/M2 | TEMPERATURE: 97 F | DIASTOLIC BLOOD PRESSURE: 60 MMHG | SYSTOLIC BLOOD PRESSURE: 121 MMHG | HEART RATE: 99 BPM

## 2024-10-29 DIAGNOSIS — E03.9 ACQUIRED HYPOTHYROIDISM: ICD-10-CM

## 2024-10-29 DIAGNOSIS — Z23 NEED FOR VACCINATION: ICD-10-CM

## 2024-10-29 DIAGNOSIS — E87.1 HYPONATREMIA: ICD-10-CM

## 2024-10-29 DIAGNOSIS — I10 ESSENTIAL HYPERTENSION, BENIGN: ICD-10-CM

## 2024-10-29 DIAGNOSIS — C34.12 CANCER OF UPPER LOBE OF LEFT LUNG (HCC): Primary | ICD-10-CM

## 2024-10-29 PROCEDURE — 90662 IIV NO PRSV INCREASED AG IM: CPT | Performed by: FAMILY MEDICINE

## 2024-10-29 PROCEDURE — G0008 ADMIN INFLUENZA VIRUS VAC: HCPCS | Performed by: FAMILY MEDICINE

## 2024-10-29 PROCEDURE — 99496 TRANSJ CARE MGMT HIGH F2F 7D: CPT | Performed by: FAMILY MEDICINE

## 2024-10-29 NOTE — PROGRESS NOTES
Subjective:   Alejandro Hanna is a 68 year old male who presents for hospital follow up.   He was discharged from [facility] to Home or Self Care  Admission Date: 10/24/24   Discharge Date: 10/25/24  Hospital Discharge Diagnosis: Status post VATS of left lung nodule    Interactive contact within 2 business days post discharge first initiated on Date: 10/28/2024    During the visit, the following was completed:  Obtained and reviewed discharge summary, continuity of care documents, and Hospitalist notes  Reviewed Labs (CBC, CMP)    HPI:     Last seen on 08/02/2024.      Pt was discharged from [facility] on 10/25/2024 for a VATS procedure for a cancerous left lung nodule. H states that he is feeling good but is still sore at the incision site. He has an oncology appointment on 11/07/2024 and has a pulmonologist appointment on 12/09/2024; they are trying to get him an earlier appointment. He drove himself here today. [place] the health club [place], he walked 4 laps and got tired and was unable to do leg machines. I discussed that he is over doing it with him. He does not have oxygen at home. Pt hasn't had a cigarette since since his surgery. Dr. Maria, cardiothoracic surgeon, will remove his suture at the site of the chest tube removal.  Moved from bother me at all.     I reviewed pt's labs and discussed his normalized thyroid levels with him. He regularly consults with nephrology and had a kidney panel on 10/25. I reviewed the labs.    Pt will receive his flu shot today.     History/Other:   Current Medications:  Medication Reconciliation:  I am aware of an inpatient discharge within the last 30 days.  The discharge medication list has been reconciled with the patient's current medication list and reviewed by me.  See medication list for additions of new medication, and changes to current doses of medications and discontinued medications.  Outpatient Medications Marked as Taking for the  10/29/24 encounter (Office Visit) with Leonid Ford DO   Medication Sig    BREO ELLIPTA 200-25 MCG/ACT Inhalation Aerosol Powder, Breath Activated Inhale 1 puff into the lungs daily.    levothyroxine (LEVOXYL) 88 MCG Oral Tab Take 1 tablet (88 mcg total) by mouth before breakfast. For underactive thyroid.    Fenofibrate 160 MG Oral Tab Take 1 tablet (160 mg total) by mouth daily.    fluticasone propionate 50 MCG/ACT Nasal Suspension 2 sprays by Nasal route daily.    atorvastatin 10 MG Oral Tab Take 1 tablet (10 mg total) by mouth daily.    Omeprazole 40 MG Oral Capsule Delayed Release Take 1 capsule (40 mg total) by mouth daily.    Olmesartan Medoxomil 40 MG Oral Tab TAKE 1 TABLET (40 MG TOTAL) BY MOUTH DAILY FOR BLOOD PRESSURE    albuterol (VENTOLIN HFA) 108 (90 Base) MCG/ACT Inhalation Aero Soln Inhale 2 puffs into the lungs every 6 (six) hours as needed for Wheezing.    Multiple Vitamins-Minerals (CENTRUM SILVER 50+MEN OR) Take by mouth.    Dutasteride 0.5 MG Oral Cap Take 1 capsule (0.5 mg total) by mouth every morning.       Review of Systems:  He has no chest pain.  He does not feel very short of breath.  He will get a bit tired if he overexerts himself but nothing that does not resolve once he rests.  He is not having any fevers or cough.  There is no leg swelling.    Objective:   No LMP for male patient.  Estimated body mass index is 19.05 kg/m² as calculated from the following:    Height as of this encounter: 5' 4\" (1.626 m).    Weight as of this encounter: 111 lb.   /84   Pulse 99   Temp 97 °F (36.1 °C) (Temporal)   Ht 5' 4\" (1.626 m)   Wt 111 lb   BMI 19.05 kg/m²      Vitals:    10/29/24 0942 10/29/24 0954   BP: 145/84 121/60   Pulse: 99    Temp: 97 °F (36.1 °C)    TempSrc: Temporal    Weight: 111 lb    Height: 5' 4\" (1.626 m)         GENERAL: Thin male in no apparent distress  HEENT: atraumatic, normocephalic  EYES: PERRLA, EOMI, conjunctiva are clear  NECK: supple, no adenopathy, no  bruits  CHEST: no chest tenderness. Site of the chest tube insertion on the left thorax is healing well, without infection.   LUNGS: clear to auscultation. Decreased air entry throughout due to years of smoking and COPD.   CARDIO: RRR without murmurs  EXTREMITIES: no edema of the legs  NEURO: Alert and oriented times three, cranial nerves are intact, motor and sensory are grossly intact    Assessment & Plan:   Diagnoses and all orders for this visit:    Cancer of upper lobe of left lung (HCC)  TCM done.  He is doing quite well.  He has specialist all lined up.  Need for vaccination  -     INFLUENZA VAC HIGH DOSE PRSV FREE    Acquired hypothyroidism  Controlled and will continue same dose of medication  Hyponatremia  Mild and follows with nephrology  Essential hypertension, benign  Nicely controlled and will continue present management      Referrals (if applicable)  No orders of the defined types were placed in this encounter.        Follow up if symptoms persist.  Take medicine (if given) as prescribed.  Approach to treatment discussed and patient/family member understands and agrees to plan.     Return in about 3 months (around 2/5/2025) for Medicare Physical.        Return in about 3 months (around 2/5/2025) for Medicare Physical.      By signing my name below, ISarah,  attest that this documentation has been prepared under the direction and in the presence of Leonid Ford DO.   Electronically Signed: Sarah Richardson, 10/29/2024, 10:08 AM.     I, Leonid Ford DO,  personally performed the services described in this documentation. All medical record entries made by the scribe were at my direction and in my presence.  I have reviewed the chart and discharge instructions (if applicable) and agree that the record reflects my personal performance and is accurate and complete.  Leonid Ford DO, 10/29/2024, 11:04 AM

## 2024-11-01 ENCOUNTER — TELEPHONE (OUTPATIENT)
Dept: HEMATOLOGY/ONCOLOGY | Facility: HOSPITAL | Age: 69
End: 2024-11-01

## 2024-11-01 ENCOUNTER — OFFICE VISIT (OUTPATIENT)
Dept: PULMONOLOGY | Facility: CLINIC | Age: 69
End: 2024-11-01
Payer: MEDICARE

## 2024-11-01 VITALS
WEIGHT: 113 LBS | HEART RATE: 89 BPM | BODY MASS INDEX: 19.29 KG/M2 | DIASTOLIC BLOOD PRESSURE: 62 MMHG | SYSTOLIC BLOOD PRESSURE: 115 MMHG | OXYGEN SATURATION: 94 % | HEIGHT: 64 IN

## 2024-11-01 DIAGNOSIS — C34.12 MALIGNANT NEOPLASM OF UPPER LOBE OF LEFT LUNG (HCC): Primary | ICD-10-CM

## 2024-11-01 DIAGNOSIS — J44.9 CHRONIC OBSTRUCTIVE PULMONARY DISEASE, UNSPECIFIED COPD TYPE (HCC): ICD-10-CM

## 2024-11-01 PROCEDURE — 99214 OFFICE O/P EST MOD 30 MIN: CPT | Performed by: INTERNAL MEDICINE

## 2024-11-01 NOTE — TELEPHONE ENCOUNTER
Spoke to spouse regarding apt with Dr Damian for 11/7/24 at 130 pm after apt with Dr Maria.  She verbalizes understanding.

## 2024-11-01 NOTE — PROGRESS NOTES
Subjective:   Patient ID: Alejandro Hanna is a 68 year old male.    HPI  Doing very well overall no chest pain or dyspnea with no active cough or wheezes.  Very active with mild fatigue after surgery with no fever or chills with good appetite and trying to build up some strength with Ensure    History/Other:   Review of Systems   Constitutional: Negative.    HENT: Negative.     Respiratory: Negative.     Cardiovascular: Negative.    Gastrointestinal: Negative.    Musculoskeletal: Negative.    Skin: Negative.    Neurological: Negative.    Hematological: Negative.    Psychiatric/Behavioral: Negative.       Current Outpatient Medications   Medication Sig Dispense Refill    BREO ELLIPTA 200-25 MCG/ACT Inhalation Aerosol Powder, Breath Activated Inhale 1 puff into the lungs daily.      levothyroxine (LEVOXYL) 88 MCG Oral Tab Take 1 tablet (88 mcg total) by mouth before breakfast. For underactive thyroid. 90 tablet 1    Fenofibrate 160 MG Oral Tab Take 1 tablet (160 mg total) by mouth daily. 90 tablet 3    fluticasone propionate 50 MCG/ACT Nasal Suspension 2 sprays by Nasal route daily. 48 mL 3    atorvastatin 10 MG Oral Tab Take 1 tablet (10 mg total) by mouth daily. 90 tablet 3    Omeprazole 40 MG Oral Capsule Delayed Release Take 1 capsule (40 mg total) by mouth daily. 90 capsule 3    Olmesartan Medoxomil 40 MG Oral Tab TAKE 1 TABLET (40 MG TOTAL) BY MOUTH DAILY FOR BLOOD PRESSURE 90 tablet 3    albuterol (VENTOLIN HFA) 108 (90 Base) MCG/ACT Inhalation Aero Soln Inhale 2 puffs into the lungs every 6 (six) hours as needed for Wheezing. 54 each 5    Multiple Vitamins-Minerals (CENTRUM SILVER 50+MEN OR) Take by mouth.      Dutasteride 0.5 MG Oral Cap Take 1 capsule (0.5 mg total) by mouth every morning.      oxyCODONE 5 MG Oral Tab Take 1 tablet (5 mg total) by mouth every 4 (four) hours as needed for Pain. (Patient not taking: Reported on 10/28/2024) 15 tablet 0     Allergies:Allergies[1]    Objective:   Physical  Exam  Constitutional:       General: He is not in acute distress.  HENT:      Head: Normocephalic and atraumatic.      Nose: Nose normal.      Mouth/Throat:      Mouth: Mucous membranes are moist.   Eyes:      General: No scleral icterus.     Pupils: Pupils are equal, round, and reactive to light.   Cardiovascular:      Rate and Rhythm: Normal rate.      Heart sounds: No murmur heard.     No gallop.   Pulmonary:      Effort: No respiratory distress.      Breath sounds: No stridor. No wheezing, rhonchi or rales.   Chest:      Chest wall: No tenderness.   Abdominal:      General: Abdomen is flat. Bowel sounds are normal. There is no distension.      Palpations: Abdomen is soft.   Musculoskeletal:      Cervical back: Normal range of motion.      Right lower leg: No edema.      Left lower leg: No edema.   Skin:     General: Skin is dry.   Neurological:      Mental Status: He is oriented to person, place, and time.         Assessment & Plan:   1. Malignant neoplasm of upper lobe of left lung (HCC)    2. Chronic obstructive pulmonary disease, unspecified COPD type (HCC)        1-lung cancer WOODY 1 cm mass   s/p VATS and wedge resection on 10/24/24   T2N0M0 (poorly differentiated squamous cell carcinoma )    Doing very well overall  Follow-up chest CT in 6 months  Follow-up with oncology     2- Moderate COPD with FEV1 1.32 L 56 %   Stable and doing well on room air / good functional status   lifelong history of smoking /recently prior VATS surgery  Continue with the Breo and albuterol     F/u in 6 months        Meds This Visit:  Requested Prescriptions      No prescriptions requested or ordered in this encounter       Imaging & Referrals:  ONCOLOGY/HEMATOLOGY - INTERNAL  CT CHEST (CPT=71250)         [1] No Known Allergies

## 2024-11-07 ENCOUNTER — OFFICE VISIT (OUTPATIENT)
Dept: HEMATOLOGY/ONCOLOGY | Facility: HOSPITAL | Age: 69
End: 2024-11-07
Attending: THORACIC SURGERY (CARDIOTHORACIC VASCULAR SURGERY)
Payer: MEDICARE

## 2024-11-07 VITALS
DIASTOLIC BLOOD PRESSURE: 77 MMHG | TEMPERATURE: 98 F | WEIGHT: 109 LBS | HEART RATE: 91 BPM | HEIGHT: 64 IN | SYSTOLIC BLOOD PRESSURE: 128 MMHG | OXYGEN SATURATION: 95 % | RESPIRATION RATE: 16 BRPM | BODY MASS INDEX: 18.61 KG/M2

## 2024-11-07 VITALS
DIASTOLIC BLOOD PRESSURE: 77 MMHG | RESPIRATION RATE: 16 BRPM | BODY MASS INDEX: 18.61 KG/M2 | TEMPERATURE: 98 F | HEART RATE: 91 BPM | OXYGEN SATURATION: 95 % | WEIGHT: 109 LBS | HEIGHT: 64 IN | SYSTOLIC BLOOD PRESSURE: 128 MMHG

## 2024-11-07 DIAGNOSIS — Z90.2 S/P LOBECTOMY OF LUNG: ICD-10-CM

## 2024-11-07 DIAGNOSIS — C34.12 SQUAMOUS CELL CARCINOMA OF UPPER LOBE OF LEFT LUNG (HCC): Primary | ICD-10-CM

## 2024-11-07 PROCEDURE — 99211 OFF/OP EST MAY X REQ PHY/QHP: CPT

## 2024-11-07 PROCEDURE — 36415 COLL VENOUS BLD VENIPUNCTURE: CPT

## 2024-11-07 NOTE — PROGRESS NOTES
[facility] Post-op Visit    Mr. Hanna is a 68 year old male who presents today in follow up after a left VATS upper lobe wedge resection and pleural biopsy performed on 10/24/24 for an enlarging left upper lobe nodule. He is doing well. He complains of nothing. He denies fevers or chills. He has an occasional cough. He quit smoking. No hemoptysis. No worsening shortness of breath. No worsening pain, swelling, or drainage from wounds. He has been staying active and using incentive spirometry at home.    PMH:  Past Medical History:    Allergic rhinitis    Flonase    Bladder cancer (HCC)    f/u [facility]    BPH (benign prostatic hyperplasia)    COPD (HCC)    moderate-severe     Disorder of kidney and ureter    Disorder of thyroid    hypothyroid    ED (erectile dysfunction)    Esophageal reflux    Essential hypertension    Hearing impairment    Hepatomegaly    Hiatal hernia    High blood pressure    High cholesterol    Hypothyroidism    Injury    Left knee injury, from fall    Osteoarthritis    in knee's, hip's    Pulmonary nodules    first noted 2016on LD screen CT    Tobacco abuse    Visual impairment    glasses       Meds:    Current Outpatient Medications:     BREO ELLIPTA 200-25 MCG/ACT Inhalation Aerosol Powder, Breath Activated, Inhale 1 puff into the lungs daily., Disp: , Rfl:     levothyroxine (LEVOXYL) 88 MCG Oral Tab, Take 1 tablet (88 mcg total) by mouth before breakfast. For underactive thyroid., Disp: 90 tablet, Rfl: 1    Fenofibrate 160 MG Oral Tab, Take 1 tablet (160 mg total) by mouth daily., Disp: 90 tablet, Rfl: 3    fluticasone propionate 50 MCG/ACT Nasal Suspension, 2 sprays by Nasal route daily., Disp: 48 mL, Rfl: 3    atorvastatin 10 MG Oral Tab, Take 1 tablet (10 mg total) by mouth daily., Disp: 90 tablet, Rfl: 3    Omeprazole 40 MG Oral Capsule Delayed Release, Take 1 capsule (40 mg total) by mouth daily., Disp: 90 capsule, Rfl: 3    Olmesartan Medoxomil 40 MG Oral Tab, TAKE 1 TABLET  (40 MG TOTAL) BY MOUTH DAILY FOR BLOOD PRESSURE, Disp: 90 tablet, Rfl: 3    albuterol (VENTOLIN HFA) 108 (90 Base) MCG/ACT Inhalation Aero Soln, Inhale 2 puffs into the lungs every 6 (six) hours as needed for Wheezing., Disp: 54 each, Rfl: 5    Multiple Vitamins-Minerals (CENTRUM SILVER 50+MEN OR), Take by mouth., Disp: , Rfl:     Dutasteride 0.5 MG Oral Cap, Take 1 capsule (0.5 mg total) by mouth every morning., Disp: , Rfl:     Vital Signs:    /77 (BP Location: Right arm, Patient Position: Sitting, Cuff Size: adult)   Pulse 91   Temp 98.2 °F (36.8 °C) (Oral)   Resp 16   Ht 1.626 m (5' 4\")   Wt 49.4 kg (109 lb)   SpO2 95%   BMI 18.71 kg/m²     Physical Exam:    General: well appearing male in no acute distress  HEENT: Normocephalic, PERRL, EOMI  Heart: regular rate and rhythm. No murmurs, rubs or gallops. No lower extremity edema.  Lungs: Normal respiratory effort. Clear to ascultation bilaterally.  Chest: incisions are clean, dry and intact. No signs of infection. Chest tube stitch removed.  Abdomen: Soft, Non-tender, non-distended. No hepatosplenomegaly noted.  Extremities: No clubbing or cyanosis. No lateralizing weakness  Neuro: No gross cranial nerve defects, no loss of sensation  Psych: oriented to person place and time, normal mood and affect    Pathology:  Final Diagnosis:      A.  Upper lobe, left lung; video-assisted partial resection:  Portion of lung parenchyma with overlying visceral pleura demonstrates infiltrating moderately-poorly differentiated nonkeratinizing squamous cell carcinoma (1.5 cm in maximum dimension) with prominent areas of tumor necrosis.  All inked approximated parenchymal specimen margins are free of malignancy with the tumor showing a closest approach of 1.0 cm from the inked parenchymal specimen margin focally.  Tumor shows focal extension into the overlying visceral pleura and is 0.1 mm from the inked visceral pleural surface (PL-1).  Tumor demonstrates multifocal  lymphovascular invasion.  Lung parenchyma uninvolved by tumor demonstrates areas of mild subpleural fibrosis, multifocal emphysematous changes, few aggregates of anthracotic pigment-laden histiocytes, diffuse vascular/alveolar capillary congestion, and scattered mild chronic interstitial inflammatory infiltrates.  Pathologic TNM: pT2, N0 (See Lung Cancer Case Summary)     B.  Left pleural nodule; biopsy:  Portion of vascular fibroconnective tissue, fibroadipose tissue, and focal skeletal muscle shows no significant pathologic abnormalities.     C.  Subaortic lymph nodes, level 5; dissection:  Two lymph nodes demonstrate no evidence of primary or metastatic malignant infiltrates (0/2).     D.  Para-aortic lymph node, level 6; dissection:  Lymph node demonstrates no evidence of primary or metastatic malignant infiltrates (0/1).     E.  Pulmonary ligament lymph node, level 9; dissection:  Lymph node demonstrates no evidence of primary or metastatic malignant infiltrates (0/1).       Assessment:    Mr. Hanna is a 68 year old male who presents today in follow up after a left VATS upper lobe wedge resection and pleural biopsy performed on October 24th, 2024 for an enlarging left upper lobe nodule. Final pathology revealed T2aN0, stage IB squamous cell carcinoma. He is doing quite well. Mr. Hanna has no apparent complications from the surgery. We will continue to follow him as needed in the postoperative period. Long term surveillance will be with Dr. Mathews or oncolcogy.  He has no restriction and we have encouraged an active lifestyle.     Plan:  Encouraged continued exercise and incentive spirometer use.  No activity restrictions  Follow up with Dr. Mathews   Mr. Hanna should follow up with thoracic surgery on an as needed basis. He is encouraged to call with any further questions or concerns.     Jagdish Maria M.D.  Thoracic Surgery  Pager: [phone]

## 2024-11-07 NOTE — CONSULTS
[facility]  Initial Consult Note    Alejandro Hanna Patient Status:  Medical Oncology Series    12/10/1955 MRN [id number]   Location [facility] HEMATOLOGY ONCOLOGY Attending Carlos Damian MD   Hosp Day # 0 PCP Leonid Ford DO     Reason for consultation: lung cancer    History of Present Illness  Alejandro Hanna is a 68 year old male for recently diagnosed squamous cell carcinoma of the left lung.  His oncologic history is detailed below.    24 patient has long history of tobacco use.  Screening CT chest showed enlarging left upper lobe nodule measuring 1 x 1 cm.  There are also subcentimeter right and left lung nodules    2024 PET/CT fusion study showed markedly elevated metabolic activity of the left upper lobe nodule measuring 8.79.  Nodule in the right upper lobe had decreased in size and has minimal activity.  No evidence of metastatic disease in the chest abdomen pelvis or mediastinal hilar lymphadenopathy was described.    10/25/2024 the patient saw thoracic surgery and underwent video-assisted partial resection of the left upper lobe that showed a 1.5 cm nonkeratinizing poorly-defined squamous cell carcinoma with negative margins.  There is focal extension the overlying visceral pleura along with multifocal lymphovascular invasion.  A total of 5 lymph nodes were removed none of which were involved with malignancy final pathologic stage T2 N0.    He has made a good recovery from surgery is referred here for discussion regarding adjuvant systemic therapy.  He has a nearly 57-pack-year history of smoking but quit 2 weeks ago.  Has some cravings.      Review of Systems:  Pt denies fevers, chills, night sweats, HA, vision changes, CP, SOB, cough, n/v/d, abd pain, urinary or bowel complaints  Hematology/Oncology ROS performed and negative except as above in HPI    History/Other:   Past Medical History:  Past Medical History:    Allergic rhinitis     Flonase    Bladder cancer (HCC)    f/u [facility]    BPH (benign prostatic hyperplasia)    COPD (HCC)    moderate-severe     Disorder of kidney and ureter    Disorder of thyroid    hypothyroid    ED (erectile dysfunction)    Esophageal reflux    Essential hypertension    Hearing impairment    Hepatomegaly    Hiatal hernia    High blood pressure    High cholesterol    Hypothyroidism    Injury    Left knee injury, from fall    Osteoarthritis    in knee's, hip's    Pulmonary nodules    first noted 2016on LD screen CT    Tobacco abuse    Visual impairment    glasses       Past Surgical History:  Past Surgical History:   Procedure Laterality Date    Colonoscopy      02-14-17    Colonoscopy N/A 12/28/2020    Procedure: COLONOSCOPY;  Surgeon: Real Abreu MD;  Location: [facility] ENDOSCOPY    Electrocardiogram, complete  12/15/2013    scanned to media tab    Oral surgery      Other surgical history  2009    cyst removal, side of head    Other surgical history Left 10/24/2024    VATS upper lobe wedge resection by Dr. Maria       Current Medications:  No current facility-administered medications on file as of 11/7/2024.       Allergies:   Allergies[1]    Family Medical History:  Family History   Problem Relation Age of Onset    Dementia Mother     Diabetes Paternal Grandmother     Cancer Sister         lung cancer       Social History:  Social History     Socioeconomic History    Marital status:      Spouse name: Not on file    Number of children: Not on file    Years of education: Not on file    Highest education level: Not on file   Occupational History    Not on file   Tobacco Use    Smoking status: Former     Current packs/day: 0.50     Average packs/day: 1 pack/day for 40.8 years (40.4 ttl pk-yrs)     Types: Cigarettes     Start date: 2024    Smokeless tobacco: Former     Quit date: 10/24/2024    Tobacco comments:     2/2022: less than 1 pack per day     10/2024: smokes 8-9 cigarettes a day   Vaping Use     Vaping status: Never Used   Substance and Sexual Activity    Alcohol use: Yes     Alcohol/week: 2.0 standard drinks of alcohol     Types: 2 Cans of beer per week     Comment: 2 beer per day    Drug use: No    Sexual activity: Not on file   Other Topics Concern     Service Not Asked    Blood Transfusions Not Asked    Caffeine Concern Yes     Comment: coffee-1 cup/day    Occupational Exposure Not Asked    Hobby Hazards Not Asked    Sleep Concern Not Asked    Stress Concern Not Asked    Weight Concern Not Asked    Special Diet Not Asked    Back Care Not Asked    Exercise Not Asked    Bike Helmet Not Asked    Seat Belt Not Asked    Self-Exams Not Asked   Social History Narrative    Live with fianace    Work [employer] in [employer]    Now retired     Social Drivers of Health     Financial Resource Strain: Low Risk  (10/28/2024)    Financial Resource Strain     Difficulty of Paying Living Expenses: Not hard at all     Med Affordability: No   Food Insecurity: No Food Insecurity (10/24/2024)    Food Insecurity     Food Insecurity: Never true   Transportation Needs: No Transportation Needs (10/28/2024)    Transportation Needs     Lack of Transportation: No     Car Seat: Not on file   Physical Activity: Not on file   Stress: Not on file   Social Connections: Not on file   Housing Stability: Low Risk  (10/24/2024)    Housing Stability     Housing Instability: No     Housing Instability Emergency: Not on file     Crib or Bassinette: Not on file       Gyn History:      Objective:    /77 (BP Location: Right arm, Patient Position: Sitting, Cuff Size: adult)   Pulse 91   Temp 98.2 °F (36.8 °C) (Oral)   Resp 16   Ht 1.626 m (5' 4\")   Wt 49.4 kg (109 lb)   SpO2 95%   BMI 18.71 kg/m²   Physical Exam:  ECOG PS: 1  General: A&Ox3, NAD  HEENT: PERRL, OP clear  Neck: supple, no LAD or JVD  CV: RRR, no murmurs, + pulses  Pulm: CTA b/l, no w/r/r, normal effort  Abd: soft, ntnd,  no HSM or  masses  Lymph: no palpable lymphadenopathy throughout the cervical, supraclavicular, axillary, or inguinal regions  Extremities: no edema or calf tenderness  Neurological: Grossly intact    Labs:  Lab Results   Component Value Date/Time    WBC 13.3 (H) 10/25/2024 04:06 AM    RBC 3.75 (L) 10/25/2024 04:06 AM    HGB 11.5 (L) 10/25/2024 04:06 AM    HCT 33.3 (L) 10/25/2024 04:06 AM    MCV 88.8 10/25/2024 04:06 AM    MCH 30.7 10/25/2024 04:06 AM    MCHC 34.5 10/25/2024 04:06 AM    RDW 13.4 10/25/2024 04:06 AM    NEPRELIM 11.85 (H) 10/25/2024 04:06 AM    .0 10/25/2024 04:06 AM       Lab Results   Component Value Date/Time     (H) 10/25/2024 04:06 AM    BUN 9 10/25/2024 04:06 AM    CREATSERUM 0.82 10/25/2024 04:06 AM    GFRNAA 96 10/12/2021 09:48 AM    CA 9.1 10/25/2024 04:06 AM    ALB 4.9 (H) 10/19/2024 08:48 AM     (L) 10/25/2024 04:06 AM    K 4.0 10/25/2024 04:06 AM     10/25/2024 04:06 AM    CO2 25.0 10/25/2024 04:06 AM    ALKPHO 60 02/15/2024 10:03 AM    AST 22 02/15/2024 10:03 AM    ALT 14 02/15/2024 10:03 AM       Imaging:  Reviewed PET/CT films personally    Assessment & Plan:    Alejandro Hanna is a 68 year old male wih a Stage IB (T2N0M0) squamous cell carcinoma of the left lung s/p resection.    I had a long discussion with the patient explained that he had a small tumor with some high risk features including poorly differentiated nature, lymphovascular invasion and visceral pleural involvement.  We discussed that adjuvant chemotherapy would offer a modest benefit and reduce the risk of recurrence.    # He is not interested in undergoing adjuvant chemotherapy for a small benefit in reducing the risk of recurrence.    # Will obtain NGS testing to ensure he does not have an EGFR mutation in which case adjuvant osimertinib would be an option    # He will otherwise continue surveillance imaging with a repeat CT scan in 6 months as previously recommended by pulmonology.    Tobacco  cessation counseling for 3-10 minutes (add E/M code #42735). : he has stopped tobacco use 2 wks ago has some cravings, discussed NRT        Thank you Dr Ian Mathews for the opportunity to participate in the care of this interesting patient. Please do contact me if I may be of any further assistance    Carlos Damian MD  [facility] Hematology/Oncology  [facility]    This note was created using a voice-recognition transcribing system. Incorrect words or phrases may have been missed during proofreading. Please interpret accordingly.    High complexity MDM today most provided    Copy to: Dr Leonid Ford DO, Dr. Jagdish Maria MD           [1] No Known Allergies

## 2024-11-07 NOTE — PROGRESS NOTES
[employer] collected and sent via [employer] on 11/7.      Order entered online for [employer] for PD-L1, xF, and xT will be drawn from Pathology specimen [id number] .

## 2024-11-08 ENCOUNTER — LAB REQUISITION (OUTPATIENT)
Dept: LAB | Facility: HOSPITAL | Age: 69
End: 2024-11-08
Payer: MEDICARE

## 2024-11-08 DIAGNOSIS — C34.12 MALIGNANT NEOPLASM OF UPPER LOBE, LEFT BRONCHUS OR LUNG (HCC): ICD-10-CM

## 2024-11-08 PROCEDURE — 88363 XM ARCHIVE TISSUE MOLEC ANAL: CPT | Performed by: PATHOLOGY

## 2024-11-21 ENCOUNTER — TELEPHONE (OUTPATIENT)
Dept: HEMATOLOGY/ONCOLOGY | Facility: HOSPITAL | Age: 69
End: 2024-11-21

## 2024-11-21 NOTE — TELEPHONE ENCOUNTER
Called Alejandro back,  Per Dr. Damian testing did not demonstrate any mutations that we can target, so recommend surveillance alone with a CT scan in 6 months. Alejandro voiced understanding and thanked me for the call.

## 2024-11-29 ENCOUNTER — TELEPHONE (OUTPATIENT)
Dept: PULMONOLOGY | Facility: CLINIC | Age: 69
End: 2024-11-29

## 2024-11-29 RX ORDER — AZITHROMYCIN 250 MG/1
TABLET, FILM COATED ORAL
Qty: 6 TABLET | Refills: 0 | Status: SHIPPED | OUTPATIENT
Start: 2024-11-29

## 2024-12-03 DIAGNOSIS — I10 ESSENTIAL HYPERTENSION: ICD-10-CM

## 2024-12-04 ENCOUNTER — NURSE TRIAGE (OUTPATIENT)
Dept: FAMILY MEDICINE CLINIC | Facility: CLINIC | Age: 69
End: 2024-12-04

## 2024-12-04 NOTE — TELEPHONE ENCOUNTER
Action Requested: Summary for Provider     []  Critical Lab, Recommendations Needed  [] Need Additional Advice  []   FYI    []   Need Orders  [] Need Medications Sent to Pharmacy  []  Other     SUMMARY:  Patient c/o being off/balance, head cold, yellow mucus out of nasal    Stated did recently have surgery and Dr. Mathews did provided z-kina but still not better    Advised to do covid test and wear mask to call if covid test (+)    Denies: fever, chills, dizziness, sob,    Future Appointments   Date Time Provider Department Center   12/5/2024  3:00 PM Leonid Ford DO [facility]   12/11/2024  8:00 AM Elaina White DPM ECLMBPOD [facility] [place]   12/30/2024  9:10 AM Sierra Encarnacion DPM [facility]ADOPOD [facility]   1/6/2025  9:30 AM LMB CT RM1 [facility] MOB. CT EM [place]   1/17/2025  1:15 PM Ian Mathews MD ECLMBPULM [facility] [place]   2/4/2025  9:30 AM Carlos Damian MD [facility] HEM ONC [facility]   2/6/2025  9:00 AM Leonid Ford DO [facility]       Reason for Disposition   MILD weakness (i.e., does not interfere with ability to work, go to school, normal activities) and persists > 1 week    Protocols used: Weakness (Generalized) and Fatigue-A-OH

## 2024-12-05 ENCOUNTER — OFFICE VISIT (OUTPATIENT)
Dept: FAMILY MEDICINE CLINIC | Facility: CLINIC | Age: 69
End: 2024-12-05
Payer: MEDICARE

## 2024-12-05 ENCOUNTER — EKG ENCOUNTER (OUTPATIENT)
Dept: LAB | Age: 69
End: 2024-12-05
Attending: FAMILY MEDICINE
Payer: MEDICARE

## 2024-12-05 ENCOUNTER — LAB ENCOUNTER (OUTPATIENT)
Dept: LAB | Age: 69
End: 2024-12-05
Attending: FAMILY MEDICINE
Payer: MEDICARE

## 2024-12-05 VITALS
HEART RATE: 75 BPM | WEIGHT: 116.5 LBS | BODY MASS INDEX: 20 KG/M2 | TEMPERATURE: 97 F | SYSTOLIC BLOOD PRESSURE: 150 MMHG | DIASTOLIC BLOOD PRESSURE: 60 MMHG

## 2024-12-05 DIAGNOSIS — I10 ESSENTIAL HYPERTENSION: ICD-10-CM

## 2024-12-05 DIAGNOSIS — R26.89 IMBALANCE: Primary | ICD-10-CM

## 2024-12-05 DIAGNOSIS — R26.89 IMBALANCE: ICD-10-CM

## 2024-12-05 DIAGNOSIS — R91.8 MASS OF UPPER LOBE OF LEFT LUNG: ICD-10-CM

## 2024-12-05 LAB
ANION GAP SERPL CALC-SCNC: 7 MMOL/L (ref 0–18)
ATRIAL RATE: 69 BPM
BASOPHILS # BLD AUTO: 0.07 X10(3) UL (ref 0–0.2)
BASOPHILS NFR BLD AUTO: 0.6 %
BUN BLD-MCNC: 24 MG/DL (ref 9–23)
BUN/CREAT SERPL: 27.9 (ref 10–20)
CALCIUM BLD-MCNC: 10.3 MG/DL (ref 8.7–10.4)
CHLORIDE SERPL-SCNC: 92 MMOL/L (ref 98–112)
CO2 SERPL-SCNC: 30 MMOL/L (ref 21–32)
CREAT BLD-MCNC: 0.86 MG/DL
DEPRECATED RDW RBC AUTO: 41.7 FL (ref 35.1–46.3)
EGFRCR SERPLBLD CKD-EPI 2021: 93 ML/MIN/1.73M2 (ref 60–?)
EOSINOPHIL # BLD AUTO: 0.91 X10(3) UL (ref 0–0.7)
EOSINOPHIL NFR BLD AUTO: 7.8 %
ERYTHROCYTE [DISTWIDTH] IN BLOOD BY AUTOMATED COUNT: 12.7 % (ref 11–15)
FASTING STATUS PATIENT QL REPORTED: NO
GLUCOSE BLD-MCNC: 87 MG/DL (ref 70–99)
HCT VFR BLD AUTO: 36.6 %
HGB BLD-MCNC: 12.3 G/DL
IMM GRANULOCYTES # BLD AUTO: 0.1 X10(3) UL (ref 0–1)
IMM GRANULOCYTES NFR BLD: 0.9 %
LYMPHOCYTES # BLD AUTO: 0.97 X10(3) UL (ref 1–4)
LYMPHOCYTES NFR BLD AUTO: 8.3 %
MCH RBC QN AUTO: 29.9 PG (ref 26–34)
MCHC RBC AUTO-ENTMCNC: 33.6 G/DL (ref 31–37)
MCV RBC AUTO: 89.1 FL
MONOCYTES # BLD AUTO: 1.18 X10(3) UL (ref 0.1–1)
MONOCYTES NFR BLD AUTO: 10.1 %
NEUTROPHILS # BLD AUTO: 8.41 X10 (3) UL (ref 1.5–7.7)
NEUTROPHILS # BLD AUTO: 8.41 X10(3) UL (ref 1.5–7.7)
NEUTROPHILS NFR BLD AUTO: 72.3 %
OSMOLALITY SERPL CALC.SUM OF ELEC: 271 MOSM/KG (ref 275–295)
P AXIS: 59 DEGREES
P-R INTERVAL: 138 MS
PLATELET # BLD AUTO: 426 10(3)UL (ref 150–450)
POTASSIUM SERPL-SCNC: 4.4 MMOL/L (ref 3.5–5.1)
Q-T INTERVAL: 374 MS
QRS DURATION: 92 MS
QTC CALCULATION (BEZET): 400 MS
R AXIS: 62 DEGREES
RBC # BLD AUTO: 4.11 X10(6)UL
SODIUM SERPL-SCNC: 129 MMOL/L (ref 136–145)
T AXIS: 76 DEGREES
VENTRICULAR RATE: 69 BPM
WBC # BLD AUTO: 11.6 X10(3) UL (ref 4–11)

## 2024-12-05 PROCEDURE — G2211 COMPLEX E/M VISIT ADD ON: HCPCS | Performed by: FAMILY MEDICINE

## 2024-12-05 PROCEDURE — 80048 BASIC METABOLIC PNL TOTAL CA: CPT

## 2024-12-05 PROCEDURE — 99214 OFFICE O/P EST MOD 30 MIN: CPT | Performed by: FAMILY MEDICINE

## 2024-12-05 PROCEDURE — 85025 COMPLETE CBC W/AUTO DIFF WBC: CPT

## 2024-12-05 PROCEDURE — 93010 ELECTROCARDIOGRAM REPORT: CPT | Performed by: INTERNAL MEDICINE

## 2024-12-05 PROCEDURE — 93005 ELECTROCARDIOGRAM TRACING: CPT

## 2024-12-05 PROCEDURE — 36415 COLL VENOUS BLD VENIPUNCTURE: CPT

## 2024-12-05 RX ORDER — OLMESARTAN MEDOXOMIL 40 MG/1
40 TABLET ORAL DAILY
Qty: 90 TABLET | Refills: 3 | Status: SHIPPED | OUTPATIENT
Start: 2024-12-05

## 2024-12-05 NOTE — TELEPHONE ENCOUNTER
Refill passed per [facility] protocol.     Requested Prescriptions   Pending Prescriptions Disp Refills    OLMESARTAN MEDOXOMIL 40 MG Oral Tab [Pharmacy Med Name: OLMESARTAN MEDOXOMIL 40 MG TAB] 90 tablet 3     Sig: TAKE 1 TABLET (40 MG TOTAL) BY MOUTH DAILY FOR BLOOD PRESSURE       Hypertension Medications Protocol Passed - 12/5/2024 12:36 PM        Passed - CMP or BMP in past 12 months        Passed - Last BP reading less than 140/90     BP Readings from Last 1 Encounters:   11/07/24 128/77               Passed - In person appointment or virtual visit in the past 12 mos or appointment in next 3 mos     Recent Outpatient Visits              4 weeks ago Squamous cell carcinoma of upper lobe of left lung (HCC)    [facility] Hematology Oncology Carlos Damian MD    Office Visit    4 weeks ago Squamous cell carcinoma of upper lobe of left lung (HCC)    [facility] Hematology Oncology    Office Visit    1 month ago Malignant neoplasm of upper lobe of left lung (HCC)    [facility] [address], [place] Ian Mathews MD    Office Visit    1 month ago Cancer of upper lobe of left lung (HCC)    [facility] ([place], [place]) Leonid Ford DO    Office Visit    2 months ago Chronic obstructive pulmonary disease, unspecified COPD type ([facility])    [facility], [address], [place] Ian Mathews MD    Office Visit          Future Appointments         Provider Department Appt Notes    Today Leonid Ford DO [facility] ([place], [place]) Generalized weakness    In 6 days Elaina White DPM [facility], [address], [place] [facility]    In 1 month LMB CT [address] CT - [place]     In 1 month Ian Mathews MD [facility], [address], [place] f/u    In 2 months Carlos Damian MD [facility] Hematology Oncology     In 2 months Leonid Ford DO [facility], [facility], [place] px, last px 2/15/24                    Passed - EGFRCR or GFRNAA > 50     GFR Evaluation  EGFRCR: 94 , resulted on 10/25/2024

## 2024-12-05 NOTE — PROGRESS NOTES
Patient ID: Alejandro Hanna is a 68 year old male.    HPI  Chief Complaint   Patient presents with    Follow - Up     Patient complains of having problems with balance (equilibrium) x 2 weeks.      Last seen on 10/29/2024.    Pt has not smoked since 10/24/2024.    I reviewed pt's vitals and labs. He c/o of feeling an imbalance when he is walking up stairs without a railing. He first had an episode on 11/28/2024 while climbing his nephew's stairs. He states he felt unsteady and not confident in his ability to step. During thanksgiving he also was having yellow mucus from his nose. On 11/29/2024 he contacted his pulmonologist who prescribed him a Zpack, which he finished today. He states that the yellow mucus is clearing up but he is still feeling unbalanced on the stairs. He denies vomiting, dysuria, and hematuria. He states he walks well on flat surfaces. On 10/25/2024 his Sodium level was 133. His PEP scan in September, 2023 was negative. EKG on 10/19/2024 was normal. I discussed with him.  He is not having any ringing in the ears    Pt states he is recovering well from VATS procedure left lung nodule, although is having some mild left rib pain. He regularly consults with pulmonology and has a CT chest scheduled for 01/6/2025. He also regularly consults with nephrology.     Wt Readings from Last 6 Encounters:   12/05/24 116 lb 8 oz   11/07/24 109 lb   11/07/24 109 lb   11/01/24 113 lb   10/29/24 111 lb   10/25/24 106 lb 14.8 oz       BMI Readings from Last 6 Encounters:   12/05/24 20.00 kg/m²   11/07/24 18.71 kg/m²   11/07/24 18.71 kg/m²   11/01/24 19.40 kg/m²   10/29/24 19.05 kg/m²   10/25/24 18.35 kg/m²       BP Readings from Last 6 Encounters:   12/05/24 150/60   11/07/24 128/77   11/07/24 128/77   11/01/24 115/62   10/29/24 121/60   10/25/24 152/75         Review of Systems   Respiratory:  Negative for shortness of breath.    Cardiovascular:  Negative for chest pain.   Neurological:  Positive for  dizziness.           Medical History:      Past Medical History:    Allergic rhinitis    Flonase    Bladder cancer (HCC)    f/u [facility]    BPH (benign prostatic hyperplasia)    COPD (HCC)    moderate-severe     Disorder of kidney and ureter    Disorder of thyroid    hypothyroid    ED (erectile dysfunction)    Esophageal reflux    Essential hypertension    Hearing impairment    Hepatomegaly    Hiatal hernia    High blood pressure    High cholesterol    Hypothyroidism    Injury    Left knee injury, from fall    Osteoarthritis    in knee's, hip's    Pulmonary nodules    first noted 2016on LD screen CT    Tobacco abuse    Visual impairment    glasses       Past Surgical History:   Procedure Laterality Date    Colonoscopy      02-14-17    Colonoscopy N/A 12/28/2020    Procedure: COLONOSCOPY;  Surgeon: Real Abreu MD;  Location: [facility] ENDOSCOPY    Electrocardiogram, complete  12/15/2013    scanned to media tab    Oral surgery      Other surgical history  2009    cyst removal, side of head    Other surgical history Left 10/24/2024    VATS upper lobe wedge resection by Dr. Maria          Current Outpatient Medications   Medication Sig Dispense Refill    Olmesartan Medoxomil 40 MG Oral Tab Take 1 tablet (40 mg total) by mouth daily. For blood pressure 90 tablet 3    azithromycin 250 MG Oral Tab take 2 tablet (500MG)  by ORAL route  every day for 1 day then 1 tablet (250 mg) by oral route once daily for 4 days 6 tablet 0    BREO ELLIPTA 200-25 MCG/ACT Inhalation Aerosol Powder, Breath Activated Inhale 1 puff into the lungs daily.      levothyroxine (LEVOXYL) 88 MCG Oral Tab Take 1 tablet (88 mcg total) by mouth before breakfast. For underactive thyroid. 90 tablet 1    Fenofibrate 160 MG Oral Tab Take 1 tablet (160 mg total) by mouth daily. 90 tablet 3    fluticasone propionate 50 MCG/ACT Nasal Suspension 2 sprays by Nasal route daily. 48 mL 3    atorvastatin 10 MG Oral Tab Take 1 tablet (10 mg total) by mouth daily.  90 tablet 3    Omeprazole 40 MG Oral Capsule Delayed Release Take 1 capsule (40 mg total) by mouth daily. 90 capsule 3    albuterol (VENTOLIN HFA) 108 (90 Base) MCG/ACT Inhalation Aero Soln Inhale 2 puffs into the lungs every 6 (six) hours as needed for Wheezing. 54 each 5    Multiple Vitamins-Minerals (CENTRUM SILVER 50+MEN OR) Take by mouth.      Dutasteride 0.5 MG Oral Cap Take 1 capsule (0.5 mg total) by mouth every morning.       Allergies:Allergies[1]     Physical Exam:       Physical Exam  Blood pressure 147/78, pulse 75, temperature 97.2 °F (36.2 °C), temperature source Tympanic, weight 116 lb 8 oz.    Vitals:    12/05/24 1502 12/05/24 1517   BP: 147/78 150/60   BP Location: Left arm    Patient Position: Sitting    Cuff Size: adult    Pulse: 75    Temp: 97.2 °F (36.2 °C)    TempSrc: Tympanic    Weight: 116 lb 8 oz           Physical Exam   Constitutional: Patient is oriented to person, place, and time. Patient appears well-developed and well-nourished. No distress.   Head: Normocephalic.   Right Ear: Tympanic membrane, external ear and ear canal normal.   Left Ear: Tympanic membrane, external ear and ear canal normal.   Nose: No mucosal edema or rhinorrhea.   Throat: Oropharynx is clear without exudate   Eyes: Conjunctivae and EOM are normal. No nystagmus.   Neck: Normal range of motion. No thyromegaly present.   Cardiovascular: Normal rate, regular rhythm and normal heart sounds.    Pulmonary/Chest: Effort normal and breath sounds normal. No respiratory distress.   Lymphadenopathy: Patient has no cervical adenopathy.  Neurological: Patient is alert and oriented to person, place, and time. DTR 2/4 BLE and BUE. Romberg's has mild positive unsteadiness but he does not fall and is able to catch himself..  Gait in the office is normal.  Skin: Skin is warm.   Psychiatry: Normal mood and affect.  Lower legs: No edema of the legs bilaterally.     Vitals reviewed.           Assessment/Plan:      Diagnoses and all  orders for this visit:    Imbalance  -     Basic Metabolic Panel (8); Future  -     CBC With Differential With Platelet; Future  -     EKG 12 Lead; Future  Would like to do some labs.  I wonder if this has to do with the low sodium in the past and we will recheck this  Mass of upper lobe of left lung  Had the VATS procedure and doing well  Essential hypertension  -     Basic Metabolic Panel (8); Future  -     CBC With Differential With Platelet; Future  -     EKG 12 Lead; Future  Blood pressure is a bit high which is unusual for him as he is usually controlled.      Referrals (if applicable)  No orders of the defined types were placed in this encounter.      Follow up if symptoms persist.  Take medicine (if given) as prescribed.  Approach to treatment discussed and patient/family member understands and agrees to plan.     No follow-ups on file.    There are no Patient Instructions on file for this visit.    Sarah Richardson    12/5/2024    By signing my name below, ISarah,  attest that this documentation has been prepared under the direction and in the presence of Leonid Ford DO.   Electronically Signed: Sarah Richardson, 12/5/2024, 3:11 PM.      I, Leonid Ford DO,  personally performed the services described in this documentation. All medical record entries made by the scribe were at my direction and in my presence.  I have reviewed the chart and discharge instructions (if applicable) and agree that the record reflects my personal performance and is accurate and complete.  Leonid Ford DO, 12/6/2024, 9:02 AM               [1] No Known Allergies

## 2024-12-06 DIAGNOSIS — E87.1 HYPONATREMIA: Primary | ICD-10-CM

## 2024-12-11 ENCOUNTER — OFFICE VISIT (OUTPATIENT)
Dept: PODIATRY CLINIC | Facility: CLINIC | Age: 69
End: 2024-12-11
Payer: MEDICARE

## 2024-12-11 DIAGNOSIS — B35.1 ONYCHOMYCOSIS: Primary | ICD-10-CM

## 2024-12-11 DIAGNOSIS — B35.1 PAIN DUE TO ONYCHOMYCOSIS OF TOENAILS OF BOTH FEET: ICD-10-CM

## 2024-12-11 DIAGNOSIS — M79.675 PAIN DUE TO ONYCHOMYCOSIS OF TOENAILS OF BOTH FEET: ICD-10-CM

## 2024-12-11 DIAGNOSIS — M79.674 PAIN DUE TO ONYCHOMYCOSIS OF TOENAILS OF BOTH FEET: ICD-10-CM

## 2024-12-11 PROCEDURE — 99213 OFFICE O/P EST LOW 20 MIN: CPT | Performed by: PODIATRIST

## 2024-12-11 PROCEDURE — 11721 DEBRIDE NAIL 6 OR MORE: CPT | Performed by: PODIATRIST

## 2024-12-11 NOTE — PROGRESS NOTES
Reason for Visit      Alejandro Hanna is a 69 year old male presents today for routine footcare.    History of Present Illness     Patient presents to clinic today after last being seen by podiatry 8/30/2024 for routine footcare.  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  Patient presents to clinic today complaining of elongated, thickened toenails that he is unable to debride himself.    The following portions of the patient's history were reviewed and updated as appropriate: allergies, current medications, past family history, past medical history, past social history, past surgical history and problem list.    Allergies[1]      Current Outpatient Medications:     Olmesartan Medoxomil 40 MG Oral Tab, Take 1 tablet (40 mg total) by mouth daily. For blood pressure, Disp: 90 tablet, Rfl: 3    azithromycin 250 MG Oral Tab, take 2 tablet (500MG)  by ORAL route  every day for 1 day then 1 tablet (250 mg) by oral route once daily for 4 days, Disp: 6 tablet, Rfl: 0    BREO ELLIPTA 200-25 MCG/ACT Inhalation Aerosol Powder, Breath Activated, Inhale 1 puff into the lungs daily., Disp: , Rfl:     levothyroxine (LEVOXYL) 88 MCG Oral Tab, Take 1 tablet (88 mcg total) by mouth before breakfast. For underactive thyroid., Disp: 90 tablet, Rfl: 1    Fenofibrate 160 MG Oral Tab, Take 1 tablet (160 mg total) by mouth daily., Disp: 90 tablet, Rfl: 3    fluticasone propionate 50 MCG/ACT Nasal Suspension, 2 sprays by Nasal route daily., Disp: 48 mL, Rfl: 3    atorvastatin 10 MG Oral Tab, Take 1 tablet (10 mg total) by mouth daily., Disp: 90 tablet, Rfl: 3    Omeprazole 40 MG Oral Capsule Delayed Release, Take 1 capsule (40 mg total) by mouth daily., Disp: 90 capsule, Rfl: 3    albuterol (VENTOLIN HFA) 108 (90 Base) MCG/ACT Inhalation Aero Soln, Inhale 2 puffs into the lungs every 6 (six) hours as needed for Wheezing., Disp: 54 each, Rfl: 5     Multiple Vitamins-Minerals (CENTRUM SILVER 50+MEN OR), Take by mouth., Disp: , Rfl:     Dutasteride 0.5 MG Oral Cap, Take 1 capsule (0.5 mg total) by mouth every morning., Disp: , Rfl:     There are no discontinued medications.    Patient Active Problem List   Diagnosis    Essential hypertension, benign    Mixed hyperlipidemia    Nocturia    Benign non-nodular prostatic hyperplasia with lower urinary tract symptoms    Chronic obstructive pulmonary disease (HCC)    Decreased hearing of right ear    Eructation    Allergic rhinitis due to pollen    Mass of upper lobe of left lung    Acquired hypothyroidism    Tobacco use disorder    Bladder cancer (HCC)    Alcohol abuse    Hyponatremia    Calcification of aorta (HCC)    Hiatal hernia    Protein-calorie malnutrition, unspecified severity (HCC)    Dizziness    Right ear impacted cerumen    Abnormal clinical finding    Abnormal blood chemistry    Abnormal results of liver function studies    Cough    Disorder of kidney and ureter    Hepatomegaly    Impotence of organic origin    Internal derangement of left knee    Primary osteoarthritis of left knee    Cancer of upper lobe of left lung (HCC)       Past Medical History:    Allergic rhinitis    Flonase    Bladder cancer (HCC)    f/u [facility]    BPH (benign prostatic hyperplasia)    COPD (HCC)    moderate-severe     Disorder of kidney and ureter    Disorder of thyroid    hypothyroid    ED (erectile dysfunction)    Esophageal reflux    Essential hypertension    Hearing impairment    Hepatomegaly    Hiatal hernia    High blood pressure    High cholesterol    Hypothyroidism    Injury    Left knee injury, from fall    Osteoarthritis    in knee's, hip's    Pulmonary nodules    first noted 2016on LD screen CT    Tobacco abuse    Visual impairment    glasses       Past Surgical History:   Procedure Laterality Date    Colonoscopy      02-14-17    Colonoscopy N/A 12/28/2020    Procedure: COLONOSCOPY;  Surgeon: Real Abrue,  MD;  Location: [facility] ENDOSCOPY    Electrocardiogram, complete  12/15/2013    scanned to media tab    Oral surgery      Other surgical history  2009    cyst removal, side of head    Other surgical history Left 10/24/2024    VATS upper lobe wedge resection by Dr. Maria       Family History   Problem Relation Age of Onset    Dementia Mother     Diabetes Paternal Grandmother     Cancer Sister         lung cancer       Social History     Occupational History    Not on file   Tobacco Use    Smoking status: Former     Current packs/day: 0.50     Average packs/day: 1 pack/day for 40.9 years (40.5 ttl pk-yrs)     Types: Cigarettes     Start date: 2024    Smokeless tobacco: Former     Quit date: 10/24/2024    Tobacco comments:     2/2022: less than 1 pack per day     10/2024: smokes 8-9 cigarettes a day   Vaping Use    Vaping status: Never Used   Substance and Sexual Activity    Alcohol use: Yes     Alcohol/week: 2.0 standard drinks of alcohol     Types: 2 Cans of beer per week     Comment: 2 beer per day    Drug use: No    Sexual activity: Not on file       ROS      Constitutional: negative for chills, fevers and sweats  Gastrointestinal: negative for abdominal pain, diarrhea, nausea and vomiting  Genitourinary:negative for dysuria and hematuria  Musculoskeletal:negative for arthralgias and muscle weakness  Neurological: negative for paresthesia and weakness  All others reviewed and negative.      Physical Exam     LE PHYSICAL EXAM    Constitution: Well-developed and well-nourished. Gait appears normal. No apparent distress. Alert and oriented to person, place, and time.  Integument: There are no varicosities. Skin appears moist, warm, and supple with positive hair growth. There are no color changes. No open lesions. No macerations, No Hyperkeratotic lesions.  Vascular examination: Dorsalis pedis and posterior tibial pulses are strong bilaterally with capillary filling time less than 3 seconds to all digits. There is no  peripheral edema..  Neurological Sensorium: Grossly intact to sharp/dull. Vibratory: Intact.  Musculoskeletal:   5/5 pedal muscle strength b/l     Advanced trophic changes as: hair growth decrease  nail changes  (thickening) pigmentary changes (discoloration) skin texture (thin, shiny)   Procedure       Nails 1 through 5 bilateral debrided with use of a nail nipper.  Patient Toller procedure well had no complications.  Neurovascular status intact to the level of the digits post procedure.     Assessment and Plan     Encounter Diagnoses   Name Primary?    Onychomycosis Yes    Pain due to onychomycosis of toenails of both feet        Evaluated the patient. Discussed treatment options with the patient.  Discussed with patient proper care and hygiene for their feet.  Patient tolerated procedures well, without incident.    Instrumentation used includes nail nippers and electric  where appropriate.  Procedure: (64899 Debridement of toenails 6-10) Surgically debrided and mechanically reduced 6 or more toenails.Hemmorhage occurred none.Nails that were debrided appeared dystrophic and caused the patient pain in shoe gear.Nails 5 Left & 5 Right.     Evaluated patient. Discussed treatment options with patient.  Discussed proper hygiene and care for feet as well as use of emollient creams (ie Urea based creams)  Answered all patient questions. Discussed offloading hyperkeratotic lesions with proper shoe gear, offloading pads, and insoles.   Patient was instructed to call the office or on-call podiatric physician immediately with any issues or concerns before the next scheduled visit. Patient to follow-up in clinic in 3 months      Elaina Mccollum DPM, D.YOLANDA MATUTE  Diplomat, American Board of Foot and Ankle Surgery  Certified in Foot and Rearfoot/Ankle Reconstruction  Fellow of the American College of Foot and Ankle Surgeons  Fellowship Trained Foot and Ankle Surgeon   [facility]  [facility]     12/11/2024    7:08 AM         [1] No Known Allergies

## 2024-12-14 ENCOUNTER — LAB ENCOUNTER (OUTPATIENT)
Dept: LAB | Age: 69
End: 2024-12-14
Attending: FAMILY MEDICINE
Payer: MEDICARE

## 2024-12-14 DIAGNOSIS — R89.9 ABNORMAL LABORATORY TEST RESULT: ICD-10-CM

## 2024-12-14 DIAGNOSIS — E03.9 ACQUIRED HYPOTHYROIDISM: ICD-10-CM

## 2024-12-14 DIAGNOSIS — E87.1 HYPONATREMIA: ICD-10-CM

## 2024-12-14 DIAGNOSIS — N28.9 KIDNEY DISORDER: ICD-10-CM

## 2024-12-14 DIAGNOSIS — E83.52 HYPERCALCEMIA: ICD-10-CM

## 2024-12-14 LAB
ALBUMIN SERPL-MCNC: 4.9 G/DL (ref 3.2–4.8)
ANION GAP SERPL CALC-SCNC: 6 MMOL/L (ref 0–18)
BUN BLD-MCNC: 20 MG/DL (ref 9–23)
BUN/CREAT SERPL: 22.2 (ref 10–20)
CALCIUM BLD-MCNC: 10.8 MG/DL (ref 8.7–10.4)
CHLORIDE SERPL-SCNC: 102 MMOL/L (ref 98–112)
CO2 SERPL-SCNC: 30 MMOL/L (ref 21–32)
CREAT BLD-MCNC: 0.9 MG/DL
EGFRCR SERPLBLD CKD-EPI 2021: 91 ML/MIN/1.73M2 (ref 60–?)
GLUCOSE BLD-MCNC: 85 MG/DL (ref 70–99)
OSMOLALITY SERPL CALC.SUM OF ELEC: 288 MOSM/KG (ref 275–295)
PHOSPHATE SERPL-MCNC: 3.9 MG/DL (ref 2.4–5.1)
POTASSIUM SERPL-SCNC: 4.6 MMOL/L (ref 3.5–5.1)
PTH-INTACT SERPL-MCNC: 15.3 PG/ML (ref 18.5–88)
SODIUM SERPL-SCNC: 138 MMOL/L (ref 136–145)
VIT D+METAB SERPL-MCNC: 30.3 NG/ML (ref 30–100)

## 2024-12-14 PROCEDURE — 80069 RENAL FUNCTION PANEL: CPT

## 2024-12-14 PROCEDURE — 83970 ASSAY OF PARATHORMONE: CPT

## 2024-12-14 PROCEDURE — 36415 COLL VENOUS BLD VENIPUNCTURE: CPT

## 2024-12-14 PROCEDURE — 82306 VITAMIN D 25 HYDROXY: CPT

## 2024-12-14 PROCEDURE — 84165 PROTEIN E-PHORESIS SERUM: CPT

## 2024-12-16 ENCOUNTER — TELEPHONE (OUTPATIENT)
Dept: FAMILY MEDICINE CLINIC | Facility: CLINIC | Age: 69
End: 2024-12-16

## 2024-12-16 LAB
ALBUMIN SERPL ELPH-MCNC: 4.54 G/DL (ref 3.75–5.21)
ALBUMIN/GLOB SERPL: 1.71 {RATIO} (ref 1–2)
ALPHA1 GLOB SERPL ELPH-MCNC: 0.28 G/DL (ref 0.19–0.46)
ALPHA2 GLOB SERPL ELPH-MCNC: 0.73 G/DL (ref 0.48–1.05)
B-GLOBULIN SERPL ELPH-MCNC: 0.83 G/DL (ref 0.68–1.23)
GAMMA GLOB SERPL ELPH-MCNC: 0.81 G/DL (ref 0.62–1.7)
PROT SERPL-MCNC: 7.2 G/DL (ref 5.7–8.2)

## 2024-12-16 NOTE — TELEPHONE ENCOUNTER
Verified name and .    Patient states that he got the renal function test repeated on 24- ordered under Dr. Villar- asking if Dr. Ford can view results and advise of any recommendations.      Your imbalance is due to the sodium being low at 129.  You need to cut down on the amount of fluid you are drinking.  Especially if it is alcohol as you are diluting the salt that is in your body.  If the dizziness continues or the imbalance gets worse you need to go to the emergency room.  White blood cell count is better than 1 month ago and your anemia has also improved.  I will order another kidney function for 1 week from now to make sure that the sodium is going back up.   Written by Leonid Ford DO on 2024 12:07 PM CST  Seen by patient Alejandro Abrams Cyndi on 2024 11:39 AM

## 2024-12-16 NOTE — TELEPHONE ENCOUNTER
Spoke with patient, (  Name and date of birth verified ) informed of Dr. Ford's  message below     Patient is very pleased with the results

## 2024-12-16 NOTE — TELEPHONE ENCOUNTER
Let him know that the sodium that was done with Dr. Villar is back to normal.  No need to do further testing through me.

## 2024-12-18 ENCOUNTER — TELEPHONE (OUTPATIENT)
Dept: NEPHROLOGY | Facility: CLINIC | Age: 69
End: 2024-12-18

## 2024-12-18 NOTE — TELEPHONE ENCOUNTER
Kidney function remains good.  Sodium was in the normal range.  Calcium though remains mildly elevated.  Please see me in January though for 6-month follow-up.

## 2024-12-19 NOTE — TELEPHONE ENCOUNTER
Do not take any additional vitamin D or calcium supplements.  Do a renal panel just before upcoming visit in February.

## 2024-12-19 NOTE — TELEPHONE ENCOUNTER
Dr. Villar.Spoke with patient and went over test results- went over recommendations. Patient scheduled follow up for 02/04/25, was first available. Patient also wanted to know if anything he needs to do or follow up regarding mildly elevated calcium level? Thanks.

## 2025-01-02 ENCOUNTER — OFFICE VISIT (OUTPATIENT)
Dept: OTOLARYNGOLOGY | Facility: CLINIC | Age: 70
End: 2025-01-02
Payer: MEDICARE

## 2025-01-02 VITALS — WEIGHT: 116 LBS | BODY MASS INDEX: 19.81 KG/M2 | HEIGHT: 64 IN

## 2025-01-02 DIAGNOSIS — J34.2 NASAL SEPTAL DEVIATION: ICD-10-CM

## 2025-01-02 DIAGNOSIS — R09.82 PND (POST-NASAL DRIP): ICD-10-CM

## 2025-01-02 DIAGNOSIS — J32.9 CHRONIC SINUSITIS, UNSPECIFIED LOCATION: Primary | ICD-10-CM

## 2025-01-02 DIAGNOSIS — R09.81 NASAL CONGESTION: ICD-10-CM

## 2025-01-02 DIAGNOSIS — H61.21 IMPACTED CERUMEN, RIGHT EAR: ICD-10-CM

## 2025-01-02 RX ORDER — SULFAMETHOXAZOLE AND TRIMETHOPRIM 800; 160 MG/1; MG/1
1 TABLET ORAL EVERY 12 HOURS
Qty: 28 TABLET | Refills: 0 | Status: SHIPPED | OUTPATIENT
Start: 2025-01-02 | End: 2025-01-16

## 2025-01-02 RX ORDER — PREDNISONE 20 MG/1
TABLET ORAL
Qty: 15 TABLET | Refills: 0 | Status: SHIPPED | OUTPATIENT
Start: 2025-01-02 | End: 2025-01-12

## 2025-01-02 NOTE — PROGRESS NOTES
Alejandro Hanna is a 69 year old male.   Chief Complaint   Patient presents with    Sinus Problem     Sinus issues; chronic nasal congestion; mouth breathing    Ear Problem     Left ear pain     HPI:   69-year-old presents with chronic sinonasal symptoms for about 2 months now.  Was initially tried on a course of azithromycin which did not help.  Has been using Afrin on a regular basis as well.  Reports a lot of nasal congestion and postnasal drip    Current Outpatient Medications   Medication Sig Dispense Refill    sulfamethoxazole-trimethoprim -160 MG Oral Tab per tablet Take 1 tablet by mouth every 12 (twelve) hours for 14 days. 28 tablet 0    predniSONE 20 MG Oral Tab Take 2 tablets (40 mg total) by mouth daily for 5 days, THEN 1 tablet (20 mg total) daily for 5 days. 15 tablet 0    Olmesartan Medoxomil 40 MG Oral Tab Take 1 tablet (40 mg total) by mouth daily. For blood pressure 90 tablet 3    azithromycin 250 MG Oral Tab take 2 tablet (500MG)  by ORAL route  every day for 1 day then 1 tablet (250 mg) by oral route once daily for 4 days 6 tablet 0    BREO ELLIPTA 200-25 MCG/ACT Inhalation Aerosol Powder, Breath Activated Inhale 1 puff into the lungs daily.      levothyroxine (LEVOXYL) 88 MCG Oral Tab Take 1 tablet (88 mcg total) by mouth before breakfast. For underactive thyroid. 90 tablet 1    Fenofibrate 160 MG Oral Tab Take 1 tablet (160 mg total) by mouth daily. 90 tablet 3    fluticasone propionate 50 MCG/ACT Nasal Suspension 2 sprays by Nasal route daily. 48 mL 3    atorvastatin 10 MG Oral Tab Take 1 tablet (10 mg total) by mouth daily. 90 tablet 3    Omeprazole 40 MG Oral Capsule Delayed Release Take 1 capsule (40 mg total) by mouth daily. 90 capsule 3    albuterol (VENTOLIN HFA) 108 (90 Base) MCG/ACT Inhalation Aero Soln Inhale 2 puffs into the lungs every 6 (six) hours as needed for Wheezing. 54 each 5    Multiple Vitamins-Minerals (CENTRUM SILVER 50+MEN OR) Take by mouth.      Dutasteride  0.5 MG Oral Cap Take 1 capsule (0.5 mg total) by mouth every morning.        Past Medical History:    Allergic rhinitis    Flonase    Bladder cancer (HCC)    f/u [facility]    BPH (benign prostatic hyperplasia)    COPD (HCC)    moderate-severe     Disorder of kidney and ureter    Disorder of thyroid    hypothyroid    ED (erectile dysfunction)    Esophageal reflux    Essential hypertension    Hearing impairment    Hepatomegaly    Hiatal hernia    High blood pressure    High cholesterol    Hypothyroidism    Injury    Left knee injury, from fall    Osteoarthritis    in knee's, hip's    Pulmonary nodules    first noted 2016on LD screen CT    Tobacco abuse    Visual impairment    glasses      Social History:  Social History     Socioeconomic History    Marital status:    Tobacco Use    Smoking status: Former     Current packs/day: 0.50     Average packs/day: 1 pack/day for 41.0 years (40.5 ttl pk-yrs)     Types: Cigarettes     Start date: 2024    Smokeless tobacco: Former     Quit date: 10/24/2024    Tobacco comments:     2/2022: less than 1 pack per day     10/2024: smokes 8-9 cigarettes a day   Vaping Use    Vaping status: Never Used   Substance and Sexual Activity    Alcohol use: Yes     Alcohol/week: 2.0 standard drinks of alcohol     Types: 2 Cans of beer per week     Comment: 2 beer per day    Drug use: No   Other Topics Concern    Caffeine Concern Yes     Comment: coffee-1 cup/day   Social History Narrative    Live with fianace    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     Social Drivers of Health     Financial Resource Strain: Low Risk  (10/28/2024)    Financial Resource Strain     Difficulty of Paying Living Expenses: Not hard at all     Med Affordability: No   Food Insecurity: No Food Insecurity (10/24/2024)    Food Insecurity     Food Insecurity: Never true   Transportation Needs: No Transportation Needs (10/28/2024)    Transportation Needs     Lack of Transportation: No    Housing Stability: Low Risk  (10/24/2024)    Housing Stability     Housing Instability: No      Past Surgical History:   Procedure Laterality Date    Colonoscopy      02-14-17    Colonoscopy N/A 12/28/2020    Procedure: COLONOSCOPY;  Surgeon: Real Abreu MD;  Location: [facility] ENDOSCOPY    Electrocardiogram, complete  12/15/2013    scanned to media tab    Oral surgery      Other surgical history  2009    cyst removal, side of head    Other surgical history Left 10/24/2024    VATS upper lobe wedge resection by Dr. Maria         EXAM:   Ht 5' 4\" (1.626 m)   Wt 116 lb (52.6 kg)   BMI 19.91 kg/m²     System Details   Skin Inspection - Normal.   Constitutional Overall appearance - Normal.   Head/Face Symmetric, TMJ tenderness not present    Eyes EOMI, PERRL   Right ear:  Canal with cerumen, TM intact, no LELAND   Left ear:  Canal clear, TM intact, no LELAND   Nose: Septum midline, inferior turbinates not enlarged, nasal valves without collapse    Oral cavity/Oropharynx: No lesions or masses on inspection or palpation, tonsils symmetric    Neck: Soft without LAD, thyroid not enlarged  Voice clear/ no stridor   Other:      SCOPES AND PROCEDURES:     Canals:  Right: Canal with cerumen preventing adequate view of TM, debrided with instrumentation    Tympanic Membranes:  Right: Normal tympanic membrane.     TM Visualized Method:   Right TM examined via otomicroscopy.      PROCEDURE:   Removal of cerumen impaction   The cerumen impaction was completely removed on the right side using microscopy as necessary.   Removal was completed by using a curette and suction.     Nasal Endoscopy Procedure Note     Due to inability for adequate examination of the nose and nasopharynx and need for magnification to perform the examination, endoscopy was performed.  Risks and benefits were discussed with patient/family and they have given verbal consent to proceed.    Pre-operative Diagnosis:   1. Chronic sinusitis, unspecified location     2. Nasal septal deviation    3. Nasal congestion    4. PND (post-nasal drip)    5. Impacted cerumen, right ear        Post-operative Diagnosis: Same    Procedure: Diagnostic nasal endoscopy    Anesthesia: Topical anesthetic [place]     Surgeon Jerald Anne MD    EBL: 0cc    Procedure Detail & Findings:     After placement of topical anesthetic intranasally the endoscope was inserted into each nares and driven through the nasal cavity into the nasopharynx. The following findings were noted:    Septum: Deviated  Inferior turbinates: Normal  Middle meatus: Patent  Middle turbinates: Normal  Purulence: Bilateral nasal cavities and middle meatus polyps: None noted  Nasopharynx and eustachian tube: No masses  Other: The middle and superior meatus, the turbinates, and the spheno-ethmoid recess were inspected and seen to be without significant abnormal findings.     Condition: Stable    Complications: Patient tolerated the procedure well with no immediate complication.    Jerald Anne MD    AUDIOGRAM AND IMAGING:         IMPRESSION:   1. Chronic sinusitis, unspecified location    2. Nasal septal deviation    3. Nasal congestion    4. PND (post-nasal drip)    5. Impacted cerumen, right ear       Recommendations:  -Has purulence in both nasal cavities draining from the middle meatus area indicative of a chronic sinusitis likely bacterial  -Given the duration of his symptoms for most 2 months we will place on a 14-day course of Bactrim as well as an oral prednisone burst and taper  -Has been using Afrin regularly which may also be contributing to rebound nasal congestion.  Discussed cessation of this and replacing with Flonase and also bridging off of it with the prednisone burst and taper which will be provided  -Like to see him back in 2 weeks discussed pertinent side effects of the medications    -Short term use risks of oral steroids include fluid retention, causing swelling in your lower legs, high blood pressure, mood  swings, memory, behavior, and other psychological effects, such as confusion or delirium, upset stomach, increased blood sugar, and other less likely but serious side effects such as avascular necrosis     The patient indicates understanding of these issues and agrees to the plan.      Jerald Anne MD  1/2/2025  1:32 PM

## 2025-01-06 ENCOUNTER — HOSPITAL ENCOUNTER (OUTPATIENT)
Dept: CT IMAGING | Age: 70
Discharge: HOME | End: 2025-01-06
Attending: INTERNAL MEDICINE
Payer: MEDICARE

## 2025-01-06 DIAGNOSIS — C34.12 MALIGNANT NEOPLASM OF UPPER LOBE OF LEFT LUNG (HCC): ICD-10-CM

## 2025-01-06 PROCEDURE — 71250 CT THORAX DX C-: CPT | Performed by: INTERNAL MEDICINE

## 2025-01-07 ENCOUNTER — APPOINTMENT (OUTPATIENT)
Dept: GENERAL RADIOLOGY | Age: 70
End: 2025-01-07
Attending: PHYSICIAN ASSISTANT
Payer: MEDICARE

## 2025-01-07 ENCOUNTER — HOSPITAL ENCOUNTER (OUTPATIENT)
Age: 70
Discharge: HOME | End: 2025-01-07
Payer: MEDICARE

## 2025-01-07 ENCOUNTER — TELEPHONE (OUTPATIENT)
Dept: FAMILY MEDICINE CLINIC | Facility: CLINIC | Age: 70
End: 2025-01-07

## 2025-01-07 ENCOUNTER — APPOINTMENT (OUTPATIENT)
Dept: ULTRASOUND IMAGING | Age: 70
End: 2025-01-07
Attending: PHYSICIAN ASSISTANT
Payer: MEDICARE

## 2025-01-07 VITALS
SYSTOLIC BLOOD PRESSURE: 129 MMHG | HEART RATE: 91 BPM | OXYGEN SATURATION: 96 % | RESPIRATION RATE: 19 BRPM | TEMPERATURE: 98 F | DIASTOLIC BLOOD PRESSURE: 62 MMHG

## 2025-01-07 DIAGNOSIS — M79.661 PAIN OF RIGHT LOWER LEG: Primary | ICD-10-CM

## 2025-01-07 PROCEDURE — 99213 OFFICE O/P EST LOW 20 MIN: CPT | Performed by: PHYSICIAN ASSISTANT

## 2025-01-07 PROCEDURE — 93971 EXTREMITY STUDY: CPT | Performed by: PHYSICIAN ASSISTANT

## 2025-01-07 PROCEDURE — 73590 X-RAY EXAM OF LOWER LEG: CPT | Performed by: PHYSICIAN ASSISTANT

## 2025-01-07 NOTE — ED PROVIDER NOTES
Patient Seen in: [facility]    History     Chief Complaint   Patient presents with    Leg Pain     Stated Complaint:     HPI    HPI: Alejandro Hanna is a 69 year old male who presents with chief complaint of right posterior lower leg pain.  Onset 1.5 weeks ago.  Patient denies eliciting injury or trauma.  Patient states that he walks for exercise daily.  Patient denies head/neck injury or pain, open wound, decreased range of motion, swelling, ecchymosis, erythema, weakness, paraesthesias, chest pain, dyspnea, cough, wheeze, hemoptysis.          Past Medical History:    Allergic rhinitis    Flonase    Bladder cancer (HCC)    f/u [facility]    BPH (benign prostatic hyperplasia)    COPD (HCC)    moderate-severe     Disorder of kidney and ureter    Disorder of thyroid    hypothyroid    ED (erectile dysfunction)    Esophageal reflux    Essential hypertension    Hearing impairment    Hepatomegaly    Hiatal hernia    High blood pressure    High cholesterol    Hypothyroidism    Injury    Left knee injury, from fall    Osteoarthritis    in knee's, hip's    Pulmonary nodules    first noted 2016on LD screen CT    Tobacco abuse    Visual impairment    glasses       Past Surgical History:   Procedure Laterality Date    Colonoscopy      02-14-17    Colonoscopy N/A 12/28/2020    Procedure: COLONOSCOPY;  Surgeon: Real Abreu MD;  Location: [facility] ENDOSCOPY    Electrocardiogram, complete  12/15/2013    scanned to media tab    Oral surgery      Other surgical history  2009    cyst removal, side of head    Other surgical history Left 10/24/2024    VATS upper lobe wedge resection by Dr. Maria            Family History   Problem Relation Age of Onset    Dementia Mother     Diabetes Paternal Grandmother     Cancer Sister         lung cancer       Social History     Socioeconomic History    Marital status:    Tobacco Use    Smoking status: Former     Current packs/day: 0.50     Average packs/day: 1 pack/day  for 41.0 years (40.5 ttl pk-yrs)     Types: Cigarettes     Start date: 2024    Smokeless tobacco: Former     Quit date: 10/24/2024    Tobacco comments:     2/2022: less than 1 pack per day     10/2024: smokes 8-9 cigarettes a day   Vaping Use    Vaping status: Never Used   Substance and Sexual Activity    Alcohol use: Yes     Alcohol/week: 2.0 standard drinks of alcohol     Types: 2 Cans of beer per week     Comment: 2 beer per day    Drug use: No   Other Topics Concern    Caffeine Concern Yes     Comment: coffee-1 cup/day   Social History Narrative    Live with fianace    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     Social Drivers of Health     Financial Resource Strain: Low Risk  (10/28/2024)    Financial Resource Strain     Difficulty of Paying Living Expenses: Not hard at all     Med Affordability: No   Food Insecurity: No Food Insecurity (10/24/2024)    Food Insecurity     Food Insecurity: Never true   Transportation Needs: No Transportation Needs (10/28/2024)    Transportation Needs     Lack of Transportation: No   Housing Stability: Low Risk  (10/24/2024)    Housing Stability     Housing Instability: No       Review of Systems    Positive for stated complaint:   Other systems are as noted in HPI.  Constitutional and vital signs reviewed.      All other systems reviewed and negative except as noted above.    PSFH elements reviewed from today and agreed except as otherwise stated in HPI.    Physical Exam     ED Triage Vitals [01/07/25 1419]   /62   Pulse 91   Resp 19   Temp 98.3 °F (36.8 °C)   Temp src Oral   SpO2 96 %   O2 Device None (Room air)       Current:/62   Pulse 91   Temp 98.3 °F (36.8 °C) (Oral)   Resp 19   SpO2 96%     PULSE OX within normal limits on room air as interpreted by this provider.    Physical Exam      Constitutional: The patient is cooperative. Appears well-developed and well-nourished.  Mild discomfort.  Psychological: Alert, No abnormalities  of mood, affect.  Head: Normocephalic/atraumatic.  Eyes: Pupils are equal round reactive to light.  Conjunctiva are within normal limits.  ENT: Oropharynx is clear.  Neck: The neck is supple.  No meningeal signs.  Respiratory: Respiratory effort was normal.  There is no stridor.  Air entry is equal.  Cardiovascular: Regular rate and rhythm.  Capillary refill is brisk.   Genitourinary: Not examined.  Lymphatic: No gross lymphadenopathy noted.  Musculoskeletal: Right lower extremity-Right lower extremity normal to inspection without acute bony deformity.  Generalized tenderness to palpation along right posterior calf.  No overlying skin changes.  Remainder of right lower extremity nontender to palpation.  Distal pulses intact.  Capillary refill normal.  Motor intact distally.  Sensory intact distally.  No ecchymosis.  No erythema.  No open wounds.  Full range of motion of right lower extremity.  No compartment syndrome.  No edema.  Remainder of musculoskeletal system is grossly intact.  There is no obvious deformity.  Neurological: Gross motor movement is intact in all 4 extremities.  Patient exhibits normal speech.  Skin: Skin is normal to inspection, except as documented.  Warm and dry.  No obvious rash.        ED Course   Labs Reviewed - No data to display    MDM     Differential diagnosis prior to work-up including but not limited to fracture, strain/sprain, contusion, DVT, Baker's cyst, peripheral artery disease    Radiology findings: XR TIBIA + FIBULA (2 VIEWS), RIGHT (CPT=73590)    Result Date: 1/7/2025  CONCLUSION: No acute osseous abnormality of the right tibia/fibula.  Dictated by (CST): Louis Taylor MD on 1/07/2025 at 3:18 PM     Finalized by (CST): Louis Taylor MD on 1/07/2025 at 3:19 PM          US VENOUS DOPPLER LEG RIGHT - DIAG IMG (CPT=93971)    Result Date: 1/7/2025  CONCLUSION: No right lower extremity DVT.    Dictated by (CST): Zachery Collins MD on 1/07/2025 at 3:03 PM     Finalized by (CST):  Zachery Collins MD on 1/07/2025 at 3:04 PM           X-ray images of right tibia and fibula independently viewed by this provider-no acute fracture.    Physical exam remained stable as previously documented.  Available results reviewed with patient.    I have given the patient instructions regarding their diagnoses, expectations, follow up, and ER precautions. I explained to the patient that emergent conditions may arise and to go to the ER for new, worsening or any persistent conditions. I've explained the importance of following up with their doctor as instructed. The patient verbalized understanding of the discharge instructions and plan.    Disposition and Plan     Clinical Impression:  1. Pain of right lower leg        Disposition:  Discharge    Follow-up:  Leonid Ford DO  [address]  [facility] [address]  [phone]    Call in 1 day  For follow-up    Odell Dawson MD  [address]  [facility] [id number] [phone]    Call in 1 day  For follow-up      Medications Prescribed:  Current Discharge Medication List

## 2025-01-07 NOTE — ED INITIAL ASSESSMENT (HPI)
RIGHT LOWER LEG PAIN STARTED 1.5 WEEK AGO THAT GETTING WORSE. NO INJURY NO TRAUMA. NORMALLY ABOUT TO WALK A MILE BUT RECENTLY NOT ABLE TO WALK THE SAME MILE DUE TO PAIN.

## 2025-01-07 NOTE — TELEPHONE ENCOUNTER
Patient has appt with you Monday, looking for sooner follow up if able to  Declined seeing Ofelia ARRIETA, likes her but prefers to see you his PCP only for this for follow up  for leg calf and foot pain. At times feels pain up into hamstring/thigh but slight.   Please advise.     Patient calling, was seen at  today for right foot and lower leg pain.  Rule out DVT and did xray - nothing found.   Advised to follow up with PCP  Patient has had this pain for about 2 weeks, progressively getting worse. No redness, warmth.   Patient uses treadmill and usually can go a while on this but only able to do one mile now because the burning in his leg gets so bad. No open wounds.   Also today went to grocery store, walking in store and back to car is was really hurting him.   Constant pain.  Patient taking ABT given by ENT for last 4 days, has not had impact on pain, no worse or better so unlikely side effect?   Also taking 4 days of steroids/prednisone, no change in pain.   Patient does see podiatry to get toenails cut only. Could call and schedule with them if you feel that would be helpful for evaluation?       Future Appointments   Date Time Provider Department Center   1/13/2025  8:45 AM Leonid Ford DO [facility]   1/16/2025  1:00 PM Jerald Anne MD [facility]   1/17/2025  1:15 PM Ian Mathews MD [id number] [place]   2/4/2025  9:30 AM Carlos Damian MD [facility]   2/4/2025  1:20 PM Faisal Villar MD [id number]  [facility]   2/6/2025  9:00 AM Leonid Ford DO [facility]

## 2025-01-08 ENCOUNTER — OFFICE VISIT (OUTPATIENT)
Dept: FAMILY MEDICINE CLINIC | Facility: CLINIC | Age: 70
End: 2025-01-08
Payer: MEDICARE

## 2025-01-08 VITALS
HEIGHT: 64 IN | SYSTOLIC BLOOD PRESSURE: 118 MMHG | HEART RATE: 89 BPM | DIASTOLIC BLOOD PRESSURE: 60 MMHG | BODY MASS INDEX: 19.81 KG/M2 | WEIGHT: 116 LBS

## 2025-01-08 DIAGNOSIS — M76.61 TENDONITIS, ACHILLES, RIGHT: Primary | ICD-10-CM

## 2025-01-08 PROCEDURE — 99213 OFFICE O/P EST LOW 20 MIN: CPT | Performed by: PHYSICIAN ASSISTANT

## 2025-01-08 NOTE — PROGRESS NOTES
HPI:     HPI  A 69-year-old man presents with right calf and foot pain for the past 2 weeks. He was seen in the  yesterday. X-ray and US of the leg were normal. He exercises daily and believes the pain is getting worse. He is taking Prednisone at this time.  He denies fever, redness, leg swelling, numbness, tingling sensation, or weakness.      Medications:     Current Outpatient Medications   Medication Sig Dispense Refill    sulfamethoxazole-trimethoprim -160 MG Oral Tab per tablet Take 1 tablet by mouth every 12 (twelve) hours for 14 days. 28 tablet 0    predniSONE 20 MG Oral Tab Take 2 tablets (40 mg total) by mouth daily for 5 days, THEN 1 tablet (20 mg total) daily for 5 days. 15 tablet 0    Olmesartan Medoxomil 40 MG Oral Tab Take 1 tablet (40 mg total) by mouth daily. For blood pressure 90 tablet 3    azithromycin 250 MG Oral Tab take 2 tablet (500MG)  by ORAL route  every day for 1 day then 1 tablet (250 mg) by oral route once daily for 4 days 6 tablet 0    BREO ELLIPTA 200-25 MCG/ACT Inhalation Aerosol Powder, Breath Activated Inhale 1 puff into the lungs daily.      levothyroxine (LEVOXYL) 88 MCG Oral Tab Take 1 tablet (88 mcg total) by mouth before breakfast. For underactive thyroid. 90 tablet 1    Fenofibrate 160 MG Oral Tab Take 1 tablet (160 mg total) by mouth daily. 90 tablet 3    fluticasone propionate 50 MCG/ACT Nasal Suspension 2 sprays by Nasal route daily. 48 mL 3    atorvastatin 10 MG Oral Tab Take 1 tablet (10 mg total) by mouth daily. 90 tablet 3    Omeprazole 40 MG Oral Capsule Delayed Release Take 1 capsule (40 mg total) by mouth daily. 90 capsule 3    albuterol (VENTOLIN HFA) 108 (90 Base) MCG/ACT Inhalation Aero Soln Inhale 2 puffs into the lungs every 6 (six) hours as needed for Wheezing. 54 each 5    Multiple Vitamins-Minerals (CENTRUM SILVER 50+MEN OR) Take by mouth.      Dutasteride 0.5 MG Oral Cap Take 1 capsule (0.5 mg total) by mouth every morning.         Allergies:    Allergies[1]    History:     Health Maintenance   Topic Date Due    Zoster Vaccines (2 of 2) 03/28/2024    COVID-19 Vaccine (5 - 2024-25 season) 09/01/2024    Annual Depression Screening  01/01/2025    HTN: BP Follow-Up  01/05/2025    Annual Physical  02/15/2025    PSA  10/27/2025    Colorectal Cancer Screening  12/28/2025    Influenza Vaccine  Completed    Fall Risk Screening (Annual)  Completed    Pneumococcal Vaccine: 50+ Years  Completed    Meningococcal B Vaccine  Aged Out       No LMP for male patient.   Past Medical History:     Past Medical History:    Allergic rhinitis    Flonase    Bladder cancer (HCC)    f/u [facility]    BPH (benign prostatic hyperplasia)    COPD (HCC)    moderate-severe     Disorder of kidney and ureter    Disorder of thyroid    hypothyroid    ED (erectile dysfunction)    Esophageal reflux    Essential hypertension    Hearing impairment    Hepatomegaly    Hiatal hernia    High blood pressure    High cholesterol    Hypothyroidism    Injury    Left knee injury, from fall    Osteoarthritis    in knee's, hip's    Pulmonary nodules    first noted 2016on LD screen CT    Tobacco abuse    Visual impairment    glasses       Past Surgical History:     Past Surgical History:   Procedure Laterality Date    Colonoscopy      02-14-17    Colonoscopy N/A 12/28/2020    Procedure: COLONOSCOPY;  Surgeon: Real Abreu MD;  Location: [facility] ENDOSCOPY    Electrocardiogram, complete  12/15/2013    scanned to media tab    Oral surgery      Other surgical history  2009    cyst removal, side of head    Other surgical history Left 10/24/2024    VATS upper lobe wedge resection by Dr. Maria       Family History:     Family History   Problem Relation Age of Onset    Dementia Mother     Diabetes Paternal Grandmother     Cancer Sister         lung cancer       Social History:     Social History     Socioeconomic History    Marital status:      Spouse name: Not on file    Number of children: Not on file     Years of education: Not on file    Highest education level: Not on file   Occupational History    Not on file   Tobacco Use    Smoking status: Former     Current packs/day: 0.50     Average packs/day: 1 pack/day for 41.0 years (40.5 ttl pk-yrs)     Types: Cigarettes     Start date: 2024    Smokeless tobacco: Former     Quit date: 10/24/2024    Tobacco comments:     2/2022: less than 1 pack per day     10/2024: smokes 8-9 cigarettes a day   Vaping Use    Vaping status: Never Used   Substance and Sexual Activity    Alcohol use: Yes     Alcohol/week: 2.0 standard drinks of alcohol     Types: 2 Cans of beer per week     Comment: 2 beer per day    Drug use: No    Sexual activity: Not on file   Other Topics Concern     Service Not Asked    Blood Transfusions Not Asked    Caffeine Concern Yes     Comment: coffee-1 cup/day    Occupational Exposure Not Asked    Hobby Hazards Not Asked    Sleep Concern Not Asked    Stress Concern Not Asked    Weight Concern Not Asked    Special Diet Not Asked    Back Care Not Asked    Exercise Not Asked    Bike Helmet Not Asked    Seat Belt Not Asked    Self-Exams Not Asked   Social History Narrative    Live with fianace    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     Social Drivers of Health     Financial Resource Strain: Low Risk  (10/28/2024)    Financial Resource Strain     Difficulty of Paying Living Expenses: Not hard at all     Med Affordability: No   Food Insecurity: No Food Insecurity (10/24/2024)    Food Insecurity     Food Insecurity: Never true   Transportation Needs: No Transportation Needs (10/28/2024)    Transportation Needs     Lack of Transportation: No     Car Seat: Not on file   Physical Activity: Not on file   Stress: Not on file   Social Connections: Not on file   Housing Stability: Low Risk  (10/24/2024)    Housing Stability     Housing Instability: No     Housing Instability Emergency: Not on file     Crib or [place]inette: Not on file        Review of Systems:   Review of Systems   + right leg and foot pain.    Vitals:    01/08/25 1314 01/08/25 1327   BP: 142/74 118/60   Pulse: 89    Weight: 116 lb (52.6 kg)    Height: 5' 4\" (1.626 m)      Body mass index is 19.91 kg/m².    Physical Exam:   Physical Exam     Assessment and Plan::     Problem List Items Addressed This Visit    None  Visit Diagnoses       Tendonitis, Achilles, right    -  Primary        Continue to finish Prednisone as prescribed. Advise the patient to take Tylenol 500 mg Q4-6 prn for pain.  Advise the patient to rest, apply ice compresses and stop the exercise that cause the injury.    Discussed plan of care with pt and pt is in agreement.All questions answered. Pt to call with questions or concerns.         [1] No Known Allergies

## 2025-01-08 NOTE — TELEPHONE ENCOUNTER
Future Appointments   Date Time Provider Department Center   1/8/2025  1:15 PM Sis Shen PA-C ECLMB[facility] [place]   1/16/2025  1:00 PM Jerald Anne MD [facility] [place]   1/17/2025  1:15 PM Ian Mathews MD ECLMBPULM [facility] [place]   2/4/2025  9:30 AM Carlos Damian MD [facility]   2/4/2025  1:20 PM Faisal Villar MD [id number]   2/6/2025  9:00 AM Leonid Ford DO [facility]ADOF[facility] [place]     Patient being seen today for visit with Min  No other questions or concerns.

## 2025-01-10 DIAGNOSIS — R13.19 ESOPHAGEAL DYSPHAGIA: ICD-10-CM

## 2025-01-10 DIAGNOSIS — R14.2 ERUCTATION: ICD-10-CM

## 2025-01-10 DIAGNOSIS — K21.9 CHRONIC GERD: ICD-10-CM

## 2025-01-13 ENCOUNTER — TELEPHONE (OUTPATIENT)
Dept: PULMONOLOGY | Facility: CLINIC | Age: 70
End: 2025-01-13

## 2025-01-13 DIAGNOSIS — C34.32 MALIGNANT NEOPLASM OF LOWER LOBE OF LEFT LUNG (HCC): Primary | ICD-10-CM

## 2025-01-14 RX ORDER — OMEPRAZOLE 40 MG/1
40 CAPSULE, DELAYED RELEASE ORAL DAILY
Qty: 90 CAPSULE | Refills: 3 | Status: SHIPPED | OUTPATIENT
Start: 2025-01-14

## 2025-01-14 NOTE — TELEPHONE ENCOUNTER
Refill Per Protocol     Requested Prescriptions   Pending Prescriptions Disp Refills    OMEPRAZOLE 40 MG Oral Capsule Delayed Release [Pharmacy Med Name: OMEPRAZOLE DR 40 MG CAPSULE] 90 capsule 3     Sig: Take 1 capsule (40 mg total) by mouth daily.       Gastrointestional Medication Protocol Passed - 1/14/2025  2:46 PM        Passed - In person appointment or virtual visit in the past 12 mos or appointment in next 3 mos     Recent Outpatient Visits              6 days ago Tendonitis, Achilles, right    [facility], [address], [place] Sis Shen PA-C    Office Visit    1 week ago Chronic sinusitis, unspecified location    [facility] Jerald Anne MD    Office Visit    1 month ago Onychomycosis    [facility], [address], [place] Elaina White DPM    Office Visit    1 month ago Imbalance    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 2 wks    In 3 days Ian Mathews MD [facility], [address], [place] f/u    In 3 weeks Carlos Damian MD [facility] Oncology [place]     In 3 weeks Faisal Villar MD [facility]     In 3 weeks Leonid Ford DO [facility] ma px, last px 2/15/24                    Passed - Medication is active on med list               Future Appointments         Provider Department Appt Notes    In 2 days Jerald Anne MD [facility] 2 wks    In 3 days Ian Mathews MD [place]  [facility] Medical [facility], [address], [place] f/u    In 3 weeks Carlos Damian MD [facility] Hematology Oncology [place]     In 3 weeks Faisal Villar MD [facility]     In 3 weeks Leonid Ford DO [facility] px, last px 2/15/24          Recent Outpatient Visits              6 days ago Tendonitis, Achilles, right    [facility], [address], [place] Sis Shen PA-C    Office Visit    1 week ago Chronic sinusitis, unspecified location    [facility] [place] Jerald Anne MD    Office Visit    1 month ago Onychomycosis    [facility], [address], [place] Elaina White DPM    Office Visit    1 month ago Imbalance    [facility] Leonid Ford DO    Office Visit    2 months ago Squamous cell carcinoma of upper lobe of left lung (HCC)    [facility] Hematology Oncology Carlos Damian MD    Office Visit

## 2025-01-16 ENCOUNTER — OFFICE VISIT (OUTPATIENT)
Dept: OTOLARYNGOLOGY | Facility: CLINIC | Age: 70
End: 2025-01-16
Payer: MEDICARE

## 2025-01-16 VITALS — HEIGHT: 64 IN | WEIGHT: 116 LBS | BODY MASS INDEX: 19.81 KG/M2

## 2025-01-16 DIAGNOSIS — J32.9 CHRONIC SINUSITIS, UNSPECIFIED LOCATION: Primary | ICD-10-CM

## 2025-01-16 PROCEDURE — 31231 NASAL ENDOSCOPY DX: CPT | Performed by: STUDENT IN AN ORGANIZED HEALTH CARE EDUCATION/TRAINING PROGRAM

## 2025-01-16 PROCEDURE — 99213 OFFICE O/P EST LOW 20 MIN: CPT | Performed by: STUDENT IN AN ORGANIZED HEALTH CARE EDUCATION/TRAINING PROGRAM

## 2025-01-16 NOTE — PROGRESS NOTES
Alejandro Hanna is a 69 year old male.   Chief Complaint   Patient presents with    Follow - Up     chronic sinusitis, unspecified location     HPI:   Doing much better he feels the sinusitis is cleared up.     Current Outpatient Medications   Medication Sig Dispense Refill    Omeprazole 40 MG Oral Capsule Delayed Release Take 1 capsule (40 mg total) by mouth daily. 90 capsule 3    sulfamethoxazole-trimethoprim -160 MG Oral Tab per tablet Take 1 tablet by mouth every 12 (twelve) hours for 14 days. 28 tablet 0    Olmesartan Medoxomil 40 MG Oral Tab Take 1 tablet (40 mg total) by mouth daily. For blood pressure 90 tablet 3    BREO ELLIPTA 200-25 MCG/ACT Inhalation Aerosol Powder, Breath Activated Inhale 1 puff into the lungs daily.      levothyroxine (LEVOXYL) 88 MCG Oral Tab Take 1 tablet (88 mcg total) by mouth before breakfast. For underactive thyroid. 90 tablet 1    Fenofibrate 160 MG Oral Tab Take 1 tablet (160 mg total) by mouth daily. 90 tablet 3    fluticasone propionate 50 MCG/ACT Nasal Suspension 2 sprays by Nasal route daily. 48 mL 3    atorvastatin 10 MG Oral Tab Take 1 tablet (10 mg total) by mouth daily. 90 tablet 3    albuterol (VENTOLIN HFA) 108 (90 Base) MCG/ACT Inhalation Aero Soln Inhale 2 puffs into the lungs every 6 (six) hours as needed for Wheezing. 54 each 5    Multiple Vitamins-Minerals (CENTRUM SILVER 50+MEN OR) Take by mouth.      Dutasteride 0.5 MG Oral Cap Take 1 capsule (0.5 mg total) by mouth every morning.      azithromycin 250 MG Oral Tab take 2 tablet (500MG)  by ORAL route  every day for 1 day then 1 tablet (250 mg) by oral route once daily for 4 days (Patient not taking: Reported on 1/16/2025) 6 tablet 0      Past Medical History:    Allergic rhinitis    Flonase    Bladder cancer (HCC)    f/u Sarah    BPH (benign prostatic hyperplasia)    COPD (HCC)    moderate-severe     Disorder of kidney and ureter    Disorder of thyroid    hypothyroid    ED (erectile dysfunction)     Esophageal reflux    Essential hypertension    Hearing impairment    Hepatomegaly    Hiatal hernia    High blood pressure    High cholesterol    Hypothyroidism    Injury    Left knee injury, from fall    Osteoarthritis    in knee's, hip's    Pulmonary nodules    first noted 2016on LD screen CT    Tobacco abuse    Visual impairment    glasses      Social History:  Social History     Socioeconomic History    Marital status:    Tobacco Use    Smoking status: Former     Current packs/day: 0.50     Average packs/day: 1 pack/day for 41.0 years (40.5 ttl pk-yrs)     Types: Cigarettes     Start date: 2024    Smokeless tobacco: Former     Quit date: 10/24/2024    Tobacco comments:     2/2022: less than 1 pack per day     10/2024: smokes 8-9 cigarettes a day   Vaping Use    Vaping status: Never Used   Substance and Sexual Activity    Alcohol use: Yes     Alcohol/week: 2.0 standard drinks of alcohol     Types: 2 Cans of beer per week     Comment: 2 beer per day    Drug use: No   Other Topics Concern    Caffeine Concern Yes     Comment: coffee-1 cup/day   Social History Narrative    Live with fianace    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     Social Drivers of Health     Financial Resource Strain: Low Risk  (10/28/2024)    Financial Resource Strain     Difficulty of Paying Living Expenses: Not hard at all     Med Affordability: No   Food Insecurity: No Food Insecurity (10/24/2024)    Food Insecurity     Food Insecurity: Never true   Transportation Needs: No Transportation Needs (10/28/2024)    Transportation Needs     Lack of Transportation: No   Housing Stability: Low Risk  (10/24/2024)    Housing Stability     Housing Instability: No      Past Surgical History:   Procedure Laterality Date    Colonoscopy      02-14-17    Colonoscopy N/A 12/28/2020    Procedure: COLONOSCOPY;  Surgeon: Real Abreu MD;  Location: [facility] ENDOSCOPY    Electrocardiogram, complete  12/15/2013    scanned  to media tab    Oral surgery      Other surgical history  2009    cyst removal, side of head    Other surgical history Left 10/24/2024    VATS upper lobe wedge resection by Dr. Maria         EXAM:   Ht 5' 4\" (1.626 m)   Wt 116 lb (52.6 kg)   BMI 19.91 kg/m²     System Details   Skin Inspection - Normal.   Constitutional Overall appearance - Normal.   Head/Face Symmetric, TMJ tenderness not present    Eyes EOMI, PERRL   Right ear:  Canal clear, TM intact, no LELAND   Left ear:  Canal clear, TM intact, no LELAND   Nose: Septum midline, inferior turbinates not enlarged, nasal valves without collapse    Oral cavity/Oropharynx: No lesions or masses on inspection or palpation, tonsils symmetric    Neck: Soft without LAD, thyroid not enlarged  Voice clear/ no stridor   Other:      SCOPES AND PROCEDURES:     Nasal Endoscopy Procedure Note     Due to inability for adequate examination of the nose and nasopharynx and need for magnification to perform the examination, endoscopy was performed.  Risks and benefits were discussed with patient/family and they have given verbal consent to proceed.    Pre-operative Diagnosis:   1. Chronic sinusitis, unspecified location        Post-operative Diagnosis: Same    Procedure: Diagnostic nasal endoscopy    Anesthesia: Topical anesthetic [place]     Surgeon Jerald Anne MD    EBL: 0cc    Procedure Detail & Findings:     After placement of topical anesthetic intranasally the endoscope was inserted into each nares and driven through the nasal cavity into the nasopharynx. The following findings were noted:    Septum: Midline  Inferior turbinates: Normal  Middle meatus: Patent  Middle turbinates: Normal  Purulence: Endoscopy without any draining purulence. Some thicker mucous in the nasal cavities that was clear  Polyps: None noted  Nasopharynx and eustachian tube: No masses  Other: The middle and superior meatus, the turbinates, and the spheno-ethmoid recess were inspected and seen to be without  significant abnormal findings.     Condition: Stable    Complications: Patient tolerated the procedure well with no immediate complication.    Jerald Anne MD    AUDIOGRAM AND IMAGING:         IMPRESSION:   1. Chronic sinusitis, unspecified location       Recommendations:  -Overall clinically improved and improved exam. He will complete the antibiotics. Given his improvement clinically and improved exam will observe for any return of his symptoms and he will return or let me know if this occurs     The patient indicates understanding of these issues and agrees to the plan.      Jerald Anne MD  1/16/2025  1:43 PM

## 2025-01-17 ENCOUNTER — OFFICE VISIT (OUTPATIENT)
Dept: PULMONOLOGY | Facility: CLINIC | Age: 70
End: 2025-01-17

## 2025-01-17 VITALS
HEART RATE: 96 BPM | DIASTOLIC BLOOD PRESSURE: 40 MMHG | BODY MASS INDEX: 21.34 KG/M2 | OXYGEN SATURATION: 98 % | WEIGHT: 125 LBS | SYSTOLIC BLOOD PRESSURE: 90 MMHG | RESPIRATION RATE: 12 BRPM | HEIGHT: 64 IN

## 2025-01-17 DIAGNOSIS — J44.9 CHRONIC OBSTRUCTIVE PULMONARY DISEASE, UNSPECIFIED COPD TYPE (HCC): ICD-10-CM

## 2025-01-17 DIAGNOSIS — C34.12 MALIGNANT NEOPLASM OF UPPER LOBE OF LEFT LUNG (HCC): Primary | ICD-10-CM

## 2025-01-17 PROCEDURE — 99214 OFFICE O/P EST MOD 30 MIN: CPT | Performed by: INTERNAL MEDICINE

## 2025-01-17 NOTE — PROGRESS NOTES
Subjective:   Patient ID: Alejandro Hanna is a 69 year old male.    HPI  Overall doing very well , good appetite and gained about 10 pounds and doing well with exercise  No dyspnea at rest  Dyspnea upon exertion about 3 blocks  Exercising in the gym 3 times a week  No active cough or sputum or hemoptysis  No chest pain  No fever or chill  History/Other:   Review of Systems   Constitutional:  Negative for fever and unexpected weight change.   HENT:  Negative for congestion.    Respiratory:  Negative for cough and wheezing.    Cardiovascular: Negative.    Gastrointestinal: Negative.    Musculoskeletal: Negative.    Neurological: Negative.    Hematological: Negative.    Psychiatric/Behavioral: Negative.       Current Outpatient Medications   Medication Sig Dispense Refill    Omeprazole 40 MG Oral Capsule Delayed Release Take 1 capsule (40 mg total) by mouth daily. 90 capsule 3    Olmesartan Medoxomil 40 MG Oral Tab Take 1 tablet (40 mg total) by mouth daily. For blood pressure 90 tablet 3    azithromycin 250 MG Oral Tab take 2 tablet (500MG)  by ORAL route  every day for 1 day then 1 tablet (250 mg) by oral route once daily for 4 days 6 tablet 0    BREO ELLIPTA 200-25 MCG/ACT Inhalation Aerosol Powder, Breath Activated Inhale 1 puff into the lungs daily.      levothyroxine (LEVOXYL) 88 MCG Oral Tab Take 1 tablet (88 mcg total) by mouth before breakfast. For underactive thyroid. 90 tablet 1    Fenofibrate 160 MG Oral Tab Take 1 tablet (160 mg total) by mouth daily. 90 tablet 3    fluticasone propionate 50 MCG/ACT Nasal Suspension 2 sprays by Nasal route daily. 48 mL 3    atorvastatin 10 MG Oral Tab Take 1 tablet (10 mg total) by mouth daily. 90 tablet 3    albuterol (VENTOLIN HFA) 108 (90 Base) MCG/ACT Inhalation Aero Soln Inhale 2 puffs into the lungs every 6 (six) hours as needed for Wheezing. 54 each 5    Multiple Vitamins-Minerals (CENTRUM SILVER 50+MEN OR) Take by mouth.      Dutasteride 0.5 MG Oral Cap Take 1  capsule (0.5 mg total) by mouth every morning.       Allergies:Allergies[1]    Objective:   Physical Exam  Constitutional:       General: He is not in acute distress.     Appearance: Normal appearance. He is not ill-appearing.   HENT:      Head: Atraumatic.      Nose: Nose normal.      Mouth/Throat:      Mouth: Mucous membranes are moist.   Eyes:      General: No scleral icterus.  Cardiovascular:      Rate and Rhythm: Normal rate.      Heart sounds:      No gallop.   Pulmonary:      Effort: No respiratory distress.      Breath sounds: No stridor. No wheezing, rhonchi or rales.   Chest:      Chest wall: No tenderness.   Abdominal:      General: Abdomen is flat. Bowel sounds are normal.      Palpations: Abdomen is soft.      Tenderness: There is no guarding.   Musculoskeletal:      Cervical back: Normal range of motion.      Right lower leg: No edema.      Left lower leg: No edema.   Skin:     General: Skin is dry.   Neurological:      Mental Status: He is oriented to person, place, and time.             Chest ct 1/8/2025 reviewed   Impression   CONCLUSION:     Post left upper lobe wedge resection.     1.3 cm nodule adjacent to the suture margin suggestive of scar.  Close continued follow-up surveillance imaging recommended to demonstrate resolution/stability.     9 mm right upper lobe pulmonary nodule is unchanged since the prior exams. This can also be assessed for stability on subsequent follow-up imaging.         Retained secretions within the trachea and bilateral mainstem bronchi.     Mild centrilobular emphysema     Multiple other incidental findings as described in the body of the report which are unchanged.        Assessment & Plan:   1. Malignant neoplasm of upper lobe of left lung (HCC)    2. Chronic obstructive pulmonary disease, unspecified COPD type (HCC)        1-lung cancer WOODY 1 cm mass   s/p VATS and wedge resection on 10/24/24   [id number] (poorly differentiated squamous cell carcinoma )     Doing very  well overall  Chest CT on 1/8/2025 reviewed with the scarification on the surgical site  Follow-up chest CT in 6 months  Follow-up with oncology     2- Moderate COPD with FEV1 1.32 L 56 %   Stable and doing well on room air / good functional status   lifelong history of smoking , quit in October 2024  Continue with the Breo and albuterol     F/u in 6 months     Meds This Visit:  Requested Prescriptions      No prescriptions requested or ordered in this encounter       Imaging & Referrals:  None         [1] No Known Allergies

## 2025-01-21 ENCOUNTER — OFFICE VISIT (OUTPATIENT)
Dept: FAMILY MEDICINE CLINIC | Facility: CLINIC | Age: 70
End: 2025-01-21
Payer: MEDICARE

## 2025-01-21 VITALS
HEART RATE: 96 BPM | TEMPERATURE: 98 F | HEIGHT: 64 IN | WEIGHT: 123 LBS | SYSTOLIC BLOOD PRESSURE: 118 MMHG | DIASTOLIC BLOOD PRESSURE: 77 MMHG | BODY MASS INDEX: 21 KG/M2

## 2025-01-21 DIAGNOSIS — L30.9 DERMATITIS: ICD-10-CM

## 2025-01-21 DIAGNOSIS — M76.61 RIGHT ACHILLES TENDINITIS: ICD-10-CM

## 2025-01-21 DIAGNOSIS — M79.604 RIGHT LEG PAIN: Primary | ICD-10-CM

## 2025-01-21 DIAGNOSIS — N40.1 BENIGN NON-NODULAR PROSTATIC HYPERPLASIA WITH LOWER URINARY TRACT SYMPTOMS: ICD-10-CM

## 2025-01-21 DIAGNOSIS — J43.2 CENTRILOBULAR EMPHYSEMA (HCC): ICD-10-CM

## 2025-01-21 DIAGNOSIS — Z87.891 HISTORY OF TOBACCO ABUSE: ICD-10-CM

## 2025-01-21 DIAGNOSIS — R09.89: ICD-10-CM

## 2025-01-21 DIAGNOSIS — C67.9 MALIGNANT NEOPLASM OF URINARY BLADDER, UNSPECIFIED SITE (HCC): ICD-10-CM

## 2025-01-21 DIAGNOSIS — R39.11 URINARY HESITANCY: ICD-10-CM

## 2025-01-21 DIAGNOSIS — I70.201 ATHEROSCLEROSIS OF RIGHT LEG: Chronic | ICD-10-CM

## 2025-01-21 PROCEDURE — G2211 COMPLEX E/M VISIT ADD ON: HCPCS | Performed by: FAMILY MEDICINE

## 2025-01-21 PROCEDURE — 99215 OFFICE O/P EST HI 40 MIN: CPT | Performed by: FAMILY MEDICINE

## 2025-01-21 NOTE — PROGRESS NOTES
Patient ID: Alejandro Hanna is a 69 year old male.    Tendonitis  Associated symptoms include a rash. Pertinent negatives include no chest pain.     Chief Complaint   Patient presents with    Tendonitis     On right leg      PROCEDURE: XR TIBIA + FIBULA (2 VIEWS), RIGHT (CPT=73590)     COMPARISON: None.     INDICATIONS: Right lower leg distal pain x1 year worsening x3 months. No known injury.     TECHNIQUE: 2 views were obtained.       FINDINGS:  BONES: No significant arthropathy, fracture or acute abnormality.  SOFT TISSUES: Calcific atherosclerosis within calf region. No visible soft tissue swelling.  EFFUSION: None visible.  OTHER: Negative.               Impression   CONCLUSION: No acute osseous abnormality of the right tibia/fibula.     Dictated by (CST): Louis Taylor MD on 1/07/2025 at 3:18 PM      Finalized by (CST): Louis Taylor MD on 1/07/2025 at 3:19 PM         Last seen on 12/05/2024.    Pt quit smoking 10/24/2024 after his lung surgery.     Pt c/o right leg weakness and pain. He was walking on the track as he usually does and recently found that he couldn't finish the lap as he was having burning pain and weakness and pointed at his right Achilles.  Now when he walks a longer distance he has to stop and rest due to the discomfort in the right distal leg posteriorly. Pt consulted with podiatry 3 weeks to a month ago and states that the podiatrist felt that his pedal pulses were normal.. I reviewed XR tibia and fibula from 1/7/2024 and it shows calcific atherosclerosis within the calf region.. I discussed with him.     He also c/o rash on the abdomen that began two days ago. The rash does not itch.  I discussed with him.     Pt c/o difficulty urinating the last two nights. He felt the need to urinate but was unable to urinate. Denies dysuria or hematuria.  Did not have any abdominal pain or distention of his lower abdomen.  Once he went back to bed, he was able to urinate later that night without  issue. He usually has no issues with urination. Hx enlarged prostate and bladder CA which was already treated.  He states he has had no issues since last night.  I discussed with him and referred him to urology.       Wt Readings from Last 6 Encounters:   01/21/25 123 lb   01/17/25 125 lb   01/16/25 116 lb   01/08/25 116 lb   01/02/25 116 lb   12/05/24 116 lb 8 oz       BMI Readings from Last 6 Encounters:   01/21/25 21.11 kg/m²   01/17/25 21.46 kg/m²   01/16/25 19.91 kg/m²   01/08/25 19.91 kg/m²   01/02/25 19.91 kg/m²   12/05/24 20.00 kg/m²       BP Readings from Last 6 Encounters:   01/21/25 118/77   01/17/25 90/40   01/08/25 118/60   01/07/25 129/62   12/05/24 150/60   11/07/24 128/77         Review of Systems   Respiratory:  Negative for shortness of breath.    Cardiovascular:  Negative for chest pain.   Genitourinary:  Positive for difficulty urinating. Negative for dysuria and hematuria.   Skin:  Positive for rash.           Medical History:      Past Medical History:    Allergic rhinitis    Flonase    Bladder cancer (HCC)    f/u Sarah    BPH (benign prostatic hyperplasia)    COPD (HCC)    moderate-severe     Disorder of kidney and ureter    Disorder of thyroid    hypothyroid    ED (erectile dysfunction)    Esophageal reflux    Essential hypertension    Hearing impairment    Hepatomegaly    Hiatal hernia    High blood pressure    High cholesterol    Hypothyroidism    Injury    Left knee injury, from fall    Osteoarthritis    in knee's, hip's    Pulmonary nodules    first noted 2016on LD screen CT    Tobacco abuse    Visual impairment    glasses       Past Surgical History:   Procedure Laterality Date    Colonoscopy      02-14-17    Colonoscopy N/A 12/28/2020    Procedure: COLONOSCOPY;  Surgeon: Real Abreu MD;  [facility]    Electrocardiogram, complete  12/15/2013    scanned to media tab    Oral surgery      Other surgical history  2009    cyst removal, side of head    Other surgical  history Left 10/24/2024    VATS upper lobe wedge resection by Dr. Maria          Current Outpatient Medications   Medication Sig Dispense Refill    Omeprazole 40 MG Oral Capsule Delayed Release Take 1 capsule (40 mg total) by mouth daily. 90 capsule 3    Olmesartan Medoxomil 40 MG Oral Tab Take 1 tablet (40 mg total) by mouth daily. For blood pressure 90 tablet 3    BREO ELLIPTA 200-25 MCG/ACT Inhalation Aerosol Powder, Breath Activated Inhale 1 puff into the lungs daily.      levothyroxine (LEVOXYL) 88 MCG Oral Tab Take 1 tablet (88 mcg total) by mouth before breakfast. For underactive thyroid. 90 tablet 1    Fenofibrate 160 MG Oral Tab Take 1 tablet (160 mg total) by mouth daily. 90 tablet 3    fluticasone propionate 50 MCG/ACT Nasal Suspension 2 sprays by Nasal route daily. 48 mL 3    atorvastatin 10 MG Oral Tab Take 1 tablet (10 mg total) by mouth daily. 90 tablet 3    albuterol (VENTOLIN HFA) 108 (90 Base) MCG/ACT Inhalation Aero Soln Inhale 2 puffs into the lungs every 6 (six) hours as needed for Wheezing. 54 each 5    Dutasteride 0.5 MG Oral Cap Take 1 capsule (0.5 mg total) by mouth every morning.      azithromycin 250 MG Oral Tab take 2 tablet (500MG)  by ORAL route  every day for 1 day then 1 tablet (250 mg) by oral route once daily for 4 days (Patient not taking: Reported on 1/21/2025) 6 tablet 0    Multiple Vitamins-Minerals (CENTRUM SILVER 50+MEN OR) Take by mouth. (Patient not taking: Reported on 1/21/2025)       Allergies:Allergies[1]     Physical Exam:       Physical Exam  Blood pressure 118/77, pulse 96, temperature 98.1 °F (36.7 °C), temperature source Tympanic, height 5' 4\" (1.626 m), weight 123 lb.    Physical Exam   Constitutional: Patient is oriented to person, place, and time. Patient appears well-developed and well-nourished. No distress.   Neck: Normal range of motion. No thyromegaly present.   Cardiovascular: Normal rate, regular rhythm and normal heart sounds.    Pulmonary/Chest: Effort  normal and breath sounds normal. No respiratory distress.   Lymphadenopathy: Patient has no cervical adenopathy.  Neurological: Patient is alert and oriented to person, place, and time.   Skin: Skin is warm. Blanching flat pinkish rash, blotchy in nature, from his back all the way around to his abdomen.  Not hot to the touch.  It is not tender.  Psychiatry: Normal mood and affect.  Right Foot: I can't palpate the pedal pulses on the right foot although the right foot is not cyanotic or cold to the touch.  He does not have any hair growth on his legs.  Achilles is intact. No edema of the lower extremity. He has very minimal tenderness of the achilles tendon at the musculotendinous junction.  Great strength with plantar and dorsiflexion of the ankle.    Vitals reviewed.           Assessment/Plan:      Diagnoses and all orders for this visit:    Right leg pain  -     US ART LOWER EXT BILAT DOPPLER W SEG PRESSURES (CPT=93923); Future  I spoke to him at length about possible peripheral arterial disease.  I would like to do arterial Dopplers both legs.  He has had numerous pack years of smoking.  I asked him if he can be on aspirin and he stated he was told no but he cannot remember exactly why so we will hold off for now and get the workup done.  Right Achilles tendinitis  Possible right Achilles tendinitis although he states he has not really walked somewhat of a longer distance for him to feel the discomfort which makes me think that this is possibly more of a claudication issue.  Absence of pulse of right foot  -     US ART LOWER EXT BILAT DOPPLER W SEG PRESSURES (CPT=93923); Future    History of tobacco abuse  -     US ART LOWER EXT BILAT DOPPLER W SEG PRESSURES (CPT=93923); Future  Very proud of him for quitting smoking last year.  Dermatitis  Not a bother.  We will continue to watch.  If it gets worse please let me know so we can get you dermatology  Centrilobular emphysema (HCC)  He does follow-up with pulmonology  and gets CAT scans of his chest.  Quitting smoking was the best thing you could have done for himself.  Congratulated him.  Malignant neoplasm of urinary bladder, unspecified site (HCC)  -     UROLOGY - INTERNAL    Benign non-nodular prostatic hyperplasia with lower urinary tract symptoms  -     UROLOGY - INTERNAL    Urinary hesitancy  -     UROLOGY - INTERNAL  With these urine issues I would like him to see the urologist but also because of the history of bladder cancer.  Patient states that the urine issue only happened twice and he is not sure it is necessary to see the urologist but I told him it is better to make an appointment as they are most likely booked for now.  He states if he does not have any further symptoms he may cancel the appointment but I told him it is best to make 1 especially if his symptoms continue.  Atherosclerosis of right leg (HCC)  Reviewed the diagnostic tests which show this.  I informed the patient.      Referrals (if applicable)  Orders Placed This Encounter   Procedures    UROLOGY - INTERNAL     My fax number [phone] but also on Epic.     Referral Priority:   Routine     Referral Type:   OFFICE VISIT     Referred to Provider:   Cortez Shaffer MD     Requested Specialty:   UROLOGY     Number of Visits Requested:   3       Follow up if symptoms persist.  Take medicine (if given) as prescribed.  Approach to treatment discussed and patient/family member understands and agrees to plan.     No follow-ups on file.    There are no Patient Instructions on file for this visit.    Sarah Richardson    1/21/2025    By signing my name below, Sarah ARITA,  attest that this documentation has been prepared under the direction and in the presence of Leonid Ford DO.   Electronically Signed: Sarah Richardson, 1/21/2025, 12:45 PM.    Leonid ARITA DO,  personally performed the services described in this documentation. All medical record entries made by the scribe were at my  direction and in my presence.  I have reviewed the chart and discharge instructions (if applicable) and agree that the record reflects my personal performance and is accurate and complete.  Leonid Ford DO, 1/21/2025, 5:30 PM                  [1] No Known Allergies

## 2025-01-23 DIAGNOSIS — E78.2 MIXED HYPERLIPIDEMIA: ICD-10-CM

## 2025-01-23 DIAGNOSIS — E03.9 ACQUIRED HYPOTHYROIDISM: ICD-10-CM

## 2025-01-24 DIAGNOSIS — F17.200 TOBACCO USE DISORDER: ICD-10-CM

## 2025-01-24 DIAGNOSIS — J43.9 PULMONARY EMPHYSEMA, UNSPECIFIED EMPHYSEMA TYPE (HCC): ICD-10-CM

## 2025-01-27 RX ORDER — ATORVASTATIN CALCIUM 10 MG/1
10 TABLET, FILM COATED ORAL DAILY
Qty: 90 TABLET | Refills: 3 | Status: SHIPPED | OUTPATIENT
Start: 2025-01-27

## 2025-01-27 RX ORDER — LEVOTHYROXINE SODIUM 88 UG/1
88 TABLET ORAL
Qty: 90 TABLET | Refills: 3 | Status: SHIPPED | OUTPATIENT
Start: 2025-01-27

## 2025-01-27 RX ORDER — LEVOTHYROXINE SODIUM 88 UG/1
88 TABLET ORAL
Qty: 90 TABLET | Refills: 1 | OUTPATIENT
Start: 2025-01-27

## 2025-01-27 NOTE — TELEPHONE ENCOUNTER
Patient (name and  verified) calling to ask why the refill was denied. Last Rx was 2024 with only one refill. Patient is due for refills.     Routed through protocol as now patient is almost out of medication.

## 2025-01-29 RX ORDER — ALBUTEROL SULFATE 90 UG/1
2 INHALANT RESPIRATORY (INHALATION) EVERY 6 HOURS PRN
Qty: 54 G | Refills: 3 | Status: SHIPPED | OUTPATIENT
Start: 2025-01-29

## 2025-01-29 NOTE — TELEPHONE ENCOUNTER
Refill Per Protocol     Requested Prescriptions   Pending Prescriptions Disp Refills    VENTOLIN  (90 Base) MCG/ACT Inhalation Aero Soln [Pharmacy Med Name: VENTOLIN HFA 90 MCG INHALER] 54 each 5     Sig: INHALE 2 PUFFS INTO THE LUNGS EVERY 6 HOURS AS NEEDED FOR WHEEZE       Asthma & COPD Medication Protocol Passed - 1/29/2025  9:05 AM        Passed - Appointment in past 6 or next 3 months      Recent Outpatient Visits              1 week ago Right leg pain    [facility] Leonid Ford DO    Office Visit    1 week ago Malignant neoplasm of upper lobe of left lung (HCC)    [facility] [place] Raslan, Ahmad O, MD    Office Visit    1 week ago Chronic sinusitis, unspecified location    [facility] Jerald Anne MD    Office Visit    3 weeks ago [facility]endon[facility], Achilles[facility]    [facility] Visit    3 weeks ago Chronic sinusitis, unspecified location    [facility] Jerald Anne MD    Office Visit          Future Appointments         Provider Department Appt Notes    Tomorrow EM US RM5 PV [facility] Ultrasound     In 3 days ADO SCHEDULED RESOURCE [facility]     In 6 days Carlos Damian MD Nancy [facility] Hematology Oncology [place]     In 6 days Faisal Villar MD [facility]     In 1 week Leonid Ford DO [facility] ma px, last px 2/15/24                    Passed - Medication is active on med list               Future Appointments         Provider Department Appt Notes    Tomorrow [facility] US RM5 PV [facility] Ultrasound     In 3 days ADO SCHEDULED RESOURCE [facility]     In 6 days Nati  MD Zahida Gonzalez [facility] Hematology Oncology [place]     In 6 days Faisal Villar MD [facility] [place]     In 1 week Leonid Ford DO [facility], [facility] ma px, last px 2/15/24          Recent Outpatient Visits              1 week ago Right leg pain    [facility] Leonid Ford DO    Office Visit    1 week ago Malignant neoplasm of upper lobe of left lung (HCC)    [facility] [address] [place] Ian Mathews MD    Office Visit    1 week ago Chronic sinusitis, unspecified location    [facility], Jerald Cota MD    Office Visit    3 weeks ago Tendonitis, Achilles, right    [facility] [address], [place] Sis Shen PA-C    Office Visit    3 weeks ago Chronic sinusitis, unspecified location    [facility] Jerald Cota MD    Office Visit

## 2025-01-30 ENCOUNTER — HOSPITAL ENCOUNTER (OUTPATIENT)
Dept: ULTRASOUND IMAGING | Facility: HOSPITAL | Age: 70
Discharge: HOME | End: 2025-01-30
Attending: FAMILY MEDICINE
Payer: MEDICARE

## 2025-01-30 DIAGNOSIS — R09.89: ICD-10-CM

## 2025-01-30 DIAGNOSIS — Z87.891 HISTORY OF TOBACCO ABUSE: ICD-10-CM

## 2025-01-30 DIAGNOSIS — M79.604 RIGHT LEG PAIN: ICD-10-CM

## 2025-01-30 PROCEDURE — 93923 UPR/LXTR ART STDY 3+ LVLS: CPT | Performed by: FAMILY MEDICINE

## 2025-02-01 ENCOUNTER — LAB ENCOUNTER (OUTPATIENT)
Dept: LAB | Age: 70
End: 2025-02-01
Attending: INTERNAL MEDICINE
Payer: MEDICARE

## 2025-02-01 DIAGNOSIS — E87.1 HYPONATREMIA: ICD-10-CM

## 2025-02-01 LAB
ALBUMIN SERPL-MCNC: 4.8 G/DL (ref 3.2–4.8)
ANION GAP SERPL CALC-SCNC: 6 MMOL/L (ref 0–18)
BUN BLD-MCNC: 12 MG/DL (ref 9–23)
BUN/CREAT SERPL: 12.8 (ref 10–20)
CALCIUM BLD-MCNC: 9.5 MG/DL (ref 8.7–10.4)
CHLORIDE SERPL-SCNC: 99 MMOL/L (ref 98–112)
CO2 SERPL-SCNC: 31 MMOL/L (ref 21–32)
CREAT BLD-MCNC: 0.94 MG/DL
EGFRCR SERPLBLD CKD-EPI 2021: 87 ML/MIN/1.73M2 (ref 60–?)
GLUCOSE BLD-MCNC: 91 MG/DL (ref 70–99)
OSMOLALITY SERPL CALC.SUM OF ELEC: 281 MOSM/KG (ref 275–295)
PHOSPHATE SERPL-MCNC: 3.1 MG/DL (ref 2.4–5.1)
POTASSIUM SERPL-SCNC: 4.6 MMOL/L (ref 3.5–5.1)
SODIUM SERPL-SCNC: 136 MMOL/L (ref 136–145)

## 2025-02-01 PROCEDURE — 36415 COLL VENOUS BLD VENIPUNCTURE: CPT

## 2025-02-01 PROCEDURE — 80069 RENAL FUNCTION PANEL: CPT

## 2025-02-04 ENCOUNTER — OFFICE VISIT (OUTPATIENT)
Age: 70
End: 2025-02-04
Attending: INTERNAL MEDICINE
Payer: MEDICARE

## 2025-02-04 ENCOUNTER — OFFICE VISIT (OUTPATIENT)
Dept: NEPHROLOGY | Facility: CLINIC | Age: 70
End: 2025-02-04
Payer: MEDICARE

## 2025-02-04 VITALS
DIASTOLIC BLOOD PRESSURE: 72 MMHG | BODY MASS INDEX: 21.17 KG/M2 | RESPIRATION RATE: 16 BRPM | HEART RATE: 72 BPM | WEIGHT: 124 LBS | OXYGEN SATURATION: 96 % | TEMPERATURE: 98 F | SYSTOLIC BLOOD PRESSURE: 154 MMHG | HEIGHT: 64 IN

## 2025-02-04 VITALS
DIASTOLIC BLOOD PRESSURE: 70 MMHG | BODY MASS INDEX: 21 KG/M2 | WEIGHT: 125 LBS | HEART RATE: 76 BPM | SYSTOLIC BLOOD PRESSURE: 131 MMHG

## 2025-02-04 DIAGNOSIS — R91.1 LUNG NODULE: ICD-10-CM

## 2025-02-04 DIAGNOSIS — C34.12 SQUAMOUS CELL CARCINOMA OF UPPER LOBE OF LEFT LUNG (HCC): Primary | ICD-10-CM

## 2025-02-04 DIAGNOSIS — E87.1 HYPONATREMIA: Primary | ICD-10-CM

## 2025-02-04 PROCEDURE — 99214 OFFICE O/P EST MOD 30 MIN: CPT | Performed by: INTERNAL MEDICINE

## 2025-02-04 NOTE — PROGRESS NOTES
[facility] Office Note     Diagnosis  1. 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 Lung nodule      Current Therapy  Surveillance after WOODY wedge resection    INTERVAL HISTORY  Patient returns for follow-up. Since the last visit he has done well.  He quit smoking the day of surgery and has not smoked since nearly 3 months out.  Denies any chest pain or dyspnea.      1/6/2025 CT chest showed multiple mildly prominent mediastinal lymph nodes which are nonspecific.  There was a 1.3 cm nodule adjacent to the suture margin that is suggestive of scarring for which follow-up was recommended.  As well as a 9 mm right upper lobe pulmonary nodule is unchanged.     Pt also denies any bleeding, specifically hematuria, hematemesis or hemoptysis.    Past Oncologic History   Alejandro Hanna is a 69 year old male for recently diagnosed squamous cell carcinoma of the left lung.  His oncologic history is detailed below.    8/13/24 patient has long history of tobacco use.  Screening CT chest showed enlarging left upper lobe nodule measuring 1 x 1 cm.  There are also subcentimeter right and left lung nodules    9/4/2024 PET/CT fusion study showed markedly elevated metabolic activity of the left upper lobe nodule measuring 8.79.  Nodule in the right upper lobe had decreased in size and has minimal activity.  No evidence of metastatic disease in the chest abdomen pelvis or mediastinal hilar lymphadenopathy was described.    10/25/2024 the patient saw thoracic surgery and underwent video-assisted partial resection of the left upper lobe that showed a 1.5 cm nonkeratinizing poorly-defined squamous cell carcinoma with negative margins.  There is focal extension the overlying visceral pleura along with multifocal lymphovascular invasion.  A total of 5 lymph nodes were removed none of which were involved with malignancy final pathologic stage T2 N0.  NGS showed a PIK3CA mutation but no  other  mutations including EGFR or ALK.  MSI was stable.  TMB low 9.5 m/MB    He has a nearly 57-pack-year history of smoking but quit 2 weeks ago.    11/2024 He had some high risk features including poorly differentiated nature, lymphovascular invasion and visceral pleural involvement.  We discussed that adjuvant chemotherapy would offer a modest benefit and reduce the risk of recurrence.  However he declined to undergo chemotherapy for a small benefit, and now on surveillance      Review of Systems:  Pt denies fevers, chills, night sweats, HA, vision changes, CP, SOB, cough, n/v/d, abd pain, urinary or bowel complaints  Hematology/Oncology ROS performed and negative except as above in HPI    History/Other:   Past Medical History:  Past Medical History:    Allergic rhinitis    Flonase    Bladder cancer (HCC)    f/u Sarah    BPH (benign prostatic hyperplasia)    COPD (HCC)    moderate-severe     Disorder of kidney and ureter    Disorder of thyroid    hypothyroid    ED (erectile dysfunction)    Esophageal reflux    Essential hypertension    Hearing impairment    Hepatomegaly    Hiatal hernia    High blood pressure    High cholesterol    Hypothyroidism    Injury    Left knee injury, from fall    Osteoarthritis    in knee's, hip's    Pulmonary nodules    first noted 2016on LD screen CT    Tobacco abuse    Visual impairment    glasses       Past Surgical History:  Past Surgical History:   Procedure Laterality Date    Colonoscopy      02-14-17    Colonoscopy N/A 12/28/2020    Procedure: COLONOSCOPY;  Surgeon: Real Abreu MD;  Location: [facility] ENDOSCOPY    Electrocardiogram, complete  12/15/2013    scanned to media tab    Oral surgery      Other surgical history  2009    cyst removal, side of head    Other surgical history Left 10/24/2024    VATS upper lobe wedge resection by Dr. Maria       Current Medications:  No current facility-administered medications on file as of 2/4/2025.       Allergies:    Allergies[1]    Family Medical History:  Family History   Problem Relation Age of Onset    Dementia Mother     Diabetes Paternal Grandmother     Cancer Sister         lung cancer       Social History:  Social History     Socioeconomic History    Marital status:      Spouse name: Not on file    Number of children: Not on file    Years of education: Not on file    Highest education level: Not on file   Occupational History    Not on file   Tobacco Use    Smoking status: Former     Current packs/day: 0.50     Average packs/day: 1 pack/day for 41.1 years (40.5 ttl pk-yrs)     Types: Cigarettes     Start date: 2024    Smokeless tobacco: Former     Quit date: 10/24/2024    Tobacco comments:     2/2022: less than 1 pack per day     10/2024: smokes 8-9 cigarettes a day   Vaping Use    Vaping status: Never Used   Substance and Sexual Activity    Alcohol use: Yes     Alcohol/week: 2.0 standard drinks of alcohol     Types: 2 Cans of beer per week     Comment: 2 beer per day    Drug use: No    Sexual activity: Not on file   Other Topics Concern     Service Not Asked    Blood Transfusions Not Asked    Caffeine Concern Yes     Comment: coffee-1 cup/day    Occupational Exposure Not Asked    Hobby Hazards Not Asked    Sleep Concern Not Asked    Stress Concern Not Asked    Weight Concern Not Asked    Special Diet Not Asked    Back Care Not Asked    Exercise Not Asked    Bike Helmet Not Asked    Seat Belt Not Asked    Self-Exams Not Asked   Social History Narrative    Live with fianace    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     Social Drivers of Health     Food Insecurity: No Food Insecurity (10/24/2024)    Food Insecurity     Food Insecurity: Never true   Transportation Needs: No Transportation Needs (10/28/2024)    Transportation Needs     Lack of Transportation: No     Car Seat: Not on file   Housing Stability: Low Risk  (10/24/2024)    Housing Stability     Housing Instability: No      Housing Instability Emergency: Not on file     Crib or [place]: Not on file       Gyn History:      Objective:    /72 (BP Location: Right arm, Patient Position: Sitting, Cuff Size: adult)   Pulse 72   Temp 98.1 °F (36.7 °C) (Oral)   Resp 16   Ht 1.626 m (5' 4\")   Wt 56.2 kg (124 lb)   SpO2 96%   BMI 21.28 kg/m²     Physical Exam:  ECOG PS: 1  General: A&Ox3, NAD  HEENT: PERRL, OP clear  Neck: supple, no LAD or JVD  CV: RRR, no murmurs, + pulses  Pulm: CTA b/l, no w/r/r, normal effort  Lymph: no palpable lymphadenopathy throughout the cervical, supraclavicular, axillary, or inguinal regions  Extremities: no edema or calf tenderness  Neurological: Grossly intact    Labs:  Lab Results   Component Value Date/Time    WBC 11.6 (H) 12/05/2024 03:41 PM    RBC 4.11 12/05/2024 03:41 PM    HGB 12.3 (L) 12/05/2024 03:41 PM    HCT 36.6 (L) 12/05/2024 03:41 PM    MCV 89.1 12/05/2024 03:41 PM    MCH 29.9 12/05/2024 03:41 PM    MCHC 33.6 12/05/2024 03:41 PM    RDW 12.7 12/05/2024 03:41 PM    NEPRELIM 8.41 (H) 12/05/2024 03:41 PM    .0 12/05/2024 03:41 PM       Lab Results   Component Value Date/Time    GLU 91 02/01/2025 09:00 AM    BUN 12 02/01/2025 09:00 AM    CREATSERUM 0.94 02/01/2025 09:00 AM    GFRNAA 96 10/12/2021 09:48 AM    CA 9.5 02/01/2025 09:00 AM    ALB 4.8 02/01/2025 09:00 AM     02/01/2025 09:00 AM    K 4.6 02/01/2025 09:00 AM    CL 99 02/01/2025 09:00 AM    CO2 31.0 02/01/2025 09:00 AM    ALKPHO 60 02/15/2024 10:03 AM    AST 22 02/15/2024 10:03 AM    ALT 14 02/15/2024 10:03 AM       Imaging:    Assessment & Plan:    Alejandro Hanna is a 69 year old male wih a Stage IB (T2N0M0) squamous cell carcinoma of the left lung s/p resection.I had a long discussion with the patient explained that he had a small tumor with some high risk features including poorly differentiated nature, lymphovascular invasion and visceral pleural involvement.  We discussed that adjuvant chemotherapy would  offer a modest benefit and reduce the risk of recurrence.  He was not interested in undergoing adjuvant chemotherapy for a small benefit in reducing the risk of recurrence.    # CT 1/25 showed some nodularity along the site of wedge resection.  Will repeat CT in 3 months to ensure this is stable    # He will return for follow-up after the CT in April.    # History of superficial bladder cancer.  He continues regular follow-up with Dr. Shaffer    Thank you Dr Ian Mathews for the opportunity to participate in the care of this interesting patient. Please do contact me if I may be of any further assistance    Carlos Damian MD  [facility] Hematology/Oncology  [facility]    This note was created using a voice-recognition transcribing system. Incorrect words or phrases may have been missed during proofreading. Please interpret accordingly.               [1] No Known Allergies

## 2025-02-05 NOTE — PATIENT INSTRUCTIONS
Repeat your kidney blood test in 3 months.  Orders are in the computer.  I congratulate you on quitting smoking cigarettes.

## 2025-02-05 NOTE — PROGRESS NOTES
02/04/25        Patient: Alejandro Hanna   YOB: 1955   Date of Visit: 2/4/2025       Dear  Dr. Ford, DO,      Thank you for referring Alejandro Hanna to my practice.  Please find my assessment and plan below.      As you know he is a 69-year-old male with a history of hypertension, bladder cancer followed by Dr. Shaffer, severe COPD, hypothyroidism, GERD and hypercholesterolemia who I now had the pleasure of seeing for follow-up of chronic hyponatremia.  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  So far is doing well.  Following a 48 ounce per day fluid restriction.    On physical exam his blood pressure is 131/70 with a pulse of 76 and he weighed 125 pounds.  His neck was supple without JVD.  Lungs were clear.  Heart revealed a regular rate and rhythm with an S4 but no other gallops, murmurs or rubs.  Abdomen was soft, flat, nontender without organomegaly, masses or bruits.  Extremities revealed no edema.    I reviewed his most recent labs done on February 1, 2025.  Creatinine remains normal at 0.94.  Sodium likewise was good at 136.    I therefore reassured him that his sodium is in the normal range.  Continue a 48 ounce per day fluid restriction.  Commended him on quitting smoking cigarettes.  Repeat renal panel in 3 months.  Return in 6 months.    Thank you again for allowing me to participate in the care of your patient.  If you have any questions please feel free to call.           Sincerely,   Faisal Villar MD   [facility], [facility]  [address]  [place] [id number]    Document electronically generated by:  Faisal Villar MD

## 2025-02-06 ENCOUNTER — HOSPITAL ENCOUNTER (OUTPATIENT)
Dept: GENERAL RADIOLOGY | Age: 70
Discharge: HOME | End: 2025-02-06
Attending: FAMILY MEDICINE
Payer: MEDICARE

## 2025-02-06 ENCOUNTER — LAB ENCOUNTER (OUTPATIENT)
Dept: LAB | Age: 70
End: 2025-02-06
Attending: FAMILY MEDICINE
Payer: MEDICARE

## 2025-02-06 ENCOUNTER — OFFICE VISIT (OUTPATIENT)
Dept: FAMILY MEDICINE CLINIC | Facility: CLINIC | Age: 70
End: 2025-02-06

## 2025-02-06 VITALS
SYSTOLIC BLOOD PRESSURE: 148 MMHG | HEIGHT: 64 IN | WEIGHT: 124 LBS | TEMPERATURE: 97 F | HEART RATE: 96 BPM | DIASTOLIC BLOOD PRESSURE: 78 MMHG | BODY MASS INDEX: 21.17 KG/M2

## 2025-02-06 DIAGNOSIS — E03.9 ACQUIRED HYPOTHYROIDISM: ICD-10-CM

## 2025-02-06 DIAGNOSIS — E78.2 MIXED HYPERLIPIDEMIA: ICD-10-CM

## 2025-02-06 DIAGNOSIS — R29.898 LEG WEAKNESS, BILATERAL: ICD-10-CM

## 2025-02-06 DIAGNOSIS — Z00.00 ADULT GENERAL MEDICAL EXAM: Primary | ICD-10-CM

## 2025-02-06 DIAGNOSIS — J43.9 PULMONARY EMPHYSEMA, UNSPECIFIED EMPHYSEMA TYPE (HCC): ICD-10-CM

## 2025-02-06 DIAGNOSIS — S63.501A SPRAIN OF RIGHT WRIST, INITIAL ENCOUNTER: ICD-10-CM

## 2025-02-06 DIAGNOSIS — K21.9 CHRONIC GERD: ICD-10-CM

## 2025-02-06 DIAGNOSIS — Z00.00 ENCOUNTER FOR ANNUAL HEALTH EXAMINATION: ICD-10-CM

## 2025-02-06 DIAGNOSIS — Z12.5 SCREENING PSA (PROSTATE SPECIFIC ANTIGEN): ICD-10-CM

## 2025-02-06 DIAGNOSIS — N40.1 BENIGN NON-NODULAR PROSTATIC HYPERPLASIA WITH LOWER URINARY TRACT SYMPTOMS: ICD-10-CM

## 2025-02-06 DIAGNOSIS — M76.61 ACHILLES TENDINITIS OF RIGHT LOWER EXTREMITY: ICD-10-CM

## 2025-02-06 LAB
ALBUMIN SERPL-MCNC: 4.9 G/DL (ref 3.2–4.8)
ALP LIVER SERPL-CCNC: 53 U/L
ALT SERPL-CCNC: 15 U/L
AST SERPL-CCNC: 20 U/L (ref ?–34)
BILIRUB DIRECT SERPL-MCNC: 0.1 MG/DL (ref ?–0.3)
BILIRUB SERPL-MCNC: 0.4 MG/DL (ref 0.2–1.1)
CHOLEST SERPL-MCNC: 173 MG/DL (ref ?–200)
COMPLEXED PSA SERPL-MCNC: 0.48 NG/ML (ref ?–4)
FASTING PATIENT LIPID ANSWER: YES
HDLC SERPL-MCNC: 66 MG/DL (ref 40–59)
LDLC SERPL CALC-MCNC: 93 MG/DL (ref ?–100)
NONHDLC SERPL-MCNC: 107 MG/DL (ref ?–130)
PROT SERPL-MCNC: 7.5 G/DL (ref 5.7–8.2)
TRIGL SERPL-MCNC: 77 MG/DL (ref 30–149)
TSI SER-ACNC: 2.43 UIU/ML (ref 0.55–4.78)
VLDLC SERPL CALC-MCNC: 12 MG/DL (ref 0–30)

## 2025-02-06 PROCEDURE — 84443 ASSAY THYROID STIM HORMONE: CPT

## 2025-02-06 PROCEDURE — 80076 HEPATIC FUNCTION PANEL: CPT

## 2025-02-06 PROCEDURE — 72110 X-RAY EXAM L-2 SPINE 4/>VWS: CPT | Performed by: FAMILY MEDICINE

## 2025-02-06 PROCEDURE — 80061 LIPID PANEL: CPT

## 2025-02-06 PROCEDURE — 73110 X-RAY EXAM OF WRIST: CPT | Performed by: FAMILY MEDICINE

## 2025-02-06 PROCEDURE — 36415 COLL VENOUS BLD VENIPUNCTURE: CPT

## 2025-02-06 NOTE — PROGRESS NOTES
Subjective:   Alejandro Hanna is a 69 year old male who presents for a Subsequent Annual Wellness visit (Pt already had Initial Annual Wellness) and scheduled follow up of multiple significant but stable problems.     Last physical done on 02/15/2024.    Pt quit smoking sometime ago.    I reviewed pt's labs and vitals. He consulted with Dr. Damian, oncology/hematology, on 2/4/2025 and his blood pressure was normal then. He states his blood pressures are in a normal range at home. Recent blood pressures in office were also normal. I discussed with him and let him know to keep monitoring his blood pressure. Pt consulted with nephrology on 2/4/2024, I reviewed his labs from 2/1/2025  and discussed with him.  Sodium was back up to normal.    Pt states that right leg pain has improved since his last visit. Arterial dopplers of his bilateral legs were normal. The burning pain has subsided but he will have lots of fatigue in his legs after walking long distances. He has not consulted with neurology; I discussed with him and provided a referral.    Pt was walking on the ice and had a FOOSH injury after falling backwards and trying to catch himself this morning. He had some pain at the time of injury but now only has some minimal right wrist tenderness. He states he has good range of motion in the wrist. I discussed with him.  He did not hurt his head.  He is able to stand right back up.      History/Other:   Fall Risk Assessment:   He has been screened for Falls and is low risk.      Cognitive Assessment:   He had a completely normal cognitive assessment - see flowsheet entries       Functional Ability/Status:   Alejandro Hanna has some abnormal functions as listed below:  He has Hearing problems based on screening of functional status.He has Vision problems based on screening of functional status. He has Walking problems based on screening of functional status. He has problems with Daily Activities based on  screening of functional status.       Depression Screening (PHQ):  PHQ-2 SCORE: 0  , done 2/6/2025             Advanced Directives:   He has a Living Will on file in [facility]; reviewed and discussed documents with patient (and family/surrogate if present).  He does NOT have a Power of  for Health Care. [Do you have a healthcare power of ?: No]  Discussed Advance Care Planning with patient (and family/surrogate if present). Standard forms made available to patient in After Visit Summary.      Patient Active Problem List   Diagnosis    Essential hypertension, benign    Mixed hyperlipidemia    Nocturia    Benign non-nodular prostatic hyperplasia with lower urinary tract symptoms    Chronic obstructive pulmonary disease (HCC)    Decreased hearing of right ear    Eructation    Allergic rhinitis due to pollen    Mass of upper lobe of left lung    Acquired hypothyroidism    Tobacco use disorder    Bladder cancer (HCC)    Alcohol abuse    Hyponatremia    Calcification of aorta    Hiatal hernia    Protein-calorie malnutrition, unspecified severity (HCC)    Dizziness    Right ear impacted cerumen    Abnormal clinical finding    Abnormal blood chemistry    Abnormal results of liver function studies    Cough    Disorder of kidney and ureter    Hepatomegaly    Impotence of organic origin    Internal derangement of left knee    Primary osteoarthritis of left knee    Cancer of upper lobe of left lung (HCC)     Allergies:  He has No Known Allergies.    Current Medications:  Outpatient Medications Marked as Taking for the 2/6/25 encounter (Office Visit) with Leonid Ford DO   Medication Sig    albuterol (VENTOLIN HFA) 108 (90 Base) MCG/ACT Inhalation Aero Soln Inhale 2 puffs into the lungs every 6 (six) hours as needed for Wheezing.    ATORVASTATIN 10 MG Oral Tab TAKE 1 TABLET BY MOUTH EVERY DAY    levothyroxine (LEVOXYL) 88 MCG Oral Tab Take 1 tablet (88 mcg total) by mouth before breakfast. For underactive thyroid.     Omeprazole 40 MG Oral Capsule Delayed Release Take 1 capsule (40 mg total) by mouth daily.    Olmesartan Medoxomil 40 MG Oral Tab Take 1 tablet (40 mg total) by mouth daily. For blood pressure    BREO ELLIPTA 200-25 MCG/ACT Inhalation Aerosol Powder, Breath Activated Inhale 1 puff into the lungs daily.    Fenofibrate 160 MG Oral Tab Take 1 tablet (160 mg total) by mouth daily.    fluticasone propionate 50 MCG/ACT Nasal Suspension 2 sprays by Nasal route daily.    Dutasteride 0.5 MG Oral Cap Take 1 capsule (0.5 mg total) by mouth every morning.       Medical History:  He  has a past medical history of Allergic rhinitis, Bladder cancer (HCC) (11/2019), BPH (benign prostatic hyperplasia), COPD (HCC), Disorder of kidney and ureter (03/14/2012), Disorder of thyroid, ED (erectile dysfunction), Esophageal reflux, Essential hypertension, Hearing impairment, Hepatomegaly (05/13/2010), Hiatal hernia, High blood pressure, High cholesterol, Hypothyroidism, Injury (01/29/2019), Osteoarthritis, Pulmonary nodules, Tobacco abuse, and Visual impairment.  Surgical History:  He  has a past surgical history that includes electrocardiogram, complete (12/15/2013); other surgical history (2009); colonoscopy; oral surgery; colonoscopy (N/A, 12/28/2020); and other surgical history (Left, 10/24/2024).   Family History:  His family history includes Cancer in his sister; Dementia in his mother; Diabetes in his paternal grandmother.  Social History:  He  reports that he has quit smoking. His smoking use included cigarettes. He started smoking about 13 months ago. He has a 40.5 pack-year smoking history. He quit smokeless tobacco use about 3 months ago. He reports current alcohol use of about 2.0 standard drinks of alcohol per week. He reports that he does not use drugs.    Tobacco:  He smoked tobacco in the past but just quit smoking in the last 12 months.  Social History     Tobacco Use   Smoking Status Former    Current packs/day: 0.50     Average packs/day: 1 pack/day for 41.1 years (40.5 ttl pk-yrs)    Types: Cigarettes    Start date: 2024   Smokeless Tobacco Former    Quit date: 10/24/2024   Tobacco Comments    2/2022: less than 1 pack per day    10/2024: smokes 8-9 cigarettes a day          CAGE Alcohol Screen:   CAGE screening score of 0 on 2/6/2025, showing low risk of alcohol abuse.      Patient Care Team:  Leonid Ford DO as PCP - General (Family Medicine)  Najjar, Samer F, MD (SURGERY, VASCULAR)    Review of Systems   Respiratory:  Negative for shortness of breath.    Cardiovascular:  Negative for chest pain.          Objective:   Physical Exam    Physical Exam   Constitutional: He appears well-developed and well-nourished. No distress.   Head: Normocephalic.   Right Ear: Tympanic membrane and ear canal normal.   Left Ear: Tympanic membrane and ear canal normal.   Nose: No mucosal edema or rhinorrhea.  Mouth/Throat: Oropharynx is clear and moist and mucous membranes are normal.   Eyes: Conjunctivae and EOM are normal. Pupils are equal, round, and reactive to light.   Neck: Normal range of motion. Neck supple. No thyromegaly present.   Cardiovascular: Normal rate, regular rhythm and no murmur heard.   Pulmonary/Chest: Effort normal and breath sounds normal. No respiratory distress.   Abdominal: Soft. Bowel sounds are normal. There is no hepatosplenomegaly. There is no tenderness.   Lymphadenopathy: He has no cervical adenopathy.   Neurological: He is alert and oriented to person, place, and time. He has normal reflexes. No cranial nerve deficit.   Skin: Skin is warm and dry. No rash noted.   Psychiatric: He has a normal mood and affect.  Lower legs: No edema of the legs bilaterally. 2+ pedal pulses bilaterally.     Right Wrist: No tenderness and full range of motion. No scaphoid tenderness. Strong hand grasp. Good pulses and no open wounds.     Right Lower Extremity: No achilles tenderness on the right. His achilles is intact. DTR 1/4 BLE.   Gait at this time is normal.    Vitals reviewed.      BP (!) 172/97   Pulse 96   Temp 97.3 °F (36.3 °C) (Temporal)   Ht 5' 4\" (1.626 m)   Wt 124 lb   BMI 21.28 kg/m²  Estimated body mass index is 21.28 kg/m² as calculated from the following:    Height as of this encounter: 5' 4\" (1.626 m).    Weight as of this encounter: 124 lb.    Medicare Hearing Assessment:   Hearing Screening    Screening Method: Questionnaire  I have a problem hearing over the telephone: No I have trouble following the conversations when two or more people are talking at the same time: No   I have trouble understanding things on the TV: No I have to strain to understand conversations: No   I have to worry about missing the telephone ring or doorbell: No I have trouble hearing conversations in a noisy background such as a crowded room or restaurant: No   I get confused about where sounds come from: No I misunderstand some words in a sentence and need to ask people to repeat themselves: No   I especially have trouble understanding the speech of women and children: No I have trouble understanding the speaker in a large room such as at a meeting or place of Judaism: No   Many people I talk to seem to mumble (or don't speak clearly): No People get annoyed because I misunderstand what they say: No   I misunderstand what others are saying and make inappropriate responses: No I avoid social activities because I cannot hear well and fear I will reply improperly: No   Family members and friends have told me they think I may have hearing loss: No             Visual Acuity:   Right Eye Visual Acuity: Corrected Right Eye Chart Acuity: 20/25   Left Eye Visual Acuity: Corrected Left Eye Chart Acuity: 20/25   Both Eyes Visual Acuity: Corrected Both Eyes Chart Acuity: 20/25   Able To Tolerate Visual Acuity: Yes        Assessment & Plan:   Alejandro Mckinneykaren is a 69 year old male who presents for a Medicare Assessment.     Diagnoses and all orders for  this visit:    Adult general medical exam  Medicare exam done.  Acquired hypothyroidism  -     Assay, Thyroid Stim Hormone; Future    Mixed hyperlipidemia  -     Hepatic Function Panel (7); Future  -     Lipid Panel; Future  Compliant with medications for cholesterol  Pulmonary emphysema, unspecified emphysema type (HCC)  -     Hepatic Function Panel (7); Future  Follows up with pulmonology and takes his inhalers  Benign non-nodular prostatic hyperplasia with lower urinary tract symptoms  Takes the prostate medicine with some relief  Encounter for annual health examination    Screening PSA (prostate specific antigen)  -     PSA Total, Screen; Future    Chronic GERD  Controlled with PPI  Sprain of right wrist, initial encounter  -     XR WRIST COMPLETE (MIN 3 VIEWS), RIGHT (CPT=73110); Future  Exam is quite normal.  We have we will do an x-ray  Achilles tendinitis of right lower extremity  No tenderness at this time.  The Achilles tendinitis seems to be much better.  Leg weakness, bilateral  -     NEURO - INTERNAL  -     XR LUMBAR SPINE (MIN 4 VIEWS) (CPT=72110); Future  He does not complain of burning or numbness in the feet or toes or ache in the calves that makes him stop walking.  It is just a weakness that happens after walking more than 1.75 miles.      Referrals (if applicable)  Orders Placed This Encounter   Procedures    NEURO - INTERNAL     If he is busy you can see one of his partners.    Leg weakness after he walks about 1 mile and three quarters.  We did arterial Dopplers and there is no blockage.  Do you think this is neuropathic claudication?     Referral Priority:   Routine     Referral Type:   OFFICE VISIT     Referred to Provider:   Bebeto Macdonald DO     Requested Specialty:   NEUROLOGY     Number of Visits Requested:   3         Follow up if symptoms persist.  Take medicine (if given) as prescribed.  Approach to treatment discussed and patient/family member understands and agrees to plan.     No  follow-ups on file.    The patient indicates understanding of these issues and agrees to the plan.  Reinforced healthy diet, lifestyle, and exercise.      No follow-ups on file.     Sarah Richardson, 2/6/2025     By signing my name below, ISarah,  attest that this documentation has been prepared under the direction and in the presence of Leonid Ford DO.   Electronically Signed: Sarah iRchardson, 2/6/2025, 8:44 AM.     I, Leonid Ford DO,  personally performed the services described in this documentation. All medical record entries made by the scribe were at my direction and in my presence.  I have reviewed the chart and discharge instructions (if applicable) and agree that the record reflects my personal performance and is accurate and complete.  Leonid Ford DO, 2/6/2025, 9:25 AM    Supplementary Documentation:   General Health:  In the past six months, have you lost more than 10 pounds without trying?: 2 - No  Has your appetite been poor?: No  Type of Diet: Balanced  How does the patient maintain a good energy level?: Appropriate Exercise  How would you describe your daily physical activity?: Moderate  How would you describe your current health state?: Fair  How do you maintain positive mental well-being?: Social Interaction;Visiting Friends;Visiting Family  On a scale of 0 to 10, with 0 being no pain and 10 being severe pain, what is your pain level?: 0 - (None)  In the past six months, have you experienced urine leakage?: 0-No  At any time do you feel concerned for the safety/well-being of yourself and/or your children, in your home or elsewhere?: No  Have you had any immunizations at another office such as Influenza, Hepatitis B, Tetanus, or Pneumococcal?: Yes    Health Maintenance   Topic Date Due    Zoster Vaccines (2 of 2) 03/28/2024    COVID-19 Vaccine (5 - 2024-25 season) 09/01/2024    Annual Depression Screening  01/01/2025    Annual Physical  02/15/2025    PSA  10/27/2025     Colorectal Cancer Screening  12/28/2025    Influenza Vaccine  Completed    Fall Risk Screening (Annual)  Completed    Pneumococcal Vaccine: 50+ Years  Completed    Meningococcal B Vaccine  Aged Out

## 2025-02-07 ENCOUNTER — TELEPHONE (OUTPATIENT)
Dept: FAMILY MEDICINE CLINIC | Facility: CLINIC | Age: 70
End: 2025-02-07

## 2025-02-07 NOTE — TELEPHONE ENCOUNTER
It looks like the radiology department is a bit understaffed and therefore it may take a little bit longer to get the x-rays read.  Once I get the results I will definitely let you know.

## 2025-02-11 ENCOUNTER — TELEPHONE (OUTPATIENT)
Dept: FAMILY MEDICINE CLINIC | Facility: CLINIC | Age: 70
End: 2025-02-11

## 2025-02-11 NOTE — TELEPHONE ENCOUNTER
Patient called, verified Name and . Calling regarding x-ray of wrist and lumbar spine. Relayed provider's message below. Patient verbalized understanding. He will follow up with Neurology. No further questions at this time.      X-ray of the right wrist shows no fractures or dislocations.  Only show some minimal arthritic changes.  No treatment necessary.   Written by Leonid Ford DO on 2/10/2025 12:13 PM CST  Seen by patient Alejandro Hanna on 2/10/2025  3:57 PM     Your back x-ray does show arthritic changes which is not uncommon at 69 years of age.  You have a mild wedge deformity at T10-T11 but I do not think this is causing the fatigue on your legs.  Definitely see neurology as they may need to do nerve test in your legs.   Written by Leonid Ford DO on 2/10/2025 12:12 PM CST  Seen by patient Alejandro Hanna on 2/10/2025  3:58 PM

## 2025-02-14 ENCOUNTER — NURSE TRIAGE (OUTPATIENT)
Dept: FAMILY MEDICINE CLINIC | Facility: CLINIC | Age: 70
End: 2025-02-14

## 2025-02-14 NOTE — TELEPHONE ENCOUNTER
Patient can not get in with Neurology until April.     He would like to know if you think it would be okay for him to see pain management Dr. Reynoso? Patient states he completed an EMG for his wife and if this is the test you think he needs he can get in much sooner?

## 2025-02-14 NOTE — TELEPHONE ENCOUNTER
Action Requested: Summary for Provider     []  Critical Lab, Recommendations Needed  [] Need Additional Advice  []   FYI    []   Need Orders  [] Need Medications Sent to Pharmacy  []  Other     SUMMARY: Recommended home care. Will call back if symptoms change or worsen.     Reason for call: Cold  Onset: n/a     Patient states he has runny nose with yellow drainage. He would like to know if he should be concerned that this is a sinus infection. Denies headache, pain/pressure to sinuses. Afebrile.     Reason for Disposition   Colds with no complications    Protocols used: Common Cold-A-[place]

## 2025-02-14 NOTE — TELEPHONE ENCOUNTER
Spoke to patient (verified Name and ) and relayed provider's message below. Patient verbalized understanding and had no further questions or concerns at this time.

## 2025-02-17 ENCOUNTER — TELEPHONE (OUTPATIENT)
Dept: PHYSICAL MEDICINE AND REHAB | Facility: CLINIC | Age: 70
End: 2025-02-17

## 2025-02-17 NOTE — TELEPHONE ENCOUNTER
Message sent to  and  for transfer of care review.    Patient's last appointment with  was 8/15/24- televisit.

## 2025-02-17 NOTE — TELEPHONE ENCOUNTER
Patient was referred to Dr Reynoso he is currently a patient of Dr Gerardo.  Patient stated his wife also sees Dr Reynoos so he wants to transfer his care. Can this transfer be allowed?   He is coming in for leg pain. Achilles tendon is also still not getting better .

## 2025-02-19 NOTE — TELEPHONE ENCOUNTER
Per : \"Fine with me if Dr. Gerardo thinks it appropriate.\"    Per :\"Yes ok with me. Thanks\"    Message forwarded to front office. EDUAR approval received. Ok to schedule appointment.

## 2025-02-21 ENCOUNTER — OFFICE VISIT (OUTPATIENT)
Dept: PODIATRY CLINIC | Facility: CLINIC | Age: 70
End: 2025-02-21
Payer: MEDICARE

## 2025-02-21 DIAGNOSIS — M79.671 BILATERAL FOOT PAIN: ICD-10-CM

## 2025-02-21 DIAGNOSIS — M79.675 PAIN DUE TO ONYCHOMYCOSIS OF TOENAILS OF BOTH FEET: Primary | ICD-10-CM

## 2025-02-21 DIAGNOSIS — R29.898 WEAKNESS OF BOTH LOWER EXTREMITIES: ICD-10-CM

## 2025-02-21 DIAGNOSIS — L84 FOOT CALLUS: ICD-10-CM

## 2025-02-21 DIAGNOSIS — B35.1 PAIN DUE TO ONYCHOMYCOSIS OF TOENAILS OF BOTH FEET: Primary | ICD-10-CM

## 2025-02-21 DIAGNOSIS — M79.672 BILATERAL FOOT PAIN: ICD-10-CM

## 2025-02-21 DIAGNOSIS — M21.622 TAILOR'S BUNION OF BOTH FEET: ICD-10-CM

## 2025-02-21 DIAGNOSIS — M21.621 TAILOR'S BUNION OF BOTH FEET: ICD-10-CM

## 2025-02-21 DIAGNOSIS — M79.674 PAIN DUE TO ONYCHOMYCOSIS OF TOENAILS OF BOTH FEET: Primary | ICD-10-CM

## 2025-02-21 PROCEDURE — 99213 OFFICE O/P EST LOW 20 MIN: CPT | Performed by: STUDENT IN AN ORGANIZED HEALTH CARE EDUCATION/TRAINING PROGRAM

## 2025-02-21 NOTE — PROGRESS NOTES
[facility]  Progress Note      Alejandro Hanna is a 69 year old male.   Chief Complaint   Patient presents with    Toenail Care     F/u toenail care  left great toe pain 7/10 possibly due to long nail              HPI:     Patient is a pleasant 69-year-old male who presents to clinic for bilateral foot evaluation.  Admits to elongated toenails and a callus on the plantar aspect of the right foot.  Today patient also admits to lower extremity weakness.    Allergies: Patient has no known allergies.    Current Outpatient Medications   Medication Sig Dispense Refill    albuterol (VENTOLIN HFA) 108 (90 Base) MCG/ACT Inhalation Aero Soln Inhale 2 puffs into the lungs every 6 (six) hours as needed for Wheezing. 54 g 3    ATORVASTATIN 10 MG Oral Tab TAKE 1 TABLET BY MOUTH EVERY DAY 90 tablet 3    levothyroxine (LEVOXYL) 88 MCG Oral Tab Take 1 tablet (88 mcg total) by mouth before breakfast. For underactive thyroid. 90 tablet 3    Omeprazole 40 MG Oral Capsule Delayed Release Take 1 capsule (40 mg total) by mouth daily. 90 capsule 3    Olmesartan Medoxomil 40 MG Oral Tab Take 1 tablet (40 mg total) by mouth daily. For blood pressure 90 tablet 3    BREO ELLIPTA 200-25 MCG/ACT Inhalation Aerosol Powder, Breath Activated Inhale 1 puff into the lungs daily.      Fenofibrate 160 MG Oral Tab Take 1 tablet (160 mg total) by mouth daily. 90 tablet 3    fluticasone propionate 50 MCG/ACT Nasal Suspension 2 sprays by Nasal route daily. 48 mL 3    Dutasteride 0.5 MG Oral Cap Take 1 capsule (0.5 mg total) by mouth every morning.        Past Medical History:    Allergic rhinitis    Flonase    Bladder cancer (HCC)    f/u Sarah    BPH (benign prostatic hyperplasia)    COPD (HCC)    moderate-severe     Disorder of kidney and ureter    Disorder of thyroid    hypothyroid    ED (erectile dysfunction)    Esophageal reflux    Essential hypertension    Hearing impairment    Hepatomegaly    Hiatal hernia    High blood pressure     High cholesterol    Hypothyroidism    Injury    Left knee injury, from fall    Osteoarthritis    in knee's, hip's    Pulmonary nodules    first noted 2016on LD screen CT    Tobacco abuse    Visual impairment    glasses      Past Surgical History:   Procedure Laterality Date    Colonoscopy      02-14-17    Colonoscopy N/A 12/28/2020    Procedure: COLONOSCOPY;  Surgeon: Real Abreu MD;  Location: [facility] ENDOSCOPY    Electrocardiogram, complete  12/15/2013    scanned to media tab    Oral surgery      Other surgical history  2009    cyst removal, side of head    Other surgical history Left 10/24/2024    VATS upper lobe wedge resection by Dr. Maria      Family History   Problem Relation Age of Onset    Dementia Mother     Diabetes Paternal Grandmother     Cancer Sister         lung cancer      Social History     Socioeconomic History    Marital status:    Tobacco Use    Smoking status: Former     Current packs/day: 0.50     Average packs/day: 1 pack/day for 41.1 years (40.6 ttl pk-yrs)     Types: Cigarettes     Start date: 2024    Smokeless tobacco: Former     Quit date: 10/24/2024    Tobacco comments:     2/2022: less than 1 pack per day     10/2024: smokes 8-9 cigarettes a day   Vaping Use    Vaping status: Never Used   Substance and Sexual Activity    Alcohol use: Yes     Alcohol/week: 2.0 standard drinks of alcohol     Types: 2 Cans of beer per week     Comment: 2 beer per day    Drug use: No   Other Topics Concern    Caffeine Concern Yes     Comment: coffee-1 cup/day           REVIEW OF SYSTEMS:     Denies nause, fever, chills  No calf pain  Denies chest pain or SOB      EXAM:   There were no vitals taken for this visit.  GENERAL: well developed, well nourished, in no apparent distress  EXTREMITIES:   1. Integument: Normal skin temperature and turgor. Toenails x10 are elongated, thickened and discolored with subungal derbi. HPK to right plantar foot.     2. Vascular: Dorsalis pedis two out of  four bilateral and posterior tibial pulses two out of   four bilateral, capillary refill normal.   3. Musculoskeletal: All muscle groups are graded 5 out of 5 in the foot and ankle.   4. Neurological: Normal sharp dull sensation; reflexes normal.             ASSESSMENT AND PLAN:   Diagnoses and all orders for this visit:    Pain due to onychomycosis of toenails of both feet    Foot callus    Bilateral foot pain  -     Physical Therapy Referral - [facility]    Tailor's bunion of both feet    Weakness of both lower extremities  -     Physical Therapy Referral - [facility]          Plan:       -Patient examined, chart history reviewed.  -Discussed importance of proper pedal hygiene, regular foot checks, and tight glucose control.  -Sharply debrided nails x10 with a sterile nail nipper achieving a 20% reduction in thickness and length, without incident.   -pared HPK to right foot with sterile #15 blade to healthy skin  -Ambulate with supportive shoes and inserts and avoid walking barefoot.  -Educated patient on acute signs of infection advised patient to seek immediate medical attention if symptoms arise.    RTC in 3 months  The patient indicates understanding of these issues and agrees to the plan.        Sierra Encarnacion DPM

## 2025-03-14 ENCOUNTER — TELEPHONE (OUTPATIENT)
Dept: PHYSICAL MEDICINE AND REHAB | Facility: CLINIC | Age: 70
End: 2025-03-14

## 2025-03-14 ENCOUNTER — OFFICE VISIT (OUTPATIENT)
Dept: PHYSICAL MEDICINE AND REHAB | Facility: CLINIC | Age: 70
End: 2025-03-14
Payer: MEDICARE

## 2025-03-14 VITALS — WEIGHT: 125 LBS | BODY MASS INDEX: 21.34 KG/M2 | HEIGHT: 64 IN

## 2025-03-14 DIAGNOSIS — M25.551 GREATER TROCHANTERIC PAIN SYNDROME OF BOTH LOWER EXTREMITIES: ICD-10-CM

## 2025-03-14 DIAGNOSIS — M25.552 GREATER TROCHANTERIC PAIN SYNDROME OF BOTH LOWER EXTREMITIES: ICD-10-CM

## 2025-03-14 DIAGNOSIS — M54.16 LUMBAR RADICULOPATHY: Primary | ICD-10-CM

## 2025-03-14 RX ORDER — TRIAMCINOLONE ACETONIDE 40 MG/ML
80 INJECTION, SUSPENSION INTRA-ARTICULAR; INTRAMUSCULAR ONCE
Status: COMPLETED | OUTPATIENT
Start: 2025-03-14 | End: 2025-03-14

## 2025-03-14 RX ORDER — LIDOCAINE HYDROCHLORIDE 10 MG/ML
13 INJECTION, SOLUTION INFILTRATION; PERINEURAL ONCE
Status: COMPLETED | OUTPATIENT
Start: 2025-03-14 | End: 2025-03-14

## 2025-03-14 RX ADMIN — LIDOCAINE HYDROCHLORIDE 13 ML: 10 INJECTION, SOLUTION INFILTRATION; PERINEURAL at 09:38:00

## 2025-03-14 RX ADMIN — TRIAMCINOLONE ACETONIDE 80 MG: 40 INJECTION, SUSPENSION INTRA-ARTICULAR; INTRAMUSCULAR at 09:37:00

## 2025-03-14 NOTE — PROCEDURES
PROCEDURE NOTE    DIAGNOSIS  Bilateral greater trochanteric bursitis/pain syndrome    PROCEDURE  Ultrasound guided Bilateral greater trochanteric bursa injection    PERFORMING PHYSICIAN  Brando Reynoso DO    PROCEDURE NOTE  Informed consent was obtained. Risks and benefits of the procedure were explained. The patient was placed in a sidelying position and the Right greater trochanter was identified under ultrasound using the curvilinear transducer. The area of maximal tenderness was identified. The area was prepped with Betadine x 3 and alcohol swab. Sterile ultrasound gel was applied. A 27-gauge 1.5 inch needle was inserted into this area and 1-2 mL of 1% lidocaine was infused subcutaneously to anesthetize the region. Then, a 22-gauge 3.5 inch needle was advanced under ultrasound guidance to the target, using an in-plane approach. Then, 1 mL of 40 mg/mL Triamcinolone, 3 mL of 1% Lidocaine was infused.     Procedure was then repeated on the left side.     The patient tolerated the procedure without complications. Post procedure instructions were provided.        Brando Reynoso DO  Physical Medicine and Rehabilitation / Sports Medicine   [facility]

## 2025-03-14 NOTE — TELEPHONE ENCOUNTER
Per CMS Guidelines -no authorization is required for Bilateral greater trochanteric bursa injections, ultrasound guidance.  CPT codes: 23399-44,  x's 2  Completed in the office.       Status: Authorization is not required based on medical necessity however is not a guarantee of payment and may be subject to review once claim is submitted.

## 2025-03-14 NOTE — PROGRESS NOTES
NEW PATIENT VISIT    CHIEF COMPLAINT  Low back pain  HISTORY OF PRESENTING ILLNESS    Alejandro Hanna is a 69 year old male who presents for evaluation of low back pain.  Patient complains of bilateral leg pain worse on the which starts in his ankle and radiates up to his [place].  Pain is aching and burning.  It is worse when walking.  Denies numbness but complains of tingling.  He has a lumbar spine radiograph from 2/6/2025.  Denies physical therapy or injections.  Currently using Tylenol with slight relief.  Level pain is rated 6/10.    History of Present Illness  The patient, with a history of Achilles tendon sprain, presents with progressive leg pain and weakness that began in January. Initially, he noticed difficulty completing his usual mile walk [place] [employer] due to a burning sensation traveling up his leg from the gluteal region to the thigh to the lateral and anterior  shin. Over time, the distance he could walk decreased, and the burning sensation spread to the other leg and up to the buttocks. The pain and weakness have now progressed to the point where the patient's legs shake and give out after standing for a short period, such as when snow blowing his driveway. He also reports sensitivity in the area of the previous Achilles tendon sprain, but x-rays of the area show no abnormalities. The patient has received a bursa injection in the past, which provided relief until recently. He also has a history of physical therapy for hip issues, which was beneficial.     PAST MEDICAL HISTORY  Past Medical History:    Allergic rhinitis    Flonase    Bladder cancer (HCC)    f/u Sarah    BPH (benign prostatic hyperplasia)    COPD (HCC)    moderate-severe     Disorder of kidney and ureter    Disorder of thyroid    hypothyroid    ED (erectile dysfunction)    Esophageal reflux    Essential hypertension    Hearing impairment    Hepatomegaly    Hiatal hernia    High blood pressure    High cholesterol     Hypothyroidism    Injury    Left knee injury, from fall    Osteoarthritis    in knee's, hip's    Pulmonary nodules    first noted 2016on LD screen CT    Tobacco abuse    Visual impairment    glasses       PAST SURGICAL HISTORY  Past Surgical History:   Procedure Laterality Date    Colonoscopy      02-14-17    Colonoscopy N/A 12/28/2020    Procedure: COLONOSCOPY;  Surgeon: Real Abreu MD;  Location: [facility] ENDOSCOPY    Electrocardiogram, complete  12/15/2013    scanned to media tab    Oral surgery      Other surgical history  2009    cyst removal, side of head    Other surgical history Left 10/24/2024    VATS upper lobe wedge resection by Dr. Maria       MEDICATIONS  Medications Ordered Prior to Encounter[1]    ALLERGIES  Allergies[2]    SOCIAL HISTORY   reports that he has been smoking cigarettes. He started smoking about 14 months ago. He has a 40.6 pack-year smoking history. He quit smokeless tobacco use about 4 months ago. He reports current alcohol use of about 2.0 standard drinks of alcohol per week. He reports that he does not use drugs.    FAMILY HISTORY  Family History   Problem Relation Age of Onset    Dementia Mother     Diabetes Paternal Grandmother     Cancer Sister         lung cancer       REVIEW OF SYSTEMS  Complete review of systems was performed and was negative except for those items stated in the History of Presenting Illness and Past Medical/Surgical History.    PHYSICAL EXAMINATION  GENERAL:  In no acute distress. Well-developed and well nourished.   SKIN: No rashes or open wounds involving posterior torso, posterior pelvis, lower extremities.  NEUROLOGIC:   Strength: 5/5 bilaterally with hip flexion, knee extension, knee flexion, ankle dorsiflexion, ankle eversion, ankle inversion, ankle plantarflexion, and great toe extension.   Sensation: intact light touch sensation throughout both lower extremities.   Reflexes: intact and symmetric in bilateral lower extremities. Babinski  downgoing bilaterally. No clonus.   Gait: able to heel walk, toe walk, and perform tandem gait.   MUSCULOSKELETAL:  Physical Exam  MUSCULOSKELETAL: Ankles normal range of motion bilaterally with weakness in ankle dorsiflexion bilaterally. Toe plantarflexion normal strength bilaterally. Hips normal range of motion bilaterally.  NEUROLOGICAL: Reflexes present. Sensation intact, no hypersensitivity.       REVIEW OF PRIOR X-RAYS/STUDIES  Independently reviewed XR of lumbar spine which shows multilevel degenerative changes.     IMPRESSION/DIAGNOSIS  Encounter Diagnoses   Name Primary?    Lumbar radiculopathy Yes    Greater trochanteric pain syndrome of both lower extremities        TREATMENT/PLAN  Assessment & Plan  Lumbar Radiculopathy  Suspected nerve compression at L4-L5 or L5-S1 levels affecting L4 and L5 nerves. MRI needed for confirmation and assessment.  - Order MRI of the lumbar spine.  - Refer to DPT for physical therapy.  - consider TFESI depending on MRI results.     Trochanteric Bursitis  Pain likely due to bursa inflammation. Injections previously provided relief.  - Administer bursa injections today.      Education was provided regarding the above impression/diagnosis and treatment options/plan were discussed.  All questions were answered during today's visit.  Patient will contact clinic if any other questions or concerns.            Brando Reynoso,   Interventional Spine and Sports Medicine Specialist   Physical Medicine and Rehabilitation  [address] [id number]    [facility]  [address] [place] [address]               [1]   Current Outpatient Medications on File Prior to Visit   Medication Sig Dispense Refill    albuterol (VENTOLIN HFA) 108 (90 Base) MCG/ACT Inhalation Aero Soln Inhale 2 puffs into the lungs every 6 (six) hours as needed for Wheezing. 54 g 3    ATORVASTATIN 10 MG Oral Tab TAKE 1 TABLET BY MOUTH  EVERY DAY 90 tablet 3    levothyroxine (LEVOXYL) 88 MCG Oral Tab Take 1 tablet (88 mcg total) by mouth before breakfast. For underactive thyroid. 90 tablet 3    Omeprazole 40 MG Oral Capsule Delayed Release Take 1 capsule (40 mg total) by mouth daily. 90 capsule 3    Olmesartan Medoxomil 40 MG Oral Tab Take 1 tablet (40 mg total) by mouth daily. For blood pressure 90 tablet 3    BREO ELLIPTA 200-25 MCG/ACT Inhalation Aerosol Powder, Breath Activated Inhale 1 puff into the lungs daily.      Fenofibrate 160 MG Oral Tab Take 1 tablet (160 mg total) by mouth daily. 90 tablet 3    fluticasone propionate 50 MCG/ACT Nasal Suspension 2 sprays by Nasal route daily. 48 mL 3    Dutasteride 0.5 MG Oral Cap Take 1 capsule (0.5 mg total) by mouth every morning.       No current facility-administered medications on file prior to visit.   [2] No Known Allergies

## 2025-03-17 ENCOUNTER — TELEPHONE (OUTPATIENT)
Dept: PODIATRY CLINIC | Facility: CLINIC | Age: 70
End: 2025-03-17

## 2025-03-17 DIAGNOSIS — M21.622 TAILOR'S BUNION OF BOTH FEET: ICD-10-CM

## 2025-03-17 DIAGNOSIS — M79.671 BILATERAL FOOT PAIN: ICD-10-CM

## 2025-03-17 DIAGNOSIS — M21.621 TAILOR'S BUNION OF BOTH FEET: ICD-10-CM

## 2025-03-17 DIAGNOSIS — L84 FOOT CALLUS: Primary | ICD-10-CM

## 2025-03-17 DIAGNOSIS — R29.898 WEAKNESS OF BOTH LOWER EXTREMITIES: ICD-10-CM

## 2025-03-17 DIAGNOSIS — M79.672 BILATERAL FOOT PAIN: ICD-10-CM

## 2025-03-17 NOTE — TELEPHONE ENCOUNTER
Per patient calling stating he is interested in insoles and would like to know what to do next. Please advise.

## 2025-03-18 ENCOUNTER — TELEPHONE (OUTPATIENT)
Dept: PHYSICAL MEDICINE AND REHAB | Facility: CLINIC | Age: 70
End: 2025-03-18

## 2025-03-18 NOTE — TELEPHONE ENCOUNTER
Patient was seen on 2/21/25 for toenail care- patient inquiring on insoles. Would you scan in office or are you sending to [facility] clinic?    Please advise

## 2025-03-18 NOTE — TELEPHONE ENCOUNTER
Pt called requesting to speak with clinical staff. Pt states he cannot sleep and cannot walk well. He thought bursa injection would help but it did not. He states he is hurting from the ankle on up. Pt requesting to have a muscle relaxer or antiinflammatory called in. Please advise, thank you.

## 2025-03-19 RX ORDER — METHYLPREDNISOLONE 4 MG/1
TABLET ORAL
Qty: 1 EACH | Refills: 0 | Status: SHIPPED | OUTPATIENT
Start: 2025-03-19

## 2025-03-19 NOTE — TELEPHONE ENCOUNTER
Spoke with patient who had Ultrasound guided Bilateral greater trochanteric bursa injection on 3/14/25.  - 80% relief    Next office visit: 4/14/25    Patient stated his right leg is still hurting and he can barely walk. States it is his right lower leg/achilles tendon area. Pain radiates up the outside of his right foot.    States last night he was experiencing cramping/burning pain to the right lower leg. Patient can get up to 9/10 with activity and also if he sits a certain way stated it feels like he is cutting off blood supply. Pain can get down to 1-2/10 when laying/no activity.    Patient asking for an anti-inflammatory or some other medication to help with this pain.

## 2025-03-19 NOTE — TELEPHONE ENCOUNTER
Per Dr Reynoso he responded with \"  Sent a medrol dosepack, will need the MRI for injection planning for his radicular symptoms. I wouldn't expect the GT bursa injection to help with that radiating pain, so as we expected, will likely need to intervene on the low back as well.   \"    Patient was informed on Dr Reynoso's recommendations.Patient has MRI scheduled on 4/9/25. No further questions at this time. Closing the encounter.

## 2025-03-20 DIAGNOSIS — E78.2 MIXED HYPERLIPIDEMIA: ICD-10-CM

## 2025-03-24 RX ORDER — FENOFIBRATE 160 MG/1
160 TABLET ORAL DAILY
Qty: 90 TABLET | Refills: 3 | Status: SHIPPED | OUTPATIENT
Start: 2025-03-24

## 2025-03-24 NOTE — TELEPHONE ENCOUNTER
Refill Per Protocol     Requested Prescriptions   Pending Prescriptions Disp Refills    FENOFIBRATE 160 MG Oral Tab [Pharmacy Med Name: FENOFIBRATE 160 MG TABLET] 90 tablet 3     Sig: TAKE 1 TABLET BY MOUTH EVERY DAY       Cholesterol Medication Protocol Passed - 3/24/2025 11:57 AM        Passed - ALT < 80     Lab Results   Component Value Date    ALT 15 02/06/2025             Passed - ALT resulted within past year        Passed - Lipid panel within past 12 months     Lab Results   Component Value Date    CHOLEST 173 02/06/2025    TRIG 77 02/06/2025    HDL 66 (H) 02/06/2025    LDL 93 02/06/2025    VLDL 12 02/06/2025    NONHDLC 107 02/06/2025             Passed - In person appointment or virtual visit in the past 12 mos or appointment in next 3 mos     Recent Outpatient Visits              1 week ago Lumbar radiculopathy    [facility] Brando Reynoso DO    Office Visit    1 month ago Pain due to onychomycosis of toenails of both feet    [facility] Sierra Encarnacion DPM    Office Visit    1 month ago Adult general medical exam    [facility] Leonid Ford DO    Office Visit    1 month ago Hyponatremia    [facility] Faisal Villar MD    Office Visit    1 month ago Squamous cell carcinoma of upper lobe of left lung (HCC)    [facility] Hematology Oncology [place] Carlos Damian MD    Office Visit          Future Appointments         Provider Department Appt Notes    In 2 weeks LMB MRI RM1 (1.5T WIDE) [facility] MRI - [place]     In 3 weeks Brando Reynoso DO [facility] discuss mri    In 1 month Sierra Encarnacion DPM [facility] 9wk follow up    In 1 month LMB CT RM1 [facility] CT - [place]     In 1 month  Faisal Villar MD [facility] [place] 3 months    In 1 month Carlos Damian MD [facility]ology Oncology [place]                     Passed - Medication is active on med list

## 2025-04-03 ENCOUNTER — HOSPITAL ENCOUNTER (OUTPATIENT)
Dept: MRI IMAGING | Age: 70
Discharge: HOME | End: 2025-04-03
Attending: PHYSICAL MEDICINE & REHABILITATION
Payer: MEDICARE

## 2025-04-03 DIAGNOSIS — M54.16 LUMBAR RADICULOPATHY: ICD-10-CM

## 2025-04-03 PROCEDURE — 72148 MRI LUMBAR SPINE W/O DYE: CPT | Performed by: PHYSICAL MEDICINE & REHABILITATION

## 2025-04-09 ENCOUNTER — PATIENT MESSAGE (OUTPATIENT)
Dept: PHYSICAL MEDICINE AND REHAB | Facility: CLINIC | Age: 70
End: 2025-04-09

## 2025-04-14 ENCOUNTER — OFFICE VISIT (OUTPATIENT)
Dept: PHYSICAL MEDICINE AND REHAB | Facility: CLINIC | Age: 70
End: 2025-04-14
Payer: MEDICARE

## 2025-04-14 ENCOUNTER — TELEPHONE (OUTPATIENT)
Dept: PHYSICAL MEDICINE AND REHAB | Facility: CLINIC | Age: 70
End: 2025-04-14

## 2025-04-14 VITALS
HEART RATE: 82 BPM | HEIGHT: 64 IN | WEIGHT: 125 LBS | BODY MASS INDEX: 21.34 KG/M2 | OXYGEN SATURATION: 97 % | SYSTOLIC BLOOD PRESSURE: 132 MMHG | DIASTOLIC BLOOD PRESSURE: 72 MMHG

## 2025-04-14 DIAGNOSIS — M54.16 LUMBAR RADICULOPATHY: Primary | ICD-10-CM

## 2025-04-14 PROCEDURE — 99214 OFFICE O/P EST MOD 30 MIN: CPT | Performed by: PHYSICAL MEDICINE & REHABILITATION

## 2025-04-14 NOTE — TELEPHONE ENCOUNTER
Per Spinal Intervention Reference \"patient does not have to hold ASA or blood thinners when performing lumbar facet or medial branch blocks\"   Patient has been scheduled for Right L4 and L5 Transforaminal Epidural Steroid Injection on 4/15/2025 at the [facility] with Dr. Reynoso.   Anesthesia type:  Local  Please note: The [facility] will call the business day prior to discuss the exact time/arrival and additional instructions for your appointment.  Patient was advised that if he/she does receive the covid vaccine it needs to be at least 2 weeks before or after the injection.  Medications and allergies reviewed.  Patient informed of [facility]'s  policy:  The patient will require transportation arrangements to and from the procedure, with the  present on site for the entire visit.  Without a , the appointment is subject to cancellation.    [facility] is located in the [facility] 1st floor [address] [place] [id number].   may park in the yellow/purple parking lot.  Patient verbalized understanding and agrees with plan.  Scheduled in Epic: Yes  Scheduled in Surgical Case: Yes  Follow up appointment made: NOV: Visit date not found  Authorization date valid until N/A at [facility].

## 2025-04-14 NOTE — PROGRESS NOTES
RETURN PATIENT VISIT    CHIEF COMPLAINT  Low back pain right lower extremity radicular pain    INTERVAL HISTORY  Alejandro Hanna is a 69 year old who was last seen in clinic on 3/14/2025 following up on lumbar spine MRI which was completed on 4/3/2025.  Continues to endorse an aching burning pain.  Pain is currently rated 0/10 but this worsens with walking and activity.  Endorses some numbness and tingling and weakness primarily into the right lower extremity.  Is currently using ibuprofen.    History of Present Illness  The patient presents with right leg pain that is exacerbated by walking. The pain is described as a burning sensation that is located in the calf, primarily on the outside. The pain is severe enough to limit the patient's mobility and daily activities, including walking to the mailbox and standing for extended periods, such as during showering. The patient has been attending physical therapy, which has shown some improvement, but the pain remains significant. The patient also mentions occasional pain in the left leg, but it is not as severe as the right.      REVIEW OF SYSTEMS  Review of systems was completed with the patient today as pertinent to today's visit    PHYSICAL EXAMINATION  CONSTITUTIONAL: Well-appearing, in no apparent distress  EYES: No scleral icterus or conjunctival hemorrhage  CARDIOVASCULAR: Skin warm and well-perfused, no peripheral edema  RESPIRATORY: Breathing unlabored without accessory muscle use  PSYCHIATRIC: Alert, cooperative, appropriate mood and affect  SKIN: No lesions or rashes on exposed skin  MUSCULOSKELETAL:   Patient with restricted range of motion in the lumbar spine in forward flexion and extension, positive slump sit on the right side with reproduction of pain posterior laterally in right lower extremity.    REVIEW OF PRIOR X-RAYS/STUDIES  Independently reviewed the MRI of the lumbar spine dated 4/3/2025 which reveals moderate foraminal narrowing L1/2. As  well as broad-based disc bulging at L4-5 which encroaches upon the exiting nerve roots at the L4-5 level as well as the descending L5 nerve root.  This is bilateral.    IMPRESSION/DIAGNOSIS  1.   Encounter Diagnosis   Name Primary?    Lumbar radiculopathy Yes     TREATMENT/PLAN  Assessment & Plan  Lumbar radiculopathy  Chronic lumbar radiculopathy with right-sided symptoms due to L4-5 disc bulge impinging on L5 nerve root, affecting mobility and quality of life.  - Perform a two-level transforaminal epidural steroid injection targeting the L4 and L5 nerve roots on the right side using dexamethasone.  - Schedule the procedure within a week.        Education was provided regarding the above impression/diagnosis and treatment options/plan were discussed.  All questions were answered during today's visit.  Patient will contact clinic if any other questions or concerns.          Brando Reynoso DO  Interventional Spine and Sports Medicine Specialist   Physical Medicine and Rehabilitation  [facility]  [address] [place] [id number]    [address] [place] [address]

## 2025-04-14 NOTE — TELEPHONE ENCOUNTER
Per CMS Guidelines -no authorization is required for Right L4 and L5 TFESI with fluoroscopic guidance.  CPT codes: 63978, 57369       Status: Authorization is not required based on medical necessity however is not a guarantee of payment and may be subject to review once claim is submitted.

## 2025-04-14 NOTE — PROGRESS NOTES
The following individual(s) verbally consented to be recorded using ambient AI listening technology and understand that they can each withdraw their consent to this listening technology at any point by asking the clinician to turn off or pause the recording:    Patient name: Alejandro Abrams Cyndi

## 2025-04-15 PROBLEM — M54.16 LUMBAR RADICULOPATHY: Status: ACTIVE | Noted: 2025-04-15

## 2025-04-19 ENCOUNTER — HOSPITAL ENCOUNTER (INPATIENT)
Facility: HOSPITAL | Age: 70
LOS: 1 days | Discharge: HOME | DRG: 178 | End: 2025-04-20
Attending: EMERGENCY MEDICINE | Admitting: STUDENT IN AN ORGANIZED HEALTH CARE EDUCATION/TRAINING PROGRAM
Payer: MEDICARE

## 2025-04-19 ENCOUNTER — HOSPITAL ENCOUNTER (OUTPATIENT)
Age: 70
Discharge: ANOTHER HOSPITAL | DRG: 178 | End: 2025-04-19
Payer: MEDICARE

## 2025-04-19 ENCOUNTER — APPOINTMENT (OUTPATIENT)
Dept: GENERAL RADIOLOGY | Facility: HOSPITAL | Age: 70
End: 2025-04-19
Attending: EMERGENCY MEDICINE
Payer: MEDICARE

## 2025-04-19 ENCOUNTER — APPOINTMENT (OUTPATIENT)
Dept: GENERAL RADIOLOGY | Facility: HOSPITAL | Age: 70
DRG: 178 | End: 2025-04-19
Attending: EMERGENCY MEDICINE
Payer: MEDICARE

## 2025-04-19 ENCOUNTER — HOSPITAL ENCOUNTER (OUTPATIENT)
Age: 70
Discharge: ANOTHER HOSPITAL | End: 2025-04-19
Payer: MEDICARE

## 2025-04-19 ENCOUNTER — HOSPITAL ENCOUNTER (INPATIENT)
Facility: HOSPITAL | Age: 70
LOS: 1 days | Discharge: HOME | End: 2025-04-20
Attending: EMERGENCY MEDICINE | Admitting: STUDENT IN AN ORGANIZED HEALTH CARE EDUCATION/TRAINING PROGRAM
Payer: MEDICARE

## 2025-04-19 ENCOUNTER — APPOINTMENT (OUTPATIENT)
Dept: MRI IMAGING | Facility: HOSPITAL | Age: 70
End: 2025-04-19
Attending: EMERGENCY MEDICINE
Payer: MEDICARE

## 2025-04-19 ENCOUNTER — APPOINTMENT (OUTPATIENT)
Dept: MRI IMAGING | Facility: HOSPITAL | Age: 70
DRG: 178 | End: 2025-04-19
Attending: EMERGENCY MEDICINE
Payer: MEDICARE

## 2025-04-19 VITALS
RESPIRATION RATE: 16 BRPM | TEMPERATURE: 98 F | HEART RATE: 131 BPM | OXYGEN SATURATION: 93 % | SYSTOLIC BLOOD PRESSURE: 101 MMHG | DIASTOLIC BLOOD PRESSURE: 60 MMHG

## 2025-04-19 DIAGNOSIS — R05.1 ACUTE COUGH: ICD-10-CM

## 2025-04-19 DIAGNOSIS — R79.81 LOW OXYGEN SATURATION: ICD-10-CM

## 2025-04-19 DIAGNOSIS — J30.1 ALLERGIC RHINITIS DUE TO POLLEN, UNSPECIFIED SEASONALITY: ICD-10-CM

## 2025-04-19 DIAGNOSIS — F17.200 TOBACCO USE DISORDER: ICD-10-CM

## 2025-04-19 DIAGNOSIS — R53.1 WEAKNESS: ICD-10-CM

## 2025-04-19 DIAGNOSIS — R29.898 LEG WEAKNESS, BILATERAL: Primary | ICD-10-CM

## 2025-04-19 DIAGNOSIS — J43.9 PULMONARY EMPHYSEMA, UNSPECIFIED EMPHYSEMA TYPE (HCC): ICD-10-CM

## 2025-04-19 DIAGNOSIS — M54.16 LUMBAR RADICULOPATHY: ICD-10-CM

## 2025-04-19 DIAGNOSIS — Z92.241 STATUS POST EPIDURAL STEROID INJECTION: ICD-10-CM

## 2025-04-19 DIAGNOSIS — R26.2 AMBULATORY DYSFUNCTION: ICD-10-CM

## 2025-04-19 DIAGNOSIS — R00.0 TACHYCARDIA: ICD-10-CM

## 2025-04-19 DIAGNOSIS — J18.9 COMMUNITY ACQUIRED PNEUMONIA OF LEFT LOWER LOBE OF LUNG: Primary | ICD-10-CM

## 2025-04-19 DIAGNOSIS — E87.1 HYPONATREMIA: ICD-10-CM

## 2025-04-19 PROBLEM — D72.829 LEUKOCYTOSIS: Status: ACTIVE | Noted: 2025-04-19

## 2025-04-19 PROBLEM — R73.9 HYPERGLYCEMIA: Status: ACTIVE | Noted: 2025-04-19

## 2025-04-19 LAB
ALBUMIN SERPL-MCNC: 5 G/DL (ref 3.2–4.8)
ALP LIVER SERPL-CCNC: 46 U/L (ref 45–117)
ALT SERPL-CCNC: 18 U/L (ref 10–49)
ANION GAP SERPL CALC-SCNC: 9 MMOL/L (ref 0–18)
AST SERPL-CCNC: 20 U/L (ref ?–34)
ATRIAL RATE: 106 BPM
ATRIAL RATE: 113 BPM
BASOPHILS # BLD AUTO: 0.06 X10(3) UL (ref 0–0.2)
BASOPHILS NFR BLD AUTO: 0.4 %
BILIRUB DIRECT SERPL-MCNC: 0.2 MG/DL (ref ?–0.3)
BILIRUB SERPL-MCNC: 0.6 MG/DL (ref 0.2–1.1)
BILIRUB UR QL: NEGATIVE
BUN BLD-MCNC: 13 MG/DL (ref 9–23)
BUN/CREAT SERPL: 14.4 (ref 10–20)
CALCIUM BLD-MCNC: 10.1 MG/DL (ref 8.7–10.4)
CHLORIDE SERPL-SCNC: 92 MMOL/L (ref 98–112)
CLARITY UR: CLEAR
CO2 SERPL-SCNC: 28 MMOL/L (ref 21–32)
CREAT BLD-MCNC: 0.9 MG/DL (ref 0.7–1.3)
DEPRECATED RDW RBC AUTO: 39.9 FL (ref 35.1–46.3)
EGFRCR SERPLBLD CKD-EPI 2021: 91 ML/MIN/1.73M2 (ref 60–?)
EOSINOPHIL # BLD AUTO: 0.04 X10(3) UL (ref 0–0.7)
EOSINOPHIL NFR BLD AUTO: 0.3 %
ERYTHROCYTE [DISTWIDTH] IN BLOOD BY AUTOMATED COUNT: 13.2 % (ref 11–15)
FLUAV + FLUBV RNA SPEC NAA+PROBE: NEGATIVE
FLUAV + FLUBV RNA SPEC NAA+PROBE: NEGATIVE
GLUCOSE BLD-MCNC: 101 MG/DL (ref 70–99)
GLUCOSE UR-MCNC: NORMAL MG/DL
HCT VFR BLD AUTO: 37.1 % (ref 39–53)
HGB BLD-MCNC: 13.1 G/DL (ref 13–17.5)
HGB UR QL STRIP.AUTO: NEGATIVE
IMM GRANULOCYTES # BLD AUTO: 0.22 X10(3) UL (ref 0–1)
IMM GRANULOCYTES NFR BLD: 1.5 %
KETONES UR-MCNC: NEGATIVE MG/DL
LACTATE SERPL-SCNC: 1.3 MMOL/L (ref 0.5–2)
LEUKOCYTE ESTERASE UR QL STRIP.AUTO: NEGATIVE
LYMPHOCYTES # BLD AUTO: 0.5 X10(3) UL (ref 1–4)
LYMPHOCYTES NFR BLD AUTO: 3.4 %
MCH RBC QN AUTO: 29.1 PG (ref 26–34)
MCHC RBC AUTO-ENTMCNC: 35.3 G/DL (ref 31–37)
MCV RBC AUTO: 82.4 FL (ref 80–100)
MONOCYTES # BLD AUTO: 0.89 X10(3) UL (ref 0.1–1)
MONOCYTES NFR BLD AUTO: 6 %
MRSA DNA SPEC QL NAA+PROBE: NEGATIVE
NEUTROPHILS # BLD AUTO: 13.04 X10 (3) UL (ref 1.5–7.7)
NEUTROPHILS # BLD AUTO: 13.04 X10(3) UL (ref 1.5–7.7)
NEUTROPHILS NFR BLD AUTO: 88.4 %
NITRITE UR QL STRIP.AUTO: NEGATIVE
OSMOLALITY SERPL CALC.SUM OF ELEC: 268 MOSM/KG (ref 275–295)
P AXIS: 57 DEGREES
P AXIS: 61 DEGREES
P-R INTERVAL: 128 MS
P-R INTERVAL: 134 MS
PH UR: 6.5 [PH] (ref 5–8)
PLATELET # BLD AUTO: 369 10(3)UL (ref 150–450)
POTASSIUM SERPL-SCNC: 4 MMOL/L (ref 3.5–5.1)
PROT SERPL-MCNC: 7.6 G/DL (ref 5.7–8.2)
PROT UR-MCNC: NEGATIVE MG/DL
Q-T INTERVAL: 308 MS
Q-T INTERVAL: 332 MS
QRS DURATION: 88 MS
QRS DURATION: 88 MS
QTC CALCULATION (BEZET): 422 MS
QTC CALCULATION (BEZET): 441 MS
R AXIS: 33 DEGREES
R AXIS: 34 DEGREES
RBC # BLD AUTO: 4.5 X10(6)UL (ref 3.8–5.8)
RSV RNA SPEC NAA+PROBE: NEGATIVE
SARS-COV-2 RNA RESP QL NAA+PROBE: NOT DETECTED
SODIUM SERPL-SCNC: 129 MMOL/L (ref 136–145)
SP GR UR STRIP: 1.01 (ref 1–1.03)
T AXIS: 52 DEGREES
T AXIS: 61 DEGREES
TROPONIN I SERPL HS-MCNC: 5 NG/L (ref ?–53)
TROPONIN I SERPL HS-MCNC: 5 NG/L (ref ?–53)
UROBILINOGEN UR STRIP-ACNC: NORMAL
VENTRICULAR RATE: 106 BPM
VENTRICULAR RATE: 113 BPM
WBC # BLD AUTO: 14.8 X10(3) UL (ref 4–11)

## 2025-04-19 PROCEDURE — 72148 MRI LUMBAR SPINE W/O DYE: CPT | Performed by: EMERGENCY MEDICINE

## 2025-04-19 PROCEDURE — 71045 X-RAY EXAM CHEST 1 VIEW: CPT | Performed by: EMERGENCY MEDICINE

## 2025-04-19 PROCEDURE — 99223 1ST HOSP IP/OBS HIGH 75: CPT | Performed by: HOSPITALIST

## 2025-04-19 PROCEDURE — 99215 OFFICE O/P EST HI 40 MIN: CPT | Performed by: PHYSICIAN ASSISTANT

## 2025-04-19 RX ORDER — ENOXAPARIN SODIUM 100 MG/ML
40 INJECTION SUBCUTANEOUS NIGHTLY
Status: DISCONTINUED | OUTPATIENT
Start: 2025-04-19 | End: 2025-04-20

## 2025-04-19 RX ORDER — MORPHINE SULFATE 4 MG/ML
4 INJECTION, SOLUTION INTRAMUSCULAR; INTRAVENOUS EVERY 2 HOUR PRN
Status: DISCONTINUED | OUTPATIENT
Start: 2025-04-19 | End: 2025-04-20

## 2025-04-19 RX ORDER — MORPHINE SULFATE 2 MG/ML
1 INJECTION, SOLUTION INTRAMUSCULAR; INTRAVENOUS EVERY 2 HOUR PRN
Status: DISCONTINUED | OUTPATIENT
Start: 2025-04-19 | End: 2025-04-20

## 2025-04-19 RX ORDER — GUAIFENESIN 600 MG/1
600 TABLET, EXTENDED RELEASE ORAL 2 TIMES DAILY
Status: DISCONTINUED | OUTPATIENT
Start: 2025-04-19 | End: 2025-04-20

## 2025-04-19 RX ORDER — LEVOTHYROXINE SODIUM 88 UG/1
88 TABLET ORAL
Status: DISCONTINUED | OUTPATIENT
Start: 2025-04-20 | End: 2025-04-20

## 2025-04-19 RX ORDER — SENNOSIDES 8.6 MG
17.2 TABLET ORAL NIGHTLY PRN
Status: DISCONTINUED | OUTPATIENT
Start: 2025-04-19 | End: 2025-04-20

## 2025-04-19 RX ORDER — ATORVASTATIN CALCIUM 10 MG/1
10 TABLET, FILM COATED ORAL DAILY
Status: DISCONTINUED | OUTPATIENT
Start: 2025-04-20 | End: 2025-04-20

## 2025-04-19 RX ORDER — HYDROCODONE BITARTRATE AND ACETAMINOPHEN 5; 325 MG/1; MG/1
2 TABLET ORAL EVERY 4 HOURS PRN
Refills: 0 | Status: DISCONTINUED | OUTPATIENT
Start: 2025-04-19 | End: 2025-04-20

## 2025-04-19 RX ORDER — FENOFIBRATE 134 MG/1
134 CAPSULE ORAL
Status: DISCONTINUED | OUTPATIENT
Start: 2025-04-20 | End: 2025-04-20

## 2025-04-19 RX ORDER — IPRATROPIUM BROMIDE AND ALBUTEROL SULFATE 2.5; .5 MG/3ML; MG/3ML
3 SOLUTION RESPIRATORY (INHALATION) EVERY 6 HOURS PRN
Status: DISCONTINUED | OUTPATIENT
Start: 2025-04-19 | End: 2025-04-20

## 2025-04-19 RX ORDER — BISACODYL 10 MG
10 SUPPOSITORY, RECTAL RECTAL
Status: DISCONTINUED | OUTPATIENT
Start: 2025-04-19 | End: 2025-04-20

## 2025-04-19 RX ORDER — NAPROXEN SODIUM 220 MG/1
1-2 TABLET, FILM COATED ORAL DAILY PRN
COMMUNITY
End: 2025-05-01

## 2025-04-19 RX ORDER — METOCLOPRAMIDE HYDROCHLORIDE 5 MG/ML
10 INJECTION INTRAMUSCULAR; INTRAVENOUS EVERY 8 HOURS PRN
Status: DISCONTINUED | OUTPATIENT
Start: 2025-04-19 | End: 2025-04-20

## 2025-04-19 RX ORDER — FLUTICASONE PROPIONATE AND SALMETEROL 500; 50 UG/1; UG/1
1 POWDER RESPIRATORY (INHALATION) 2 TIMES DAILY
Status: DISCONTINUED | OUTPATIENT
Start: 2025-04-19 | End: 2025-04-20

## 2025-04-19 RX ORDER — ACETAMINOPHEN 325 MG/1
650 TABLET ORAL EVERY 4 HOURS PRN
Status: DISCONTINUED | OUTPATIENT
Start: 2025-04-19 | End: 2025-04-20

## 2025-04-19 RX ORDER — FLUTICASONE PROPIONATE 50 MCG
2 SPRAY, SUSPENSION (ML) NASAL 2 TIMES DAILY
Status: DISCONTINUED | OUTPATIENT
Start: 2025-04-19 | End: 2025-04-20

## 2025-04-19 RX ORDER — FINASTERIDE 5 MG/1
5 TABLET, FILM COATED ORAL DAILY
Status: DISCONTINUED | OUTPATIENT
Start: 2025-04-20 | End: 2025-04-20

## 2025-04-19 RX ORDER — AZITHROMYCIN 250 MG/1
500 TABLET, FILM COATED ORAL DAILY
Status: DISCONTINUED | OUTPATIENT
Start: 2025-04-20 | End: 2025-04-20

## 2025-04-19 RX ORDER — HYDROCODONE BITARTRATE AND ACETAMINOPHEN 5; 325 MG/1; MG/1
1 TABLET ORAL EVERY 4 HOURS PRN
Refills: 0 | Status: DISCONTINUED | OUTPATIENT
Start: 2025-04-19 | End: 2025-04-20

## 2025-04-19 RX ORDER — ACETAMINOPHEN 500 MG
1-2 TABLET ORAL EVERY 6 HOURS PRN
COMMUNITY

## 2025-04-19 RX ORDER — MELOXICAM 15 MG/1
1 TABLET ORAL DAILY
COMMUNITY
Start: 2025-03-26 | End: 2025-04-19

## 2025-04-19 RX ORDER — POLYETHYLENE GLYCOL 3350 17 G/17G
17 POWDER, FOR SOLUTION ORAL DAILY PRN
Status: DISCONTINUED | OUTPATIENT
Start: 2025-04-19 | End: 2025-04-20

## 2025-04-19 RX ORDER — MORPHINE SULFATE 2 MG/ML
2 INJECTION, SOLUTION INTRAMUSCULAR; INTRAVENOUS EVERY 2 HOUR PRN
Status: DISCONTINUED | OUTPATIENT
Start: 2025-04-19 | End: 2025-04-20

## 2025-04-19 RX ORDER — ONDANSETRON 2 MG/ML
4 INJECTION INTRAMUSCULAR; INTRAVENOUS EVERY 6 HOURS PRN
Status: DISCONTINUED | OUTPATIENT
Start: 2025-04-19 | End: 2025-04-20

## 2025-04-19 RX ORDER — SODIUM CHLORIDE 9 MG/ML
INJECTION, SOLUTION INTRAVENOUS CONTINUOUS
Status: DISCONTINUED | OUTPATIENT
Start: 2025-04-19 | End: 2025-04-20

## 2025-04-19 NOTE — ED INITIAL ASSESSMENT (HPI)
Pt here via EMS from immediate care for BLE weakness. Pt recently had steroid injectio in his back. Denies numbness/tingling.

## 2025-04-19 NOTE — ED PROVIDER NOTES
Patient Seen in: [facility]      History     Chief Complaint   Patient presents with    Weakness     Stated Complaint: Walking Problems    Subjective:   HPI    Patient is a 69-year-old male with hypertension, hyperlipidemia, COPD, GERD, squamous cell carcinoma of left lung, chronic low back pain with radiculopathy status post epidural injection on 4/15/2025, presenting to immediate care for evaluation of bilateral lower extremity weakness after epidural steroid injection of low back.  Progressively getting worse.  States was using cane for ambulation assistance yesterday and today had trouble getting out of bed and walking on his own.  He overall feels weak.  He has prior history of hyponatremia symptoms feel similar.  Patient's oxygen saturation noted to be low with tachycardia.  He does endorse slight nonproductive cough at night.  He denies any fevers.  No chest pain or shortness of breath.  Denies any abdominal pain.  No nausea or vomiting.  Denies any urinary symptoms.  No bladder/bowel dysfunction.  No saddle anesthesia.  Brought in wheelchair, accompanied by wife.      History of Present Illness               Objective:     No pertinent past medical history.            No pertinent past surgical history.              No pertinent social history.            Review of Systems   Constitutional:  Negative for fever.   Respiratory:  Positive for cough. Negative for shortness of breath.    Gastrointestinal:  Negative for abdominal pain, nausea and vomiting.   Musculoskeletal:  Positive for back pain and gait problem. Negative for joint swelling.   Skin:  Negative for rash.   Neurological:  Positive for weakness. Negative for dizziness, light-headedness, numbness and headaches.   Psychiatric/Behavioral:  Negative for confusion.        Positive for stated complaint: Walking Problems  Other systems are as noted in HPI.  Constitutional and vital signs reviewed.      All other systems reviewed and negative  except as noted above.                  Physical Exam     ED Triage Vitals [04/19/25 1036]   /60   Pulse (!) 131   Resp 16   Temp 98.2 °F (36.8 °C)   Temp src Oral   SpO2 93 %   O2 Device None (Room air)       Current Vitals:   Vital Signs  BP: 101/60  Pulse: (!) 131  Resp: 16  Temp: 98.2 °F (36.8 °C)  Temp src: Oral    Oxygen Therapy  SpO2: 93 %  O2 Device: None (Room air)        Physical Exam  Vitals and nursing note reviewed.   Constitutional:       General: He is not in acute distress.     Appearance: Normal appearance. He is not ill-appearing, toxic-appearing or diaphoretic.   HENT:      Head: Normocephalic and atraumatic.      Nose: Congestion present.      Mouth/Throat:      Mouth: Mucous membranes are moist.   Eyes:      General: No scleral icterus.     Conjunctiva/sclera: Conjunctivae normal.   Cardiovascular:      Rate and Rhythm: Normal rate and regular rhythm.      Pulses: Normal pulses.   Pulmonary:      Effort: No respiratory distress.      Breath sounds: Rhonchi present.   Abdominal:      General: There is no distension.      Palpations: Abdomen is soft.      Tenderness: There is no abdominal tenderness.   Musculoskeletal:         General: No swelling, tenderness or deformity. Normal range of motion.      Cervical back: Normal range of motion and neck supple. No rigidity.   Neurological:      General: No focal deficit present.      Mental Status: He is alert.      Motor: Weakness present.   Psychiatric:         Mood and Affect: Mood normal.         Behavior: Behavior normal.         Physical Exam                ED Course   Labs Reviewed - No data to display       Results              MDM         Patient is 69-year-old male with history above, presenting to immediate care for evaluation of worsening lower extremity weakness status post recent epidural steroid injection lumbar back for chronic low back pain with lumbar radiculopathy on 4/15/2025.  Denies any fevers.  No bladder/bowel dysfunction.   Symptoms progressively getting worse.  Now unable to ambulate.  Feels overall weak.  Patient noted to be tachycardic 130s and oxygen saturation 93-94% on room air.  He does endorse nonproductive cough.  Discussed concern for infectious etiology, electrolyte disturbance, cord compression, cauda, epidural abscess, etc.  EMS paramedics called for transport.  Will be taken to [facility] for evaluation of symptoms.    Medical Decision Making      Disposition and Plan     Clinical Impression:  1. Leg weakness, bilateral    2. Tachycardia    3. Ambulatory dysfunction    4. Acute cough    5. Low oxygen saturation    6. Status post epidural steroid injection    7. Lumbar radiculopathy         Disposition:  Ic to ed  4/19/2025 10:55 am    Follow-up:  No follow-up provider specified.        Medications Prescribed:  Discharge Medication List as of 4/19/2025 11:00 AM          Supplementary Documentation:

## 2025-04-19 NOTE — PLAN OF CARE
Pt a/o x 4, elevated HR, DR. Aguilar notified, new orders received. Pt sitting up in chair, ambulates in room x 1 assist, with walker. Instructed pt to use call light for assistance, call light and belongings with in reach, chair alarm on.   Problem: Patient Centered Care  Goal: Patient preferences are identified and integrated in the patient's plan of care  Description: Interventions:- What would you like us to know as we care for you? - Provide timely, complete, and accurate information to patient/family- Incorporate patient and family knowledge, values, beliefs, and cultural backgrounds into the planning and delivery of care- Encourage patient/family to participate in care and decision-making at the level they choose- Honor patient and family perspectives and choices  Outcome: Progressing

## 2025-04-19 NOTE — ED PROVIDER NOTES
Patient Seen in: [facility] Emergency Department      History     Chief Complaint   Patient presents with    Fatigue     Stated Complaint:     Subjective:   HPI    Patient presents to the emergency department with leg weakness.  He states that his legs became very weak.  He denies weakness in other areas of his body.  It was not unilateral.  There is no bowel or bladder dysfunction.  Of note earlier in the week he had a lumbar epidural injection performed for radiculopathy.  There is no fever or chills.  There is no chest pain or shortness of breath.  History of Present Illness               Objective:     Past Medical History:    ALCOHOL USE    Allergic rhinitis    Flonase    Arthritis    Bladder cancer (HCC)    f/u Sarah    BPH (benign prostatic hyperplasia)    COPD (HCC)    moderate-severe     Disorder of kidney and ureter    Disorder of thyroid    hypothyroid    ED (erectile dysfunction)    Esophageal reflux    Essential hypertension    Hearing impairment    Hepatomegaly    Hiatal hernia    High blood pressure    High cholesterol    Hypothyroidism    Injury    Left knee injury, from fall    Osteoarthritis    in knee's, hip's    Pulmonary nodules    first noted 2016on LD screen CT    Tobacco abuse    Visual impairment    glasses              Past Surgical History:   Procedure Laterality Date    Colonoscopy      02-14-17    Colonoscopy N/A 12/28/2020    Procedure: COLONOSCOPY;  Surgeon: Real Abreu MD;  Location: [facility] ENDOSCOPY    Electrocardiogram, complete  12/15/2013    scanned to media tab    Oral surgery      Other surgical history  2009    cyst removal, side of head    Other surgical history Left 10/24/2024    VATS upper lobe wedge resection by Dr. Maria    Other surgical history  10/2024    lung nodule removed                Social History     Socioeconomic History    Marital status:    Tobacco Use    Smoking status: Former     Current packs/day: 0.50     Average packs/day: 1  pack/day for 41.3 years (40.6 ttl pk-yrs)     Types: Cigarettes     Start date: 2024    Smokeless tobacco: Former     Quit date: 10/24/2024    Tobacco comments:     2/2022: less than 1 pack per day     10/2024: smokes 8-9 cigarettes a day   Vaping Use    Vaping status: Never Used   Substance and Sexual Activity    Alcohol use: Yes     Alcohol/week: 2.0 standard drinks of alcohol     Types: 2 Cans of beer per week     Comment: 2 beer per day    Drug use: No   Other Topics Concern    Caffeine Concern Yes     Comment: coffee-1 cup/day   Social History Narrative    Live with fianace    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     Social Drivers of Health     Food Insecurity: No Food Insecurity (10/24/2024)    Food Insecurity     Food Insecurity: Never true   Transportation Needs: No Transportation Needs (10/28/2024)    Transportation Needs     Lack of Transportation: No   Housing Stability: Low Risk  (10/24/2024)    Housing Stability     Housing Instability: No                                Physical Exam     ED Triage Vitals [04/19/25 1130]   /66   Pulse 114   Resp 22   Temp 98.2 °F (36.8 °C)   Temp src Oral   SpO2 95 %   O2 Device None (Room air)       Current Vitals:   Vital Signs  BP: 119/66  Pulse: 114  Resp: 22  Temp: 98.2 °F (36.8 °C)  Temp src: Oral    Oxygen Therapy  SpO2: 95 %  O2 Device: None (Room air)        Physical Exam  Vitals and nursing note reviewed.   Constitutional:       General: He is not in acute distress.     Appearance: He is well-developed.   HENT:      Head: Normocephalic.      Nose: Nose normal.      Mouth/Throat:      Mouth: Mucous membranes are moist.   Eyes:      Conjunctiva/sclera: Conjunctivae normal.   Cardiovascular:      Rate and Rhythm: Normal rate and regular rhythm.      Heart sounds: No murmur heard.  Pulmonary:      Effort: Pulmonary effort is normal. No respiratory distress.      Breath sounds: Normal breath sounds.   Abdominal:      General:  There is no distension.      Palpations: Abdomen is soft.      Tenderness: There is no abdominal tenderness.   Musculoskeletal:         General: No tenderness. Normal range of motion.      Cervical back: Normal range of motion and neck supple.   Skin:     General: Skin is warm and dry.      Capillary Refill: Capillary refill takes less than 2 seconds.      Findings: No rash.   Neurological:      General: No focal deficit present.      Mental Status: He is alert and oriented to person, place, and time.      Cranial Nerves: No cranial nerve deficit.      Sensory: No sensory deficit.      Motor: No weakness.      Coordination: Coordination normal.           Physical Exam                ED Course     Labs Reviewed   CBC WITH DIFFERENTIAL WITH PLATELET - Abnormal; Notable for the following components:       Result Value    WBC 14.8 (*)     HCT 37.1 (*)     Neutrophil Absolute Prelim 13.04 (*)     Neutrophil Absolute 13.04 (*)     Lymphocyte Absolute 0.50 (*)     All other components within normal limits   BASIC METABOLIC PANEL (8) - Abnormal; Notable for the following components:    Glucose 101 (*)     Sodium 129 (*)     Chloride 92 (*)     Calculated Osmolality 268 (*)     All other components within normal limits   HEPATIC FUNCTION PANEL (7) - Abnormal; Notable for the following components:    Albumin 5.0 (*)     All other components within normal limits   TROPONIN I HIGH SENSITIVITY - Normal   SARS-COV-2/FLU A AND B/RSV BY PCR (GENEXPERT) - Normal    Narrative:     This test is intended for the qualitative detection and differentiation of SARS-CoV-2, influenza A, influenza B, and respiratory syncytial virus (RSV) viral RNA in nasopharyngeal or nares swabs from individuals suspected of respiratory viral infection consistent with COVID-19 by their healthcare provider. Signs and symptoms of respiratory viral infection due to SARS-CoV-2, influenza, and RSV can be similar.    Test performed using the Xpert Xpress  SARS-CoV-2/FLU/RSV (real time RT-PCR)  assay on the GeneXpert instrument, [employer], [place] [address].   This test is being used under the Food and Drug Administration's Emergency Use Authorization.    The authorized Fact Sheet for Healthcare Providers for this assay is available upon request from the laboratory.   URINALYSIS, ROUTINE   TROPONIN I HIGH SENSITIVITY   LACTIC ACID, PLASMA   BLOOD CULTURE   BLOOD CULTURE     EKG    Rate, intervals and axes as noted on EKG Report.  Rate: 113 bpm   Rhythm: Sinus Rhythm  Reading: normal EKG other than tachycardia.              Results                                 [facility]          Admission disposition: 4/19/2025  2:03 PM           Medical Decision Making  Differential diagnosis considered for infectious causes, spinal stenosis, herniated disc, spinal epidural hematoma.    Problems Addressed:  Community acquired pneumonia of left lower lobe of lung: acute illness or injury  Hyponatremia: acute illness or injury  Weakness: acute illness or injury    Amount and/or Complexity of Data Reviewed  Labs: ordered. Decision-making details documented in ED Course.     Details: Hyponatremic.  Normal troponin.  Elevated white blood cell count.  Radiology: ordered and independent interpretation performed. Decision-making details documented in ED Course.     Details: No evidence of epidural abscess on MRI.  Chest x-ray shows left lower lobe pneumonia.  ECG/medicine tests: ordered and independent interpretation performed. Decision-making details documented in ED Course.  Discussion of management or test interpretation with external provider(s): Blood cultures and lactic sent.  IV fluids given in the emergency department.  Unasyn and Zithromax started in the emergency department.    Risk  Prescription drug management.  Decision regarding hospitalization.        Disposition and Plan     Clinical Impression:  1. Community acquired pneumonia of left lower lobe of lung    2. Weakness    3.  Hyponatremia         Disposition:  Admit  4/19/2025  2:03 pm    Follow-up:  No follow-up provider specified.  We recommend that you schedule follow up care with a primary care provider within the next three months to obtain basic health screening including reassessment of your blood pressure.      Medications Prescribed:  Current Discharge Medication List          Supplementary Documentation:         Hospital Problems       Present on Admission  Date Reviewed: 4/15/2025          ICD-10-CM Noted POA    * (Principal) Community acquired pneumonia of left lower lobe of lung J18.9 4/19/2025 Unknown    Hyperglycemia R73.9 4/19/2025 Yes    Leukocytosis D72.829 4/19/2025 Yes                                                                      follow up care with a primary care provider within the next three months to obtain basic health screening including reassessment of your blood pressure.      Medications Prescribed:  Discharge Medication List as of 4/20/2025 12:02 PM        START taking these medications    Details   doxycycline 100 MG Oral Cap Take 1 capsule (100 mg total) by mouth 2 (two) times daily for 5 days., Normal, Disp-10 capsule, R-0             Supplementary Documentation:         Hospital Problems       Present on Admission  Date Reviewed: 4/22/2025          ICD-10-CM Noted POA    * (Principal) Community acquired pneumonia of left lower lobe of lung J18.9 4/19/2025 Unknown    Hyperglycemia R73.9 4/19/2025 Yes    Hyponatremia E87.1 5/8/2020 Unknown    Leukocytosis D72.829 4/19/2025 Yes    Weakness R53.1 4/19/2025 Unknown

## 2025-04-20 VITALS
BODY MASS INDEX: 21.51 KG/M2 | TEMPERATURE: 98 F | DIASTOLIC BLOOD PRESSURE: 69 MMHG | RESPIRATION RATE: 18 BRPM | HEIGHT: 64 IN | WEIGHT: 126 LBS | OXYGEN SATURATION: 98 % | SYSTOLIC BLOOD PRESSURE: 146 MMHG | HEART RATE: 90 BPM

## 2025-04-20 LAB
ALBUMIN SERPL-MCNC: 3.9 G/DL (ref 3.2–4.8)
ALBUMIN/GLOB SERPL: 1.7 {RATIO} (ref 1–2)
ALP LIVER SERPL-CCNC: 42 U/L (ref 45–117)
ALT SERPL-CCNC: 13 U/L (ref 10–49)
ANION GAP SERPL CALC-SCNC: 10 MMOL/L (ref 0–18)
AST SERPL-CCNC: 18 U/L (ref ?–34)
BASOPHILS # BLD AUTO: 0.03 X10(3) UL (ref 0–0.2)
BASOPHILS NFR BLD AUTO: 0.2 %
BILIRUB SERPL-MCNC: 0.4 MG/DL (ref 0.2–1.1)
BUN BLD-MCNC: 9 MG/DL (ref 9–23)
BUN/CREAT SERPL: 12 (ref 10–20)
CALCIUM BLD-MCNC: 8.3 MG/DL (ref 8.7–10.4)
CHLORIDE SERPL-SCNC: 100 MMOL/L (ref 98–112)
CO2 SERPL-SCNC: 24 MMOL/L (ref 21–32)
CREAT BLD-MCNC: 0.75 MG/DL (ref 0.7–1.3)
DEPRECATED RDW RBC AUTO: 41.9 FL (ref 35.1–46.3)
EGFRCR SERPLBLD CKD-EPI 2021: 96 ML/MIN/1.73M2 (ref 60–?)
EOSINOPHIL # BLD AUTO: 0.07 X10(3) UL (ref 0–0.7)
EOSINOPHIL NFR BLD AUTO: 0.5 %
ERYTHROCYTE [DISTWIDTH] IN BLOOD BY AUTOMATED COUNT: 13.4 % (ref 11–15)
GLOBULIN PLAS-MCNC: 2.3 G/DL (ref 2–3.5)
GLUCOSE BLD-MCNC: 93 MG/DL (ref 70–99)
HCT VFR BLD AUTO: 30.1 % (ref 39–53)
HGB BLD-MCNC: 10.6 G/DL (ref 13–17.5)
IMM GRANULOCYTES # BLD AUTO: 0.21 X10(3) UL (ref 0–1)
IMM GRANULOCYTES NFR BLD: 1.6 %
L PNEUMO AG UR QL: NEGATIVE
LYMPHOCYTES # BLD AUTO: 0.59 X10(3) UL (ref 1–4)
LYMPHOCYTES NFR BLD AUTO: 4.6 %
MAGNESIUM SERPL-MCNC: 1 MG/DL (ref 1.6–2.6)
MCH RBC QN AUTO: 30 PG (ref 26–34)
MCHC RBC AUTO-ENTMCNC: 35.2 G/DL (ref 31–37)
MCV RBC AUTO: 85.3 FL (ref 80–100)
MONOCYTES # BLD AUTO: 0.81 X10(3) UL (ref 0.1–1)
MONOCYTES NFR BLD AUTO: 6.3 %
NEUTROPHILS # BLD AUTO: 11.12 X10 (3) UL (ref 1.5–7.7)
NEUTROPHILS # BLD AUTO: 11.12 X10(3) UL (ref 1.5–7.7)
NEUTROPHILS NFR BLD AUTO: 86.8 %
OSMOLALITY SERPL CALC.SUM OF ELEC: 276 MOSM/KG (ref 275–295)
PLATELET # BLD AUTO: 313 10(3)UL (ref 150–450)
POTASSIUM SERPL-SCNC: 3.3 MMOL/L (ref 3.5–5.1)
PROT SERPL-MCNC: 6.2 G/DL (ref 5.7–8.2)
RBC # BLD AUTO: 3.53 X10(6)UL (ref 3.8–5.8)
SODIUM SERPL-SCNC: 134 MMOL/L (ref 136–145)
WBC # BLD AUTO: 12.8 X10(3) UL (ref 4–11)

## 2025-04-20 PROCEDURE — 99239 HOSP IP/OBS DSCHRG MGMT >30: CPT | Performed by: STUDENT IN AN ORGANIZED HEALTH CARE EDUCATION/TRAINING PROGRAM

## 2025-04-20 RX ORDER — FLUTICASONE PROPIONATE AND SALMETEROL 500; 50 UG/1; UG/1
1 POWDER RESPIRATORY (INHALATION) NIGHTLY
Status: DISCONTINUED | OUTPATIENT
Start: 2025-04-20 | End: 2025-04-20 | Stop reason: CLARIF

## 2025-04-20 RX ORDER — FLUTICASONE PROPIONATE 50 MCG
2 SPRAY, SUSPENSION (ML) NASAL 2 TIMES DAILY
Status: SHIPPED | COMMUNITY
Start: 2025-04-20

## 2025-04-20 RX ORDER — POTASSIUM CHLORIDE 1500 MG/1
40 TABLET, EXTENDED RELEASE ORAL EVERY 4 HOURS
Status: COMPLETED | OUTPATIENT
Start: 2025-04-20 | End: 2025-04-20

## 2025-04-20 RX ORDER — ALBUTEROL SULFATE 90 UG/1
2 INHALANT RESPIRATORY (INHALATION) EVERY 12 HOURS
Status: SHIPPED | COMMUNITY
Start: 2025-04-20

## 2025-04-20 RX ORDER — FLUTICASONE PROPIONATE AND SALMETEROL 500; 50 UG/1; UG/1
1 POWDER RESPIRATORY (INHALATION) 2 TIMES DAILY
Status: DISCONTINUED | OUTPATIENT
Start: 2025-04-20 | End: 2025-04-20

## 2025-04-20 RX ORDER — DOXYCYCLINE 100 MG/1
100 CAPSULE ORAL 2 TIMES DAILY
Qty: 10 CAPSULE | Refills: 0 | Status: SHIPPED | OUTPATIENT
Start: 2025-04-21 | End: 2025-05-01

## 2025-04-20 RX ORDER — MAGNESIUM OXIDE 400 MG/1
800 TABLET ORAL ONCE
Status: COMPLETED | OUTPATIENT
Start: 2025-04-20 | End: 2025-04-20

## 2025-04-20 RX ORDER — ALBUTEROL SULFATE 90 UG/1
2 INHALANT RESPIRATORY (INHALATION) EVERY 12 HOURS
Status: DISCONTINUED | OUTPATIENT
Start: 2025-04-20 | End: 2025-04-20 | Stop reason: CLARIF

## 2025-04-20 NOTE — PLAN OF CARE
Patient continues to have bilateral lower extremity numbness and tingling. Is still a little anxious but just needs reasurance on what is happening and what meds are being given. Is on tele HR was slightly elevated at shift change bolus of 500ml fluid was given and HR has now lowered. MRSA collected and came back negative, legionella collected waiting results, pending sputum culture hoping to send down in the morning.   Problem: Patient Centered Care  Goal: Patient preferences are identified and integrated in the patient's plan of care  Description: Interventions:- What would you like us to know as we care for you? Keep family updated on plan of care- Provide timely, complete, and accurate information to patient/family- Incorporate patient and family knowledge, values, beliefs, and cultural backgrounds into the planning and delivery of care- Encourage patient/family to participate in care and decision-making at the level they choose- Honor patient and family perspectives and choices  Outcome: Progressing

## 2025-04-20 NOTE — PLAN OF CARE
Pt a/o x 4, vss. Pt ambulates in room with walker and standby assist. Medicated per md order, discussed POC, offered emotional support. Instructed pt and family on discharge instructions and follow up care. IV removed and pt discharged home.   Problem: Patient Centered Care  Goal: Patient preferences are identified and integrated in the patient's plan of care  Description: Interventions:- What would you like us to know as we care for you? - Provide timely, complete, and accurate information to patient/family- Incorporate patient and family knowledge, values, beliefs, and cultural backgrounds into the planning and delivery of care- Encourage patient/family to participate in care and decision-making at the level they choose- Honor patient and family perspectives and choices  Outcome: Progressing

## 2025-04-20 NOTE — DISCHARGE SUMMARY
[facility]  part of [facility]    Discharge Summary    Alejandro Hanna Patient Status:  Inpatient    12/10/1955 MRN [id number]   Location [facility] 4W/SW/SE Attending Madeline Wilcox MD   Hosp Day # 1 PCP Leonid Ford DO     Date of Admission: 2025   Date of Discharge: 25     Admitting Diagnosis: Hyponatremia [E87.1]  Weakness [R53.1]  Community acquired pneumonia of left lower lobe of lung [J18.9]    Disposition: Home with wife     Discharge Diagnosis: .Principal Problem:    Community acquired pneumonia of left lower lobe of lung  Active Problems:    Hyponatremia    Leukocytosis    Hyperglycemia    Weakness      Hospital Course:   Reason for Admission: Pneumonia with hyponatremia       Discharge Physical Exam:   GENERAL:  Awake and alert, in no acute distress.  CHEST:  Symmetrical movement on inspiration  HEART:  Regular rhythm.  Regular rate   LUNGS:  Air entry was good.  No increased work of breathing or wheezes   ABDOMEN: Soft and non-tender.    MUSCULOSKELETAL:  No obvious abnormalities noted  EXTREMITIES: No edema appreciated  NEUROLOGICAL:  There was no new focal deficit.  SKIN:  Warm and well perfused  PSYCHIATRIC: Normal mood      Hospital Course:   This is a 69 year old male with past medical history of squamous cell lung CA status postresection, hypertension, COPD, hyperlipidemia who presented with lower extremity weakness. Has hx of chronic LE weakness, recently had lumbar epidural spinal injection. He noted over past several days he has gotten weaker so came to ED for evaluation. Lumbar spine MRI showed degenerative disc disease but no injury to the lumbar spine was noted. He was also noted to have hyponatremia Na 129 and chest x-ray noted left basilar pneumonia. He was admitted for Community Acquired Pneumonia with hyponatremia, and started on IV Abx and IVFs. Patient rapidly improved with treatment, and by hospital day 2, his weakness improved, was  ambulating in room and to bathroom- which he couldn't previously do. His hyponatremia likely from dehydration, and sodium levels improved with fluids. He felt much improved, was asking for discharge. He is already going to outpatient PT, and was advised to continue. Discussed with wife via phone- regarding anemia (hgb drop in labs likely dilutional, no overt bleeding noted), LE imaging findings from January. His LE weakness was likely multifactorial from acute pneumonia and dehydration along with chronic underlying LE weakness.   He was advised to continue follow up with PCP, PT sessions, neuro follow up.   Patient stabilized for discharge. Was discharged home to complete oral antibiotic course for CAP pneumonia.         Complications: None        Pending Labs       Order Current Status    Blood Culture In process    Blood Culture In process    Sputum culture Preliminary result            Discharge Plan:   Discharge Condition: Stable    Current Discharge Medication List        New Orders    Details   doxycycline 100 MG Oral Cap Take 1 capsule (100 mg total) by mouth 2 (two) times daily for 5 days.           Home Meds - Modified    Details   albuterol (VENTOLIN HFA) 108 (90 Base) MCG/ACT Inhalation Aero Soln Inhale 2 puffs into the lungs in the morning and 2 puffs in the evening.      fluticasone propionate 50 MCG/ACT Nasal Suspension 2 sprays by Nasal route in the morning and 2 sprays before bedtime.           Home Meds - Unchanged    Details   acetaminophen 500 MG Oral Tab Take 1-2 tablets (500-1,000 mg total) by mouth every 6 (six) hours as needed for Pain.      naproxen (ALEVE) 220 MG Oral Tab Take 1-2 tablets (220-440 mg total) by mouth daily as needed.      Fenofibrate 160 MG Oral Tab Take 1 tablet (160 mg total) by mouth daily.      ATORVASTATIN 10 MG Oral Tab TAKE 1 TABLET BY MOUTH EVERY DAY      levothyroxine (LEVOXYL) 88 MCG Oral Tab Take 1 tablet (88 mcg total) by mouth before breakfast. For underactive  thyroid.      Omeprazole 40 MG Oral Capsule Delayed Release Take 1 capsule (40 mg total) by mouth daily.      Olmesartan Medoxomil 40 MG Oral Tab Take 1 tablet (40 mg total) by mouth daily. For blood pressure      BREO ELLIPTA 200-25 MCG/ACT Inhalation Aerosol Powder, Breath Activated Inhale 1 puff into the lungs in the evening.      Dutasteride 0.5 MG Oral Cap Take 1 capsule (0.5 mg total) by mouth every morning.                 Discharge Diet: As tolerated    Discharge Activity: As tolerated Per PT     Follow up:      Follow-up Information       Leonid Ford DO. Go in 2 day(s).    Specialty: Family Medicine  Why: Follow up for hospital discharge.  Contact information:  [address] [place] [address]  [phone]                               >30 minutes spent on discharge orders, coordination, and planning.       Madeline Wilcox MD  4/20/2025

## 2025-04-21 ENCOUNTER — TELEPHONE (OUTPATIENT)
Dept: FAMILY MEDICINE CLINIC | Facility: CLINIC | Age: 70
End: 2025-04-21

## 2025-04-21 NOTE — TELEPHONE ENCOUNTER
Patient, states that he was discharged from [facility] yesterday and was told to follow up with his primary doctor in two days. The first available appointment with Dr. Ford is not until 4-28-25 and patient would like to be seen this week. Would the doctor like to add the patient to the schedule this week? If so, which date and time?    Please reply to pool: EM CC IM FM ALG RHE [[phone]]

## 2025-04-21 NOTE — TELEPHONE ENCOUNTER
Attempted to call patient. Somebody answered phone and stated he was napping. Person asked was this regarding appointment and confirmed time. No further questions at this time.

## 2025-04-21 NOTE — TELEPHONE ENCOUNTER
I can see him tomorrow.  If things get worse with regard to his symptoms, then he needs to go to the emergency room immediately.

## 2025-04-21 NOTE — TELEPHONE ENCOUNTER
[place], please advise if res 14 can be used tomorrow or if able to fit patient in today or tomorrow. Patient states he only wants to see .     Patient states he was released from [facility] yesterday and he states he has pneumonia and low potassium and low magnesium. He is still not feeling well, neck feels swollen and so does abdomen. He has not been able to sleep at night.

## 2025-04-22 ENCOUNTER — PATIENT OUTREACH (OUTPATIENT)
Dept: CASE MANAGEMENT | Age: 70
End: 2025-04-22

## 2025-04-22 ENCOUNTER — LAB ENCOUNTER (OUTPATIENT)
Dept: LAB | Age: 70
End: 2025-04-22
Attending: FAMILY MEDICINE
Payer: MEDICARE

## 2025-04-22 ENCOUNTER — OFFICE VISIT (OUTPATIENT)
Dept: FAMILY MEDICINE CLINIC | Facility: CLINIC | Age: 70
End: 2025-04-22

## 2025-04-22 VITALS
WEIGHT: 130 LBS | BODY MASS INDEX: 22 KG/M2 | HEART RATE: 93 BPM | DIASTOLIC BLOOD PRESSURE: 66 MMHG | SYSTOLIC BLOOD PRESSURE: 130 MMHG

## 2025-04-22 DIAGNOSIS — E83.42 HYPOMAGNESEMIA: ICD-10-CM

## 2025-04-22 DIAGNOSIS — D64.9 ANEMIA, UNSPECIFIED TYPE: ICD-10-CM

## 2025-04-22 DIAGNOSIS — M48.061 FORAMINAL STENOSIS OF LUMBAR REGION: ICD-10-CM

## 2025-04-22 DIAGNOSIS — J43.9 PULMONARY EMPHYSEMA, UNSPECIFIED EMPHYSEMA TYPE (HCC): ICD-10-CM

## 2025-04-22 DIAGNOSIS — C79.9 METASTATIC SIGNET RING CELL CARCINOMA (HCC): ICD-10-CM

## 2025-04-22 DIAGNOSIS — E83.51 HYPOCALCEMIA: ICD-10-CM

## 2025-04-22 DIAGNOSIS — E87.1 HYPONATREMIA: ICD-10-CM

## 2025-04-22 DIAGNOSIS — J18.9 COMMUNITY ACQUIRED PNEUMONIA OF LEFT LOWER LOBE OF LUNG: Primary | ICD-10-CM

## 2025-04-22 DIAGNOSIS — R53.1 GENERAL WEAKNESS: ICD-10-CM

## 2025-04-22 LAB
ANION GAP SERPL CALC-SCNC: 8 MMOL/L (ref 0–18)
BUN BLD-MCNC: 7 MG/DL (ref 9–23)
BUN/CREAT SERPL: 9.1 (ref 10–20)
CALCIUM BLD-MCNC: 9 MG/DL (ref 8.7–10.4)
CHLORIDE SERPL-SCNC: 96 MMOL/L (ref 98–112)
CO2 SERPL-SCNC: 27 MMOL/L (ref 21–32)
CREAT BLD-MCNC: 0.77 MG/DL (ref 0.7–1.3)
EGFRCR SERPLBLD CKD-EPI 2021: 96 ML/MIN/1.73M2 (ref 60–?)
FASTING STATUS PATIENT QL REPORTED: YES
GLUCOSE BLD-MCNC: 91 MG/DL (ref 70–99)
MAGNESIUM SERPL-MCNC: 0.9 MG/DL (ref 1.6–2.6)
OSMOLALITY SERPL CALC.SUM OF ELEC: 270 MOSM/KG (ref 275–295)
POTASSIUM SERPL-SCNC: 3.5 MMOL/L (ref 3.5–5.1)
SODIUM SERPL-SCNC: 131 MMOL/L (ref 136–145)
TSI SER-ACNC: 2.66 UIU/ML (ref 0.55–4.78)

## 2025-04-22 PROCEDURE — 36415 COLL VENOUS BLD VENIPUNCTURE: CPT

## 2025-04-22 PROCEDURE — 85007 BL SMEAR W/DIFF WBC COUNT: CPT

## 2025-04-22 PROCEDURE — 80048 BASIC METABOLIC PNL TOTAL CA: CPT

## 2025-04-22 PROCEDURE — 99496 TRANSJ CARE MGMT HIGH F2F 7D: CPT | Performed by: FAMILY MEDICINE

## 2025-04-22 PROCEDURE — 85025 COMPLETE CBC W/AUTO DIFF WBC: CPT

## 2025-04-22 PROCEDURE — 83735 ASSAY OF MAGNESIUM: CPT

## 2025-04-22 PROCEDURE — 85027 COMPLETE CBC AUTOMATED: CPT

## 2025-04-22 PROCEDURE — 85060 BLOOD SMEAR INTERPRETATION: CPT

## 2025-04-22 PROCEDURE — 84443 ASSAY THYROID STIM HORMONE: CPT

## 2025-04-22 RX ORDER — CALCIUM CARBONATE 300MG(750)
400 TABLET,CHEWABLE ORAL DAILY
Qty: 90 TABLET | Refills: 1 | Status: SHIPPED | OUTPATIENT
Start: 2025-04-22

## 2025-04-22 NOTE — PROGRESS NOTES
Left message on mailbox for patient to call care manager back for transitional care management/hospital follow-up call.  Care  information included in message.

## 2025-04-22 NOTE — PROGRESS NOTES
Transitions of Care Navigation  Discharge Date: 25  Contact Date: 2025    Transitions of Care Assessment:  EDUAR Initial Assessment    General:  Assessment completed with: Patient  Patient Subjective: NCM spoke with patient states is feeling still a little weak. Patient states he continues to feel slight short of breath at night. He states is walking around just fine with his walker. Patient is taking it easy, not doing much. Patient continues to take antibiotics, states has lose stools, he will try probiotic if needed. He denies any fevers, chills, nausea, vomiting, shortness of breath, chest pain or any other symptoms at this time. Patient followed up with PCP today. Patient denies any questions or concerns at this time. He is agreeable with further outreaches.  Chief Complaint: pneumonia  Verify patient name and  with patient/ caregiver: Yes    Hospital Stay/Discharge:  Tell me what you understand of why you were in the hospital or emergency department: Patient reports to ED with weakness.  Prior to leaving the hospital were your Discharge Instructions reviewed with you?: Yes  Did you receive a copy of your written Discharge Instructions?: Yes  What questions do you have about your Discharge Instructions?: no questions at this time.  Do you feel better or worse since you left the hospital or emergency department?: Better    Follow - Up Appointment:  Do you have a follow-up appointment?: Yes  Date: 25  Physician: PCP  Are there any barriers to getting to your follow-up appointment?: No    Home Health/DME:  Prior to leaving the hospital was Home Health (HH) arranged for you?: N/A  Are HH needs identified by staff during the assessment?: No     Prior to leaving the hospital or emergency department was Durable Medical Equipment (DME), medical supplies, or infusions arranged for you?: N/A  Are DME/medical supply/infusions needs identified by staff during this assessment?: No     Medications/Diet:  Did  any of your medications change, during or after your hospital stay or ED visit?: Yes  Do you have your new or updated medications?: Yes  Do you understand what your medications are for and possible side effects?: Yes  Are there any reasons that keep you from taking your medication as prescribed?: No  Any concerns about medication refills?: No    Were you given a different diet per your Discharge Instructions?: No  Reason: n/a     Questions/Concerns:  Do you have any questions or concerns that have not been discussed?: No   EDUAR Follow-up Assessment    General:  Assessment completed with: Patient  Patient Subjective: NCM spoke with patient states is feeling still a little weak. Patient states he continues to feel slight short of breath at night. He states is walking around just fine with his walker. Patient is taking it easy, not doing much. Patient continues to take antibiotics, states has lose stools, he will try probiotic if needed. He denies any fevers, chills, nausea, vomiting, shortness of breath, chest pain or any other symptoms at this time. Patient followed up with PCP today. Patient denies any questions or concerns at this time. He is agreeable with further outreaches.  Chief Complaint: pneumonia      Nursing Interventions:  All discharge instructions reviewed with the patient. Reviewed when to call MD vs when to call 911 or go the ED. Educated patient on the importance of taking all meds as prescribed as well as close f/u with PCP/specialists. Pt verbalized understanding and will contact the office with any further questions or concerns. Patient denies fevers, chills, nausea, vomiting, shortness of breath, chest pain, or any other symptoms at this time.   NCM provided contact information for any further questions/concerns. Patient verbalized understanding and agreeable.     Medications:  Medication Reconciliation:  I am aware of an inpatient discharge within the last 30 days.  The discharge medication list  has been reconciled with the patient's current medication list and reviewed by me. See medication list for additions of new medication, and changes to current doses of medications and discontinued medications.  Current Medications[1]    Diagnosis specifics:  Tell me what you understand about the signs and symptoms of pneumonia reoccurrence? NCM discussed with patient signs and worsening symptoms of pneumonia and when to seek immediate medical attention. Patient agreeable.       Follow-up Appointments:  Your appointments       Date & Time Appointment Department ([place])    Apr 25, 2025 9:10 AM CDT Follow Up Visit with Sierra Encarnacion DPM [facility] ([facility])        Apr 28, 2025 10:00 AM CDT Laboratory Services with NASRIN SCHEDULED RESOURCE [facility])        May 05, 2025 9:00 AM CDT CT CHEST with LMB CT RM1 [facility] [place] ([facility] [place])    Please arrive 15 minutes prior to your scheduled appointment time.      May 05, 2025 10:45 AM CDT Exam - Established with Brando Reynoso DO [facility] ([facility])        May 06, 2025 11:20 AM CDT Follow Up Visit with Faisal Villar MD [facility] ([facility])        May 08, 2025 8:30 AM CDT HEMATOLOGY ONCOLOGY FOLLOW UP with Carlos Damian MD [facility] Hematology Oncology [place] ([facility])              [facility]ly [facility]  [address] [address]  [phone] [facility] [place] [facility] [place]  [address] [place] [address]  [phone] [facility]  [address]  [facility] [id number] [phone]     [facility]  [address]  [place] [id number] [phone] [facility] [place]  [address]  [place] [facility] [address]  [phone] Zahida PADILLARanjith Porter [facility] Hematology Oncology [facility] [place]  [address]  [place] [address]  [phone]            Transitional [facility] Clinic  Was TCC Ordered: No      Primary Care Provider (If no TCC appointment)  Does patient already have a PCP appointment scheduled? Yes  Nurse Care Manager Confirmed PCP office TCM/EDUAR appointment with patient       Specialist  Does the patient have any other follow-up appointment(s) need to be scheduled? No       [x]  Patient verbally agrees to additional follow-up calls from Nurse Care Manager    Book By Date: 4/27/25                 [1]   Current Outpatient Medications   Medication Sig Dispense Refill    albuterol (VENTOLIN HFA) 108 (90 Base) MCG/ACT Inhalation Aero Soln Inhale 2 puffs into the lungs in the morning and 2 puffs in the evening.      fluticasone propionate 50 MCG/ACT Nasal Suspension 2 sprays by Nasal route in the morning and 2 sprays before bedtime.      doxycycline 100 MG Oral Cap Take 1 capsule (100 mg total) by mouth 2 (two) times daily for 5 days. 10 capsule 0    acetaminophen 500 MG Oral Tab Take 1-2 tablets (500-1,000 mg total) by mouth every 6 (six) hours as needed for Pain.      naproxen (ALEVE) 220 MG Oral Tab Take 1-2 tablets (220-440 mg total) by mouth daily as needed.      Fenofibrate 160 MG Oral Tab Take 1 tablet (160 mg total) by mouth daily. 90 tablet 3    ATORVASTATIN 10 MG Oral Tab TAKE 1 TABLET BY MOUTH EVERY DAY 90 tablet 3    levothyroxine (LEVOXYL) 88 MCG Oral Tab Take 1 tablet (88 mcg total) by mouth before breakfast. For underactive thyroid. 90 tablet 3    Omeprazole 40 MG Oral Capsule Delayed Release Take 1 capsule  (40 mg total) by mouth daily. 90 capsule 3    Olmesartan Medoxomil 40 MG Oral Tab Take 1 tablet (40 mg total) by mouth daily. For blood pressure 90 tablet 3    BREO ELLIPTA 200-25 MCG/ACT Inhalation Aerosol Powder, Breath Activated Inhale 1 puff into the lungs in the evening.      Dutasteride 0.5 MG Oral Cap Take 1 capsule (0.5 mg total) by mouth every morning.

## 2025-04-22 NOTE — PROGRESS NOTES
Subjective:          The following individual(s) verbally consented to be recorded using ambient AI listening technology and understand that they can each withdraw their consent to this listening technology at any point by asking the clinician to turn off or pause the recording:    Patient name: Alejandro Hanna  Additional names:  Shawna wife    Alejandro Hanna is a 69 year old male who presents for hospital follow up.   He was discharged from [facility] to Home or Self Care  Admission Date: 4/19/25   Discharge Date: 4/20/25  Hospital Discharge Diagnosis: Community-acquired pneumonia along with electrolyte abnormalities.    Interactive contact within 2 business days post discharge first initiated on Date: 4/22/2025    During the visit, the following was completed:  Obtained and reviewed discharge summary, continuity of care documents, and Hospitalist notes  Reviewed Labs (CBC, CMP)    History of Present Illness  Alejandro Hanna is a 69 year old male who presents for follow-up after recent hospitalization for pneumonia and low sodium.    He was admitted to the hospital on April 19th and discharged the following day with a diagnosis of pneumonia in the left lung and low sodium levels. He feels weak and describes the pneumonia as having significantly affected him. He is currently on antibiotics and notes that his neck and stomach were swollen due to fluid intake during hospitalization, but the swelling has improved.    He has a history of leg pain, particularly around the Achilles, and has consulted multiple specialists, including an orthopedist and a pain doctor. He has had an epidural and questions about a Doppler ultrasound performed on January 30th, which showed normal arterial flow and no significant peripheral arterial occlusive disease. He is concerned about a possible blockage in the soft tissue of his calf, as mentioned by his stepdaughter who states she reviewed the reports of the venous  Dopplers.  I reviewed the venous Dopplers and I could not find this finding.  I actually read off the report to the patient and his wife    He has a history of disc disease and osteoarthritis at L1-L2 and L4-L5, with a recent MRI confirming these findings. He is concerned about weight gain due to eating ice cream or custard almost every night. He also reports having soft stools, which he attributes to antibiotic use.  Has an appointment to see Dr. Reynoso already.    He has a history of low magnesium and calcium levels, with recent labs showing low magnesium at 1.0 and low calcium at 8.3. He is taking calcium supplements and eating bananas for potassium. No vitamin D supplements are taken.    He has a cough at night and feels tired, attributing this to the recent pneumonia. He is concerned about anemia and plans to have labs checked to monitor his blood levels.      Results  LABS  CBC: Elevated WBC (04/20/2025)  Magnesium: 1.0 (04/20/2025)  Potassium: 3.3 (04/20/2025)  Calcium: 8.3 (04/20/2025)  Renal function: Normal (04/20/2025)  Glucose: Normal (04/20/2025)  Vitamin D: 30 (12/14/2024)    RADIOLOGY  Chest X-ray: Pneumonia on the left base (04/19/2025)  Lumbar spine MRI: Disc disease and osteoarthritis at L1-L2 and L4-L5 resulting in central canal and neuroforaminal narrowing, more advanced at L1-L2 (04/2025)    DIAGNOSTIC  Doppler ultrasound (01/30/2025): Normal resting arterial Doppler study with intact pressures and waveforms, normal ankle-brachial indices, no evidence of significant peripheral arterial occlusive disease  Doppler ultrasound (01/07/2025): No right lower extremity DVT, normal flow, thrombosis negative, normal great and small saphenous, posterior tibial, and peroneal veins    PATHOLOGY  Sputum culture: Klebsiella, Candida (04/19/2025)     History/Other:   Current Medications:  Medication Reconciliation:  I am aware of an inpatient discharge within the last 30 days.  The discharge medication list has  been reconciled with the patient's current medication list and reviewed by me. See medication list for additions of new medication, and changes to current doses of medications and discontinued medications.  Outpatient Medications Marked as Taking for the 4/22/25 encounter (Office Visit) with Leonid Ford, DO   Medication Sig    albuterol (VENTOLIN HFA) 108 (90 Base) MCG/ACT Inhalation Aero Soln Inhale 2 puffs into the lungs in the morning and 2 puffs in the evening.    fluticasone propionate 50 MCG/ACT Nasal Suspension 2 sprays by Nasal route in the morning and 2 sprays before bedtime.    doxycycline 100 MG Oral Cap Take 1 capsule (100 mg total) by mouth 2 (two) times daily for 5 days.    acetaminophen 500 MG Oral Tab Take 1-2 tablets (500-1,000 mg total) by mouth every 6 (six) hours as needed for Pain.    naproxen (ALEVE) 220 MG Oral Tab Take 1-2 tablets (220-440 mg total) by mouth daily as needed.    Fenofibrate 160 MG Oral Tab Take 1 tablet (160 mg total) by mouth daily.    ATORVASTATIN 10 MG Oral Tab TAKE 1 TABLET BY MOUTH EVERY DAY    levothyroxine (LEVOXYL) 88 MCG Oral Tab Take 1 tablet (88 mcg total) by mouth before breakfast. For underactive thyroid.    Omeprazole 40 MG Oral Capsule Delayed Release Take 1 capsule (40 mg total) by mouth daily.    Olmesartan Medoxomil 40 MG Oral Tab Take 1 tablet (40 mg total) by mouth daily. For blood pressure    BREO ELLIPTA 200-25 MCG/ACT Inhalation Aerosol Powder, Breath Activated Inhale 1 puff into the lungs in the evening.    Dutasteride 0.5 MG Oral Cap Take 1 capsule (0.5 mg total) by mouth every morning.       Review of Systems     Negative except as per HPI    Objective:   Physical Exam    Physical Exam  Physical Exam   Constitutional: Patient is oriented to person, place, and time. Patient appears well-developed and well-nourished. No distress.   HENT:   Head: Normocephalic and atraumatic.   Neck: Normal range of motion. Neck supple. No thyromegaly present.    Lymphadenopathy:     Has no cervical adenopathy.   Cardiovascular: Normal rate, regular rhythm and no murmur heard.  Pulmonary/Chest: Effort normal.  No respiratory distress.  Some slight crackles at the right base but otherwise he has good air entry and speaking clearly and does not look winded when he speaks.  Neurological: Patient is alert and oriented to person, place, and time.   Skin: Skin is warm and dry.  No pallor or diaphoresis.  Psychiatric: Patient has a normal mood and affect.   Legs: No pitting edema.    Nursing note and vitals reviewed.        VITALS: BP- 130/66  CARDIOVASCULAR: Heart sounds normal.  EXTREMITIES: No edema in extremities.    Vitals:    04/22/25 1046 04/22/25 1109   BP: 158/79 130/66   BP Location: Right arm    Patient Position: Sitting    Cuff Size: adult    Pulse: 93    Weight: 130 lb (59 kg)          /79 (BP Location: Right arm, Patient Position: Sitting, Cuff Size: adult)   Pulse 93   Wt 130 lb (59 kg)   BMI 22.31 kg/m²  Estimated body mass index is 22.31 kg/m² as calculated from the following:    Height as of 4/19/25: 5' 4\" (1.626 m).    Weight as of this encounter: 130 lb (59 kg).    Assessment & Plan:     Diagnoses and all orders for this visit:    Community acquired pneumonia of left lower lobe of lung  On antibiotics.  Reviewed the hospital notes.  Hyponatremia  -     Basic Metabolic Panel (8); Future  Will recheck  Pulmonary emphysema, unspecified emphysema type (HCC)  No longer smokes.  Has medications  Hypomagnesemia  -     Magnesium [E]; Future    Hypocalcemia  -     Basic Metabolic Panel (8); Future    Anemia, unspecified type  -     CBC With Differential With Platelet; Future    Foraminal stenosis of lumbar region  Already has an appointment to see Dr. Reynoso    General weakness  -     Assay, Thyroid Stim Hormone; Future  Could be due to the pneumonia and hospital stay.      Referrals (if applicable)  No orders of the defined types were placed in this  encounter.        Follow up if symptoms persist.  Take medicine (if given) as prescribed.  Approach to treatment discussed and patient/family member understands and agrees to plan.     No follow-ups on file.      Assessment & Plan  Pneumonia, left lung  Pneumonia of the left lung diagnosed during hospital admission on April 19, 2025. Sputum culture showed Klebsiella and Candida. Currently on doxycycline. Reports weakness and cough, expected as the body clears the infection. Encouraged to expectorate sputum to aid recovery.  - Continue doxycycline  - Encourage expectoration of sputum  - Recheck labs to monitor white blood cell count and ensure normalization of electrolytes    Anemia, unspecified  Unspecified anemia noted. Reports weakness, possibly related to anemia or recent pneumonia. Monitoring necessary to ensure stability.  - Recheck hemoglobin and hematocrit levels    Hyponatremia  Hyponatremia noted during recent hospital admission. Monitoring for electrolyte imbalances is necessary.  - Recheck sodium levels    Hypomagnesemia  Hypomagnesemia noted during recent hospital admission with a magnesium level of 1.0. Taking magnesium supplements daily. Monitoring required to ensure normalization.  - Continue magnesium supplementation  - Recheck magnesium levels    Swelling due to IV fluids  Swelling in the neck and stomach noted post-hospitalization, likely due to IV fluid administration. Swelling has improved since discharge.  - Advise on fluid intake management    Lumbar disc disease and osteoarthritis  Lumbar disc disease and osteoarthritis with bulging discs at L1-L2 and L4-L5. Recent MRI confirmed findings. Reports back pain and is following up with Dr. Reynoso for pain management.  - Follow up with Dr. Reynoso for pain management        No follow-ups on file.

## 2025-04-23 ENCOUNTER — MED REC SCAN ONLY (OUTPATIENT)
Dept: PHYSICAL MEDICINE AND REHAB | Facility: CLINIC | Age: 70
End: 2025-04-23

## 2025-04-23 LAB
BASOPHILS # BLD: 0 X10(3) UL (ref 0–0.2)
BASOPHILS NFR BLD: 0 %
DEPRECATED RDW RBC AUTO: 40.9 FL (ref 35.1–46.3)
EOSINOPHIL # BLD: 0.12 X10(3) UL (ref 0–0.7)
EOSINOPHIL NFR BLD: 1 %
ERYTHROCYTE [DISTWIDTH] IN BLOOD BY AUTOMATED COUNT: 13.2 % (ref 11–15)
HCT VFR BLD AUTO: 29.4 % (ref 39–53)
HGB BLD-MCNC: 10.4 G/DL (ref 13–17.5)
LYMPHOCYTES NFR BLD: 0.71 X10(3) UL (ref 1–4)
LYMPHOCYTES NFR BLD: 6 %
MCH RBC QN AUTO: 29.9 PG (ref 26–34)
MCHC RBC AUTO-ENTMCNC: 35.4 G/DL (ref 31–37)
MCV RBC AUTO: 84.5 FL (ref 80–100)
MONOCYTES # BLD: 1.19 X10(3) UL (ref 0.1–1)
MONOCYTES NFR BLD: 10 %
MORPHOLOGY: NORMAL
MYELOCYTES # BLD: 0.12 X10(3) UL (ref ?–0.01)
MYELOCYTES NFR BLD: 1 %
NEUTROPHILS # BLD AUTO: 9.15 X10 (3) UL (ref 1.5–7.7)
NEUTROPHILS NFR BLD: 77 %
NEUTS BAND NFR BLD: 5 %
NEUTS HYPERSEG # BLD: 9.76 X10(3) UL (ref 1.5–7.7)
PLATELET # BLD AUTO: 366 10(3)UL (ref 150–450)
PLATELET MORPHOLOGY: NORMAL
RBC # BLD AUTO: 3.48 X10(6)UL (ref 3.8–5.8)
TOTAL CELLS COUNTED BLD: 100
WBC # BLD AUTO: 11.9 X10(3) UL (ref 4–11)

## 2025-04-24 NOTE — PROGRESS NOTES
Provider Clarification     Additional clarification of the pneumonia being treated.    Type: Acute Left lower lobe pneumonia due to Klebsiella Pneumoniae    This note is part of the patient's medical record.

## 2025-04-26 ENCOUNTER — TELEPHONE (OUTPATIENT)
Dept: FAMILY MEDICINE CLINIC | Facility: CLINIC | Age: 70
End: 2025-04-26

## 2025-04-26 DIAGNOSIS — E87.1 HYPONATREMIA: ICD-10-CM

## 2025-04-26 DIAGNOSIS — E83.42 HYPOMAGNESEMIA: ICD-10-CM

## 2025-04-26 DIAGNOSIS — R26.2 DIFFICULTY WALKING: Primary | ICD-10-CM

## 2025-04-26 DIAGNOSIS — D64.9 ANEMIA, UNSPECIFIED TYPE: ICD-10-CM

## 2025-04-26 NOTE — TELEPHONE ENCOUNTER
Patient spoke to Dr. Ford but forgot to ask a question. Wants to speak to nurse. Wants to know if he needs to retake vitals and what medications or vitamins need to be taken. Needs a steroid as well

## 2025-04-28 ENCOUNTER — TELEPHONE (OUTPATIENT)
Dept: FAMILY MEDICINE CLINIC | Facility: CLINIC | Age: 70
End: 2025-04-28

## 2025-04-28 ENCOUNTER — PATIENT OUTREACH (OUTPATIENT)
Dept: CASE MANAGEMENT | Age: 70
End: 2025-04-28

## 2025-04-28 NOTE — PROGRESS NOTES
Transitions of Care Navigation  Discharge Date: 4/20/25  Contact Date: 4/28/2025    Transitions of Care Assessment:  EDUAR Follow-up Assessment    General:  Assessment completed with: Patient  Patient Subjective: NCM spoke with patient states is feeling okay. Patient continues to use his walker to get around at home. Patient is still not walking right. Patient denies any fevers, chills, nausea, vomiting, shortness of breath, chest pain or any other symptoms at this time. Patient will follow up with Dr. Macdonald neurology. Patient denies any questions or concerns at this time. Patient declines any further outreaches.  Chief Complaint: pneumonia    Progress/Care Plan:  Is the patient progressing as planned?: Yes  Care Plan Update: NCM spoke with patient states is feeling okay. Patient continues to use his walker to get around at home. Patient is still not walking right. Patient denies any fevers, chills, nausea, vomiting, shortness of breath, chest pain or any other symptoms at this time. Patient will follow up with Dr. Macdonald neurology. Patient denies any questions or concerns at this time. Patient declines any further outreaches.  New Care Plan: Patient will follow up with neurology Dr. Macdonald.  Frequency/Follow Up Plan: patient declined any further outreaches.     Notes:  Navigator Notes: Patient will contact [facility] with any further questions or concerns.     Nursing Interventions:  All discharge instructions reviewed with the patient. Reviewed when to call MD vs when to call 911 or go the ED. Educated patient on the importance of taking all meds as prescribed as well as close f/u with PCP/specialists. Pt verbalized understanding and will contact the office with any further questions or concerns. Patient denies fevers, chills, nausea, vomiting, shortness of breath, chest pain, or any other symptoms at this time.  [facility] provided contact information for any further questions/concerns. Patient verbalized understanding and  agreeable.

## 2025-04-28 NOTE — TELEPHONE ENCOUNTER
Dr Ford, please advise    Called patient (name and  verified) back to verify message below. Patient has additional symptoms, last office visit 25.     See TE 25-See Referral request for Neuro-Dr Macdonald for ongoing difficulty walking and \"shaking when writing for several months\"?  Referral pended for review/approval.      Patient is asking if you want him to repeat and lab tests in a few weeks to check levels?

## 2025-04-28 NOTE — TELEPHONE ENCOUNTER
Called patient (name and  verified) and instructed on providers message below. Patient verbalized understanding and agrees to plan.    Future Appointments   Date Time Provider Department Center   2025  9:00 AM LMB CT RM1 [facility] MOB. CT EM [place]   2025 10:45 AM Brando Reynoso DO PM&R ELM [facility]   2025 11:20 AM Faisal Villar MD [id number] [facility]   2025  8:30 AM Carlos Damian MD ELMSW HemOnc [facility]   2025 11:10 AM Sierra Encarnacion DPM ECADOPOD [facility]   2025 10:40 AM Bebeto Macdonald DO ENIELHUR [facility]

## 2025-04-28 NOTE — TELEPHONE ENCOUNTER
Inform him that the neurology referral was done.  He can also get the labs done nonfasting as we can recheck the levels for the anemia, sodium and magnesium.  He may want to wait another 5 to 6 days to do them though so we can see if they actually normalized.

## 2025-04-29 ENCOUNTER — HOSPITAL ENCOUNTER (INPATIENT)
Facility: HOSPITAL | Age: 70
LOS: 2 days | Discharge: HOME | End: 2025-05-01
Attending: EMERGENCY MEDICINE | Admitting: HOSPITALIST
Payer: MEDICARE

## 2025-04-29 ENCOUNTER — APPOINTMENT (OUTPATIENT)
Dept: CT IMAGING | Facility: HOSPITAL | Age: 70
End: 2025-04-29
Attending: EMERGENCY MEDICINE
Payer: MEDICARE

## 2025-04-29 ENCOUNTER — HOSPITAL ENCOUNTER (INPATIENT)
Facility: HOSPITAL | Age: 70
LOS: 2 days | Discharge: HOME HEALTH | End: 2025-05-01
Attending: EMERGENCY MEDICINE | Admitting: HOSPITALIST
Payer: MEDICARE

## 2025-04-29 ENCOUNTER — APPOINTMENT (OUTPATIENT)
Dept: GENERAL RADIOLOGY | Facility: HOSPITAL | Age: 70
End: 2025-04-29
Attending: EMERGENCY MEDICINE
Payer: MEDICARE

## 2025-04-29 DIAGNOSIS — E83.42 HYPOMAGNESEMIA: Primary | ICD-10-CM

## 2025-04-29 DIAGNOSIS — J18.9 COMMUNITY ACQUIRED PNEUMONIA, UNSPECIFIED LATERALITY: ICD-10-CM

## 2025-04-29 LAB
ALBUMIN SERPL-MCNC: 4.6 G/DL (ref 3.2–4.8)
ALBUMIN/GLOB SERPL: 1.8 {RATIO} (ref 1–2)
ALP LIVER SERPL-CCNC: 44 U/L (ref 45–117)
ALT SERPL-CCNC: 13 U/L (ref 10–49)
ANION GAP SERPL CALC-SCNC: 10 MMOL/L (ref 0–18)
AST SERPL-CCNC: 20 U/L (ref ?–34)
ATRIAL RATE: 79 BPM
BASOPHILS # BLD: 0 X10(3) UL (ref 0–0.2)
BASOPHILS NFR BLD: 0 %
BILIRUB SERPL-MCNC: 0.4 MG/DL (ref 0.2–1.1)
BILIRUB UR QL: NEGATIVE
BUN BLD-MCNC: 9 MG/DL (ref 9–23)
BUN/CREAT SERPL: 11.8 (ref 10–20)
CALCIUM BLD-MCNC: 8.3 MG/DL (ref 8.7–10.4)
CHLORIDE SERPL-SCNC: 93 MMOL/L (ref 98–112)
CLARITY UR: CLEAR
CO2 SERPL-SCNC: 26 MMOL/L (ref 21–32)
COLOR UR: COLORLESS
CREAT BLD-MCNC: 0.76 MG/DL (ref 0.7–1.3)
DEPRECATED RDW RBC AUTO: 39.3 FL (ref 35.1–46.3)
EGFRCR SERPLBLD CKD-EPI 2021: 96 ML/MIN/1.73M2 (ref 60–?)
EOSINOPHIL # BLD: 0 X10(3) UL (ref 0–0.7)
EOSINOPHIL NFR BLD: 0 %
ERYTHROCYTE [DISTWIDTH] IN BLOOD BY AUTOMATED COUNT: 12.9 % (ref 11–15)
GLOBULIN PLAS-MCNC: 2.6 G/DL (ref 2–3.5)
GLUCOSE BLD-MCNC: 95 MG/DL (ref 70–99)
GLUCOSE UR-MCNC: NORMAL MG/DL
HCT VFR BLD AUTO: 32.5 % (ref 39–53)
HGB BLD-MCNC: 11.5 G/DL (ref 13–17.5)
HGB UR QL STRIP.AUTO: NEGATIVE
KETONES UR-MCNC: NEGATIVE MG/DL
LACTATE SERPL-SCNC: 0.7 MMOL/L (ref 0.5–2)
LEUKOCYTE ESTERASE UR QL STRIP.AUTO: NEGATIVE
LYMPHOCYTES NFR BLD: 0.74 X10(3) UL (ref 1–4)
LYMPHOCYTES NFR BLD: 6 %
MAGNESIUM SERPL-MCNC: 0.6 MG/DL (ref 1.6–2.6)
MAGNESIUM SERPL-MCNC: 2 MG/DL (ref 1.6–2.6)
MCH RBC QN AUTO: 29.3 PG (ref 26–34)
MCHC RBC AUTO-ENTMCNC: 35.4 G/DL (ref 31–37)
MCV RBC AUTO: 82.7 FL (ref 80–100)
METAMYELOCYTES # BLD: 0.12 X10(3) UL (ref ?–0.01)
METAMYELOCYTES NFR BLD: 1 %
MONOCYTES # BLD: 0.87 X10(3) UL (ref 0.1–1)
MONOCYTES NFR BLD: 7 %
MORPHOLOGY: NORMAL
NEUTROPHILS # BLD AUTO: 9.79 X10 (3) UL (ref 1.5–7.7)
NEUTROPHILS NFR BLD: 85 %
NEUTS BAND NFR BLD: 1 %
NEUTS HYPERSEG # BLD: 10.66 X10(3) UL (ref 1.5–7.7)
NITRITE UR QL STRIP.AUTO: NEGATIVE
OSMOLALITY SERPL CALC.SUM OF ELEC: 266 MOSM/KG (ref 275–295)
P AXIS: 61 DEGREES
P-R INTERVAL: 130 MS
PH UR: 8 [PH] (ref 5–8)
PLATELET # BLD AUTO: 493 10(3)UL (ref 150–450)
PLATELET MORPHOLOGY: NORMAL
POTASSIUM SERPL-SCNC: 3.4 MMOL/L (ref 3.5–5.1)
PROT SERPL-MCNC: 7.2 G/DL (ref 5.7–8.2)
PROT UR-MCNC: NEGATIVE MG/DL
Q-T INTERVAL: 374 MS
QRS DURATION: 88 MS
QTC CALCULATION (BEZET): 428 MS
R AXIS: 40 DEGREES
RBC # BLD AUTO: 3.93 X10(6)UL (ref 3.8–5.8)
SODIUM SERPL-SCNC: 129 MMOL/L (ref 136–145)
SP GR UR STRIP: >1.03 (ref 1–1.03)
T AXIS: 68 DEGREES
TOTAL CELLS COUNTED BLD: 100
UROBILINOGEN UR STRIP-ACNC: NORMAL
VENTRICULAR RATE: 79 BPM
WBC # BLD AUTO: 12.4 X10(3) UL (ref 4–11)

## 2025-04-29 PROCEDURE — 99223 1ST HOSP IP/OBS HIGH 75: CPT | Performed by: HOSPITALIST

## 2025-04-29 PROCEDURE — 71045 X-RAY EXAM CHEST 1 VIEW: CPT | Performed by: EMERGENCY MEDICINE

## 2025-04-29 PROCEDURE — 74177 CT ABD & PELVIS W/CONTRAST: CPT | Performed by: EMERGENCY MEDICINE

## 2025-04-29 PROCEDURE — 99223 1ST HOSP IP/OBS HIGH 75: CPT | Performed by: INTERNAL MEDICINE

## 2025-04-29 RX ORDER — MAGNESIUM OXIDE 400 MG/1
400 TABLET ORAL DAILY
Status: DISCONTINUED | OUTPATIENT
Start: 2025-04-29 | End: 2025-05-01

## 2025-04-29 RX ORDER — ATORVASTATIN CALCIUM 10 MG/1
10 TABLET, FILM COATED ORAL DAILY
Status: DISCONTINUED | OUTPATIENT
Start: 2025-04-29 | End: 2025-05-01

## 2025-04-29 RX ORDER — POLYETHYLENE GLYCOL 3350 17 G/17G
17 POWDER, FOR SOLUTION ORAL DAILY
Status: DISCONTINUED | OUTPATIENT
Start: 2025-04-29 | End: 2025-05-01

## 2025-04-29 RX ORDER — LEVOTHYROXINE SODIUM 88 UG/1
88 TABLET ORAL
Status: DISCONTINUED | OUTPATIENT
Start: 2025-04-30 | End: 2025-05-01

## 2025-04-29 RX ORDER — LOSARTAN POTASSIUM 100 MG/1
100 TABLET ORAL DAILY
Status: DISCONTINUED | OUTPATIENT
Start: 2025-04-30 | End: 2025-05-01

## 2025-04-29 RX ORDER — BISACODYL 5 MG/1
10 TABLET, DELAYED RELEASE ORAL
Status: DISCONTINUED | OUTPATIENT
Start: 2025-04-29 | End: 2025-05-01

## 2025-04-29 RX ORDER — METOCLOPRAMIDE HYDROCHLORIDE 5 MG/ML
10 INJECTION INTRAMUSCULAR; INTRAVENOUS EVERY 8 HOURS PRN
Status: DISCONTINUED | OUTPATIENT
Start: 2025-04-29 | End: 2025-04-30

## 2025-04-29 RX ORDER — SODIUM CHLORIDE 9 MG/ML
INJECTION, SOLUTION INTRAVENOUS CONTINUOUS
Status: DISCONTINUED | OUTPATIENT
Start: 2025-04-29 | End: 2025-04-30

## 2025-04-29 RX ORDER — METHYLPREDNISOLONE SODIUM SUCCINATE 40 MG/ML
40 INJECTION INTRAMUSCULAR; INTRAVENOUS EVERY 12 HOURS
Status: DISCONTINUED | OUTPATIENT
Start: 2025-04-29 | End: 2025-05-01

## 2025-04-29 RX ORDER — PANTOPRAZOLE SODIUM 40 MG/1
40 TABLET, DELAYED RELEASE ORAL
Status: DISCONTINUED | OUTPATIENT
Start: 2025-04-30 | End: 2025-05-01

## 2025-04-29 RX ORDER — ONDANSETRON 2 MG/ML
4 INJECTION INTRAMUSCULAR; INTRAVENOUS EVERY 6 HOURS PRN
Status: DISCONTINUED | OUTPATIENT
Start: 2025-04-29 | End: 2025-05-01

## 2025-04-29 RX ORDER — FLUTICASONE PROPIONATE AND SALMETEROL 250; 50 UG/1; UG/1
1 POWDER RESPIRATORY (INHALATION) 2 TIMES DAILY
Status: DISCONTINUED | OUTPATIENT
Start: 2025-04-29 | End: 2025-05-01

## 2025-04-29 RX ORDER — ACETAMINOPHEN 500 MG
500 TABLET ORAL EVERY 4 HOURS PRN
Status: DISCONTINUED | OUTPATIENT
Start: 2025-04-29 | End: 2025-05-01

## 2025-04-29 RX ORDER — FLUTICASONE PROPIONATE 50 MCG
2 SPRAY, SUSPENSION (ML) NASAL 2 TIMES DAILY
Status: DISCONTINUED | OUTPATIENT
Start: 2025-04-29 | End: 2025-05-01

## 2025-04-29 RX ORDER — FINASTERIDE 5 MG/1
5 TABLET, FILM COATED ORAL DAILY
Status: DISCONTINUED | OUTPATIENT
Start: 2025-04-29 | End: 2025-05-01

## 2025-04-29 RX ORDER — AZITHROMYCIN 250 MG/1
500 TABLET, FILM COATED ORAL DAILY
Status: COMPLETED | OUTPATIENT
Start: 2025-04-29 | End: 2025-05-01

## 2025-04-29 RX ORDER — IPRATROPIUM BROMIDE AND ALBUTEROL SULFATE 2.5; .5 MG/3ML; MG/3ML
3 SOLUTION RESPIRATORY (INHALATION) EVERY 6 HOURS PRN
Status: DISCONTINUED | OUTPATIENT
Start: 2025-04-29 | End: 2025-05-01

## 2025-04-29 RX ORDER — ALBUTEROL SULFATE 90 UG/1
2 INHALANT RESPIRATORY (INHALATION) EVERY 12 HOURS
Status: DISCONTINUED | OUTPATIENT
Start: 2025-04-29 | End: 2025-05-01

## 2025-04-29 RX ORDER — TEMAZEPAM 15 MG/1
15 CAPSULE ORAL NIGHTLY PRN
Status: DISCONTINUED | OUTPATIENT
Start: 2025-04-29 | End: 2025-05-01

## 2025-04-29 RX ORDER — HEPARIN SODIUM 5000 [USP'U]/ML
5000 INJECTION, SOLUTION INTRAVENOUS; SUBCUTANEOUS EVERY 12 HOURS SCHEDULED
Status: DISCONTINUED | OUTPATIENT
Start: 2025-04-29 | End: 2025-05-01

## 2025-04-29 NOTE — ED INITIAL ASSESSMENT (HPI)
From home. To ED via [facility] for c/o weakness and difficulty walking. Patient states he was suppose to get a epidural to help with his leg pain and weakness.    Patient was recently dx with PNA  about a week ago and hospitalized for a day. Patient states he has been feeling bad ever since he was dx with PNA.     Denies CP or SOB

## 2025-04-29 NOTE — ED PROVIDER NOTES
Patient Seen in: [facility] Emergency Department      History     Chief Complaint   Patient presents with    Weakness     Stated Complaint:     Subjective:   HPI    69-year-old male presents for evaluation for generalized weakness, difficulty walking, and tremor.  Wife reports that he was recently admitted for pneumonia and since getting home has been regressing.  Patient states that he cut back on alcohol.  He has been having increasing difficulty walking while using a walker.  He states that he did have an epidural recently which has helped his right leg burning sensation.  He denies any fevers, chills, nausea, vomiting, abdominal pain.  He does report abdominal distention.  He denies any diarrhea but does endorse constipation.  He denies dysuria or hematuria. He states he has bilaterally achilles tendon pain.   History of Present Illness               Objective:     Past Medical History:    ALCOHOL USE    Allergic rhinitis    Flonase    Arthritis    Bladder cancer (HCC)    f/u Sarah    BPH (benign prostatic hyperplasia)    COPD (HCC)    moderate-severe     Disorder of kidney and ureter    Disorder of thyroid    hypothyroid    ED (erectile dysfunction)    Esophageal reflux    Essential hypertension    Hearing impairment    Hepatomegaly    Hiatal hernia    High blood pressure    High cholesterol    Hypothyroidism    Injury    Left knee injury, from fall    Osteoarthritis    in knee's, hip's    Pulmonary nodules    first noted 2016on LD screen CT    Tobacco abuse    Visual impairment    glasses              Past Surgical History:   Procedure Laterality Date    Colonoscopy      02-14-17    Colonoscopy N/A 12/28/2020    Procedure: COLONOSCOPY;  Surgeon: Real Abreu MD;  Location: [facility] ENDOSCOPY    Electrocardiogram, complete  12/15/2013    scanned to media tab    Oral surgery      Other surgical history  2009    cyst removal, side of head    Other surgical history Left 10/24/2024    VATS upper lobe  wedge resection by Dr. Maria    Other surgical history  10/2024    lung nodule removed                Social History     Socioeconomic History    Marital status:    Tobacco Use    Smoking status: Former     Current packs/day: 0.50     Average packs/day: 1 pack/day for 41.3 years (40.7 ttl pk-yrs)     Types: Cigarettes     Start date: 2024    Smokeless tobacco: Former     Quit date: 10/24/2024    Tobacco comments:     2/2022: less than 1 pack per day     10/2024: smokes 8-9 cigarettes a day   Vaping Use    Vaping status: Never Used   Substance and Sexual Activity    Alcohol use: Yes     Alcohol/week: 2.0 standard drinks of alcohol     Types: 2 Cans of beer per week     Comment: 2 beer per day    Drug use: No   Other Topics Concern    Caffeine Concern Yes     Comment: coffee-1 cup/day   Social History Narrative    Live with fianace    Work [employer] in [employer] service    Now retired     Social Drivers of Health     Food Insecurity: No Food Insecurity (4/29/2025)    NCSS - Food Insecurity     Worried About Running Out of Food in the Last Year: No     Ran Out of Food in the Last Year: No   Transportation Needs: No Transportation Needs (4/29/2025)    NCSS - Transportation     Lack of Transportation: No   Housing Stability: Not At Risk (4/29/2025)    NCSS - Housing/Utilities     Has Housing: Yes     Worried About Losing Housing: No     Unable to Get Utilities: No                                Physical Exam     ED Triage Vitals [04/29/25 1034]   /85   Pulse 99   Resp 17   Temp 97.9 °F (36.6 °C)   Temp src Temporal   SpO2 95 %   O2 Device None (Room air)       Current Vitals:   Vital Signs  BP: 156/82  Pulse: 100  Resp: 18  Temp: 97.3 °F (36.3 °C)  Temp src: Oral  MAP (mmHg): (!) 102    Oxygen Therapy  SpO2: 95 %  O2 Device: None (Room air)        Physical Exam  Vitals and nursing note reviewed.   Constitutional:       Appearance: Normal appearance.   HENT:      Head: Normocephalic and  atraumatic.   Cardiovascular:      Rate and Rhythm: Normal rate and regular rhythm.      Pulses: Normal pulses.           Radial pulses are 2+ on the right side and 2+ on the left side.      Heart sounds: Normal heart sounds.   Pulmonary:      Effort: Pulmonary effort is normal.      Breath sounds: Normal breath sounds and air entry.   Abdominal:      General: Bowel sounds are normal. There is distension.      Tenderness: There is no abdominal tenderness. There is no guarding or rebound.   Musculoskeletal:         General: Normal range of motion.      Cervical back: Normal range of motion.      Right lower leg: No edema.      Left lower leg: No edema.      Right ankle: Normal.      Right Achilles Tendon: Normal.      Left ankle: Normal.      Left Achilles Tendon: Normal.   Skin:     General: Skin is warm and dry.   Neurological:      General: No focal deficit present.      Mental Status: He is alert.      Motor: Tremor present.           Physical Exam                ED Course     Labs Reviewed   COMP METABOLIC PANEL (14) - Abnormal; Notable for the following components:       Result Value    Sodium 129 (*)     Potassium 3.4 (*)     Chloride 93 (*)     Calcium, Total 8.3 (*)     Calculated Osmolality 266 (*)     Alkaline Phosphatase 44 (*)     All other components within normal limits   CBC WITH DIFFERENTIAL WITH PLATELET - Abnormal; Notable for the following components:    WBC 12.4 (*)     HGB 11.5 (*)     HCT 32.5 (*)     .0 (*)     Neutrophil Absolute Prelim 9.79 (*)     All other components within normal limits   MAGNESIUM - Abnormal; Notable for the following components:    Magnesium 0.6 (*)     All other components within normal limits   MANUAL DIFFERENTIAL - Abnormal; Notable for the following components:    Neutrophil Absolute Manual 10.66 (*)     Lymphocyte Absolute Manual 0.74 (*)     Metamyelocyte Absolute Manual 0.12 (*)     All other components within normal limits   URINALYSIS WITH CULTURE REFLEX  - Abnormal; Notable for the following components:    Urine Color Colorless (*)     Spec Gravity >1.030 (*)     All other components within normal limits   LACTIC ACID, PLASMA - Normal   MAGNESIUM   RAINBOW DRAW LAVENDER   RAINBOW DRAW LIGHT GREEN   RAINBOW DRAW BLUE   BLOOD CULTURE   BLOOD CULTURE   SPUTUM CULTURE     EKG    Rate, intervals and axes as noted on EKG Report.  Rate: 79  Rhythm: Sinus Rhythm  Reading: no stemi, interpreted by myself.            ED Course as of 04/29/25 1748  ------------------------------------------------------------  Time: 04/29 1257  Value: CT ABDOMEN+PELVIS(CONTRAST ONLY)(CPT=74177)  Comment: Per my independent interpretation, patient's CT Abdomen and Pelvis demonstrates no SBO.       Results                                 MDM          Admission disposition: 4/29/2025  1:08 PM           Medical Decision Making  Differential diagnosis includes but is not limited to electrolyte disturbance, radiculopathy, peripheral neuropathy, etc    Patient CBC was unremarkable.  Patient's chemistry shows mild hyponatremia with critical hypomagnesemia.  I suspect this is causing his symptoms.  He is started on IV magnesium replacement and will be admitted for further treatment.  Imaging of the chest was negative.  CT imaging shows no obstruction, there are findings concerning for PNA. Cultures ordered. Lactic acid pending. Unasyn started..    Rhythm Strip: Rate 88 sinus rhythm as interpreted by myself. The cardiac monitor was ordered secondary to the patient's electrolyte abnormality.     Complicating factors: The patient  has a past medical history of ALCOHOL USE, Allergic rhinitis, Arthritis, Bladder cancer (HCC) (11/2019), BPH (benign prostatic hyperplasia), COPD (HCC), Disorder of kidney and ureter (03/14/2012), Disorder of thyroid, ED (erectile dysfunction), Esophageal reflux, Essential hypertension, Hearing impairment, Hepatomegaly (05/13/2010), Hiatal hernia, High blood pressure, High  cholesterol, Hypothyroidism, Injury (01/29/2019), Osteoarthritis, Pulmonary nodules, Tobacco abuse, and Visual impairment. and  has a past surgical history that includes electrocardiogram, complete (12/15/2013); other surgical history (2009); colonoscopy; oral surgery; colonoscopy (N/A, 12/28/2020); other surgical history (Left, 10/24/2024); and other surgical history (10/2024). that contribute to the medical complexity of this ED evaluation.     Medical Record Review: I personally reviewed available prior medical records for any recent pertinent discharge summaries, testing, and procedures, and reviewed those reports.        Problems Addressed:  Community acquired pneumonia, unspecified laterality: acute illness or injury with systemic symptoms  Hypomagnesemia: acute illness or injury with systemic symptoms that poses a threat to life or bodily functions    Amount and/or Complexity of Data Reviewed  External Data Reviewed: labs.     Details: Mag on 4/22 reviewed and 0.9.  Labs: ordered. Decision-making details documented in ED Course.  Radiology: ordered and independent interpretation performed. Decision-making details documented in ED Course.  ECG/medicine tests: ordered and independent interpretation performed. Decision-making details documented in ED Course.  Discussion of management or test interpretation with external provider(s): Dr. Mendoza accepts admission. He would like Unasyn and pulm consult for PNA.    Risk  Decision regarding hospitalization.    Critical Care  Total time providing critical care: minutes (44 minutes including time spent examining and re-evaluating the patient, ordering and reviewing laboratory tests, documenting, reviewing previous records, obtaining information from the family, and speaking with consultants, admitting doctors, nurses and medics and excludes any time spent on procedures.  )        Disposition and Plan     Clinical Impression:  1. Hypomagnesemia    2. Community  acquired pneumonia, unspecified laterality         Disposition:  Admit  4/29/2025  1:08 pm    Follow-up:  No follow-up provider specified.  We recommend that you schedule follow up care with a primary care provider within the next three months to obtain basic health screening including reassessment of your blood pressure.      Medications Prescribed:  Current Discharge Medication List          Supplementary Documentation:         Hospital Problems       Present on Admission  Date Reviewed: 4/22/2025          ICD-10-CM Noted POA    * (Principal) Hypomagnesemia E83.42 4/29/2025 Unknown    Community acquired pneumonia, unspecified laterality J18.9 4/29/2025 Unknown

## 2025-04-29 NOTE — PROGRESS NOTES
Patient received from ED in stable condition. Accompanied alone. Admission screening completed, information provided by patient. AxOx4. Repleating electrolytes per order/protocol.

## 2025-04-29 NOTE — ED QUICK NOTES
Orders for admission, patient is aware of plan and ready to go upstairs. Any questions, please call ED RN Nataliia at extension [phone].     Patient Covid vaccination status: Fully vaccinated     COVID Test Ordered in ED: None    COVID Suspicion at Admission: N/A    Running Infusions:     Mental Status/LOC at time of transport: ALERT    Other pertinent information: On RA, ambulates with assist, 20G IV L forearm    CIWA score: N/A   NIH score:  N/A

## 2025-04-29 NOTE — CM/SW NOTE
MDO for  nicolette    Per chart review pt lives at home w/ spouse, is A&O, on RA, at baseline walks with a walker.    PMHx COPD, heavy etoh use-last drink prior to hospitalization 9 days ago    Recent hx of hospitalization between April 19 and April 20 for pneumonia, treated and discharged home. Today, he came in to the emergency department for evaluation or progressive fatigue and weakness.     Found to have critical Mag level.    Currently on RA, await Pulm consult, PT OT SLP consult, blood cx    Tentative [facility] ref sent, f2f done for RN PT OT and SLP    Plan  Pending above work up    / to remain available for support and/or discharge planning.     Rosemarie Mooney RN    Ext [phone]

## 2025-04-29 NOTE — H&P
[facility]    PATIENT'S NAME: FRANCESCO NIELSON   ATTENDING PHYSICIAN: Austin Pickering MD   PATIENT ACCOUNT#:   [id number]    LOCATION:  [address]  MEDICAL RECORD #:   [id number]       YOB: 1955  ADMISSION DATE:       04/29/2025    HISTORY AND PHYSICAL EXAMINATION    CHIEF COMPLAINT:  Profound hypomagnesemia and weakness.    HISTORY OF PRESENT ILLNESS:  Patient is a 69-year-old  male who was hospitalized recently between April 19 and April 20 for pneumonia, treated and discharged home.  Today, he came in to the emergency department for evaluation or progressive fatigue and weakness.  Reviewing the records, noted during his hospitalization to have 2 magnesium levels, 1.0 and 0.9.  Today, magnesium is 0.6, potassium 3.5.  Otherwise, chemistry and liver function tests were unremarkable.  His sodium was 129 and calculated osmolality 266.  Today, CT scan of the abdomen and pelvis showed multifocal patchy ground-glass density opacities, nodules in both lung bases, which are new since recent imaging studies.  Findings concerning for infection or pneumonia and post-treatment followup recommended.  Chest x-ray showed no acute findings.      PAST MEDICAL HISTORY:  Chronic obstructive pulmonary disease.  History of heavy alcohol abuse, but he quit since his recent hospitalization.  Gastroesophageal reflux disease, benign prostatic hypertrophy, hypothyroidism, hyperlipidemia, generalized osteoarthritis, benign prostatic hypertrophy, hiatal hernia, degenerative joint disease of lumbar spine.    PAST SURGICAL HISTORY:  Transurethral resection of bladder tumor for urothelial carcinoma, on followup cystoscopies no recurrence.  Left knee arthroscopic procedure.  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    MEDICATIONS:  Please see medication reconciliation list.     ALLERGIES:  No known drug  allergies.     FAMILY HISTORY:  Mother had diabetes mellitus type 2.    SOCIAL HISTORY:  Chronic tobacco use.  History of heavy alcohol consumption, but he quit 2 weeks ago.  No drug use.  Lives by himself.      REVIEW OF SYSTEMS:  Patient describes fatigue, weakness, occasional cough.  Denies any fever or chills.  He has been constipated and he has been having difficulty with his basic activities of daily living.  Other 12-point review of systems is negative.       PHYSICAL EXAMINATION:    GENERAL:  Alert and oriented to time, place and person.  Fatigued.  No acute distress.   VITAL SIGNS:  Temperature 97.9, pulse 84, respiratory rate 23, blood pressure 148/81, pulse ox 95% on room air.  HEENT:  Atraumatic.  Oropharynx clear.  Dry mucous membranes.    NECK:  Supple.  No lymphadenopathy.  Trachea midline.    LUNGS:  Clear to auscultation bilaterally.  Diminished breathing sounds, both lung bases.  Faint rhonchi.   HEART:  Regular rate and rhythm.  S1 and S2 auscultated.  No murmur.    ABDOMEN:  Soft, nondistended.  No tenderness.  Positive bowel sounds.   EXTREMITIES:  No peripheral edema, clubbing or cyanosis.   NEUROLOGIC:  Motor and sensory intact.  Cranial nerves II to XII are intact.      ASSESSMENT:    1.   Multifocal pneumonia evident on imaging studies.  2.   Profound hypomagnesemia and mild hypokalemia.  3.   Musculoskeletal deconditioning.   4.   Chronic obstructive pulmonary disease.      PLAN:  We will start patient on electrolyte replacement therapy.  Start him on Unasyn and azithromycin.  Obtain pulmonary consult.  Physical and occupational therapy.  Fall precautions.  Gentle hydration.  Further recommendations to follow.     Dictated By Sandie Mendoza MD  d: 04/29/2025 13:29:43  t: 04/29/2025 13:47:36  Job [id number]  /

## 2025-04-29 NOTE — CONSULTS
[facility]  part of [facility]    Report of Consultation    Alejandro Hanna Patient Status:  Inpatient    12/10/1955 MRN [id number]   Location [facility] 4W/SW/SE Attending Sandie Mendoza MD   Hosp Day # 0 PCP Leonid Ford DO     Date of Admission:  2025    Reason for Consultation:   Dyspnea    History of Present Illness:   Patient is a 69-year-old male with past medical history significant for COPD, non-small cell lung cancer status post left upper lobe wedge resection who presents with chief complaint of weakness fatigue malaise and send with associated dyspnea.  Found to have significant hyponatremia, hypomagnesemia and hypokalemia on presentation.  Admits to some occasional cough.  No significant hypoxia on presentation.  Denies chest pain.    Past Medical History  Past Medical History[1]    Past Surgical History  Past Surgical History[2]    Family History  Family History[3]    Social History  Social Hx on file[4]        Current Medications:  Current Hospital Medications[5]  Prescriptions Prior to Admission[6]    Allergies  Allergies[7]    Review of Systems:   Constitutional: Fatigue, malaise  HEENT: denies headache, sore throat, vision loss  Cardio: denies chest pain, chest pressure, palpitations  Respiratory: dyspnea, cough, denies wheezing, hemoptysis   GI: denies nausea, vomiting, abdominal pain  : denies dysuria, hematuria  Musculoskeletal: denies arthralgia, myalgia  Integumentary: denies rash, itching  Neurological: denies syncope, weakness, dizziness,   Psychiatric: denies depression, anxiety  Hematologic: denies bruising        Physical Exam:   Blood pressure 156/82, pulse 92, temperature 97.3 °F (36.3 °C), temperature source Oral, resp. rate 18, height 5' 4\" (1.626 m), weight 127 lb 6.8 oz (57.8 kg), SpO2 95%.    Constitutional: no acute distress  Eyes: PERRL  ENT: nares patent  Neck: neck supple, no JVD  Cardio: RRR, S1 S2  Respiratory: Expiratory  Rales  GI: abdomen soft, non tender, active bowel souds, no organomegaly  Extremities: no clubbing, cyanosis, edema  Neurologic: no gross motor deficits  Skin: warm, dry    Results:   Laboratory Data  Lab Results   Component Value Date    WBC 12.4 (H) 04/29/2025    HGB 11.5 (L) 04/29/2025    HCT 32.5 (L) 04/29/2025    .0 (H) 04/29/2025    CREATSERUM 0.76 04/29/2025    BUN 9 04/29/2025     (L) 04/29/2025    K 3.4 (L) 04/29/2025    CL 93 (L) 04/29/2025    CO2 26.0 04/29/2025    GLU 95 04/29/2025    CA 8.3 (L) 04/29/2025    ALB 4.6 04/29/2025    ALKPHO 44 (L) 04/29/2025    TP 7.2 04/29/2025    AST 20 04/29/2025    ALT 13 04/29/2025    PTT 28.1 03/05/2019    INR 0.93 03/05/2019    PTP 12.3 03/05/2019    T4F 0.68 04/17/2018    TSH 2.656 04/22/2025    GGT 36 06/29/2023    PSA 0.98 10/27/2023    MG 0.6 (LL) 04/29/2025    PHOS 3.1 02/01/2025    B12 656 06/29/2023         Imaging  CT ABDOMEN+PELVIS(CONTRAST ONLY)(CPT=74177)  Result Date: 4/29/2025  CONCLUSION:  1. Multifocal patchy ground-glass density opacities and nodules at the lung bases are new since recent prior January, 2025 chest CT.  Findings are most concerning for infection/pneumonia and a post treatment follow-up chest CT is suggested to ensure resolution. 2. No other acute intra-abdominal finding. 3. Colonic diverticulosis. 4. Probable small hepatic cysts. 5. Coronary and peripheral atherosclerosis. 6. Circumferential urinary bladder wall thickening, which may relate to incomplete distention or cystitis.  If there are referable symptoms, urinalysis correlation is requested. 7. Subacute to chronic-appearing left lateral 7th rib fracture, which was present on prior chest CT. 8. Lesser incidental findings as above.   elm-remote  Dictated by (CST): Duong Veronica MD on 4/29/2025 at 12:19 PM     Finalized by (CST): Duong Veronica MD on 4/29/2025 at 12:26 PM            Assessment   1.  COPD with mild exacerbation  2.  Leukocytosis  3.  Non-small cell  lung cancer status post left upper lobe wedge resection  4.  Hypokalemia  5.  Hypomagnesemia  6.  Hyponatremia    Plan   -Patient presents with evidence of fatigue malaise dyspnea.  Concern for mild COPD exacerbation.  - Chest x-ray with no acute findings seen  - Continue antibiotic therapy  - IV steroids and gradually wean  - Nebulizer treatments  - ICS/LABA  - Electrolyte repletion  - Discussed with family  - Reviewed vitals, labs and imaging    Brennon Greco DO  Pulmonary Critical Care Medicine  [facility]  4/29/2025  3:51 PM        [1]   Past Medical History:   ALCOHOL USE    Allergic rhinitis    Flonase    Arthritis    Bladder cancer (HCC)    f/u [facility]    BPH (benign prostatic hyperplasia)    COPD (HCC)    moderate-severe     Disorder of kidney and ureter    Disorder of thyroid    hypothyroid    ED (erectile dysfunction)    Esophageal reflux    Essential hypertension    Hearing impairment    Hepatomegaly    Hiatal hernia    High blood pressure    High cholesterol    Hypothyroidism    Injury    Left knee injury, from fall    Osteoarthritis    in knee's, hip's    Pulmonary nodules    first noted 2016on LD screen CT    Tobacco abuse    Visual impairment    glasses   [2]   Past Surgical History:  Procedure Laterality Date    Colonoscopy      02-14-17    Colonoscopy N/A 12/28/2020    Procedure: COLONOSCOPY;  Surgeon: Real Abreu MD;  Location: [facility] ENDOSCOPY    Electrocardiogram, complete  12/15/2013    scanned to media tab    Oral surgery      Other surgical history  2009    cyst removal, side of head    Other surgical history Left 10/24/2024    VATS upper lobe wedge resection by Dr. Maria    Other surgical history  10/2024    lung nodule removed   [3]   Family History  Problem Relation Age of Onset    Dementia Mother     Diabetes Paternal Grandmother     Cancer Sister         lung cancer   [4]   Social History  Socioeconomic History    Marital status:    Tobacco Use    Smoking status:  Former     Current packs/day: 0.50     Average packs/day: 1 pack/day for 41.3 years (40.7 ttl pk-yrs)     Types: Cigarettes     Start date: 2024    Smokeless tobacco: Former     Quit date: 10/24/2024    Tobacco comments:     2/2022: less than 1 pack per day     10/2024: smokes 8-9 cigarettes a day   Vaping Use    Vaping status: Never Used   Substance and Sexual Activity    Alcohol use: Yes     Alcohol/week: 2.0 standard drinks of alcohol     Types: 2 Cans of beer per week     Comment: 2 beer per day    Drug use: No   Other Topics Concern    Caffeine Concern Yes     Comment: coffee-1 cup/day   [5]   Current Facility-Administered Medications   Medication Dose Route Frequency    heparin (Porcine) 5000 UNIT/ML injection 5,000 Units  5,000 Units Subcutaneous 2 times per day    acetaminophen (Tylenol Extra Strength) tab 500 mg  500 mg Oral Q4H PRN    ondansetron (Zofran) 4 MG/2ML injection 4 mg  4 mg Intravenous Q6H PRN    metoclopramide (Reglan) 5 mg/mL injection 10 mg  10 mg Intravenous Q8H PRN    temazepam (Restoril) cap 15 mg  15 mg Oral Nightly PRN    azithromycin (Zithromax) tab 500 mg  500 mg Oral Daily    ipratropium-albuterol (Duoneb) 0.5-2.5 (3) MG/3ML inhalation solution 3 mL  3 mL Nebulization Q6H PRN    sodium chloride 0.9% infusion   Intravenous Continuous    ampicillin-sulbactam (Unasyn) 3 g in sodium chloride 0.9% 100mL IVPB-ADD  3 g Intravenous q6h    potassium chloride 40 mEq in 250mL sodium chloride 0.9% IVPB premix  40 mEq Intravenous Once    albuterol (Ventolin HFA) 108 (90 Base) MCG/ACT inhaler 2 puff  2 puff Inhalation 2 times per day    atorvastatin (Lipitor) tab 10 mg  10 mg Oral Daily    finasteride (Proscar) tab 5 mg  5 mg Oral Daily    fluticasone propionate (Flonase) 50 MCG/ACT nasal suspension 2 spray  2 spray Nasal BID    [START ON 4/30/2025] levothyroxine (Synthroid) tab 88 mcg  88 mcg Oral Before breakfast    magnesium oxide (Mag-Ox) tab 400 mg  400 mg Oral Daily    [START ON 4/30/2025]  losartan (Cozaar) tab 100 mg  100 mg Oral Daily    [START ON 2025] pantoprazole (Protonix) DR tab 40 mg  40 mg Oral QAM AC   [6]   Medications Prior to Admission   Medication Sig    Magnesium 400 MG Oral Tab Take 400 mg by mouth daily.    albuterol (VENTOLIN HFA) 108 (90 Base) MCG/ACT Inhalation Aero Soln Inhale 2 puffs into the lungs in the morning and 2 puffs in the evening.    fluticasone propionate 50 MCG/ACT Nasal Suspension 2 sprays by Nasal route in the morning and 2 sprays before bedtime.    naproxen (ALEVE) 220 MG Oral Tab Take 1-2 tablets (220-440 mg total) by mouth daily as needed.    Fenofibrate 160 MG Oral Tab Take 1 tablet (160 mg total) by mouth daily.    ATORVASTATIN 10 MG Oral Tab TAKE 1 TABLET BY MOUTH EVERY DAY    levothyroxine (LEVOXYL) 88 MCG Oral Tab Take 1 tablet (88 mcg total) by mouth before breakfast. For underactive thyroid.    Omeprazole 40 MG Oral Capsule Delayed Release Take 1 capsule (40 mg total) by mouth daily.    Olmesartan Medoxomil 40 MG Oral Tab Take 1 tablet (40 mg total) by mouth daily. For blood pressure    BREO ELLIPTA 200-25 MCG/ACT Inhalation Aerosol Powder, Breath Activated Inhale 1 puff into the lungs in the evening.    [] doxycycline 100 MG Oral Cap Take 1 capsule (100 mg total) by mouth 2 (two) times daily for 5 days.    acetaminophen 500 MG Oral Tab Take 1-2 tablets (500-1,000 mg total) by mouth every 6 (six) hours as needed for Pain.    Dutasteride 0.5 MG Oral Cap Take 1 capsule (0.5 mg total) by mouth every morning.   [7] No Known Allergies

## 2025-04-30 PROBLEM — F39 EPISODIC MOOD DISORDER: Status: ACTIVE | Noted: 2025-04-30

## 2025-04-30 LAB
ANION GAP SERPL CALC-SCNC: 8 MMOL/L (ref 0–18)
BASOPHILS # BLD: 0 X10(3) UL (ref 0–0.2)
BASOPHILS NFR BLD: 0 %
BUN BLD-MCNC: 11 MG/DL (ref 9–23)
BUN/CREAT SERPL: 14.9 (ref 10–20)
CALCIUM BLD-MCNC: 8.4 MG/DL (ref 8.7–10.4)
CHLORIDE SERPL-SCNC: 99 MMOL/L (ref 98–112)
CO2 SERPL-SCNC: 25 MMOL/L (ref 21–32)
CREAT BLD-MCNC: 0.74 MG/DL (ref 0.7–1.3)
DEPRECATED RDW RBC AUTO: 39.8 FL (ref 35.1–46.3)
EGFRCR SERPLBLD CKD-EPI 2021: 97 ML/MIN/1.73M2 (ref 60–?)
EOSINOPHIL # BLD: 0 X10(3) UL (ref 0–0.7)
EOSINOPHIL NFR BLD: 0 %
ERYTHROCYTE [DISTWIDTH] IN BLOOD BY AUTOMATED COUNT: 13.2 % (ref 11–15)
GLUCOSE BLD-MCNC: 124 MG/DL (ref 70–99)
HCT VFR BLD AUTO: 30.7 % (ref 39–53)
HGB BLD-MCNC: 10.9 G/DL (ref 13–17.5)
LYMPHOCYTES NFR BLD: 0.24 X10(3) UL (ref 1–4)
LYMPHOCYTES NFR BLD: 2 %
MAGNESIUM SERPL-MCNC: 1.9 MG/DL (ref 1.6–2.6)
MCH RBC QN AUTO: 29.6 PG (ref 26–34)
MCHC RBC AUTO-ENTMCNC: 35.5 G/DL (ref 31–37)
MCV RBC AUTO: 83.4 FL (ref 80–100)
METAMYELOCYTES # BLD: 0.12 X10(3) UL (ref ?–0.01)
METAMYELOCYTES NFR BLD: 1 %
MONOCYTES # BLD: 0.12 X10(3) UL (ref 0.1–1)
MONOCYTES NFR BLD: 1 %
MORPHOLOGY: NORMAL
NEUTROPHILS # BLD AUTO: 11.05 X10 (3) UL (ref 1.5–7.7)
NEUTROPHILS NFR BLD: 94 %
NEUTS BAND NFR BLD: 2 %
NEUTS HYPERSEG # BLD: 11.71 X10(3) UL (ref 1.5–7.7)
OSMOLALITY SERPL CALC.SUM OF ELEC: 275 MOSM/KG (ref 275–295)
OSMOLALITY UR: 459 MOSM/KG (ref 300–1300)
PLATELET # BLD AUTO: 472 10(3)UL (ref 150–450)
PLATELET MORPHOLOGY: NORMAL
POTASSIUM SERPL-SCNC: 4.2 MMOL/L (ref 3.5–5.1)
POTASSIUM SERPL-SCNC: 4.2 MMOL/L (ref 3.5–5.1)
RBC # BLD AUTO: 3.68 X10(6)UL (ref 3.8–5.8)
SODIUM SERPL-SCNC: 132 MMOL/L (ref 136–145)
SODIUM SERPL-SCNC: 75 MMOL/L
TOTAL CELLS COUNTED BLD: 100
TSI SER-ACNC: 0.93 UIU/ML (ref 0.55–4.78)
WBC # BLD AUTO: 12.2 X10(3) UL (ref 4–11)

## 2025-04-30 PROCEDURE — 99232 SBSQ HOSP IP/OBS MODERATE 35: CPT | Performed by: INTERNAL MEDICINE

## 2025-04-30 PROCEDURE — 99233 SBSQ HOSP IP/OBS HIGH 50: CPT | Performed by: HOSPITALIST

## 2025-04-30 PROCEDURE — 90792 PSYCH DIAG EVAL W/MED SRVCS: CPT | Performed by: OTHER

## 2025-04-30 RX ORDER — LORAZEPAM 0.5 MG/1
2 TABLET ORAL
Status: DISCONTINUED | OUTPATIENT
Start: 2025-04-30 | End: 2025-05-01

## 2025-04-30 RX ORDER — MAGNESIUM OXIDE 400 MG/1
400 TABLET ORAL DAILY
Status: DISCONTINUED | OUTPATIENT
Start: 2025-04-30 | End: 2025-04-30

## 2025-04-30 RX ORDER — THIAMINE HYDROCHLORIDE 100 MG/ML
100 INJECTION, SOLUTION INTRAMUSCULAR; INTRAVENOUS ONCE
Status: COMPLETED | OUTPATIENT
Start: 2025-04-30 | End: 2025-04-30

## 2025-04-30 RX ORDER — OLANZAPINE 2.5 MG/1
2.5 TABLET, FILM COATED ORAL NIGHTLY
Status: DISCONTINUED | OUTPATIENT
Start: 2025-04-30 | End: 2025-05-01

## 2025-04-30 RX ORDER — DOXEPIN HYDROCHLORIDE 50 MG/1
1 CAPSULE ORAL DAILY
Status: DISCONTINUED | OUTPATIENT
Start: 2025-04-30 | End: 2025-05-01

## 2025-04-30 RX ORDER — FOLIC ACID 1 MG/1
1 TABLET ORAL DAILY
Status: DISCONTINUED | OUTPATIENT
Start: 2025-04-30 | End: 2025-05-01

## 2025-04-30 RX ORDER — CHLORDIAZEPOXIDE HYDROCHLORIDE 5 MG/1
5 CAPSULE, GELATIN COATED ORAL 2 TIMES DAILY
Status: DISCONTINUED | OUTPATIENT
Start: 2025-04-30 | End: 2025-05-01

## 2025-04-30 RX ORDER — MELATONIN
100 DAILY
Status: DISCONTINUED | OUTPATIENT
Start: 2025-05-01 | End: 2025-05-01

## 2025-04-30 RX ORDER — LORAZEPAM 0.5 MG/1
1 TABLET ORAL
Status: DISCONTINUED | OUTPATIENT
Start: 2025-04-30 | End: 2025-05-01

## 2025-04-30 NOTE — SLP NOTE
ADULT SWALLOWING EVALUATION    ASSESSMENT    ASSESSMENT/OVERALL IMPRESSION:  PPE REQUIRED. THIS THERAPIST WORE GLOVES FOR DURATION OF EVALUATION. HANDS WASHED UPON ENTRANCE/EXIT.    Per MD H&P, \"Patient is a 69-year-old  male who was hospitalized recently between April 19 and April 20 for pneumonia, treated and discharged home. Today, he came in to the emergency department for evaluation or progressive fatigue and weakness\"    SLP BSSE orders received and acknowledged. A swallow evaluation warranted per \"possible aspiration\". Pt afebrile with harsh vocal quality, on room air, with oxygen saturation at 94%. Pt with no hx of dysphagia at [facility].   Pt positioned 90 degrees in bedside chair, alert/cooperative. Pt with no complaints of pain. Oral motor skills within functional limits. Pt presented with trials of hard solids and thin liquids via straw. Pt with adequate oral acceptance and bilabial seal across all trials. Pt with intact bite, mastication of solids, and timely A-P transit. Pt's swallow response appears timely with adequate hyolaryngeal elevation/excursion. No clinical signs of aspiration (e.g., immediate/delayed throat clear, immediate/delayed cough, wet vocal quality, increased O2 effort) observed across all trials. 4/29 CXR indicates \"Normal heart size.  No edema Stable bibasilar pulmonary opacities.  No new opacity No pneumothorax or significant pleural effusion\". Oxygen status remained stable t/o the entire evaluation.     At this time, pt presents with functional oral and probable pharyngeal swallow stages. Recommend a regular diet and thin liquids with strict adherence to safe swallowing compensatory strategies. Results and recommendations reviewed with RN, pt. Pt v/u to all results/recommendations. No further swallow services warranted at this time. Please re consult if needed.        RECOMMENDATIONS   Diet Recommendations - Solids: Regular  Diet Recommendations - Liquids: Thin Liquids                        Aspiration Precautions: Upright position, Slow rate  Medication Administration Recommendations:  (as tolerated)  Treatment Plan/Recommendations: No further inpatient SLP service warranted    HISTORY   MEDICAL HISTORY  Reason for Referral: R/O aspiration    Problem List  Principal Problem:    Hypomagnesemia  Active Problems:    Community acquired pneumonia, unspecified laterality      Past Medical History  Past Medical History[1]    Prior Living Situation: Home with spouse  Diet Prior to Admission: Regular, Thin liquids     Patient/Family Goals: did not state    SWALLOWING HISTORY  Current Diet Consistency: Regular, Thin liquids  Dysphagia History: none  Imaging Results: 4/29 CT ABDOMEN PELVIS  CONCLUSION:   1. Multifocal patchy ground-glass density opacities and nodules at the lung bases are new since recent prior January, 2025 chest CT.  Findings are most concerning for infection/pneumonia and a post treatment follow-up chest CT is suggested to ensure   resolution.   2. No other acute intra-abdominal finding.   3. Colonic diverticulosis.   4. Probable small hepatic cysts.   5. Coronary and peripheral atherosclerosis.   6. Circumferential urinary bladder wall thickening, which may relate to incomplete distention or cystitis.  If there are referable symptoms, urinalysis correlation is requested.   7. Subacute to chronic-appearing left lateral 7th rib fracture, which was present on prior chest CT.   8. Lesser incidental findings as above.         SUBJECTIVE       OBJECTIVE   ORAL MOTOR EXAMINATION  Dentition: Functional  Symmetry: Within Functional Limits  Strength: Within Functional Limits     Range of Motion: Within Functional Limits  Rate of Motion: Within Functional Limits    Voice Quality: Harsh/Strained  Respiratory Status: Unlabored  Consistencies Trialed: Thin liquids, Hard solid  Method of Presentation: Self presentation, Straw  Patient Positioned: Upright, Midline, Bedside chair    Oral Phase  of Swallow: Within Functional Limits                    Pharyngeal Phase of Swallow: Within Functional Limits           (Please note: Silent aspiration cannot be evaluated clinically. Videofluoroscopic Swallow Study is required to rule-out silent aspiration.)    Esophageal Phase of Swallow: No complaints consistent with possible esophageal involvement  Comments: NA          GOALS: NA    FOLLOW UP  Treatment Plan/Recommendations: No further inpatient SLP service warranted     Follow Up Needed (Documentation Required): No  SLP Follow-up Date: 04/30/25    Thank you for your referral.   If you have any questions, please contact SERINA Callaway M.S. CCC-SLP  Speech Language Pathologist  Phone Number Ext. [phone]         [1]   Past Medical History:   ALCOHOL USE    Allergic rhinitis    Flonase    Arthritis    Bladder cancer (HCC)    f/u [facility]    BPH (benign prostatic hyperplasia)    COPD (HCC)    moderate-severe     Disorder of kidney and ureter    Disorder of thyroid    hypothyroid    ED (erectile dysfunction)    Esophageal reflux    Essential hypertension    Hearing impairment    Hepatomegaly    Hiatal hernia    High blood pressure    High cholesterol    Hypothyroidism    Injury    Left knee injury, from fall    Osteoarthritis    in knee's, hip's    Pulmonary nodules    first noted 2016on LD screen CT    Tobacco abuse    Visual impairment    glasses

## 2025-04-30 NOTE — CONSULTS
[facility]  part of [facility]    Report of Consultation    Alejandro Hanna Patient Status:  Inpatient    12/10/1955 MRN [id number]   Location [facility] 4W/SW/SE Attending Pernell Yap MD   Hosp Day # 1 PCP Leonid Ford DO     Date of Admission:  2025  Date of Consult:  2025   Reason for Consultation:   Patient presented with alcohol withdrawal, Pernell Holm MD requested psychiatric consult for evaluation and advice.    Consult Duration     The patient seen for initial psychiatric consult evaluation.   Record reviewed, communication with attending, communication with RN and patient seen face to face evaluation.    History of Present Illness:   Patient is a 69 year old   male with past medical history of COPD and heavy alcohol use who was admitted to the hospital for Hypomagnesemia: The patient has been demonstrating increased anxiety and questionable withdrawal symptoms.  Psych consult requested for evaluation and advice    Per chart review, the patient presented to the hospital with progressive fatigue and weakness. He was hospitalized about a week ago for pneumonia and given antibiotics.    The patient received the following psychotropic medications Lorazepam 1 mg on     Labs and imaging reviewed: Magnesium 0.9    Most recent CIWA 8  Vital signs: /71, , RR 18    The patient is being seen for the first time by the psychiatry consult team.    The patient seen today in the presence of his wife.  Patient sitting in his recliner.  Patient demonstrating mild tremor.  Patient otherwise alert and oriented and attentive.    The patient spoke in length about the reason for his admission focusing on the weakness and the seen.  Patient reporting that he wanted to get better quickly and he enjoyed his walk and that has been disturbing to him.    The patient today admitted that he has been drinking for many years usually drinks about 9  beers.  Patient has pneumonia 2 weeks ago and patient reporting that he has not been drinking since.  Patient reporting that he has been anxious about wanting to do more in the hospital and he wants to walk fast with the physical therapy.    The patient denied any history of withdrawal in the past or any history of seizure or DTs.  Patient believing that his recent drinking has been very moderate and drinking about 3-4 beers daily but not in the last few weeks.  Patient denied any hard liquor or any intoxication.    In the meantime the patient admitted that sometime he has anxieties and he is irritated easily and get some temper.  Patient reporting that he get angry sometime and he feel if he drinks beer he will calm down.  Patient otherwise denying any history of depression, anxiety or any treatment in the past.    The patient denying any cognitive engagement reporting that he want to feel healthy and young all the time.    Patient demonstrating some slight restlessness and noticeable tremor.  Patient otherwise on inhaler and steroid.    In meantime the patient is denying any withdrawal symptom of abdominal pain, hallucination, disorientation but his magnesium level was low upon admission.    The patient denied any symptom of hopelessness, helplessness, worthlessness, low mood, or any suicidal ideation.    Patient agreeable to some symptom of anxiety with increased restlessness, rumination and having temper and irritability.    The patient is not demonstrating any johnny or psychosis  The patient denies auditory or visual hallucinations      Past Psychiatric/Medication History:  1. Prior diagnoses: None  2. Past psychiatric inpatient: No  3. Past outpatient history: None  4. Past suicide history: None  5. Medication history: None    Social History:   Patient is  to his current spouse for the last 7 years.  Patient lives with his spouse and he is retired.  This patient reporting that he used to drink about 9  beers daily for many years but recently drinking about 3-4 nightly but not in the last 2 weeks.  Patient reported that he used to smoke but quit long time ago    Family History:  Patient denying any family psychiatric history.  Medical History:   Past Medical History  Past Medical History[1]    Past Surgical History  Past Surgical History[2]    Family History  Family History[3]    Social History  Short Social Hx on File[4]        Current Medications:  Current Hospital Medications[5]  Prescriptions Prior to Admission[6]    Allergies  Allergies[7]    Review of Systems:   As by Admitting/Attending    Results:   Laboratory Data:  Lab Results   Component Value Date    WBC 12.2 (H) 04/30/2025    HGB 10.9 (L) 04/30/2025    HCT 30.7 (L) 04/30/2025    .0 (H) 04/30/2025    CREATSERUM 0.74 04/30/2025    BUN 11 04/30/2025     (L) 04/30/2025    K 4.2 04/30/2025    K 4.2 04/30/2025    CL 99 04/30/2025    CO2 25.0 04/30/2025     (H) 04/30/2025    CA 8.4 (L) 04/30/2025    ALB 4.6 04/29/2025    ALKPHO 44 (L) 04/29/2025    TP 7.2 04/29/2025    AST 20 04/29/2025    ALT 13 04/29/2025    PTT 28.1 03/05/2019    INR 0.93 03/05/2019    PTP 12.3 03/05/2019    T4F 0.68 04/17/2018    TSH 0.928 04/30/2025    GGT 36 06/29/2023    PSA 0.98 10/27/2023    MG 1.9 04/30/2025    PHOS 3.1 02/01/2025    B12 656 06/29/2023         Imaging:  CT ABDOMEN+PELVIS(CONTRAST ONLY)(CPT=74177)  Result Date: 4/29/2025  CONCLUSION:  1. Multifocal patchy ground-glass density opacities and nodules at the lung bases are new since recent prior January, 2025 chest CT.  Findings are most concerning for infection/pneumonia and a post treatment follow-up chest CT is suggested to ensure resolution. 2. No other acute intra-abdominal finding. 3. Colonic diverticulosis. 4. Probable small hepatic cysts. 5. Coronary and peripheral atherosclerosis. 6. Circumferential urinary bladder wall thickening, which may relate to incomplete distention or cystitis.  If  there are referable symptoms, urinalysis correlation is requested. 7. Subacute to chronic-appearing left lateral 7th rib fracture, which was present on prior chest CT. 8. Lesser incidental findings as above.   elm-remote  Dictated by (CST): Duong Veronica MD on 4/29/2025 at 12:19 PM     Finalized by (CST): Duong Veronica MD on 4/29/2025 at 12:26 PM            Vital Signs:   Blood pressure 145/71, pulse 108, temperature 97.7 °F (36.5 °C), temperature source Oral, resp. rate 18, height 64\", weight 57.8 kg (127 lb 6.8 oz), SpO2 97%.    Mental Status Exam:   Appearance: Stated age and, in hospital gown, laying down in hospital bed.  Wife at bedside.  Psychomotor: No psychomotor agitation, or retardation.  Fine tremors has been observed.  Orientation: Alert and oriented to person, place, time and condition.  Gait: Walking with walker.  Attitude/Coorperation: Cooperative and attentive.  Behavior: Appropriate.  Patient very talkative  Speech: Regular rate and rhythm speech.  Mood: Patient reporting anxious mood.  Affect: Anxious affect, congruent with the mood.  Thought process: Linear and appropriate.  Thought content: Patient denies any suicidal or homicidal ideation.  Perceptions: Patient denies any auditory or visual hallucinations.  Concentration: Grossly intact.  Memory: Grossly intact.  Intellect: Average.  Judgment and Insight: Appropriate and patient has been attentive and engaging in the discussion.    Impression:     Episodic mood disorder  Alcohol use disorder.  Rule out steroid-induced mood disorder  Hypomagnesemia  Community acquired pneumonia, unspecified laterality          The patient is a 69-year-old   male with known previous psychiatric history and has history of daily alcohol of drinking 9 beers for many years who has been drinking significantly less in the last year and none in the last 2-week presented to the hospital for pneumonia and found slightly restless with a tremor and  elevated blood pressure.    Patient repeatedly denied any recent alcohol and patient did not demonstrate major alcohol withdrawal otherwise noticeable anxiety especially with the patient started on a steroid and inhaler.  Patient reporting that he has not been sleeping well and he finds himself restless and irritable here.    Discussed risk and benefit, acknowledging the current symptom and severity.  At this point, I would recommend the following approach:     Focus on safety  Focus on education and support.  Focus on insight orientation helping the patient understand diagnosis and treatment plan.  Start Librium 5 mg twice daily to address the underlying anxiety.  Start olanzapine 2.5 mg nightly to help with sleep and mood and irritability.  Processed with patient at length, the initiation of the above psychotropic medications I advised the patient of the risks, benefits, alternatives and potential side effects. The patient consents to administration of the medications and understands the right to refuse medications at any time. The patient verbalized understanding.   Coordinate plan with team    Orders This Visit:  Orders Placed This Encounter   Procedures    Comp Metabolic Panel (14)    CBC With Differential With Platelet    Magnesium    Manual differential    Urinalysis with Culture Reflex    Basic Metabolic Panel (8)    CBC With Differential With Platelet    Lactic Acid, Plasma    Magnesium    Potassium    Manual differential    Magnesium    Sodium, Urine, Random    Osmolality, Urine    TSH W Reflex To Free T4    Blood Culture    Sputum culture       Meds This Visit:  Requested Prescriptions      No prescriptions requested or ordered in this encounter       Osman Vital MD  4/30/2025    Note to Patient: The 21st Century Cures Act makes medical notes like these available to patients in the interest of transparency. However, be advised this is a medical document. It is intended as peer to peer communication.  It is written in medical language and may contain abbreviations or verbiage that are unfamiliar. It may appear blunt or direct. Medical documents are intended to carry relevant information, facts as evident, and the clinical opinion of the practitioner. This note may have been transcribed using a voice dictation system. Voice recognition errors may occur. This should not be taken to alter the content or meaning of this note.            [1]   Past Medical History:   ALCOHOL USE    Allergic rhinitis    Flonase    Arthritis    Bladder cancer (HCC)    f/u Sarah    BPH (benign prostatic hyperplasia)    COPD (HCC)    moderate-severe     Disorder of kidney and ureter    Disorder of thyroid    hypothyroid    ED (erectile dysfunction)    Esophageal reflux    Essential hypertension    Hearing impairment    Hepatomegaly    Hiatal hernia    High blood pressure    High cholesterol    Hypothyroidism    Injury    Left knee injury, from fall    Osteoarthritis    in knee's, hip's    Pulmonary nodules    first noted 2016on LD screen CT    Tobacco abuse    Visual impairment    glasses   [2]   Past Surgical History:  Procedure Laterality Date    Colonoscopy      02-14-17    Colonoscopy N/A 12/28/2020    Procedure: COLONOSCOPY;  Surgeon: Real Abreu MD;  [facility]: [facility] ENDOSCOPY    Electrocardiogram, complete  12/15/2013    scanned to media tab    Oral surgery      Other surgical history  2009    cyst removal, side of head    Other surgical history Left 10/24/2024    VATS upper lobe wedge resection by Dr. Maria    Other surgical history  10/2024    lung nodule removed   [3]   Family History  Problem Relation Age of Onset    Dementia Mother     Diabetes Paternal Grandmother     Cancer Sister         lung cancer   [4]   Social History  Socioeconomic History    Marital status:    Tobacco Use    Smoking status: Former     Current packs/day: 0.50     Average packs/day: 1 pack/day for 41.3 years (40.7 ttl pk-yrs)      Types: Cigarettes     Start date: 2024    Smokeless tobacco: Former     Quit date: 10/24/2024    Tobacco comments:     2/2022: less than 1 pack per day     10/2024: smokes 8-9 cigarettes a day   Vaping Use    Vaping status: Never Used   Substance and Sexual Activity    Alcohol use: Yes     Alcohol/week: 2.0 standard drinks of alcohol     Types: 2 Cans of beer per week     Comment: 2 beer per day    Drug use: No   Other Topics Concern    Caffeine Concern Yes     Comment: coffee-1 cup/day   Social History Narrative    Live with fianace    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   [5]   Current Facility-Administered Medications   Medication Dose Route Frequency    LORazepam (Ativan) tab 1 mg  1 mg Oral Q1H PRN    Or    LORazepam (Ativan) tab 2 mg  2 mg Oral Q1H PRN    thiamine 100 mg/mL injection 100 mg  100 mg Intravenous Once    [START ON 5/1/2025] thiamine (Vitamin B1) tab 100 mg  100 mg Oral Daily    multivitamin (Tab-A-Sampson/Beta Carotene) tab 1 tablet  1 tablet Oral Daily    folic acid (Folvite) tab 1 mg  1 mg Oral Daily    heparin (Porcine) 5000 UNIT/ML injection 5,000 Units  5,000 Units Subcutaneous 2 times per day    acetaminophen (Tylenol Extra Strength) tab 500 mg  500 mg Oral Q4H PRN    ondansetron (Zofran) 4 MG/2ML injection 4 mg  4 mg Intravenous Q6H PRN    metoclopramide (Reglan) 5 mg/mL injection 10 mg  10 mg Intravenous Q8H PRN    temazepam (Restoril) cap 15 mg  15 mg Oral Nightly PRN     azithromycin (Zithromax) tab 500 mg  500 mg Oral Daily    ipratropium-albuterol (Duoneb) 0.5-2.5 (3) MG/3ML inhalation solution 3 mL  3 mL Nebulization Q6H PRN    ampicillin-sulbactam (Unasyn) 3 g in sodium chloride 0.9% 100mL IVPB-ADD  3 g Intravenous q6h    albuterol (Ventolin HFA) 108 (90 Base) MCG/ACT inhaler 2 puff  2 puff Inhalation 2 times per day    atorvastatin (Lipitor) tab 10 mg  10 mg Oral Daily    finasteride (Proscar) tab 5 mg  5 mg Oral Daily    fluticasone propionate (Flonase) 50 MCG/ACT nasal suspension 2 spray  2 spray Nasal BID    levothyroxine (Synthroid) tab 88 mcg  88 mcg Oral Before breakfast    magnesium oxide (Mag-Ox) tab 400 mg  400 mg Oral Daily    losartan (Cozaar) tab 100 mg  100 mg Oral Daily    pantoprazole (Protonix) DR tab 40 mg  40 mg Oral QAM AC    fluticasone-salmeterol (Advair Diskus) 250-50 MCG/ACT inhaler 1 puff  1 puff Inhalation BID    methylPREDNISolone sodium succinate (Solu-MEDROL) injection 40 mg  40 mg Intravenous Q12H    polyethylene glycol (PEG 3350) (Miralax) 17 g oral packet 17 g  17 g Oral Daily    bisacodyl (Dulcolax) DR tab 10 mg  10 mg Oral Daily PRN   [6]   Medications Prior to Admission   Medication Sig    Magnesium 400 MG Oral Tab Take 400 mg by mouth daily.    albuterol (VENTOLIN HFA) 108 (90 Base) MCG/ACT Inhalation Aero Soln Inhale 2 puffs into the lungs in the morning and 2 puffs in the evening.    fluticasone propionate 50 MCG/ACT Nasal Suspension 2 sprays by Nasal route in the morning and 2 sprays before bedtime.    naproxen (ALEVE) 220 MG Oral Tab Take 1-2 tablets (220-440 mg total) by mouth daily as needed.    Fenofibrate 160 MG Oral Tab Take 1 tablet (160 mg total) by mouth daily.    ATORVASTATIN 10 MG Oral Tab TAKE 1 TABLET BY MOUTH EVERY DAY    levothyroxine (LEVOXYL) 88 MCG Oral Tab Take 1 tablet (88 mcg total) by mouth before breakfast. For underactive thyroid.    Omeprazole 40 MG Oral Capsule Delayed Release Take 1 capsule (40 mg total) by mouth  daily.    Olmesartan Medoxomil 40 MG Oral Tab Take 1 tablet (40 mg total) by mouth daily. For blood pressure    BREO ELLIPTA 200-25 MCG/ACT Inhalation Aerosol Powder, Breath Activated Inhale 1 puff into the lungs in the evening.    [] doxycycline 100 MG Oral Cap Take 1 capsule (100 mg total) by mouth 2 (two) times daily for 5 days.    acetaminophen 500 MG Oral Tab Take 1-2 tablets (500-1,000 mg total) by mouth every 6 (six) hours as needed for Pain.    Dutasteride 0.5 MG Oral Cap Take 1 capsule (0.5 mg total) by mouth every morning.   [7] No Known Allergies

## 2025-04-30 NOTE — PROGRESS NOTES
[facility]  part of [facility]     Progress Note    Alejandro Hanna Patient Status:  Inpatient    12/10/1955 MRN [id number]   Location [facility] 4W/SW/SE Attending Pernell Yap MD   Hosp Day # 1 PCP Leonid Ford DO       Subjective:   Patient seen and examined.  Dyspnea improving.  Some occasional cough.    Objective:   Blood pressure 145/71, pulse 108, temperature 97.7 °F (36.5 °C), temperature source Oral, resp. rate 18, height 5' 4\" (1.626 m), weight 127 lb 6.8 oz (57.8 kg), SpO2 97%.  Intake/Output:   Last 3 shifts: I/O last 3 completed shifts:  In: 1011 [P.O.:200; I.V.:811]  Out: -    This shift: I/O this shift:  In: 240 [P.O.:240]  Out: -      Vent Settings:      Hemodynamic parameters (last 24 hours):      Scheduled Meds: Current Hospital Medications[1]    Continuous Infusions: Medication Infusions[2]    Physical Exam  Constitutional: no acute distress  Eyes: PERRL  ENT: nares pateint  Neck: supple, no JVD  Cardio: RRR, S1 S2  Respiratory: Faint expiratory Rales  GI: abdomen soft, non tender, active bowel sounds, no organomegaly  Extremities: no clubbing, cyanosis, edema  Neurologic: no gross motor deficits  Skin: warm, dry      Results:     Lab Results   Component Value Date    WBC 12.2 2025    HGB 10.9 2025    HCT 30.7 2025    .0 2025    CREATSERUM 0.74 2025    BUN 11 2025     2025    K 4.2 2025    K 4.2 2025    CL 99 2025    CO2 25.0 2025     2025    CA 8.4 2025    MG 1.9 2025       CT ABDOMEN+PELVIS(CONTRAST ONLY)(CPT=74177)  Result Date: 2025  CONCLUSION:  1. Multifocal patchy ground-glass density opacities and nodules at the lung bases are new since recent prior  chest CT.  Findings are most concerning for infection/pneumonia and a post treatment follow-up chest CT is suggested to ensure resolution. 2. No other acute intra-abdominal finding.  3. Colonic diverticulosis. 4. Probable small hepatic cysts. 5. Coronary and peripheral atherosclerosis. 6. Circumferential urinary bladder wall thickening, which may relate to incomplete distention or cystitis.  If there are referable symptoms, urinalysis correlation is requested. 7. Subacute to chronic-appearing left lateral 7th rib fracture, which was present on prior chest CT. 8. Lesser incidental findings as above.   elm-remote  Dictated by (CST): Duong Veronica MD on 4/29/2025 at 12:19 PM     Finalized by (CST): Duong Veronica MD on 4/29/2025 at 12:26 PM            EKG  Result Date: 4/29/2025  Normal sinus rhythm Normal ECG When compared with ECG of 19-APR-2025 14:16, No significant change was found Confirmed by LEIA LONDONO ([id number]) on 4/29/2025 2:50:34 PM      Assessment   1.  COPD with mild exacerbation  2.  Leukocytosis  3.  Non-small cell lung cancer status post left upper lobe wedge resection  4.  Hypokalemia  5.  Hypomagnesemia  6.  Hyponatremia     Plan   -Patient presents with evidence of fatigue malaise dyspnea.  Concern for mild COPD exacerbation.  - Chest x-ray with no acute findings seen  - Continue antibiotic therapy  - Wean steroid therapy  - Nebulizer treatments  - ICS/LABA  - Electrolyte repletion  - Activity as tolerated  - DVT prophylaxis: Heparin    Brennon Greco DO  Pulmonary Critical Care Medicine  [facility]        [1]   Current Facility-Administered Medications   Medication Dose Route Frequency    LORazepam (Ativan) tab 1 mg  1 mg Oral Q1H PRN    Or    LORazepam (Ativan) tab 2 mg  2 mg Oral Q1H PRN    thiamine 100 mg/mL injection 100 mg  100 mg Intravenous Once    [START ON 5/1/2025] thiamine (Vitamin B1) tab 100 mg  100 mg Oral Daily    multivitamin (Tab-A-Sampson/Beta Carotene) tab 1 tablet  1 tablet Oral Daily    folic acid (Folvite) tab 1 mg  1 mg Oral Daily    heparin (Porcine) 5000 UNIT/ML injection 5,000 Units  5,000 Units Subcutaneous 2 times per day    acetaminophen  (Tylenol Extra Strength) tab 500 mg  500 mg Oral Q4H PRN    ondansetron (Zofran) 4 MG/2ML injection 4 mg  4 mg Intravenous Q6H PRN    metoclopramide (Reglan) 5 mg/mL injection 10 mg  10 mg Intravenous Q8H PRN    temazepam (Restoril) cap 15 mg  15 mg Oral Nightly PRN    azithromycin (Zithromax) tab 500 mg  500 mg Oral Daily    ipratropium-albuterol (Duoneb) 0.5-2.5 (3) MG/3ML inhalation solution 3 mL  3 mL Nebulization Q6H PRN    sodium chloride 0.9% infusion   Intravenous Continuous    ampicillin-sulbactam (Unasyn) 3 g in sodium chloride 0.9% 100mL IVPB-ADD  3 g Intravenous q6h    albuterol (Ventolin HFA) 108 (90 Base) MCG/ACT inhaler 2 puff  2 puff Inhalation 2 times per day    atorvastatin (Lipitor) tab 10 mg  10 mg Oral Daily    finasteride (Proscar) tab 5 mg  5 mg Oral Daily    fluticasone propionate (Flonase) 50 MCG/ACT nasal suspension 2 spray  2 spray Nasal BID    levothyroxine (Synthroid) tab 88 mcg  88 mcg Oral Before breakfast    magnesium oxide (Mag-Ox) tab 400 mg  400 mg Oral Daily    losartan (Cozaar) tab 100 mg  100 mg Oral Daily    pantoprazole (Protonix) DR tab 40 mg  40 mg Oral QAM AC    fluticasone-salmeterol (Advair Diskus) 250-50 MCG/ACT inhaler 1 puff  1 puff Inhalation BID    methylPREDNISolone sodium succinate (Solu-MEDROL) injection 40 mg  40 mg Intravenous Q12H    polyethylene glycol (PEG 3350) (Miralax) 17 g oral packet 17 g  17 g Oral Daily    bisacodyl (Dulcolax) DR tab 10 mg  10 mg Oral Daily PRN   [2]    sodium chloride 100 mL/hr at 04/29/25 9048

## 2025-04-30 NOTE — CM/SW NOTE
Anticipated therapy need: Home with Home Healthcare    CM self ref to case for dc planning    CM met with pt/spouse at bedside. Confirmed address on file and PCP.    CM reviewed option for HHC and the required homebound status for insurance to cover.    Pt sts he is currently established with [facility] ([facility] outpt PT in [place] pta. Pt does not want to be homebound. Pt wants to resume services outpt as pta. Wife agrees.    CM left HHC list w/ pt to take home. Pt is aware if he changes his mind he can call provider to establish hhc.    Plan  Home    / to remain available for support and/or discharge planning.     Rosemarie Mooney RN    Ext [phone]

## 2025-04-30 NOTE — PHYSICAL THERAPY NOTE
PHYSICAL THERAPY EVALUATION - INPATIENT     Room Number: 450/450-A  Evaluation Date: 4/30/2025  Type of Evaluation: Initial   Physician Order: PT Eval and Treat    Presenting Problem: hypomagnesemia  Co-Morbidities : recent admit for PNA, ETOH, lung CA, bladder CA, COPD  Reason for Therapy: Mobility Dysfunction and Discharge Planning    PHYSICAL THERAPY ASSESSMENT   Patient is a 69 year old male admitted 4/29/2025 for hypomagnesemia.  Prior to admission, patient's baseline is Mod I with walker. Reports since recent admit for PNA, spouse was assisting with bathing.  Patient is currently functioning below baseline with bed mobility, transfers, gait, stair negotiation, maintaining seated position, standing prolonged periods, and performing household tasks.  Patient is requiring stand-by assist and contact guard assist as a result of the following impairments: decreased functional strength, decreased endurance/aerobic capacity, impaired standing dynamic balance, decreased muscular endurance, cognitive deficits (decreased insight into deficits, decreased safety awareness), and medical status.  Physical Therapy will continue to follow for duration of hospitalization.    Patient will benefit from continued skilled PT Services at discharge to promote prior level of function and safety with additional support and return home with home health PT.    PLAN DURING HOSPITALIZATION  Nursing Mobility Recommendation : 1 Assist  PT Device Recommendation: Rolling walker  PT Treatment Plan: Bed mobility, Body mechanics, Endurance, Energy conservation, Patient education, Family education, Gait training, Range of motion, Strengthening, Stoop training, Balance training, Transfer training  Rehab Potential : Good  Frequency (Obs): 5x/week     PHYSICAL THERAPY MEDICAL/SOCIAL HISTORY   Problem List  Principal Problem:    Hypomagnesemia  Active Problems:    Community acquired pneumonia, unspecified laterality      HOME SITUATION  Type of Home:  House  Home Layout: One level  Stairs to Enter : 1      Lives With: Spouse        Patient Regularly Uses: Rolling walker, Glasses     SUBJECTIVE  \"I had a shot in my back for my leg, then I got pneumonia!\"    PHYSICAL THERAPY EXAMINATION   OBJECTIVE  Precautions: Bed/chair alarm  Fall Risk: Standard fall risk    WEIGHT BEARING RESTRICTION       PAIN ASSESSMENT  Ratin  Location: R hip  Management Techniques: Activity promotion, Body mechanics, Repositioning    COGNITION  Overall Cognitive Status:  WFL - within functional limits  Following Commands:  follows multistep commands with increased time  Safety Judgement:  decreased awareness of need for assistance  Awareness of Errors:  decreased awareness of errors   Awareness of Deficits:  decreased awareness of deficits    RANGE OF MOTION AND STRENGTH ASSESSMENT  Upper extremity ROM and strength are within functional limits   Lower extremity ROM is within functional limits   Lower extremity strength is within functional limits     BALANCE  Static Sitting: Good  Dynamic Sitting: Fair +  Static Standing: Fair -  Dynamic Standing: Poor +      ACTIVITY TOLERANCE  Pulse: 115  Heart Rate Source: Monitor        O2 WALK  Oxygen Therapy  SPO2% Ambulation on Room Air: 96    AM-PAC '6-Clicks' INPATIENT SHORT FORM - BASIC MOBILITY  How much difficulty does the patient currently have...  Patient Difficulty: Turning over in bed (including adjusting bedclothes, sheets and blankets)?: None   Patient Difficulty: Sitting down on and standing up from a chair with arms (e.g., wheelchair, bedside commode, etc.): A Little   Patient Difficulty: Moving from lying on back to sitting on the side of the bed?: A Little   How much help from another person does the patient currently need...   Help from Another: Moving to and from a bed to a chair (including a wheelchair)?: A Little   Help from Another: Need to walk in hospital room?: A Little   Help from Another: Climbing 3-5 steps with a  railing?: A Little     AM-PAC Score:  Raw Score: 19   Approx Degree of Impairment: 41.77%   Standardized Score (AM-PAC Scale): 45.44   CMS Modifier (G-Code): CK    FUNCTIONAL ABILITY STATUS  Functional Mobility/Gait Assessment  Gait Assistance: Contact guard assist  Distance (ft): 150ft  Assistive Device: Rolling walker  Pattern:  (decreased step length, narrow base of support. Cues to decrease speed to optimize quality and safety of movement)  Sit to Stand: stand-by assist with use of RW. Cues for pacing upon standing to allow body time to acclimate to change in position.     Exercise/Education Provided:  Body mechanics  Energy conservation  Functional activity tolerated  Gait training  Posture  ROM  Strengthening  Lower therapeutic exercise:  Ankle pumps  LAQ  Transfer training    Skilled Therapy Provided: Patient received sitting in bedside chair. RN approved activity. Therapist educated pt on POC and physiological benefits of mobilization. Cues for activity pacing and energy conservation techniques. Introduced pt to Rate of perceived exertion (RPE) and indication for rest breaks. Patient agreeable to participate. Primary complaint is fatigue during ambulation and mild pain in R hip (reports due to bursitis) which he rates at 1 out of 10 per pain scale. *SpO2 on room air with activity: 96%. HR: 115bpm RPE: 5 out of 10 per scale.     The patient's Approx Degree of Impairment: 41.77% has been calculated based on documentation in the [facility] '6 clicks' Inpatient Basic Mobility Short Form.  Research supports that patients with this level of impairment may benefit from home with home PT and family support.   Final disposition will be made by interdisciplinary medical team.    Patient End of Session: Up in chair, Call light within reach, Needs met, RN aware of session/findings, Alarm set    CURRENT GOALS  Goals to be met by: 5/14/25  Patient Goal Patient's self-stated goal is: return to PLOF   Goal #1 Patient is able to  demonstrate supine - sit EOB @ level: independent     Goal #1   Current Status    Goal #2 Patient is able to demonstrate transfers Sit to/from Stand at assistance level: modified independent with walker - rolling     Goal #2  Current Status    Goal #3 Patient is able to ambulate 300 feet with assist device: walker - rolling at assistance level: supervision   Goal #3   Current Status    Goal #4 Patient will negotiate one curb w/ assistive device and supervision   Goal #4   Current Status    Goal #5 Patient to demonstrate independence with home activity/exercise instructions provided to patient in preparation for discharge.   Goal #5   Current Status    Goal #6 Patient will participate in 10 minutes of standing dynamic balance activities requiring fall ready guarding to prepare for safe transition to home environment.    Goal #6  Current Status      Patient Evaluation Complexity Level:  History Low - no personal factors and/or co-morbidities   Examination of body systems Low -  addressing 1-2 elements   Clinical Presentation Low- Stable   Clinical Decision Making  Low Complexity     Gait Trainin minutes  Therapeutic Activity:  10 minutes

## 2025-04-30 NOTE — OCCUPATIONAL THERAPY NOTE
OCCUPATIONAL THERAPY EVALUATION - INPATIENT     Room Number: 450/450-A  Evaluation Date: 2025  Type of Evaluation: Initial  Presenting Problem: hypomagnesium    Physician Order: IP Consult to Occupational Therapy  Reason for Therapy: ADL/IADL Dysfunction and Discharge Planning    OCCUPATIONAL THERAPY ASSESSMENT   Patient is a 69 year old male admitted 2025 for hypomagnesia .  Prior to admission, patient's baseline is modified independent using a R/W.  Patient is currently functioning near baseline.  Patient is requiring contact guard assist as a result of the following impairments: decreased functional strength, decreased endurance, impaired stand balance, decreased muscular endurance, and decreased safety awareness. Occupational Therapy will continue to follow for duration of hospitalization.    Patient will benefit from continued skilled OT Services with no additional skilled services but increased support at home.    PLAN DURING HOSPITALIZATION     OT Treatment Plan: Energy conservation/work simplification techniques, ADL training, Endurance training, Equipment eval/education, Patient/Family training     OCCUPATIONAL THERAPY MEDICAL/SOCIAL HISTORY   Problem List  Principal Problem:    Hypomagnesemia  Active Problems:    Community acquired pneumonia, unspecified laterality    HOME SITUATION  Type of Home: House  Home Layout: One level  Lives With: Spouse  Patient Regularly Uses: Rolling walker; Glasses    Use of Assistive Device(s): R/W    Prior Level of [place]: Pt lives at home with his spouse who provides PRN assist with ADLs. Pt reports he is able to manage household distances however experiences LE weakness/fatigue.     SUBJECTIVE  \"This is when my legs get fatigued\"    OCCUPATIONAL THERAPY EXAMINATION      OBJECTIVE  Precautions: Bed/chair alarm  Fall Risk: Standard fall risk    PAIN ASSESSMENT  Ratin    ACTIVITY TOLERANCE  Pulse: 108 (with activity)  Heart Rate Source: Monitor    O2  SATURATIONS  Oxygen Therapy  SPO2% on Room Air at Rest: 100  SPO2% Ambulation on Room Air: 96    RANGE OF MOTION   Upper extremity ROM is within functional limits     STRENGTH ASSESSMENT  Upper extremity strength is within functional limits     ACTIVITIES OF DAILY LIVING ASSESSMENT  AM-PAC ‘6-Clicks’ Inpatient Daily Activity Short Form  How much help from another person does the patient currently need…  -   Putting on and taking off regular lower body clothing?: A Little  -   Bathing (including washing, rinsing, drying)?: A Little  -   Toileting, which includes using toilet, bedpan or urinal? : A Little  -   Putting on and taking off regular upper body clothing?: A Little  -   Taking care of personal grooming such as brushing teeth?: A Little  -   Eating meals?: None    AM-PAC Score:  Score: 19  Approx Degree of Impairment: 42.8%  Standardized Score (AM-PAC Scale): 40.22  CMS Modifier (G-Code): CK    FUNCTIONAL TRANSFER ASSESSMENT  Sit to Stand: Chair  Chair: Supervision  Toilet Transfer: Supervision    BED MOBILITY  NT    BALANCE ASSESSMENT  Provide CGA for dynamic standing tasks using R/W demo impaired dynamic stand balance with turns/obstacles    FUNCTIONAL ADL ASSESSMENT  Pt demo the flexibiltyi and functional reach to manage UE/LE self care with set up, CGA/SBA for dynamic stand portions    Skilled Therapy Provided: Pt received sitting up in chair; pleasant and cooperative. Pt is able to identify that he has been told he moves too quickly. Initially pt is able to manage an appropriate pace of activity however as session progresses, pt demo a incresaed quickness to movement which challenge his balance. Pt demo impaired safe management of R/W.    EDUCATION PROVIDED  Patient Education : Role of Occupational Therapy; Plan of Care; Discharge Recommendations; Functional Transfer Techniques; Fall Prevention; Energy Conservation; Posture/Positioning  Patient's Response to Education: Verbalized Understanding    The  patient's Approx Degree of Impairment: 42.8% has been calculated based on documentation in the [facility] '6 clicks' Inpatient Daily Activity Short Form.  Research supports that patients with this level of impairment can return home with increased support.  Final disposition will be made by interdisciplinary medical team.     Patient End of Session: Up in chair, Needs met, Call light within reach, RN aware of session/findings, All patient questions and concerns addressed, Alarm set    OT Goals  Patients self stated goal is: stay put of the hospital     Patient will complete functional transfer with Mod I  Comment:     Patient will complete toileting with Mod I  Comment:     Patient will tolerate standing for 5 minutes in prep for adls with Mod I   Comment:    Patient will complete item retrieval with Mod I  Comment:          Goals  on: 25  Frequency: 3-5x/week    Patient Evaluation Complexity Level:   Occupational Profile/Medical History LOW - Brief history including review of medical or therapy records    Specific performance deficits impacting engagement in ADL/IADL LOW  1 - 3 performance deficits    Client Assessment/Performance Deficits LOW - No comorbidities nor modifications of tasks    Clinical Decision Making LOW - Analysis of occupational profile, problem-focused assessments, limited treatment options    Overall Complexity LOW     Self-Care Home Management: 15 minutes

## 2025-04-30 NOTE — PLAN OF CARE
Problem: Patient Centered Care  Goal: Patient preferences are identified and integrated in the patient's plan of care  Description: Interventions:- What would you like us to know as we care for you? - Provide timely, complete, and accurate information to patient/family- Incorporate patient and family knowledge, values, beliefs, and cultural backgrounds into the planning and delivery of care- Encourage patient/family to participate in care and decision-making at the level they choose- Honor patient and family perspectives and choices  Outcome: Progressing     Problem: Patient/Family Goals  Goal: Patient/Family Long Term Goal  Description: Patient's Long Term Goal: Interventions:- - See additional Care Plan goals for specific interventions  Outcome: Progressing  Goal: Patient/Family Short Term Goal  Description: Patient's Short Term Goal: Interventions: - - See additional Care Plan goals for specific interventions  Outcome: Progressing     Problem: SAFETY ADULT - FALL  Goal: Free from fall injury  Description: INTERVENTIONS:- Assess pt frequently for physical needs- Identify cognitive and physical deficits and behaviors that affect risk of falls.- [place] fall precautions as indicated by assessment.- Educate pt/family on patient safety including physical limitations- Instruct pt to call for assistance with activity based on assessment- Modify environment to reduce risk of injury- Provide assistive devices as appropriate- Consider OT/PT consult to assist with strengthening/mobility- Encourage toileting schedule  Outcome: Progressing     Pt up to chair in evening. Ambulates in room with walker and assist. IVF continued. IV antibiotic given. Non productive cough. RA. Waiting for sputum sample. Voiding without difficulty. Passing gas. BM in AM. Remote tele maintained. Safety measures in place. Frequent rounding being done.

## 2025-04-30 NOTE — PLAN OF CARE
Appetite good, CIWA started/ativan given, tele, moist cough, encourage IS, still need sputum culture, iv abx, voiding freely, +bm/miralax given, tele, up standby and walker, IVF stopped, PT/OT worked with patient today, no complaints of pain, plan to discharge home.   Patient and wife updated on care plan.      Problem: Patient Centered Care  Goal: Patient preferences are identified and integrated in the patient's plan of care  Description: Interventions:- What would you like us to know as we care for you? -- Provide timely, complete, and accurate information to patient/family- Incorporate patient and family knowledge, values, beliefs, and cultural backgrounds into the planning and delivery of care- Encourage patient/family to participate in care and decision-making at the level they choose- Honor patient and family perspectives and choices  Outcome: Progressing     Problem: Patient/Family Goals  Goal: Patient/Family Long Term Goal  Description: Patient's Long Term Goal: -Interventions:- -- See additional Care Plan goals for specific interventions  Outcome: Progressing  Goal: Patient/Family Short Term Goal  Description: Patient's Short Term Goal: -Interventions: - -- See additional Care Plan goals for specific interventions  Outcome: Progressing     Problem: RISK FOR INFECTION - ADULT  Goal: Absence of fever/infection during anticipated neutropenic period  Description: INTERVENTIONS- Monitor WBC- Administer growth factors as ordered- Implement neutropenic guidelines  Outcome: Progressing     Problem: SAFETY ADULT - FALL  Goal: Free from fall injury  Description: INTERVENTIONS:- Assess pt frequently for physical needs- Identify cognitive and physical deficits and behaviors that affect risk of falls.- [place] fall precautions as indicated by assessment.- Educate pt/family on patient safety including physical limitations- Instruct pt to call for assistance with activity based on assessment- Modify environment to  reduce risk of injury- Provide assistive devices as appropriate- Consider OT/PT consult to assist with strengthening/mobility- Encourage toileting schedule  Outcome: Progressing     Problem: DISCHARGE PLANNING  Goal: Discharge to home or other facility with appropriate resources  Description: INTERVENTIONS:- Identify barriers to discharge w/pt and caregiver- Include patient/family/discharge partner in discharge planning- Arrange for needed discharge resources and transportation as appropriate- Identify discharge learning needs (meds, wound care, etc)- Arrange for interpreters to assist at discharge as needed- Consider post-discharge preferences of patient/family/discharge partner- Complete POLST form as appropriate- Assess patient's ability to be responsible for managing their own health- Refer to Case Management Department for coordinating discharge planning if the patient needs post-hospital services based on physician/LIP order or complex needs related to functional status, cognitive ability or social support system  Outcome: Progressing     Problem: CARDIOVASCULAR - ADULT  Goal: Maintains optimal cardiac output and hemodynamic stability  Description: INTERVENTIONS:- Monitor vital signs, rhythm, and trends- Monitor for bleeding, hypotension and signs of decreased cardiac output- Evaluate effectiveness of vasoactive medications to optimize hemodynamic stability- Monitor arterial and/or venous puncture sites for bleeding and/or hematoma- Assess quality of pulses, skin color and temperature- Assess for signs of decreased coronary artery perfusion - ex. Angina- Evaluate fluid balance, assess for edema, trend weights  Outcome: Progressing  Goal: Absence of cardiac arrhythmias or at baseline  Description: INTERVENTIONS:- Continuous cardiac monitoring, monitor vital signs, obtain 12 lead EKG if indicated- Evaluate effectiveness of antiarrhythmic and heart rate control medications as ordered- Initiate emergency measures  for life threatening arrhythmias- Monitor electrolytes and administer replacement therapy as ordered  Outcome: Progressing     Problem: METABOLIC/FLUID AND ELECTROLYTES - ADULT  Goal: Electrolytes maintained within normal limits  Description: INTERVENTIONS:- Monitor labs and rhythm and assess patient for signs and symptoms of electrolyte imbalances- Administer electrolyte replacement as ordered- Monitor response to electrolyte replacements, including rhythm and repeat lab results as appropriate- Fluid restriction as ordered- Instruct patient on fluid and nutrition restrictions as appropriate  Outcome: Progressing

## 2025-04-30 NOTE — PROGRESS NOTES
[facility]  part of [facility]    Progress Note    Alejandro Hanna Patient Status:  Inpatient    12/10/1955 MRN [id number]   Location [facility] 4W/SW/SE Attending Pernell Yap MD   Hosp Day # 1 PCP Leonid Ford DO     Chief Complaint:   Chief Complaint   Patient presents with    Weakness   Weakness fatigue     Subjective:   Alejandro Hanna is doing better. He still feels weak. He questions why his sodium, mg and K level drop when he hasn't had ETOH in the last 2 weeks. He does agree that he decreased the amt he drinks to 1  beer a night vs 4 beers. Residual cough has improved no SOB      Objective:   Objective:    Blood pressure 145/71, pulse 108, temperature 97.7 °F (36.5 °C), temperature source Oral, resp. rate 18, height 5' 4\" (1.626 m), weight 127 lb 6.8 oz (57.8 kg), SpO2 97%.    Physical Exam:    General: No acute distress. Tremulous   Respiratory: Clear to auscultation bilaterally. No wheezes. No rhonchi.  Cardiovascular: S1, S2. Regular rate and rhythm. No murmurs, rubs or gallops.   Abdomen: Soft, nontender, nondistended.  Positive bowel sounds. No rebound or guarding.  Neurologic: No focal neurological deficits.   Musculoskeletal: Moves all extremities.  Extremities: No edema.      Results:   Results:    Labs:  Recent Labs   Lab 25  1039 25  0701   WBC 12.4* 12.2*   HGB 11.5* 10.9*   MCV 82.7 83.4   .0* 472.0*   BAND 1 2       Recent Labs   Lab 25  1039 25  0701   GLU 95 124*   BUN 9 11   CREATSERUM 0.76 0.74   CA 8.3* 8.4*   ALB 4.6  --    * 132*   K 3.4* 4.2  4.2   CL 93* 99   CO2 26.0 25.0   ALKPHO 44*  --    AST 20  --    ALT 13  --    BILT 0.4  --    TP 7.2  --        Estimated Creatinine Clearance: 74.9 mL/min (based on SCr of 0.74 mg/dL).    No results for input(s): \"PTP\", \"INR\" in the last 168 hours.         Culture:  No results found for this visit on 25.    Cardiac  No results for input(s): \"TROP\", \"PBNP\"  in the last 168 hours.      Imaging: Imaging data reviewed in [facility].  CT ABDOMEN+PELVIS(CONTRAST ONLY)(CPT=74177)  Result Date: 4/29/2025  CONCLUSION:  1. Multifocal patchy ground-glass density opacities and nodules at the lung bases are new since recent prior January, 2025 chest CT.  Findings are most concerning for infection/pneumonia and a post treatment follow-up chest CT is suggested to ensure resolution. 2. No other acute intra-abdominal finding. 3. Colonic diverticulosis. 4. Probable small hepatic cysts. 5. Coronary and peripheral atherosclerosis. 6. Circumferential urinary bladder wall thickening, which may relate to incomplete distention or cystitis.  If there are referable symptoms, urinalysis correlation is requested. 7. Subacute to chronic-appearing left lateral 7th rib fracture, which was present on prior chest CT. 8. Lesser incidental findings as above.   elm-remote  Dictated by (CST): Duong Veronica MD on 4/29/2025 at 12:19 PM     Finalized by (CST): Duong Veronica MD on 4/29/2025 at 12:26 PM            Medications: Scheduled Medications[1]      Assessment and Plan:   Assessment & Plan:      Acute exacerbation COPD, mild   Possible Bibasilar PNA likely prev residual   Pulmonary on consult.   CT chest - multifocal GGO lung bases   IV steroids, albuterol nebs, advair, unasyn consider stopping abx soon   SLP regular diet   Blood cx x 2 pending sputum cx pending   Hyponatremia - improving   Hypomagnesemia   Hypokalemia   Replace.   Cont IVF.   Labs in AM   Likely secondary to ETOH vs dehydration   Chronic back pain   Chronic B/L LE weakness   Deconditioning   MRI reviewed. Consider eventual [facility] outpt  Pain control.   H/o SCC L lung   Fu with [facility] outpt  S/p resection   H/o ETOH abuse  CIWA  Psych consult.     MDM: High  I personally spent time on chart/note review, review of labs/imaging, discussion with patient, physical exam, discussion with staff, consultants, coordinating care, writing progress note,  and discussion of plan of care.        Plan of care discussed with patient or family at bedside.    Pernell Yap MD  Hospitalist          Supplementary Documentation:     Quality:  DVT Prophylaxis: heparin   CODE status: Full  Dispo: per clinical course            Estimated date of discharge: TBD  Discharge is dependent on: clinical stability  At this point Mr. Hanna is expected to be discharge to: home         **Certification      PHYSICIAN Certification of Need for Inpatient Hospitalization - Initial Certification    Patient will require inpatient services that will reasonably be expected to span two midnight's based on the clinical documentation in H+P.   Based on patients current state of illness, I anticipate that, after discharge, patient will require TBD.               [1]    thiamine  100 mg Intravenous Once    [START ON 5/1/2025] thiamine  100 mg Oral Daily    multivitamin  1 tablet Oral Daily    folic acid  1 mg Oral Daily    heparin  5,000 Units Subcutaneous 2 times per day    azithromycin  500 mg Oral Daily    ampicillin-sulbactam  3 g Intravenous q6h    albuterol  2 puff Inhalation 2 times per day    atorvastatin  10 mg Oral Daily    finasteride  5 mg Oral Daily    fluticasone propionate  2 spray Nasal BID    levothyroxine  88 mcg Oral Before breakfast    magnesium oxide  400 mg Oral Daily    losartan  100 mg Oral Daily    pantoprazole  40 mg Oral QAM AC    fluticasone-salmeterol  1 puff Inhalation BID    methylPREDNISolone  40 mg Intravenous Q12H    polyethylene glycol (PEG 3350)  17 g Oral Daily

## 2025-05-01 VITALS
HEART RATE: 94 BPM | DIASTOLIC BLOOD PRESSURE: 81 MMHG | BODY MASS INDEX: 21.76 KG/M2 | OXYGEN SATURATION: 98 % | HEIGHT: 64 IN | TEMPERATURE: 99 F | SYSTOLIC BLOOD PRESSURE: 163 MMHG | WEIGHT: 127.44 LBS | RESPIRATION RATE: 20 BRPM

## 2025-05-01 LAB
ANION GAP SERPL CALC-SCNC: 7 MMOL/L (ref 0–18)
BUN BLD-MCNC: 9 MG/DL (ref 9–23)
BUN/CREAT SERPL: 11.7 (ref 10–20)
CALCIUM BLD-MCNC: 8.9 MG/DL (ref 8.7–10.4)
CHLORIDE SERPL-SCNC: 99 MMOL/L (ref 98–112)
CO2 SERPL-SCNC: 29 MMOL/L (ref 21–32)
CREAT BLD-MCNC: 0.77 MG/DL (ref 0.7–1.3)
DEPRECATED RDW RBC AUTO: 41.8 FL (ref 35.1–46.3)
EGFRCR SERPLBLD CKD-EPI 2021: 96 ML/MIN/1.73M2 (ref 60–?)
ERYTHROCYTE [DISTWIDTH] IN BLOOD BY AUTOMATED COUNT: 13.4 % (ref 11–15)
GLUCOSE BLD-MCNC: 124 MG/DL (ref 70–99)
HCT VFR BLD AUTO: 31.3 % (ref 39–53)
HGB BLD-MCNC: 10.5 G/DL (ref 13–17.5)
MAGNESIUM SERPL-MCNC: 1.8 MG/DL (ref 1.6–2.6)
MCH RBC QN AUTO: 28.8 PG (ref 26–34)
MCHC RBC AUTO-ENTMCNC: 33.5 G/DL (ref 31–37)
MCV RBC AUTO: 85.8 FL (ref 80–100)
OSMOLALITY SERPL CALC.SUM OF ELEC: 280 MOSM/KG (ref 275–295)
PLATELET # BLD AUTO: 561 10(3)UL (ref 150–450)
POTASSIUM SERPL-SCNC: 3.9 MMOL/L (ref 3.5–5.1)
PROCALCITONIN SERPL-MCNC: 0.07 NG/ML (ref ?–0.05)
RBC # BLD AUTO: 3.65 X10(6)UL (ref 3.8–5.8)
SODIUM SERPL-SCNC: 135 MMOL/L (ref 136–145)
VIT D+METAB SERPL-MCNC: 22 NG/ML (ref 30–100)
WBC # BLD AUTO: 15.2 X10(3) UL (ref 4–11)

## 2025-05-01 PROCEDURE — 99232 SBSQ HOSP IP/OBS MODERATE 35: CPT | Performed by: INTERNAL MEDICINE

## 2025-05-01 PROCEDURE — 99239 HOSP IP/OBS DSCHRG MGMT >30: CPT | Performed by: HOSPITALIST

## 2025-05-01 PROCEDURE — 99233 SBSQ HOSP IP/OBS HIGH 50: CPT | Performed by: OTHER

## 2025-05-01 RX ORDER — MAGNESIUM OXIDE 400 MG/1
400 TABLET ORAL ONCE
Status: COMPLETED | OUTPATIENT
Start: 2025-05-01 | End: 2025-05-01

## 2025-05-01 RX ORDER — OLANZAPINE 2.5 MG/1
2.5 TABLET, FILM COATED ORAL NIGHTLY
Qty: 30 TABLET | Refills: 0 | Status: SHIPPED | OUTPATIENT
Start: 2025-05-01 | End: 2025-05-31

## 2025-05-01 RX ORDER — ERGOCALCIFEROL 1.25 MG/1
50000 CAPSULE, LIQUID FILLED ORAL WEEKLY
Qty: 4 CAPSULE | Refills: 0 | Status: SHIPPED | OUTPATIENT
Start: 2025-05-01 | End: 2025-05-31

## 2025-05-01 NOTE — PROGRESS NOTES
Patient is a 69 year old   male with past medical history of COPD and heavy alcohol use who was admitted to the hospital for Hypomagnesemia: The patient has been demonstrating increased anxiety and questionable withdrawal symptoms.  Psych consult requested for evaluation and advice.      Consult Duration     The patient seen for over 50-minute, follow-up evaluation, over 50% counseling and coordinating care addressing increased anxiety and possible withdrawal symptoms.  Record reviewed, communication with attending, communication with RN and patient seen face to face evaluation.    History of Present Illness:   Patient is a 69 year old   male with past medical history of COPD and heavy alcohol use who was admitted to the hospital for Hypomagnesemia: The patient has been demonstrating increased anxiety and questionable withdrawal symptoms.  Psych consult requested for evaluation and advice    Most recent [facility] Total: 1     Most recent vitals: /81, HR 87         Patient is seen today sitting in recliner wearing hospital gown.    Patient reports sleeping well last night and that he believes the medication is helping.    Patient demonstrating some slight restlessness and noticeable tremor.  Patient otherwise on inhaler and steroid.    The patient is denying any withdrawal symptom of abdominal pain, hallucination, or disorientation and last Ativan was given at 12:45 on 3/30/2025.    The patient denied any symptom of hopelessness, helplessness, worthlessness, low mood, or any suicidal ideation.    Patient agreeable to some symptom of anxiety with increased restlessness, rumination and having temper and irritability. Patient expresses positive thoughts on his medication to help manage his symptoms    The patient is not demonstrating any johnny or psychosis  The patient denies auditory or visual hallucinations    Past Psychiatric/Medication History:  1. Prior diagnoses: None  2. Past  psychiatric inpatient: No  3. Past outpatient history: None  4. Past suicide history: None  5. Medication history: None    Social History:   Patient is  to his current spouse for the last 7 years.  Patient lives with his spouse and he is retired.  This patient reporting that he used to drink about 9 beers daily for many years but recently drinking about 3-4 nightly but not in the last 2 weeks.  Patient reported that he used to smoke but quit long time ago    Family History:  Patient denying any family psychiatric history.  Medical History:   Past Medical History  Past Medical History[1]    Past Surgical History  Past Surgical History[2]    Family History  Family History[3]    Social History  Short Social Hx on File[4]        Current Medications:  Current Hospital Medications[5]  Prescriptions Prior to Admission[6]    Allergies  Allergies[7]    Review of Systems:   As by Admitting/Attending    Results:   Laboratory Data:  Lab Results   Component Value Date    WBC 15.2 (H) 05/01/2025    HGB 10.5 (L) 05/01/2025    HCT 31.3 (L) 05/01/2025    .0 (H) 05/01/2025    CREATSERUM 0.77 05/01/2025    BUN 9 05/01/2025     (L) 05/01/2025    K 3.9 05/01/2025    CL 99 05/01/2025    CO2 29.0 05/01/2025     (H) 05/01/2025    CA 8.9 05/01/2025    ALB 4.6 04/29/2025    ALKPHO 44 (L) 04/29/2025    TP 7.2 04/29/2025    AST 20 04/29/2025    ALT 13 04/29/2025    PTT 28.1 03/05/2019    INR 0.93 03/05/2019    PTP 12.3 03/05/2019    T4F 0.68 04/17/2018    TSH 0.928 04/30/2025    GGT 36 06/29/2023    PSA 0.98 10/27/2023    MG 1.8 05/01/2025    PHOS 3.1 02/01/2025    B12 656 06/29/2023         Imaging:  CT ABDOMEN+PELVIS(CONTRAST ONLY)(CPT=74177)  Result Date: 4/29/2025  CONCLUSION:  1. Multifocal patchy ground-glass density opacities and nodules at the lung bases are new since recent prior January, 2025 chest CT.  Findings are most concerning for infection/pneumonia and a post treatment follow-up chest CT is suggested  to ensure resolution. 2. No other acute intra-abdominal finding. 3. Colonic diverticulosis. 4. Probable small hepatic cysts. 5. Coronary and peripheral atherosclerosis. 6. Circumferential urinary bladder wall thickening, which may relate to incomplete distention or cystitis.  If there are referable symptoms, urinalysis correlation is requested. 7. Subacute to chronic-appearing left lateral 7th rib fracture, which was present on prior chest CT. 8. Lesser incidental findings as above.   elm-remote  Dictated by (CST): Duong Veronica MD on 4/29/2025 at 12:19 PM     Finalized by (CST): Duong Veronica MD on 4/29/2025 at 12:26 PM            Vital Signs:   Blood pressure (!) 163/81, pulse 94, temperature 98.5 °F (36.9 °C), temperature source Oral, resp. rate 20, height 64\", weight 57.8 kg (127 lb 6.8 oz), SpO2 98%.    Mental Status Exam:   Appearance: Stated age and, in hospital gown, laying down in hospital bed.  Wife at bedside.  Psychomotor: No psychomotor agitation, or retardation.  Fine tremors has been observed.  Orientation: Alert and oriented to person, place, time and condition.  Gait: Walking with walker.  Attitude/Coorperation: Cooperative and attentive.  Behavior: Appropriate.  Patient very talkative  Speech: Regular rate and rhythm speech.  Mood: Patient reporting anxious mood.  Affect: Anxious affect, congruent with the mood.  Thought process: Linear and appropriate.  Thought content: Patient denies any suicidal or homicidal ideation.  Perceptions: Patient denies any auditory or visual hallucinations.  Concentration: Grossly intact.  Memory: Grossly intact.  Intellect: Average.  Judgment and Insight: Appropriate and patient has been attentive and engaging in the discussion.    Impression:     Episodic mood disorder  Alcohol use disorder.  Rule out steroid-induced mood disorder  Hypomagnesemia  Community acquired pneumonia, unspecified laterality          The patient is a 69-year-old   male  with known previous psychiatric history and has history of daily alcohol of drinking 9 beers for many years who has been drinking significantly less in the last year and none in the last 2-week presented to the hospital for pneumonia and found slightly restless with a tremor and elevated blood pressure.    Patient repeatedly denied any recent alcohol and patient did not demonstrate major alcohol withdrawal otherwise noticeable anxiety especially with the patient started on a steroid and inhaler.  Patient reporting that he has not been sleeping well and he finds himself restless and irritable here.    5/1/2025: Patient denies any withdrawal symptoms and reports sleeping well last night. He reports increased anxiety and expresses understanding about his condition. He believes that he is having improvement from the medication last night.    Discussed risk and benefit, acknowledging the current symptom and severity.  At this point, I would recommend the following approach:     Focus on safety  Focus on education and support.  Focus on insight orientation helping the patient understand diagnosis and treatment plan.  Discontinue Librium 5 mg  Continue olanzapine 2.5 mg nightly to help with sleep and mood and irritability.  Processed with patient at length, the initiation of the above psychotropic medications I advised the patient of the risks, benefits, alternatives and potential side effects. The patient consents to administration of the medications and understands the right to refuse medications at any time. The patient verbalized understanding.   Coordinate plan with team    Orders This Visit:  Orders Placed This Encounter   Procedures    Comp Metabolic Panel (14)    CBC With Differential With Platelet    Magnesium    Manual differential    Urinalysis with Culture Reflex    Basic Metabolic Panel (8)    CBC With Differential With Platelet    Lactic Acid, Plasma    Magnesium    Potassium    Manual differential     Magnesium    Sodium, Urine, Random    Osmolality, Urine    TSH W Reflex To Free T4    CBC, Platelet; No Differential    Basic Metabolic Panel (8)    Magnesium    Vitamin D    Vitamin D, 25-Hydroxy    Magnesium    Procalcitonin    Blood Culture    Sputum culture       Meds This Visit:  Requested Prescriptions     Signed Prescriptions Disp Refills    ergocalciferol 1.25 MG (79503 UT) Oral Cap 4 capsule 0     Sig: Take 1 capsule (50,000 Units total) by mouth once a week.       Osman Vital MD  5/1/2025    Note to Patient: The 21st Century Cures Act makes medical notes like these available to patients in the interest of transparency. However, be advised this is a medical document. It is intended as peer to peer communication. It is written in medical language and may contain abbreviations or verbiage that are unfamiliar. It may appear blunt or direct. Medical documents are intended to carry relevant information, facts as evident, and the clinical opinion of the practitioner. This note may have been transcribed using a voice dictation system. Voice recognition errors may occur. This should not be taken to alter the content or meaning of this note.            [1]   Past Medical History:   ALCOHOL USE    Allergic rhinitis    Flonase    Arthritis    Bladder cancer (HCC)    f/u [facility]    BPH (benign prostatic hyperplasia)    COPD (HCC)    moderate-severe     Disorder of kidney and ureter    Disorder of thyroid    hypothyroid    ED (erectile dysfunction)    Esophageal reflux    Essential hypertension    Hearing impairment    Hepatomegaly    Hiatal hernia    High blood pressure    High cholesterol    Hypothyroidism    Injury    Left knee injury, from fall    Osteoarthritis    in knee's, hip's    Pulmonary nodules    first noted 2016on LD screen CT    Tobacco abuse    Visual impairment    glasses   [2]   Past Surgical History:  Procedure Laterality Date    Colonoscopy      02-14-17    Colonoscopy N/A 12/28/2020     Procedure: COLONOSCOPY;  Surgeon: Real Abreu MD;  Location: [facility] ENDOSCOPY    Electrocardiogram, complete  12/15/2013    scanned to media tab    Oral surgery      Other surgical history  2009    cyst removal, side of head    Other surgical history Left 10/24/2024    VATS upper lobe wedge resection by Dr. Maria    Other surgical history  10/2024    lung nodule removed   [3]   Family History  Problem Relation Age of Onset    Dementia Mother     Diabetes Paternal Grandmother     Cancer Sister         lung cancer   [4]   Social History  Socioeconomic History    Marital status:    Tobacco Use    Smoking status: Former     Current packs/day: 0.50     Average packs/day: 1 pack/day for 41.3 years (40.7 ttl pk-yrs)     Types: Cigarettes     Start date: 2024    Smokeless tobacco: Former     Quit date: 10/24/2024    Tobacco comments:     2/2022: less than 1 pack per day     10/2024: smokes 8-9 cigarettes a day   Vaping Use    Vaping status: Never Used   Substance and Sexual Activity    Alcohol use: Yes     Alcohol/week: 2.0 standard drinks of alcohol     Types: 2 Cans of beer per week     Comment: 2 beer per day    Drug use: No   Other Topics Concern    Caffeine Concern Yes     Comment: coffee-1 cup/day   Social History Narrative    Live with fianace    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   [5]   Current Facility-Administered Medications   Medication Dose Route Frequency    LORazepam (Ativan) tab 1  mg  1 mg Oral Q1H PRN    Or    LORazepam (Ativan) tab 2 mg  2 mg Oral Q1H PRN    thiamine (Vitamin B1) tab 100 mg  100 mg Oral Daily    multivitamin (Tab-A-Sampson/Beta Carotene) tab 1 tablet  1 tablet Oral Daily    folic acid (Folvite) tab 1 mg  1 mg Oral Daily    chlordiazePOXIDE (Librium) cap 5 mg  5 mg Oral BID    OLANZapine (ZyPREXA) tab 2.5 mg  2.5 mg Oral Nightly    heparin (Porcine) 5000 UNIT/ML injection 5,000 Units  5,000 Units Subcutaneous 2 times per day    acetaminophen (Tylenol Extra Strength) tab 500 mg  500 mg Oral Q4H PRN    ondansetron (Zofran) 4 MG/2ML injection 4 mg  4 mg Intravenous Q6H PRN    temazepam (Restoril) cap 15 mg  15 mg Oral Nightly PRN    ipratropium-albuterol (Duoneb) 0.5-2.5 (3) MG/3ML inhalation solution 3 mL  3 mL Nebulization Q6H PRN    albuterol (Ventolin HFA) 108 (90 Base) MCG/ACT inhaler 2 puff  2 puff Inhalation 2 times per day    atorvastatin (Lipitor) tab 10 mg  10 mg Oral Daily    finasteride (Proscar) tab 5 mg  5 mg Oral Daily    fluticasone propionate (Flonase) 50 MCG/ACT nasal suspension 2 spray  2 spray Nasal BID    levothyroxine (Synthroid) tab 88 mcg  88 mcg Oral Before breakfast    magnesium oxide (Mag-Ox) tab 400 mg  400 mg Oral Daily    losartan (Cozaar) tab 100 mg  100 mg Oral Daily    pantoprazole (Protonix) DR tab 40 mg  40 mg Oral QAM AC    fluticasone-salmeterol (Advair Diskus) 250-50 MCG/ACT inhaler 1 puff  1 puff Inhalation BID    polyethylene glycol (PEG 3350) (Miralax) 17 g oral packet 17 g  17 g Oral Daily    bisacodyl (Dulcolax) DR tab 10 mg  10 mg Oral Daily PRN   [6]   Medications Prior to Admission   Medication Sig    Magnesium 400 MG Oral Tab Take 400 mg by mouth daily.    albuterol (VENTOLIN HFA) 108 (90 Base) MCG/ACT Inhalation Aero Soln Inhale 2 puffs into the lungs in the morning and 2 puffs in the evening.    fluticasone propionate 50 MCG/ACT Nasal Suspension 2 sprays by Nasal route in the morning and 2 sprays before bedtime.    naproxen (ALEVE)  220 MG Oral Tab Take 1-2 tablets (220-440 mg total) by mouth daily as needed.    Fenofibrate 160 MG Oral Tab Take 1 tablet (160 mg total) by mouth daily.    ATORVASTATIN 10 MG Oral Tab TAKE 1 TABLET BY MOUTH EVERY DAY    levothyroxine (LEVOXYL) 88 MCG Oral Tab Take 1 tablet (88 mcg total) by mouth before breakfast. For underactive thyroid.    Omeprazole 40 MG Oral Capsule Delayed Release Take 1 capsule (40 mg total) by mouth daily.    Olmesartan Medoxomil 40 MG Oral Tab Take 1 tablet (40 mg total) by mouth daily. For blood pressure    BREO ELLIPTA 200-25 MCG/ACT Inhalation Aerosol Powder, Breath Activated Inhale 1 puff into the lungs in the evening.    [] doxycycline 100 MG Oral Cap Take 1 capsule (100 mg total) by mouth 2 (two) times daily for 5 days.    acetaminophen 500 MG Oral Tab Take 1-2 tablets (500-1,000 mg total) by mouth every 6 (six) hours as needed for Pain.    Dutasteride 0.5 MG Oral Cap Take 1 capsule (0.5 mg total) by mouth every morning.   [7] No Known Allergies

## 2025-05-01 NOTE — PLAN OF CARE
Problem: Patient Centered Care  Goal: Patient preferences are identified and integrated in the patient's plan of care  Description: Interventions:- What would you like us to know as we care for you? - Provide timely, complete, and accurate information to patient/family- Incorporate patient and family knowledge, values, beliefs, and cultural backgrounds into the planning and delivery of care- Encourage patient/family to participate in care and decision-making at the level they choose- Honor patient and family perspectives and choices  Outcome: Progressing     Problem: Patient/Family Goals  Goal: Patient/Family Long Term Goal  Description: Patient's Long Term Goal: Interventions:- - See additional Care Plan goals for specific interventions  Outcome: Progressing  Goal: Patient/Family Short Term Goal  Description: Patient's Short Term Goal: Interventions: - - See additional Care Plan goals for specific interventions  Outcome: Progressing     Problem: SAFETY ADULT - FALL  Goal: Free from fall injury  Description: INTERVENTIONS:- Assess pt frequently for physical needs- Identify cognitive and physical deficits and behaviors that affect risk of falls.- Lane fall precautions as indicated by assessment.- Educate pt/family on patient safety including physical limitations- Instruct pt to call for assistance with activity based on assessment- Modify environment to reduce risk of injury- Provide assistive devices as appropriate- Consider OT/PT consult to assist with strengthening/mobility- Encourage toileting schedule  Outcome: Progressing     Ambulates in room with walker. Bed and chair alarm in use. IV antibiotic given. RA. Strong, productive cough. Voiding without difficulty. Remote tele in place. CIWA. Safety measures maintained. Frequent rounding being done.

## 2025-05-01 NOTE — DISCHARGE SUMMARY
[place] Hospitalist Discharge Summary   Patient ID:  Alejandro Abrams [facility]  [id number]  69 year old  12/10/1955    Admit date: 4/29/2025  Discharge date: 5/1/2025  Primary Care Physician: Leonid Ford DO   Attending Physician: Pernell Yap MD   Consults:   Consultants         Provider   Role Specialty     Osman Vital MD      Consulting Physician Psychiatry     Brennon Greco DO      Consulting Physician PULMONARY DISEASES            Discharge Diagnoses:   Hypomagnesemia    Reason for admission  Copied from admission H&P: Patient is a 69-year-old  male who was hospitalized recently between April 19 and April 20 for pneumonia, treated and discharged home. Today, he came in to the emergency department for evaluation or progressive fatigue and weakness. Reviewing the records, noted during his hospitalization to have 2 magnesium levels, 1.0 and 0.9. Today, magnesium is 0.6, potassium 3.5. Otherwise, chemistry and liver function tests were unremarkable. His sodium was 129 and calculated osmolality 266. Today, CT scan of the abdomen and pelvis showed multifocal patchy ground-glass density opacities, nodules in both lung bases, which are new since recent imaging studies. Findings concerning for infection or pneumonia and post-treatment followup recommended. Chest x-ray showed no acute findings.     Hospital Course:    Acute exacerbation COPD, mild   Pulmonary on consult.   CT chest - multifocal GGO lung bases prev had PNA few days prior treated with abx  IV steroids, albuterol nebs, advair, unasyn consider stopping abx soon --> stop abx and steroids   SLP regular diet   Blood cx x 2 ngtd   Hyponatremia - improving   Hypomagnesemia   Hypokalemia   Replace.   Labs improved.   Needs w/u outpt to looks for inadequate dietary intake vs increased GI or renal loss vs other.   Stop PPI as this can cause low mg   Chronic back pain   Chronic B/L LE weakness   Deconditioning   MRI reviewed. recent VINNY  PT/OT  - home with outpt PT   H/o SCC L lung   Fu with [facility] outpt  S/p resection   H/o ETOH abuse  Anxiety d/o   CIWA low stopped   Psych consult.   Zyprexa, librium. Outpt meds for DC per psych     EXAM:   GENERAL: no apparent distress, comfortable  NEURO: A/A Ox3, no focal deficits  RESP: non labored, CTAB/L  CARDIO: Regular, no murmur  ABD: soft, NT, ND  EXTREMITIES: no edema, no calf tenderness    Operative Procedures:     Discharge Instructions     Medication List        START taking these medications      ergocalciferol 1.25 MG (69373 UT) Caps  Commonly known as: Vitamin D2  Take 1 capsule (50,000 Units total) by mouth once a week.            CONTINUE taking these medications      acetaminophen 500 MG Tabs  Commonly known as: Tylenol Extra Strength     atorvastatin 10 MG Tabs  Commonly known as: Lipitor  TAKE 1 TABLET BY MOUTH EVERY DAY     Breo Ellipta 200-25 MCG/ACT Aepb  Generic drug: fluticasone furoate-vilanterol     dutasteride 0.5 MG Caps  Commonly known as: Avodart     Fenofibrate 160 MG Tabs  Take 1 tablet (160 mg total) by mouth daily.     fluticasone propionate 50 MCG/ACT Susp  Commonly known as: Flonase     levothyroxine 88 MCG Tabs  Commonly known as: Levoxyl  Take 1 tablet (88 mcg total) by mouth before breakfast. For underactive thyroid.     Magnesium 400 MG Tabs  Take 400 mg by mouth daily.     Olmesartan Medoxomil 40 MG Tabs  Commonly known as: BENICAR  Take 1 tablet (40 mg total) by mouth daily. For blood pressure     Omeprazole 40 MG Cpdr  Take 1 capsule (40 mg total) by mouth daily.     Ventolin  (90 Base) MCG/ACT Aers  Generic drug: albuterol            STOP taking these medications      Aleve 220 MG Tabs  Generic drug: naproxen     doxycycline 100 MG Caps  Commonly known as: Vibramycin               Where to Get Your Medications        These medications were sent to [facility] [address] [facility] - [place], [place] - [address] [phone], [phone]  [address] [facility] [address]       Phone: [phone]   ergocalciferol 1.25 MG (61030 UT) Caps         Activity: activity as tolerated  Diet: regular diet  Wound Care: NA  Code Status: Full Code        Discharge Instructions         FU with PCP In 1 week  FU with psychiatry in 3 weeks           Important follow up:   Follow-up Information       [facility]. Schedule an appointment as soon as possible for a visit.    Specialty: Multi-Specialty  Contact information:  [address] [address] [facility] [address]  [facility] [id number] [phone]  Additional information:  Your appointment is located at [address] [facility] [address] in [place], [place].  Please park in the Yellow lot and go through the [facility] entrance.  Then proceed to [address].      Masks are optional for all patients and visitors, unless otherwise indicated.             Leonid Ford DO. Schedule an appointment as soon as possible for a visit in 1 week(s).    Specialty: Family Medicine  Contact information:  [address] [place] [address] [address]  [facility] [id number] [phone]               Leonid Ford DO Follow up in 1 week(s).    Specialty: Family Medicine  Contact information:  [address] [place] [address]  [facility] [id number] [phone]                             -PCP in [] within 7 days [] within 14 days [] other     Disposition: home  Discharged Condition: good    Hospital Discharge Diagnoses:  muscle cramps    Lace+ Score: 78  59-90 High Risk  29-58 Medium Risk  0-28   Low Risk.    TCM Follow-Up Recommendation:  LACE 29-58: Moderate Risk of readmission after discharge from the hospital.            Total Time Coordinating Care: Greater than 30 minutes    Patient had opportunity to ask questions, state understanding, and agree with therapeutic plan as outlined    Pernell Yap MD  Hospitalist  5/1/2025

## 2025-05-01 NOTE — PHYSICAL THERAPY NOTE
PHYSICAL THERAPY TREATMENT NOTE - INPATIENT     Room Number: 450/450-A       Presenting Problem: hypomagnesemia  Co-Morbidities : recent admit for PNA, ETOH, lung CA, bladder CA, COPD    Problem List  Principal Problem:    Hypomagnesemia  Active Problems:    Alcohol abuse    Community acquired pneumonia, unspecified laterality    Episodic mood disorder      PHYSICAL THERAPY ASSESSMENT   Patient demonstrates good  progress this session, goals  progressing with transfers and ambulation this session.    Patient is requiring stand-by assist as a result of the following impairments: decreased functional strength and medical status.     Patient continues to function below baseline with bed mobility, transfers, gait, and stair negotiation.  Pt demos adequate safety and stability with functional mobility tasks and is safe to discharge from PT standpoint.  Physical Therapy will continue to follow patient for duration of hospitalization.    Patient continues to benefit from continued skilled PT services: at discharge to promote prior level of function and safety with additional support and return home with home health PT.    PLAN DURING HOSPITALIZATION  Nursing Mobility Recommendation : 1 Assist  PT Device Recommendation: Rolling walker  PT Treatment Plan: Bed mobility, Body mechanics, Endurance, Energy conservation, Patient education, Family education, Gait training, Range of motion, Strengthening, Stoop training, Balance training, Transfer training  Frequency (Obs): 5x/week     SUBJECTIVE  How's my walking look?    OBJECTIVE  Precautions: Bed/chair alarm        PAIN ASSESSMENT   Ratin  Location: denies  Management Techniques: Activity promotion, Body mechanics, Repositioning    BALANCE  Static Sitting: Good  Dynamic Sitting: Fair +  Static Standing: Fair  Dynamic Standing: Fair -    ACTIVITY TOLERANCE  Pulse: 94  Heart Rate Source: Monitor                  AM-PAC '6-Clicks' INPATIENT SHORT FORM - BASIC MOBILITY  How  much difficulty does the patient currently have...  Patient Difficulty: Turning over in bed (including adjusting bedclothes, sheets and blankets)?: None   Patient Difficulty: Sitting down on and standing up from a chair with arms (e.g., wheelchair, bedside commode, etc.): A Little   Patient Difficulty: Moving from lying on back to sitting on the side of the bed?: A Little   How much help from another person does the patient currently need...   Help from Another: Moving to and from a bed to a chair (including a wheelchair)?: A Little   Help from Another: Need to walk in hospital room?: A Little   Help from Another: Climbing 3-5 steps with a railing?: A Little     AM-PAC Score:  Raw Score: 19   Approx Degree of Impairment: 41.77%   Standardized Score (AM-PAC Scale): 45.44   CMS Modifier (G-Code): CK    FUNCTIONAL ABILITY STATUS  Functional Mobility/Gait Assessment  Gait Assistance:  (SBA)  Distance (ft): 380 ft  Assistive Device: Rolling walker  Pattern:  (slow pace, decreased step length, steady with no LOB)  Sit to Stand: stand-by assist to RW    Skilled Therapy Provided: Pt rec'd in chair with wife at bedside, agreeable to therapy, identified by name and , gait belt donned for mobility. Pt denies pain this session. Able to perform STS transfers and ambulate community distances with RW and SBA. Pt demos slow but steady gait with no LOB. Pt required cues to stay within frame of RW for improved balance and for activity pacing. Pt encouraged to amb frequently once home for further improvement in functional strength and to use RW for safety as need, with good understanding. Pt back in chair to conclude session with alarm active and needs in reach.       The patient's Approx Degree of Impairment: 41.77% has been calculated based on documentation in the [facility] '6 clicks' Inpatient Daily Activity Short Form.  Research supports that patients with this level of impairment may benefit from HHPT.  Final disposition will be  made by interdisciplinary medical team.        Patient End of Session: Up in chair, Needs met, Call light within reach, RN aware of session/findings, Alarm set, Family present    CURRENT GOALS   Goals to be met by: 25  Patient Goal Patient's self-stated goal is: return to PLOF   Goal #1 Patient is able to demonstrate supine - sit EOB @ level: independent      Goal #1   Current Status  in progress   Goal #2 Patient is able to demonstrate transfers Sit to/from Stand at assistance level: modified independent with walker - rolling      Goal #2  Current Status  in progress   Goal #3 Patient is able to ambulate 300 feet with assist device: walker - rolling at assistance level: supervision   Goal #3   Current Status  in progress   Goal #4 Patient will negotiate one curb w/ assistive device and supervision   Goal #4   Current Status  NT   Goal #5 Patient to demonstrate independence with home activity/exercise instructions provided to patient in preparation for discharge.   Goal #5   Current Status  in progress   Goal #6 Patient will participate in 10 minutes of standing dynamic balance activities requiring fall ready guarding to prepare for safe transition to home environment.    Goal #6  Current Status  in progress     Gait Trainin minutes

## 2025-05-01 NOTE — PROGRESS NOTES
[facility]  part of [facility]     Progress Note    Alejandro Abrams Cyndi Patient Status:  Inpatient    12/10/1955 MRN [id number]   Location [facility] 4W/SW/SE Attending Pernell Yap MD   Hosp Day # 2 PCP Leonid Ford DO       Subjective:   Patient seen and examined.  Dyspnea improving.  Some occasional cough productive in nature at times.    Objective:   Blood pressure 149/67, pulse 111, temperature 98.2 °F (36.8 °C), temperature source Oral, resp. rate 18, height 5' 4\" (1.626 m), weight 127 lb 6.8 oz (57.8 kg), SpO2 93%.  Intake/Output:   Last 3 shifts: I/O last 3 completed shifts:  In: 1016 [P.O.:440; I.V.:576]  Out: -    This shift: I/O this shift:  In: 120 [P.O.:120]  Out: -      Vent Settings:      Hemodynamic parameters (last 24 hours):      Scheduled Meds: Current Hospital Medications[1]    Continuous Infusions: Medication Infusions[2]    Physical Exam  Constitutional: no acute distress  Eyes: PERRL  ENT: nares pateint  Neck: supple, no JVD  Cardio: RRR, S1 S2  Respiratory: Faint expiratory Rales  GI: abdomen soft, non tender, active bowel sounds, no organomegaly  Extremities: no clubbing, cyanosis, edema  Neurologic: no gross motor deficits  Skin: warm, dry      Results:     Lab Results   Component Value Date    WBC 15.2 2025    HGB 10.5 2025    HCT 31.3 2025    .0 2025    CREATSERUM 0.77 2025    BUN 9 2025     2025    K 3.9 2025    CL 99 2025    CO2 29.0 2025     2025    CA 8.9 2025    MG 1.8 2025       No results found.      EKG  Result Date: 2025  Normal sinus rhythm Normal ECG When compared with ECG of 2025 14:16, No significant change was found Confirmed by LEIA LONDONO ([id number]) on 2025 2:50:34 PM      Assessment   1.  COPD with mild exacerbation  2.  Leukocytosis  3.  Non-small cell lung cancer status post left upper lobe wedge resection  4.   Hypokalemia  5.  Hypomagnesemia  6.  Hyponatremia     Plan   -Patient presents with evidence of fatigue malaise dyspnea.  Concern for mild COPD exacerbation.  - Chest x-ray with no acute findings seen  - Continue antibiotic therapy  - Wean steroid therapy  - Nebulizer treatments  - ICS/LABA  - Electrolyte repletion  - Activity as tolerated  - DVT prophylaxis: Heparin  - Discussed with hospitalist.  Okay for discharge later of tolerating ambulation.  Okay to discontinue steroid and antibiotic therapy from pulmonary perspective upon discharge    Brennon Greco DO  Pulmonary Critical Care Medicine  [facility]          [1]   Current Facility-Administered Medications   Medication Dose Route Frequency    LORazepam (Ativan) tab 1 mg  1 mg Oral Q1H PRN    Or    LORazepam (Ativan) tab 2 mg  2 mg Oral Q1H PRN    thiamine (Vitamin B1) tab 100 mg  100 mg Oral Daily    multivitamin (Tab-A-Sampson/Beta Carotene) tab 1 tablet  1 tablet Oral Daily    folic acid (Folvite) tab 1 mg  1 mg Oral Daily    chlordiazePOXIDE (Librium) cap 5 mg  5 mg Oral BID    OLANZapine (ZyPREXA) tab 2.5 mg  2.5 mg Oral Nightly    heparin (Porcine) 5000 UNIT/ML injection 5,000 Units  5,000 Units Subcutaneous 2 times per day    acetaminophen (Tylenol Extra Strength) tab 500 mg  500 mg Oral Q4H PRN    ondansetron (Zofran) 4 MG/2ML injection 4 mg  4 mg Intravenous Q6H PRN    temazepam (Restoril) cap 15 mg  15 mg Oral Nightly PRN    ipratropium-albuterol (Duoneb) 0.5-2.5 (3) MG/3ML inhalation solution 3 mL  3 mL Nebulization Q6H PRN    ampicillin-sulbactam (Unasyn) 3 g in sodium chloride 0.9% 100mL IVPB-ADD  3 g Intravenous q6h    albuterol (Ventolin HFA) 108 (90 Base) MCG/ACT inhaler 2 puff  2 puff Inhalation 2 times per day    atorvastatin (Lipitor) tab 10 mg  10 mg Oral Daily    finasteride (Proscar) tab 5 mg  5 mg Oral Daily    fluticasone propionate (Flonase) 50 MCG/ACT nasal suspension 2 spray  2 spray Nasal BID    levothyroxine (Synthroid) tab 88  mcg  88 mcg Oral Before breakfast    magnesium oxide (Mag-Ox) tab 400 mg  400 mg Oral Daily    losartan (Cozaar) tab 100 mg  100 mg Oral Daily    pantoprazole (Protonix) DR tab 40 mg  40 mg Oral QAM AC    fluticasone-salmeterol (Advair Diskus) 250-50 MCG/ACT inhaler 1 puff  1 puff Inhalation BID    methylPREDNISolone sodium succinate (Solu-MEDROL) injection 40 mg  40 mg Intravenous Q12H    polyethylene glycol (PEG 3350) (Miralax) 17 g oral packet 17 g  17 g Oral Daily    bisacodyl (Dulcolax) DR tab 10 mg  10 mg Oral Daily PRN   [2]

## 2025-05-02 ENCOUNTER — TELEPHONE (OUTPATIENT)
Dept: FAMILY MEDICINE CLINIC | Facility: CLINIC | Age: 70
End: 2025-05-02

## 2025-05-02 ENCOUNTER — PATIENT OUTREACH (OUTPATIENT)
Dept: CASE MANAGEMENT | Age: 70
End: 2025-05-02

## 2025-05-02 NOTE — TELEPHONE ENCOUNTER
The patient called and stated he was just discharged from hospital on 04/29 and he needs to follow up with Dr. Ford in one week. Dr. Ford, no available appointments, ok to book I a res 24 for next week? Thank you.

## 2025-05-05 ENCOUNTER — TELEPHONE (OUTPATIENT)
Dept: CARDIOLOGY CLINIC | Facility: HOSPITAL | Age: 70
End: 2025-05-05

## 2025-05-05 ENCOUNTER — OFFICE VISIT (OUTPATIENT)
Dept: PHYSICAL MEDICINE AND REHAB | Facility: CLINIC | Age: 70
End: 2025-05-05
Payer: MEDICARE

## 2025-05-05 ENCOUNTER — HOSPITAL ENCOUNTER (OUTPATIENT)
Dept: CT IMAGING | Age: 70
Discharge: HOME | End: 2025-05-05
Attending: INTERNAL MEDICINE
Payer: MEDICARE

## 2025-05-05 ENCOUNTER — TELEPHONE (OUTPATIENT)
Dept: NEPHROLOGY | Facility: CLINIC | Age: 70
End: 2025-05-05

## 2025-05-05 ENCOUNTER — PATIENT OUTREACH (OUTPATIENT)
Dept: CASE MANAGEMENT | Age: 70
End: 2025-05-05

## 2025-05-05 VITALS — HEIGHT: 64 IN | WEIGHT: 130 LBS | BODY MASS INDEX: 22.2 KG/M2

## 2025-05-05 DIAGNOSIS — R89.9 ABNORMAL LABORATORY TEST RESULT: ICD-10-CM

## 2025-05-05 DIAGNOSIS — E87.1 HYPONATREMIA: ICD-10-CM

## 2025-05-05 DIAGNOSIS — M54.16 LUMBAR RADICULOPATHY: Primary | ICD-10-CM

## 2025-05-05 DIAGNOSIS — N28.9 KIDNEY DISORDER: ICD-10-CM

## 2025-05-05 DIAGNOSIS — C34.12 SQUAMOUS CELL CARCINOMA OF UPPER LOBE OF LEFT LUNG (HCC): ICD-10-CM

## 2025-05-05 DIAGNOSIS — E83.42 HYPOMAGNESEMIA: Primary | ICD-10-CM

## 2025-05-05 PROCEDURE — 71250 CT THORAX DX C-: CPT | Performed by: INTERNAL MEDICINE

## 2025-05-05 PROCEDURE — 99213 OFFICE O/P EST LOW 20 MIN: CPT | Performed by: PHYSICAL MEDICINE & REHABILITATION

## 2025-05-05 NOTE — PROGRESS NOTES
Left message on mailbox for patient to call care manager back for transitional care management/hospital follow-up call.  Care  information included in message.    Unable to reach pt after several attempts. NCM closing encounter.

## 2025-05-05 NOTE — PROGRESS NOTES
RETURN PATIENT VISIT    CHIEF COMPLAINT  Lumbar radiculopathy    INTERVAL HISTORY  Alejandro Hanna is a 69 year old who was last seen in clinic on 4/15/2025 following up after L4 and L5 TFESI.  History of Present Illness  Alejandro Hanna is a 69 year old male who presents following up after TFESI, significant improvement in his pain complaints, however states that he was recently hospitalized for pneumonia where there would be significant vitamin deficiencies noted, these have been supplemented and he feels as though he is regaining some strength.  Still with some difficulty walking for prolonged periods of time.  Pain is entirely gone however, he is pleased with his progress thus far.     During his hospital stay for pneumonia, he experienced low levels of magnesium, potassium, and sodium. The cause of these imbalances is unclear, with a concern about malabsorption. He is currently taking 400 mg of magnesium and calcium supplements and consumes bananas for potassium. He feels stronger each day with these supplements but has not had recent blood work to confirm current electrolyte levels. He is able to walk with a walker but has not tested his endurance over long distances.      REVIEW OF SYSTEMS  Review of systems was completed with the patient today as pertinent to today's visit    PHYSICAL EXAMINATION  CONSTITUTIONAL: Well-appearing, in no apparent distress  EYES: No scleral icterus or conjunctival hemorrhage  CARDIOVASCULAR: Skin warm and well-perfused, no peripheral edema  RESPIRATORY: Breathing unlabored without accessory muscle use  PSYCHIATRIC: Alert, cooperative, appropriate mood and affect  SKIN: No lesions or rashes on exposed skin  MUSCULOSKELETAL:   Patient continues with short step length, no pain on neurotension testing today.  Good range of motion of the lumbar spine.    REVIEW OF PRIOR X-RAYS/STUDIES  No new imaging to review    IMPRESSION/DIAGNOSIS  1.   Encounter Diagnosis   Name  Primary?    Lumbar radiculopathy Yes         TREATMENT/PLAN  Assessment & Plan  Pneumonia  Recovering post-hospitalization with improved strength and mobility.    Electrolyte imbalance  Hypomagnesemia, hypokalemia, and hyponatremia due to malabsorption. Improvement noted with supplementation.  - Continue magnesium supplements and potassium-rich diet.    Back pain with radiculopathy  Improved after TFESI.  - Resume PT.      Education was provided regarding the above impression/diagnosis and treatment options/plan were discussed.  All questions were answered during today's visit.  Patient will contact clinic if any other questions or concerns.          Brando Reynoso DO  Interventional Spine and Sports Medicine Specialist   Physical Medicine and Rehabilitation  [address] [address]    [address] [place] [address]

## 2025-05-05 NOTE — PROGRESS NOTES
Voicemail received; Patient requesting assistance with scheduling appointment. Patient can be reached at [phone].   Called patient back    SCC-COPD appointment request (discharged 05/01)    [facility]/[address] [address]  [phone]  [address]  Patient declined appointment; patient seeing Specialists  Closing encounter

## 2025-05-05 NOTE — TELEPHONE ENCOUNTER
Patient called in to discuss referral for visit, explained clinic purpose. Agrees to visit as he had a rebound admission this April. Assisted to schedule.

## 2025-05-05 NOTE — TELEPHONE ENCOUNTER
Patient had to cancel his appointment tomorrow 5/6 at 11:20 due to conflict with his wifes appointment. Patient requesting hospital follow up sooner than first available in July. Please call at [phone],thanks.

## 2025-05-06 NOTE — TELEPHONE ENCOUNTER
He does already have an appointment with his oncologist and the nephrologist is already trying to get him in and has an appointment with the neurologist as well.  I am not sure what else I can offer but if he truly wants to see me go ahead and set up an appointment.

## 2025-05-06 NOTE — TELEPHONE ENCOUNTER
Patient contacted. Verified name and date of birth.  Patient informed of Dr. Ford's message.  Patient states Dr. Yap was going to send a message to Dr. Ford regarding celiac tests and adrenal tests, unsure if this was already done. Per Patient, provider was wondering why he was not absorbing nutrients appropriately.  Patient also stated neurology appointment is not until July.  Patient would like to follow up.  Scheduled.  Future Appointments   Date Time Provider Department Center   5/8/2025  8:30 AM Carlos Damian MD [facility]   5/8/2025  9:00 AM Vibha Mari APRN [facility] SPCIALTY EM [facility]   5/12/2025 10:00 AM Loenid Ford DO [facility]   5/12/2025 11:10 AM Sierra Encarnacion DPM [facility]   7/2/2025 10:40 AM Bebeto Macdonald DO E[facility] [place] [facility]

## 2025-05-07 ENCOUNTER — MED REC SCAN ONLY (OUTPATIENT)
Dept: PHYSICAL MEDICINE AND REHAB | Facility: CLINIC | Age: 70
End: 2025-05-07

## 2025-05-07 RX ORDER — FLUTICASONE FUROATE AND VILANTEROL 200; 25 UG/1; UG/1
1 POWDER RESPIRATORY (INHALATION) DAILY
Qty: 180 EACH | Refills: 3 | OUTPATIENT
Start: 2025-05-07

## 2025-05-07 RX ORDER — FLUTICASONE FUROATE AND VILANTEROL TRIFENATATE 200; 25 UG/1; UG/1
1 POWDER RESPIRATORY (INHALATION) DAILY
Qty: 180 EACH | Refills: 3 | OUTPATIENT
Start: 2025-05-07

## 2025-05-07 NOTE — PROGRESS NOTES
[facility]    Alejandro Hanna Patient Status:  Outpatient    12/10/1955 MRN [id number]   Location [facility] Leonid Ford DO         Alejandro Hanna is a 69 year old male who presents to clinic for assessment and management after hospitalization for acute exacerbation of COPD    Admitted  25 - 25:  progressive weakness and fatigue and COPD exacerbation.  Magnesium level low at 0.9, K 3.5, Na 129. CT a/p showed multifocal patchy GGO, nodules both lung bases which are new. He has a h/o SCC Lung and resection. Treatment included IV steroids, IV Unasyn stopped at discharge. Replaced electrolytes. Psychiatry consulted for anxiety.     Admitted 25 - 25: Left lower lobe pneumonia and hyponatremia.  Discharged on Doxycycline.     Problem List:  Pneumonia  Acute on chronic respiratory failure   Squamous cell lung cancer s/p Left upper lobe resection  COPD  Hyponatremia  Hypomagnesemia  Hypokalemia  Anxiety  ETOH use  HFpEF: EF 50%, grade 1 DD in 2018  HLD  HTN  Hypothyroidism  Chronic Lower back pain s/p Lumbar VINNY      SUBJECTIVE  Overall feeling much better,stronger, no falls, here with wife  Cough: dry cough  Phlegm minimal, yellowish Color     Amount minimal  Chest tightness no  Breathlessness: when ambulating, more at night  Activities Daily living limited :yes with showering and dressing himself  Sleep: good, sleeps on 2 pillows,  Snoring per wife mild  MARQUES:never been tested for MARQUES      Review of Systems: see above  Constitutional: negative for fatigue, chills or fever  Respiratory:  negative for cough, hemoptysis and wheezing  Cardiovascular: negative for chest pain, exertional chest pressure/discomfort, near-syncope, orthopnea and palpitations  Gastrointestinal: negative for abdominal pain, diarrhea, melena, nausea and vomiting  Hematologic/lymphatic: negative  Musculoskeletal: negative for muscle weakness and myalgias    OBJECTIVE  Lab  Results   Component Value Date/Time    WBC 15.2 (H) 05/01/2025 09:11 AM    HGB 10.5 (L) 05/01/2025 09:11 AM    HCT 31.3 (L) 05/01/2025 09:11 AM    .0 (H) 05/01/2025 09:11 AM    CREATSERUM 0.77 05/01/2025 09:11 AM    BUN 9 05/01/2025 09:11 AM     (L) 05/01/2025 09:11 AM    K 3.9 05/01/2025 09:11 AM    CL 99 05/01/2025 09:11 AM    CO2 29.0 05/01/2025 09:11 AM     (H) 05/01/2025 09:11 AM    CA 8.9 05/01/2025 09:11 AM    ALB 4.6 04/29/2025 10:39 AM    ALKPHO 44 (L) 04/29/2025 10:39 AM    BILT 0.4 04/29/2025 10:39 AM    TP 7.2 04/29/2025 10:39 AM    AST 20 04/29/2025 10:39 AM    ALT 13 04/29/2025 10:39 AM    PTT 28.1 03/05/2019 02:58 PM    INR 0.93 03/05/2019 02:58 PM    PTP 12.3 03/05/2019 02:58 PM    T4F 0.68 04/17/2018 04:56 PM    TSH 0.928 04/30/2025 07:01 AM    GGT 36 06/29/2023 01:31 PM    PSA 0.98 10/27/2023 09:07 AM    MG 1.8 05/01/2025 09:11 AM    PHOS 3.1 02/01/2025 09:00 AM    B12 656 06/29/2023 01:31 PM    PGLU 107 (H) 10/24/2024 06:00 PM       Labs reviewed: : bmp and CBC from hospitalization  results reviewed with patient. Results independently reviewed     /77 (BP Location: Right arm)   Pulse 94   Temp (!) 95.2 °F (35.1 °C) (Oral)   Resp 16   Wt 123 lb 14.4 oz (56.2 kg)   SpO2 98%   BMI 21.27 kg/m²     EXAM   General appearance: alert, appears stated age and cooperative, thin  Neck: no JVD  Lungs: no breath sounds RUL, diminished bibasilar, no wheezes, ambulated 3 minutes, sa02 95%,   Heart: S1, S2 normal, no murmur, click, rub or gallop, regular rate and rhythm  Abdomen: soft, non-tender; bowel sounds normal; no masses,  no abdominal distension  Extremities: extremities normal, atraumatic, no cyanosis or edema  Pulses: 2+ and symmetric  Neurologic: Grossly normal, unsteady gait, ambulating with walker    Diagnostics:  ECG: NSR    CXR: 4/29/25: stable bibasilar pulmonary opacities    CT scan lung   5/5/25: CONCLUSION:   1.  There is history of squamous cell carcinoma.   Post wedge resection from the left upper lobe, with chronic nodular soft tissue thickening in the region of wedge resection.  The nodular soft tissue thickening in this region has otherwise decreased in   size/improved suggesting evolving scar and postprocedural change.   2.  Bronchitis and emphysema are redemonstrated.  7 mm right apical pulmonary nodule, stable or slightly smaller since previous exam.  There are 2 new nodular foci within the right lower lobe measuring 6 and 8 mm in diameter, which could represent   parenchymal lesions or impacted airways.  Interval development of multiple wedge-shaped pulmonary opacities along the peripheral aspects of both lung bases, which could represent post treatment change, however other pneumonitis such as infection or drug   reaction could potentially give this appearance.   3.  Coronary atherosclerosis     mMRC(Modified Medical Research Dyspnea Scale):  = 1  0= no breathlessness  1=SOB when hurrying or walking up hill  2=Walks slower than people your own age  3=Stop to breath after walking about 100 meters or after few minutes on level  4= Dyspnea with minimal activity such as getting dressed or leaving home    ASSESSMENT    #Acute exacerbation of COPD  #Left Lower Lobe Pneumonia  -Admitted 4/19 - 4/20 for LLL pna, s/p treatment with doxycycline.  -Re-admitted  4/29 - 5/1 for progressive weakness, COPD exacerbation. S/p IV Unasyn and IV steroids. Stopped at discharge  -blood cx negative, sputum cx + for Klebsiella pneumoniae, e coli and candida albicans  -CT chest 5/5/25 shows pulmonary opacities both lung bases.  -Breo-Ellipta inhaler nightly  -Albuterol 2 puffs q 12  ==In check : green  -Acapella Device: provided device, instructed patient and wife  -Incentive spirometry   -instructed on wearing mask in crowds, wash hands, purchase pulse oximeter  -call Dr. Mathews's office for appointment  -patient looks good, no further recommendations for antibx or steroids. Needs  close f/u with pulmonology.     #SCC Lung Cancer  -CT lung 5/5/25 demonstrates 2 new nodular foci RLL 6 and 8 cm. Stable 7mm R apical pulmonary nodule. .   -CT a/p 4/29/25: multifocal GGO and new nodule L lung base compared to CT January 2025.   -f/u Dr. Damian hematology/oncology today    #Acute on chronic respiratory failure   - tolerated walking 3 minutes 02 sat 95%  in recovery,   -Home oxygen( [facility])  no needs    #Vaccine recommendations  Flu: last Fall  Covid 2022  RSV every 2 years last Fall  Pneumonia vaccine: had 2 parts  Pneumovax 23     Smoking Cessation   -AZC1DEJ x 50 years  -Vaping no  -Quit date October 14, 2024  -Avoid second hand smoke, wife smokes, does not smoke in house   -offered nicotine replacement therapy:   nicotine patch    nicotine gum : not interested    #HFpEF  Echo 2018 EF 50-55%, Grade 1 DD: no s/s fluid overload    OTHER  #Hypomagnesemia  #Hyponatremia  #Hypokalemia  #Low Vitamin D: 22  #ETOH use disorder/Episodic mood disorder: seen by psychiatry in  hospital  #Normocytic Anemia: hgb 10.5  #Chronic Lower back pain: follows ortho for VINNY  #HTN  #HLD  #Hypothyroidism    Medications - Current[1]     Discharge Medications            Accurate as of May 8, 2025 10:30 AM. If you have any questions, ask your nurse or doctor.                CHANGE how you take these medications        Instructions Prescription details   Breo Ellipta 200-25 MCG/ACT Aepb  Generic drug: fluticasone furoate-vilanterol  What changed: when to take this      Inhale 1 puff into the lungs in the evening.   Refills: 0            CONTINUE taking these medications        Instructions Prescription details   acetaminophen 500 MG Tabs  Commonly known as: Tylenol Extra Strength      Take 1-2 tablets (500-1,000 mg total) by mouth every 6 (six) hours as needed for Pain.   Refills: 0     atorvastatin 10 MG Tabs  Commonly known as: Lipitor      TAKE 1 TABLET BY MOUTH EVERY DAY   Quantity: 90 tablet  Refills: 3     dutasteride  0.5 MG Caps  Commonly known as: Avodart      Take 1 capsule (0.5 mg total) by mouth every morning.   Refills: 0     ergocalciferol 1.25 MG (51475 UT) Caps  Commonly known as: Vitamin D2      Take 1 capsule (50,000 Units total) by mouth once a week.   Stop taking on: May 31, 2025  Quantity: 4 capsule  Refills: 0     Fenofibrate 160 MG Tabs      Take 1 tablet (160 mg total) by mouth daily.   Quantity: 90 tablet  Refills: 3     fluticasone propionate 50 MCG/ACT Susp  Commonly known as: Flonase      2 sprays by Nasal route in the morning and 2 sprays before bedtime.   Refills: 0     levothyroxine 88 MCG Tabs  Commonly known as: Levoxyl      Take 1 tablet (88 mcg total) by mouth before breakfast. For underactive thyroid.   Quantity: 90 tablet  Refills: 3     Magnesium 400 MG Tabs      Take 400 mg by mouth daily.   Quantity: 90 tablet  Refills: 1     OLANZapine 2.5 MG Tabs  Commonly known as: ZyPREXA      Take 1 tablet (2.5 mg total) by mouth nightly.   Stop taking on: May 31, 2025  Quantity: 30 tablet  Refills: 0     Olmesartan Medoxomil 40 MG Tabs  Commonly known as: BENICAR      Take 1 tablet (40 mg total) by mouth daily. For blood pressure   Quantity: 90 tablet  Refills: 3     Ventolin  (90 Base) MCG/ACT Aers  Generic drug: albuterol      Inhale 2 puffs into the lungs in the morning and 2 puffs in the evening.   Refills: 0                    PLAN  Acapella provided with and teaching: 10 times, 3 times/day  Continue IS 10 times, 3 times/day  Wear mask in [facility] pulse oximeter  Follow up pulmonology: : call to make appointment   5.  Follow up Dr. Villar: 5/16 with labs      60 minutes spent on patient education, chart review and documentation:  Patient instructed regarding clinic procedures, hours, purpose of clinic visits, sodium restricted diet, low sodium foods, fluid restrictions, daily weights, medication regimen s/s of copd exacerbation and when to call APN/clinic. COPD action plan  reviewed and given to patient.  Provided patient counseling, coordination of care and education given. Patient receptive.     Vibha Mari APRN  5/8/25         [1]   Current Outpatient Medications:     ergocalciferol 1.25 MG (44424 UT) Oral Cap, Take 1 capsule (50,000 Units total) by mouth once a week., Disp: 4 capsule, Rfl: 0    OLANZapine 2.5 MG Oral Tab, Take 1 tablet (2.5 mg total) by mouth nightly., Disp: 30 tablet, Rfl: 0    Magnesium 400 MG Oral Tab, Take 400 mg by mouth daily., Disp: 90 tablet, Rfl: 1    albuterol (VENTOLIN HFA) 108 (90 Base) MCG/ACT Inhalation Aero Soln, Inhale 2 puffs into the lungs in the morning and 2 puffs in the evening., Disp: , Rfl:     fluticasone propionate 50 MCG/ACT Nasal Suspension, 2 sprays by Nasal route in the morning and 2 sprays before bedtime., Disp: , Rfl:     acetaminophen 500 MG Oral Tab, Take 1-2 tablets (500-1,000 mg total) by mouth every 6 (six) hours as needed for Pain., Disp: , Rfl:     Fenofibrate 160 MG Oral Tab, Take 1 tablet (160 mg total) by mouth daily., Disp: 90 tablet, Rfl: 3    ATORVASTATIN 10 MG Oral Tab, TAKE 1 TABLET BY MOUTH EVERY DAY, Disp: 90 tablet, Rfl: 3    levothyroxine (LEVOXYL) 88 MCG Oral Tab, Take 1 tablet (88 mcg total) by mouth before breakfast. For underactive thyroid., Disp: 90 tablet, Rfl: 3    Olmesartan Medoxomil 40 MG Oral Tab, Take 1 tablet (40 mg total) by mouth daily. For blood pressure, Disp: 90 tablet, Rfl: 3    BREO ELLIPTA 200-25 MCG/ACT Inhalation Aerosol Powder, Breath Activated, Inhale 1 puff into the lungs in the evening. (Patient taking differently: Inhale 1 puff into the lungs at bedtime.), Disp: , Rfl:     Dutasteride 0.5 MG Oral Cap, Take 1 capsule (0.5 mg total) by mouth every morning., Disp: , Rfl:

## 2025-05-07 NOTE — TELEPHONE ENCOUNTER
Office visit with Dr. Mathews on 01/17/2025:  2- Moderate COPD with FEV1 1.32 L 56 %   Stable and doing well on room air / good functional status   lifelong history of smoking , quit in October 2024  Continue with the Breo and albuterol    Being managed by pulmonology

## 2025-05-07 NOTE — TELEPHONE ENCOUNTER
Patient contacted. Scheduled for 5/16. Patient agreed.     Dr. Villar, patient inquiring if he has to have any labs or testing done prior to visit?

## 2025-05-07 NOTE — TELEPHONE ENCOUNTER
Rn generated order for labs  listed below and pt informed  and last name verified.Advised to fast 8 hours prior to the test ,pt verbalized understanding.

## 2025-05-07 NOTE — TELEPHONE ENCOUNTER
Dr Villar patient is asking if any labs needed prior to appointment on 5/16/25 had labs done on 5/1/25.

## 2025-05-08 ENCOUNTER — OFFICE VISIT (OUTPATIENT)
Dept: CARDIOLOGY CLINIC | Facility: HOSPITAL | Age: 70
End: 2025-05-08
Attending: NURSE PRACTITIONER
Payer: MEDICARE

## 2025-05-08 ENCOUNTER — OFFICE VISIT (OUTPATIENT)
Age: 70
End: 2025-05-08
Attending: INTERNAL MEDICINE
Payer: MEDICARE

## 2025-05-08 VITALS
DIASTOLIC BLOOD PRESSURE: 77 MMHG | SYSTOLIC BLOOD PRESSURE: 128 MMHG | OXYGEN SATURATION: 98 % | RESPIRATION RATE: 16 BRPM | TEMPERATURE: 95 F | HEART RATE: 94 BPM | BODY MASS INDEX: 21 KG/M2 | WEIGHT: 123.88 LBS

## 2025-05-08 VITALS
HEIGHT: 64 IN | HEART RATE: 100 BPM | RESPIRATION RATE: 18 BRPM | OXYGEN SATURATION: 97 % | WEIGHT: 123.19 LBS | SYSTOLIC BLOOD PRESSURE: 126 MMHG | DIASTOLIC BLOOD PRESSURE: 77 MMHG | BODY MASS INDEX: 21.03 KG/M2 | TEMPERATURE: 98 F

## 2025-05-08 DIAGNOSIS — J18.9 COMMUNITY ACQUIRED PNEUMONIA, UNSPECIFIED LATERALITY: Primary | ICD-10-CM

## 2025-05-08 DIAGNOSIS — Z90.2 S/P LOBECTOMY OF LUNG: ICD-10-CM

## 2025-05-08 DIAGNOSIS — J44.1 COPD WITH ACUTE EXACERBATION (HCC): ICD-10-CM

## 2025-05-08 DIAGNOSIS — R53.1 WEAKNESS: ICD-10-CM

## 2025-05-08 DIAGNOSIS — R91.1 LUNG NODULE: ICD-10-CM

## 2025-05-08 DIAGNOSIS — C34.12 SQUAMOUS CELL CARCINOMA OF UPPER LOBE OF LEFT LUNG (HCC): Primary | ICD-10-CM

## 2025-05-08 PROCEDURE — 99215 OFFICE O/P EST HI 40 MIN: CPT | Performed by: NURSE PRACTITIONER

## 2025-05-08 PROCEDURE — 94664 DEMO&/EVAL PT USE INHALER: CPT | Performed by: NURSE PRACTITIONER

## 2025-05-08 NOTE — PROGRESS NOTES
Subjective:  Alejandro is here today , came in per w/c. Accompanied by his wife. Using w/c related to his walking and leg issues. States he is breathing well , rare cough. Doing his IS every hour.   Home equipment:     oxygen - no    IS -yes   acupella  - no  inhalers - yes   Intervention:  6MW - no   Nebulizer treatment - no .    IS teaching review done,  Instructed important to do good deep breaths  vs doing many.   In-Check dial : 75 L/ min peak inspiratory flow monitoring performed.  \"passing\"  for - Breo.     Patient and medication assessed. Discussed with APN. Treatments completed- in check testing, IS teaching .  Medications given- none . Extensive education of disease management .  Reviewed AVS instructions. Patient verbalized understanding.

## 2025-05-08 NOTE — PROGRESS NOTES
[facility] Office Note     Diagnosis  1. 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 S/P lobectomy of lung      Current Therapy  Surveillance after WOODY wedge resection    INTERVAL HISTORY  Patient returns for follow-up. Since the last visit he was hospitalized with pneumonia.  He has since made good recovery.  Still quite weak but able to ambulate with a cane.  Reports cough is improved.  No recurrence of fever he had a CT scan done recently is here to discuss the results.    Past Oncologic History   Alejandro Hanna is a 69 year old male for recently diagnosed squamous cell carcinoma of the left lung.  His oncologic history is detailed below.    8/13/24 patient has long history of tobacco use.  Screening CT chest showed enlarging left upper lobe nodule measuring 1 x 1 cm.  There are also subcentimeter right and left lung nodules    9/4/2024 PET/CT fusion study showed markedly elevated metabolic activity of the left upper lobe nodule measuring 8.79.  Nodule in the right upper lobe had decreased in size and has minimal activity.  No evidence of metastatic disease in the chest abdomen pelvis or mediastinal hilar lymphadenopathy was described.    10/25/2024 the patient saw thoracic surgery and underwent video-assisted partial resection of the left upper lobe that showed a 1.5 cm nonkeratinizing poorly-defined squamous cell carcinoma with negative margins.  There is focal extension the overlying visceral pleura along with multifocal lymphovascular invasion.  A total of 5 lymph nodes were removed none of which were involved with malignancy final pathologic stage T2 N0.  NGS showed a PIK3CA mutation but no other  mutations including EGFR or ALK.  MSI was stable.  TMB low 9.5 m/MB    He has a nearly 57-pack-year history of smoking but quit 2 weeks ago.    11/2024 He had some high risk features including poorly differentiated nature, lymphovascular invasion and  visceral pleural involvement.  We discussed that adjuvant chemotherapy would offer a modest benefit and reduce the risk of recurrence.  However he declined to undergo chemotherapy for a small benefit, and now on surveillance      Review of Systems:  Pt denies fevers, chills, night sweats, HA, vision changes, CP, SOB, cough, n/v/d, abd pain, urinary or bowel complaints  Hematology/Oncology ROS performed and negative except as above in HPI    History/Other:   Past Medical History:  Past Medical History:    ALCOHOL USE    Allergic rhinitis    Flonase    Arthritis    Bladder cancer (HCC)    f/u Sarah    BPH (benign prostatic hyperplasia)    COPD (HCC)    moderate-severe     Disorder of kidney and ureter    Disorder of thyroid    hypothyroid    ED (erectile dysfunction)    Esophageal reflux    Essential hypertension    Hearing impairment    Hepatomegaly    Hiatal hernia    High blood pressure    High cholesterol    Hypothyroidism    Injury    Left knee injury, from fall    Osteoarthritis    in knee's, hip's    Pulmonary nodules    first noted 2016on LD screen CT    Tobacco abuse    Visual impairment    glasses       Past Surgical History:  Past Surgical History:   Procedure Laterality Date    Colonoscopy      02-14-17    Colonoscopy N/A 12/28/2020    Procedure: COLONOSCOPY;  Surgeon: Real Abreu MD;  Location: [facility] ENDOSCOPY    Electrocardiogram, complete  12/15/2013    scanned to media tab    Oral surgery      Other surgical history  2009    cyst removal, side of head    Other surgical history Left 10/24/2024    VATS upper lobe wedge resection by Dr. Maria    Other surgical history  10/2024    lung nodule removed       Current Medications:  No current facility-administered medications on file as of 5/8/2025.       Allergies:   Allergies[1]    Family Medical History:  Family History   Problem Relation Age of Onset    Dementia Mother     Diabetes Paternal Grandmother     Cancer Sister         lung cancer        Social History:  Social History     Socioeconomic History    Marital status:      Spouse name: Not on file    Number of children: Not on file    Years of education: Not on file    Highest education level: Not on file   Occupational History    Not on file   Tobacco Use    Smoking status: Former     Current packs/day: 0.50     Average packs/day: 1 pack/day for 41.3 years (40.7 ttl pk-yrs)     Types: Cigarettes     Start date: 2024    Smokeless tobacco: Former     Quit date: 10/24/2024    Tobacco comments:     2/2022: less than 1 pack per day     10/2024: smokes 8-9 cigarettes a day   Vaping Use    Vaping status: Never Used   Substance and Sexual Activity    Alcohol use: Yes     Alcohol/week: 2.0 standard drinks of alcohol     Types: 2 Cans of beer per week     Comment: 2 beer per day    Drug use: No    Sexual activity: Not on file   Other Topics Concern     Service Not Asked    Blood Transfusions Not Asked    Caffeine Concern Yes     Comment: coffee-1 cup/day    Occupational Exposure Not Asked    Hobby Hazards Not Asked    Sleep Concern Not Asked    Stress Concern Not Asked    Weight Concern Not Asked    Special Diet Not Asked    Back Care Not Asked    Exercise Not Asked    Bike Helmet Not Asked    Seat Belt Not Asked    Self-Exams Not Asked   Social History Narrative    Live with fianace    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       Gyn History:      Objective:    /77 (BP Location:  Right arm, Patient Position: Sitting, Cuff Size: adult)   Pulse 100   Temp 97.7 °F (36.5 °C) (Oral)   Resp 18   Ht 1.626 m (5' 4\")   Wt 55.9 kg (123 lb 3.2 oz)   SpO2 97%   BMI 21.15 kg/m²     Physical Exam:  ECOG PS: 1  General: A&Ox3, NAD  HEENT: PERRL, OP clear  Neck: supple, no LAD or JVD  CV: RRR, no murmurs, + pulses  Pulm: CTA b/l, no w/r/r, normal effort  Lymph: no palpable lymphadenopathy throughout the cervical, supraclavicular, axillary, or inguinal regions  Extremities: no edema or calf tenderness  Neurological: Grossly intact    Labs:  Lab Results   Component Value Date/Time    WBC 15.2 (H) 05/01/2025 09:11 AM    RBC 3.65 (L) 05/01/2025 09:11 AM    HGB 10.5 (L) 05/01/2025 09:11 AM    HCT 31.3 (L) 05/01/2025 09:11 AM    MCV 85.8 05/01/2025 09:11 AM    MCH 28.8 05/01/2025 09:11 AM    MCHC 33.5 05/01/2025 09:11 AM    RDW 13.4 05/01/2025 09:11 AM    NEPRELIM 11.05 (H) 04/30/2025 07:01 AM    .0 (H) 05/01/2025 09:11 AM       Lab Results   Component Value Date/Time     (H) 05/01/2025 09:11 AM    BUN 9 05/01/2025 09:11 AM    CREATSERUM 0.77 05/01/2025 09:11 AM    GFRNAA 96 10/12/2021 09:48 AM    CA 8.9 05/01/2025 09:11 AM    ALB 4.6 04/29/2025 10:39 AM     (L) 05/01/2025 09:11 AM    K 3.9 05/01/2025 09:11 AM    CL 99 05/01/2025 09:11 AM    CO2 29.0 05/01/2025 09:11 AM    ALKPHO 44 (L) 04/29/2025 10:39 AM    AST 20 04/29/2025 10:39 AM    ALT 13 04/29/2025 10:39 AM       Imaging:  CT films reviewed with pt/family    Assessment & Plan:    Alejandro Devineolivier is a 69 year old male wih a Stage IB (T2N0M0) squamous cell carcinoma of the left lung s/p resection.I had a long discussion with the patient explained that he had a small tumor with some high risk features including poorly differentiated nature, lymphovascular invasion and visceral pleural involvement.  We discussed that adjuvant chemotherapy would offer a modest benefit and reduce the risk of recurrence.  He was not interested in  undergoing adjuvant chemotherapy for a small benefit in reducing the risk of recurrence.    # CT 5/25 showed improved nodularity along the site of wedge resection.  New GGO bilaterally likely from recent infection. Repeat CT in 3 mos    # He will return for follow-up after the CT in August. Continue pulmonary fu    # History of superficial bladder cancer.  He continues regular follow-up with Dr. Shaffer    Thank you Dr Ian Mathews for the opportunity to participate in the care of this interesting patient. Please do contact me if I may be of any further assistance    Carlos Damian MD  [facility] Hematology/Oncology  [facility]    This note was created using a voice-recognition transcribing system. Incorrect words or phrases may have been missed during proofreading. Please interpret accordingly.               [1] No Known Allergies

## 2025-05-08 NOTE — PATIENT INSTRUCTIONS
Purchase pulse oximeter: measure as needed if short of breath. Call Doctor if reading is less than 90%  Use the Acapella device: 10 times, 3 times a day  Use the Incentive spirometer 10 times, 3 times a day  Call Physical Therapy to make appointment.  Bring cane and walker to visit.   Call Dr. Mathews's office and make appointment

## 2025-05-09 DIAGNOSIS — J30.1 ALLERGIC RHINITIS DUE TO POLLEN, UNSPECIFIED SEASONALITY: ICD-10-CM

## 2025-05-09 RX ORDER — FLUTICASONE PROPIONATE 50 MCG
2 SPRAY, SUSPENSION (ML) NASAL 2 TIMES DAILY
Qty: 48 ML | Refills: 1 | Status: SHIPPED | OUTPATIENT
Start: 2025-05-09

## 2025-05-12 ENCOUNTER — OFFICE VISIT (OUTPATIENT)
Dept: FAMILY MEDICINE CLINIC | Facility: CLINIC | Age: 70
End: 2025-05-12
Payer: MEDICARE

## 2025-05-12 ENCOUNTER — TELEPHONE (OUTPATIENT)
Dept: PULMONOLOGY | Facility: CLINIC | Age: 70
End: 2025-05-12

## 2025-05-12 ENCOUNTER — LAB ENCOUNTER (OUTPATIENT)
Dept: LAB | Age: 70
End: 2025-05-12
Attending: INTERNAL MEDICINE
Payer: MEDICARE

## 2025-05-12 ENCOUNTER — OFFICE VISIT (OUTPATIENT)
Dept: PODIATRY CLINIC | Facility: CLINIC | Age: 70
End: 2025-05-12

## 2025-05-12 ENCOUNTER — TELEPHONE (OUTPATIENT)
Dept: NEPHROLOGY | Facility: CLINIC | Age: 70
End: 2025-05-12

## 2025-05-12 VITALS
DIASTOLIC BLOOD PRESSURE: 76 MMHG | HEART RATE: 83 BPM | HEIGHT: 64 IN | SYSTOLIC BLOOD PRESSURE: 137 MMHG | BODY MASS INDEX: 21 KG/M2 | TEMPERATURE: 97 F

## 2025-05-12 DIAGNOSIS — M79.672 BILATERAL FOOT PAIN: ICD-10-CM

## 2025-05-12 DIAGNOSIS — E83.42 HYPOMAGNESEMIA: Primary | ICD-10-CM

## 2025-05-12 DIAGNOSIS — M79.675 PAIN DUE TO ONYCHOMYCOSIS OF TOENAILS OF BOTH FEET: ICD-10-CM

## 2025-05-12 DIAGNOSIS — E87.1 HYPONATREMIA: ICD-10-CM

## 2025-05-12 DIAGNOSIS — D64.9 ANEMIA, UNSPECIFIED TYPE: ICD-10-CM

## 2025-05-12 DIAGNOSIS — R29.898 WEAKNESS OF BOTH LEGS: ICD-10-CM

## 2025-05-12 DIAGNOSIS — B35.1 ONYCHOMYCOSIS: ICD-10-CM

## 2025-05-12 DIAGNOSIS — L84 FOOT CALLUS: ICD-10-CM

## 2025-05-12 DIAGNOSIS — R89.9 ABNORMAL LABORATORY TEST RESULT: ICD-10-CM

## 2025-05-12 DIAGNOSIS — M21.622 TAILOR'S BUNION OF BOTH FEET: Primary | ICD-10-CM

## 2025-05-12 DIAGNOSIS — S22.32XA CLOSED FRACTURE OF ONE RIB OF LEFT SIDE, INITIAL ENCOUNTER: ICD-10-CM

## 2025-05-12 DIAGNOSIS — N28.9 KIDNEY DISORDER: ICD-10-CM

## 2025-05-12 DIAGNOSIS — B35.1 PAIN DUE TO ONYCHOMYCOSIS OF TOENAILS OF BOTH FEET: ICD-10-CM

## 2025-05-12 DIAGNOSIS — M79.671 BILATERAL FOOT PAIN: ICD-10-CM

## 2025-05-12 DIAGNOSIS — M21.621 TAILOR'S BUNION OF BOTH FEET: Primary | ICD-10-CM

## 2025-05-12 DIAGNOSIS — M79.674 PAIN DUE TO ONYCHOMYCOSIS OF TOENAILS OF BOTH FEET: ICD-10-CM

## 2025-05-12 DIAGNOSIS — E83.42 HYPOMAGNESEMIA: ICD-10-CM

## 2025-05-12 DIAGNOSIS — K21.9 GASTROESOPHAGEAL REFLUX DISEASE, UNSPECIFIED WHETHER ESOPHAGITIS PRESENT: ICD-10-CM

## 2025-05-12 DIAGNOSIS — R19.5 DARK STOOLS: ICD-10-CM

## 2025-05-12 DIAGNOSIS — J18.9 PNEUMONIA OF BOTH LUNGS DUE TO INFECTIOUS ORGANISM, UNSPECIFIED PART OF LUNG: ICD-10-CM

## 2025-05-12 PROBLEM — F10.20 UNCOMPLICATED ALCOHOL DEPENDENCE (HCC): Status: ACTIVE | Noted: 2025-05-12

## 2025-05-12 LAB
ALBUMIN SERPL-MCNC: 5 G/DL (ref 3.2–4.8)
ANION GAP SERPL CALC-SCNC: 8 MMOL/L (ref 0–18)
BASOPHILS # BLD: 0.27 X10(3) UL (ref 0–0.2)
BASOPHILS NFR BLD: 3 %
BUN BLD-MCNC: 23 MG/DL (ref 9–23)
BUN/CREAT SERPL: 19 (ref 10–20)
CALCIUM BLD-MCNC: 10 MG/DL (ref 8.7–10.4)
CHLORIDE SERPL-SCNC: 100 MMOL/L (ref 98–112)
CO2 SERPL-SCNC: 26 MMOL/L (ref 21–32)
CREAT BLD-MCNC: 1.21 MG/DL (ref 0.7–1.3)
DEPRECATED RDW RBC AUTO: 47.9 FL (ref 35.1–46.3)
EGFRCR SERPLBLD CKD-EPI 2021: 64 ML/MIN/1.73M2 (ref 60–?)
EOSINOPHIL # BLD: 0 X10(3) UL (ref 0–0.7)
EOSINOPHIL NFR BLD: 0 %
ERYTHROCYTE [DISTWIDTH] IN BLOOD BY AUTOMATED COUNT: 14.1 % (ref 11–15)
GLUCOSE BLD-MCNC: 89 MG/DL (ref 70–99)
HCT VFR BLD AUTO: 36.1 % (ref 39–53)
HGB BLD-MCNC: 11.6 G/DL (ref 13–17.5)
IRON SATN MFR SERPL: 20 % (ref 20–50)
IRON SERPL-MCNC: 67 UG/DL (ref 65–175)
LYMPHOCYTES NFR BLD: 1.09 X10(3) UL (ref 1–4)
LYMPHOCYTES NFR BLD: 12 %
MAGNESIUM SERPL-MCNC: 1.7 MG/DL (ref 1.6–2.6)
MCH RBC QN AUTO: 29.6 PG (ref 26–34)
MCHC RBC AUTO-ENTMCNC: 32.1 G/DL (ref 31–37)
MCV RBC AUTO: 92.1 FL (ref 80–100)
METAMYELOCYTES # BLD: 0.09 X10(3) UL (ref ?–0.01)
METAMYELOCYTES NFR BLD: 1 %
MONOCYTES # BLD: 0.73 X10(3) UL (ref 0.1–1)
MONOCYTES NFR BLD: 8 %
MORPHOLOGY: NORMAL
MYELOCYTES # BLD: 0.46 X10(3) UL (ref ?–0.01)
MYELOCYTES NFR BLD: 5 %
NEUTROPHILS # BLD AUTO: 6.39 X10 (3) UL (ref 1.5–7.7)
NEUTROPHILS NFR BLD: 70 %
NEUTS BAND NFR BLD: 1 %
NEUTS HYPERSEG # BLD: 6.46 X10(3) UL (ref 1.5–7.7)
OSMOLALITY SERPL CALC.SUM OF ELEC: 281 MOSM/KG (ref 275–295)
PHOSPHATE SERPL-MCNC: 3.6 MG/DL (ref 2.4–5.1)
PLATELET # BLD AUTO: 450 10(3)UL (ref 150–450)
PLATELET MORPHOLOGY: NORMAL
POTASSIUM SERPL-SCNC: 5.3 MMOL/L (ref 3.5–5.1)
RBC # BLD AUTO: 3.92 X10(6)UL (ref 3.8–5.8)
SODIUM SERPL-SCNC: 134 MMOL/L (ref 136–145)
TOTAL CELLS COUNTED BLD: 100
TOTAL IRON BINDING CAPACITY: 329 UG/DL (ref 250–425)
TRANSFERRIN SERPL-MCNC: 261 MG/DL (ref 215–365)
WBC # BLD AUTO: 9.1 X10(3) UL (ref 4–11)

## 2025-05-12 PROCEDURE — 36415 COLL VENOUS BLD VENIPUNCTURE: CPT

## 2025-05-12 PROCEDURE — 83540 ASSAY OF IRON: CPT

## 2025-05-12 PROCEDURE — 83735 ASSAY OF MAGNESIUM: CPT

## 2025-05-12 PROCEDURE — 84466 ASSAY OF TRANSFERRIN: CPT

## 2025-05-12 PROCEDURE — 85025 COMPLETE CBC W/AUTO DIFF WBC: CPT

## 2025-05-12 PROCEDURE — 80069 RENAL FUNCTION PANEL: CPT

## 2025-05-12 PROCEDURE — G2211 COMPLEX E/M VISIT ADD ON: HCPCS | Performed by: FAMILY MEDICINE

## 2025-05-12 PROCEDURE — 99214 OFFICE O/P EST MOD 30 MIN: CPT | Performed by: STUDENT IN AN ORGANIZED HEALTH CARE EDUCATION/TRAINING PROGRAM

## 2025-05-12 PROCEDURE — 99214 OFFICE O/P EST MOD 30 MIN: CPT | Performed by: FAMILY MEDICINE

## 2025-05-12 PROCEDURE — 85027 COMPLETE CBC AUTOMATED: CPT

## 2025-05-12 PROCEDURE — 85007 BL SMEAR W/DIFF WBC COUNT: CPT

## 2025-05-12 RX ORDER — MELOXICAM 15 MG/1
15 TABLET ORAL DAILY PRN
COMMUNITY
Start: 2025-04-22

## 2025-05-12 NOTE — TELEPHONE ENCOUNTER
Ian Mathews MD to Me (Selected Message)        5/12/25  4:03 PM  Please inform the patient that I checked his chest CT and overall stable and better  Good news overall  To follow-up with me with next scheduled appointment

## 2025-05-12 NOTE — PROGRESS NOTES
Patient ID: Alejandro Hanna is a 69 year old male.         The following individual(s) verbally consented to be recorded using ambient AI listening technology and understand that they can each withdraw their consent to this listening technology at any point by asking the clinician to turn off or pause the recording:    Patient name: Alejandro Hanna  Additional names: Shawna, his wife       Pernell Yap MD  Physician  Hospitalist     Discharge Summary     Addendum     Date of Service: 5/1/2025  2:25 PM       [facility] Hospitalist Discharge Summary   Patient ID:  Alejandro Hanna  [id number]  69 year old  12/10/1955     Admit date: 4/29/2025  Discharge date: 5/1/2025  Primary Care Physician: Leonid Ford DO   Attending Physician: Pernell Yap MD   Consults:   Consultants           Provider   Role Specialty      Osman Vital MD       Consulting Physician Psychiatry      Brennon Greco DO       Consulting Physician PULMONARY DISEASES                Discharge Diagnoses:   Hypomagnesemia     Reason for admission  Copied from admission H&P: Patient is a 69-year-old  male who was hospitalized recently between April 19 and April 20 for pneumonia, treated and discharged home. Today, he came in to the emergency department for evaluation or progressive fatigue and weakness. Reviewing the records, noted during his hospitalization to have 2 magnesium levels, 1.0 and 0.9. Today, magnesium is 0.6, potassium 3.5. Otherwise, chemistry and liver function tests were unremarkable. His sodium was 129 and calculated osmolality 266. Today, CT scan of the abdomen and pelvis showed multifocal patchy ground-glass density opacities, nodules in both lung bases, which are new since recent imaging studies. Findings concerning for infection or pneumonia and post-treatment followup recommended. Chest x-ray showed no acute findings.            HPI  Chief Complaint   Patient presents with    Hospital F/U      Pneumonia        History of Present Illness  Alejandro Hanna is a 69 year old male who presents for follow-up after hospitalization for bilateral pneumonia and hypomagnesemia. He is accompanied by his wife.    He was recently hospitalized due to fatigue, weakness, and low magnesium levels, during which he was diagnosed with bilateral pneumonia and treated with antibiotics. He is scheduled to follow up with nephrology on May 16.    He has a history of a cracked seventh rib, identified as a subacute to chronic left lateral seventh rib fracture on a CT scan from April 29, 2025. This was noted to be an old finding, possibly related to previous coughing episodes.    He has been experiencing dark stools and constipation since yesterday, which he attributes to recent hospitalization and medication use. No blood in the stool. He is not currently on iron supplements.  The stool is not tarry.    He has a history of acid reflux and has been taking omeprazole, which has been reduced from 40 mg to 20 mg due to concerns about magnesium absorption. He is currently taking magnesium supplements once daily and thyroid medication for hypothyroidism.  He is taking the omeprazole from his wife although he states he was told in the hospital to stop the medication completely.  He will talk to his nephrologist in a few days about this as he states without the medication his acid reflux gets to be quite bothersome.    He reports leg weakness and uses a walker and cane for mobility. He has been attending physical therapy for leg weakness and is able to walk into the building with a cane. He experiences fatigue after walking short distances.    He reports constipation and dark stools since yesterday. He confirms experiencing fatigue and weakness.    Wt Readings from Last 6 Encounters:   05/08/25 123 lb 14.4 oz (56.2 kg)   05/08/25 123 lb 3.2 oz (55.9 kg)   05/05/25 130 lb (59 kg)   04/29/25 127 lb 6.8 oz (57.8 kg)   04/22/25 130 lb (59  kg)   04/19/25 126 lb (57.2 kg)       BMI Readings from Last 6 Encounters:   05/12/25 21.27 kg/m²   05/08/25 21.27 kg/m²   05/08/25 21.15 kg/m²   05/05/25 22.31 kg/m²   04/29/25 21.87 kg/m²   04/22/25 22.31 kg/m²       BP Readings from Last 6 Encounters:   05/12/25 137/76   05/08/25 128/77   05/08/25 126/77   05/01/25 (!) 163/81   04/22/25 130/66   04/20/25 146/69       Results  LABS  Magnesium: 1.8 (05/01/2025)  TSH: 0.928 (04/30/2025)    RADIOLOGY  CT Chest: Bilateral basilar pulmonary opacities consistent with pneumonia (04/29/2025)  CT Abdomen: Subacute to chronic appearing left lateral seventh rib fracture (04/29/2025)    Review of Systems  No exertional cardiac chest pain or shortness of breath unless stated in HPI which would take precedence.  See HPI for further review of systems.        Medical History:      Past Medical History:    ALCOHOL USE    Allergic rhinitis    Flonase    Arthritis    Bladder cancer (HCC)    f/u [facility]    BPH (benign prostatic hyperplasia)    COPD (HCC)    moderate-severe     Disorder of kidney and ureter    Disorder of thyroid    hypothyroid    ED (erectile dysfunction)    Esophageal reflux    Essential hypertension    Hearing impairment    Hepatomegaly    Hiatal hernia    High blood pressure    High cholesterol    Hypothyroidism    Injury    Left knee injury, from fall    Osteoarthritis    in knee's, hip's    Pulmonary nodules    first noted 2016on LD screen CT    Tobacco abuse    Visual impairment    glasses       Past Surgical History:   Procedure Laterality Date    Colonoscopy      02-14-17    Colonoscopy N/A 12/28/2020    Procedure: COLONOSCOPY;  Surgeon: Real Abreu MD;  Location: [facility] END[facility]COPY    Electrocardiogram, complete  12/15/2013    scanned to media tab    Oral surgery      Other surgical history  2009    cyst removal, side of head    Other surgical history Left 10/24/2024    VATS upper lobe wedge resection by Dr. Maria    Other surgical history  10/2024     lung nodule removed          Current Outpatient Medications   Medication Sig Dispense Refill    Magnesium Oxide -Mg Supplement 400 MG Oral Cap Take 400 mg by mouth daily.      Meloxicam 15 MG Oral Tab Take 1 tablet (15 mg total) by mouth daily as needed.      fluticasone propionate 50 MCG/ACT Nasal Suspension 2 sprays by Nasal route in the morning and 2 sprays before bedtime. 48 mL 1    ergocalciferol 1.25 MG (80832 UT) Oral Cap Take 1 capsule (50,000 Units total) by mouth once a week. 4 capsule 0    OLANZapine 2.5 MG Oral Tab Take 1 tablet (2.5 mg total) by mouth nightly. 30 tablet 0    Magnesium 400 MG Oral Tab Take 400 mg by mouth daily. 90 tablet 1    albuterol (VENTOLIN HFA) 108 (90 Base) MCG/ACT Inhalation Aero Soln Inhale 2 puffs into the lungs in the morning and 2 puffs in the evening.      acetaminophen 500 MG Oral Tab Take 1-2 tablets (500-1,000 mg total) by mouth every 6 (six) hours as needed for Pain.      Fenofibrate 160 MG Oral Tab Take 1 tablet (160 mg total) by mouth daily. 90 tablet 3    ATORVASTATIN 10 MG Oral Tab TAKE 1 TABLET BY MOUTH EVERY DAY 90 tablet 3    levothyroxine (LEVOXYL) 88 MCG Oral Tab Take 1 tablet (88 mcg total) by mouth before breakfast. For underactive thyroid. 90 tablet 3    Olmesartan Medoxomil 40 MG Oral Tab Take 1 tablet (40 mg total) by mouth daily. For blood pressure 90 tablet 3    BREO ELLIPTA 200-25 MCG/ACT Inhalation Aerosol Powder, Breath Activated Inhale 1 puff into the lungs in the evening. (Patient taking differently: Inhale 1 puff into the lungs at bedtime.)      Dutasteride 0.5 MG Oral Cap Take 1 capsule (0.5 mg total) by mouth every morning.       Allergies:No Known Allergies     Physical Exam:       Physical Exam  Blood pressure 137/76, pulse 83, temperature 97.1 °F (36.2 °C), temperature source Tympanic, height 5' 4\" (1.626 m).  Vitals:    05/12/25 0928   BP: 137/76   BP Location: Right arm   Patient Position: Sitting   Cuff Size: adult   Pulse: 83   Temp: 97.1  °F (36.2 °C)   TempSrc: Tympanic   Height: 5' 4\" (1.626 m)          Physical Exam   Constitutional: Patient is oriented to person, place, and time. Patient appears well-developed and well-nourished. No distress.  Sitting in the wheelchair.  HENT:   Head: Normocephalic and atraumatic.   Neck: Normal range of motion. Neck supple. No thyromegaly present.   Lymphadenopathy:     Has no cervical adenopathy.   Cardiovascular: Normal rate, regular rhythm and no murmur heard.  Pulmonary/Chest: Effort normal and breath sounds normal. No respiratory distress.  He does not sound winded after he speaks.  Neurological: Patient is alert and oriented to person, place, and time.   Skin: Skin is warm and dry.  No pallor or diaphoresis.  Psychiatric: Patient has a normal mood and affect.  He is energetic.  Talkative.  Legs: No pitting edema.  No redness of the legs.    Nursing note and vitals reviewed.      Physical Exam  VITALS: P- 83  CHEST: Clear to auscultation bilaterally.  CARDIOVASCULAR: Normal heart rate and rhythm, S1 and S2 normal.      Assessment/Plan:        Diagnoses and all orders for this visit:    Hypomagnesemia  He is taking magnesium daily.  He has an appoint with nephrology.  He already did labs this morning.  Weakness of both legs  -     PHYSICAL THERAPY EXTERNAL  -     Vitamin B12; Future  They are wondering about his vitamin B12.  I did let them know that a couple years ago was completely normal.  He is not on vitamin B12.  He will get this test done the next time he needs labs from either myself or the nephrologist.  Dark stools  CBC already done this morning  Gastroesophageal reflux disease, unspecified whether esophagitis present  Try to hold off on PPI unless absolutely necessary  Pneumonia of both lungs due to infectious organism, unspecified part of lung  Is feeling much better after treatment  Closed fracture of one rib of left side, initial encounter  This is old and he was just wanting more information  about it    Quite a bit of information to review from the hospital along with the diagnostic test that they were interested in.      Referrals (if applicable)  Orders Placed This Encounter   Procedures    PHYSICAL THERAPY EXTERNAL     Treatment: Evaluate & Treat, Include modalities if therapist feels they are needed  Physician goals: Pain relief, Increased Function, Activities of daily living and Education  Frequency: 2-3 times per week.........Duration: 4-6 weeks    Examples of physical therapy companies are [employer] or [employer].   Can take to various Physical Therapy locations as is APPROVED by your insurance.  MY FAX # : [phone] for therapist to send me notes     Referral Priority:   Routine     Referral Type:   Rehab Services     Requested Specialty:   Physical Therapy     Number of Visits Requested:   9         Follow up if symptoms persist.  Take medicine (if given) as prescribed.  Approach to treatment discussed and patient/family member understands and agrees to plan.     No follow-ups on file.      Assessment & Plan  Bilateral pneumonia  Bilateral pneumonia treated in the hospital. Lungs are currently clear. Follow-up CT scan of the chest ordered to monitor the condition.  - Order follow-up CT scan of the chest    Leg weakness  Leg weakness likely due to prolonged hospitalization and reduced mobility. He uses a walker and cane for ambulation and reports fatigue with exertion. Physical therapy is recommended to improve strength and mobility.  - Provide referral for physical therapy    Hypomagnesemia  Hypomagnesemia with recent magnesium level at 1.8 upon hospital discharge. Omeprazole may contribute to low magnesium levels by decreasing absorption. Advised to avoid omeprazole if possible to prevent further magnesium depletion. Monitoring of magnesium levels is necessary.  - Avoid omeprazole if possible  - Monitor magnesium levels    Hypothyroidism  Hypothyroidism managed with thyroid medication. Recent TSH  level was 0.928, indicating well-managed thyroid function.    Left lateral seventh rib fracture  Subacute to chronic left lateral seventh rib fracture noted on CT scan. Fracture is an old finding and likely healed, possibly caused by excessive coughing in the past.    Leonid Ford,   5/12/2025

## 2025-05-12 NOTE — TELEPHONE ENCOUNTER
Kidney function has gotten a little worse and potassium is now borderline high.  Sodium is still borderline low but stable and in a safe range.  Continue a 48 ounce per day fluid restriction.  Is he taking meloxicam???  If so how much and how often because this can affect the kidney function.  Instruct him on a low potassium diet.  Repeat labs in 1 month to make sure things remain stable.

## 2025-05-12 NOTE — TELEPHONE ENCOUNTER
Spoke with wife Shawna (KIAH) on file. Verified name and date of birth, went over test results. She verbalized understanding and will keep follow up appointment scheduled with Dr. Mathews for 6/6/25.

## 2025-05-12 NOTE — PROGRESS NOTES
[facility]  Progress Note      Alejandro Hanna is a 69 year old male.   Chief Complaint   Patient presents with    Toenail Care     F/u Toenail Care  On 05/12/20225  LOV with PCP eLonid Ritchie DO             HPI:       Patient is a pleasant 69-year-old male who presents to clinic for bilateral foot evaluation.  Admits to elongated toenails and a callus on the plantar aspect of the right foot.  Today patient also admits to lower extremity weakness. Pt is accompanied by his wife    Allergies: Patient has no known allergies.    Current Outpatient Medications   Medication Sig Dispense Refill    Magnesium Oxide -Mg Supplement 400 MG Oral Cap Take 400 mg by mouth daily.      Meloxicam 15 MG Oral Tab Take 1 tablet (15 mg total) by mouth daily as needed.      fluticasone propionate 50 MCG/ACT Nasal Suspension 2 sprays by Nasal route in the morning and 2 sprays before bedtime. 48 mL 1    ergocalciferol 1.25 MG (25061 UT) Oral Cap Take 1 capsule (50,000 Units total) by mouth once a week. 4 capsule 0    OLANZapine 2.5 MG Oral Tab Take 1 tablet (2.5 mg total) by mouth nightly. 30 tablet 0    Magnesium 400 MG Oral Tab Take 400 mg by mouth daily. 90 tablet 1    albuterol (VENTOLIN HFA) 108 (90 Base) MCG/ACT Inhalation Aero Soln Inhale 2 puffs into the lungs in the morning and 2 puffs in the evening.      acetaminophen 500 MG Oral Tab Take 1-2 tablets (500-1,000 mg total) by mouth every 6 (six) hours as needed for Pain.      Fenofibrate 160 MG Oral Tab Take 1 tablet (160 mg total) by mouth daily. 90 tablet 3    ATORVASTATIN 10 MG Oral Tab TAKE 1 TABLET BY MOUTH EVERY DAY 90 tablet 3    levothyroxine (LEVOXYL) 88 MCG Oral Tab Take 1 tablet (88 mcg total) by mouth before breakfast. For underactive thyroid. 90 tablet 3    Olmesartan Medoxomil 40 MG Oral Tab Take 1 tablet (40 mg total) by mouth daily. For blood pressure 90 tablet 3    BREO ELLIPTA 200-25 MCG/ACT Inhalation Aerosol Powder, Breath Activated  Inhale 1 puff into the lungs in the evening. (Patient taking differently: Inhale 1 puff into the lungs at bedtime.)      Dutasteride 0.5 MG Oral Cap Take 1 capsule (0.5 mg total) by mouth every morning.        Past Medical History:    ALCOHOL USE    Allergic rhinitis    Flonase    Arthritis    Bladder cancer (HCC)    f/u [facility]    BPH (benign prostatic hyperplasia)    COPD (HCC)    moderate-severe     Disorder of kidney and ureter    Disorder of thyroid    hypothyroid    ED (erectile dysfunction)    Esophageal reflux    Essential hypertension    Hearing impairment    Hepatomegaly    Hiatal hernia    High blood pressure    High cholesterol    Hypothyroidism    Injury    Left knee injury, from fall    Osteoarthritis    in knee's, hip's    Pulmonary nodules    first noted 2016on LD screen CT    Tobacco abuse    Visual impairment    glasses      Past Surgical History:   Procedure Laterality Date    Colonoscopy      02-14-17    Colonoscopy N/A 12/28/2020    Procedure: COLONOSCOPY;  Surgeon: Real Abreu MD;  Location: [facility] ENDOSCOPY    Electrocardiogram, complete  12/15/2013    scanned to media tab    Oral surgery      Other surgical history  2009    cyst removal, side of head    Other surgical history Left 10/24/2024    VATS upper lobe wedge resection by Dr. Maria    Other surgical history  10/2024    lung nodule removed      Family History   Problem Relation Age of Onset    Dementia Mother     Diabetes Paternal Grandmother     Cancer Sister         lung cancer      Social History     Socioeconomic History    Marital status:    Tobacco Use    Smoking status: Former     Current packs/day: 0.50     Average packs/day: 1 pack/day for 41.4 years (40.7 ttl pk-yrs)     Types: Cigarettes     Start date: 2024    Smokeless tobacco: Former     Quit date: 10/24/2024    Tobacco comments:     2/2022: less than 1 pack per day     10/2024: smokes 8-9 cigarettes a day   Vaping Use    Vaping status: Never Used    Substance and Sexual Activity    Alcohol use: Yes     Alcohol/week: 2.0 standard drinks of alcohol     Types: 2 Cans of beer per week     Comment: 2 beer per day    Drug use: No   Other Topics Concern    Caffeine Concern Yes     Comment: coffee-1 cup/day           REVIEW OF SYSTEMS:     Denies nause, fever, chills  No calf pain  Denies chest pain or SOB      EXAM:   There were no vitals taken for this visit.  GENERAL: well developed, well nourished, in no apparent distress  EXTREMITIES:   1. Integument: Normal skin temperature and turgor. Toenails x10 are elongated, thickened and discolored with subungal derbi. HPK to right plantar foot.     2. Vascular: Dorsalis pedis two out of four bilateral and posterior tibial pulses two out of   four bilateral, capillary refill normal.   3. Musculoskeletal: All muscle groups are graded 5 out of 5 in the foot and ankle.   4. Neurological: Normal sharp dull sensation; reflexes normal.      CT CHEST (CPT=71250)  Result Date: 5/7/2025  CONCLUSION:  1.  There is history of squamous cell carcinoma.  Post wedge resection from the left upper lobe, with chronic nodular soft tissue thickening in the region of wedge resection.  The nodular soft tissue thickening in this region has otherwise decreased in size/improved suggesting evolving scar and postprocedural change. 2.  Bronchitis and emphysema are redemonstrated.  7 mm right apical pulmonary nodule, stable or slightly smaller since previous exam.  There are 2 new nodular foci within the right lower lobe measuring 6 and 8 mm in diameter, which could represent parenchymal lesions or impacted airways.  Interval development of multiple wedge-shaped pulmonary opacities along the peripheral aspects of both lung bases, which could represent post treatment change, however other pneumonitis such as infection or drug reaction could potentially give this appearance. 3.  Coronary atherosclerosis.   Dictated by (CST): Louis Taylor MD on  5/07/2025 at 3:21 PM     Finalized by (CST): Louis Taylor MD on 5/07/2025 at 3:25 PM          CT ABDOMEN+PELVIS(CONTRAST ONLY)(CPT=74177)  Result Date: 4/29/2025  CONCLUSION:  1. Multifocal patchy ground-glass density opacities and nodules at the lung bases are new since recent prior January, 2025 chest CT.  Findings are most concerning for infection/pneumonia and a post treatment follow-up chest CT is suggested to ensure resolution. 2. No other acute intra-abdominal finding. 3. Colonic diverticulosis. 4. Probable small hepatic cysts. 5. Coronary and peripheral atherosclerosis. 6. Circumferential urinary bladder wall thickening, which may relate to incomplete distention or cystitis.  If there are referable symptoms, urinalysis correlation is requested. 7. Subacute to chronic-appearing left lateral 7th rib fracture, which was present on prior chest CT. 8. Lesser incidental findings as above.   elm-remote  Dictated by (CST): Duong Veronica MD on 4/29/2025 at 12:19 PM     Finalized by (CST): Duong Veronica MD on 4/29/2025 at 12:26 PM             ASSESSMENT AND PLAN:   Diagnoses and all orders for this visit:    Tailor's bunion of both feet    Onychomycosis    Pain due to onychomycosis of toenails of both feet    Bilateral foot pain    Foot callus            Plan:       -Patient examined, chart history reviewed.  -Discussed importance of proper pedal hygiene, regular foot checks, and tight glucose control.  -Sharply debrided nails x10 with a sterile nail nipper achieving a 20% reduction in thickness and length, without incident.   -pared HPK to right foot with sterile #15 blade to healthy skin  -Ambulate with supportive shoes and inserts and avoid walking barefoot.  -Educated patient on acute signs of infection advised patient to seek immediate medical attention if symptoms arise.    RTC in 3 months  The patient indicates understanding of these issues and agrees to the plan.        Sierra Encarnacion DPM

## 2025-05-13 RX ORDER — OMEPRAZOLE 40 MG/1
40 CAPSULE, DELAYED RELEASE ORAL DAILY
COMMUNITY

## 2025-05-13 NOTE — TELEPHONE ENCOUNTER
Not taking Meloxicam was prescribed but not started.  Instructed not to take at all any NSAIDS- agreed    States he has acid reflux, pcp instructed to stop Prilosec 40 mg due to interference of magnesium absorption; instructed to contact GI for other treatment options.    Standing order expires soon; 7/5/25  Agreed to do labs in one month    Continuing with fluid restriction to 48 ounces  Discussed diets potassium(level high), sodium (stable low)  Agreed to send my chart diet information for guidance.

## 2025-05-16 ENCOUNTER — OFFICE VISIT (OUTPATIENT)
Dept: NEPHROLOGY | Facility: CLINIC | Age: 70
End: 2025-05-16
Payer: MEDICARE

## 2025-05-16 VITALS
SYSTOLIC BLOOD PRESSURE: 115 MMHG | BODY MASS INDEX: 21 KG/M2 | WEIGHT: 123 LBS | HEIGHT: 64 IN | HEART RATE: 106 BPM | DIASTOLIC BLOOD PRESSURE: 76 MMHG

## 2025-05-16 DIAGNOSIS — E87.1 HYPONATREMIA: Primary | ICD-10-CM

## 2025-05-16 PROCEDURE — 99214 OFFICE O/P EST MOD 30 MIN: CPT | Performed by: INTERNAL MEDICINE

## 2025-05-16 NOTE — PROGRESS NOTES
05/16/25        Patient: Alejandro Hanna   YOB: 1955   Date of Visit: 5/16/2025       Dear  Dr. Ford, DO,      Thank you for referring Alejandro Hanna to my practice.  Please find my assessment and plan below.      As you know he is a 69-year-old male with a history of hypertension, bladder cancer followed by Dr. Shaffer, severe COPD, hypothyroidism, GERD and hypercholesterolemia who I now had the pleasure of seeing for follow-up of chronic hyponatremia.  Patient remains off of cigarettes.  He is trying to follow 48 ounce per day fluid restriction.  The patient though was recently hospitalized for pneumonia and then was subsequently readmitted on April 29, 2025 for generalized fatigue and weakness.  He is also been dealing with right lower extremity radiculopathy.  When the patient was admitted in April 29, 2025 he had a sodium of 129, potassium 3.4 creatinine 0.76 but magnesium is 0.6.  Patient received IV fluids and magnesium replacement.  Now here for follow-up.    Overall patient states he is feeling much better.  Appetite is good.  Strength is improving but he still is having problems ambulating significant distances.  Walking with a walker around the house.  Will be starting physical therapy.  He states intermittently he is having some watery stools but not on any daily basis.  He was advised in the hospital to stop omeprazole as it was thought that might be contributing to his hypomagnesemia.    On physical exam his blood pressure 115/76 with a pulse of 106 and he weighed 123 pounds.  His neck was supple without JVD.  Lungs were clear but with diminished breath sounds.  Heart revealed a regular rate and rhythm without gallops or murmurs.  Abdomen was soft, flat, nontender without organomegaly, masses or bruits.  Extremities revealed no edema.    I reviewed his follow-up labs done on May 12, 2025.  Creatinine crept up slightly to 1.21 with an estimated GFR of 64 cc/min.  Potassium  was 5.3 and sodium was 134.  Magnesium 1.7.  Hemoglobin 11.6.  Iron levels were normal.    I therefore reassured the patient and his wife that overall his electrolytes are much better.  Modest low potassium diet was recommended.  Continue a 48 ounce per day fluid restriction.  He states he is getting some breakthrough GERD symptoms since holding omeprazole.  Advised that he can try taking Pepcid but to let us know that is not controlling his symptoms adequately.  In 2 weeks we will repeat a renal panel, magnesium in a.m. cortisol level.  Will call with results and determine follow-up.    Thank you again for allowing me to participate in the care of your patient.  If you have any questions please feel free to call.           Sincerely,   Faisal Villar MD   [facility]  [address]  [place] [address]    Document electronically generated by:  Faisal Villar MD

## 2025-05-16 NOTE — PATIENT INSTRUCTIONS
Continue a 48 ounce per day fluid restriction.  Cut back on your potassium content in your diet.  Repeat your labs in 2 weeks.  Orders are in the computer.  Try Pepcid for your GERD symptoms.

## 2025-05-22 RX ORDER — ERGOCALCIFEROL 1.25 MG/1
50000 CAPSULE, LIQUID FILLED ORAL WEEKLY
Qty: 12 CAPSULE | Refills: 1 | Status: SHIPPED | OUTPATIENT
Start: 2025-05-22

## 2025-05-22 NOTE — TELEPHONE ENCOUNTER
Please review: medication fails/has no protocol attached.    No future appointments with primary care medicine.    High dose/weekly Vitamin D initiated by Hospitalist Dr. Yap 5/1/25. Only ordered for 4 weeks. Please advise if patient should continue weekly/high dose vitamin d, switch to OTC, or complete repeat lab work.     Vitamin D   Component  Ref Range & Units (hover) 5/1/25  9:11 AM   Vitamin D, 25OH, Total 22.0 Low

## 2025-05-30 ENCOUNTER — LAB ENCOUNTER (OUTPATIENT)
Dept: LAB | Age: 70
End: 2025-05-30
Attending: INTERNAL MEDICINE
Payer: MEDICARE

## 2025-05-30 DIAGNOSIS — E87.1 HYPONATREMIA: ICD-10-CM

## 2025-05-30 DIAGNOSIS — R29.898 WEAKNESS OF BOTH LEGS: ICD-10-CM

## 2025-05-30 LAB
ALBUMIN SERPL-MCNC: 5 G/DL (ref 3.2–4.8)
ANION GAP SERPL CALC-SCNC: 5 MMOL/L (ref 0–18)
BASOPHILS # BLD: 0.16 X10(3) UL (ref 0–0.2)
BASOPHILS NFR BLD: 1 %
BUN BLD-MCNC: 20 MG/DL (ref 9–23)
BUN/CREAT SERPL: 16.7 (ref 10–20)
CALCIUM BLD-MCNC: 10 MG/DL (ref 8.7–10.4)
CHLORIDE SERPL-SCNC: 100 MMOL/L (ref 98–112)
CO2 SERPL-SCNC: 29 MMOL/L (ref 21–32)
CORTIS SERPL-MCNC: 16.6 UG/DL
CREAT BLD-MCNC: 1.2 MG/DL (ref 0.7–1.3)
DEPRECATED RDW RBC AUTO: 43.7 FL (ref 35.1–46.3)
EGFRCR SERPLBLD CKD-EPI 2021: 65 ML/MIN/1.73M2 (ref 60–?)
EOSINOPHIL # BLD: 0.16 X10(3) UL (ref 0–0.7)
EOSINOPHIL NFR BLD: 1 %
ERYTHROCYTE [DISTWIDTH] IN BLOOD BY AUTOMATED COUNT: 13.5 % (ref 11–15)
GLUCOSE BLD-MCNC: 92 MG/DL (ref 70–99)
HCT VFR BLD AUTO: 36.4 % (ref 39–53)
HGB BLD-MCNC: 11.8 G/DL (ref 13–17.5)
LYMPHOCYTES NFR BLD: 0.95 X10(3) UL (ref 1–4)
LYMPHOCYTES NFR BLD: 6 %
MAGNESIUM SERPL-MCNC: 1.8 MG/DL (ref 1.6–2.6)
MCH RBC QN AUTO: 28.5 PG (ref 26–34)
MCHC RBC AUTO-ENTMCNC: 32.4 G/DL (ref 31–37)
MCV RBC AUTO: 87.9 FL (ref 80–100)
MONOCYTES # BLD: 1.42 X10(3) UL (ref 0.1–1)
MONOCYTES NFR BLD: 9 %
MYELOCYTES # BLD: 0.32 X10(3) UL (ref ?–0.01)
MYELOCYTES NFR BLD: 2 %
NEUTROPHILS # BLD AUTO: 12.41 X10 (3) UL (ref 1.5–7.7)
NEUTROPHILS NFR BLD: 75 %
NEUTS BAND NFR BLD: 5 %
NEUTS HYPERSEG # BLD: 12.64 X10(3) UL (ref 1.5–7.7)
OSMOLALITY SERPL CALC.SUM OF ELEC: 280 MOSM/KG (ref 275–295)
PHOSPHATE SERPL-MCNC: 3.2 MG/DL (ref 2.4–5.1)
PLATELET # BLD AUTO: 487 10(3)UL (ref 150–450)
PLATELET MORPHOLOGY: NORMAL
POTASSIUM SERPL-SCNC: 5.3 MMOL/L (ref 3.5–5.1)
PROMYELOCYTES # BLD: 0.16 X10(3) UL (ref ?–0.01)
PROMYELOCYTES NFR BLD: 1 %
RBC # BLD AUTO: 4.14 X10(6)UL (ref 3.8–5.8)
SODIUM SERPL-SCNC: 134 MMOL/L (ref 136–145)
TOTAL CELLS COUNTED BLD: 100
VIT B12 SERPL-MCNC: 196 PG/ML (ref 211–911)
WBC # BLD AUTO: 15.8 X10(3) UL (ref 4–11)

## 2025-05-30 PROCEDURE — 83735 ASSAY OF MAGNESIUM: CPT

## 2025-05-30 PROCEDURE — 82607 VITAMIN B-12: CPT

## 2025-05-30 PROCEDURE — 82533 TOTAL CORTISOL: CPT

## 2025-05-30 PROCEDURE — 85025 COMPLETE CBC W/AUTO DIFF WBC: CPT

## 2025-05-30 PROCEDURE — 85007 BL SMEAR W/DIFF WBC COUNT: CPT

## 2025-05-30 PROCEDURE — 36415 COLL VENOUS BLD VENIPUNCTURE: CPT

## 2025-05-30 PROCEDURE — 80069 RENAL FUNCTION PANEL: CPT

## 2025-05-30 PROCEDURE — 85027 COMPLETE CBC AUTOMATED: CPT

## 2025-05-31 ENCOUNTER — PATIENT MESSAGE (OUTPATIENT)
Dept: FAMILY MEDICINE CLINIC | Facility: CLINIC | Age: 70
End: 2025-05-31

## 2025-05-31 DIAGNOSIS — E53.8 VITAMIN B12 DEFICIENCY: Primary | ICD-10-CM

## 2025-05-31 RX ORDER — CYANOCOBALAMIN 1000 UG/ML
1000 INJECTION, SOLUTION INTRAMUSCULAR; SUBCUTANEOUS ONCE
Status: SHIPPED | OUTPATIENT
Start: 2025-05-31

## 2025-06-02 ENCOUNTER — MED REC SCAN ONLY (OUTPATIENT)
Dept: PHYSICAL MEDICINE AND REHAB | Facility: CLINIC | Age: 70
End: 2025-06-02

## 2025-06-02 NOTE — TELEPHONE ENCOUNTER
Patient called (identified name and ),   Wanted to discuss low vitamin B12 results, and why was it important to do the injections.  Advised deficiency can lead to fatigue, weakness, tingling in extremities.  Patient stated understanding and has scheduled his appointments.    Future Appointments   Date Time Provider Department Center   2025 11:00 AM JOVANI GUTIÉRREZ RN NASRIN [facility]   2025  1:00 PM Ian Mathews MD ECLMBPULM EC [place]   2025 11:30 AM Brando Reynoso DO PM&R ELM [place] [facility]   2025 10:40 AM [facility] [place] [facility]   2025 10:45 AM Leonid Ford DO NASRIN [facility]   2025  9:30 AM LMB CT RM1 LMB MOB. CT EM [place]   8/15/2025  8:30 AM Carlos Damian MD EL[facility]   8/15/2025  9:30 AM Sierra Encarnacion DPM [facility]  [facility]

## 2025-06-05 ENCOUNTER — NURSE ONLY (OUTPATIENT)
Dept: FAMILY MEDICINE CLINIC | Facility: CLINIC | Age: 70
End: 2025-06-05
Payer: MEDICARE

## 2025-06-05 DIAGNOSIS — E53.8 VITAMIN B12 DEFICIENCY: Primary | ICD-10-CM

## 2025-06-05 PROCEDURE — 96372 THER/PROPH/DIAG INJ SC/IM: CPT | Performed by: FAMILY MEDICINE

## 2025-06-05 RX ADMIN — CYANOCOBALAMIN 1000 MCG: 1000 INJECTION, SOLUTION INTRAMUSCULAR; SUBCUTANEOUS at 10:51:00

## 2025-06-05 NOTE — PROGRESS NOTES
Patient is here for her b12 injection. I verified full name, and reason for nurse visit. Patient tolerated injection well.

## 2025-06-06 ENCOUNTER — OFFICE VISIT (OUTPATIENT)
Dept: PULMONOLOGY | Facility: CLINIC | Age: 70
End: 2025-06-06
Payer: MEDICARE

## 2025-06-06 VITALS
DIASTOLIC BLOOD PRESSURE: 70 MMHG | HEART RATE: 81 BPM | OXYGEN SATURATION: 99 % | WEIGHT: 125 LBS | BODY MASS INDEX: 21.34 KG/M2 | SYSTOLIC BLOOD PRESSURE: 117 MMHG | HEIGHT: 64 IN

## 2025-06-06 DIAGNOSIS — C34.90 MALIGNANT NEOPLASM OF LUNG, UNSPECIFIED LATERALITY, UNSPECIFIED PART OF LUNG (HCC): ICD-10-CM

## 2025-06-06 DIAGNOSIS — J44.9 CHRONIC OBSTRUCTIVE PULMONARY DISEASE, UNSPECIFIED COPD TYPE (HCC): Primary | ICD-10-CM

## 2025-06-06 PROCEDURE — 99214 OFFICE O/P EST MOD 30 MIN: CPT | Performed by: INTERNAL MEDICINE

## 2025-06-06 NOTE — PROGRESS NOTES
Subjective:   Patient ID: Alejandro Hanna is a 69 year old male.    HPI  Doing very well overall  No active cough or sputum  Doing well with diet and exercise gained weight  No significant dyspnea with very mild dyspnea upon exertion  No fever or chills  No chest pain  No abdominal pain  No lower extremity edema   History/Other:   Review of Systems   Constitutional:  Negative for fatigue, fever and unexpected weight change.   HENT:  Negative for congestion.    Respiratory:  Negative for cough and shortness of breath.    Cardiovascular: Negative.    Gastrointestinal: Negative.    Skin: Negative.    Neurological: Negative.    Hematological: Negative.    Psychiatric/Behavioral: Negative.       Current Medications[1]  Allergies:Allergies[2]    Objective:   Physical Exam  Vitals and nursing note reviewed.   Constitutional:       General: He is not in acute distress.     Appearance: Normal appearance.   HENT:      Head: Normocephalic and atraumatic.      Nose: Nose normal.      Mouth/Throat:      Mouth: Mucous membranes are moist.   Eyes:      General: No scleral icterus.  Cardiovascular:      Rate and Rhythm: Normal rate.      Heart sounds: No murmur heard.     No gallop.   Pulmonary:      Effort: No respiratory distress.      Breath sounds: No stridor. No wheezing, rhonchi or rales.   Chest:      Chest wall: No tenderness.   Abdominal:      General: Abdomen is flat. Bowel sounds are normal. There is no distension.      Palpations: Abdomen is soft.      Tenderness: There is no guarding.   Musculoskeletal:      Right lower leg: No edema.      Left lower leg: No edema.   Lymphadenopathy:      Cervical: No cervical adenopathy.   Skin:     General: Skin is dry.   Neurological:      Mental Status: He is oriented to person, place, and time.             Chest ct 5/5/25 reviewed :  1.  There is history of squamous cell carcinoma.  Post wedge resection from the left upper lobe, with chronic nodular soft tissue thickening in  the region of wedge resection.  The nodular soft tissue thickening in this region has otherwise decreased in   size/improved suggesting evolving scar and postprocedural change.   2.  Bronchitis and emphysema are redemonstrated.  7 mm right apical pulmonary nodule, stable or slightly smaller since previous exam.  There are 2 new nodular foci within the right lower lobe measuring 6 and 8 mm in diameter, which could represent   parenchymal lesions or impacted airways.  Interval development of multiple wedge-shaped pulmonary opacities along the peripheral aspects of both lung bases, which could represent post treatment change, however other pneumonitis such as infection or drug   reaction could potentially give this appearance.   3.  Coronary atherosclerosis.     Assessment & Plan:   1. Chronic obstructive pulmonary disease, unspecified COPD type (HCC)    2. Malignant neoplasm of lung, unspecified laterality, unspecified part of lung (HCC)          1-lung cancer WOODY 1 cm mass   s/p VATS and wedge resection on 10/24/24   T2N0M0 (poorly differentiated squamous cell carcinoma )     Doing very well overall  Chest ct 5/5/25 stable   Continue f/u ct  Follow-up with oncology     2- Moderate COPD with FEV1 1.32 L 56 %   Stable and doing well on room air / good functional status   lifelong history of smoking , quit in October 2024  Continue with the Breo and albuterol     F/u in 6 months     Meds This Visit:  Requested Prescriptions      No prescriptions requested or ordered in this encounter       Imaging & Referrals:  None         [1]   Current Outpatient Medications   Medication Sig Dispense Refill    ergocalciferol 1.25 MG (60415 UT) Oral Cap Take 1 capsule (50,000 Units total) by mouth once a week. 12 capsule 1    Omeprazole 40 MG Oral Capsule Delayed Release Take 1 capsule (40 mg total) by mouth daily.      fluticasone propionate 50 MCG/ACT Nasal Suspension 2 sprays by Nasal route in the morning and 2 sprays before  bedtime. 48 mL 1    Magnesium 400 MG Oral Tab Take 400 mg by mouth daily. 90 tablet 1    albuterol (VENTOLIN HFA) 108 (90 Base) MCG/ACT Inhalation Aero Soln Inhale 2 puffs into the lungs in the morning and 2 puffs in the evening.      acetaminophen 500 MG Oral Tab Take 1-2 tablets (500-1,000 mg total) by mouth every 6 (six) hours as needed for Pain.      Fenofibrate 160 MG Oral Tab Take 1 tablet (160 mg total) by mouth daily. 90 tablet 3    ATORVASTATIN 10 MG Oral Tab TAKE 1 TABLET BY MOUTH EVERY DAY 90 tablet 3    levothyroxine (LEVOXYL) 88 MCG Oral Tab Take 1 tablet (88 mcg total) by mouth before breakfast. For underactive thyroid. 90 tablet 3    Olmesartan Medoxomil 40 MG Oral Tab Take 1 tablet (40 mg total) by mouth daily. For blood pressure 90 tablet 3    BREO ELLIPTA 200-25 MCG/ACT Inhalation Aerosol Powder, Breath Activated Inhale 1 puff into the lungs in the evening.      Dutasteride 0.5 MG Oral Cap Take 1 capsule (0.5 mg total) by mouth every morning.     [2] No Known Allergies

## 2025-06-16 ENCOUNTER — OFFICE VISIT (OUTPATIENT)
Dept: FAMILY MEDICINE CLINIC | Facility: CLINIC | Age: 70
End: 2025-06-16

## 2025-06-16 VITALS
SYSTOLIC BLOOD PRESSURE: 129 MMHG | DIASTOLIC BLOOD PRESSURE: 81 MMHG | WEIGHT: 122.81 LBS | HEART RATE: 83 BPM | BODY MASS INDEX: 20.97 KG/M2 | OXYGEN SATURATION: 95 % | HEIGHT: 64 IN | TEMPERATURE: 98 F

## 2025-06-16 DIAGNOSIS — R19.7 DIARRHEA, UNSPECIFIED TYPE: ICD-10-CM

## 2025-06-16 DIAGNOSIS — F41.9 ANXIETY: ICD-10-CM

## 2025-06-16 DIAGNOSIS — R09.82 POSTNASAL DRIP: Primary | ICD-10-CM

## 2025-06-16 DIAGNOSIS — M79.2 NERVE PAIN: ICD-10-CM

## 2025-06-16 PROCEDURE — 99213 OFFICE O/P EST LOW 20 MIN: CPT

## 2025-06-16 RX ORDER — LEVOCETIRIZINE DIHYDROCHLORIDE 5 MG/1
5 TABLET, FILM COATED ORAL EVERY EVENING
Qty: 30 TABLET | Refills: 0 | Status: SHIPPED | OUTPATIENT
Start: 2025-06-16

## 2025-06-16 NOTE — PROGRESS NOTES
Subjective:   Alejandor Hanna is a 69 year old male who presents for Diarrhea (On & off, past 2 weeks, no meds taken, )   Patient is a pleasant 69-year-old male past medical history significant for COPD, lung cancer, bladder cancer, atherosclerotic aorta, alcohol dependence, hypertension, hyperlipidemia, hypothyroidism, lumbar radiculopathy, hiatal hernia, and tobacco use disorder.  Patient presents office today new to this provider for evaluation of laryngitis and diarrhea.  Patient states he has had diarrhea on and off in the past couple weeks. He notes he saw [facility] in for leg weakness. He is supposed to be seeing physiatry soon. He believes the diarrhea is secondary to his anxiety.  He is very anxious about not being able to walk.  He is worried about caring for his wife he is unable to walk.  He recently saw orthopedics out of private office on Yesy 3, 2025 for reported leg pain with nerve pain down leg that is failed prior epidural steroid injections.  He was prescribed Lyrica twice daily to try.  Patient has not tried yet.  Patient was advised this medication can help with anxiety as well as his nerve pain.    Patient reports diarrhea not associated with fever or chills.  They are not associate with abdominal pain.  He just gets anxious needs to go to the bathroom.  Can happen as much as 4 times a day and not at all other days.  We discussed bland diet and bulking of stool.  We discussed adding fiber.  We discussed increased fluid intake for periods of diarrhea.    Patient also has issues with postnasal drip and congestion in back of throat.  Patient has been taking his Flonase as prescribed.  He noticed this is been worse in the last couple weeks with all the additional allergens in the air.  Patient went to try oral antihistamine.    He is very anxious about not being able to walk.     He saw Dr Lerma and was given lyrica has not taken yet         Past Medical History[1]   Past Surgical  History[2]     History/Other:    Chief Complaint Reviewed and Verified  Nursing Notes Reviewed and   Verified  Tobacco Reviewed  Allergies Reviewed  Medications Reviewed    Problem List Reviewed  Medical History Reviewed  Surgical History   Reviewed  Family History Reviewed  Social History Reviewed         Tobacco:  He smoked tobacco in the past but just quit smoking in the last 12 months.  Tobacco Use[3]       Current Medications[4]      Review of Systems:  Review of Systems   Constitutional:  Negative for chills and fever.   HENT:  Positive for postnasal drip, rhinorrhea and voice change.    Respiratory:  Negative for shortness of breath.    Cardiovascular:  Negative for chest pain.   Gastrointestinal:  Positive for diarrhea. Negative for constipation, nausea and vomiting.         Objective:   /81 (BP Location: Right arm, Patient Position: Sitting, Cuff Size: large)   Pulse 83   Temp 97.5 °F (36.4 °C) (Oral)   Ht 5' 4\" (1.626 m)   Wt 122 lb 12.8 oz (55.7 kg)   SpO2 95%   BMI 21.08 kg/m²  Estimated body mass index is 21.08 kg/m² as calculated from the following:    Height as of this encounter: 5' 4\" (1.626 m).    Weight as of this encounter: 122 lb 12.8 oz (55.7 kg).  Physical Exam  Vitals and nursing note reviewed.   Constitutional:       Appearance: Normal appearance.   HENT:      Head: Normocephalic and atraumatic.      Right Ear: Tympanic membrane, ear canal and external ear normal. There is no impacted cerumen.      Left Ear: Tympanic membrane, ear canal and external ear normal. There is no impacted cerumen.      Nose: Congestion and rhinorrhea present.      Mouth/Throat:      Mouth: Mucous membranes are moist.      Pharynx: Oropharynx is clear.      Comments: +pnd  Cardiovascular:      Rate and Rhythm: Normal rate and regular rhythm.      Pulses: Normal pulses.      Heart sounds: Normal heart sounds. No murmur heard.  Pulmonary:      Effort: Pulmonary effort is normal. No respiratory  distress.      Breath sounds: Normal breath sounds.   Abdominal:      General: Abdomen is flat. There is no distension.      Palpations: Abdomen is soft. There is no mass.      Tenderness: There is no abdominal tenderness.      Hernia: No hernia is present.   Skin:     Capillary Refill: Capillary refill takes less than 2 seconds.   Neurological:      Mental Status: He is alert and oriented to person, place, and time.           Assessment & Plan:   1. Postnasal drip (Primary)  -     Levocetirizine Dihydrochloride; Take 1 tablet (5 mg total) by mouth every evening.  Dispense: 30 tablet; Refill: 0  2. Anxiety  3. Nerve pain  4. Diarrhea, unspecified type    1. Postnasal drip  Patient with exam consistent for allergic rhinitis  Pale boggy turbinates, postnasal drip with cobblestoning, and congestion in back of throat.  Patient educated on allergic rhinitis.  Educated on the use of normal saline rinses to flush out allergens.  Start antihistamine once daily.  continue Flonase once daily bilateral nares.  If symptoms persist will consider steroid burst, addition of Singulair, or referral to allergy specialist.  Red flags reviewed  Follow-up as needed    2. Anxiety  Issues with anxiety secondary to decreased mobility   was provided prescription for Lyrica to be taken twice daily  Has not trialed.  Advised on Lyrica and recommendations.  Information provided  Start Lyrica  If continues to be an issue consider duloxetine for anxiety/nerve pain    3. Nerve pain  Continue trial Lyrica  Follow-up physiatry as planned  Consider SNRI if needed    4. Diarrhea, unspecified type  Noninfectious in nature  No fever no chills no abdominal pain  Worse in a.m. when waking up  Associated with anxiety  [place] diet  Increase fiber  Increase water intake    Patient aware of plan of care. All questions answered to satisfaction of the patient. Patient instructed to call office or reach out via [employer] if any issues arise. For urgent issues  and/or reviewed red flags please proceed to the urgent care or ER.  Also, inform the nurse practitioner with any new symptoms or medication side effects.        Return if symptoms worsen or fail to improve.    Martell Gale, MEENAKSHI, 6/16/2025, 1:33 PM          [1]   Past Medical History:   ALCOHOL USE    Allergic rhinitis    Flonase    Arthritis    Bladder cancer (HCC)    f/u [facility]    BPH (benign prostatic hyperplasia)    COPD (HCC)    moderate-severe     Disorder of kidney and ureter    Disorder of thyroid    hypothyroid    ED (erectile dysfunction)    Esophageal reflux    Essential hypertension    Hearing impairment    Hepatomegaly    Hiatal hernia    High blood pressure    High cholesterol    Hypothyroidism    Injury    Left knee injury, from fall    Osteoarthritis    in knee's, hip's    Pulmonary nodules    first noted 2016on LD screen CT    Tobacco abuse    Visual impairment    glasses   [2]   Past Surgical History:  Procedure Laterality Date    Colonoscopy      02-14-17    Colonoscopy N/A 12/28/2020    Procedure: COLONOSCOPY;  Surgeon: Real Abreu MD;  [facility] ENDOSCOPY    Electrocardiogram, complete  12/15/2013    scanned to media tab    Oral surgery      Other surgical history  2009    cyst removal, side of head    Other surgical history Left 10/24/2024    VATS upper lobe wedge resection by Dr. Maria    Other surgical history  10/2024    lung nodule removed   [3]   Social History  Tobacco Use   Smoking Status Former    Current packs/day: 0.50    Average packs/day: 1 pack/day for 41.5 years (40.7 ttl pk-yrs)    Types: Cigarettes    Start date: 2024    Passive exposure: Never   Smokeless Tobacco Former    Quit date: 10/24/2024   Tobacco Comments    2/2022: less than 1 pack per day    10/2024: smokes 8-9 cigarettes a day   [4]   Current Outpatient Medications   Medication Sig Dispense Refill    levocetirizine 5 MG Oral Tab Take 1 tablet (5 mg total) by mouth every evening. 30 tablet 0     ergocalciferol 1.25 MG (78455 UT) Oral Cap Take 1 capsule (50,000 Units total) by mouth once a week. 12 capsule 1    fluticasone propionate 50 MCG/ACT Nasal Suspension 2 sprays by Nasal route in the morning and 2 sprays before bedtime. 48 mL 1    Magnesium 400 MG Oral Tab Take 400 mg by mouth daily. 90 tablet 1    albuterol (VENTOLIN HFA) 108 (90 Base) MCG/ACT Inhalation Aero Soln Inhale 2 puffs into the lungs in the morning and 2 puffs in the evening.      acetaminophen 500 MG Oral Tab Take 1-2 tablets (500-1,000 mg total) by mouth every 6 (six) hours as needed for Pain.      Fenofibrate 160 MG Oral Tab Take 1 tablet (160 mg total) by mouth daily. 90 tablet 3    ATORVASTATIN 10 MG Oral Tab TAKE 1 TABLET BY MOUTH EVERY DAY 90 tablet 3    levothyroxine (LEVOXYL) 88 MCG Oral Tab Take 1 tablet (88 mcg total) by mouth before breakfast. For underactive thyroid. 90 tablet 3    Olmesartan Medoxomil 40 MG Oral Tab Take 1 tablet (40 mg total) by mouth daily. For blood pressure 90 tablet 3    BREO ELLIPTA 200-25 MCG/ACT Inhalation Aerosol Powder, Breath Activated Inhale 1 puff into the lungs in the evening.      Dutasteride 0.5 MG Oral Cap Take 1 capsule (0.5 mg total) by mouth every morning.      Omeprazole 40 MG Oral Capsule Delayed Release Take 1 capsule (40 mg total) by mouth daily. (Patient not taking: Reported on 6/16/2025)

## 2025-06-17 ENCOUNTER — TELEPHONE (OUTPATIENT)
Dept: PHYSICAL MEDICINE AND REHAB | Facility: CLINIC | Age: 70
End: 2025-06-17

## 2025-06-17 ENCOUNTER — OFFICE VISIT (OUTPATIENT)
Dept: PHYSICAL MEDICINE AND REHAB | Facility: CLINIC | Age: 70
End: 2025-06-17
Payer: MEDICARE

## 2025-06-17 VITALS — HEIGHT: 64 IN | WEIGHT: 125 LBS | BODY MASS INDEX: 21.34 KG/M2

## 2025-06-17 DIAGNOSIS — M54.16 LUMBAR RADICULOPATHY: Primary | ICD-10-CM

## 2025-06-17 PROCEDURE — 99214 OFFICE O/P EST MOD 30 MIN: CPT | Performed by: PHYSICAL MEDICINE & REHABILITATION

## 2025-06-17 RX ORDER — PREGABALIN 75 MG/1
75 CAPSULE ORAL 2 TIMES DAILY
COMMUNITY

## 2025-06-17 NOTE — TELEPHONE ENCOUNTER
Per CMS Guidelines -no authorization is required for Right L5 and S1 TFESI with fluoroscopic guidance, local anesthesia.   CPT codes: 83507, 03728      Status: Authorization is not required based on medical necessity however is not a guarantee of payment and may be subject to review once claim is submitted.  Per Dr. Reynoso: To be performed on or after 7/15/25.

## 2025-06-17 NOTE — TELEPHONE ENCOUNTER
Patient has been scheduled for Right L5 and S1 Transforaminal Epidural Steroid Injection on 7/22/2025 at the [facility] with Dr. Reynoso.   Anesthesia type: Local  Please note: The [facility] will call the business day prior to discuss the exact time/arrival and additional instructions for your appointment.  Patient was advised that if he/she does receive the covid vaccine it needs to be at least 2 weeks before or after the injection.  Medications and allergies reviewed.  Educated to hold NSAIDS (Aleve, Ibuprofen, Motrin, Advil) and anti-inflammatories (Meloxicam, Naproxen, Diclofenac, Celebrex) and for cervical injections must hold Multi-Vitamins, Vitamin E, Fish Oil/Omega-3.  Patient informed of [facility]'s  policy:  The patient will require transportation arrangements to and from the procedure, with the  present on site for the entire visit.  Without a , the appointment is subject to cancellation.    [facility] is located in the [facility] [address] [place] [address].   may park in the yellow/purple parking lot.  Patient verbalized understanding and agrees with plan.  Scheduled in Epic: Yes  Scheduled in Surgical Case: Yes  Follow up appointment made: NOV: 7/21/2025 Brando Reynoso DO  Authorization date valid until N/A at N/A.

## 2025-06-17 NOTE — PROGRESS NOTES
RETURN PATIENT VISIT    CHIEF COMPLAINT  Greater trochanter pain syndrome  Lumbar radiculopathy.    INTERVAL HISTORY  Alejandro Hanna is a 69 year old who was last seen in clinic on 5/5/2025, at that visit he was following up on transforaminal epidural steroid injections performed on 4/15/2025.  These provided with significant improvement.  He is currently physical therapy which has been helpful.  He continues to have some right leg pain cramping.  Continues with his vitamin supplements.  Currently using Lyrica for pain which helps.  Current level of pain is rated 8/10.  History of Present Illness  Alejandro Hanna is a 69 year old male who presents with chronic leg pain management.    He experiences persistent pain radiating down his leg, which initially improved but has since worsened. A cortisone injection in his knees helped with knee pain but not with the leg pain. Lyrica, started last night, alleviates the leg pain but causes side effects similar to intoxication. He plans to continue taking it at night. Previous MRI shows pain in the L5 and S1 distribution. Prior epidural injections provided initial improvement, but the effect diminished over time. He experiences leg cramps when riding a bike, attributed to nerve issues from his back. He is concerned about potential surgery and prefers to avoid it.      REVIEW OF SYSTEMS  Review of systems was completed with the patient today as pertinent to today's visit    PHYSICAL EXAMINATION  CONSTITUTIONAL: Well-appearing, in no apparent distress  EYES: No scleral icterus or conjunctival hemorrhage  CARDIOVASCULAR: Skin warm and well-perfused, no peripheral edema  RESPIRATORY: Breathing unlabored without accessory muscle use  PSYCHIATRIC: Alert, cooperative, appropriate mood and affect  SKIN: No lesions or rashes on exposed skin  MUSCULOSKELETAL:   Physical Exam  Positive neural tension testing, stable strength from previous visits.  Slightly antalgic  gait      REVIEW OF PRIOR X-RAYS/STUDIES  No new imaging to review  IMPRESSION/DIAGNOSIS  1.   Encounter Diagnosis   Name Primary?    Lumbar radiculopathy Yes         TREATMENT/PLAN  Assessment & Plan  Lumbar radiculopathy  Chronic lumbar radiculopathy in L5 distribution. Previous epidural injection effective but symptoms returned. Lyrica reduced leg pain despite initial side effects. Prefers non-surgical management, hesitant about surgery.  - Order EMG for L5 nerve involvement.  - Schedule epidural injection in one month.  - Advise Lyrica at night to reduce side effects.  - Encourage consultation with Dr. Real Mckenzie for surgical opinion.    Knee osteoarthritis  Improved with previous cortisone injection.      Education was provided regarding the above impression/diagnosis and treatment options/plan were discussed.  All questions were answered during today's visit.  Patient will contact clinic if any other questions or concerns.          Brando Reynoso,   Interventional Spine and Sports Medicine Specialist   Physical Medicine and Rehabilitation  [address] [address]    [address] [place] [address]

## 2025-07-02 ENCOUNTER — OFFICE VISIT (OUTPATIENT)
Dept: NEUROLOGY | Facility: CLINIC | Age: 70
End: 2025-07-02
Payer: MEDICARE

## 2025-07-02 VITALS
WEIGHT: 125 LBS | BODY MASS INDEX: 21.34 KG/M2 | HEART RATE: 75 BPM | SYSTOLIC BLOOD PRESSURE: 123 MMHG | HEIGHT: 64 IN | DIASTOLIC BLOOD PRESSURE: 78 MMHG

## 2025-07-02 DIAGNOSIS — M79.604 RIGHT LEG PAIN: Primary | ICD-10-CM

## 2025-07-02 DIAGNOSIS — R20.8 DECREASED PERIPHERAL VIBRATORY SENSE: ICD-10-CM

## 2025-07-02 DIAGNOSIS — R29.2 HYPERREFLEXIA: ICD-10-CM

## 2025-07-02 DIAGNOSIS — E53.8 B12 DEFICIENCY: ICD-10-CM

## 2025-07-02 PROCEDURE — 99214 OFFICE O/P EST MOD 30 MIN: CPT | Performed by: OTHER

## 2025-07-02 NOTE — PROGRESS NOTES
[address], [address]  [facility] [address]  [phone]          [facility]  NEW PATIENT EVALUATION  Reason for Admission/Consultation:  RIGHT lower lateral leg pain while walking since jan 2025; improving w/ PT and LESI    Requested by: [address] [facility] [address] / [facility] [id number]  PCP: Leonid Ford DO  Chief Complaint: Neurologic Problem (LOV 08/23/23 pt presents with balance problems that affects him walking a long distance. Pt has  Right side leg cramping and pain later lower leg that goes proximal to his thigh. Pt has a bulging disc L5 and has completed a epi dural in the last couple of months. (Patient has consented to cedricidge))         The following individual(s) verbally consented to be recorded using ambient AI listening technology and understand that they can each withdraw their consent to this listening technology at any point by asking the clinician to turn off or pause the recording:    Patient name: Alejandro Hanna  Additional names:   Shawna Hanna (wife)      HPI:  Alejandro Hanna is a 69 year old RH man w/ a pmhx of  prior hx of smoking,  SCC s/p left  lung resection,  COPD, protein calorie malnutrition (2023), HTN, HLD,  hypothyroidism, hx of alcohol dependence, hepatomegaly, L1-L2 and L4-L5 disk bulge resulting in central canal and neural foraminal narrowing, more advanced at L1-L2 , B12 deficiency    who presents for  right lateral lower leg pain w/ walking.       History of Present Illness  As per AXEL salazaribnatasha Watson   Alejandro Hanna is a 69 year old male with a history of bulging discs who presents with right leg pain and weakness. He is accompanied by his wife. He was referred by Dr. Ford for evaluation of his back and leg issues.    He has been experiencing right leg pain and weakness since December 2024, primarily affecting the right lateral leg. The pain has been improving with the use of Lyrica, which he  takes once at night to avoid daytime dizziness. He also attends physical therapy, which he finds beneficial. During therapy, he experiences cramping in the top of his thigh and back of his calf, which is alleviated by massage and rolling techniques.    He has a history of two bulging discs and has previously received an epidural injection, which provided some relief. He has been consulting with multiple doctors, including Dr. Reynoso and Dr. Noble. No numbness, tingling, or burning pain in his feet, and no numbness in his hands. He does not experience low back pain, groin pain, or buttock pain radiating down the leg. He mentions a past issue with his right Achilles but currently has no significant pain there.    His past medical history includes high blood pressure, managed with Hemocyte, and high cholesterol, managed with fenofibrate. He also takes levothyroxine for thyroid issues. He has a history of small cell lung cancer, for which he underwent surgery on the left lung, and bladder cancer, which was treated by Dr. Gou. He quit smoking nine months ago following lung surgery. He reports a history of low sodium levels, which required hospitalization and follow-up with Dr. Hardy, and a history of low B12 levels, for which he received a B12 shot.          Review and summation of prior records      ROS:  Pertinent positive and negatives per HPI.  All others were reviewed and negative.    Past Medical History[1]    Medications - Current[2]    Allergies[3]    Past Surgical History[4]=    Family History[5]    Social history:  History   Smoking Status    Former    Types: Cigarettes   Smokeless Tobacco    Former    Quit date: 10/24/2024     History   Alcohol Use    2.0 standard drinks of alcohol/week     Comment: 2 beer per day     History   Drug Use No       Family History[6]    Objective:  Vitals  Blood pressure 123/78, pulse 75, height 64\", weight 125 lb (56.7 kg).  /78   Pulse 75   Ht 64\"   Wt 125 lb  (56.7 kg)   BMI 21.46 kg/m²   Body mass index is 21.46 kg/m².  There were no vitals filed for this visit.     Exam:  - General: appears stated age and no distress     - Pulmonary:  No signs of respiratory distress.    Neurologic Exam  - Mental Status: Alert and attentive.  Able to provide hx.  Speech is spontaneous, fluent, and prosodic. Comprehension and repetition intact. Phrase length and rate are normal. No paraphasic errors, neologisms, anomia, acalculia, apraxia, anosognosia, or R/L confusion.   - Cranial Nerves: No gaze preference. Visual fields:normal.  Pupils are equally round and reactive to light  in a well lit room. EOMI. No nystagmus. No ptosis. V1 to V3 intact to LT and temperature in all 3 branches. No pathological facial asymmetry. No flattening of the nasolabial fold. .  Hearing grossly intact.  Tongue midline. No atrophy or fasiculations of the tongue noted. Palate and uvula elevate symmetrically.  Shoulder shrug symmetric.     - Motor:  normal tone,  diffusely bulk seen in pts w/ COPD.  Decreased bulk in R  vastus medialis oblique.   Decreased muscle bulk in right leg compared to left. No interosseous wasting. No flattening of hypothenar eminences.       Right Left     Motor Strength   Deltoids 5 5  Triceps 5 5  Biceps 5 5  Wrist Extensors 5 5   5 5   Hip Flexors 4+ 5   Knee extensors 5 5  Knee flexors 5 5  Plantar flexion 5 5  Dorsiflexion 5 5      Pronator drift: No pronator drift   Arm Rolling: No orbiting.   Finger Taps: Finger taps are symmetric in rate and amplitude.    Rapid movements: Rapid/fine movements are symmetric. As expected their dominant hand is slightly faster.   Foot Taps: Foot taps are symmetric.      Asterixis: No asterixis noted.   Tremor:  postural tremor      Reflexes:    C5 C6 C7  L4 S1   R 2+ 2+ 2+ 2+ 2+   L 2+ 2+ 2+ 0 2+   Adductor Spread: Cross adductors    Frontal release signs:Not assessed.    Jaw Jerk:    Shanna's sign:absent   Nonsustained clonus: Absent    Sustained clonus: Absent   - Sensory:   Light touch: normal  Temperature:normal  Pinprick: normal  Vibration: normal     - Cerebellum: No truncal ataxia. No titubations. No dysmetria, no dysdiadochokinesis. No overshoot.   - Gait/station: Normal gait and station. Symmetric arm swing. Uses a cane for long distances.          Physical Exam  As per AXEL Watson   VITALS: BP- 123/78  CRANIAL NERVES: Extraocular movements intact. Cranial nerves VII and XII intact.  MOTOR: Strength 5/5 in ankle plantar flexion and dorsiflexion bilaterally. Right hip flexor strength 4+/5. Strength 5/5 in knee extension and flexion bilaterally. Hip flexor weakness noted. Decreased muscle bulk in right leg compared to left. Normal muscle tone, no rigidity.  REFLEXES: Absent left L4 reflex, right adductor spread present. 2+ ankle reflexes bilaterally. Hyperreflexia noted.  SENSATION: Sensation intact to pinprick and cold bilaterally.  COORDINATION: Finger-to-nose test normal. Tremor present.  GAIT: Decreased left arm swing noted during gait.      Data reviewed    Test results/Imaging:   Lab Results   Component Value Date    TSH 0.928 04/30/2025     Lab Results   Component Value Date    HDL 66 (H) 02/06/2025    LDL 93 02/06/2025    TRIG 77 02/06/2025     Lab Results   Component Value Date    HGB 11.8 (L) 05/30/2025    HCT 36.4 (L) 05/30/2025    MCV 87.9 05/30/2025    WBC 15.8 (H) 05/30/2025    .0 (H) 05/30/2025      Lab Results   Component Value Date    BUN 20 05/30/2025    CA 10.0 05/30/2025    ALT 13 04/29/2025    AST 20 04/29/2025    ALKPHOS 68 03/08/2016    ALB 5.0 (H) 05/30/2025     (L) 05/30/2025    K 5.3 (H) 05/30/2025     05/30/2025    CO2 29.0 05/30/2025      I have reviewed labs.    Performed an independent visualization of:  mri l spine x2 from April 2025  Imaging revealed:   agree w/ read   Results  LABS  Cholesterol: 157  B12: 196    RADIOLOGY  MRI Lumbar Spine: Disc disease and osteoarthritis at L1, L2,  L4, L5. Central canal and neuroforaminal narrowing, more advanced at L1, L2. (04/03/2025)  MRI Lumbar Spine: Bulging discs at L4, L5, S1. Foraminal narrowing on the right side at L5, S1. (04/19/2025)             Alejandro Hanna is a 69 year old RH man w/ a pmhx of  prior hx of smoking,  SCC s/p left  lung resection,  COPD, protein calorie malnutrition (2023), HTN, HLD,  hypothyroidism, hx of alcohol dependence, hepatomegaly, L1-L2 and L4-L5 disk bulge resulting in central canal and neural foraminal narrowing, more advanced at L1-L2 , B12 deficiency    who presents for  right lateral lower leg pain w/ walking. Exam notable for right hip flexor weakness (4+/5) with preserved strength elsewhere in the legs, decreased muscle bulk in the right leg compared to the left, and hyperreflexia with absent left L4 reflex and present right adductor spread. Sensation is intact and coordination is preserved though tremor is noted. Hyperreflexia raises concern for possible cervical cord pathology, though patient is otherwise asymptomatic from a cervical standpoint. Will obtain MRI c-spine to evaluate. He is already under excellent care with Dr. Reynoso. I agree with plan for PT, EMG, and neurosurgery evaluation.    Assessment & Plan      Bulging lumbar discs with radiculopathy  Chronic bulging lumbar discs with radiculopathy, primarily affecting the right lateral leg. Symptoms include cramping in the thigh and calf during physical therapy, and dizziness from Lyrica. MRI shows disc bulges at L1, L2, L4, L5 with central canal and neuroforaminal narrowing, more advanced at L1, L2. No current indication for surgery. He prefers to avoid surgery and is seeking multiple opinions.  - Schedule EMG to assess nerve function  - Continue physical therapy  - Consider epidural injection based on EMG results  - Follow up with Dr. Mckenzie for further evaluation    Vitamin B12 deficiency  Vitamin B12 deficiency with a level of 196 pg/mL. Symptoms  may include neuropathy. B12 shots have been administered.  - Prescribe B12 oral supplementation daily  - Recheck B12 levels during next blood draw  - Check folate and thiamine levels during next blood draw    COPD  COPD is managed with current treatment regimen. No acute exacerbations reported.    Hypertension  Hypertension is well-controlled with current medication regimen. Blood pressure is 123/78 mmHg.    Hyperlipidemia  Hyperlipidemia is managed with fenofibrate. Current cholesterol level is 157 mg/dL.    Bladder cancer   No current issues reported.    Small cell lung cancer  Small cell lung cancer with left lung resection. No current respiratory issues reported. Reports a change in voice post-surgery.      1. Right leg pain  - Folic Acid Serum [E]; Future  - Vitamin B1 (Thiamine), Blood [E]; Future    2. Hyperreflexia  - MRI SPINE CERVICAL (CPT=72141); Future    3. B12 deficiency    4. Decreased peripheral vibratory sense  - Immunofixation (MARY ANN); Future  - Vitamin E, Serum; Future  - CK (Creatine Kinase) (Not Creatinine); Future        No orders of the defined types were placed in this encounter.          Education/Instructions given to: patient   Barriers to Learning:None  Content: Refer to note above. Evaluation/Outcome: Verbalized understanding    This document is not intended to support charting by exception.  Sections left blank in a completed note should be presumed not to have been done.    Education and counseling provided to patient. Instructed patient to call my office or seek medical attention immediately if symptoms worsen.  All questions were answered. All side effects of drugs were discussed.          Return to clinic in: Return in about 2 months (around 9/2/2025).    Bebeto Macdonald DO  Staff Vascular & General Neurology   07/02/25  11:26 AM         [1]   Past Medical History:   ALCOHOL USE    Allergic rhinitis    Flonase    Arthritis    Bladder cancer (HCC)    f/u [facility]    BPH (benign prostatic  hyperplasia)    COPD (HCC)    moderate-severe     Disorder of kidney and ureter    Disorder of thyroid    hypothyroid    ED (erectile dysfunction)    Esophageal reflux    Essential hypertension    Hearing impairment    Hepatomegaly    Hiatal hernia    High blood pressure    High cholesterol    Hypothyroidism    Injury    Left knee injury, from fall    Osteoarthritis    in knee's, hip's    Pulmonary nodules    first noted 2016on LD screen CT    Tobacco abuse    Visual impairment    glasses   [2]   Current Outpatient Medications:     pregabalin 75 MG Oral Cap, Take 1 capsule (75 mg total) by mouth 2 (two) times daily., Disp: , Rfl:     levocetirizine 5 MG Oral Tab, Take 1 tablet (5 mg total) by mouth every evening., Disp: 30 tablet, Rfl: 0    ergocalciferol 1.25 MG (53583 UT) Oral Cap, Take 1 capsule (50,000 Units total) by mouth once a week., Disp: 12 capsule, Rfl: 1    fluticasone propionate 50 MCG/ACT Nasal Suspension, 2 sprays by Nasal route in the morning and 2 sprays before bedtime., Disp: 48 mL, Rfl: 1    Magnesium 400 MG Oral Tab, Take 400 mg by mouth daily., Disp: 90 tablet, Rfl: 1    Fenofibrate 160 MG Oral Tab, Take 1 tablet (160 mg total) by mouth daily., Disp: 90 tablet, Rfl: 3    ATORVASTATIN 10 MG Oral Tab, TAKE 1 TABLET BY MOUTH EVERY DAY, Disp: 90 tablet, Rfl: 3    levothyroxine (LEVOXYL) 88 MCG Oral Tab, Take 1 tablet (88 mcg total) by mouth before breakfast. For underactive thyroid., Disp: 90 tablet, Rfl: 3    Olmesartan Medoxomil 40 MG Oral Tab, Take 1 tablet (40 mg total) by mouth daily. For blood pressure, Disp: 90 tablet, Rfl: 3    BREO ELLIPTA 200-25 MCG/ACT Inhalation Aerosol Powder, Breath Activated, Inhale 1 puff into the lungs in the evening., Disp: , Rfl:     Dutasteride 0.5 MG Oral Cap, Take 1 capsule (0.5 mg total) by mouth every morning., Disp: , Rfl:     Omeprazole 40 MG Oral Capsule Delayed Release, Take 1 capsule (40 mg total) by mouth daily. (Patient not taking: Reported on  6/16/2025), Disp: , Rfl:     albuterol (VENTOLIN HFA) 108 (90 Base) MCG/ACT Inhalation Aero Soln, Inhale 2 puffs into the lungs in the morning and 2 puffs in the evening., Disp: , Rfl:     acetaminophen 500 MG Oral Tab, Take 1-2 tablets (500-1,000 mg total) by mouth every 6 (six) hours as needed for Pain., Disp: , Rfl:   [3] No Known Allergies  [4]   Past Surgical History:  Procedure Laterality Date    Colonoscopy      02-14-17    Colonoscopy N/A 12/28/2020    Procedure: COLONOSCOPY;  Surgeon: Real Abreu MD;  Location: [facility] ENDOSCOPY    Electrocardiogram, complete  12/15/2013    scanned to media tab    Oral surgery      Other surgical history  2009    cyst removal, side of head    Other surgical history Left 10/24/2024    VATS upper lobe wedge resection by Dr. Maria    Other surgical history  10/2024    lung nodule removed   [5]   Family History  Problem Relation Age of Onset    Dementia Mother     Diabetes Paternal Grandmother     Cancer Sister         lung cancer   [6]   Family History  Problem Relation Age of Onset    Dementia Mother     Diabetes Paternal Grandmother     Cancer Sister         lung cancer

## 2025-07-02 NOTE — PROGRESS NOTES
The following individual(s) verbally consented to be recorded using ambient AI listening technology and understand that they can each withdraw their consent to this listening technology at any point by asking the clinician to turn off or pause the recording:    Patient name: Alejandro Abrams Cyndi  Additional names:  Shawna Hanna

## 2025-07-07 ENCOUNTER — OFFICE VISIT (OUTPATIENT)
Dept: FAMILY MEDICINE CLINIC | Facility: CLINIC | Age: 70
End: 2025-07-07
Payer: MEDICARE

## 2025-07-07 ENCOUNTER — MED REC SCAN ONLY (OUTPATIENT)
Dept: PHYSICAL MEDICINE AND REHAB | Facility: CLINIC | Age: 70
End: 2025-07-07

## 2025-07-07 VITALS
SYSTOLIC BLOOD PRESSURE: 122 MMHG | TEMPERATURE: 97 F | BODY MASS INDEX: 21.45 KG/M2 | WEIGHT: 125.63 LBS | DIASTOLIC BLOOD PRESSURE: 77 MMHG | HEIGHT: 64 IN | HEART RATE: 94 BPM

## 2025-07-07 DIAGNOSIS — M79.2 NERVE PAIN: ICD-10-CM

## 2025-07-07 DIAGNOSIS — M48.061 FORAMINAL STENOSIS OF LUMBAR REGION: ICD-10-CM

## 2025-07-07 DIAGNOSIS — E53.8 VITAMIN B12 DEFICIENCY: ICD-10-CM

## 2025-07-07 DIAGNOSIS — R29.898 WEAKNESS OF BOTH LEGS: ICD-10-CM

## 2025-07-07 DIAGNOSIS — E83.42 HYPOMAGNESEMIA: Primary | ICD-10-CM

## 2025-07-07 PROCEDURE — G2211 COMPLEX E/M VISIT ADD ON: HCPCS | Performed by: FAMILY MEDICINE

## 2025-07-07 PROCEDURE — 99214 OFFICE O/P EST MOD 30 MIN: CPT | Performed by: FAMILY MEDICINE

## 2025-07-07 NOTE — PROGRESS NOTES
Patient ID: Alejandro Hanna is a 69 year old male.         The following individual(s) verbally consented to be recorded using ambient AI listening technology and understand that they can each withdraw their consent to this listening technology at any point by asking the clinician to turn off or pause the recording:    Patient name: Alejandro Hanna  Additional names:           HPI  Chief Complaint   Patient presents with    Follow - Up     Hypomagnesemia        History of Present Illness  Alejandro Hanna is a 69 year old male who presents for follow-up on magnesium levels and leg pain management.    He has been taking magnesium daily as prescribed but was unable to complete a scheduled blood test on Saturday due to experiencing diarrhea. His last magnesium level, checked by his nephrologist on May 30th, was 1.8 mg/dL.    He has a history of bulging discs in his back, resulting in significant right leg pain, which has previously necessitated the use of a wheelchair due to difficulty walking. He is currently finishing therapy for his back and is inquiring about therapy for his legs.  He is already seen the neurologist and will be following up with a neurosurgeon and then has an appointment with the physiatrist as well.    He is experiencing weakness in his legs and nerve pain, for which he has been prescribed vitamin B12 daily. He is scheduled for an EMG on July 20th to further evaluate his condition.    He has seen multiple specialists, including a neurologist on July 3rd, and is scheduled to see a neurosurgeon on July 9th. He is also under the care of a physiatrist and a pain management specialist.    Wt Readings from Last 6 Encounters:   07/07/25 125 lb 9.6 oz (57 kg)   07/02/25 125 lb (56.7 kg)   06/17/25 125 lb (56.7 kg)   06/16/25 122 lb 12.8 oz (55.7 kg)   06/06/25 125 lb (56.7 kg)   05/16/25 123 lb (55.8 kg)       BMI Readings from Last 6 Encounters:   07/07/25 21.56 kg/m²   07/02/25 21.46  kg/m²   06/17/25 21.46 kg/m²   06/16/25 21.08 kg/m²   06/06/25 21.46 kg/m²   05/16/25 21.11 kg/m²       BP Readings from Last 6 Encounters:   07/07/25 122/77   07/02/25 123/78   06/16/25 129/81   06/06/25 117/70   05/16/25 115/76   05/12/25 137/76       Results  LABS  Magnesium: 1.8 mg/dL (05/30/2025)    Review of Systems  No exertional cardiac chest pain or shortness of breath unless stated in HPI which would take precedence.  See HPI for further review of systems.        Medical History:      Past Medical History:    ALCOHOL USE    Allergic rhinitis    Flonase    Arthritis    Bladder cancer (HCC)    f/u [facility]    BPH (benign prostatic hyperplasia)    COPD (HCC)    moderate-severe     Disorder of kidney and ureter    Disorder of thyroid    hypothyroid    ED (erectile dysfunction)    Esophageal reflux    Essential hypertension    Hearing impairment    Hepatomegaly    Hiatal hernia    High blood pressure    High cholesterol    Hypothyroidism    Injury    Left knee injury, from fall    Osteoarthritis    in knee's, hip's    Pulmonary nodules    first noted 2016on LD screen CT    Tobacco abuse    Visual impairment    glasses       Past Surgical History:   Procedure Laterality Date    Colonoscopy      02-14-17    Colonoscopy N/A 12/28/2020    Procedure: COLONOSCOPY;  Surgeon: Real Abreu MD;  Location: [facility] ENDOSCOPY    Electrocardiogram, complete  12/15/2013    scanned to media tab    Oral surgery      Other surgical history  2009    cyst removal, side of head    Other surgical history Left 10/24/2024    VATS upper lobe wedge resection by Dr. Maria    Other surgical history  10/2024    lung nodule removed          Current Outpatient Medications   Medication Sig Dispense Refill    Cyanocobalamin (B-12) 1000 MCG Oral Cap Take 1 capsule by mouth daily. 180 capsule 0    pregabalin 75 MG Oral Cap Take 1 capsule (75 mg total) by mouth 2 (two) times daily.      levocetirizine 5 MG Oral Tab Take 1 tablet (5 mg  total) by mouth every evening. 30 tablet 0    ergocalciferol 1.25 MG (40140 UT) Oral Cap Take 1 capsule (50,000 Units total) by mouth once a week. 12 capsule 1    fluticasone propionate 50 MCG/ACT Nasal Suspension 2 sprays by Nasal route in the morning and 2 sprays before bedtime. 48 mL 1    Magnesium 400 MG Oral Tab Take 400 mg by mouth daily. 90 tablet 1    albuterol (VENTOLIN HFA) 108 (90 Base) MCG/ACT Inhalation Aero Soln Inhale 2 puffs into the lungs in the morning and 2 puffs in the evening.      acetaminophen 500 MG Oral Tab Take 1-2 tablets (500-1,000 mg total) by mouth every 6 (six) hours as needed for Pain.      Fenofibrate 160 MG Oral Tab Take 1 tablet (160 mg total) by mouth daily. 90 tablet 3    ATORVASTATIN 10 MG Oral Tab TAKE 1 TABLET BY MOUTH EVERY DAY 90 tablet 3    levothyroxine (LEVOXYL) 88 MCG Oral Tab Take 1 tablet (88 mcg total) by mouth before breakfast. For underactive thyroid. 90 tablet 3    Olmesartan Medoxomil 40 MG Oral Tab Take 1 tablet (40 mg total) by mouth daily. For blood pressure 90 tablet 3    BREO ELLIPTA 200-25 MCG/ACT Inhalation Aerosol Powder, Breath Activated Inhale 1 puff into the lungs in the evening.      Dutasteride 0.5 MG Oral Cap Take 1 capsule (0.5 mg total) by mouth every morning.      Omeprazole 40 MG Oral Capsule Delayed Release Take 1 capsule (40 mg total) by mouth daily. (Patient not taking: Reported on 7/7/2025)       Allergies:  Allergies   Allergen Reactions    Bupropion DIZZINESS        Physical Exam:       Physical Exam  Blood pressure 122/77, pulse 94, temperature 97.2 °F (36.2 °C), temperature source Temporal, height 5' 4\" (1.626 m), weight 125 lb 9.6 oz (57 kg).  Vitals:    07/07/25 1029   BP: 122/77   Pulse: 94   Temp: 97.2 °F (36.2 °C)   TempSrc: Temporal   Weight: 125 lb 9.6 oz (57 kg)   Height: 5' 4\" (1.626 m)     Physical Exam   Constitutional: Patient is oriented to person, place, and time. Patient appears well-developed and well-nourished. No  distress.   HENT:   Head: Normocephalic and atraumatic.   Neck: Normal range of motion. Neck supple. No thyromegaly present.   Lymphadenopathy:     Has no cervical adenopathy.   Cardiovascular: Normal rate, regular rhythm and no murmur heard.  Pulmonary/Chest: Effort normal and breath sounds normal. No respiratory distress.   Neurological: Patient is alert and oriented to person, place, and time.   Skin: Skin is warm and dry.  No pallor or diaphoresis.  Psychiatric: Patient has a normal mood and affect.   Legs: No pitting edema.    Nursing note and vitals reviewed.           Physical Exam  NECK: No cervical lymphadenopathy.  CHEST: Clear to auscultation bilaterally. No wheezes, rhonchi, or crackles.  CARDIOVASCULAR: Normal heart rate and rhythm. S1 and S2 normal without murmurs.      Assessment/Plan:        Diagnoses and all orders for this visit:    Hypomagnesemia  -     Magnesium [E]; Future  Already has labs ordered from the nephrologist and the neurologist.  He will keep all of his of his appointments.  When he does their labs he can also do my magnesium although the last 2 levels were normal.  Vitamin B12 deficiency  On vitamin B12 per neurology  Weakness of both legs  Already doing physical therapy and has a neurosurgeon appointment coming up  Nerve pain  As above  Foraminal stenosis of lumbar region  Handicap placard form filled out and provided.      Referrals (if applicable)  No orders of the defined types were placed in this encounter.        Follow up if symptoms persist.  Take medicine (if given) as prescribed.  Approach to treatment discussed and patient/family member understands and agrees to plan.     No follow-ups on file.      Assessment & Plan  Bulging discs with right leg pain  Bulging discs are causing significant right leg pain, previously necessitating wheelchair use. He is undergoing physical therapy and is scheduled for neurosurgical evaluation. He requested a temporary handicap placard for  mobility assistance.  - Provide temporary handicap placard  - Continue physical therapy  - Refer to neurosurgeon for evaluation    Vitamin B12 deficiency  Vitamin B12 deficiency may contribute to leg weakness and nerve pain. Daily vitamin B12 supplementation is recommended by the neurologist. Although supplementation may be redundant, it is unlikely to cause harm and may be beneficial.  - Continue daily vitamin B12 supplementation    Hypomagnesemia  Magnesium level was 1.8 mg/dL as of May 30th, indicating hypomagnesemia. He is taking daily magnesium supplements. Monitoring is necessary to ensure normal magnesium levels.  - Order magnesium level test    General Health Maintenance  Under the care of multiple specialists, including a nephrologist and neurologist, with ongoing tests and treatments. Coordination of care and lab orders is essential.  - Coordinate lab tests with nephrologist and neurologist    Follow-up  Upcoming appointments include a neurosurgeon visit on July 9th and an EMG on July 20th. He is also seeing a physiatrist and pain management specialist for further evaluation and treatment.  - Follow up with neurosurgeon on July 9th  - Schedule EMG on July 20th  - Continue care with physiatrist and pain management specialist    Leonid Ford DO  7/7/2025

## 2025-07-08 ENCOUNTER — HOSPITAL ENCOUNTER (OUTPATIENT)
Dept: MRI IMAGING | Age: 70
Discharge: HOME | End: 2025-07-08
Attending: Other
Payer: MEDICARE

## 2025-07-08 DIAGNOSIS — R29.2 HYPERREFLEXIA: ICD-10-CM

## 2025-07-08 PROCEDURE — 72141 MRI NECK SPINE W/O DYE: CPT | Performed by: OTHER

## 2025-07-09 ENCOUNTER — OFFICE VISIT (OUTPATIENT)
Dept: SURGERY | Facility: CLINIC | Age: 70
End: 2025-07-09
Payer: MEDICARE

## 2025-07-09 VITALS
DIASTOLIC BLOOD PRESSURE: 76 MMHG | HEIGHT: 64 IN | HEART RATE: 101 BPM | BODY MASS INDEX: 21.34 KG/M2 | WEIGHT: 125 LBS | SYSTOLIC BLOOD PRESSURE: 112 MMHG

## 2025-07-09 DIAGNOSIS — G95.9 CERVICAL MYELOPATHY (HCC): ICD-10-CM

## 2025-07-09 DIAGNOSIS — M48.061 SPINAL STENOSIS OF LUMBAR REGION WITHOUT NEUROGENIC CLAUDICATION: Primary | ICD-10-CM

## 2025-07-09 PROCEDURE — 99203 OFFICE O/P NEW LOW 30 MIN: CPT | Performed by: NEUROLOGICAL SURGERY

## 2025-07-09 NOTE — PROGRESS NOTES
Neurosurgery Clinic Visit  2025    Alejandro Hanna PCP:  Leonid Ford DO    12/10/1955 MRN [id number]       CHIEF COMPLAINT:  No chief complaint on file.      HISTORY OF PRESENT ILLNESS:  Alejandro Hanna is a(n) 69 year old male     History of Present Illness  Alejandro Hanna is a 69 year old male who presents with right leg pain. He was referred by Dr. Macdonald and Dr. Reynoso for evaluation of his right leg pain.    He has been experiencing shooting pain down his right leg since December. The pain radiates down the side and sometimes the front of the leg, below the knee, but does not extend into the foot. The intensity varies, and on some days, it is severe enough to require him to sit down after walking short distances.    He has been undergoing physical therapy, which includes riding a bike for six minutes, but this activity results in cramps in his thigh, hamstring, and calf. He has been evaluated by multiple doctors and has undergone an MRI of his neck and spine. Other doctors have suggested the issue may be related to the L5 nerve.    He was prescribed Lyrica to manage the nerve pain but discontinued it due to severe dizziness. He has also received an epidural injection, which provided temporary relief but did not resolve the pain completely.    He mentions that the pain has been improving and he feels stronger in the leg compared to when the symptoms first started. However, the pain still limits his ability to walk long distances and affects his quality of life. No symptoms in his left leg, arms, or hands. No issues with hand function, such as dropping things.    REVIEW OF SYSTEMS:  The 12 point checklist was reviewed and was negative except: As noted in HPI    ALLERGIES:  Allergies[1]    MEDICATIONS:  Current Medications[2]    HISTORY:  Past Medical History[3]  Past Surgical History[4]  Family History[5]  Alejandro  reports that he has quit smoking. His smoking use included cigarettes. He  started smoking about 18 months ago. He has a 40.8 pack-year smoking history. He has never been exposed to tobacco smoke. He quit smokeless tobacco use about 8 months ago. He reports current alcohol use of about 2.0 standard drinks of alcohol per week. He reports that he does not use drugs.    PHYSICAL EXAMINATION:  Vital Signs:  /76 (BP Location: Right arm, Patient Position: Sitting, Cuff Size: adult)   Pulse 101   Ht 64\"   Wt 125 lb (56.7 kg)   BMI 21.46 kg/m²   On examination, strength is 5/5 throughout.  Reflexes are 2+ and symmetric.  Shanna's is weakly present on the right.  No clonus.    REVIEW OF STUDIES:  MRI of the lumbar spine was performed April 19.  This demonstrates multilevel spondylosis.  There is facet arthropathy on the right side at L5-S1 with foraminal stenosis and lateral recess stenosis.  At L4-5, there is some lateral recess stenosis as well.    MRI of the cervical spine was performed yesterday.  This demonstrates multilevel spondylosis, worst at C2-3, C3-4, and C4-5.  At C2-3 and C3-4, there is multifactorial moderate stenosis with mild cord compression.      ASSESSMENT AND PLAN:  1.  Right lumbar radiculopathy.  I reviewed the imaging with the patient, and we had a conversation regarding his clinical progress.  At this point, he still has significant symptoms.  He is scheduled for an EMG and an epidural steroid injection later this month.  I recommend he proceed with these.  If his pain persists after that, I think he would be a good candidate for minimally invasive decompression.  He will follow-up with me in a month or 6 weeks.    2.  Cervical spinal stenosis with cord compression and subclinical myelopathy.  He does not have any symptoms related to this.  However, the MRI scan is somewhat concerning.  I would least like to keep an eye on this going forward.  I would anticipate a follow-up visit in 3 to 6 months, for clinical reevaluation.      Time spent on  counseling/coordination of care:  15 Minutes    Total Time spent with patient:  15 Minutes      7/9/2025  10:00 AM       [1]   Allergies  Allergen Reactions    Bupropion DIZZINESS   [2]   Current Outpatient Medications   Medication Sig Dispense Refill    Cyanocobalamin (B-12) 1000 MCG Oral Cap Take 1 capsule by mouth daily. 180 capsule 0    pregabalin 75 MG Oral Cap Take 1 capsule (75 mg total) by mouth 2 (two) times daily.      levocetirizine 5 MG Oral Tab Take 1 tablet (5 mg total) by mouth every evening. 30 tablet 0    ergocalciferol 1.25 MG (27855 UT) Oral Cap Take 1 capsule (50,000 Units total) by mouth once a week. 12 capsule 1    fluticasone propionate 50 MCG/ACT Nasal Suspension 2 sprays by Nasal route in the morning and 2 sprays before bedtime. 48 mL 1    Magnesium 400 MG Oral Tab Take 400 mg by mouth daily. 90 tablet 1    albuterol (VENTOLIN HFA) 108 (90 Base) MCG/ACT Inhalation Aero Soln Inhale 2 puffs into the lungs in the morning and 2 puffs in the evening.      acetaminophen 500 MG Oral Tab Take 1-2 tablets (500-1,000 mg total) by mouth every 6 (six) hours as needed for Pain.      Fenofibrate 160 MG Oral Tab Take 1 tablet (160 mg total) by mouth daily. 90 tablet 3    ATORVASTATIN 10 MG Oral Tab TAKE 1 TABLET BY MOUTH EVERY DAY 90 tablet 3    levothyroxine (LEVOXYL) 88 MCG Oral Tab Take 1 tablet (88 mcg total) by mouth before breakfast. For underactive thyroid. 90 tablet 3    Olmesartan Medoxomil 40 MG Oral Tab Take 1 tablet (40 mg total) by mouth daily. For blood pressure 90 tablet 3    BREO ELLIPTA 200-25 MCG/ACT Inhalation Aerosol Powder, Breath Activated Inhale 1 puff into the lungs in the evening.      Dutasteride 0.5 MG Oral Cap Take 1 capsule (0.5 mg total) by mouth every morning.     [3]   Past Medical History:   ALCOHOL USE    Allergic rhinitis    Flonase    Arthritis    Bladder cancer (HCC)    f/u [facility]    BPH (benign prostatic hyperplasia)    COPD (HCC)    moderate-severe     Disorder of  kidney and ureter    Disorder of thyroid    hypothyroid    ED (erectile dysfunction)    Esophageal reflux    Essential hypertension    Hearing impairment    Hepatomegaly    Hiatal hernia    High blood pressure    High cholesterol    Hypothyroidism    Injury    Left knee injury, from fall    Osteoarthritis    in knee's, hip's    Pulmonary nodules    first noted 2016on LD screen CT    Tobacco abuse    Visual impairment    glasses   [4]   Past Surgical History:  Procedure Laterality Date    Colonoscopy      02-14-17    Colonoscopy N/A 12/28/2020    Procedure: COLONOSCOPY;  Surgeon: Real Abreu MD;  Location: [facility] ENDOSCOPY    Electrocardiogram, complete  12/15/2013    scanned to media tab    Oral surgery      Other surgical history  2009    cyst removal, side of head    Other surgical history Left 10/24/2024    VATS upper lobe wedge resection by Dr. Maria    Other surgical history  10/2024    lung nodule removed   [5]   Family History  Problem Relation Age of Onset    Dementia Mother     Diabetes Paternal Grandmother     Cancer Sister         lung cancer

## 2025-07-09 NOTE — PROGRESS NOTES
New patient presents for lumbar radiculopathy. concerns. Patient has been seeing Dr. Reynoso for pain management and treatment. Patient admits to pain in the right lower extremity, and some N/T on the outer part of the calf. Patient uses cane to walk as he feels unstable at times. He saw an orthopedic doctor and that doctor told him the issue is coming from the back.     Patient had cervical spine MRI yesterday ordered by Dr. Macdonald to check for issues; patient denies neck pain, stiffness, upper extremity symptoms.     Prev surgeries: none  Injections: yes 2, Right L4-5 TESI April 2025, Right L5-S1 TESI June 2025  PT: yes currently; he feels this is helping  Meds: otc meds prn; tried Lyrica- had side effects

## 2025-07-10 ENCOUNTER — LAB ENCOUNTER (OUTPATIENT)
Dept: LAB | Age: 70
End: 2025-07-10
Attending: FAMILY MEDICINE
Payer: MEDICARE

## 2025-07-10 DIAGNOSIS — N28.9 KIDNEY DISORDER: ICD-10-CM

## 2025-07-10 DIAGNOSIS — E87.1 HYPONATREMIA: ICD-10-CM

## 2025-07-10 DIAGNOSIS — R20.8 DECREASED PERIPHERAL VIBRATORY SENSE: ICD-10-CM

## 2025-07-10 DIAGNOSIS — E83.42 HYPOMAGNESEMIA: ICD-10-CM

## 2025-07-10 DIAGNOSIS — M79.604 RIGHT LEG PAIN: ICD-10-CM

## 2025-07-10 DIAGNOSIS — R09.82 POSTNASAL DRIP: ICD-10-CM

## 2025-07-10 DIAGNOSIS — I10 ESSENTIAL HYPERTENSION, BENIGN: ICD-10-CM

## 2025-07-10 LAB
ALBUMIN SERPL-MCNC: 5 G/DL (ref 3.2–4.8)
ANION GAP SERPL CALC-SCNC: 11 MMOL/L (ref 0–18)
BASOPHILS # BLD AUTO: 0.06 X10(3) UL (ref 0–0.2)
BASOPHILS NFR BLD AUTO: 0.6 %
BUN BLD-MCNC: 18 MG/DL (ref 9–23)
BUN/CREAT SERPL: 15.8 (ref 10–20)
CALCIUM BLD-MCNC: 9.6 MG/DL (ref 8.7–10.4)
CHLORIDE SERPL-SCNC: 91 MMOL/L (ref 98–112)
CK SERPL-CCNC: 145 U/L (ref 46–171)
CO2 SERPL-SCNC: 24 MMOL/L (ref 21–32)
CREAT BLD-MCNC: 1.14 MG/DL (ref 0.7–1.3)
DEPRECATED RDW RBC AUTO: 45.4 FL (ref 35.1–46.3)
EGFRCR SERPLBLD CKD-EPI 2021: 69 ML/MIN/1.73M2 (ref 60–?)
EOSINOPHIL # BLD AUTO: 0.14 X10(3) UL (ref 0–0.7)
EOSINOPHIL NFR BLD AUTO: 1.4 %
ERYTHROCYTE [DISTWIDTH] IN BLOOD BY AUTOMATED COUNT: 13.8 % (ref 11–15)
FOLATE SERPL-MCNC: 16.7 NG/ML (ref 5.4–?)
GLUCOSE BLD-MCNC: 88 MG/DL (ref 70–99)
HCT VFR BLD AUTO: 33.2 % (ref 39–53)
HGB BLD-MCNC: 10.8 G/DL (ref 13–17.5)
IMM GRANULOCYTES # BLD AUTO: 0.36 X10(3) UL (ref 0–1)
IMM GRANULOCYTES NFR BLD: 3.5 %
LYMPHOCYTES # BLD AUTO: 0.73 X10(3) UL (ref 1–4)
LYMPHOCYTES NFR BLD AUTO: 7.2 %
MAGNESIUM SERPL-MCNC: 1.6 MG/DL (ref 1.6–2.6)
MCH RBC QN AUTO: 28.9 PG (ref 26–34)
MCHC RBC AUTO-ENTMCNC: 32.5 G/DL (ref 31–37)
MCV RBC AUTO: 88.8 FL (ref 80–100)
MONOCYTES # BLD AUTO: 1.08 X10(3) UL (ref 0.1–1)
MONOCYTES NFR BLD AUTO: 10.6 %
NEUTROPHILS # BLD AUTO: 7.78 X10 (3) UL (ref 1.5–7.7)
NEUTROPHILS # BLD AUTO: 7.78 X10(3) UL (ref 1.5–7.7)
NEUTROPHILS NFR BLD AUTO: 76.7 %
OSMOLALITY SERPL CALC.SUM OF ELEC: 263 MOSM/KG (ref 275–295)
PHOSPHATE SERPL-MCNC: 2.6 MG/DL (ref 2.4–5.1)
PLATELET # BLD AUTO: 404 10(3)UL (ref 150–450)
POTASSIUM SERPL-SCNC: 4.8 MMOL/L (ref 3.5–5.1)
RBC # BLD AUTO: 3.74 X10(6)UL (ref 3.8–5.8)
SODIUM SERPL-SCNC: 126 MMOL/L (ref 136–145)
WBC # BLD AUTO: 10.2 X10(3) UL (ref 4–11)

## 2025-07-10 PROCEDURE — 86334 IMMUNOFIX E-PHORESIS SERUM: CPT

## 2025-07-10 PROCEDURE — 80069 RENAL FUNCTION PANEL: CPT

## 2025-07-10 PROCEDURE — 84446 ASSAY OF VITAMIN E: CPT

## 2025-07-10 PROCEDURE — 82746 ASSAY OF FOLIC ACID SERUM: CPT

## 2025-07-10 PROCEDURE — 82550 ASSAY OF CK (CPK): CPT

## 2025-07-10 PROCEDURE — 36415 COLL VENOUS BLD VENIPUNCTURE: CPT

## 2025-07-10 PROCEDURE — 85025 COMPLETE CBC W/AUTO DIFF WBC: CPT

## 2025-07-10 PROCEDURE — 83735 ASSAY OF MAGNESIUM: CPT

## 2025-07-10 PROCEDURE — 84425 ASSAY OF VITAMIN B-1: CPT

## 2025-07-11 ENCOUNTER — TELEPHONE (OUTPATIENT)
Dept: NEPHROLOGY | Facility: CLINIC | Age: 70
End: 2025-07-11

## 2025-07-11 NOTE — TELEPHONE ENCOUNTER
Hi, the last sodium level was only slightly different from previous levels I do not think it is the cause of his fatigue, I am reassured by looking at his labs.      The discussion of what caused his hyponatremia is complex and he needs to schedule an appointment with Dr. Villar to discuss.  Thanks

## 2025-07-11 NOTE — TELEPHONE ENCOUNTER
Dr Cabrera (on call ) patient of Dr Villar Chronic hyponatremia please see note below ,spoke to patient  and last name verified ,reported is doing fine just slight fatigue ,no headache,muscle cramping.advised to limit fluid intake (was advised 48 ounces /day)as pt has been doing,asking if anything can pt take or what cause to have low sodium.thanks.        Component      Latest Ref Rng 2025 2025 2025 2025 7/10/2025   Glucose      70 - 99 mg/dL 124 (H)  124 (H)  89  92  88    Sodium      136 - 145 mmol/L 132 (L)  135 (L)  134 (L)  134 (L)  126 (L)    Potassium      3.5 - 5.1 mmol/L 4.2  3.9  5.3 (H)  5.3 (H)  4.8    Potassium       4.2        Chloride      98 - 112 mmol/L 99  99  100  100  91 (L)    Carbon Dioxide, Total      21.0 - 32.0 mmol/L 25.0  29.0  26.0  29.0  24.0    ANION GAP      0 - 18 mmol/L 8  7  8  5  11    BUN      9 - 23 mg/dL 11  9  23  20  18    CREATININE      0.70 - 1.30 mg/dL 0.74  0.77  1.21  1.20  1.14    BUN/CREATININE RATIO      10.0 - 20.0  14.9  11.7  19.0  16.7  15.8    CALCIUM      8.7 - 10.4 mg/dL 8.4 (L)  8.9  10.0  10.0  9.6    CALCULATED OSMOLALITY      275 - 295 mOsm/kg 275  280  281  280  263 (L)    Albumin      3.2 - 4.8 g/dL   5.0 (H)  5.0 (H)  5.0 (H)    EGFR      >=60 mL/min/1.73m2 97  96  64  65  69    PHOSPHORUS      2.4 - 5.1 mg/dL   3.6  3.2  2.6       Legend:  (H) High  (L) Low

## 2025-07-12 ENCOUNTER — HOSPITAL ENCOUNTER (EMERGENCY)
Facility: HOSPITAL | Age: 70
Discharge: HOME | End: 2025-07-12
Attending: EMERGENCY MEDICINE
Payer: MEDICARE

## 2025-07-12 VITALS
RESPIRATION RATE: 22 BRPM | HEART RATE: 94 BPM | OXYGEN SATURATION: 98 % | SYSTOLIC BLOOD PRESSURE: 131 MMHG | DIASTOLIC BLOOD PRESSURE: 73 MMHG | TEMPERATURE: 97 F

## 2025-07-12 DIAGNOSIS — E87.1 HYPONATREMIA: Primary | ICD-10-CM

## 2025-07-12 LAB
ALBUMIN SERPL-MCNC: 4.9 G/DL (ref 3.2–4.8)
ALBUMIN/GLOB SERPL: 2.1 {RATIO} (ref 1–2)
ALP LIVER SERPL-CCNC: 54 U/L (ref 45–117)
ALT SERPL-CCNC: 17 U/L (ref 10–49)
ANION GAP SERPL CALC-SCNC: 10 MMOL/L (ref 0–18)
AST SERPL-CCNC: 17 U/L (ref ?–34)
BASOPHILS # BLD AUTO: 0.05 X10(3) UL (ref 0–0.2)
BASOPHILS NFR BLD AUTO: 0.4 %
BILIRUB SERPL-MCNC: 0.4 MG/DL (ref 0.2–1.1)
BUN BLD-MCNC: 22 MG/DL (ref 9–23)
BUN/CREAT SERPL: 17.2 (ref 10–20)
CALCIUM BLD-MCNC: 9.4 MG/DL (ref 8.7–10.4)
CHLORIDE SERPL-SCNC: 102 MMOL/L (ref 98–112)
CO2 SERPL-SCNC: 23 MMOL/L (ref 21–32)
CREAT BLD-MCNC: 1.28 MG/DL (ref 0.7–1.3)
DEPRECATED RDW RBC AUTO: 44.5 FL (ref 35.1–46.3)
EGFRCR SERPLBLD CKD-EPI 2021: 60 ML/MIN/1.73M2 (ref 60–?)
EOSINOPHIL # BLD AUTO: 0.12 X10(3) UL (ref 0–0.7)
EOSINOPHIL NFR BLD AUTO: 1 %
ERYTHROCYTE [DISTWIDTH] IN BLOOD BY AUTOMATED COUNT: 14 % (ref 11–15)
GLOBULIN PLAS-MCNC: 2.3 G/DL (ref 2–3.5)
GLUCOSE BLD-MCNC: 106 MG/DL (ref 70–99)
HCT VFR BLD AUTO: 32.4 % (ref 39–53)
HGB BLD-MCNC: 10.6 G/DL (ref 13–17.5)
IMM GRANULOCYTES # BLD AUTO: 0.22 X10(3) UL (ref 0–1)
IMM GRANULOCYTES NFR BLD: 1.8 %
LYMPHOCYTES # BLD AUTO: 0.74 X10(3) UL (ref 1–4)
LYMPHOCYTES NFR BLD AUTO: 6.2 %
MAGNESIUM SERPL-MCNC: 1.6 MG/DL (ref 1.6–2.6)
MCH RBC QN AUTO: 28.6 PG (ref 26–34)
MCHC RBC AUTO-ENTMCNC: 32.7 G/DL (ref 31–37)
MCV RBC AUTO: 87.6 FL (ref 80–100)
MONOCYTES # BLD AUTO: 1.44 X10(3) UL (ref 0.1–1)
MONOCYTES NFR BLD AUTO: 12.1 %
NEUTROPHILS # BLD AUTO: 9.35 X10 (3) UL (ref 1.5–7.7)
NEUTROPHILS # BLD AUTO: 9.35 X10(3) UL (ref 1.5–7.7)
NEUTROPHILS NFR BLD AUTO: 78.5 %
OSMOLALITY SERPL CALC.SUM OF ELEC: 284 MOSM/KG (ref 275–295)
PLATELET # BLD AUTO: 450 10(3)UL (ref 150–450)
POTASSIUM SERPL-SCNC: 4 MMOL/L (ref 3.5–5.1)
PROT SERPL-MCNC: 7.2 G/DL (ref 5.7–8.2)
RBC # BLD AUTO: 3.7 X10(6)UL (ref 3.8–5.8)
SODIUM SERPL-SCNC: 135 MMOL/L (ref 136–145)
WBC # BLD AUTO: 11.9 X10(3) UL (ref 4–11)

## 2025-07-12 PROCEDURE — 85025 COMPLETE CBC W/AUTO DIFF WBC: CPT | Performed by: EMERGENCY MEDICINE

## 2025-07-12 PROCEDURE — 83735 ASSAY OF MAGNESIUM: CPT | Performed by: EMERGENCY MEDICINE

## 2025-07-12 PROCEDURE — 99284 EMERGENCY DEPT VISIT MOD MDM: CPT

## 2025-07-12 PROCEDURE — 96360 HYDRATION IV INFUSION INIT: CPT

## 2025-07-12 PROCEDURE — 80053 COMPREHEN METABOLIC PANEL: CPT | Performed by: EMERGENCY MEDICINE

## 2025-07-12 NOTE — ED INITIAL ASSESSMENT (HPI)
Patient arrives to the ER complaining of weakness and diarrhea that started this morning. No pain. Patient endorses a low sodium level for a blood test from last week .

## 2025-07-12 NOTE — ED PROVIDER NOTES
Patient Seen in: [facility] Emergency Department    History     Chief Complaint   Patient presents with    Abnormal Labs    Diarrhea       HPI    69-year-old male presents to the emergency department for evaluation of generalized weakness and 4 episodes of loose watery stool today.  He states he took some Pepto and the diarrhea has since stopped.  He had mild abdominal cramping but that has also stopped.  He was concerned because earlier this week, he had a low sodium on blood work.  He states he has had problems with the sodium previously.  No fever, chills, nausea, vomiting.    History from Independent Source:       External Records Reviewed: On chart review, patient had lab work completed on the 10th which showed a sodium of 126.  His last sodium previous to that was 134 on 30 May.  On further chart review he has had problems with hyponatremia in the past which appears to mostly have been controlled through fluid restriction    History reviewed. Past Medical History[1]    History reviewed. Past Surgical History[2]      Medications :  Prescriptions Prior to Admission[3]     Family History[4]    Smoking Status: Social Hx on file[5]    Constitutional and vital signs reviewed.      Social History and Family History elements reviewed from today, pertinent positives to the presenting problem noted.    Physical Exam     ED Triage Vitals [07/12/25 1614]   /69   Pulse 106   Resp 16   Temp 97.3 °F (36.3 °C)   Temp src Temporal   SpO2 97 %   O2 Device None (Room air)       Physical Exam   Constitutional: AAOx3, well nourished, NAD  HEENT: Normocephalic, PERRLA, MMM  CV: s1s2+, RRR, no m/r/g, normal distal pulses  Pulmonary/Chest: CTA b/l with no rales, wheezes.  No chest wall tenderness  Abdominal: Nontender.  Nondistended. Soft. Bowel sounds are normal.   Neck/Back:   :   Musculoskeletal: Normal range of motion. No deformity.   Neurological: Awake, alert. Normal reflexes. No cranial nerve deficit.    Skin:  Skin is warm and dry. No rash noted. No erythema.   Psychiatric:      All measures to prevent infection transmission during my interaction with the patient were taken. The patient was already wearing a droplet mask on my arrival to the room. Personal protective equipment was worn throughout the duration of the exam.      ED Course        Labs Reviewed   CBC WITH DIFFERENTIAL WITH PLATELET - Abnormal; Notable for the following components:       Result Value    WBC 11.9 (*)     RBC 3.70 (*)     HGB 10.6 (*)     HCT 32.4 (*)     Neutrophil Absolute Prelim 9.35 (*)     Neutrophil Absolute 9.35 (*)     Lymphocyte Absolute 0.74 (*)     Monocyte Absolute 1.44 (*)     All other components within normal limits   COMP METABOLIC PANEL (14) - Abnormal; Notable for the following components:    Glucose 106 (*)     Sodium 135 (*)     Albumin 4.9 (*)     A/G Ratio 2.1 (*)     All other components within normal limits   MAGNESIUM     My Independent Interpretation of EKG (if performed):     Monitor Interpretation:   normal sinus rhythm as interpreted by me.      Imaging Results Available and Reviewed while in ED: No results found.  ED Medications Administered:   Medications   sodium chloride 0.9 % IV bolus 1,000 mL (1,000 mL Intravenous New Bag 7/12/25 1711)             MDM     Vitals:    07/12/25 1614 07/12/25 1645 07/12/25 1700   BP: 118/69 109/71 112/78   Pulse: 106 97 105   Resp: 16 21 22   Temp: 97.3 °F (36.3 °C)     TempSrc: Temporal     SpO2: 97% 95% 95%     *I personally reviewed and interpreted all ED vitals.    Independent Interpretation of Studies:     Social Determinants of Health:     Procedures:      Differential/MDM/Shared Decision Making: Differential Diagnosis includes dehydration, SIADH, Santy's, others.      The patient already  has a past medical history of ALCOHOL USE, Allergic rhinitis, Arthritis, Bladder cancer (HCC) (11/2019), BPH (benign prostatic hyperplasia), COPD (HCC), Disorder of kidney and ureter  (03/14/2012), Disorder of thyroid, ED (erectile dysfunction), Esophageal reflux, Essential hypertension, Hearing impairment, Hepatomegaly (05/13/2010), Hiatal hernia, High blood pressure, High cholesterol, Hypothyroidism, Injury (01/29/2019), Osteoarthritis, Pulmonary nodules, Tobacco abuse, and Visual impairment.  to contribute to the complexity of this ED evaluation.           Medications, Diagnostics, or Disposition considered but not done:     Patient's sodium today is 135.  Patient states that he was eating extra potato chips and salty meats in the last day or 2 after he got the results.  Management of case was discussed with patient and I have asked him to continue his 48 ounce fluid restriction as per his nephrologist.  He is otherwise comfortable with discharge plan.      Condition upon leaving the department: Stable    Disposition and Plan     Clinical Impression:  1. Hyponatremia        Disposition:  Discharge    Follow-up:  Faisal Villar MD  [address] [address]  [phone]    Call in 2 day(s)        Medications Prescribed:  Current Discharge Medication List                   [1]   Past Medical History:   ALCOHOL USE    Allergic rhinitis    Flonase    Arthritis    Bladder cancer (HCC)    f/u Sarah    BPH (benign prostatic hyperplasia)    COPD (HCC)    moderate-severe     Disorder of kidney and ureter    Disorder of thyroid    hypothyroid    ED (erectile dysfunction)    Esophageal reflux    Essential hypertension    Hearing impairment    Hepatomegaly    Hiatal hernia    High blood pressure    High cholesterol    Hypothyroidism    Injury    Left knee injury, from fall    Osteoarthritis    in knee's, hip's    Pulmonary nodules    first noted 2016on LD screen CT    Tobacco abuse    Visual impairment    glasses   [2]   Past Surgical History:  Procedure Laterality Date    Colonoscopy      02-14-17    Colonoscopy N/A 12/28/2020    Procedure: COLONOSCOPY;  Surgeon: Real Abreu  MD;  Location: [facility] ENDOSCOPY    Electrocardiogram, complete  12/15/2013    scanned to media tab    Oral surgery      Other surgical history  2009    cyst removal, side of head    Other surgical history Left 10/24/2024    VATS upper lobe wedge resection by Dr. Maria    Other surgical history  10/2024    lung nodule removed   [3] (Not in a hospital admission)   [4]   Family History  Problem Relation Age of Onset    Dementia Mother     Diabetes Paternal Grandmother     Cancer Sister         lung cancer   [5]   Social History  Socioeconomic History    Marital status:    Tobacco Use    Smoking status: Former     Current packs/day: 0.50     Average packs/day: 1 pack/day for 41.5 years (40.8 ttl pk-yrs)     Types: Cigarettes     Start date: 2024     Passive exposure: Never    Smokeless tobacco: Former     Quit date: 10/24/2024    Tobacco comments:     2/2022: less than 1 pack per day     10/2024: smokes 8-9 cigarettes a day   Vaping Use    Vaping status: Never Used   Substance and Sexual Activity    Alcohol use: Yes     Alcohol/week: 2.0 standard drinks of alcohol     Comment: 2 beer per day    Drug use: No   Other Topics Concern    Caffeine Concern Yes     Comment: coffee-1 cup/day

## 2025-07-13 ENCOUNTER — RESULTS FOLLOW-UP (OUTPATIENT)
Dept: NEPHROLOGY | Facility: CLINIC | Age: 70
End: 2025-07-13

## 2025-07-13 NOTE — TELEPHONE ENCOUNTER
I see that Dr. Cabrera addressed the sodium of 126.  Had he been following a 48 ounce per day fluid restriction carefully?.  He then went to the emergency room and repeat sodium was 135.  Reinforced the importance of following a 48 ounce per day fluid restriction.  Repeat labs in 1 month to make sure things remain stable.

## 2025-07-14 ENCOUNTER — PATIENT OUTREACH (OUTPATIENT)
Age: 70
End: 2025-07-14

## 2025-07-14 LAB — VITAMIN B1 WHOLE BLD: 122.4 NMOL/L

## 2025-07-14 NOTE — TELEPHONE ENCOUNTER
Informed patient of the note below  and last name verified ,verbalized understanding aware of the 48 ounces fluid restrictions.

## 2025-07-14 NOTE — PROGRESS NOTES
This writer contacted patient at [phone] for EDUAR Navigation post-hospital discharge follow up call on 7/14/2025 at 5:48 PM. There was no answer and I was unable to reach the patient at this time. I left a nondescript message with my contact information and the reason for my call on voicemail. See today's results f/u call fir Dr. Villar's recommendations.      Disposition and Plan      Clinical Impression:  1. Hyponatremia          Disposition:  Discharge     Follow-up:  Faisal Villar MD  [address] [place] [id number]  [phone]     Call in 2 day(s)       Future Appointments   Date Time Provider Department Center   7/21/2025  2:00 PM Brando Reynoso DO PM&R ELM [facility]   7/22/2025 11:00 AM Brando Reynoso DO EMGEOS[facility]   8/8/2025  9:30 AM LMB CT RM1 [facility]MB MOB. CT [facility] [place]   8/15/2025  8:30 AM Carlos Damian MD ELMSW [facility]   8/15/2025  9:30 AM Sierra Encarnacion DPM [facility]   11/3/2025 11:20 AM Faisal Villar MD [id number] [facility]

## 2025-07-15 ENCOUNTER — TELEPHONE (OUTPATIENT)
Dept: FAMILY MEDICINE CLINIC | Facility: CLINIC | Age: 70
End: 2025-07-15

## 2025-07-15 LAB
VIT E ALPHA TOCO: 12.5 MG/L
VIT E GAMMA TOCO: 1.1 MG/L

## 2025-07-15 NOTE — PROGRESS NOTES
Spoke with pt, briefly--feels much better, declines full EDUAR assessment, declines med review, declines EDUAR/ER f/u appt with PCP/partners, declines add'l EDUAR f/u calls. Pt confirms he is on 48 oz FR/day and will complete repeat labs in one month, as per Dr. Villar. Sent TE to office staff as FYI/EDUAR protocol.  Patient aware when to contact PCP/specialists and when to seek emergency care. No further questions/concerns at this time.      Future Appointments   Date Time Provider Department Center   7/21/2025  2:00 PM Brando Reynoso DO PM&R EL[facility]   7/22/2025 11:00 AM Brando Reynoso DO [facility]   8/8/2025  9:30 AM LMB CT RM1 LMB MOB. CT [facility] [place]   8/15/2025  8:30 AM Carlos Damian MD [facility]   8/15/2025  9:30 AM Sierra Encarnacion DPM [facility]   11/3/2025 11:20 AM Faisal Villar MD [id number] [facility]

## 2025-07-15 NOTE — TELEPHONE ENCOUNTER
Sent as FYI/TCM protocol:    Spoke with patient for Transitions of Care call today--feels much better, declines full EDUAR assessment, declines med review, declines EDUAR/ER f/u appt with PCP/partners, declines add'l EDUAR f/u calls.     Pt confirms he is on 48 oz FR/day and will complete repeat labs in one month, as per Dr. Villar. Patient aware when to contact PCP/specialists and when to seek emergency care. No further questions/concerns at this time..       ER Follow-up appointment needed by 7/19/25.      BOOK BY DATE: 7/26/25             Disposition and Plan      Clinical Impression:  1. Hyponatremia          Disposition:  Discharge     Follow-up:  Faisal Villar MD  [address] [place] [address] [id number] [phone]     Call in 2 day(s)    Future Appointments   Date Time Provider Department Center   7/21/2025  2:00 PM Brando Reynoso DO PM&R ELM [facility]   7/22/2025 11:00 AM Brando Reynoso DO EMGEOSC [facility]   8/8/2025  9:30 AM LMB CT RM1 LMB MOB. CT [facility] [place]   8/15/2025  8:30 AM Carlos Damian MD ELMSW [facility]c [facility]   8/15/2025  9:30 AM Sierra Encarnacion DPM [facility]   11/3/2025 11:20 AM Faisal Villar MD [id number] [facility]

## 2025-07-15 NOTE — TELEPHONE ENCOUNTER
Refill passed [facility] protocol    Please review due to High  High Dose: levocetirizine, 5 mg, Oral, Every evening Single dose of 5 mg exceeds recommended maximum of 2.5 mg by 100%  Daily dose of 5 mg exceeds recommended maximum of 1.25 mg by 300%  Frequency of 1 doses/day exceeds recommended maximum of 0.5 doses/day

## 2025-07-16 RX ORDER — LEVOCETIRIZINE DIHYDROCHLORIDE 5 MG/1
5 TABLET, FILM COATED ORAL EVERY EVENING
Qty: 90 TABLET | Refills: 3 | Status: ON HOLD | OUTPATIENT
Start: 2025-07-16

## 2025-07-17 ENCOUNTER — NURSE TRIAGE (OUTPATIENT)
Dept: FAMILY MEDICINE CLINIC | Facility: CLINIC | Age: 70
End: 2025-07-17

## 2025-07-17 ENCOUNTER — TELEPHONE (OUTPATIENT)
Facility: CLINIC | Age: 70
End: 2025-07-17

## 2025-07-17 ENCOUNTER — OFFICE VISIT (OUTPATIENT)
Dept: FAMILY MEDICINE CLINIC | Facility: CLINIC | Age: 70
End: 2025-07-17
Payer: MEDICARE

## 2025-07-17 VITALS
WEIGHT: 122 LBS | BODY MASS INDEX: 20.83 KG/M2 | HEIGHT: 64 IN | DIASTOLIC BLOOD PRESSURE: 70 MMHG | HEART RATE: 121 BPM | SYSTOLIC BLOOD PRESSURE: 105 MMHG

## 2025-07-17 DIAGNOSIS — R19.7 DIARRHEA OF PRESUMED INFECTIOUS ORIGIN: Primary | ICD-10-CM

## 2025-07-17 PROCEDURE — 99213 OFFICE O/P EST LOW 20 MIN: CPT | Performed by: FAMILY MEDICINE

## 2025-07-17 RX ORDER — DIPHENOXYLATE HYDROCHLORIDE AND ATROPINE SULFATE 2.5; .025 MG/1; MG/1
2 TABLET ORAL 4 TIMES DAILY PRN
Qty: 30 TABLET | Refills: 0 | Status: ON HOLD | OUTPATIENT
Start: 2025-07-17

## 2025-07-17 NOTE — TELEPHONE ENCOUNTER
Dr. Abreu and Chayo, Spoke to patient. He was last seen in this office in 2022. He reports he has been having diarrhea for the past 2-3 weeks, brown in color, liquid, no stool particles. He can have about 4-5 liquid stools a day. Occasional abdominal cramping. Denies nausea, no vomiting. No blood noted in stool. He was taking Kaopectate tablets with poor effect.  No change in appetite. Staying hydrated.    Advised patient to call his primary as well. Advised if symptoms worsen to go to urgent care or ER.  Scheduled appointment for him to see Chayo on 7/23/25.    Thank you.

## 2025-07-17 NOTE — TELEPHONE ENCOUNTER
Please reply to pool: EM RN TRIAGE  Action Requested: Summary for Provider     []  Critical Lab, Recommendations Needed  [] Need Additional Advice  [x]   FYI    []   Need Orders  [] Need Medications Sent to Pharmacy  []  Other     SUMMARY: Patient contacts clinic reporting diarrhea off and on x 2 weeks.  5-6 loose bowel movement per day.  Denies blood.  Some cramping with bowel movement.  Denies fever, body aches, chills, nausea or vomiting.  Has upcoming epidural appointment and would like to try and resolve this prior.  Acute visit booked today.     Reason for call: Diarrhea  Onset: Data Unavailable                       Reason for Disposition   MODERATE diarrhea (e.g., 4-6 times / day more than normal) and present > 48 hours (2 days)    Protocols used: Diarrhea-A-OH

## 2025-07-17 NOTE — PROGRESS NOTES
7/17/2025  2:24 PM    Alejandro Hanna is a 69 year old male.    Chief complaint(s):   Chief Complaint   Patient presents with    Diarrhea     C/o diarrhea x 2 weeks he is going 6-8 x a day very loose and watery      HPI:     Alejandro Hanna primary complaint is regarding diarrhea.     Patient is a 69-year-old male with history of bladder and lung cancer for which at present time he is in remission presents complaining of diarrhea for the past 2 to 3 weeks.  He is having about 8 bowel movements which are watery or mushy a day.  Not associate with any hematochezia or melena.  Denies any nausea vomiting or fever.  Denies any abdominal cramps as well.  No recent travel or use of antibiotics for any reason.  Denies any history of C. difficile.        HISTORY:  Past Medical History[1]   Past Surgical History[2]   Family History[3]   Social History: Short Social Hx on File[4]     Immunizations:   Immunization History   Administered Date(s) Administered    Covid-19 Vaccine Pfizer 30 mcg/0.3 ml 02/17/2021, 03/10/2021, 10/30/2021    Covid-19 Vaccine Pfizer Bivalent 30mcg/0.3mL 10/10/2022    FLU VAC High Dose 65 YRS & Older PRSV Free (63819) 11/03/2020, 10/30/2021, 10/10/2022, 10/13/2023    FLUZONE 6 months and older PFS 0.5 ml (95919) 10/09/2018, 10/10/2018, 10/08/2019    Fluarix 6 Months And Older 0.5 ml prefilled syringe (45773) 10/08/2019    Fluzone Vaccine Medicare () 11/03/2020    High Dose Fluzone Influenza Vaccine, 65yr+ PF 0.5mL (06342) 10/29/2024    Influenza 10/05/2016, 10/04/2017, 09/23/2018    Pneumococcal (Prevnar 13) 03/05/2019    Pneumovax 23 03/10/2016, 03/01/2021    RSV, recombinant, RSVpreF, adjuvanted (Arexvy) 02/01/2024    TDAP 03/10/2016    Zoster Vaccine Live (Zostavax) 03/10/2016    Zoster Vaccine Recombinant Adjuvanted (Shingrix) 02/01/2024       Medications (Active prior to today's visit):  Current Medications[5]    Allergies:  Allergies[6]      ROS:   Review of Systems    Constitutional:  Negative for appetite change, fatigue and fever.   Respiratory:  Negative for shortness of breath.    Cardiovascular:  Negative for chest pain.   Gastrointestinal:  Positive for diarrhea. Negative for abdominal pain, blood in stool, constipation, nausea and vomiting.   Musculoskeletal:  Negative for myalgias.   Skin:  Negative for rash.   Neurological:  Negative for dizziness, weakness and headaches.       PHYSICAL EXAM:   VS: /70 (BP Location: Right arm, Patient Position: Sitting, Cuff Size: adult)   Pulse (!) 121   Ht 5' 4\" (1.626 m)   Wt 122 lb (55.3 kg)   BMI 20.94 kg/m²     Physical Exam  Vitals reviewed.   Constitutional:       Appearance: Normal appearance. He is well-developed.   HENT:      Head: Normocephalic.   Eyes:      General: No scleral icterus.     Conjunctiva/sclera: Conjunctivae normal.   Cardiovascular:      Rate and Rhythm: Normal rate.   Pulmonary:      Effort: Pulmonary effort is normal.   Abdominal:      General: Bowel sounds are increased.      Palpations: Abdomen is soft. There is no hepatomegaly or mass.      Tenderness: There is no abdominal tenderness.   Musculoskeletal:      Cervical back: Neck supple.   Skin:     Findings: No rash.   Psychiatric:         Mood and Affect: Mood normal.         LABORATORY RESULTS:     EKG / Spirometry : -     Radiology:      ASSESSMENT/PLAN:   Assessment   Encounter Diagnosis   Name Primary?    Diarrhea of presumed infectious origin Yes       MEDICATIONS:     Requested Prescriptions     Signed Prescriptions Disp Refills    diphenoxylate-atropine (LOMOTIL) 2.5-0.025 MG Oral Tab 30 tablet 0     Sig: Take 2 tablets by mouth 4 (four) times daily as needed for Diarrhea.           LABORATORY & ORDERS:   Orders Placed This Encounter   Procedures    Basic Metabolic Panel (8)    CBC With Differential With Platelet    Stool Culture W/Shigatoxin    Giardia + Crypto Antigen, Stool    C. diff toxigenic PCR (OPT) [E]     REFERRALS: PILY with  GI next week        RECOMMENDATIONS given include: Patient was reassured of  his medical condition and all questions and concerns were answered. Patient was informed to please, call our office with any new or further questions or concerns that may come up in the near future. Notify Dr Wasserman or the [facility] if there is a deterioration or worsening of the medical condition. Also, inform the doctor with any new symptoms or medications' side effects.  Dehydration precautions, increase fluids intake, no dairy products.     FOLLOW-UP: Schedule a follow-up visit in  prn.            Orders This Visit:  Orders Placed This Encounter   Procedures    Basic Metabolic Panel (8)    CBC With Differential With Platelet    Stool Culture W/Shigatoxin    Giardia + Crypto Antigen, Stool    C. diff toxigenic PCR (OPT) [E]       Meds This Visit:  Requested Prescriptions     Signed Prescriptions Disp Refills    diphenoxylate-atropine (LOMOTIL) 2.5-0.025 MG Oral Tab 30 tablet 0     Sig: Take 2 tablets by mouth 4 (four) times daily as needed for Diarrhea.       Imaging & Referrals:  None         DUNCAN WASSERMAN MD         [1]   Past Medical History:   ALCOHOL USE    Allergic rhinitis    Flonase    Arthritis    Bladder cancer (HCC)    f/u Sarah    BPH (benign prostatic hyperplasia)    COPD (HCC)    moderate-severe     Disorder of kidney and ureter    Disorder of thyroid    hypothyroid    ED (erectile dysfunction)    Esophageal reflux    Essential hypertension    Hearing impairment    Hepatomegaly    Hiatal hernia    High blood pressure    High cholesterol    Hypothyroidism    Injury    Left knee injury, from fall    Osteoarthritis    in knee's, hip's    Pulmonary nodules    first noted 2016on LD screen CT    Tobacco abuse    Visual impairment    glasses   [2]   Past Surgical History:  Procedure Laterality Date    Colonoscopy      02-14-17    Colonoscopy N/A 12/28/2020    Procedure: COLONOSCOPY;  Surgeon: Real Abreu MD;   Location: [facility] ENDOSCOPY    Electrocardiogram, complete  12/15/2013    scanned to media tab    Oral surgery      Other surgical history  2009    cyst removal, side of head    Other surgical history Left 10/24/2024    VATS upper lobe wedge resection by Dr. Maria    Other surgical history  10/2024    lung nodule removed   [3]   Family History  Problem Relation Age of Onset    Dementia Mother     Diabetes Paternal Grandmother     Cancer Sister         lung cancer   [4]   Social History  Socioeconomic History    Marital status:    Tobacco Use    Smoking status: Former     Current packs/day: 0.50     Average packs/day: 1 pack/day for 41.5 years (40.8 ttl pk-yrs)     Types: Cigarettes     Start date: 2024     Passive exposure: Never    Smokeless tobacco: Former     Quit date: 10/24/2024    Tobacco comments:     2/2022: less than 1 pack per day     10/2024: smokes 8-9 cigarettes a day   Vaping Use    Vaping status: Never Used   Substance and Sexual Activity    Alcohol use: Yes     Alcohol/week: 2.0 standard drinks of alcohol     Comment: 2 beer per day    Drug use: No   Other Topics Concern    Caffeine Concern Yes     Comment: coffee-1 cup/day   Social History Narrative    Live with fianace    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   [5]   Current Outpatient Medications   Medication Sig Dispense Refill    diphenoxylate-atropine (LOMOTIL) 2.5-0.025 MG Oral Tab Take 2 tablets by mouth 4 (four) times daily as  needed for Diarrhea. 30 tablet 0    levocetirizine 5 MG Oral Tab Take 1 tablet (5 mg total) by mouth every evening. 90 tablet 3    Magnesium 400 MG Oral Tab Take 400 mg by mouth in the morning and 400 mg before bedtime. 180 tablet 0    Cyanocobalamin (B-12) 1000 MCG Oral Cap Take 1 capsule by mouth daily. 180 capsule 0    ergocalciferol 1.25 MG (42415 UT) Oral Cap Take 1 capsule (50,000 Units total) by mouth once a week. 12 capsule 1    fluticasone propionate 50 MCG/ACT Nasal Suspension 2 sprays by Nasal route in the morning and 2 sprays before bedtime. 48 mL 1    albuterol (VENTOLIN HFA) 108 (90 Base) MCG/ACT Inhalation Aero Soln Inhale 2 puffs into the lungs in the morning and 2 puffs in the evening.      acetaminophen 500 MG Oral Tab Take 1-2 tablets (500-1,000 mg total) by mouth every 6 (six) hours as needed for Pain.      Fenofibrate 160 MG Oral Tab Take 1 tablet (160 mg total) by mouth daily. 90 tablet 3    ATORVASTATIN 10 MG Oral Tab TAKE 1 TABLET BY MOUTH EVERY DAY 90 tablet 3    levothyroxine (LEVOXYL) 88 MCG Oral Tab Take 1 tablet (88 mcg total) by mouth before breakfast. For underactive thyroid. 90 tablet 3    Olmesartan Medoxomil 40 MG Oral Tab Take 1 tablet (40 mg total) by mouth daily. For blood pressure 90 tablet 3    BREO ELLIPTA 200-25 MCG/ACT Inhalation Aerosol Powder, Breath Activated Inhale 1 puff into the lungs in the evening.      Dutasteride 0.5 MG Oral Cap Take 1 capsule (0.5 mg total) by mouth every morning.     [6]   Allergies  Allergen Reactions    Bupropion DIZZINESS

## 2025-07-18 ENCOUNTER — APPOINTMENT (OUTPATIENT)
Dept: CT IMAGING | Facility: HOSPITAL | Age: 70
End: 2025-07-18
Attending: EMERGENCY MEDICINE

## 2025-07-18 ENCOUNTER — HOSPITAL ENCOUNTER (INPATIENT)
Facility: HOSPITAL | Age: 70
LOS: 4 days | Discharge: HOME | End: 2025-07-22
Attending: EMERGENCY MEDICINE | Admitting: HOSPITALIST

## 2025-07-18 ENCOUNTER — LAB ENCOUNTER (OUTPATIENT)
Dept: LAB | Age: 70
End: 2025-07-18
Attending: FAMILY MEDICINE

## 2025-07-18 ENCOUNTER — LAB ENCOUNTER (OUTPATIENT)
Dept: LAB | Age: 70
DRG: 683 | End: 2025-07-18
Attending: FAMILY MEDICINE

## 2025-07-18 ENCOUNTER — HOSPITAL ENCOUNTER (INPATIENT)
Facility: HOSPITAL | Age: 70
LOS: 4 days | Discharge: HOME | DRG: 683 | End: 2025-07-22
Attending: EMERGENCY MEDICINE | Admitting: HOSPITALIST

## 2025-07-18 ENCOUNTER — APPOINTMENT (OUTPATIENT)
Dept: CT IMAGING | Facility: HOSPITAL | Age: 70
DRG: 683 | End: 2025-07-18
Attending: EMERGENCY MEDICINE

## 2025-07-18 DIAGNOSIS — N17.9 AKI (ACUTE KIDNEY INJURY): ICD-10-CM

## 2025-07-18 DIAGNOSIS — F41.9 ANXIETY: ICD-10-CM

## 2025-07-18 DIAGNOSIS — E83.42 HYPOMAGNESEMIA: ICD-10-CM

## 2025-07-18 DIAGNOSIS — R19.7 DIARRHEA, UNSPECIFIED TYPE: Primary | ICD-10-CM

## 2025-07-18 DIAGNOSIS — R19.7 DIARRHEA OF PRESUMED INFECTIOUS ORIGIN: ICD-10-CM

## 2025-07-18 LAB
ALBUMIN SERPL-MCNC: 5 G/DL (ref 3.2–4.8)
ALBUMIN/GLOB SERPL: 1.9 (ref 1–2)
ALP LIVER SERPL-CCNC: 56 U/L (ref 45–117)
ALT SERPL-CCNC: 13 U/L (ref 10–49)
ANION GAP SERPL CALC-SCNC: 11 MMOL/L (ref 0–18)
ANION GAP SERPL CALC-SCNC: 7 MMOL/L (ref 0–18)
AST SERPL-CCNC: 15 U/L (ref ?–34)
BASOPHILS # BLD AUTO: 0.11 X10(3) UL (ref 0–0.2)
BASOPHILS NFR BLD AUTO: 1.1 %
BILIRUB SERPL-MCNC: 0.5 MG/DL (ref 0.2–1.1)
BUN BLD-MCNC: 27 MG/DL (ref 9–23)
BUN BLD-MCNC: 30 MG/DL (ref 9–23)
BUN/CREAT SERPL: 13.8 (ref 10–20)
BUN/CREAT SERPL: 14.2 (ref 10–20)
CALCIUM BLD-MCNC: 9.5 MG/DL (ref 8.7–10.4)
CALCIUM BLD-MCNC: 9.6 MG/DL (ref 8.7–10.4)
CHLORIDE SERPL-SCNC: 102 MMOL/L (ref 98–112)
CHLORIDE SERPL-SCNC: 104 MMOL/L (ref 98–112)
CO2 SERPL-SCNC: 20 MMOL/L (ref 21–32)
CO2 SERPL-SCNC: 22 MMOL/L (ref 21–32)
CREAT BLD-MCNC: 1.95 MG/DL (ref 0.7–1.3)
CREAT BLD-MCNC: 2.11 MG/DL (ref 0.7–1.3)
DEPRECATED RDW RBC AUTO: 44.4 FL (ref 35.1–46.3)
EGFRCR SERPLBLD CKD-EPI 2021: 33 ML/MIN/1.73M2 (ref 60–?)
EGFRCR SERPLBLD CKD-EPI 2021: 36 ML/MIN/1.73M2 (ref 60–?)
EOSINOPHIL # BLD AUTO: 0.18 X10(3) UL (ref 0–0.7)
EOSINOPHIL NFR BLD AUTO: 1.8 %
ERYTHROCYTE [DISTWIDTH] IN BLOOD BY AUTOMATED COUNT: 13.8 % (ref 11–15)
FASTING STATUS PATIENT QL REPORTED: YES
GLOBULIN PLAS-MCNC: 2.6 G/DL (ref 2–3.5)
GLUCOSE BLD-MCNC: 100 MG/DL (ref 70–99)
GLUCOSE BLD-MCNC: 93 MG/DL (ref 70–99)
HCT VFR BLD AUTO: 33.5 % (ref 39–53)
HGB BLD-MCNC: 10.8 G/DL (ref 13–17.5)
IGA SERPL-MCNC: 239 MG/DL (ref 70–312)
IMM GRANULOCYTES # BLD AUTO: 0.5 X10(3) UL (ref 0–1)
IMM GRANULOCYTES NFR BLD: 5 %
LYMPHOCYTES # BLD AUTO: 0.92 X10(3) UL (ref 1–4)
LYMPHOCYTES NFR BLD AUTO: 9.2 %
MCH RBC QN AUTO: 28.2 PG (ref 26–34)
MCHC RBC AUTO-ENTMCNC: 32.2 G/DL (ref 31–37)
MCV RBC AUTO: 87.5 FL (ref 80–100)
MONOCYTES # BLD AUTO: 1.16 X10(3) UL (ref 0.1–1)
MONOCYTES NFR BLD AUTO: 11.6 %
NEUTROPHILS # BLD AUTO: 7.11 X10 (3) UL (ref 1.5–7.7)
NEUTROPHILS # BLD AUTO: 7.11 X10(3) UL (ref 1.5–7.7)
NEUTROPHILS NFR BLD AUTO: 71.3 %
OSMOLALITY SERPL CALC.SUM OF ELEC: 281 MOSM/KG (ref 275–295)
OSMOLALITY SERPL CALC.SUM OF ELEC: 282 MOSM/KG (ref 275–295)
PLATELET # BLD AUTO: 558 10(3)UL (ref 150–450)
POTASSIUM SERPL-SCNC: 4 MMOL/L (ref 3.5–5.1)
POTASSIUM SERPL-SCNC: 4.2 MMOL/L (ref 3.5–5.1)
PROT SERPL-MCNC: 7.6 G/DL (ref 5.7–8.2)
RBC # BLD AUTO: 3.83 X10(6)UL (ref 3.8–5.8)
SODIUM SERPL-SCNC: 133 MMOL/L (ref 136–145)
SODIUM SERPL-SCNC: 133 MMOL/L (ref 136–145)
T4 FREE SERPL-MCNC: 1.7 NG/DL (ref 0.8–1.7)
TSI SER-ACNC: 5.07 UIU/ML (ref 0.55–4.78)
WBC # BLD AUTO: 10 X10(3) UL (ref 4–11)

## 2025-07-18 PROCEDURE — 36415 COLL VENOUS BLD VENIPUNCTURE: CPT

## 2025-07-18 PROCEDURE — 80053 COMPREHEN METABOLIC PANEL: CPT

## 2025-07-18 PROCEDURE — 99283 EMERGENCY DEPT VISIT LOW MDM: CPT | Performed by: INTERNAL MEDICINE

## 2025-07-18 PROCEDURE — 99223 1ST HOSP IP/OBS HIGH 75: CPT | Performed by: HOSPITALIST

## 2025-07-18 PROCEDURE — 85025 COMPLETE CBC W/AUTO DIFF WBC: CPT

## 2025-07-18 PROCEDURE — 80048 BASIC METABOLIC PNL TOTAL CA: CPT

## 2025-07-18 PROCEDURE — 74177 CT ABD & PELVIS W/CONTRAST: CPT | Performed by: STUDENT IN AN ORGANIZED HEALTH CARE EDUCATION/TRAINING PROGRAM

## 2025-07-18 RX ORDER — DEXTROSE MONOHYDRATE AND SODIUM CHLORIDE 5; .45 G/100ML; G/100ML
INJECTION, SOLUTION INTRAVENOUS CONTINUOUS
Status: ACTIVE | OUTPATIENT
Start: 2025-07-18 | End: 2025-07-18

## 2025-07-18 RX ORDER — FINASTERIDE 5 MG/1
5 TABLET, FILM COATED ORAL DAILY
Status: DISCONTINUED | OUTPATIENT
Start: 2025-07-19 | End: 2025-07-22

## 2025-07-18 RX ORDER — ONDANSETRON 2 MG/ML
4 INJECTION INTRAMUSCULAR; INTRAVENOUS EVERY 6 HOURS PRN
Status: DISCONTINUED | OUTPATIENT
Start: 2025-07-18 | End: 2025-07-18 | Stop reason: ALTCHOICE

## 2025-07-18 RX ORDER — HEPARIN SODIUM 5000 [USP'U]/ML
5000 INJECTION, SOLUTION INTRAVENOUS; SUBCUTANEOUS EVERY 12 HOURS SCHEDULED
Status: DISCONTINUED | OUTPATIENT
Start: 2025-07-18 | End: 2025-07-20

## 2025-07-18 RX ORDER — ALBUTEROL SULFATE 90 UG/1
2 INHALANT RESPIRATORY (INHALATION) EVERY 12 HOURS
Status: DISCONTINUED | OUTPATIENT
Start: 2025-07-19 | End: 2025-07-22

## 2025-07-18 RX ORDER — FLUTICASONE PROPIONATE 50 MCG
1 SPRAY, SUSPENSION (ML) NASAL 2 TIMES DAILY
Status: DISCONTINUED | OUTPATIENT
Start: 2025-07-18 | End: 2025-07-22

## 2025-07-18 RX ORDER — METOCLOPRAMIDE HYDROCHLORIDE 5 MG/ML
5 INJECTION INTRAMUSCULAR; INTRAVENOUS EVERY 8 HOURS PRN
Status: DISCONTINUED | OUTPATIENT
Start: 2025-07-18 | End: 2025-07-22

## 2025-07-18 RX ORDER — LEVOTHYROXINE SODIUM 88 UG/1
88 TABLET ORAL
Status: DISCONTINUED | OUTPATIENT
Start: 2025-07-19 | End: 2025-07-22

## 2025-07-18 RX ORDER — ATORVASTATIN CALCIUM 10 MG/1
10 TABLET, FILM COATED ORAL DAILY
Status: DISCONTINUED | OUTPATIENT
Start: 2025-07-19 | End: 2025-07-22

## 2025-07-18 RX ORDER — ONDANSETRON 2 MG/ML
4 INJECTION INTRAMUSCULAR; INTRAVENOUS EVERY 4 HOURS PRN
Status: ACTIVE | OUTPATIENT
Start: 2025-07-18 | End: 2025-07-18

## 2025-07-18 RX ORDER — DICYCLOMINE HCL 20 MG
20 TABLET ORAL ONCE
Status: COMPLETED | OUTPATIENT
Start: 2025-07-18 | End: 2025-07-18

## 2025-07-18 RX ORDER — SODIUM CHLORIDE 9 MG/ML
INJECTION, SOLUTION INTRAVENOUS CONTINUOUS
Status: DISCONTINUED | OUTPATIENT
Start: 2025-07-18 | End: 2025-07-19

## 2025-07-18 RX ORDER — ACETAMINOPHEN 500 MG
500 TABLET ORAL EVERY 4 HOURS PRN
Status: DISCONTINUED | OUTPATIENT
Start: 2025-07-18 | End: 2025-07-22

## 2025-07-18 RX ORDER — TEMAZEPAM 15 MG/1
15 CAPSULE ORAL NIGHTLY PRN
Status: DISCONTINUED | OUTPATIENT
Start: 2025-07-18 | End: 2025-07-22

## 2025-07-18 NOTE — TELEPHONE ENCOUNTER
Called the patient, I spoke to the spouse, Shawna (HIPAA authorized). She verified his name and date of birth. The patient is not available.    APN message relayed.    As per Shawna, the patient already picked up the stool kit.    She expressed understanding.

## 2025-07-18 NOTE — ED PROVIDER NOTES
Patient Seen in: [facility] Emergency Department    History     Chief Complaint   Patient presents with    Abnormal Result     Stated Complaint: abnormal labs     HPI    69-year-old male with past medical history of hypothyroid, COPD, GERD, hypertension, dyslipidemia, hepatosteatosis, bladder/lung cancer presenting for evaluation with 3 weeks of diarrhea somewhat worse postprandially in setting of increased dairy intake.  No sick contacts recent travel, no other new food ingestions.  No recent antibiotics.  No other infectious complaints, no fevers or chills.  Initially taking Imodium, seen yesterday by primary care for which labs ordered and Lomotil prescribed, CBC resulting normal though BMP with creatinine 1.95/GFR of 36.  No nausea or vomiting.  No prior abdominal surgeries.    Past Medical History[1]    Past Surgical History[2]         Family History[3]    Short Social Hx on File[4]    Review of Systems :  Constitutional: As per HPI  Gastrointestinal: Negative for vomiting; (+) diarrhea and abdominal pain.   Genitourinary: Negative for dysuria and hematuria.     Positive for stated complaint: abnormal labs  Other systems are as noted in HPI.  Constitutional and vital signs reviewed.      All other systems reviewed and negative except as noted above.    PSFH elements reviewed from today and agreed except as otherwise stated in HPI.    Physical Exam     ED Triage Vitals [07/18/25 1730]   /73   Pulse 101   Resp 18   Temp    Temp src    SpO2 96 %   O2 Device None (Room air)       Current:/70   Pulse 98   Resp 18   Ht 162.6 cm (5' 4\")   Wt 56.7 kg   SpO2 97%   BMI 21.46 kg/m²         Physical Exam   Constitutional: No distress.   HEENT: Dry MM.  Head: Normocephalic.   Eyes: No injection.   Cardiovascular: RRR.   Pulmonary/Chest: Effort normal. CTAB.  Abdominal: Soft.  Diffuse tenderness without peritonitis, distended with hyperactive bowel sounds.  Musculoskeletal: No gross  deformity.  Neurological: Alert.   Skin: Skin is warm.   Psychiatric: Cooperative.  Nursing note and vitals reviewed.        ED Course     Labs Reviewed   COMP METABOLIC PANEL (14) - Abnormal; Notable for the following components:       Result Value    Sodium 133 (*)     CO2 20.0 (*)     BUN 30 (*)     Creatinine 2.11 (*)     eGFR-Cr 33 (*)     Albumin 5.0 (*)     All other components within normal limits   TSH W REFLEX TO FREE T4 - Abnormal; Notable for the following components:    TSH 5.067 (*)     All other components within normal limits   T4, FREE (S) - Normal   CELIAC DISEASE SCREEN REFLEX   RAINBOW DRAW LAVENDER   RAINBOW DRAW LIGHT GREEN   RAINBOW DRAW BLUE   RAINBOW DRAW GOLD   GI STOOL PANEL BY PCR   C. DIFFICILE(TOXIGENIC)PCR     CT ABDOMEN+PELVIS(CONTRAST ONLY)(CPT=74177)  Result Date: 7/18/2025  PROCEDURE: CT ABDOMEN+PELVIS(CONTRAST ONLY)(CPT=74177) INDICATIONS:  abnormal labs COMPARISON: CT abdomen pelvis dated 4/29/2025 TECHNIQUE: Multidetector CT images of the abdomen and pelvis were obtained withnon-ionic intravenous contrast material. Automated exposure control for dose reduction was used. Oral contrast was not administered.Adjustment of the mA and/or kV was done based on the patient's size. Iterative reconstruction technique for dose reduction was employed. Dose information was transmitted to the ACR (American College of Radiology) NRDR (National Radiology Data Registry), which includes the Dose Index Registry. FINDINGS: LUNG BASES: Emphysematous changes in the lung bases. LIVER: Multiple hypodensities, likely benign such as cysts or hemangiomas. GALLBLADDER/BILIARY: Within normal limits.No biliary duct dilation. PANCREAS: Within normal limits. SPLEEN: No enlargement. ADRENALS: No defined mass or abnormal enlargement. KIDNEYS: No hydronephrosis. Symmetric nephrograms. No suspicious renal mass. GI: There is no evidence of bowel obstruction. There is focal mural edema of the second portion of the  duodenum. There is fluid noted throughout the small bowel and ascending colon. Appendix is within normal limits. URINARY BLADDER: Within normal limits. LYMPH NODES: No lymphadenopathy. PELVIC ORGANS: Within normal limits VASCULATURE: Moderate atherosclerosis of the abdominal aorta. No abdominal aortic aneurysm. PERITONEUM/RETROPERITONEUM: No free fluid or free air. BONES: No suspicious osseous lesion or acute osseous abnormality. ABDOMINAL WALL: Within normal limits.     CONCLUSION: 1.  Focal mural edema of the second portion of the duodenum. Additionally, there is fluid noted throughout the small bowel and ascending colon. This may represent duodenitis. Electronically Verified and Signed by Attending Radiologist: Jason Diehl MD 7/18/2025 5:47 PM Workstation: [employer]         [facility]   DIFFERENTIAL DIAGNOSIS: After history and physical exam differential diagnosis includes but is not limited to BRIELLE, hepatitis, electrolyte derangement, infectious diarrhea, diverticulitis, colitis, thyroid arrangement.    Pulse ox: 97% :Normal on RA, as independently interpreted by myself    Medical Decision Making  Evaluation for several weeks of diarrhea associated with postprandial worsening of symptoms and pain without fevers or peritonitis and nontoxic and well-appearing patient with BRIELLE noted.  BRIELLE worsening versus early this morning, IV fluids initiated with CT as noted; d/w nephrology, will admit for ongoing IVF/stool studies and continued monitoring/management; case d/w hospitalist Dr. Mendoza for admission, nephrology Dr. Villar, GI Dr. Abreu.    Problems Addressed:  BRIELLE (acute kidney injury): acute illness or injury  Diarrhea, unspecified type: complicated acute illness or injury with systemic symptoms    Amount and/or Complexity of Data Reviewed  External Data Reviewed: labs, radiology and notes.     Details: Outpatient note from day prior, labs from same day; 4/29/2025 CT abd/pelvis result reviewed  Labs:  ordered. Decision-making details documented in ED Course.  Radiology: ordered and independent interpretation performed. Decision-making details documented in ED Course.     Details: CT abd/pelvis without obvious free air as independently interpreted by myself  Discussion of management or test interpretation with external provider(s): Case d/w nephrology Dr. Villar, hospitalist Dr. Mendoza, GI Dr. Abreu    Risk  Prescription drug management.  Decision regarding hospitalization.      I was wearing at minimum a facemask and eye protection throughout this encounter with handwashing performed prior and after patient evaluation without personal hand/facial/oropharyngeal contact and gloves worn throughout encounter. See note and/or contact this provider for further PPE details.    Disposition and Plan     Clinical Impression:  1. Diarrhea, unspecified type    2. BRIELLE (acute kidney injury)        Disposition:  Admit    Follow-up:  Leonid Ford DO  [address] [place] [address]  [phone]    Call  For followup, re-evaluation and repeat labs.      Medications Prescribed:  Current Discharge Medication List                   [1]   Past Medical History:   ALCOHOL USE    Allergic rhinitis    Flonase    Arthritis    Bladder cancer (HCC)    f/u Sarah    BPH (benign prostatic hyperplasia)    COPD (HCC)    moderate-severe     Disorder of kidney and ureter    Disorder of thyroid    hypothyroid    ED (erectile dysfunction)    Esophageal reflux    Essential hypertension    Hearing impairment    Hepatomegaly    Hiatal hernia    High blood pressure    High cholesterol    Hypothyroidism    Injury    Left knee injury, from fall    Osteoarthritis    in knee's, hip's    Pulmonary nodules    first noted 2016on LD screen CT    Tobacco abuse    Visual impairment    glasses   [2]   Past Surgical History:  Procedure Laterality Date    Colonoscopy      02-14-17    Colonoscopy N/A 12/28/2020    Procedure: COLONOSCOPY;   Surgeon: Real Abreu MD;  Location: [facility] ENDOSCOPY    Electrocardiogram, complete  12/15/2013    scanned to media tab    Oral surgery      Other surgical history  2009    cyst removal, side of head    Other surgical history Left 10/24/2024    VATS upper lobe wedge resection by Dr. Maria    Other surgical history  10/2024    lung nodule removed   [3]   Family History  Problem Relation Age of Onset    Dementia Mother     Diabetes Paternal Grandmother     Cancer Sister         lung cancer   [4]   Social History  Socioeconomic History    Marital status:    Tobacco Use    Smoking status: Former     Current packs/day: 0.50     Average packs/day: 1 pack/day for 41.5 years (40.8 ttl pk-yrs)     Types: Cigarettes     Start date: 2024     Passive exposure: Never    Smokeless tobacco: Former     Quit date: 10/24/2024    Tobacco comments:     2/2022: less than 1 pack per day     10/2024: smokes 8-9 cigarettes a day   Vaping Use    Vaping status: Never Used   Substance and Sexual Activity    Alcohol use: Not Currently     Alcohol/week: 2.0 standard drinks of alcohol     Types: 2 Standard drinks or equivalent per week     Comment: 2 beer per day    Drug use: No   Other Topics Concern    Caffeine Concern Yes     Comment: coffee-1 cup/day   Social History Narrative    Live with fianace    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

## 2025-07-18 NOTE — ED QUICK NOTES
Orders for admission, patient is aware of plan and ready to go upstairs. Any questions, please call ED RN Elaina at extension [phone].     Patient Covid vaccination status: Fully vaccinated     COVID Test Ordered in ED: None    COVID Suspicion at Admission: N/A    Running Infusions: Medication Infusions[1]     Mental Status/LOC at time of transport: AOX4    Other pertinent information:   CIWA score: N/A   NIH score:  N/A             [1]

## 2025-07-18 NOTE — ED INITIAL ASSESSMENT (HPI)
Pt arrived via EMS with c/o abnormal labs. Pt went to get labs today and had low EGFR and was told to come in. Pt c/o diarrhea for past 3 wks.

## 2025-07-18 NOTE — TELEPHONE ENCOUNTER
Thank you for the info.   Dr Malone ordered stool tests yesterday, I recommend the patient turn these in as soon as possible so we can have results for our upcoming visit.     Chayo Santoro APRN

## 2025-07-19 LAB
ALBUMIN SERPL-MCNC: 3.9 G/DL (ref 3.2–4.8)
ANION GAP SERPL CALC-SCNC: 7 MMOL/L (ref 0–18)
BASOPHILS # BLD AUTO: 0.06 X10(3) UL (ref 0–0.2)
BASOPHILS NFR BLD AUTO: 0.7 %
BILIRUB UR QL: NEGATIVE
BUN BLD-MCNC: 21 MG/DL (ref 9–23)
BUN/CREAT SERPL: 16.2 (ref 10–20)
CALCIUM BLD-MCNC: 8.6 MG/DL (ref 8.7–10.4)
CHLORIDE SERPL-SCNC: 108 MMOL/L (ref 98–112)
CLARITY UR: CLEAR
CO2 SERPL-SCNC: 19 MMOL/L (ref 21–32)
CREAT BLD-MCNC: 1.3 MG/DL (ref 0.7–1.3)
CREAT UR-SCNC: 141.3 MG/DL
CRP SERPL-MCNC: 6.2 MG/DL (ref ?–0.5)
DEPRECATED HBV CORE AB SER IA-ACNC: 512 NG/ML (ref 50–336)
DEPRECATED RDW RBC AUTO: 43.8 FL (ref 35.1–46.3)
EGFRCR SERPLBLD CKD-EPI 2021: 59 ML/MIN/1.73M2 (ref 60–?)
EOSINOPHIL # BLD AUTO: 0.19 X10(3) UL (ref 0–0.7)
EOSINOPHIL NFR BLD AUTO: 2.3 %
ERYTHROCYTE [DISTWIDTH] IN BLOOD BY AUTOMATED COUNT: 13.9 % (ref 11–15)
ERYTHROCYTE [SEDIMENTATION RATE] IN BLOOD: 62 MM/HR (ref 0–20)
GLUCOSE BLD-MCNC: 72 MG/DL (ref 70–99)
GLUCOSE UR-MCNC: NORMAL MG/DL
HCT VFR BLD AUTO: 25.8 % (ref 39–53)
HGB BLD-MCNC: 8.4 G/DL (ref 13–17.5)
HGB UR QL STRIP.AUTO: NEGATIVE
IMM GRANULOCYTES # BLD AUTO: 0.41 X10(3) UL (ref 0–1)
IMM GRANULOCYTES NFR BLD: 4.9 %
IRON SATN MFR SERPL: 6 % (ref 20–50)
IRON SERPL-MCNC: 17 UG/DL (ref 65–175)
KETONES UR-MCNC: NEGATIVE MG/DL
LACTATE SERPL-SCNC: 1.1 MMOL/L (ref 0.5–2)
LEUKOCYTE ESTERASE UR QL STRIP.AUTO: NEGATIVE
LYMPHOCYTES # BLD AUTO: 0.78 X10(3) UL (ref 1–4)
LYMPHOCYTES NFR BLD AUTO: 9.2 %
MAGNESIUM SERPL-MCNC: 1.5 MG/DL (ref 1.6–2.6)
MCH RBC QN AUTO: 28.3 PG (ref 26–34)
MCHC RBC AUTO-ENTMCNC: 32.6 G/DL (ref 31–37)
MCV RBC AUTO: 86.9 FL (ref 80–100)
MONOCYTES # BLD AUTO: 1.03 X10(3) UL (ref 0.1–1)
MONOCYTES NFR BLD AUTO: 12.2 %
NEUTROPHILS # BLD AUTO: 5.97 X10 (3) UL (ref 1.5–7.7)
NEUTROPHILS # BLD AUTO: 5.97 X10(3) UL (ref 1.5–7.7)
NEUTROPHILS NFR BLD AUTO: 70.7 %
NITRITE UR QL STRIP.AUTO: NEGATIVE
OSMOLALITY SERPL CALC.SUM OF ELEC: 280 MOSM/KG (ref 275–295)
PH UR: 5.5 (ref 5–8)
PHOSPHATE SERPL-MCNC: 2.4 MG/DL (ref 2.4–5.1)
PLATELET # BLD AUTO: 460 10(3)UL (ref 150–450)
POTASSIUM SERPL-SCNC: 3.8 MMOL/L (ref 3.5–5.1)
RBC # BLD AUTO: 2.97 X10(6)UL (ref 3.8–5.8)
SODIUM SERPL-SCNC: 134 MMOL/L (ref 136–145)
SODIUM SERPL-SCNC: 49 MMOL/L
SP GR UR STRIP: >1.03 (ref 1–1.03)
TOTAL IRON BINDING CAPACITY: 309 UG/DL (ref 250–425)
TRANSFERRIN SERPL-MCNC: 236 MG/DL (ref 215–365)
UROBILINOGEN UR STRIP-ACNC: NORMAL
WBC # BLD AUTO: 8.4 X10(3) UL (ref 4–11)

## 2025-07-19 PROCEDURE — 99233 SBSQ HOSP IP/OBS HIGH 50: CPT | Performed by: INTERNAL MEDICINE

## 2025-07-19 PROCEDURE — 99223 1ST HOSP IP/OBS HIGH 75: CPT | Performed by: INTERNAL MEDICINE

## 2025-07-19 PROCEDURE — 99233 SBSQ HOSP IP/OBS HIGH 50: CPT | Performed by: STUDENT IN AN ORGANIZED HEALTH CARE EDUCATION/TRAINING PROGRAM

## 2025-07-19 RX ORDER — POTASSIUM CHLORIDE 1500 MG/1
20 TABLET, EXTENDED RELEASE ORAL ONCE
Status: COMPLETED | OUTPATIENT
Start: 2025-07-19 | End: 2025-07-19

## 2025-07-19 NOTE — PROGRESS NOTES
Progress Note     Alejandro aHnna Patient Status:  Inpatient    12/10/1955 MRN [id number]   Location [facility] 5SW/SE Attending Ramila Galeano MD   Hosp Day # 1 PCP Leonid Ford DO     Chief Complaint: Diarrhea    Subjective:   S: Patient seen and examined at bedside.  Since he has been in the hospital his diarrhea has resolved and he is actually had no bowel movement.  Creatinine is better today.  Labs show anemia but this could also be dilution from IV fluids.  Vital signs are stable.  He denies any nausea or vomiting    Review of Systems:   10 point ROS completed and was negative, except for pertinent positive and negatives stated in subjective.    Objective:   Vital signs:  Temp:  [97.4 °F (36.3 °C)-98.6 °F (37 °C)] 97.4 °F (36.3 °C)  Pulse:  [] 79  Resp:  [16-18] 18  BP: (124-153)/(61-83) 127/61  SpO2:  [95 %-97 %] 95 %    Wt Readings from Last 6 Encounters:   25 121 lb 9.6 oz (55.2 kg)   25 122 lb (55.3 kg)   25 125 lb (56.7 kg)   25 125 lb 9.6 oz (57 kg)   25 125 lb (56.7 kg)   25 125 lb (56.7 kg)         Physical Exam:    General: No acute distress. Alert ,         Respiratory: Clear to auscultation bilaterally. No wheezes. No rhonchi.  Cardiovascular: S1, S2. Regular rate and rhythm. No murmurs, rubs or gallops.   Abdomen: Soft, nontender, nondistended.  Positive bowel sounds. No rebound or guarding.  Neurologic: No focal neurological deficits.   Musculoskeletal: Moves all extremities.  Extremities: No edema.    Results:   Diagnostic Data:      Labs:    Labs Last 24 Hours:   BMP     CBC    Other     Na 134 Cl 108 BUN 21 Glu 72   Hb 8.4   PTT - Procal -   K 3.8 CO2 19.0 Cr 1.30   WBC 8.4 >< .0  INR - CRP -   Renal Lytes Endo    Hct 25.8   Trop - D dim -   eGFR - Ca 8.6 POC Gluc  -    LFT   pBNP - Lactic -   eGFR AA - PO4 2.4 A1c -   AST 15 APk 56 Prot 7.6  LDL -     Mg 1.5 TSH 5.067   ALT 13 T rosanne 0.5 Alb 3.9        COVID-19 Lab  Results    COVID-19  Lab Results   Component Value Date    COVID19 Not Detected 04/19/2025    COVID19 Not Detected 09/08/2024    COVID19 Not Detected 10/28/2023       Pro-Calcitonin  No results for input(s): \"PCT\" in the last 168 hours.    Cardiac  No results for input(s): \"TROP\", \"PBNP\" in the last 168 hours.    Creatinine Kinase  No results for input(s): \"CK\" in the last 168 hours.    Inflammatory Markers  Recent Labs   Lab 07/19/25  0621   FRANK 512*       Imaging: Imaging data reviewed in Epic.    Medications: Scheduled Medications[1]    Assessment & Plan:   ASSESSMENT / PLAN:   #Nonbloody diarrhea  Unclear etiology.  CT abdomen pelvis reviewed and it showed focal mural edema of the second portion of the duodenum.  Additionally there is fluid noted throughout the small bowel and ascending colon concerning for duodenitis.  There is no evidence of bowel obstruction  - Patient afebrile.  Hold off on antibiotics.  GI enteric panel, C. difficile and celiac serologies pending.  UA negative  - Clear liquid diet okay.  GI following.  Given his focal mural edema of unclear significance, he will need an EGD and a colonoscopy.  Timing to be determined  - Iron studies noted.  Ferritin is elevated    #BRIELLE with metabolic acidosis  Creatinine on admission 1.95 and then peaked at 2.11.  Improving now.  Continue IV fluids with bicarb per nephrology    #Hypomagnesemia  Magnesium 1.5 on admission likely due to GI loss.  IV supplementation given this morning  Repeat morning labs    #Hypothyroidism  Continue PTA levothyroxine  Repeat TSH and free T4 acceptable    #Hyperlipidemia  Continue statin    #BPH  Continue finasteride      Quality:  DVT Prophylaxis: Heparin subcu  CODE status: Full code  Ortega:    Central line: None  Dispo: further recs pending clinical course      Will the patient be referred to TCC on discharge?:  N/A  Estimated date of discharge: Greater than 48 hours  Discharge is dependent on: Endoscopies  At this point  Mr. Hanna is expected to be discharge to: Anticipate home    Plan of care discussed with patient and primary RN    Outpatient records or previous hospital records reviewed.   Patient and/or patient's family given opportunity to ask questions and note understanding and agreeing with therapeutic plan as outlined     Coordinated care with providers and counseling re: treatment plan and workup    MDM: High complexity    Ramila Galeano MD  [facility]            [1]    pantoprazole  40 mg Intravenous Daily    heparin  5,000 Units Subcutaneous 2 times per day    albuterol  2 puff Inhalation 2 times per day    atorvastatin  10 mg Oral Daily    finasteride  5 mg Oral Daily    fluticasone propionate  1 spray Each Nare BID    levothyroxine  88 mcg Oral Before breakfast

## 2025-07-19 NOTE — CONSULTS
Gastroenterology consultation note    Reason for consultation:  Diarrhea, iron deficiency      History of present illness:  The patient is a 69 year old male who is seen today at the bedside today along with Dr. Villar and with his wife Shawna via [facility].    As per previous notes the patient has a history of adenomatous colon polyps.  His last colonoscopy in December 2020 revealed #2 subcentimeter tubular adenomas.  A 5-year surveillance examination was advised.  A concurrent upper endoscopy revealed Candida esophagitis and a 2 cm sliding hiatal hernia.    The patient endorses that he was well until about 3 weeks prior when he developed watery diarrhea with at least several bowel movements daily with episodes of incontinence.  There was mild abdominal soreness without abdominal pain and no bleeding.    He saw primary care who repeated laboratory testing and advise ED evaluation for hyponatremia and an increase in creatinine.    The patient denies ill contacts recent travel or antibiotic use prior to symptom onset.  He was seen in the emergency room on July 12 and given IV fluids.    He was started on olmesartan as far back as at least December 2024 for hypertension.    The patient has been intermittently mildly anemic in the past, however, hemoglobin recently has been trending downwards to 8.4 today.  Ferritin was elevated at 512.  Iron saturation was 6%.    The patient has had no bowel movements since being admitted to the hospital.    A CT scan revealed mural thickening of the duodenum.    Past Medical History[1]      Allergies[2]      Current Hospital Medications[3]  Medications Taking[4]    Social History     Socioeconomic History    Marital status:      Spouse name: Not on file    Number of children: Not on file    Years of education: Not on file    Highest education level: Not on file   Occupational History    Not on file   Tobacco Use    Smoking status: Former     Current packs/day: 0.50      Average packs/day: 1 pack/day for 41.5 years (40.8 ttl pk-yrs)     Types: Cigarettes     Start date: 2024     Passive exposure: Never    Smokeless tobacco: Former     Quit date: 10/24/2024    Tobacco comments:     2/2022: less than 1 pack per day     10/2024: smokes 8-9 cigarettes a day   Vaping Use    Vaping status: Never Used   Substance and Sexual Activity    Alcohol use: Not Currently     Alcohol/week: 2.0 standard drinks of alcohol     Types: 2 Standard drinks or equivalent per week     Comment: 2 beer per day    Drug use: No    Sexual activity: Not on file   Other Topics Concern     Service Not Asked    Blood Transfusions Not Asked    Caffeine Concern Yes     Comment: coffee-1 cup/day    Occupational Exposure Not Asked    Hobby Hazards Not Asked    Sleep Concern Not Asked    Stress Concern Not Asked    Weight Concern Not Asked    Special Diet Not Asked    Back Care Not Asked    Exercise Not Asked    Bike Helmet Not Asked    Seat Belt Not Asked    Self-Exams Not Asked   Social History Narrative    Live with fianace    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  Pertinent positives and negatives noted in the the HPI.        Physical examination:  GEN:  Pleasant well-developed well-nourished male who is in no acute distress.  HEENT: Negative for scleral icterus.  Conjunctivae are pink.  Neck:  Supple without adenopathy or thyromegaly  Chest: Clear to auscultation and percussion  Cardiovascular: Regular S1 and S2  Abdomen: Appears slightly distended.  Bowel sounds are present.  There is no tenderness to palpation.  There are no masses appreciated.  Liver and spleen are not palpable.  Extremities: Without edema  Neurologic: Awake, alert and appropriate.  Nonfocal examination.  Affect:Normal   Skin: No jaundice.  Warm and dry.      Laboratory data:  Recent Labs   Lab 07/12/25  1622 07/18/25  0852 07/19/25  0621   RBC 3.70* 3.83 2.97*   HGB 10.6* 10.8* 8.4*   HCT 32.4* 33.5* 25.8*   MCV 87.6 87.5 86.9   MCH 28.6 28.2 28.3   MCHC 32.7 32.2 32.6   RDW 14.0 13.8 13.9   NEPRELIM 9.35* 7.11 5.97   WBC 11.9* 10.0 8.4   .0 558.0* 460.0*       Recent Labs   Lab 07/12/25  1622 07/18/25  0852 07/18/25  1621 07/19/25  0621   * 100* 93 72   BUN 22 27* 30* 21   CREATSERUM 1.28 1.95* 2.11* 1.30   CA 9.4 9.6 9.5 8.6*   ALB 4.9*  --  5.0* 3.9   * 133* 133* 134*   K 4.0 4.2 4.0 3.8    104 102 108   CO2 23.0 22.0 20.0* 19.0*   ALKPHO 54  --  56  --    AST 17  --  15  --    ALT 17  --  13  --    BILT 0.4  --  0.5  --    TP 7.2  --  7.6  --        Lab Results   Component Value Date    INR 0.93 03/05/2019         CT ABDOMEN+PELVIS(CONTRAST ONLY)(CPT=74177)  Result Date: 7/18/2025  CONCLUSION: 1.  Focal mural edema of the second portion of the duodenum. Additionally, there is fluid noted throughout the small bowel and ascending colon. This may represent duodenitis. Electronically Verified and Signed by Attending Radiologist: Jason Diehl MD 7/18/2025 5:47 PM Workstation: [id number]      Assessment and Plan:    Impression:  Diarrhea:  The patient presents with a 3-week history of watery diarrhea.  CT scanning reveals mural thickening of the second portion of the duodenum of uncertain significance.  The patient is also iron deficient.  We have discussed possible causes of his symptoms including an  infectious gastroenteritis, celiac sprue, and olmesartan induced sprue syndrome or less likely a neuroendocrine tumor of the duodenum.  The patient will be kept on clear liquids today.  IV iron repletion as per Dr. Villar.  Await GI PCR panel, C. difficile toxin assay and sprue serology.  The patient will require upper and lower endoscopy.  Timing to be dependent on clinical course.      Recommendations:  1.  Await results of GI PCR panel, C. difficile toxin assay and sprue serology.  2.  Continue clear liquids today.  3.  IV iron repletion as per Dr. Villar.  4.  The patient will require upper and lower endoscopy, timing dependent on clinical course.      eRal Abreu MD  [facility]  07/19/25        This note was partially prepared using Dragon Medical voice recognition dictation software. As a result, errors may occur. When identified, these errors have been corrected. While every attempt is made to correct errors during dictation, discrepancies may still exist.                    [1]   Past Medical History:   ALCOHOL USE    Allergic rhinitis    Flonase    Arthritis    Bladder cancer (HCC)    f/u [facility]    BPH (benign prostatic hyperplasia)    COPD (HCC)    moderate-severe     Disorder of kidney and ureter    Disorder of thyroid    hypothyroid    ED (erectile dysfunction)    Esophageal reflux    Essential hypertension    Hearing impairment    Hepatomegaly    Hiatal hernia    High blood pressure    High cholesterol    Hypothyroidism    Injury    Left knee injury, from fall    Osteoarthritis    in knee's, hip's    Pulmonary nodules    first noted 2016on LD screen CT    Tobacco abuse    Visual impairment    glasses   [2]   Allergies  Allergen Reactions    Bupropion DIZZINESS   [3]   Current Facility-Administered Medications   Medication Dose Route Frequency    sodium phosphate 15 mmol in 0.9% NaCl 100mL IVPB premix  15 mmol Intravenous Once    pantoprazole (Protonix) 40 mg in sodium  chloride 0.9% PF 10 mL IV push  40 mg Intravenous Daily    sodium chloride 0.9% infusion   Intravenous Continuous    heparin (Porcine) 5000 UNIT/ML injection 5,000 Units  5,000 Units Subcutaneous 2 times per day    acetaminophen (Tylenol Extra Strength) tab 500 mg  500 mg Oral Q4H PRN    metoclopramide (Reglan) 5 mg/mL injection 5 mg  5 mg Intravenous Q8H PRN    temazepam (Restoril) cap 15 mg  15 mg Oral Nightly PRN    albuterol (Ventolin HFA) 108 (90 Base) MCG/ACT inhaler 2 puff  2 puff Inhalation 2 times per day    atorvastatin (Lipitor) tab 10 mg  10 mg Oral Daily    finasteride (Proscar) tab 5 mg  5 mg Oral Daily    fluticasone propionate (Flonase) 50 MCG/ACT nasal suspension 1 spray  1 spray Each Nare BID    levothyroxine (Synthroid) tab 88 mcg  88 mcg Oral Before breakfast   [4]   Outpatient Medications Marked as Taking for the 7/18/25 encounter (Hospital Encounter)   Medication Sig Dispense Refill    diphenoxylate-atropine (LOMOTIL) 2.5-0.025 MG Oral Tab Take 2 tablets by mouth 4 (four) times daily as needed for Diarrhea. 30 tablet 0    Magnesium 400 MG Oral Tab Take 400 mg by mouth in the morning and 400 mg before bedtime. (Patient taking differently: Take 400 mg by mouth daily.) 180 tablet 0    Cyanocobalamin (B-12) 1000 MCG Oral Cap Take 1 capsule by mouth daily. 180 capsule 0    ergocalciferol 1.25 MG (78731 UT) Oral Cap Take 1 capsule (50,000 Units total) by mouth once a week. (Patient taking differently: Take 1 capsule (50,000 Units total) by mouth once a week. Taking dose on Fridays) 12 capsule 1    fluticasone propionate 50 MCG/ACT Nasal Suspension 2 sprays by Nasal route in the morning and 2 sprays before bedtime. 48 mL 1    albuterol (VENTOLIN HFA) 108 (90 Base) MCG/ACT Inhalation Aero Soln Inhale 2 puffs into the lungs in the morning and 2 puffs in the evening.      acetaminophen 500 MG Oral Tab Take 1-2 tablets (500-1,000 mg total) by mouth every 6 (six) hours as needed for Pain.      Fenofibrate  160 MG Oral Tab Take 1 tablet (160 mg total) by mouth daily. 90 tablet 3    ATORVASTATIN 10 MG Oral Tab TAKE 1 TABLET BY MOUTH EVERY DAY 90 tablet 3    levothyroxine (LEVOXYL) 88 MCG Oral Tab Take 1 tablet (88 mcg total) by mouth before breakfast. For underactive thyroid. 90 tablet 3    Olmesartan Medoxomil 40 MG Oral Tab Take 1 tablet (40 mg total) by mouth daily. For blood pressure 90 tablet 3    BREO ELLIPTA 200-25 MCG/ACT Inhalation Aerosol Powder, Breath Activated Inhale 1 puff into the lungs in the evening.      Dutasteride 0.5 MG Oral Cap Take 1 capsule (0.5 mg total) by mouth every morning.     [5]   Family History  Problem Relation Age of Onset    Dementia Mother     Diabetes Paternal Grandmother     Cancer Sister         lung cancer

## 2025-07-19 NOTE — SPIRITUAL CARE NOTE
Spiritual Care Visit Note    Patient Name: Alejandro Hanna Date of Spiritual Care Visit: 25   : 12/10/1955 Primary Dx: Diarrhea, unspecified type       Referred By:  [facility] documents    Spiritual Care Taxonomy:    Intended Effects: Convey a calming presence    Methods: Offer support    Interventions: Assist someone with Advance Directives, Assist patient with documenting values, Prayer for healing    Visit Type/Summary:     - PoA: Patient unable to complete PoAH at this time: Vistited patient in response to PoA for Healthcare consult/request. Upon inquiry, identified that the patient may not be able to make decisions related to PoAH at this time. Reviewed the patient's EHR and paper chart. There is no documented PoAH on either. Reviewed Patient's paper chart to determine whether a surrogate decision maker has been identified. A completed surrogate decision maker form could not be located. Contacted Patient's  to let them know that a surrogate decision maker needs to be identified. Provided this update to the patient's nurse.   remains available for follow up.    Spiritual Care support can be requested via an Epic consult. For urgent/immediate needs, please contact the On Call  at: [place]: ext [phone]    Rev jessika Saldana ext [phone] MDiv    Spiritual Care Visit Note    Patient Name: Alejandro Hanna Date of Spiritual Care Visit: 25   : 12/10/1955 Primary Dx: Diarrhea, unspecified type       Referred By:   visited with pt, while he was in bed,  explained POA-HC documents, pt was not sure about doing these documents now- pt was open to receiving the paperwork,  giving him a couple copies,  explained them, and left 2 copies, so pt and his wife, could look at them,  if they decided to do them,  explained to let the nurse know and  she can request a  can come visit again to assist.  Not interested at this time, to  fill out POAHC documents. If pt changes his mind,  explained to let the nurse know    Spiritual Care Taxonomy: Provided words of hope, and comfort- explaind POAHC documents and gave copies to consider    Intended Effects: Convey a calming presence    Methods: Offer support    Interventions: Assist someone with Advance Directives, Assist patient with documenting values, Prayer for healing    Visit Type/Summary:     - PoA: Patient unable to complete PoAH at this time: Vistited patient in response to PoA for Healthcare consult/request. Upon inquiry, identified that the patient may not be able to make decisions related to PoAH at this time. Reviewed the patient's EHR and paper chart. There is no documented PoAH on either. Reviewed Patient's paper chart to determine whether a surrogate decision maker has been identified. A completed surrogate decision maker form could not be located. Contacted Patient's  to let them know that a surrogate decision maker needs to be identified. Provided this update to the patient's nurse.   remains available for follow up.    Spiritual Care support can be requested via an Epic consult. For urgent/immediate needs, please contact the On Call  at: [place]: ext [phone]  Rev Chaplain Chayo Saldana  ext [phone] [place]

## 2025-07-19 NOTE — PROGRESS NOTES
[facility]  Nephrology Daily Progress Note    Alejandro Hanna  [id number]  69 year old      HPI:   Alejandro Hanna is a 69 year old male.  Overall feeling better.  No BM since admission.  Tolerating clear liquids.  No abd pain.       ROS:     Constitutional:  Negative for decreased activity, fever, irritability and lethargy  Endocrine:  Negative for abnormal sleep patterns, increased activity, polydipsia and polyphagia  Cardiovascular:  Negative for cool extremity and irregular heartbeat/palpitations  Gastrointestinal:  Negative for abdominal pain, constipation, decreased appetite, diarrhea and vomiting  Genitourinary:  Negative for dysuria and hematuria  Hema/Lymph:  Negative for easy bleeding and easy bruising  Integumentary:  Negative for pruritus and rash  Musculoskeletal:  Negative for bone/joint symptoms  Neurological:  Negative for gait disturbance  Psychiatric:  Negative for inappropriate interaction and psychiatric symptoms  Respiratory:  Negative for cough, dyspnea and wheezing      PHYSICAL EXAM:   Temp:  [98.4 °F (36.9 °C)-98.6 °F (37 °C)] 98.4 °F (36.9 °C)  Pulse:  [] 89  Resp:  [16-18] 18  BP: (124-153)/(70-83) 142/74  SpO2:  [95 %-97 %] 97 %  Patient Weight for the past 72 hrs:   Weight   07/18/25 1619 125 lb (56.7 kg)   07/18/25 2049 121 lb 12.8 oz (55.2 kg)   07/19/25 0456 121 lb 9.6 oz (55.2 kg)       Constitutional: appears well hydrated alert and responsive no acute distress noted  Neck/Thyroid: neck is supple without adenopathy  Lymphatic: no abnormal cervical, supraclavicular or axillary adenopathy is noted  Respiratory: normal to inspection lungs are clear to auscultation bilaterally normal respiratory effort  Cardiovascular: regular rate and rhythm no murmurs, gallups, or rubs  Abdomen: soft non-tender non-distended no organomegaly noted no masses  Musculoskeletal: full ROM all extremities good strength  no deformities  Extremities: no edema, cyanosis, or  clubbing  Neurological: exam appropriate for age reflexes and motor skills appropriate for age    Labs:  Lab Results   Component Value Date    WBC 8.4 07/19/2025    HGB 8.4 07/19/2025    HCT 25.8 07/19/2025    .0 07/19/2025    CREATSERUM 1.30 07/19/2025    BUN 21 07/19/2025     07/19/2025    K 3.8 07/19/2025     07/19/2025    CO2 19.0 07/19/2025    GLU 72 07/19/2025    CA 8.6 07/19/2025    ALB 3.9 07/19/2025    ALKPHO 56 07/18/2025    BILT 0.5 07/18/2025    TP 7.6 07/18/2025    AST 15 07/18/2025    ALT 13 07/18/2025    T4F 1.7 07/18/2025    TSH 5.067 07/18/2025    MG 1.5 07/19/2025    PHOS 2.4 07/19/2025     Recent Labs   Lab 07/12/25  1622 07/18/25  0852 07/19/25  0621   WBC 11.9* 10.0 8.4   HGB 10.6* 10.8* 8.4*   HCT 32.4* 33.5* 25.8*   .0 558.0* 460.0*     Recent Labs   Lab 07/12/25  1622 07/18/25  0852 07/18/25  1621 07/19/25  0621   * 100* 93 72   BUN 22 27* 30* 21   CREATSERUM 1.28 1.95* 2.11* 1.30   CA 9.4 9.6 9.5 8.6*   ALB 4.9*  --  5.0* 3.9   * 133* 133* 134*   K 4.0 4.2 4.0 3.8    104 102 108   CO2 23.0 22.0 20.0* 19.0*   ALKPHO 54  --  56  --    AST 17  --  15  --    ALT 17  --  13  --    BILT 0.4  --  0.5  --    TP 7.2  --  7.6  --    PHOS  --   --   --  2.4       Imaging  CT ABDOMEN+PELVIS(CONTRAST ONLY)(CPT=74177)  Result Date: 7/18/2025  CONCLUSION: 1.  Focal mural edema of the second portion of the duodenum. Additionally, there is fluid noted throughout the small bowel and ascending colon. This may represent duodenitis. Electronically Verified and Signed by Attending Radiologist: Jason Diehl MD 7/18/2025 5:47 PM Workstation: [email or web address]        Medications:  Current Hospital Medications[1]    Allergies:  Allergies[2]    Input/Output:    Intake/Output Summary (Last 24 hours) at 7/19/2025 1159  Last data filed at 7/19/2025 1135  Gross per 24 hour   Intake 2976.67 ml   Output 50 ml   Net 2926.67 ml          ASSESSMENT/PLAN:   Assessment   Problem  List[3]    UO incomplete but BRIELLE resolving and creatinine down to 1.30.  Sodium 134 and CO2 19.  Wean off IVF but will switch to bicarb drip.  Replace K and Mg.  Hb down to 8.4.  ? Dilutional but will check iron studies.  GI seeing pt as well.  Discussed with RN and wife.              7/19/2025  Faisal Villar MD               [1]   Current Facility-Administered Medications:     sodium phosphate 15 mmol in 0.9% NaCl 100mL IVPB premix, 15 mmol, Intravenous, Once    potassium chloride (Klor-Con M20) tab 20 mEq, 20 mEq, Oral, Once    sodium bicarbonate 150 mEq in dextrose 5% 1,000 mL infusion, 150 mEq, Intravenous, Continuous    pantoprazole (Protonix) 40 mg in sodium chloride 0.9% PF 10 mL IV push, 40 mg, Intravenous, Daily    heparin (Porcine) 5000 UNIT/ML injection 5,000 Units, 5,000 Units, Subcutaneous, 2 times per day    acetaminophen (Tylenol Extra Strength) tab 500 mg, 500 mg, Oral, Q4H PRN    metoclopramide (Reglan) 5 mg/mL injection 5 mg, 5 mg, Intravenous, Q8H PRN    temazepam (Restoril) cap 15 mg, 15 mg, Oral, Nightly PRN    albuterol (Ventolin HFA) 108 (90 Base) MCG/ACT inhaler 2 puff, 2 puff, Inhalation, 2 times per day    atorvastatin (Lipitor) tab 10 mg, 10 mg, Oral, Daily    finasteride (Proscar) tab 5 mg, 5 mg, Oral, Daily    fluticasone propionate (Flonase) 50 MCG/ACT nasal suspension 1 spray, 1 spray, Each Nare, BID    levothyroxine (Synthroid) tab 88 mcg, 88 mcg, Oral, Before breakfast  [2]   Allergies  Allergen Reactions    Bupropion DIZZINESS   [3]   Patient Active Problem List  Diagnosis    Essential hypertension, benign    Mixed hyperlipidemia    Nocturia    Benign non-nodular prostatic hyperplasia with lower urinary tract symptoms    Chronic obstructive pulmonary disease (HCC)    Decreased hearing of right ear    Eructation    Allergic rhinitis due to pollen    Mass of upper lobe of left lung    Acquired hypothyroidism    Tobacco use disorder    Bladder cancer (HCC)    Alcohol abuse     Hyponatremia    Calcification of aorta    Hiatal hernia    Protein-calorie malnutrition, unspecified severity (HCC)    Dizziness    Right ear impacted cerumen    Abnormal clinical finding    Abnormal blood chemistry    Abnormal results of liver function studies    Cough    Disorder of kidney and ureter    Hepatomegaly    Impotence of organic origin    Internal derangement of left knee    Primary osteoarthritis of left knee    Cancer of upper lobe of left lung (HCC)    Lumbar radiculopathy    Leukocytosis    Hyperglycemia    Community acquired pneumonia of left lower lobe of lung    Weakness    Metastatic signet ring cell carcinoma (HCC)    Hypomagnesemia    Community acquired pneumonia, unspecified laterality    Episodic mood disorder    Uncomplicated alcohol dependence (HCC)    B12 deficiency    Diarrhea, unspecified type    BRIELLE (acute kidney injury)    Acute kidney failure

## 2025-07-19 NOTE — PLAN OF CARE
Problem: Patient Centered Care  Goal: Patient preferences are identified and integrated in the patient's plan of care  Description: Interventions:  - What would you like us to know as we care for you? Home with spouse  - Provide timely, complete, and accurate information to patient/family  - Incorporate patient and family knowledge, values, beliefs, and cultural backgrounds into the planning and delivery of care  - Encourage patient/family to participate in care and decision-making at the level they choose  - Honor patient and family perspectives and choices  Outcome: Progressing     Problem: GASTROINTESTINAL - ADULT  Goal: Minimal or absence of nausea and vomiting  Description: INTERVENTIONS:  - Maintain adequate hydration with IV or PO as ordered and tolerated  - Nasogastric tube to low intermittent suction as ordered  - Evaluate effectiveness of ordered antiemetic medications  - Provide nonpharmacologic comfort measures as appropriate  - Advance diet as tolerated, if ordered  - Obtain nutritional consult as needed  - Evaluate fluid balance  Outcome: Progressing  Goal: Maintains or returns to baseline bowel function  Description: INTERVENTIONS:  - Assess bowel function  - Maintain adequate hydration with IV or PO as ordered and tolerated  - Evaluate effectiveness of GI medications  - Encourage mobilization and activity  - Obtain nutritional consult as needed  - Establish a toileting routine/schedule  - Consider collaborating with pharmacy to review patient's medication profile  Outcome: Progressing     Problem: GENITOURINARY - ADULT  Goal: Absence of urinary retention  Description: INTERVENTIONS:  - Assess patient’s ability to void and empty bladder  - Monitor intake/output and perform bladder scan as needed  - Follow urinary retention protocol/standard of care  - Consider collaborating with pharmacy to review patient's medication profile  - Implement strategies to promote bladder emptying  Outcome:  Progressing     Problem: METABOLIC/FLUID AND ELECTROLYTES - ADULT  Goal: Electrolytes maintained within normal limits  Description: INTERVENTIONS:  - Monitor labs and rhythm and assess patient for signs and symptoms of electrolyte imbalances  - Administer electrolyte replacement as ordered  - Monitor response to electrolyte replacements, including rhythm and repeat lab results as appropriate  - Fluid restriction as ordered  - Instruct patient on fluid and nutrition restrictions as appropriate  Outcome: Progressing  Goal: Hemodynamic stability and optimal renal function maintained  Description: INTERVENTIONS:  - Monitor labs and assess for signs and symptoms of volume excess or deficit  - Monitor intake, output and patient weight  - Monitor urine specific gravity, serum osmolarity and serum sodium as indicated or ordered  - Monitor response to interventions for patient's volume status, including labs, urine output, blood pressure (other measures as available)  - Encourage oral intake as appropriate  - Instruct patient on fluid and nutrition restrictions as appropriate  Outcome: Progressing     Problem: SAFETY ADULT - FALL  Goal: Free from fall injury  Description: INTERVENTIONS:  - Assess pt frequently for physical needs  - Identify cognitive and physical deficits and behaviors that affect risk of falls.  - [place] fall precautions as indicated by assessment.  - Educate pt/family on patient safety including physical limitations  - Instruct pt to call for assistance with activity based on assessment  - Modify environment to reduce risk of injury  - Provide assistive devices as appropriate  - Consider OT/PT consult to assist with strengthening/mobility  - Encourage toileting schedule  Outcome: Progressing     Problem: DISCHARGE PLANNING  Goal: Discharge to home or other facility with appropriate resources  Description: INTERVENTIONS:  - Identify barriers to discharge w/pt and caregiver  - Include  patient/family/discharge partner in discharge planning  - Arrange for needed discharge resources and transportation as appropriate  - Identify discharge learning needs (meds, wound care, etc)  - Arrange for interpreters to assist at discharge as needed  - Consider post-discharge preferences of patient/family/discharge partner  - Complete POLST form as appropriate  - Assess patient's ability to be responsible for managing their own health  - Refer to Case Management Department for coordinating discharge planning if the patient needs post-hospital services based on physician/LIP order or complex needs related to functional status, cognitive ability or social support system  Outcome: Progressing

## 2025-07-19 NOTE — H&P (VIEW-ONLY)
Gastroenterology consultation note    Reason for consultation:  Diarrhea, iron deficiency      History of present illness:  The patient is a 69 year old male who is seen today at the bedside today along with Dr. Villar and with his wife Shawna via [facility].    As per previous notes the patient has a history of adenomatous colon polyps.  His last colonoscopy in December 2020 revealed #2 subcentimeter tubular adenomas.  A 5-year surveillance examination was advised.  A concurrent upper endoscopy revealed Candida esophagitis and a 2 cm sliding hiatal hernia.    The patient endorses that he was well until about 3 weeks prior when he developed watery diarrhea with at least several bowel movements daily with episodes of incontinence.  There was mild abdominal soreness without abdominal pain and no bleeding.    He saw primary care who repeated laboratory testing and advise ED evaluation for hyponatremia and an increase in creatinine.    The patient denies ill contacts recent travel or antibiotic use prior to symptom onset.  He was seen in the emergency room on July 12 and given IV fluids.    He was started on olmesartan as far back as at least December 2024 for hypertension.    The patient has been intermittently mildly anemic in the past, however, hemoglobin recently has been trending downwards to 8.4 today.  Ferritin was elevated at 512.  Iron saturation was 6%.    The patient has had no bowel movements since being admitted to the hospital.    A CT scan revealed mural thickening of the duodenum.    Past Medical History[1]      Allergies[2]      Current Hospital Medications[3]  Medications Taking[4]    Social History     Socioeconomic History    Marital status:      Spouse name: Not on file    Number of children: Not on file    Years of education: Not on file    Highest education level: Not on file   Occupational History    Not on file   Tobacco Use    Smoking status: Former     Current packs/day: 0.50      Average packs/day: 1 pack/day for 41.5 years (40.8 ttl pk-yrs)     Types: Cigarettes     Start date: 2024     Passive exposure: Never    Smokeless tobacco: Former     Quit date: 10/24/2024    Tobacco comments:     2/2022: less than 1 pack per day     10/2024: smokes 8-9 cigarettes a day   Vaping Use    Vaping status: Never Used   Substance and Sexual Activity    Alcohol use: Not Currently     Alcohol/week: 2.0 standard drinks of alcohol     Types: 2 Standard drinks or equivalent per week     Comment: 2 beer per day    Drug use: No    Sexual activity: Not on file   Other Topics Concern     Service Not Asked    Blood Transfusions Not Asked    Caffeine Concern Yes     Comment: coffee-1 cup/day    Occupational Exposure Not Asked    Hobby Hazards Not Asked    Sleep Concern Not Asked    Stress Concern Not Asked    Weight Concern Not Asked    Special Diet Not Asked    Back Care Not Asked    Exercise Not Asked    Bike Helmet Not Asked    Seat Belt Not Asked    Self-Exams Not Asked   Social History Narrative    Live with fianace    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  Pertinent positives and negatives noted in the the HPI.        Physical examination:  GEN:  Pleasant well-developed well-nourished male who is in no acute distress.  HEENT: Negative for scleral icterus.  Conjunctivae are pink.  Neck:  Supple without adenopathy or thyromegaly  Chest: Clear to auscultation and percussion  Cardiovascular: Regular S1 and S2  Abdomen: Appears slightly distended.  Bowel sounds are present.  There is no tenderness to palpation.  There are no masses appreciated.  Liver and spleen are not palpable.  Extremities: Without edema  Neurologic: Awake, alert and appropriate.  Nonfocal examination.  Affect:Normal   Skin: No jaundice.  Warm and dry.      Laboratory data:  Recent Labs   Lab 07/12/25  1622 07/18/25  0852 07/19/25  0621   RBC 3.70* 3.83 2.97*   HGB 10.6* 10.8* 8.4*   HCT 32.4* 33.5* 25.8*   MCV 87.6 87.5 86.9   MCH 28.6 28.2 28.3   MCHC 32.7 32.2 32.6   RDW 14.0 13.8 13.9   NEPRELIM 9.35* 7.11 5.97   WBC 11.9* 10.0 8.4   .0 558.0* 460.0*       Recent Labs   Lab 07/12/25  1622 07/18/25  0852 07/18/25  1621 07/19/25  0621   * 100* 93 72   BUN 22 27* 30* 21   CREATSERUM 1.28 1.95* 2.11* 1.30   CA 9.4 9.6 9.5 8.6*   ALB 4.9*  --  5.0* 3.9   * 133* 133* 134*   K 4.0 4.2 4.0 3.8    104 102 108   CO2 23.0 22.0 20.0* 19.0*   ALKPHO 54  --  56  --    AST 17  --  15  --    ALT 17  --  13  --    BILT 0.4  --  0.5  --    TP 7.2  --  7.6  --        Lab Results   Component Value Date    INR 0.93 03/05/2019         CT ABDOMEN+PELVIS(CONTRAST ONLY)(CPT=74177)  Result Date: 7/18/2025  CONCLUSION: 1.  Focal mural edema of the second portion of the duodenum. Additionally, there is fluid noted throughout the small bowel and ascending colon. This may represent duodenitis. Electronically Verified and Signed by Attending Radiologist: Jason Diehl MD 7/18/2025 5:47 PM Workstation: [id number]      Assessment and Plan:    Impression:  Diarrhea:  The patient presents with a 3-week history of watery diarrhea.  CT scanning reveals mural thickening of the second portion of the duodenum of uncertain significance.  The patient is also iron deficient.  We have discussed possible causes of his symptoms including an  infectious gastroenteritis, celiac sprue, and olmesartan induced sprue syndrome or less likely a neuroendocrine tumor of the duodenum.  The patient will be kept on clear liquids today.  IV iron repletion as per Dr. Villar.  Await GI PCR panel, C. difficile toxin assay and sprue serology.  The patient will require upper and lower endoscopy.  Timing to be dependent on clinical course.      Recommendations:  1.  Await results of GI PCR panel, C. difficile toxin assay and sprue serology.  2.  Continue clear liquids today.  3.  IV iron repletion as per Dr. Villar.  4.  The patient will require upper and lower endoscopy, timing dependent on clinical course.      Real Abreu MD  [facility]  07/19/25        This note was partially prepared using Dragon Medical voice recognition dictation software. As a result, errors may occur. When identified, these errors have been corrected. While every attempt is made to correct errors during dictation, discrepancies may still exist.                    [1]   Past Medical History:   ALCOHOL USE    Allergic rhinitis    Flonase    Arthritis    Bladder cancer (HCC)    f/u [facility]    BPH (benign prostatic hyperplasia)    COPD (HCC)    moderate-severe     Disorder of kidney and ureter    Disorder of thyroid    hypothyroid    ED (erectile dysfunction)    Esophageal reflux    Essential hypertension    Hearing impairment    Hepatomegaly    Hiatal hernia    High blood pressure    High cholesterol    Hypothyroidism    Injury    Left knee injury, from fall    Osteoarthritis    in knee's, hip's    Pulmonary nodules    first noted 2016on LD screen CT    Tobacco abuse    Visual impairment    glasses   [2]   Allergies  Allergen Reactions    Bupropion DIZZINESS   [3]   Current Facility-Administered Medications   Medication Dose Route Frequency    sodium phosphate 15 mmol in 0.9% NaCl 100mL IVPB premix  15 mmol Intravenous Once    pantoprazole (Protonix) 40 mg in sodium  chloride 0.9% PF 10 mL IV push  40 mg Intravenous Daily    sodium chloride 0.9% infusion   Intravenous Continuous    heparin (Porcine) 5000 UNIT/ML injection 5,000 Units  5,000 Units Subcutaneous 2 times per day    acetaminophen (Tylenol Extra Strength) tab 500 mg  500 mg Oral Q4H PRN    metoclopramide (Reglan) 5 mg/mL injection 5 mg  5 mg Intravenous Q8H PRN    temazepam (Restoril) cap 15 mg  15 mg Oral Nightly PRN    albuterol (Ventolin HFA) 108 (90 Base) MCG/ACT inhaler 2 puff  2 puff Inhalation 2 times per day    atorvastatin (Lipitor) tab 10 mg  10 mg Oral Daily    finasteride (Proscar) tab 5 mg  5 mg Oral Daily    fluticasone propionate (Flonase) 50 MCG/ACT nasal suspension 1 spray  1 spray Each Nare BID    levothyroxine (Synthroid) tab 88 mcg  88 mcg Oral Before breakfast   [4]   Outpatient Medications Marked as Taking for the 7/18/25 encounter (Hospital Encounter)   Medication Sig Dispense Refill    diphenoxylate-atropine (LOMOTIL) 2.5-0.025 MG Oral Tab Take 2 tablets by mouth 4 (four) times daily as needed for Diarrhea. 30 tablet 0    Magnesium 400 MG Oral Tab Take 400 mg by mouth in the morning and 400 mg before bedtime. (Patient taking differently: Take 400 mg by mouth daily.) 180 tablet 0    Cyanocobalamin (B-12) 1000 MCG Oral Cap Take 1 capsule by mouth daily. 180 capsule 0    ergocalciferol 1.25 MG (85973 UT) Oral Cap Take 1 capsule (50,000 Units total) by mouth once a week. (Patient taking differently: Take 1 capsule (50,000 Units total) by mouth once a week. Taking dose on Fridays) 12 capsule 1    fluticasone propionate 50 MCG/ACT Nasal Suspension 2 sprays by Nasal route in the morning and 2 sprays before bedtime. 48 mL 1    albuterol (VENTOLIN HFA) 108 (90 Base) MCG/ACT Inhalation Aero Soln Inhale 2 puffs into the lungs in the morning and 2 puffs in the evening.      acetaminophen 500 MG Oral Tab Take 1-2 tablets (500-1,000 mg total) by mouth every 6 (six) hours as needed for Pain.      Fenofibrate  160 MG Oral Tab Take 1 tablet (160 mg total) by mouth daily. 90 tablet 3    ATORVASTATIN 10 MG Oral Tab TAKE 1 TABLET BY MOUTH EVERY DAY 90 tablet 3    levothyroxine (LEVOXYL) 88 MCG Oral Tab Take 1 tablet (88 mcg total) by mouth before breakfast. For underactive thyroid. 90 tablet 3    Olmesartan Medoxomil 40 MG Oral Tab Take 1 tablet (40 mg total) by mouth daily. For blood pressure 90 tablet 3    BREO ELLIPTA 200-25 MCG/ACT Inhalation Aerosol Powder, Breath Activated Inhale 1 puff into the lungs in the evening.      Dutasteride 0.5 MG Oral Cap Take 1 capsule (0.5 mg total) by mouth every morning.     [5]   Family History  Problem Relation Age of Onset    Dementia Mother     Diabetes Paternal Grandmother     Cancer Sister         lung cancer

## 2025-07-19 NOTE — H&P
[facility]    PATIENT'S NAME: FRANCESCO NIELSON   ATTENDING PHYSICIAN: Gray Wiley MD   PATIENT ACCOUNT#:   [id number]    LOCATION:  [address]  MEDICAL RECORD #:   [id number]       YOB: 1955  ADMISSION DATE:       07/18/2025    HISTORY AND PHYSICAL EXAMINATION    CHIEF COMPLAINT:  Acute kidney injury, persistent watery diarrhea.    HISTORY OF PRESENT ILLNESS:  Patient is a 69-year-old  male who came in today to the emergency department for evaluation after he was found to have decline in his kidney function on an outpatient blood test.  Patient has been having watery diarrhea for the last several days.  Yesterday, he was seen by his primary care physician, had a prescription for Lomotil.  Today in the emergency room, CBC showed white blood cell count of 10.0 with left shift, hemoglobin 10.8.  Chemistry showed GFR of 33 which is below his baseline, bicarb 20, BUN and creatinine of 30 and 2.11, sodium 133 which is chronic.  CT scan of the abdomen and pelvis showed focal mural edema of the second portion of the duodenum.  Additionally, there is fluid noted throughout the small bowel and ascending colon, finding may represent duodenitis.     PAST MEDICAL HISTORY:  Chronic obstructive pulmonary disease, gastroesophageal reflux disease, benign prostatic hypertrophy, hypothyroidism, hyperlipidemia, generalized osteoarthritis, hiatal hernia, degenerative joint disease of lumbar spine.     PAST SURGICAL HISTORY:  Transurethral resection of bladder tumor for urothelial carcinoma, on followup cystoscopy no recurrence.  Left knee arthroscopic procedure, VATS, left upper lobe wedge resection for non-small cell lung cancer, pathology squamous cell carcinoma declined adjuvant chemotherapy.    MEDICATIONS:  Please see medication reconciliation list.    ALLERGIES:  No known drug allergies.     FAMILY HISTORY:  Mother had diabetes mellitus type 2 and hypertension.    SOCIAL HISTORY:   Chronic tobacco use, history of heavy alcohol consumption but he has not consumed alcohol lately.  No drug use.  Lives with his family.  Usually independent in his basic activities of daily living.     REVIEW OF SYSTEMS:  Patient reports watery loose bowel movements for the last several days.  He was started on magnesium tablets around 3 to 4 weeks ago.  He denies fever, chills, or body aches.  No recent antibiotic use or recent travel.  No sick contacts.  He continued to take olmesartan on a daily basis for his blood pressure as well.    PHYSICAL EXAMINATION:    GENERAL:  Alert and oriented to time, place, and person.  No acute distress.  VITAL SIGNS:  Temperature 98.0, pulse 18, respiratory rate 131/70, pulse ox 97% on room air.  HEENT:  Atraumatic.  Oropharynx clear.  Dry mucous membranes.  Ears, nose normal.  Anicteric sclerae.    NECK:  Supple.  No lymphadenopathy.  Trachea midline.  Full range of motion.  LUNGS:  Clear to auscultation bilaterally.  Normal respiratory effort.  HEART:  Regular rate and rhythm.  S1, S2 auscultated.  No murmur.  ABDOMEN:  Soft, mild distention.  Hypoactive bowel sounds.  EXTREMITIES:  No peripheral edema, clubbing, or cyanosis.   NEUROLOGIC:  Motor and sensory intact.     ASSESSMENT AND PLAN:    1.   Acute kidney injury secondary to volume loss with diarrhea.  2.   Persistent diarrhea, rule out infectious etiology.  3.   Metabolic acidosis secondary to bicarb loss with diarrhea.  4.   Chronic hyponatremia.  5.   Imaging studies suggestive of duodenitis, rule out peptic ulcer disease which can cause diarrhea as well.    Patient will be admitted to general medical floor.  IV fluids.  Clear liquid diet.  IV Protonix.  Hold olmesartan, magnesium, and Lomotil.  Nephrology and gastroenterology consult.  GI PCR panel and C difficile studies.  Further recommendations to follow.    Dictated By Sandie Mendoza MD  d: 07/18/2025 19:08:47  t: 07/18/2025  19:41:07  [facility] [id number]  /

## 2025-07-19 NOTE — CONSULTS
[facility]    PATIENT'S NAME: FRANCESCO NIELSON   ATTENDING PHYSICIAN: Gray Wiley MD   CONSULTING PHYSICIAN: Faisal Villar MD   PATIENT ACCOUNT#:   [id number]    LOCATION:  [address]  MEDICAL RECORD #:   [id number]       YOB: 1955  ADMISSION DATE:       07/18/2025      CONSULT DATE:  07/18/2025    REPORT OF CONSULTATION      HISTORY OF PRESENT ILLNESS:  The patient is a 69-year-old male with a history of hypertension, bladder cancer, severe COPD, history of hypothyroidism, GERD, hypercholesterolemia, who is known to my service as I see him for chronic hyponatremia, who now presents to the emergency room with a 3-week history of diarrhea.  Symptoms seem to be waxing and waning.  Has used some over-the-counter medications such as Pepto-Bismol but symptoms have persisted.  As a result, he just saw Dr. Castelan in the office yesterday.  They ordered some stool culture, which he has not yet done.  In addition, routine laboratory studies showed a creatinine of 1.95 and as a result of this, the patient was directed to the emergency room for further workup and evaluation.  When the patient arrived in the emergency room, repeat labs showed a BUN and creatinine of 30 and 2.11, respectively, with a CO2 of 20.  His GFR is 33, and the patient is now admitted for further workup and evaluation.    Of note is the patient always has had pretty normal renal function.  On July 10, 2025, he had a creatinine of 1.14.  His sodium on that day was 126.  The patient got worried and went to the emergency room on 07/12/2025, and his sodium is better at 135 with a creatinine 1.25.     The patient denies any recent travel or antibiotics.  No one else at home has similar GI symptoms.    PAST MEDICAL HISTORY:  Significant for hypertension, bladder cancer without evidence for recurrence, severe COPD, history of hypothyroidism, GERD, hypercholesterolemia.  Hyponatremia is thought to be secondary to SIADH  secondary to his pulmonary issues.     MEDICATIONS:  At home include albuterol, Lipitor, Breo Ellipta, vitamin B12, vitamin D, fenofibrate, Proscar, Flonase, Zyrtec, levothyroxine, magnesium, olmesartan 40 mg daily, Tylenol, and he was just given Lomotil yesterday.    SOCIAL HISTORY:  He is a chronic cigarette smoker.  Has had problems with excess alcohol consumption, but he states he has cut back significantly.    FAMILY HISTORY:  Negative for renal pathology.    REVIEW OF SYSTEMS:  The patient states he has fatigue but has not really been having any obvious nausea or vomiting.  No change in urinary habits.  No increase in shortness of breath or chest pain.  No recent fevers or chills.  No blood in the stools.  A 10-point review of systems is otherwise unremarkable.      PHYSICAL EXAMINATION:    GENERAL:  The patient is awake, alert, and oriented x3, not in any acute distress.  VITAL SIGNS:  Blood pressure 131/70, with a pulse 98, respirations 18, afebrile, O2 saturation was 97% on room air.  NECK:  Supple without JVD.  LUNGS:  Clear.  HEART:  Regular rate and rhythm with an S4, but no other gallops, murmurs, or rubs.  ABDOMEN:  Soft, flat, nontender without organomegaly, masses, or bruits.  EXTREMITIES:  No edema.    LABORATORY DATA:  As stated above.  He also had a white count 10.0 with a hemoglobin 10.8.      The patient also had a CT scan done of the abdomen and pelvis with IV contrast.  Emphysematous changes were noted in the lung bases.  Kidneys appeared to be grossly normal.  There was evidence for duodenitis, but otherwise was unremarkable.    IMPRESSION:  The patient is a 69-year-old male now with:    1.   Acute renal failure.  This is most likely secondary to volume depletion secondary to diarrhea.  Olmesartan is also probably playing a role.  The patient also just got a CT scan with IV contrast and need to make sure that the patient does not develop contrast-induced nephropathy.  2.   History of diarrhea  x 3 weeks.  Etiology to be determined.  3.   History of hypertension.  4.   History of bladder cancer.  5.   History of severe chronic obstructive pulmonary disease.  6.   Hypothyroidism.  7.   Gastroesophageal reflux disease.  8.   Hypercholesterolemia.  9.   History of chronic hyponatremia secondary to SIADH secondary to his COPD.  Currently stable at 133.    PLAN:  Will hold olmesartan.  Start patient on IV fluids.  Will obtain urine studies.  Stool cultures have been ordered.  Will follow with I's and O's, daily weights, and serial chemistries.  Continue 48-ounce per day p.o. fluid restriction.  Discussed the above with his wife and Dr. Mendoza.     Dictated By Faisal Villar MD  d: 07/18/2025 19:32:29  t: 07/18/2025 19:34:49  [facility] [id number]  [facility]/

## 2025-07-19 NOTE — PLAN OF CARE
Problem: Patient Centered Care  Goal: Patient preferences are identified and integrated in the patient's plan of care  Description: Interventions:  - What would you like us to know as we care for you?   - Provide timely, complete, and accurate information to patient/family  - Incorporate patient and family knowledge, values, beliefs, and cultural backgrounds into the planning and delivery of care  - Encourage patient/family to participate in care and decision-making at the level they choose  - Honor patient and family perspectives and choices  Outcome: Progressing     Problem: GASTROINTESTINAL - ADULT  Goal: Minimal or absence of nausea and vomiting  Description: INTERVENTIONS:  - Maintain adequate hydration with IV or PO as ordered and tolerated  - Nasogastric tube to low intermittent suction as ordered  - Evaluate effectiveness of ordered antiemetic medications  - Provide nonpharmacologic comfort measures as appropriate  - Advance diet as tolerated, if ordered  - Obtain nutritional consult as needed  - Evaluate fluid balance  Outcome: Progressing  Goal: Maintains or returns to baseline bowel function  Description: INTERVENTIONS:  - Assess bowel function  - Maintain adequate hydration with IV or PO as ordered and tolerated  - Evaluate effectiveness of GI medications  - Encourage mobilization and activity  - Obtain nutritional consult as needed  - Establish a toileting routine/schedule  - Consider collaborating with pharmacy to review patient's medication profile  Outcome: Progressing     Problem: GENITOURINARY - ADULT  Goal: Absence of urinary retention  Description: INTERVENTIONS:  - Assess patient’s ability to void and empty bladder  - Monitor intake/output and perform bladder scan as needed  - Follow urinary retention protocol/standard of care  - Consider collaborating with pharmacy to review patient's medication profile  - Implement strategies to promote bladder emptying  Outcome: Progressing     Problem:  METABOLIC/FLUID AND ELECTROLYTES - ADULT  Goal: Electrolytes maintained within normal limits  Description: INTERVENTIONS:  - Monitor labs and rhythm and assess patient for signs and symptoms of electrolyte imbalances  - Administer electrolyte replacement as ordered  - Monitor response to electrolyte replacements, including rhythm and repeat lab results as appropriate  - Fluid restriction as ordered  - Instruct patient on fluid and nutrition restrictions as appropriate  Outcome: Progressing  Goal: Hemodynamic stability and optimal renal function maintained  Description: INTERVENTIONS:  - Monitor labs and assess for signs and symptoms of volume excess or deficit  - Monitor intake, output and patient weight  - Monitor urine specific gravity, serum osmolarity and serum sodium as indicated or ordered  - Monitor response to interventions for patient's volume status, including labs, urine output, blood pressure (other measures as available)  - Encourage oral intake as appropriate  - Instruct patient on fluid and nutrition restrictions as appropriate  Outcome: Progressing     Problem: SAFETY ADULT - FALL  Goal: Free from fall injury  Description: INTERVENTIONS:  - Assess pt frequently for physical needs  - Identify cognitive and physical deficits and behaviors that affect risk of falls.  - [place] fall precautions as indicated by assessment.  - Educate pt/family on patient safety including physical limitations  - Instruct pt to call for assistance with activity based on assessment  - Modify environment to reduce risk of injury  - Provide assistive devices as appropriate  - Consider OT/PT consult to assist with strengthening/mobility  - Encourage toileting schedule  Outcome: Progressing     Problem: DISCHARGE PLANNING  Goal: Discharge to home or other facility with appropriate resources  Description: INTERVENTIONS:  - Identify barriers to discharge w/pt and caregiver  - Include patient/family/discharge partner in discharge  planning  - Arrange for needed discharge resources and transportation as appropriate  - Identify discharge learning needs (meds, wound care, etc)  - Arrange for interpreters to assist at discharge as needed  - Consider post-discharge preferences of patient/family/discharge partner  - Complete POLST form as appropriate  - Assess patient's ability to be responsible for managing their own health  - Refer to Case Management Department for coordinating discharge planning if the patient needs post-hospital services based on physician/LIP order or complex needs related to functional status, cognitive ability or social support system  Outcome: Progressing

## 2025-07-20 PROBLEM — E87.6 HYPOKALEMIA DUE TO EXCESSIVE GASTROINTESTINAL LOSS OF POTASSIUM: Status: ACTIVE | Noted: 2025-07-20

## 2025-07-20 LAB
ALBUMIN SERPL-MCNC: 3.7 G/DL (ref 3.2–4.8)
ANION GAP SERPL CALC-SCNC: 8 MMOL/L (ref 0–18)
BASOPHILS # BLD AUTO: 0.03 X10(3) UL (ref 0–0.2)
BASOPHILS NFR BLD AUTO: 0.4 %
BUN BLD-MCNC: 8 MG/DL (ref 9–23)
BUN/CREAT SERPL: 9.1 (ref 10–20)
C DIFF TOX B STL QL: NEGATIVE
CALCIUM BLD-MCNC: 8.6 MG/DL (ref 8.7–10.4)
CHLORIDE SERPL-SCNC: 103 MMOL/L (ref 98–112)
CO2 SERPL-SCNC: 25 MMOL/L (ref 21–32)
CREAT BLD-MCNC: 0.88 MG/DL (ref 0.7–1.3)
DEPRECATED RDW RBC AUTO: 42.5 FL (ref 35.1–46.3)
EGFRCR SERPLBLD CKD-EPI 2021: 92 ML/MIN/1.73M2 (ref 60–?)
EOSINOPHIL # BLD AUTO: 0.18 X10(3) UL (ref 0–0.7)
EOSINOPHIL NFR BLD AUTO: 2.7 %
ERYTHROCYTE [DISTWIDTH] IN BLOOD BY AUTOMATED COUNT: 13.5 % (ref 11–15)
GLUCOSE BLD-MCNC: 97 MG/DL (ref 70–99)
HCT VFR BLD AUTO: 25.4 % (ref 39–53)
HGB BLD-MCNC: 8.5 G/DL (ref 13–17.5)
IMM GRANULOCYTES # BLD AUTO: 0.16 X10(3) UL (ref 0–1)
IMM GRANULOCYTES NFR BLD: 2.4 %
LYMPHOCYTES # BLD AUTO: 0.61 X10(3) UL (ref 1–4)
LYMPHOCYTES NFR BLD AUTO: 9 %
MAGNESIUM SERPL-MCNC: 2 MG/DL (ref 1.6–2.6)
MCH RBC QN AUTO: 28.4 PG (ref 26–34)
MCHC RBC AUTO-ENTMCNC: 33.5 G/DL (ref 31–37)
MCV RBC AUTO: 84.9 FL (ref 80–100)
MONOCYTES # BLD AUTO: 0.89 X10(3) UL (ref 0.1–1)
MONOCYTES NFR BLD AUTO: 13.1 %
NEUTROPHILS # BLD AUTO: 4.92 X10 (3) UL (ref 1.5–7.7)
NEUTROPHILS # BLD AUTO: 4.92 X10(3) UL (ref 1.5–7.7)
NEUTROPHILS NFR BLD AUTO: 72.4 %
OSMOLALITY SERPL CALC.SUM OF ELEC: 280 MOSM/KG (ref 275–295)
PHOSPHATE SERPL-MCNC: 2 MG/DL (ref 2.4–5.1)
PLATELET # BLD AUTO: 431 10(3)UL (ref 150–450)
POTASSIUM SERPL-SCNC: 3.4 MMOL/L (ref 3.5–5.1)
POTASSIUM SERPL-SCNC: 4.2 MMOL/L (ref 3.5–5.1)
RBC # BLD AUTO: 2.99 X10(6)UL (ref 3.8–5.8)
SODIUM SERPL-SCNC: 136 MMOL/L (ref 136–145)
WBC # BLD AUTO: 6.8 X10(3) UL (ref 4–11)

## 2025-07-20 PROCEDURE — 99232 SBSQ HOSP IP/OBS MODERATE 35: CPT | Performed by: STUDENT IN AN ORGANIZED HEALTH CARE EDUCATION/TRAINING PROGRAM

## 2025-07-20 PROCEDURE — 99232 SBSQ HOSP IP/OBS MODERATE 35: CPT | Performed by: INTERNAL MEDICINE

## 2025-07-20 PROCEDURE — 99233 SBSQ HOSP IP/OBS HIGH 50: CPT | Performed by: INTERNAL MEDICINE

## 2025-07-20 RX ORDER — POTASSIUM CHLORIDE 14.9 MG/ML
20 INJECTION INTRAVENOUS ONCE
Status: COMPLETED | OUTPATIENT
Start: 2025-07-20 | End: 2025-07-20

## 2025-07-20 NOTE — PLAN OF CARE
Consent done for EGD & Colonoscopy.    Problem: Patient Centered Care  Goal: Patient preferences are identified and integrated in the patient's plan of care  Description: Interventions:  - What would you like us to know as we care for you? Home with spouse  - Provide timely, complete, and accurate information to patient/family  - Incorporate patient and family knowledge, values, beliefs, and cultural backgrounds into the planning and delivery of care  - Encourage patient/family to participate in care and decision-making at the level they choose  - Honor patient and family perspectives and choices  Outcome: Progressing     Problem: GASTROINTESTINAL - ADULT  Goal: Minimal or absence of nausea and vomiting  Description: INTERVENTIONS:  - Maintain adequate hydration with IV or PO as ordered and tolerated  - Nasogastric tube to low intermittent suction as ordered  - Evaluate effectiveness of ordered antiemetic medications  - Provide nonpharmacologic comfort measures as appropriate  - Advance diet as tolerated, if ordered  - Obtain nutritional consult as needed  - Evaluate fluid balance  Outcome: Progressing  Goal: Maintains or returns to baseline bowel function  Description: INTERVENTIONS:  - Assess bowel function  - Maintain adequate hydration with IV or PO as ordered and tolerated  - Evaluate effectiveness of GI medications  - Encourage mobilization and activity  - Obtain nutritional consult as needed  - Establish a toileting routine/schedule  - Consider collaborating with pharmacy to review patient's medication profile  Outcome: Progressing     Problem: GENITOURINARY - ADULT  Goal: Absence of urinary retention  Description: INTERVENTIONS:  - Assess patient’s ability to void and empty bladder  - Monitor intake/output and perform bladder scan as needed  - Follow urinary retention protocol/standard of care  - Consider collaborating with pharmacy to review patient's medication profile  - Implement strategies to promote  bladder emptying  Outcome: Progressing     Problem: METABOLIC/FLUID AND ELECTROLYTES - ADULT  Goal: Electrolytes maintained within normal limits  Description: INTERVENTIONS:  - Monitor labs and rhythm and assess patient for signs and symptoms of electrolyte imbalances  - Administer electrolyte replacement as ordered  - Monitor response to electrolyte replacements, including rhythm and repeat lab results as appropriate  - Fluid restriction as ordered  - Instruct patient on fluid and nutrition restrictions as appropriate  Outcome: Progressing  Goal: Hemodynamic stability and optimal renal function maintained  Description: INTERVENTIONS:  - Monitor labs and assess for signs and symptoms of volume excess or deficit  - Monitor intake, output and patient weight  - Monitor urine specific gravity, serum osmolarity and serum sodium as indicated or ordered  - Monitor response to interventions for patient's volume status, including labs, urine output, blood pressure (other measures as available)  - Encourage oral intake as appropriate  - Instruct patient on fluid and nutrition restrictions as appropriate  Outcome: Progressing     Problem: SAFETY ADULT - FALL  Goal: Free from fall injury  Description: INTERVENTIONS:  - Assess pt frequently for physical needs  - Identify cognitive and physical deficits and behaviors that affect risk of falls.  - [place] fall precautions as indicated by assessment.  - Educate pt/family on patient safety including physical limitations  - Instruct pt to call for assistance with activity based on assessment  - Modify environment to reduce risk of injury  - Provide assistive devices as appropriate  - Consider OT/PT consult to assist with strengthening/mobility  - Encourage toileting schedule  Outcome: Progressing     Problem: DISCHARGE PLANNING  Goal: Discharge to home or other facility with appropriate resources  Description: INTERVENTIONS:  - Identify barriers to discharge w/pt and caregiver  -  Include patient/family/discharge partner in discharge planning  - Arrange for needed discharge resources and transportation as appropriate  - Identify discharge learning needs (meds, wound care, etc)  - Arrange for interpreters to assist at discharge as needed  - Consider post-discharge preferences of patient/family/discharge partner  - Complete POLST form as appropriate  - Assess patient's ability to be responsible for managing their own health  - Refer to Case Management Department for coordinating discharge planning if the patient needs post-hospital services based on physician/LIP order or complex needs related to functional status, cognitive ability or social support system  Outcome: Progressing

## 2025-07-20 NOTE — PROGRESS NOTES
Progress Note     Alejandro Hanna Patient Status:  Inpatient    12/10/1955 MRN [id number]   Location [facility] 5SW/SE Attending Ramila Galeano MD   Hosp Day # 2 PCP Leonid Ford DO     Chief Complaint: Diarrhea    Subjective:   S: No overnight events.  Tolerating p.o. intake.  No BM yet.  Creatinine improved    Review of Systems:   10 point ROS completed and was negative, except for pertinent positive and negatives stated in subjective.    Objective:   Vital signs:  Temp:  [98.5 °F (36.9 °C)-99.4 °F (37.4 °C)] 99.4 °F (37.4 °C)  Pulse:  [73-87] 74  Resp:  [16-18] 18  BP: (117-133)/(61-70) 117/70  SpO2:  [93 %-94 %] 94 %    Wt Readings from Last 6 Encounters:   25 122 lb 14.4 oz (55.7 kg)   25 122 lb (55.3 kg)   25 125 lb (56.7 kg)   25 125 lb 9.6 oz (57 kg)   25 125 lb (56.7 kg)   25 125 lb (56.7 kg)         Physical Exam:    General: No acute distress. Alert ,         Respiratory: Clear to auscultation bilaterally. No wheezes. No rhonchi.  Cardiovascular: S1, S2. Regular rate and rhythm. No murmurs, rubs or gallops.   Abdomen: Soft, nontender, nondistended.  Positive bowel sounds. No rebound or guarding.  Neurologic: No focal neurological deficits.   Musculoskeletal: Moves all extremities.  Extremities: No edema.    Results:   Diagnostic Data:      Labs:    Labs Last 24 Hours:   BMP     CBC    Other     Na 136 Cl 103 BUN 8 Glu 97   Hb 8.5   PTT - Procal -   K 3.4 CO2 25.0 Cr 0.88   WBC 6.8 >< .0  INR - CRP 6.20   Renal Lytes Endo    Hct 25.4   Trop - D dim -   eGFR - Ca 8.6 POC Gluc  -    LFT   pBNP - Lactic 1.1   eGFR AA - PO4 2.0 A1c -   AST - APk - Prot -  LDL -     Mg 2.0 TSH -   ALT - T rosanne - Alb 3.7        COVID-19 Lab Results    COVID-19  Lab Results   Component Value Date    COVID19 Not Detected 2025    COVID19 Not Detected 2024    COVID19 Not Detected 10/28/2023       Pro-Calcitonin  No results for input(s): \"PCT\" in the last 168  hours.    Cardiac  No results for input(s): \"TROP\", \"PBNP\" in the last 168 hours.    Creatinine Kinase  No results for input(s): \"CK\" in the last 168 hours.    Inflammatory Markers  Recent Labs   Lab 07/19/25  0621 07/19/25 2023   CRP  --  6.20*   FRANK 512*  --        Imaging: Imaging data reviewed in Epic.    Medications: Scheduled Medications[1]    Assessment & Plan:   ASSESSMENT / PLAN:   #Nonbloody diarrhea  Unclear etiology.  CT abdomen pelvis reviewed and it showed focal mural edema of the second portion of the duodenum.  Additionally there is fluid noted throughout the small bowel and ascending colon concerning for duodenitis.  There is no evidence of bowel obstruction  - Patient afebrile.  Hold off on antibiotics.  GI enteric panel, C. difficile and celiac serologies pending.  UA negative  - Advance to full liquids and keep n.p.o. after midnight.  Given his focal mural edema of unclear significance, he will need an EGD and a colonoscopy.. Which is tentatively planned for tomorrow morning  - Iron studies noted.  Ferritin is elevated.  He is on IV iron    #BRIELLE with metabolic acidosis-improving  Creatinine on admission 1.95 and then peaked at 2.11.  Improving now.  Stop fluids    # Electrolyte imbalances secondary to GI losses  Serial lab monitoring and replacement as needed    #Hypothyroidism  Continue PTA levothyroxine  Repeat TSH and free T4 acceptable    #Hyperlipidemia  Continue statin    #BPH  Continue finasteride      Quality:  DVT Prophylaxis: SCDs  CODE status: Full code  Ortega:    Central line: None  Dispo: further recs pending clinical course      Will the patient be referred to [facility] on discharge?:  N/A  Estimated date of discharge: 24 to 48 hours  Discharge is dependent on: Endoscopies  At this point Mr. Hanna is expected to be discharge to: Anticipate home    Plan of care discussed with patient and primary RN    Outpatient records or previous hospital records reviewed.   Patient and/or patient's  family given opportunity to ask questions and note understanding and agreeing with therapeutic plan as outlined     Coordinated care with providers and counseling re: treatment plan and workup    MDM: High complexity    Ramila Galeano MD  [facility]              [1]    potassium chloride  20 mEq Intravenous Once    polyethylene glycol-electrolyte solution  480 mL Oral BID@0300,1800    sodium ferric gluconate  125 mg Intravenous Daily    pantoprazole  40 mg Intravenous Daily    albuterol  2 puff Inhalation 2 times per day    atorvastatin  10 mg Oral Daily    finasteride  5 mg Oral Daily    fluticasone propionate  1 spray Each Nare BID    levothyroxine  88 mcg Oral Before breakfast

## 2025-07-20 NOTE — PROGRESS NOTES
[facility]  Nephrology Daily Progress Note    Alejandro Hanna  [id number]  69 year old      HPI:   Alejandro Hanna is a 69 year old male.  Overall feeling better.  He has not had any diarrhea today.  Eating well      ROS:     Constitutional:  Negative for decreased activity, fever, irritability and lethargy  Endocrine:  Negative for abnormal sleep patterns, increased activity, polydipsia and polyphagia  Cardiovascular:  Negative for cool extremity and irregular heartbeat/palpitations  Gastrointestinal:  Negative for abdominal pain, constipation, decreased appetite, diarrhea and vomiting  Genitourinary:  Negative for dysuria and hematuria  Hema/Lymph:  Negative for easy bleeding and easy bruising  Integumentary:  Negative for pruritus and rash  Musculoskeletal:  Negative for bone/joint symptoms  Neurological:  Negative for gait disturbance  Psychiatric:  Negative for inappropriate interaction and psychiatric symptoms  Respiratory:  Negative for cough, dyspnea and wheezing      PHYSICAL EXAM:   Temp:  [98.5 °F (36.9 °C)-99.4 °F (37.4 °C)] 99.1 °F (37.3 °C)  Pulse:  [73-87] 75  Resp:  [16-18] 18  BP: (117-141)/(61-70) 141/62  SpO2:  [93 %-96 %] 96 %  Patient Weight for the past 72 hrs:   Weight   07/18/25 1619 125 lb (56.7 kg)   07/18/25 2049 121 lb 12.8 oz (55.2 kg)   07/19/25 0456 121 lb 9.6 oz (55.2 kg)   07/20/25 0608 122 lb 14.4 oz (55.7 kg)       Constitutional: appears well hydrated alert and responsive no acute distress noted  Neck/Thyroid: neck is supple without adenopathy  Lymphatic: no abnormal cervical, supraclavicular or axillary adenopathy is noted  Respiratory: normal to inspection lungs are clear to auscultation bilaterally normal respiratory effort  Cardiovascular: regular rate and rhythm no murmurs, gallups, or rubs  Abdomen: soft non-tender non-distended no organomegaly noted no masses  Musculoskeletal: full ROM all extremities good strength  no deformities  Extremities: no  edema, cyanosis, or clubbing  Neurological: exam appropriate for age reflexes and motor skills appropriate for age    Labs:  Lab Results   Component Value Date    WBC 6.8 07/20/2025    HGB 8.5 07/20/2025    HCT 25.4 07/20/2025    .0 07/20/2025    CREATSERUM 0.88 07/20/2025    BUN 8 07/20/2025     07/20/2025    K 3.4 07/20/2025     07/20/2025    CO2 25.0 07/20/2025    GLU 97 07/20/2025    CA 8.6 07/20/2025    ALB 3.7 07/20/2025    ESRML 62 07/19/2025    CRP 6.20 07/19/2025    MG 2.0 07/20/2025    PHOS 2.0 07/20/2025     Recent Labs   Lab 07/18/25  0852 07/19/25  0621 07/20/25  0734   WBC 10.0 8.4 6.8   HGB 10.8* 8.4* 8.5*   HCT 33.5* 25.8* 25.4*   .0* 460.0* 431.0     Recent Labs   Lab 07/18/25  1621 07/19/25  0621 07/20/25  0734   GLU 93 72 97   BUN 30* 21 8*   CREATSERUM 2.11* 1.30 0.88   CA 9.5 8.6* 8.6*   ALB 5.0* 3.9 3.7   * 134* 136   K 4.0 3.8 3.4*    108 103   CO2 20.0* 19.0* 25.0   ALKPHO 56  --   --    AST 15  --   --    ALT 13  --   --    BILT 0.5  --   --    TP 7.6  --   --    PHOS  --  2.4 2.0*       Imaging  CT ABDOMEN+PELVIS(CONTRAST ONLY)(CPT=74177)  Result Date: 7/18/2025  CONCLUSION: 1.  Focal mural edema of the second portion of the duodenum. Additionally, there is fluid noted throughout the small bowel and ascending colon. This may represent duodenitis. Electronically Verified and Signed by Attending Radiologist: Jason Diehl MD 7/18/2025 5:47 PM Workstation: [id number]        Medications:  Current Hospital Medications[1]    Allergies:  Allergies[2]    Input/Output:    Intake/Output Summary (Last 24 hours) at 7/20/2025 1701  Last data filed at 7/20/2025 1143  Gross per 24 hour   Intake 1902 ml   Output --   Net 1902 ml          ASSESSMENT/PLAN:   Assessment   Problem List[3]    Urine output not recorded but creatinine better down to 0.88.  Sodium good at 136.  Replace potassium and phosphorus.  BRIELLE has resolved.  Scheduled for endoscopic studies tomorrow.   Hemoglobin 8.5.  Will discontinue active follow-up.  Please call if needed.           7/20/2025  Faisal Villar MD               [1]   Current Facility-Administered Medications:     polyethylene glycol-electrolyte solution (Plenvu) 140 g SOLR 480 mL, 480 mL, Oral, BID@0300,1800    sodium ferric gluconate (Ferrlecit) 125 mg in sodium chloride 0.9% 100mL IVPB premix, 125 mg, Intravenous, Daily    pantoprazole (Protonix) 40 mg in sodium chloride 0.9% PF 10 mL IV push, 40 mg, Intravenous, Daily    acetaminophen (Tylenol Extra Strength) tab 500 mg, 500 mg, Oral, Q4H PRN    metoclopramide (Reglan) 5 mg/mL injection 5 mg, 5 mg, Intravenous, Q8H PRN    temazepam (Restoril) cap 15 mg, 15 mg, Oral, Nightly PRN    albuterol (Ventolin HFA) 108 (90 Base) MCG/ACT inhaler 2 puff, 2 puff, Inhalation, 2 times per day    atorvastatin (Lipitor) tab 10 mg, 10 mg, Oral, Daily    finasteride (Proscar) tab 5 mg, 5 mg, Oral, Daily    fluticasone propionate (Flonase) 50 MCG/ACT nasal suspension 1 spray, 1 spray, Each Nare, BID    levothyroxine (Synthroid) tab 88 mcg, 88 mcg, Oral, Before breakfast  [2]   Allergies  Allergen Reactions    Bupropion DIZZINESS   [3]   Patient Active Problem List  Diagnosis    Essential hypertension, benign    Mixed hyperlipidemia    Nocturia    Benign non-nodular prostatic hyperplasia with lower urinary tract symptoms    Chronic obstructive pulmonary disease (HCC)    Decreased hearing of right ear    Eructation    Allergic rhinitis due to pollen    Mass of upper lobe of left lung    Acquired hypothyroidism    Tobacco use disorder    Bladder cancer (HCC)    Alcohol abuse    Hyponatremia    Calcification of aorta    Hiatal hernia    Protein-calorie malnutrition, unspecified severity (HCC)    Dizziness    Right ear impacted cerumen    Abnormal clinical finding    Abnormal blood chemistry    Abnormal results of liver function studies    Cough    Disorder of kidney and ureter    Hepatomegaly    Impotence of organic  origin    Internal derangement of left knee    Primary osteoarthritis of left knee    Cancer of upper lobe of left lung (HCC)    Lumbar radiculopathy    Leukocytosis    Hyperglycemia    Community acquired pneumonia of left lower lobe of lung    Weakness    Metastatic signet ring cell carcinoma (HCC)    Hypomagnesemia    Community acquired pneumonia, unspecified laterality    Episodic mood disorder    Uncomplicated alcohol dependence (HCC)    B12 deficiency    Diarrhea, unspecified type    BRIELLE (acute kidney injury)    Acute kidney failure    Hypokalemia due to excessive gastrointestinal loss of potassium

## 2025-07-20 NOTE — PROGRESS NOTES
[facility]     Gastroenterology Progress Note    Alejandro Hanna Patient Status:  Inpatient    12/10/1955 MRN [id number]   Location [facility] 5SW/SE Attending Ramila Galeano MD   Hosp Day # 2 PCP Leonid Ford DO       Assessment and Plan:   1.  Diarrhea  Quiescent while in the hospital hence stool studies have not been obtained.  CRP, ferritin and ESR are increased.  Anemic with an iron saturation of 6%.  Differential diagnosis of the diarrhea remains a previous infectious gastroenteritis, macroscopic or microscopic IBD, celiac disease or an olmesartan induced sprue syndrome.  The significance of the duodenal findings on CT scanning is unclear.  In light of the symptoms, iron deficiency and CT findings, I am recommending that we proceed with a colonoscopy and upper endoscopy which will be arranged tomorrow with a split dose Plenvu preparation and monitored anesthesia care.  Continue IV iron repletion.    2.  Iron deficiency anemia  Iron is being repleted.  Endoscopic evaluation as above.    3.  Personal history of colon polyps  Was due for a colonoscopy later this year.  To be performed in the inpatient setting.    Recommend:  1.  Continue clear liquid diet.  2.  Continue IV iron repletion.  3.  Colonoscopy and upper endoscopy tomorrow with a split dose Plenvu preparation and monitored anesthesia care.        Subjective:   History conducted in conjunction with the patient's wife Shawna on FaceTime.  No stools.  No abdominal pain.  No other complaints other than anxious to receive a diagnosis and treatment plan    Objective:   Patient Vitals for the past 24 hrs:   BP Temp Temp src Pulse Resp SpO2 Weight   25 0833 117/70 99.4 °F (37.4 °C) Oral 74 18 94 % --   25 0621 125/61 98.5 °F (36.9 °C) Oral 87 16 94 % --   25 0608 -- -- -- -- -- -- 122 lb 14.4 oz (55.7 kg)   25 2038 133/67 98.6 °F (37 °C) Oral 73 16 93 % --   25 1425 127/61 97.4 °F (36.3  °C) Oral 79 18 95 % --     Body mass index is 21.1 kg/m².    General: Patient appears comfortable and in no acute distress  HEENT:Negative for scleral icterus.  Mucous membranes are moist.  Cardiovascular: Regular rate and rhythm   Lung: Clear to auscultation bilaterally  Abdomen: Protuberant but soft.  Bowel sounds are present.  There is no tenderness to palpation.  There are no masses appreciated.  Liver and spleen are not palpable.  Skin: No jaundice.  Warm and dry.  Ext: No cyanosis, clubbing or edema is evident.   Neuro- Alert and interactive, and gross movements of extremities normal  Affect:Normal      Results:     Recent Labs   Lab 07/18/25  0852 07/19/25  0621 07/20/25  0734   RBC 3.83 2.97* 2.99*   HGB 10.8* 8.4* 8.5*   HCT 33.5* 25.8* 25.4*   MCV 87.5 86.9 84.9   MCH 28.2 28.3 28.4   MCHC 32.2 32.6 33.5   RDW 13.8 13.9 13.5   NEPRELIM 7.11 5.97 4.92   WBC 10.0 8.4 6.8   .0* 460.0* 431.0         Recent Labs   Lab 07/18/25  1621 07/19/25  0621 07/20/25  0734   GLU 93 72 97   BUN 30* 21 8*   CREATSERUM 2.11* 1.30 0.88   CA 9.5 8.6* 8.6*   ALB 5.0* 3.9 3.7   * 134* 136   K 4.0 3.8 3.4*    108 103   CO2 20.0* 19.0* 25.0   ALKPHO 56  --   --    AST 15  --   --    ALT 13  --   --    BILT 0.5  --   --    TP 7.6  --   --        Lab Results   Component Value Date    INR 0.93 03/05/2019       CT ABDOMEN+PELVIS(CONTRAST ONLY)(CPT=74177)  Result Date: 7/18/2025  CONCLUSION: 1.  Focal mural edema of the second portion of the duodenum. Additionally, there is fluid noted throughout the small bowel and ascending colon. This may represent duodenitis. Electronically Verified and Signed by Attending Radiologist: Jason Diehl MD 7/18/2025 5:47 PM Workstation: [id number]            Real Abreu MD  7/20/2025

## 2025-07-20 NOTE — PLAN OF CARE
Problem: Patient Centered Care  Goal: Patient preferences are identified and integrated in the patient's plan of care  Description: Interventions:  - What would you like us to know as we care for you? Home with spouse  - Provide timely, complete, and accurate information to patient/family  - Incorporate patient and family knowledge, values, beliefs, and cultural backgrounds into the planning and delivery of care  - Encourage patient/family to participate in care and decision-making at the level they choose  - Honor patient and family perspectives and choices  Outcome: Progressing     Problem: GASTROINTESTINAL - ADULT  Goal: Minimal or absence of nausea and vomiting  Description: INTERVENTIONS:  - Maintain adequate hydration with IV or PO as ordered and tolerated  - Nasogastric tube to low intermittent suction as ordered  - Evaluate effectiveness of ordered antiemetic medications  - Provide nonpharmacologic comfort measures as appropriate  - Advance diet as tolerated, if ordered  - Obtain nutritional consult as needed  - Evaluate fluid balance  Outcome: Progressing  Goal: Maintains or returns to baseline bowel function  Description: INTERVENTIONS:  - Assess bowel function  - Maintain adequate hydration with IV or PO as ordered and tolerated  - Evaluate effectiveness of GI medications  - Encourage mobilization and activity  - Obtain nutritional consult as needed  - Establish a toileting routine/schedule  - Consider collaborating with pharmacy to review patient's medication profile  Outcome: Progressing     Problem: GENITOURINARY - ADULT  Goal: Absence of urinary retention  Description: INTERVENTIONS:  - Assess patient’s ability to void and empty bladder  - Monitor intake/output and perform bladder scan as needed  - Follow urinary retention protocol/standard of care  - Consider collaborating with pharmacy to review patient's medication profile  - Implement strategies to promote bladder emptying  Outcome:  Progressing     Problem: METABOLIC/FLUID AND ELECTROLYTES - ADULT  Goal: Electrolytes maintained within normal limits  Description: INTERVENTIONS:  - Monitor labs and rhythm and assess patient for signs and symptoms of electrolyte imbalances  - Administer electrolyte replacement as ordered  - Monitor response to electrolyte replacements, including rhythm and repeat lab results as appropriate  - Fluid restriction as ordered  - Instruct patient on fluid and nutrition restrictions as appropriate  Outcome: Progressing  Goal: Hemodynamic stability and optimal renal function maintained  Description: INTERVENTIONS:  - Monitor labs and assess for signs and symptoms of volume excess or deficit  - Monitor intake, output and patient weight  - Monitor urine specific gravity, serum osmolarity and serum sodium as indicated or ordered  - Monitor response to interventions for patient's volume status, including labs, urine output, blood pressure (other measures as available)  - Encourage oral intake as appropriate  - Instruct patient on fluid and nutrition restrictions as appropriate  Outcome: Progressing     Problem: SAFETY ADULT - FALL  Goal: Free from fall injury  Description: INTERVENTIONS:  - Assess pt frequently for physical needs  - Identify cognitive and physical deficits and behaviors that affect risk of falls.  - [place] fall precautions as indicated by assessment.  - Educate pt/family on patient safety including physical limitations  - Instruct pt to call for assistance with activity based on assessment  - Modify environment to reduce risk of injury  - Provide assistive devices as appropriate  - Consider OT/PT consult to assist with strengthening/mobility  - Encourage toileting schedule  Outcome: Progressing     Problem: DISCHARGE PLANNING  Goal: Discharge to home or other facility with appropriate resources  Description: INTERVENTIONS:  - Identify barriers to discharge w/pt and caregiver  - Include  patient/family/discharge partner in discharge planning  - Arrange for needed discharge resources and transportation as appropriate  - Identify discharge learning needs (meds, wound care, etc)  - Arrange for interpreters to assist at discharge as needed  - Consider post-discharge preferences of patient/family/discharge partner  - Complete POLST form as appropriate  - Assess patient's ability to be responsible for managing their own health  - Refer to Case Management Department for coordinating discharge planning if the patient needs post-hospital services based on physician/LIP order or complex needs related to functional status, cognitive ability or social support system  Outcome: Progressing

## 2025-07-21 ENCOUNTER — ANESTHESIA (OUTPATIENT)
Dept: ENDOSCOPY | Facility: HOSPITAL | Age: 70
End: 2025-07-21
Payer: MEDICARE

## 2025-07-21 ENCOUNTER — ANESTHESIA EVENT (OUTPATIENT)
Dept: ENDOSCOPY | Facility: HOSPITAL | Age: 70
End: 2025-07-21
Payer: MEDICARE

## 2025-07-21 LAB
ALBUMIN SERPL-MCNC: 4.2 G/DL (ref 3.2–4.8)
ANION GAP SERPL CALC-SCNC: 7 MMOL/L (ref 0–18)
BASOPHILS # BLD AUTO: 0.05 X10(3) UL (ref 0–0.2)
BASOPHILS NFR BLD AUTO: 0.7 %
BUN BLD-MCNC: 6 MG/DL (ref 9–23)
BUN/CREAT SERPL: 6.5 (ref 10–20)
CALCIUM BLD-MCNC: 9.2 MG/DL (ref 8.7–10.4)
CHLORIDE SERPL-SCNC: 104 MMOL/L (ref 98–112)
CO2 SERPL-SCNC: 26 MMOL/L (ref 21–32)
CREAT BLD-MCNC: 0.93 MG/DL (ref 0.7–1.3)
DEPRECATED RDW RBC AUTO: 42.1 FL (ref 35.1–46.3)
EGFRCR SERPLBLD CKD-EPI 2021: 88 ML/MIN/1.73M2 (ref 60–?)
EOSINOPHIL # BLD AUTO: 0.17 X10(3) UL (ref 0–0.7)
EOSINOPHIL NFR BLD AUTO: 2.3 %
ERYTHROCYTE [DISTWIDTH] IN BLOOD BY AUTOMATED COUNT: 13.3 % (ref 11–15)
GLUCOSE BLD-MCNC: 85 MG/DL (ref 70–99)
HCT VFR BLD AUTO: 27.7 % (ref 39–53)
HGB BLD-MCNC: 9.3 G/DL (ref 13–17.5)
IMM GRANULOCYTES # BLD AUTO: 0.17 X10(3) UL (ref 0–1)
IMM GRANULOCYTES NFR BLD: 2.3 %
LYMPHOCYTES # BLD AUTO: 0.83 X10(3) UL (ref 1–4)
LYMPHOCYTES NFR BLD AUTO: 11.4 %
MAGNESIUM SERPL-MCNC: 1.6 MG/DL (ref 1.6–2.6)
MCH RBC QN AUTO: 28.9 PG (ref 26–34)
MCHC RBC AUTO-ENTMCNC: 33.6 G/DL (ref 31–37)
MCV RBC AUTO: 86 FL (ref 80–100)
MONOCYTES # BLD AUTO: 0.91 X10(3) UL (ref 0.1–1)
MONOCYTES NFR BLD AUTO: 12.5 %
NEUTROPHILS # BLD AUTO: 5.17 X10 (3) UL (ref 1.5–7.7)
NEUTROPHILS # BLD AUTO: 5.17 X10(3) UL (ref 1.5–7.7)
NEUTROPHILS NFR BLD AUTO: 70.8 %
OSMOLALITY SERPL CALC.SUM OF ELEC: 281 MOSM/KG (ref 275–295)
PHOSPHATE SERPL-MCNC: 2.4 MG/DL (ref 2.4–5.1)
PHOSPHATE SERPL-MCNC: 2.4 MG/DL (ref 2.4–5.1)
PLATELET # BLD AUTO: 468 10(3)UL (ref 150–450)
POTASSIUM SERPL-SCNC: 4.1 MMOL/L (ref 3.5–5.1)
RBC # BLD AUTO: 3.22 X10(6)UL (ref 3.8–5.8)
SODIUM SERPL-SCNC: 137 MMOL/L (ref 136–145)
WBC # BLD AUTO: 7.3 X10(3) UL (ref 4–11)

## 2025-07-21 PROCEDURE — 0DB98ZX EXCISION OF DUODENUM, VIA NATURAL OR ARTIFICIAL OPENING ENDOSCOPIC, DIAGNOSTIC: ICD-10-PCS | Performed by: INTERNAL MEDICINE

## 2025-07-21 PROCEDURE — 0DB78ZX EXCISION OF STOMACH, PYLORUS, VIA NATURAL OR ARTIFICIAL OPENING ENDOSCOPIC, DIAGNOSTIC: ICD-10-PCS | Performed by: INTERNAL MEDICINE

## 2025-07-21 PROCEDURE — 0DBK8ZZ EXCISION OF ASCENDING COLON, VIA NATURAL OR ARTIFICIAL OPENING ENDOSCOPIC: ICD-10-PCS | Performed by: INTERNAL MEDICINE

## 2025-07-21 PROCEDURE — 0DBN8ZX EXCISION OF SIGMOID COLON, VIA NATURAL OR ARTIFICIAL OPENING ENDOSCOPIC, DIAGNOSTIC: ICD-10-PCS | Performed by: INTERNAL MEDICINE

## 2025-07-21 PROCEDURE — 99232 SBSQ HOSP IP/OBS MODERATE 35: CPT | Performed by: STUDENT IN AN ORGANIZED HEALTH CARE EDUCATION/TRAINING PROGRAM

## 2025-07-21 PROCEDURE — 45380 COLONOSCOPY AND BIOPSY: CPT | Performed by: INTERNAL MEDICINE

## 2025-07-21 PROCEDURE — 45385 COLONOSCOPY W/LESION REMOVAL: CPT | Performed by: INTERNAL MEDICINE

## 2025-07-21 PROCEDURE — 43239 EGD BIOPSY SINGLE/MULTIPLE: CPT | Performed by: INTERNAL MEDICINE

## 2025-07-21 PROCEDURE — 0DBE8ZX EXCISION OF LARGE INTESTINE, VIA NATURAL OR ARTIFICIAL OPENING ENDOSCOPIC, DIAGNOSTIC: ICD-10-PCS | Performed by: INTERNAL MEDICINE

## 2025-07-21 PROCEDURE — 0DBH8ZZ EXCISION OF CECUM, VIA NATURAL OR ARTIFICIAL OPENING ENDOSCOPIC: ICD-10-PCS | Performed by: INTERNAL MEDICINE

## 2025-07-21 RX ORDER — MAGNESIUM OXIDE 400 MG/1
400 TABLET ORAL ONCE
Status: COMPLETED | OUTPATIENT
Start: 2025-07-21 | End: 2025-07-21

## 2025-07-21 RX ORDER — IPRATROPIUM BROMIDE AND ALBUTEROL SULFATE 2.5; .5 MG/3ML; MG/3ML
3 SOLUTION RESPIRATORY (INHALATION) EVERY 6 HOURS PRN
Status: DISCONTINUED | OUTPATIENT
Start: 2025-07-21 | End: 2025-07-22

## 2025-07-21 RX ORDER — NALOXONE HYDROCHLORIDE 0.4 MG/ML
0.08 INJECTION, SOLUTION INTRAMUSCULAR; INTRAVENOUS; SUBCUTANEOUS ONCE AS NEEDED
Status: DISCONTINUED | OUTPATIENT
Start: 2025-07-21 | End: 2025-07-21 | Stop reason: HOSPADM

## 2025-07-21 RX ORDER — PHENYLEPHRINE HCL 10 MG/ML
VIAL (ML) INJECTION AS NEEDED
Status: DISCONTINUED | OUTPATIENT
Start: 2025-07-21 | End: 2025-07-21 | Stop reason: SURG

## 2025-07-21 RX ORDER — SODIUM CHLORIDE, SODIUM LACTATE, POTASSIUM CHLORIDE, CALCIUM CHLORIDE 600; 310; 30; 20 MG/100ML; MG/100ML; MG/100ML; MG/100ML
INJECTION, SOLUTION INTRAVENOUS CONTINUOUS PRN
Status: DISCONTINUED | OUTPATIENT
Start: 2025-07-21 | End: 2025-07-21 | Stop reason: SURG

## 2025-07-21 RX ORDER — SODIUM CHLORIDE, SODIUM LACTATE, POTASSIUM CHLORIDE, CALCIUM CHLORIDE 600; 310; 30; 20 MG/100ML; MG/100ML; MG/100ML; MG/100ML
INJECTION, SOLUTION INTRAVENOUS CONTINUOUS
Status: DISCONTINUED | OUTPATIENT
Start: 2025-07-21 | End: 2025-07-22

## 2025-07-21 RX ORDER — PANTOPRAZOLE SODIUM 40 MG/1
40 TABLET, DELAYED RELEASE ORAL
Qty: 60 TABLET | Refills: 0 | Status: SHIPPED | OUTPATIENT
Start: 2025-07-21

## 2025-07-21 RX ADMIN — PHENYLEPHRINE HCL 40 MCG: 10 MG/ML VIAL (ML) INJECTION at 14:22:00

## 2025-07-21 RX ADMIN — SODIUM CHLORIDE, SODIUM LACTATE, POTASSIUM CHLORIDE, CALCIUM CHLORIDE: 600; 310; 30; 20 INJECTION, SOLUTION INTRAVENOUS at 13:47:00

## 2025-07-21 NOTE — PLAN OF CARE
Problem: Patient Centered Care  Goal: Patient preferences are identified and integrated in the patient's plan of care  Description: Interventions:  - What would you like us to know as we care for you? Home with spouse  - Provide timely, complete, and accurate information to patient/family  - Incorporate patient and family knowledge, values, beliefs, and cultural backgrounds into the planning and delivery of care  - Encourage patient/family to participate in care and decision-making at the level they choose  - Honor patient and family perspectives and choices  Outcome: Progressing     Problem: GASTROINTESTINAL - ADULT  Goal: Minimal or absence of nausea and vomiting  Description: INTERVENTIONS:  - Maintain adequate hydration with IV or PO as ordered and tolerated  - Nasogastric tube to low intermittent suction as ordered  - Evaluate effectiveness of ordered antiemetic medications  - Provide nonpharmacologic comfort measures as appropriate  - Advance diet as tolerated, if ordered  - Obtain nutritional consult as needed  - Evaluate fluid balance  Outcome: Progressing  Goal: Maintains or returns to baseline bowel function  Description: INTERVENTIONS:  - Assess bowel function  - Maintain adequate hydration with IV or PO as ordered and tolerated  - Evaluate effectiveness of GI medications  - Encourage mobilization and activity  - Obtain nutritional consult as needed  - Establish a toileting routine/schedule  - Consider collaborating with pharmacy to review patient's medication profile  Outcome: Progressing     Problem: GENITOURINARY - ADULT  Goal: Absence of urinary retention  Description: INTERVENTIONS:  - Assess patient’s ability to void and empty bladder  - Monitor intake/output and perform bladder scan as needed  - Follow urinary retention protocol/standard of care  - Consider collaborating with pharmacy to review patient's medication profile  - Implement strategies to promote bladder emptying  Outcome:  Progressing     Problem: METABOLIC/FLUID AND ELECTROLYTES - ADULT  Goal: Electrolytes maintained within normal limits  Description: INTERVENTIONS:  - Monitor labs and rhythm and assess patient for signs and symptoms of electrolyte imbalances  - Administer electrolyte replacement as ordered  - Monitor response to electrolyte replacements, including rhythm and repeat lab results as appropriate  - Fluid restriction as ordered  - Instruct patient on fluid and nutrition restrictions as appropriate  Outcome: Progressing  Goal: Hemodynamic stability and optimal renal function maintained  Description: INTERVENTIONS:  - Monitor labs and assess for signs and symptoms of volume excess or deficit  - Monitor intake, output and patient weight  - Monitor urine specific gravity, serum osmolarity and serum sodium as indicated or ordered  - Monitor response to interventions for patient's volume status, including labs, urine output, blood pressure (other measures as available)  - Encourage oral intake as appropriate  - Instruct patient on fluid and nutrition restrictions as appropriate  Outcome: Progressing     Problem: SAFETY ADULT - FALL  Goal: Free from fall injury  Description: INTERVENTIONS:  - Assess pt frequently for physical needs  - Identify cognitive and physical deficits and behaviors that affect risk of falls.  - [place] fall precautions as indicated by assessment.  - Educate pt/family on patient safety including physical limitations  - Instruct pt to call for assistance with activity based on assessment  - Modify environment to reduce risk of injury  - Provide assistive devices as appropriate  - Consider OT/PT consult to assist with strengthening/mobility  - Encourage toileting schedule  Outcome: Progressing     Problem: DISCHARGE PLANNING  Goal: Discharge to home or other facility with appropriate resources  Description: INTERVENTIONS:  - Identify barriers to discharge w/pt and caregiver  - Include  patient/family/discharge partner in discharge planning  - Arrange for needed discharge resources and transportation as appropriate  - Identify discharge learning needs (meds, wound care, etc)  - Arrange for interpreters to assist at discharge as needed  - Consider post-discharge preferences of patient/family/discharge partner  - Complete POLST form as appropriate  - Assess patient's ability to be responsible for managing their own health  - Refer to Case Management Department for coordinating discharge planning if the patient needs post-hospital services based on physician/LIP order or complex needs related to functional status, cognitive ability or social support system  Outcome: Progressing

## 2025-07-21 NOTE — INTERVAL H&P NOTE
Pre-op Diagnosis: diarrhea, iron deficiency anemia    The above referenced H&P was reviewed by Real Abreu MD on 7/21/2025, the patient was examined and no significant changes have occurred in the patient's condition since the H&P was performed.  I discussed with the patient and/or legal representative the potential benefits, risks and side effects of this procedure; the likelihood of the patient achieving goals; and potential problems that might occur during recuperation.  I discussed reasonable alternatives to the procedure, including risks, benefits and side effects related to the alternatives and risks related to not receiving this procedure.  We will proceed with procedure as planned.

## 2025-07-21 NOTE — PLAN OF CARE
Problem: Patient Centered Care  Goal: Patient preferences are identified and integrated in the patient's plan of care  Description: Interventions:  - What would you like us to know as we care for you? Home with spouse  - Provide timely, complete, and accurate information to patient/family  - Incorporate patient and family knowledge, values, beliefs, and cultural backgrounds into the planning and delivery of care  - Encourage patient/family to participate in care and decision-making at the level they choose  - Honor patient and family perspectives and choices  Outcome: Progressing     Problem: GASTROINTESTINAL - ADULT  Goal: Minimal or absence of nausea and vomiting  Description: INTERVENTIONS:  - Maintain adequate hydration with IV or PO as ordered and tolerated  - Nasogastric tube to low intermittent suction as ordered  - Evaluate effectiveness of ordered antiemetic medications  - Provide nonpharmacologic comfort measures as appropriate  - Advance diet as tolerated, if ordered  - Obtain nutritional consult as needed  - Evaluate fluid balance  Outcome: Progressing  Goal: Maintains or returns to baseline bowel function  Description: INTERVENTIONS:  - Assess bowel function  - Maintain adequate hydration with IV or PO as ordered and tolerated  - Evaluate effectiveness of GI medications  - Encourage mobilization and activity  - Obtain nutritional consult as needed  - Establish a toileting routine/schedule  - Consider collaborating with pharmacy to review patient's medication profile  Outcome: Progressing     Problem: GENITOURINARY - ADULT  Goal: Absence of urinary retention  Description: INTERVENTIONS:  - Assess patient’s ability to void and empty bladder  - Monitor intake/output and perform bladder scan as needed  - Follow urinary retention protocol/standard of care  - Consider collaborating with pharmacy to review patient's medication profile  - Implement strategies to promote bladder emptying  Outcome:  Progressing     Problem: METABOLIC/FLUID AND ELECTROLYTES - ADULT  Goal: Electrolytes maintained within normal limits  Description: INTERVENTIONS:  - Monitor labs and rhythm and assess patient for signs and symptoms of electrolyte imbalances  - Administer electrolyte replacement as ordered  - Monitor response to electrolyte replacements, including rhythm and repeat lab results as appropriate  - Fluid restriction as ordered  - Instruct patient on fluid and nutrition restrictions as appropriate  Outcome: Progressing  Goal: Hemodynamic stability and optimal renal function maintained  Description: INTERVENTIONS:  - Monitor labs and assess for signs and symptoms of volume excess or deficit  - Monitor intake, output and patient weight  - Monitor urine specific gravity, serum osmolarity and serum sodium as indicated or ordered  - Monitor response to interventions for patient's volume status, including labs, urine output, blood pressure (other measures as available)  - Encourage oral intake as appropriate  - Instruct patient on fluid and nutrition restrictions as appropriate  Outcome: Progressing     Problem: SAFETY ADULT - FALL  Goal: Free from fall injury  Description: INTERVENTIONS:  - Assess pt frequently for physical needs  - Identify cognitive and physical deficits and behaviors that affect risk of falls.  - [place] fall precautions as indicated by assessment.  - Educate pt/family on patient safety including physical limitations  - Instruct pt to call for assistance with activity based on assessment  - Modify environment to reduce risk of injury  - Provide assistive devices as appropriate  - Consider OT/PT consult to assist with strengthening/mobility  - Encourage toileting schedule  Outcome: Progressing     Problem: DISCHARGE PLANNING  Goal: Discharge to home or other facility with appropriate resources  Description: INTERVENTIONS:  - Identify barriers to discharge w/pt and caregiver  - Include  patient/family/discharge partner in discharge planning  - Arrange for needed discharge resources and transportation as appropriate  - Identify discharge learning needs (meds, wound care, etc)  - Arrange for interpreters to assist at discharge as needed  - Consider post-discharge preferences of patient/family/discharge partner  - Complete POLST form as appropriate  - Assess patient's ability to be responsible for managing their own health  - Refer to Case Management Department for coordinating discharge planning if the patient needs post-hospital services based on physician/LIP order or complex needs related to functional status, cognitive ability or social support system  Outcome: Progressing

## 2025-07-21 NOTE — OPERATIVE REPORT
[facility] Endoscopy Report      Date of Procedure:  07/21/25      Preoperative Diagnosis:  1.  Iron deficiency anemia  2.  Abnormal CT scan of the duodenum  3.  Resolved diarrhea (presumed infectious)  4.  Personal history of adenomatous colon polyp      Postoperative Diagnosis:  1.  Duodenal ulcer  2.  Hiatal hernia  3.  Colon polyps  4.  Sigmoid colon diverticulosis  5.  Inflamed sigmoid diverticulum      Procedure:    Esophagogastroduodenoscopy with biopsy  Colonoscopy with polypectomy and biopsy      Surgeon:  Real Abreu M.D.      Anesthesia:  Monitored anesthesia care  Cecal withdrawal time: 18 minutes  EBL:  Insignificant      Brief History:  This is a 69 year old male who was admitted to the hospital with a 3-week history of diarrhea and mild renal insufficiency.  He was mildly iron deficient on studies.  A CT scan revealed mural thickening of the duodenum.  The patient has a history of adenomatous colon polyps.  His last colonoscopy was 5 years prior.  Upper and lower endoscopy are being performed in light of the aforementioned history, laboratory and CT findings.      Technique:  After informed consent, the patient was placed in the left lateral recumbent position.  an Olympus adult HD gastroscope was inserted into the hypopharynx and advanced under direct vision into the esophagus, stomach and duodenum.  The endoscope was withdrawn to the stomach where retroflexion of the angulus, body, cardia and fundus was performed.  The instrument was straightened, insufflated air and fluid were suctioned and the endoscope was withdrawn.      Following esophagogastroduodenoscopy, a digital rectal examination revealed no palpable intraluminal abnormalities.  An Olympus variable stiffness 190 series HD colonoscope was inserted into the rectum and advanced under direct vision by following the lumen to the terminal ileum.  The colon was examined upon withdrawal in the left lateral recumbent  position.  The procedures were well-tolerated immediate complication.      Findings:  The esophagus was normal without evidence of ulceration, erosion, stricture, ring or Santana's esophagus.  The GE junction and diaphragmatic impression were at 39/41 cm with a 2 cm sliding hiatal hernia.  The stomach distended appropriately with insufflated air.  The mucosa of the stomach including cardia, fundus, gastric body and antrum was normal.  Biopsies from the antrum were obtained.  The duodenal bulb was normal.  There was an approximately 1.4 cm irregular but well-circumscribed ulceration in the second portion of the duodenum which was biopsied.  The villous pattern of the duodenum was normal.  Biopsies were obtained from the nonulcerated portion of the duodenum and placed in a separate container.    The preparation of the colon was excellent.  The terminal ileum was examined for several cm and visually normal.  The ileocecal valve was well preserved. The visualized colonic mucosa from the cecum to the anal verge was normal with an intact vascular pattern.  There were #3 polyps in the cecum/proximal ascending colon measuring 4-7 mm in size.  These were cold snare excised and retrieved.  No ongoing bleeding.  There were multiple diverticula seen in the sigmoid colon including an inflamed diverticulum with purulence at 18 cm.  Directed biopsies were taken of the inflamed diverticulum and random biopsies were taken for the remainder of the colon.  There were no other colonic polyps, mass lesions, vascular anomalies or signs of inflammation seen.  Retroflexion in the rectum revealed no abnormalities.        Impression:  1.  Duodenal ulcer likely benign, however, indeterminate for malignancy based on appearance.  2.  Small hiatal hernia  3.  Colon polyps  4.  Sigmoid colon diverticulosis  5.  Inflamed sigmoid diverticulum    Recommendations:  1.  Twice daily proton pump inhibitor therapy.  2.  Follow-up biopsy results.  3.  If  duodenal biopsies are negative for malignancy, I would recommend a follow-up endoscopy in 8 weeks to confirm healing of the ulceration based on its somewhat appearance.  4.  May be discharged from my standpoint later today if clinically well.      Real Abreu MD  7/21/2025

## 2025-07-21 NOTE — ANESTHESIA POSTPROCEDURE EVALUATION
Patient: Alejandro Hanna    Procedure Summary       Date: 07/21/25 Room / Location: [facility] ENDOSCOPY 05 / [facility] ENDOSCOPY; [facility]    Anesthesia Start: 1346 Anesthesia Stop: 1434    Procedures:       COLONOSCOPY      ESOPHAGOGASTRODUODENOSCOPY (EGD)      EMG ([facility]CI[facility]) Diagnosis:       Lumbar radiculopathy      (Hiatal hernia, duodenal ulcer, diverticulosis, polyps, inflamed sigmoid diverticulum)    Scheduled Providers: Real Abreu MD; Brando Reynoso DO Anesthesiologist: Octavio Slaughter MD    Anesthesia Type: MAC ASA Status: 3            Anesthesia Type: MAC    Vitals Value Taken Time   BP 88/50 07/21/25 14:37   Temp 97 07/21/25 14:37   Pulse 81 07/21/25 14:37   Resp 20 07/21/25 14:37   SpO2 96 07/21/25 14:37       [facility] AN Post Evaluation:   Patient Evaluated in PACU  Patient Participation: waiting for patient participation  Level of Consciousness: awake  Pain Score: 0  Pain Management: adequate  Airway Patency:patent  Yes    Nausea/Vomiting: none  Cardiovascular Status: acceptable  Respiratory Status: airway suctioned  Postoperative Hydration acceptable      Octavio Slaughter MD  7/21/2025 2:37 PM

## 2025-07-21 NOTE — ANESTHESIA PREPROCEDURE EVALUATION
Anesthesia PreOp Note    HPI:     Alejandro Hanna is a 69 year old male who presents for preoperative consultation requested by: Real Abreu MD    Date of Surgery: 7/18/2025 - 7/21/2025    Procedure(s):  COLONOSCOPY  ESOPHAGOGASTRODUODENOSCOPY (EGD)  Indication: diarrhea, iron deficiency anemia    Relevant Problems   No relevant active problems       NPO:  Last Liquid Consumption Date: 07/21/25  Last Liquid Consumption Time: 0830 (sip)  Last Solid Consumption Date: 07/20/25  Last Solid Consumption Time: 1600  Last Liquid Consumption Date: 07/21/25          History Review:  Patient Active Problem List    Diagnosis Date Noted    Hypokalemia due to excessive gastrointestinal loss of potassium 07/20/2025    Diarrhea, unspecified type 07/18/2025    BRIELLE (acute kidney injury) 07/18/2025    Acute kidney failure 07/18/2025    B12 deficiency 07/02/2025    Uncomplicated alcohol dependence (HCC) 05/12/2025    Episodic mood disorder 04/30/2025    Hypomagnesemia 04/29/2025    Community acquired pneumonia, unspecified laterality 04/29/2025    Metastatic signet ring cell carcinoma (HCC) 04/22/2025    Leukocytosis 04/19/2025    Hyperglycemia 04/19/2025    Community acquired pneumonia of left lower lobe of lung 04/19/2025    Weakness 04/19/2025    Lumbar radiculopathy 04/15/2025    Cancer of upper lobe of left lung (HCC) 10/24/2024    Internal derangement of left knee 02/28/2024    Primary osteoarthritis of left knee 02/28/2024    Dizziness 09/19/2023    Right ear impacted cerumen 09/19/2023    Protein-calorie malnutrition, unspecified severity (HCC) 07/06/2023    Calcification of aorta 11/04/2020    Hiatal hernia 11/04/2020    Alcohol abuse 05/08/2020    Hyponatremia 05/08/2020    Bladder cancer (HCC) 12/16/2019    Acquired hypothyroidism 10/23/2018    Tobacco use disorder 10/23/2018    Mass of upper lobe of left lung 04/24/2018    Decreased hearing of right ear 04/17/2018    Eructation 04/17/2018    Allergic rhinitis  due to pollen 04/17/2018    Benign non-nodular prostatic hyperplasia with lower urinary tract symptoms 03/10/2016    Chronic obstructive pulmonary disease (HCC) 03/10/2016    Nocturia 06/23/2015    Essential hypertension, benign 10/07/2014    Mixed hyperlipidemia 10/07/2014    Abnormal results of liver function studies 10/01/2012    Abnormal clinical finding 07/28/2012    Abnormal blood chemistry 03/26/2012    Disorder of kidney and ureter 03/14/2012    Cough 03/05/2012    Hepatomegaly 05/13/2010    Impotence of organic origin 05/13/2010       Past Medical History[1]    Past Surgical History[2]    Prescriptions Prior to Admission[3]  Current Medications and Prescriptions Ordered in Epic[4]    Allergies[5]    Family History[6]  Social Hx on file[7]    Available pre-op labs reviewed.  Lab Results   Component Value Date    WBC 7.3 07/21/2025    RBC 3.22 (L) 07/21/2025    HGB 9.3 (L) 07/21/2025    HCT 27.7 (L) 07/21/2025    MCV 86.0 07/21/2025    MCH 28.9 07/21/2025    MCHC 33.6 07/21/2025    RDW 13.3 07/21/2025    .0 (H) 07/21/2025     Lab Results   Component Value Date     07/21/2025    K 4.1 07/21/2025     07/21/2025    CO2 26.0 07/21/2025    BUN 6 (L) 07/21/2025    CREATSERUM 0.93 07/21/2025    GLU 85 07/21/2025    CA 9.2 07/21/2025          Vital Signs:  Body mass index is 21.52 kg/m².   height is 1.626 m (5' 4\") and weight is 56.9 kg (125 lb 6.4 oz). His oral temperature is 98.7 °F (37.1 °C). His blood pressure is 158/69 and his pulse is 77. His respiration is 18 and oxygen saturation is 98%.   Vitals:    07/21/25 0559 07/21/25 0600 07/21/25 0748 07/21/25 1315   BP: 137/74  123/74 158/69   Pulse: 96  108 77   Resp: 18  18 18   Temp: 99.3 °F (37.4 °C)  98.7 °F (37.1 °C)    TempSrc: Oral  Oral    SpO2: 92%  95% 98%   Weight:  56.9 kg (125 lb 6.4 oz)     Height:            Anesthesia Evaluation     Patient summary reviewed    Airway   Mallampati: II  TM distance: >3 FB  Neck ROM: limited  Dental       Pulmonary - normal exam   (+) COPD  Cardiovascular - normal exam  (+) hypertension    Neuro/Psych    (+)  neuromuscular disease,        GI/Hepatic/Renal    (+) GERD, liver disease    Endo/Other    Abdominal                  Anesthesia Plan:   ASA:  3  Plan:   MAC  Informed Consent Plan and Risks Discussed With:  Patient      I have informed Alejandro Hanna and/or legal guardian or family member of the nature of the anesthetic plan, benefits, risks including possible dental damage if relevant, major complications, and any alternative forms of anesthetic management.   All of the patient's questions were answered to the best of my ability. The patient desires the anesthetic management as planned.  Octavio Slaughter MD  7/21/2025 1:34 PM  Present on Admission:  **None**           [1]   Past Medical History:   ALCOHOL USE    Allergic rhinitis    Flonase    Arthritis    Bladder cancer (HCC)    f/u [facility]    BPH (benign prostatic hyperplasia)    COPD (HCC)    moderate-severe     Disorder of kidney and ureter    Disorder of thyroid    hypothyroid    ED (erectile dysfunction)    Esophageal reflux    Essential hypertension    Hearing impairment    Hepatomegaly    Hiatal hernia    High blood pressure    High cholesterol    Hypothyroidism    Injury    Left knee injury, from fall    Osteoarthritis    in knee's, hip's    Pulmonary nodules    first noted 2016on LD screen CT    Tobacco abuse    Visual impairment    glasses   [2]   Past Surgical History:  Procedure Laterality Date    Colonoscopy      02-14-17    Colonoscopy N/A 12/28/2020    Procedure: COLONOSCOPY;  Surgeon: Real Abreu MD;  Location: [facility] ENDOSCOPY    Electrocardiogram, complete  12/15/2013    scanned to media tab    Oral surgery      Other surgical history  2009    cyst removal, side of head    Other surgical history Left 10/24/2024    VATS upper lobe wedge resection by Dr. Maria    Other surgical history  10/2024    lung nodule removed   [3]    Facility-Administered Medications Prior to Admission   Medication Dose Route Frequency Provider Last Rate Last Admin    [COMPLETED] cyanocobalamin (Vitamin B12) 1000 MCG/ML injection 1,000 mcg  1,000 mcg Intramuscular Once    1,000 mcg at 06/05/25 1051     Medications Prior to Admission   Medication Sig Dispense Refill Last Dose/Taking    diphenoxylate-atropine (LOMOTIL) 2.5-0.025 MG Oral Tab Take 2 tablets by mouth 4 (four) times daily as needed for Diarrhea. 30 tablet 0 7/18/2025 at 12:00 PM    Magnesium 400 MG Oral Tab Take 400 mg by mouth in the morning and 400 mg before bedtime. (Patient taking differently: Take 400 mg by mouth daily.) 180 tablet 0 7/18/2025 at  8:00 AM    Cyanocobalamin (B-12) 1000 MCG Oral Cap Take 1 capsule by mouth daily. 180 capsule 0 7/18/2025 at  8:00 AM    ergocalciferol 1.25 MG (16455 UT) Oral Cap Take 1 capsule (50,000 Units total) by mouth once a week. (Patient taking differently: Take 1 capsule (50,000 Units total) by mouth once a week. Taking dose on Fridays) 12 capsule 1 7/11/2025    fluticasone propionate 50 MCG/ACT Nasal Suspension 2 sprays by Nasal route in the morning and 2 sprays before bedtime. 48 mL 1 7/18/2025 at  8:00 AM    albuterol (VENTOLIN HFA) 108 (90 Base) MCG/ACT Inhalation Aero Soln Inhale 2 puffs into the lungs in the morning and 2 puffs in the evening.   7/18/2025 at  8:00 AM    acetaminophen 500 MG Oral Tab Take 1-2 tablets (500-1,000 mg total) by mouth every 6 (six) hours as needed for Pain.   Taking As Needed    Fenofibrate 160 MG Oral Tab Take 1 tablet (160 mg total) by mouth daily. 90 tablet 3 7/18/2025 at  8:00 AM    ATORVASTATIN 10 MG Oral Tab TAKE 1 TABLET BY MOUTH EVERY DAY 90 tablet 3 7/18/2025 at  8:00 AM    levothyroxine (LEVOXYL) 88 MCG Oral Tab Take 1 tablet (88 mcg total) by mouth before breakfast. For underactive thyroid. 90 tablet 3 7/18/2025 at  8:00 AM    Olmesartan Medoxomil 40 MG Oral Tab Take 1 tablet (40 mg total) by mouth daily. For  blood pressure 90 tablet 3 7/18/2025 at  8:00 AM    BREO ELLIPTA 200-25 MCG/ACT Inhalation Aerosol Powder, Breath Activated Inhale 1 puff into the lungs in the evening.   7/18/2025 at  8:00 AM    Dutasteride 0.5 MG Oral Cap Take 1 capsule (0.5 mg total) by mouth every morning.   7/18/2025 at  8:00 AM    levocetirizine 5 MG Oral Tab Take 1 tablet (5 mg total) by mouth every evening. (Patient not taking: Reported on 7/18/2025) 90 tablet 3 Not Taking   [4]   Current Facility-Administered Medications Ordered in Epic   Medication Dose Route Frequency Provider Last Rate Last Admin    sodium ferric gluconate (Ferrlecit) 125 mg in sodium chloride 0.9% 100mL IVPB premix  125 mg Intravenous Daily Faisal Villar  mL/hr at 07/21/25 0832 125 mg at 07/21/25 0832    pantoprazole (Protonix) 40 mg in sodium chloride 0.9% PF 10 mL IV push  40 mg Intravenous Daily Sandie Mendoza MD   40 mg at 07/21/25 0830    acetaminophen (Tylenol Extra Strength) tab 500 mg  500 mg Oral Q4H PRN Sandie Mendoza MD        metoclopramide (Reglan) 5 mg/mL injection 5 mg  5 mg Intravenous Q8H PRN Sandie Mendoza MD        temazepam (Restoril) cap 15 mg  15 mg Oral Nightly PRN Sandie Mendoza MD        albuterol (Ventolin HFA) 108 (90 Base) MCG/ACT inhaler 2 puff  2 puff Inhalation 2 times per day Sandie Mendoza MD   2 puff at 07/21/25 0600    atorvastatin (Lipitor) tab 10 mg  10 mg Oral Daily Sandie Mendoza MD   10 mg at 07/21/25 0830    finasteride (Proscar) tab 5 mg  5 mg Oral Daily Sandie Mendoza MD   5 mg at 07/21/25 0830    fluticasone propionate (Flonase) 50 MCG/ACT nasal suspension 1 spray  1 spray Each Nare BID Sandie Mendoza MD   1 spray at 07/21/25 0834    levothyroxine (Synthroid) tab 88 mcg  88 mcg Oral Before breakfast Sandie Mendoza MD   88 mcg at 07/21/25 0614     No current [facility]-ordered outpatient medications on file.   [5]   Allergies  Allergen Reactions    Bupropion DIZZINESS   [6]   Family History  Problem  Relation Age of Onset    Dementia Mother     Diabetes Paternal Grandmother     Cancer Sister         lung cancer   [7]   Social History  Socioeconomic History    Marital status:    Tobacco Use    Smoking status: Former     Current packs/day: 0.50     Average packs/day: 1 pack/day for 41.6 years (40.8 ttl pk-yrs)     Types: Cigarettes     Start date: 2024     Passive exposure: Never    Smokeless tobacco: Former     Quit date: 10/24/2024    Tobacco comments:     2/2022: less than 1 pack per day     10/2024: smokes 8-9 cigarettes a day   Vaping Use    Vaping status: Never Used   Substance and Sexual Activity    Alcohol use: Not Currently     Alcohol/week: 2.0 standard drinks of alcohol     Types: 2 Standard drinks or equivalent per week     Comment: 2 beer per day    Drug use: No   Other Topics Concern    Caffeine Concern Yes     Comment: coffee-1 cup/day

## 2025-07-21 NOTE — PROGRESS NOTES
Progress Note     Alejandro Hanna Patient Status:  Inpatient    12/10/1955 MRN [id number]   Location [facility] 5SW/SE Attending Ramila Galeano MD   Hosp Day # 3 PCP Leonid Ford DO     Chief Complaint: Diarrhea    Subjective:   S: No overnight events.  Mg slightly low. Plan for upper and lower endoscopies today    Review of Systems:   10 point ROS completed and was negative, except for pertinent positive and negatives stated in subjective.    Objective:   Vital signs:  Temp:  [98.7 °F (37.1 °C)-99.3 °F (37.4 °C)] 98.7 °F (37.1 °C)  Pulse:  [] 108  Resp:  [16-18] 18  BP: (123-144)/(62-74) 123/74  SpO2:  [92 %-96 %] 95 %    Wt Readings from Last 6 Encounters:   25 125 lb 6.4 oz (56.9 kg)   25 122 lb (55.3 kg)   25 125 lb (56.7 kg)   25 125 lb 9.6 oz (57 kg)   25 125 lb (56.7 kg)   25 125 lb (56.7 kg)         Physical Exam:    General: No acute distress. Alert ,         Respiratory: Clear to auscultation bilaterally. No wheezes. No rhonchi.  Cardiovascular: S1, S2. Regular rate and rhythm. No murmurs, rubs or gallops.   Abdomen: Soft, nontender, nondistended.  Positive bowel sounds. No rebound or guarding.  Neurologic: No focal neurological deficits.   Musculoskeletal: Moves all extremities.  Extremities: No edema.    Results:   Diagnostic Data:      Labs:    Labs Last 24 Hours:   BMP     CBC    Other     Na 137 Cl 104 BUN 6 Glu 85   Hb 9.3   PTT - Procal -   K 4.1 CO2 26.0 Cr 0.93   WBC 7.3 >< .0  INR - CRP -   Renal Lytes Endo    Hct 27.7   Trop - D dim -   eGFR - Ca 9.2 POC Gluc  -    LFT   pBNP - Lactic -   eGFR AA - PO4 2.4; 2.4 A1c -   AST - APk - Prot -  LDL -     Mg 1.6 TSH -   ALT - T rosanne - Alb 4.2        COVID-19 Lab Results    COVID-19  Lab Results   Component Value Date    COVID19 Not Detected 2025    COVID19 Not Detected 2024    COVID19 Not Detected 10/28/2023       Pro-Calcitonin  No results for input(s): \"PCT\" in the last  168 hours.    Cardiac  No results for input(s): \"TROP\", \"PBNP\" in the last 168 hours.    Creatinine Kinase  No results for input(s): \"CK\" in the last 168 hours.    Inflammatory Markers  Recent Labs   Lab 07/19/25  0621 07/19/25 2023   CRP  --  6.20*   FRANK 512*  --        Imaging: Imaging data reviewed in Epic.    Medications: Scheduled Medications[1]    Assessment & Plan:   ASSESSMENT / PLAN:   #Nonbloody diarrhea  Unclear etiology.  CT abdomen pelvis reviewed and it showed focal mural edema of the second portion of the duodenum.  Additionally there is fluid noted throughout the small bowel and ascending colon concerning for duodenitis.  There is no evidence of bowel obstruction  - Patient afebrile.  Hold off on antibiotics.  GI enteric panel, C. difficile and celiac serologies pending.  UA negative  - Advance to full liquids and keep n.p.o. after midnight.  Given his focal mural edema of unclear significance, he is planned for EGD/Colonoscopy today for further evaluation  - Iron studies noted.  Ferritin is elevated.  He is on IV iron    #BRIELLE with metabolic acidosis-improving  Creatinine on admission 1.95 and then peaked at 2.11.  Resolved now. Stop fluids    # Electrolyte imbalances secondary to GI losses  Serial lab monitoring and replacement as needed    #Hypothyroidism  Continue PTA levothyroxine  Repeat TSH and free T4 acceptable    #Hyperlipidemia  Continue statin    #BPH  Continue finasteride      Quality:  DVT Prophylaxis: SCDs  CODE status: Full code  Ortega:    Central line: None  Dispo: further recs pending clinical course      Will the patient be referred to TCC on discharge?:  N/A  Estimated date of discharge: 24 to 48 hours  Discharge is dependent on: Endoscopies  At this point Mr. Hanna is expected to be discharge to: Anticipate home    Plan of care discussed with patient and primary RN    Outpatient records or previous hospital records reviewed.   Patient and/or patient's family given opportunity to  ask questions and note understanding and agreeing with therapeutic plan as outlined     Coordinated care with providers and counseling re: treatment plan and workup    MDM: High complexity    Ramila Galeano MD  [facility]ist                [1]    sodium ferric gluconate  125 mg Intravenous Daily    pantoprazole  40 mg Intravenous Daily    albuterol  2 puff Inhalation 2 times per day    atorvastatin  10 mg Oral Daily    finasteride  5 mg Oral Daily    fluticasone propionate  1 spray Each Nare BID    levothyroxine  88 mcg Oral Before breakfast

## 2025-07-21 NOTE — ANESTHESIA POSTPROCEDURE EVALUATION
Patient: Alejandro Hanna    Procedure Summary       Date: 07/21/25 Room / Location: [facility] ENDOSCOPY 05 / [facility] ENDOSCOPY; [facility]    Anesthesia Start: 1346 Anesthesia Stop: 1434    Procedures:       COLONOSCOPY      ESOPHAGOGASTRODUODENOSCOPY (EGD)      EMG ([facility]CI[facility]) Diagnosis:       Lumbar radiculopathy      (Hiatal hernia, duodenal ulcer, diverticulosis, polyps, inflamed sigmoid diverticulum)    Scheduled Providers: Real Abreu MD; Brando Reynoso DO Anesthesiologist: Octavio Slaughter MD    Anesthesia Type: MAC ASA Status: 3            Anesthesia Type: MAC    Vitals Value Taken Time   BP 88/50 07/21/25 14:35   Temp 97 07/21/25 14:35   Pulse 81 07/21/25 14:35   Resp 20 07/21/25 14:35   SpO2 96 07/21/25 14:35       [facility] AN Post Evaluation    Octavio Slaughter MD  7/21/2025 2:35 PM

## 2025-07-22 ENCOUNTER — TELEPHONE (OUTPATIENT)
Facility: CLINIC | Age: 70
End: 2025-07-22

## 2025-07-22 VITALS
BODY MASS INDEX: 19.91 KG/M2 | RESPIRATION RATE: 16 BRPM | SYSTOLIC BLOOD PRESSURE: 138 MMHG | WEIGHT: 116.63 LBS | OXYGEN SATURATION: 94 % | DIASTOLIC BLOOD PRESSURE: 69 MMHG | TEMPERATURE: 98 F | HEART RATE: 85 BPM | HEIGHT: 64 IN

## 2025-07-22 DIAGNOSIS — D50.9 IRON DEFICIENCY ANEMIA, UNSPECIFIED IRON DEFICIENCY ANEMIA TYPE: Primary | ICD-10-CM

## 2025-07-22 PROBLEM — F41.9 ANXIETY: Status: ACTIVE | Noted: 2025-07-22

## 2025-07-22 PROBLEM — K26.9 DUODENAL ULCER: Status: ACTIVE | Noted: 2025-07-22

## 2025-07-22 LAB
PHOSPHATE SERPL-MCNC: 3.1 MG/DL (ref 2.4–5.1)
TTG IGA SER-ACNC: 0.5 U/ML (ref ?–7)

## 2025-07-22 PROCEDURE — 99232 SBSQ HOSP IP/OBS MODERATE 35: CPT | Performed by: PHYSICIAN ASSISTANT

## 2025-07-22 PROCEDURE — 99239 HOSP IP/OBS DSCHRG MGMT >30: CPT | Performed by: STUDENT IN AN ORGANIZED HEALTH CARE EDUCATION/TRAINING PROGRAM

## 2025-07-22 RX ORDER — ALPRAZOLAM 0.25 MG
0.25 TABLET ORAL ONCE AS NEEDED
Qty: 3 TABLET | Refills: 0 | Status: SHIPPED | OUTPATIENT
Start: 2025-07-22 | End: 2025-07-22

## 2025-07-22 NOTE — TELEPHONE ENCOUNTER
I spoke to Cody and to Shawna.  Cody is home.  He is feeling well.  He had #3 subcentimeter adenomatous polyps removed (TA and SSAs).  I discussed the significance.  I recommended a surveillance colonoscopy in 3 years.  The upper endoscopy revealed a duodenal ulcer indeterminate for malignancy based on appearance.  Gastric biopsies were negative for H. pylori.  Ulcer biopsies were negative for neoplasm.  I have recommended the followin.  Pantoprazole twice daily before breakfast and supper.  2.  Absolutely no aspirin/NSAIDs.  3.  Repeat CBC and iron studies in 2 weeks.  4.  Repeat EGD with monitored anesthesia care in 6-8 weeks.  5.  Surveillance colonoscopy in 3 years.  6.  The patient will contact me if he develops recurrent diarrhea or any abdominal symptoms.    GI RNs: Please schedule the aforementioned EGD and enter colonoscopy recall for 3 years.  He does not need to be seen in the office in follow-up as long as he is feeling well.

## 2025-07-22 NOTE — TELEPHONE ENCOUNTER
Dr Noble    The patient is requesting to schedule the follow up with you.    Discharged from the hospital today for diarrhea and iron deficiency.    Thank you

## 2025-07-22 NOTE — TELEPHONE ENCOUNTER
Nursing: Please set up follow up with Dr. Noble or myself for hospital follow up.     Joaquina Stuart PA-C

## 2025-07-22 NOTE — PLAN OF CARE
Problem: Patient Centered Care  Goal: Patient preferences are identified and integrated in the patient's plan of care  Description: Interventions:  - What would you like us to know as we care for you? Home with spouse  - Provide timely, complete, and accurate information to patient/family  - Incorporate patient and family knowledge, values, beliefs, and cultural backgrounds into the planning and delivery of care  - Encourage patient/family to participate in care and decision-making at the level they choose  - Honor patient and family perspectives and choices  7/22/2025 1046 by Joy Rivas RN  Outcome: Adequate for Discharge  7/22/2025 1046 by Joy Rivas RN  Outcome: Progressing     Problem: GASTROINTESTINAL - ADULT  Goal: Minimal or absence of nausea and vomiting  Description: INTERVENTIONS:  - Maintain adequate hydration with IV or PO as ordered and tolerated  - Nasogastric tube to low intermittent suction as ordered  - Evaluate effectiveness of ordered antiemetic medications  - Provide nonpharmacologic comfort measures as appropriate  - Advance diet as tolerated, if ordered  - Obtain nutritional consult as needed  - Evaluate fluid balance  7/22/2025 1046 by Joy Rivas RN  Outcome: Adequate for Discharge  7/22/2025 1046 by Joy Rivas RN  Outcome: Progressing  Goal: Maintains or returns to baseline bowel function  Description: INTERVENTIONS:  - Assess bowel function  - Maintain adequate hydration with IV or PO as ordered and tolerated  - Evaluate effectiveness of GI medications  - Encourage mobilization and activity  - Obtain nutritional consult as needed  - Establish a toileting routine/schedule  - Consider collaborating with pharmacy to review patient's medication profile  7/22/2025 1046 by Joy Rivas RN  Outcome: Adequate for Discharge  7/22/2025 1046 by Joy Rivas RN  Outcome: Progressing     Problem: GENITOURINARY - ADULT  Goal: Absence of  urinary retention  Description: INTERVENTIONS:  - Assess patient’s ability to void and empty bladder  - Monitor intake/output and perform bladder scan as needed  - Follow urinary retention protocol/standard of care  - Consider collaborating with pharmacy to review patient's medication profile  - Implement strategies to promote bladder emptying  7/22/2025 1046 by Joy Rivas RN  Outcome: Adequate for Discharge  7/22/2025 1046 by Joy Rivas RN  Outcome: Progressing     Problem: METABOLIC/FLUID AND ELECTROLYTES - ADULT  Goal: Electrolytes maintained within normal limits  Description: INTERVENTIONS:  - Monitor labs and rhythm and assess patient for signs and symptoms of electrolyte imbalances  - Administer electrolyte replacement as ordered  - Monitor response to electrolyte replacements, including rhythm and repeat lab results as appropriate  - Fluid restriction as ordered  - Instruct patient on fluid and nutrition restrictions as appropriate  7/22/2025 1046 by Joy Rivas RN  Outcome: Adequate for Discharge  7/22/2025 1046 by Joy Rivas RN  Outcome: Progressing  Goal: Hemodynamic stability and optimal renal function maintained  Description: INTERVENTIONS:  - Monitor labs and assess for signs and symptoms of volume excess or deficit  - Monitor intake, output and patient weight  - Monitor urine specific gravity, serum osmolarity and serum sodium as indicated or ordered  - Monitor response to interventions for patient's volume status, including labs, urine output, blood pressure (other measures as available)  - Encourage oral intake as appropriate  - Instruct patient on fluid and nutrition restrictions as appropriate  7/22/2025 1046 by Joy Rivas RN  Outcome: Adequate for Discharge  7/22/2025 1046 by Joy Rivas RN  Outcome: Progressing     Problem: SAFETY ADULT - FALL  Goal: Free from fall injury  Description: INTERVENTIONS:  - Assess pt frequently for  physical needs  - Identify cognitive and physical deficits and behaviors that affect risk of falls.  - [place] fall precautions as indicated by assessment.  - Educate pt/family on patient safety including physical limitations  - Instruct pt to call for assistance with activity based on assessment  - Modify environment to reduce risk of injury  - Provide assistive devices as appropriate  - Consider OT/PT consult to assist with strengthening/mobility  - Encourage toileting schedule  7/22/2025 1046 by Joy Rivas RN  Outcome: Adequate for Discharge  7/22/2025 1046 by Joy Rivas RN  Outcome: Progressing     Problem: DISCHARGE PLANNING  Goal: Discharge to home or other facility with appropriate resources  Description: INTERVENTIONS:  - Identify barriers to discharge w/pt and caregiver  - Include patient/family/discharge partner in discharge planning  - Arrange for needed discharge resources and transportation as appropriate  - Identify discharge learning needs (meds, wound care, etc)  - Arrange for interpreters to assist at discharge as needed  - Consider post-discharge preferences of patient/family/discharge partner  - Complete POLST form as appropriate  - Assess patient's ability to be responsible for managing their own health  - Refer to Case Management Department for coordinating discharge planning if the patient needs post-hospital services based on physician/LIP order or complex needs related to functional status, cognitive ability or social support system  7/22/2025 1046 by Joy Rivas, RN  Outcome: Adequate for Discharge  7/22/2025 1046 by Joy Rivas, RN  Outcome: Progressing

## 2025-07-22 NOTE — DISCHARGE SUMMARY
Discharge Summary     Alejandro Hanna Patient Status:  Inpatient    12/10/1955 MRN [id number]   Location [facility] 5SW/SE Attending Ramila Galeano MD   Hosp Day # 4 PCP Leonid Ford DO     Date of Admission: 2025  Date of Discharge: 2025  Discharge Disposition: Home or Self Care    Discharge Diagnosis:   Shiga toxin/E. coli subacute nonbloody diarrhea  Peptic ulcer  Hypokalemia  Hypomagnesemia    History of Present Illness: See initial HPI            Brief Synopsis:  # Shiga toxin/E. coli diarrhea, subacute  Stool culture with Shiga toxin/E. coli positive.  Recommend supportive care without antibiotics as this is a self-limiting disease   CT abdomen pelvis reviewed and it showed focal mural edema of the second portion of the duodenum.  Additionally there is fluid noted throughout the small bowel and ascending colon concerning for duodenitis.  There is no evidence of bowel obstruction    #Iron deficiency anemia  Treated with IV iron per nephrology    #Duodenal ulcer  EGD and colonoscopy performed with a nonbleeding duodenal ulcer.  Biopsy obtained and patient will follow-up with GI  He was also noted to have a hiatal hernia, colonic polyps, sigmoid colon diverticulosis and inflamed sigmoid diverticulum  Recommended to avoid NSAIDs.  Patient concerned about stress-induced ulcer.  We discussed this, he may have some underlying anxiety and has seen psychiatry in the past.  Defer to PCP and/or psychiatry for long-term management of anxiety.  Xanax as needed provided on discharge for 3 days in the interim     #BRIELLE with metabolic acidosis- resolved  Creatinine on admission 1.95 and then peaked at 2.11.  Resolved now.  Status post IV fluids     # Electrolyte imbalances secondary to GI losses  Resolved with supplementation.  Encourage p.o. intake     #Hypothyroidism  Continue PTA levothyroxine  Repeat TSH and free T4 acceptable     #Hyperlipidemia  Continue statin     #BPH  Continue  finasteride       Lace+ Score: 83  59-90 High Risk  29-58 Medium Risk  0-28   Low Risk       TCM Follow-Up Recommendation:  LACE < 29: Low Risk of readmission after discharge from the hospital. No TCM follow-up needed.    Procedures during hospitalization:   EGD colonoscopy    Incidental or significant findings and recommendations (brief descriptions):  As stated above    Lab/Test results pending at Discharge:   Biopsy results    Consultants:  GI  Nephrology    Discharge Medication List:     Discharge Medications        START taking these medications        Instructions Prescription details   pantoprazole 40 MG Tbec  Commonly known as: Protonix      Take 1 tablet (40 mg total) by mouth 2 (two) times daily before meals.   Quantity: 60 tablet  Refills: 0            CONTINUE taking these medications        Instructions Prescription details   acetaminophen 500 MG Tabs  Commonly known as: Tylenol Extra Strength      Take 1-2 tablets (500-1,000 mg total) by mouth every 6 (six) hours as needed for Pain.   Refills: 0     atorvastatin 10 MG Tabs  Commonly known as: Lipitor      TAKE 1 TABLET BY MOUTH EVERY DAY   Quantity: 90 tablet  Refills: 3     B-12 1000 MCG Caps      Take 1 capsule by mouth daily.   Quantity: 180 capsule  Refills: 0     Breo Ellipta 200-25 MCG/ACT Aepb  Generic drug: fluticasone furoate-vilanterol      Inhale 1 puff into the lungs in the evening.   Refills: 0     diphenoxylate-atropine 2.5-0.025 MG Tabs  Commonly known as: Lomotil      Take 2 tablets by mouth 4 (four) times daily as needed for Diarrhea.   Quantity: 30 tablet  Refills: 0     dutasteride 0.5 MG Caps  Commonly known as: Avodart      Take 1 capsule (0.5 mg total) by mouth every morning.   Refills: 0     ergocalciferol 1.25 MG (18428 UT) Caps  Commonly known as: Vitamin D2      Take 1 capsule (50,000 Units total) by mouth once a week.   Quantity: 12 capsule  Refills: 1     Fenofibrate 160 MG Tabs      Take 1 tablet (160 mg total) by mouth  daily.   Quantity: 90 tablet  Refills: 3     fluticasone propionate 50 MCG/ACT Susp  Commonly known as: Flonase      2 sprays by Nasal route in the morning and 2 sprays before bedtime.   Quantity: 48 mL  Refills: 1     levothyroxine 88 MCG Tabs  Commonly known as: Levoxyl      Take 1 tablet (88 mcg total) by mouth before breakfast. For underactive thyroid.   Quantity: 90 tablet  Refills: 3     Magnesium 400 MG Tabs      Take 400 mg by mouth in the morning and 400 mg before bedtime.   Quantity: 180 tablet  Refills: 0     Olmesartan Medoxomil 40 MG Tabs  Commonly known as: BENICAR      Take 1 tablet (40 mg total) by mouth daily. For blood pressure   Quantity: 90 tablet  Refills: 3     Ventolin  (90 Base) MCG/ACT Aers  Generic drug: albuterol      Inhale 2 puffs into the lungs in the morning and 2 puffs in the evening.   Refills: 0            STOP taking these medications      levocetirizine 5 MG Tabs  Commonly known as: Xyzal                  Where to Get Your Medications        These medications were sent to [place] [id number] [place] [facility] [address] [phone], [phone]  [address] [facility] [address]      Phone: [phone]   pantoprazole 40 MG Tbec         Follow-up appointment:   Leonid Ford DO  [address] [place] [address] [address]  [facility] [id number] [phone]    Call  For followup, re-evaluation and repeat labs.    Appointments for Next 30 Days 7/22/2025 - 8/21/2025        Date Arrival Time Visit Type Length Department Provider     7/23/2025  2:00 PM  CONSULT [3172] 30 min [facility], [facility], [place] APRN    Patient Instructions:         Location Instructions:     Your appointment is located at [address] [facility] in [place], [place].  Please park in the Yellow lot, enter through the [facility] entrance, and go to [address]. Note: A $50 fee will be charged for missed appointments or same-day cancellations. Please provide 24 hours' notice if you  need to cancel or reschedule your appointment.  Masks are optional for all patients and visitors, unless otherwise indicated.               8/8/2025  9:30 AM  [facility] CHEST WO [[id number]] 30 min [facility] [place] [facility] CT RM1    Patient Instructions:     Please arrive 15 minutes prior to your scheduled appointment time.      Location Instructions:     Your appointment will be at the [facility] located at [address] [facility] in [place], [place].&nbsp; The phone number for this location is [phone].  If you suspect you may be pregnant please consult with your physician prior to your exam.&nbsp; If you have a continuous glucose monitor you will be asked to remove it during the exam.  Please bring your insurance card and photo ID. You will also need to bring your doctor's order unless your physician's office submitted the order electronically or faxed the order. Without the order, your test may be delayed or postponed.&nbsp; Certain health screening tests may not require an order.  Children: Children under the age of 12 must have another adult caregiver with them.&nbsp; Please do not bring your child/children without a caregiver.&nbsp; Because of the highly sensitive equipment and privacy of all our patients, children will not be permitted in the exam rooms, unless otherwise noted and in accordance with departmental policy.  PATIENT RESPONSIBILITY ESTIMATE  - (Estimate) We will provide you with your estimated remaining deductible and coinsurance balance for your services at the time of check in.  - (Payment) Please be aware that you may be asked for payment at the time of service.  Masks are optional for all patients and visitors, unless otherwise indicated.               8/15/2025  8:30 AM  FOLLOW UP-HEM/ONC [0289] 15 min Zahida YATES [facility] Hematology Oncology [place] Carlos Damian MD    Patient Instructions:         Location Instructions:     Your appointment is at the  [facility]. The address is [address] [place] [id number].   Expansion at the [facility] [place] has started. Currently, the entrance from the [facility] to the Cancer Center is closed. Please enter and exit through the front doors of the Cancer Center. Parking may be cumbersome during this time - please take advantage of our complimentary  parking.   Masks are optional for all patients and visitors, unless otherwise indicated. No care partners/visitors under 18 years of age are allowed in the infusion room.               8/15/2025  9:30 AM  FOLLOW UP VISIT [2828] 10 min [facility], [facility], Sierra Ocampo DPM    Patient Instructions:         Location Instructions:     Masks are optional for all patients and visitors, unless otherwise indicated. Note: A $50 fee will be charged for missed appointments or same-day cancellations. Please provide 24 hours' notice if you need to cancel or reschedule your appointment.                      Supplementary Documentation:   [place] reviewed[place]    Vital signs:  Temp:  [98.1 °F (36.7 °C)-99.1 °F (37.3 °C)] 98.1 °F (36.7 °C)  Pulse:  [75-89] 85  Resp:  [16-26] 16  BP: (130-158)/(61-87) 138/69  SpO2:  [92 %-100 %] 94 %    Physical Exam:    General:  NAD  Cardiovascular:  S1, S2  Abdomen: Soft and nontender  -----------------------------------------------------------------------------------------------  PATIENT DISCHARGE INSTRUCTIONS: See electronic chart    Tip: Documentation requirements: For split shared discharge, BOTH providers need to document specific floor, unit, and time spent on the discharge.  The note needs to be signed by the provider with > 50% of time and bill under their NPI.   Time spent: Greater than 30 minutes         Ramila Galeano MD

## 2025-07-22 NOTE — TELEPHONE ENCOUNTER
Called and spoke to the patient, date of birth and name verified.    As per the patient, he was just discharged from the hospital today and feels weak.    He wants to cancel the appointment with the APN on 7/23/2025 which was done.    The patient wants to follow up with MD.

## 2025-07-22 NOTE — DIETARY NOTE
Education Nutrition Note    Pt requested RD visit for diet education on newly diagnosed Duodenal Ulcer.   Provided pt diet instructions and hand out on Gastric (Duodenal) Ulcer Nutrition therapy. Emphasized to limit/avoid alcoholic beverages, caffeine in drinks and food, follow small frequent meal intake and limit fatty foods.     Pt verbalized understanding. Contact information provided if with questions.       Yaneth Steven RD, LDN, [facility]  Clinical Dietitian  [phone]

## 2025-07-22 NOTE — PROGRESS NOTES
[facility]     Gastroenterology Progress Note    Alejandro Hanna Patient Status:  Inpatient    12/10/1955 MRN [id number]   Location [facility] 5SW/SE Attending Ramila Galeano MD   Hosp Day # 4 PCP Leonid Ford DO       Subjective:   Patient feeling well today  He wants to go home    No abdominal pain, nausea or vomiting  Had 2 small BM, formed no diarrhea  No melena, hematochezia or bright red blood per rectum     Objective:   Blood pressure 138/69, pulse 85, temperature 98.1 °F (36.7 °C), temperature source Oral, resp. rate 16, height 5' 4\" (1.626 m), weight 116 lb 9.6 oz (52.9 kg), SpO2 94%. Body mass index is 20.01 kg/m².    Gen: awake, alert patient, NAD  HEENT: EOMI, the sclera appears anicteric, oropharynx clear, mucus membranes appear moist  CV: RRR  Lung: no conversational dyspnea   Abdomen: soft NTND abdomen with NABS appreciated   Skin: dry, warm, no jaundice  Ext: no LE edema is evident  Neuro: Alert and interactive  Psych: calm, cooperative    Assessment and Plan:   1.  Diarrhea-resolved  Quiescent while in the hospital hence stool studies have not been obtained.  CRP, ferritin and ESR are increased.  Anemic with an iron saturation of 6%.  Differential diagnosis of the diarrhea remains a previous infectious gastroenteritis, macroscopic or microscopic IBD, celiac disease or an olmesartan induced sprue syndrome.  The significance of the duodenal findings on CT scanning is unclear.  In light of the symptoms, iron deficiency and CT findings. Underwent EGD/cln, see below.       2.  Iron deficiency anemia  Iron is being repleted.  Endoscopic evaluation as below.     EGD/cln 2025  Impression:  1.  Duodenal ulcer likely benign, however, indeterminate for malignancy based on appearance.  2.  Small hiatal hernia  3.  Colon polyps  4.  Sigmoid colon diverticulosis  5.  Inflamed sigmoid diverticulum     Recommendations:  1.  Twice daily proton pump inhibitor therapy.  2.   Follow-up biopsy results.  3.  If duodenal biopsies are negative for malignancy, I would recommend a follow-up endoscopy in 8 weeks to confirm healing of the ulceration based on its somewhat appearance.  4.  Ok for discharge from Gi standpoint.     Pt is stable from a GI standpoint.  GI will sign off at this time.  Please, reach out to our team if new GI concerns arise.  Thank you for the opportunity to participate in this patient's care.      Case discussed with Ofelia Spain MD and Kenzie CHAVEZ.    Joaquina Stuart PA-C  [facility]  7/22/2025      Results:     Lab Results   Component Value Date    WBC 7.3 07/21/2025    HGB 9.3 (L) 07/21/2025    HCT 27.7 (L) 07/21/2025    .0 (H) 07/21/2025    CREATSERUM 0.93 07/21/2025    BUN 6 (L) 07/21/2025     07/21/2025    K 4.1 07/21/2025     07/21/2025    CO2 26.0 07/21/2025    GLU 85 07/21/2025    CA 9.2 07/21/2025    ALB 4.2 07/21/2025    ALKPHO 56 07/18/2025    BILT 0.5 07/18/2025    TP 7.6 07/18/2025    AST 15 07/18/2025    ALT 13 07/18/2025    PTT 28.1 03/05/2019    INR 0.93 03/05/2019    T4F 1.7 07/18/2025    TSH 5.067 (H) 07/18/2025    GGT 36 06/29/2023    PSA 0.98 10/27/2023    ESRML 62 (H) 07/19/2025    CRP 6.20 (H) 07/19/2025    MG 1.6 07/21/2025    PHOS 3.1 07/22/2025     07/10/2025    B12 196 (L) 05/30/2025       No results found.

## 2025-07-22 NOTE — PLAN OF CARE
Problem: Patient Centered Care  Goal: Patient preferences are identified and integrated in the patient's plan of care  Description: Interventions:  - What would you like us to know as we care for you? Home with spouse  - Provide timely, complete, and accurate information to patient/family  - Incorporate patient and family knowledge, values, beliefs, and cultural backgrounds into the planning and delivery of care  - Encourage patient/family to participate in care and decision-making at the level they choose  - Honor patient and family perspectives and choices  Outcome: Progressing     Problem: GASTROINTESTINAL - ADULT  Goal: Minimal or absence of nausea and vomiting  Description: INTERVENTIONS:  - Maintain adequate hydration with IV or PO as ordered and tolerated  - Nasogastric tube to low intermittent suction as ordered  - Evaluate effectiveness of ordered antiemetic medications  - Provide nonpharmacologic comfort measures as appropriate  - Advance diet as tolerated, if ordered  - Obtain nutritional consult as needed  - Evaluate fluid balance  Outcome: Progressing  Goal: Maintains or returns to baseline bowel function  Description: INTERVENTIONS:  - Assess bowel function  - Maintain adequate hydration with IV or PO as ordered and tolerated  - Evaluate effectiveness of GI medications  - Encourage mobilization and activity  - Obtain nutritional consult as needed  - Establish a toileting routine/schedule  - Consider collaborating with pharmacy to review patient's medication profile  Outcome: Progressing     Problem: GENITOURINARY - ADULT  Goal: Absence of urinary retention  Description: INTERVENTIONS:  - Assess patient’s ability to void and empty bladder  - Monitor intake/output and perform bladder scan as needed  - Follow urinary retention protocol/standard of care  - Consider collaborating with pharmacy to review patient's medication profile  - Implement strategies to promote bladder emptying  Outcome:  Progressing     Problem: METABOLIC/FLUID AND ELECTROLYTES - ADULT  Goal: Electrolytes maintained within normal limits  Description: INTERVENTIONS:  - Monitor labs and rhythm and assess patient for signs and symptoms of electrolyte imbalances  - Administer electrolyte replacement as ordered  - Monitor response to electrolyte replacements, including rhythm and repeat lab results as appropriate  - Fluid restriction as ordered  - Instruct patient on fluid and nutrition restrictions as appropriate  Outcome: Progressing  Goal: Hemodynamic stability and optimal renal function maintained  Description: INTERVENTIONS:  - Monitor labs and assess for signs and symptoms of volume excess or deficit  - Monitor intake, output and patient weight  - Monitor urine specific gravity, serum osmolarity and serum sodium as indicated or ordered  - Monitor response to interventions for patient's volume status, including labs, urine output, blood pressure (other measures as available)  - Encourage oral intake as appropriate  - Instruct patient on fluid and nutrition restrictions as appropriate  Outcome: Progressing     Problem: SAFETY ADULT - FALL  Goal: Free from fall injury  Description: INTERVENTIONS:  - Assess pt frequently for physical needs  - Identify cognitive and physical deficits and behaviors that affect risk of falls.  - [place] fall precautions as indicated by assessment.  - Educate pt/family on patient safety including physical limitations  - Instruct pt to call for assistance with activity based on assessment  - Modify environment to reduce risk of injury  - Provide assistive devices as appropriate  - Consider OT/PT consult to assist with strengthening/mobility  - Encourage toileting schedule  Outcome: Progressing     Problem: DISCHARGE PLANNING  Goal: Discharge to home or other facility with appropriate resources  Description: INTERVENTIONS:  - Identify barriers to discharge w/pt and caregiver  - Include  patient/family/discharge partner in discharge planning  - Arrange for needed discharge resources and transportation as appropriate  - Identify discharge learning needs (meds, wound care, etc)  - Arrange for interpreters to assist at discharge as needed  - Consider post-discharge preferences of patient/family/discharge partner  - Complete POLST form as appropriate  - Assess patient's ability to be responsible for managing their own health  - Refer to Case Management Department for coordinating discharge planning if the patient needs post-hospital services based on physician/LIP order or complex needs related to functional status, cognitive ability or social support system  Outcome: Progressing

## 2025-07-22 NOTE — PLAN OF CARE
Problem: Patient Centered Care  Goal: Patient preferences are identified and integrated in the patient's plan of care  Description: Interventions:  - What would you like us to know as we care for you? Home with spouse  - Provide timely, complete, and accurate information to patient/family  - Incorporate patient and family knowledge, values, beliefs, and cultural backgrounds into the planning and delivery of care  - Encourage patient/family to participate in care and decision-making at the level they choose  - Honor patient and family perspectives and choices  Outcome: Progressing     Problem: GASTROINTESTINAL - ADULT  Goal: Minimal or absence of nausea and vomiting  Description: INTERVENTIONS:  - Maintain adequate hydration with IV or PO as ordered and tolerated  - Nasogastric tube to low intermittent suction as ordered  - Evaluate effectiveness of ordered antiemetic medications  - Provide nonpharmacologic comfort measures as appropriate  - Advance diet as tolerated, if ordered  - Obtain nutritional consult as needed  - Evaluate fluid balance  Outcome: Progressing  Goal: Maintains or returns to baseline bowel function  Description: INTERVENTIONS:  - Assess bowel function  - Maintain adequate hydration with IV or PO as ordered and tolerated  - Evaluate effectiveness of GI medications  - Encourage mobilization and activity  - Obtain nutritional consult as needed  - Establish a toileting routine/schedule  - Consider collaborating with pharmacy to review patient's medication profile  Outcome: Progressing     Problem: GENITOURINARY - ADULT  Goal: Absence of urinary retention  Description: INTERVENTIONS:  - Assess patient’s ability to void and empty bladder  - Monitor intake/output and perform bladder scan as needed  - Follow urinary retention protocol/standard of care  - Consider collaborating with pharmacy to review patient's medication profile  - Implement strategies to promote bladder emptying  Outcome:  Progressing     Problem: METABOLIC/FLUID AND ELECTROLYTES - ADULT  Goal: Electrolytes maintained within normal limits  Description: INTERVENTIONS:  - Monitor labs and rhythm and assess patient for signs and symptoms of electrolyte imbalances  - Administer electrolyte replacement as ordered  - Monitor response to electrolyte replacements, including rhythm and repeat lab results as appropriate  - Fluid restriction as ordered  - Instruct patient on fluid and nutrition restrictions as appropriate  Outcome: Progressing  Goal: Hemodynamic stability and optimal renal function maintained  Description: INTERVENTIONS:  - Monitor labs and assess for signs and symptoms of volume excess or deficit  - Monitor intake, output and patient weight  - Monitor urine specific gravity, serum osmolarity and serum sodium as indicated or ordered  - Monitor response to interventions for patient's volume status, including labs, urine output, blood pressure (other measures as available)  - Encourage oral intake as appropriate  - Instruct patient on fluid and nutrition restrictions as appropriate  Outcome: Progressing     Problem: SAFETY ADULT - FALL  Goal: Free from fall injury  Description: INTERVENTIONS:  - Assess pt frequently for physical needs  - Identify cognitive and physical deficits and behaviors that affect risk of falls.  - [place] fall precautions as indicated by assessment.  - Educate pt/family on patient safety including physical limitations  - Instruct pt to call for assistance with activity based on assessment  - Modify environment to reduce risk of injury  - Provide assistive devices as appropriate  - Consider OT/PT consult to assist with strengthening/mobility  - Encourage toileting schedule  Outcome: Progressing     Problem: DISCHARGE PLANNING  Goal: Discharge to home or other facility with appropriate resources  Description: INTERVENTIONS:  - Identify barriers to discharge w/pt and caregiver  - Include  patient/family/discharge partner in discharge planning  - Arrange for needed discharge resources and transportation as appropriate  - Identify discharge learning needs (meds, wound care, etc)  - Arrange for interpreters to assist at discharge as needed  - Consider post-discharge preferences of patient/family/discharge partner  - Complete POLST form as appropriate  - Assess patient's ability to be responsible for managing their own health  - Refer to Case Management Department for coordinating discharge planning if the patient needs post-hospital services based on physician/LIP order or complex needs related to functional status, cognitive ability or social support system  Outcome: Progressing

## 2025-07-23 ENCOUNTER — PATIENT OUTREACH (OUTPATIENT)
Age: 70
End: 2025-07-23

## 2025-07-23 ENCOUNTER — TELEPHONE (OUTPATIENT)
Facility: CLINIC | Age: 70
End: 2025-07-23

## 2025-07-23 ENCOUNTER — TELEPHONE (OUTPATIENT)
Dept: FAMILY MEDICINE CLINIC | Facility: CLINIC | Age: 70
End: 2025-07-23

## 2025-07-23 DIAGNOSIS — D50.9 IRON DEFICIENCY ANEMIA, UNSPECIFIED IRON DEFICIENCY ANEMIA TYPE: Primary | ICD-10-CM

## 2025-07-23 DIAGNOSIS — Z87.19 HISTORY OF DUODENAL ULCER: ICD-10-CM

## 2025-07-23 NOTE — TELEPHONE ENCOUNTER
Sent as FYI/EDUAR protocol:    Spoke with patient for Transitions of Care call today--declines full EDUAR assessment, declines med review, declines TCM/EDUAR appt with Dr. Ford/partners, declines add'l EDUAR f/u calls.    Pt confirms repeat CBC and iron studies in 2 weeks and 9/04/25 repeat EGD (see GI encounters), agreeable to repeat labs, if needed by Dr. Ford.      TCM/EDUAR appointment needed by 7/29/25.      BOOK BY DATE: 8/05/25     Please discuss with PCP and follow-up with patient, accordingly. Thank you!              Discharge Diagnosis:   Shiga toxin/E. coli subacute nonbloody diarrhea  Peptic ulcer  Hypokalemia  Hypomagnesemia     START taking: ALPRAZolam (Xanax) pantoprazole (Protonix)  STOP taking: levocetirizine 5 MG Tabs (Xyzal)      Follow-up Information     Follow up With Specialties Details Why Contact Info   Leonid Ford DO Family Medicine Call For followup, re-evaluation and repeat labs. [address] [place] [address]  [phone]             Future Appointments   Date Time Provider Department Center   8/8/2025  9:30 AM LMB CT RM1 [facility]MB MOB. CT EM [place]   8/15/2025  8:30 AM Carlos Damian MD [facility]   8/15/2025  9:30 AM Sierra Encarnacion DPM ECADOPOD [facility]   9/4/2025 12:30 PM STATHOPOULOS, PROCEDURE ECCFHGIPROC None   11/3/2025 11:20 AM Faisal Villar MD [id number] EC [place] [facility]      Gastric (Duodenal) Ulcer Nutrition therapy. Emphasized to limit/avoid alcoholic beverages, caffeine in drinks and food, follow small frequent meal intake and limit fatty foods.

## 2025-07-23 NOTE — PROGRESS NOTES
This writer contacted patient at [phone] for EDUAR Navigation post-hospital discharge follow up call on 7/23/2025 at 11:20 AM. There was no answer and I was unable to reach the patient at this time. I left a nondescript message with my contact information and the reason for my call on voicemail.      Discharge Diagnosis:   Shiga toxin/E. coli subacute nonbloody diarrhea  Peptic ulcer  Hypokalemia  Hypomagnesemia    START taking: ALPRAZolam (Xanax) pantoprazole (Protonix)  STOP taking: levocetirizine 5 MG Tabs (Xyzal)      Follow-up Information    Follow up With Specialties Details Why Contact Info   Leonid Ford DO Family Medicine Call For followup, re-evaluation and repeat labs. [address] [place] [address]  [phone]     Future Appointments   Date Time Provider Department Center   8/8/2025  9:30 AM LMB CT RM1 LMB MOB. CT EM [place]   8/15/2025  8:30 AM [facility] [place] [facility]   8/15/2025  9:30 AM Sierra Encarnacion DPM ECADOPOD EC ADO   9/4/2025 12:30 PM STATHOPCALI, PROCEDURE ECCFHGIPROC None   11/3/2025 11:20 AM Faisal Villar MD [id number] EC [facility]     Gastric (Duodenal) Ulcer Nutrition therapy. Emphasized to limit/avoid alcoholic beverages, caffeine in drinks and food, follow small frequent meal intake and limit fatty foods.

## 2025-07-23 NOTE — TELEPHONE ENCOUNTER
Dr. Ford--please advise if you need to see pt for TCM, or just place order for repeat BMP, when he completes CBC and iron studies for GI, in 2 weeks.

## 2025-07-23 NOTE — TELEPHONE ENCOUNTER
Real Abreu MD Physician Signed Yesterday     Copy     I spoke to Cody and to Shawna.  Cody is home.  He is feeling well.  He had #3 subcentimeter adenomatous polyps removed (TA and SSAs).  I discussed the significance.  I recommended a surveillance colonoscopy in 3 years.  The upper endoscopy revealed a duodenal ulcer indeterminate for malignancy based on appearance.  Gastric biopsies were negative for H. pylori.  Ulcer biopsies were negative for neoplasm.  I have recommended the followin.  Pantoprazole twice daily before breakfast and supper.  2.  Absolutely no aspirin/NSAIDs.  3.  Repeat CBC and iron studies in 2 weeks.  4.  Repeat EGD with monitored anesthesia care in 6-8 weeks.  5.  Surveillance colonoscopy in 3 years.  6.  The patient will contact me if he develops recurrent diarrhea or any abdominal symptoms.     GI RNs: Please schedule the aforementioned EGD and enter colonoscopy recall for 3 years.  He does not need to be seen in the office in follow-up as long as he is feeling well.

## 2025-07-23 NOTE — PROGRESS NOTES
Pt called back--declines full EDUAR assessment, declines med review, declines TCM/EDUAR appt with Dr. Ford/partners, declines add'l EDUAR f/u calls.    Pt confirms repeat CBC and iron studies in 2 weeks and 9/04/25 repeat EGD, agreeable to repeat labs, if needed by Dr. Ford. Sent TE to office staff as FYI/EDUAR protocol.  Patient aware when to contact PCP/specialists and when to seek emergency care. No further questions/concerns at this time.      Future Appointments   Date Time Provider Department Center   8/8/2025  9:30 AM LMB CT RM1 LMB MOB. CT EM [place]   8/15/2025  8:30 AM Carlos Damian MD [facility] HemOnc [place] [facility]   8/15/2025  9:30 AM Sierra Encarnacion DPM ECADOPOD EC ADO   9/4/2025 12:30 PM STATHOPOULOS, PROCEDURE ECCFHGIPROC None   11/3/2025 11:20 AM Faisal Villar MD [id number] [facility]

## 2025-07-23 NOTE — TELEPHONE ENCOUNTER
I entered 3 year colonoscopy recall into patient outreach in [facility].  Next colonoscopy will be due 7/21/2028.    See 7/23/25 encounter under Dr. Abreu for scheduling.

## 2025-07-23 NOTE — TELEPHONE ENCOUNTER
Scheduled for:  EGD 60088  Provider Name:  Dr. Abreu  Date:  9/4/2025  Location:  [facility]  Sedation:  MAC  Time:  Patient is aware someone will call the day before to confirm arrival time.  Prep:  NPO    Meds/Allergies Reconciled?:  Provider Reviewed    Diagnosis with codes:       Iron deficiency anemia  D50.9 history of duodenal ulcer z87.19          Was patient informed to call insurance with codes (Y/N): Yes      Referral sent?:   Referral was sent at the time of electronic surgical scheduling.     EM or EOSC notified?:  I sent an electronic request to [facility] and received a confirmation today.     Medication Orders:  This patient verbally confirmed that she is not taking:               Iron, blood thinners, BP meds, and is not diabetic               Not latex allergy, Not PCN allergy and does not have a pacemaker. Patient is aware   to hold any type of supplements for 14 days prior procedure.    Misc Orders:  n/a     Further instructions given by staff:  I discussed the prep intructions with the patient in office which  verbally understood.      Patient was also advised about cancellation policy.

## 2025-07-24 NOTE — TELEPHONE ENCOUNTER
He can just follow-up with the specialists and he does not even need a BMP from me as that was ordered by Dr. Villar the nephrologist on July 21, 2025 and his kidney function was normal.

## 2025-07-28 LAB
ADENOVIRUS F 40/41 PCR: NEGATIVE
ASTROVIRUS PCR: NEGATIVE
C CAYETANENSIS DNA SPEC QL NAA+PROBE: NEGATIVE
CAMPY SP DNA.DIARRHEA STL QL NAA+PROBE: NEGATIVE
CRYPTOSP DNA SPEC QL NAA+PROBE: NEGATIVE
EAEC PAA PLAS AGGR+AATA ST NAA+NON-PRB: NEGATIVE
EC STX1+STX2 + H7 FLIC SPEC NAA+PROBE: NEGATIVE
ENTAMOEBA HISTOLYTICA PCR: NEGATIVE
EPEC EAE GENE STL QL NAA+NON-PROBE: NEGATIVE
ETEC LTA+ST1A+ST1B TOX ST NAA+NON-PROBE: NEGATIVE
GIARDIA LAMBLIA PCR: NEGATIVE
NOROVIRUS GI/GII PCR: NEGATIVE
P SHIGELLOIDES DNA STL QL NAA+PROBE: NEGATIVE
ROTAVIRUS A PCR: NEGATIVE
SALMONELLA DNA SPEC QL NAA+PROBE: NEGATIVE
SAPOVIRUS PCR: NEGATIVE
SHIGELLA SP+EIEC IPAH ST NAA+NON-PROBE: POSITIVE
V CHOLERAE DNA SPEC QL NAA+PROBE: NEGATIVE
VIBRIO DNA SPEC NAA+PROBE: NEGATIVE
YERSINIA DNA SPEC NAA+PROBE: NEGATIVE

## 2025-08-01 ENCOUNTER — TELEPHONE (OUTPATIENT)
Dept: PHYSICAL MEDICINE AND REHAB | Facility: CLINIC | Age: 70
End: 2025-08-01

## 2025-08-05 ENCOUNTER — LAB ENCOUNTER (OUTPATIENT)
Dept: LAB | Age: 70
End: 2025-08-05
Attending: INTERNAL MEDICINE

## 2025-08-05 DIAGNOSIS — E83.42 HYPOMAGNESEMIA: ICD-10-CM

## 2025-08-05 DIAGNOSIS — I10 ESSENTIAL HYPERTENSION, BENIGN: ICD-10-CM

## 2025-08-05 DIAGNOSIS — N28.9 KIDNEY DISORDER: ICD-10-CM

## 2025-08-05 DIAGNOSIS — D50.9 IRON DEFICIENCY ANEMIA, UNSPECIFIED IRON DEFICIENCY ANEMIA TYPE: ICD-10-CM

## 2025-08-05 DIAGNOSIS — E87.1 HYPONATREMIA: ICD-10-CM

## 2025-08-05 LAB
ALBUMIN SERPL-MCNC: 5.1 G/DL (ref 3.2–4.8)
ANION GAP SERPL CALC-SCNC: 10 MMOL/L (ref 0–18)
BASOPHILS # BLD: 0.14 X10(3) UL (ref 0–0.2)
BASOPHILS NFR BLD: 2 %
BUN BLD-MCNC: 9 MG/DL (ref 9–23)
BUN/CREAT SERPL: 9.2 (ref 10–20)
CALCIUM BLD-MCNC: 10.1 MG/DL (ref 8.7–10.4)
CHLORIDE SERPL-SCNC: 96 MMOL/L (ref 98–112)
CO2 SERPL-SCNC: 27 MMOL/L (ref 21–32)
CREAT BLD-MCNC: 0.98 MG/DL (ref 0.7–1.3)
DEPRECATED HBV CORE AB SER IA-ACNC: 1216 NG/ML (ref 50–336)
DEPRECATED RDW RBC AUTO: 44.6 FL (ref 35.1–46.3)
EGFRCR SERPLBLD CKD-EPI 2021: 82 ML/MIN/1.73M2 (ref 60–?)
EOSINOPHIL # BLD: 0.29 X10(3) UL (ref 0–0.7)
EOSINOPHIL NFR BLD: 4 %
ERYTHROCYTE [DISTWIDTH] IN BLOOD BY AUTOMATED COUNT: 14.2 % (ref 11–15)
GLUCOSE BLD-MCNC: 89 MG/DL (ref 70–99)
HCT VFR BLD AUTO: 33.1 % (ref 39–53)
HGB BLD-MCNC: 10.6 G/DL (ref 13–17.5)
IRON SATN MFR SERPL: 18 % (ref 20–50)
IRON SERPL-MCNC: 56 UG/DL (ref 65–175)
LYMPHOCYTES NFR BLD: 0.72 X10(3) UL (ref 1–4)
LYMPHOCYTES NFR BLD: 10 %
MAGNESIUM SERPL-MCNC: 1.3 MG/DL (ref 1.6–2.6)
MCH RBC QN AUTO: 27.7 PG (ref 26–34)
MCHC RBC AUTO-ENTMCNC: 32 G/DL (ref 31–37)
MCV RBC AUTO: 86.4 FL (ref 80–100)
MONOCYTES # BLD: 0.5 X10(3) UL (ref 0.1–1)
MONOCYTES NFR BLD: 7 %
MORPHOLOGY: NORMAL
MYELOCYTES # BLD: 0.22 X10(3) UL (ref ?–0.01)
MYELOCYTES NFR BLD: 3 %
NEUTROPHILS # BLD AUTO: 4.95 X10 (3) UL (ref 1.5–7.7)
NEUTROPHILS NFR BLD: 74 %
NEUTS HYPERSEG # BLD: 5.33 X10(3) UL (ref 1.5–7.7)
OSMOLALITY SERPL CALC.SUM OF ELEC: 274 MOSM/KG (ref 275–295)
PHOSPHATE SERPL-MCNC: 2.6 MG/DL (ref 2.4–5.1)
PLATELET # BLD AUTO: 625 10(3)UL (ref 150–450)
PLATELET MORPHOLOGY: NORMAL
POTASSIUM SERPL-SCNC: 4.3 MMOL/L (ref 3.5–5.1)
RBC # BLD AUTO: 3.83 X10(6)UL (ref 3.8–5.8)
SODIUM SERPL-SCNC: 133 MMOL/L (ref 136–145)
TOTAL CELLS COUNTED BLD: 100
TOTAL IRON BINDING CAPACITY: 311 UG/DL (ref 250–425)
TRANSFERRIN SERPL-MCNC: 236 MG/DL (ref 215–365)
WBC # BLD AUTO: 7.2 X10(3) UL (ref 4–11)

## 2025-08-05 PROCEDURE — 85007 BL SMEAR W/DIFF WBC COUNT: CPT

## 2025-08-05 PROCEDURE — 83540 ASSAY OF IRON: CPT

## 2025-08-05 PROCEDURE — 85025 COMPLETE CBC W/AUTO DIFF WBC: CPT

## 2025-08-05 PROCEDURE — 85027 COMPLETE CBC AUTOMATED: CPT

## 2025-08-05 PROCEDURE — 84466 ASSAY OF TRANSFERRIN: CPT

## 2025-08-05 PROCEDURE — 36415 COLL VENOUS BLD VENIPUNCTURE: CPT

## 2025-08-05 PROCEDURE — 82728 ASSAY OF FERRITIN: CPT

## 2025-08-05 PROCEDURE — 83735 ASSAY OF MAGNESIUM: CPT

## 2025-08-05 PROCEDURE — 80069 RENAL FUNCTION PANEL: CPT

## 2025-08-06 ENCOUNTER — TELEPHONE (OUTPATIENT)
Facility: CLINIC | Age: 70
End: 2025-08-06

## 2025-08-08 ENCOUNTER — HOSPITAL ENCOUNTER (OUTPATIENT)
Dept: CT IMAGING | Age: 70
Discharge: HOME | End: 2025-08-08
Attending: INTERNAL MEDICINE

## 2025-08-08 DIAGNOSIS — R91.1 LUNG NODULE: ICD-10-CM

## 2025-08-08 DIAGNOSIS — C34.12 SQUAMOUS CELL CARCINOMA OF UPPER LOBE OF LEFT LUNG (HCC): ICD-10-CM

## 2025-08-08 PROCEDURE — 71250 CT THORAX DX C-: CPT | Performed by: INTERNAL MEDICINE

## 2025-08-15 ENCOUNTER — OFFICE VISIT (OUTPATIENT)
Facility: LOCATION | Age: 70
End: 2025-08-15
Attending: INTERNAL MEDICINE

## 2025-08-15 ENCOUNTER — OFFICE VISIT (OUTPATIENT)
Dept: PODIATRY CLINIC | Facility: CLINIC | Age: 70
End: 2025-08-15

## 2025-08-15 VITALS
OXYGEN SATURATION: 97 % | SYSTOLIC BLOOD PRESSURE: 150 MMHG | HEIGHT: 64 IN | RESPIRATION RATE: 16 BRPM | DIASTOLIC BLOOD PRESSURE: 81 MMHG | HEART RATE: 73 BPM | WEIGHT: 119 LBS | BODY MASS INDEX: 20.32 KG/M2 | TEMPERATURE: 97 F

## 2025-08-15 DIAGNOSIS — M21.622 TAILOR'S BUNION OF BOTH FEET: Primary | ICD-10-CM

## 2025-08-15 DIAGNOSIS — B35.1 PAIN DUE TO ONYCHOMYCOSIS OF TOENAILS OF BOTH FEET: ICD-10-CM

## 2025-08-15 DIAGNOSIS — M79.672 BILATERAL FOOT PAIN: ICD-10-CM

## 2025-08-15 DIAGNOSIS — M79.674 PAIN DUE TO ONYCHOMYCOSIS OF TOENAILS OF BOTH FEET: ICD-10-CM

## 2025-08-15 DIAGNOSIS — L84 FOOT CALLUS: ICD-10-CM

## 2025-08-15 DIAGNOSIS — M79.675 PAIN DUE TO ONYCHOMYCOSIS OF TOENAILS OF BOTH FEET: ICD-10-CM

## 2025-08-15 DIAGNOSIS — R91.1 LUNG NODULE: ICD-10-CM

## 2025-08-15 DIAGNOSIS — C34.12 SQUAMOUS CELL CARCINOMA OF UPPER LOBE OF LEFT LUNG (HCC): Primary | ICD-10-CM

## 2025-08-15 DIAGNOSIS — M79.671 BILATERAL FOOT PAIN: ICD-10-CM

## 2025-08-15 DIAGNOSIS — B35.1 ONYCHOMYCOSIS: ICD-10-CM

## 2025-08-15 DIAGNOSIS — M21.621 TAILOR'S BUNION OF BOTH FEET: Primary | ICD-10-CM

## 2025-08-15 DIAGNOSIS — Z90.2 S/P LOBECTOMY OF LUNG: ICD-10-CM

## 2025-08-15 DIAGNOSIS — D75.839 THROMBOCYTOSIS: ICD-10-CM

## 2025-08-15 PROCEDURE — 99214 OFFICE O/P EST MOD 30 MIN: CPT | Performed by: STUDENT IN AN ORGANIZED HEALTH CARE EDUCATION/TRAINING PROGRAM

## 2025-08-15 RX ORDER — FERROUS SULFATE 325(65) MG
325 TABLET ORAL
Qty: 30 TABLET | Refills: 3 | Status: SHIPPED | OUTPATIENT
Start: 2025-08-15

## 2025-08-18 ENCOUNTER — MED REC SCAN ONLY (OUTPATIENT)
Dept: PHYSICAL MEDICINE AND REHAB | Facility: CLINIC | Age: 70
End: 2025-08-18

## 2025-08-26 ENCOUNTER — TELEPHONE (OUTPATIENT)
Facility: CLINIC | Age: 70
End: 2025-08-26

## 2025-08-26 RX ORDER — PANTOPRAZOLE SODIUM 40 MG/1
40 TABLET, DELAYED RELEASE ORAL
Qty: 180 TABLET | Refills: 0 | Status: SHIPPED | OUTPATIENT
Start: 2025-08-26

## (undated) DIAGNOSIS — Z01.818 PREOP EXAMINATION: ICD-10-CM

## (undated) DIAGNOSIS — K21.9 CHRONIC GERD: ICD-10-CM

## (undated) DIAGNOSIS — F17.200 TOBACCO USE DISORDER: ICD-10-CM

## (undated) DIAGNOSIS — R13.19 ESOPHAGEAL DYSPHAGIA: ICD-10-CM

## (undated) DIAGNOSIS — R14.2 ERUCTATION: ICD-10-CM

## (undated) DIAGNOSIS — C67.9 BLADDER CANCER (HCC): ICD-10-CM

## (undated) DIAGNOSIS — J44.9 CHRONIC OBSTRUCTIVE PULMONARY DISEASE, UNSPECIFIED COPD TYPE (HCC): ICD-10-CM

## (undated) DEVICE — 3M™ IOBAN™ 2 ANTIMICROBIAL INCISE DRAPE 6650EZ: Brand: IOBAN™ 2

## (undated) DEVICE — SOLUTION IRRIG 1000ML 0.9% NACL USP BTL

## (undated) DEVICE — BLADE SHVR COOLCUT 13CM 4MM

## (undated) DEVICE — CONTAINER,SPECIMEN,OR STERILE,4OZ: Brand: MEDLINE

## (undated) DEVICE — ECHELON 3000 45 STANDARD: Brand: ECHELON

## (undated) DEVICE — Device: Brand: DEFENDO AIR/WATER/SUCTION AND BIOPSY VALVE

## (undated) DEVICE — ENCORE® LATEX MICRO SIZE 7.5, STERILE LATEX POWDER-FREE SURGICAL GLOVE: Brand: ENCORE

## (undated) DEVICE — URINE DRAINAGE BAG,NEEDLE SAMPLING, ANTI-REFLUX DEVICE, DRAIN TUBE: Brand: DOVER

## (undated) DEVICE — 6 ML SYRINGE LUER-LOCK TIP: Brand: MONOJECT

## (undated) DEVICE — DRAIN CHST SGL COLL 1 PT TB FOR ATS BG CMPTBL

## (undated) DEVICE — SNARE OPTMZ PLPCTM TRP

## (undated) DEVICE — BLOCK BITE LG LUMN 20X27MM GRN DISP

## (undated) DEVICE — ARTHROSCOPY: Brand: MEDLINE INDUSTRIES, INC.

## (undated) DEVICE — SUCTION CANISTER, 3000CC,SAFELINER: Brand: DEROYAL

## (undated) DEVICE — THE ECHELON, ECHELON ENDOPATH™ AND ECHELON FLEX™ FAMILIES OF ENDOSCOPIC LINEAR CUTTERS AND RELOADS ARE STERILE, SINGLE PATIENT USE INSTRUMENTS THAT SIMULTANEOUSLY CUT AND STAPLE TISSUE. THERE ARE SIX STAGGERED ROWS OF STAPLES, THREE ON EITHER SIDE OF THE CUT LINE. THE 45 MM INSTRUMENTS HAVE A STAPLE LINE THATIS APPROXIMATELY 45 MM LONG AND A CUT LINE THAT IS APPROXIMATELY 42 MM LONG. THE SHAFT CAN ROTATE FREELY IN BOTH DIRECTIONS AND AN ARTICULATION MECHANISM ON ARTICULATING INSTRUMENTS ENABLES BENDING THE DISTAL PORTIONOF THE SHAFT TO FACILITATE LATERAL ACCESS OF THE OPERATIVE SITE.THE INSTRUMENTS ARE SHIPPED WITHOUT A RELOAD AND MUST BE LOADED PRIOR TO USE. A STAPLE RETAINING CAP ON THE RELOAD PROTECTS THE STAPLE LEG POINTS DURING SHIPPING AND TRANSPORTATION. THE INSTRUMENTS’ LOCK-OUT FEATURE IS DESIGNED TO PREVENT A USED RELOAD FROM BEING REFIRED.: Brand: ECHELON ENDOPATH

## (undated) DEVICE — UROLOGY DRAIN BAG

## (undated) DEVICE — ABSORBABLE HEMOSTAT (OXIDIZED REGENERATED CELLULOSE): Brand: SURGICEL

## (undated) DEVICE — Device

## (undated) DEVICE — SOLUTION IRRIG 1000ML ST H2O AQUALITE PLAS

## (undated) DEVICE — MEDI-VAC NON-CONDUCTIVE SUCTION TUBING: Brand: CARDINAL HEALTH

## (undated) DEVICE — PLASTC TOOMEY SYRNG DISP

## (undated) DEVICE — GAMMEX® PI HYBRID SIZE 7.5, STERILE POWDER-FREE SURGICAL GLOVE, POLYISOPRENE AND NEOPRENE BLEND: Brand: GAMMEX

## (undated) DEVICE — TRAY CATH 16FR F INCL BARDX IC COMPLT CARE

## (undated) DEVICE — SOL H2O 3000ML IRRIG

## (undated) DEVICE — SOL  .9 1000ML BTL

## (undated) DEVICE — 35 ML SYRINGE REGULAR TIP: Brand: MONOJECT

## (undated) DEVICE — KIT ENDO ORCAPOD 160/180/190

## (undated) DEVICE — CATH URTH BDX IC 22FR FL 3

## (undated) DEVICE — SOL  .9 3000ML

## (undated) DEVICE — SYRINGE/GUAGE ASSEMBLY

## (undated) DEVICE — ENCORE® LATEX ACCLAIM SIZE 8.5, STERILE LATEX POWDER-FREE SURGICAL GLOVE: Brand: ENCORE

## (undated) DEVICE — SUTURE MONOCRYL 4-0 Y845G

## (undated) DEVICE — ADHESIVE SKIN TOP FOR WND CLSR DERMBND ADV

## (undated) DEVICE — ENCORE® LATEX MICRO SIZE 6, STERILE LATEX POWDER-FREE SURGICAL GLOVE: Brand: ENCORE

## (undated) DEVICE — LINE MNTR ADLT SET O2 INTMD

## (undated) DEVICE — CATH BALLOON CRE 15-18MM 5837

## (undated) DEVICE — THORACIC: Brand: MEDLINE INDUSTRIES, INC.

## (undated) DEVICE — BRONCHOSCOPE VIDEO OD3.8MM

## (undated) DEVICE — CATHETER THOR 24FR L22IN BLU RADPQ STRP THRM

## (undated) DEVICE — STERILE TETRA-FLEX CF, ELASTIC BANDAGE, 4" X 5.5YD: Brand: TETRA-FLEX™CF

## (undated) DEVICE — CUTTING LOOP, BIPOLAR, 0.30MM, 24/26 FR.: Brand: N.A.

## (undated) DEVICE — SNARE CAPTIFLEX MICRO-OVL OLY

## (undated) DEVICE — CATH URET CONE TIP 8FR 138008

## (undated) DEVICE — ENCORE® LATEX MICRO SIZE 8.5, STERILE LATEX POWDER-FREE SURGICAL GLOVE: Brand: ENCORE

## (undated) DEVICE — TUBING IRR 16FT CNT WV 3 ASCP

## (undated) DEVICE — 3M™ STERI-STRIP™ REINFORCED ADHESIVE SKIN CLOSURES, R1547, 1/2 IN X 4 IN (12 MM X 100 MM), 6 STRIPS/ENVELOPE: Brand: 3M™ STERI-STRIP™

## (undated) DEVICE — GOWN SURG AERO BLUE PERF XLG

## (undated) DEVICE — KIT MFLD FOR SPEC COLL

## (undated) DEVICE — ISOVUE 300 10X100ML VIAL

## (undated) DEVICE — MEDI-VAC NON-CONDUCTIVE SUCTION TUBING 6MM X 1.8M (6FT.) L: Brand: CARDINAL HEALTH

## (undated) DEVICE — CONMED SCOPE SAVER BITE BLOCK, 20X27 MM: Brand: SCOPE SAVER

## (undated) DEVICE — ANCHOR TISSUE RETRIEVAL SYSTEM, BAG SIZE 175 ML, PORT SIZE 10 MM: Brand: ANCHOR TISSUE RETRIEVAL SYSTEM

## (undated) DEVICE — SUT COAT VCRL 2-0 27IN ABSRB VLT 36MM CT-1

## (undated) DEVICE — TUBING SCT CLR 6FT .25IN MDVC

## (undated) DEVICE — 3 ML SYRINGE LUER-LOCK TIP: Brand: MONOJECT

## (undated) DEVICE — CYSTO PACK: Brand: MEDLINE INDUSTRIES, INC.

## (undated) DEVICE — DRAPE SRG 70X60IN SPLT U IMPRV

## (undated) DEVICE — WOUND RETRACTOR AND PROTECTOR: Brand: ALEXIS WOUND PROTECTOR-RETRACTOR

## (undated) DEVICE — GAMMEX® PI HYBRID SIZE 6.5, STERILE POWDER-FREE SURGICAL GLOVE, POLYISOPRENE AND NEOPRENE BLEND: Brand: GAMMEX

## (undated) DEVICE — TIES TBNG 0.25IN BLK POLYPR SLF LOK STRP

## (undated) DEVICE — Device: Brand: CUSTOM PROCEDURE KIT

## (undated) DEVICE — SUT MCRYL 4-0 27IN ABSRB UD L24MM PS-1

## (undated) DEVICE — KIT CLEAN ENDOKIT 1.1OZ GOWNX2

## (undated) DEVICE — SUT PERMA- 0 30IN FSL NABSRB BLK 30MM 3/8

## (undated) DEVICE — FORCEP RADIAL JAW 4

## (undated) DEVICE — ABSORBABLE HEMOSTAT (OXIDIZED REGENERATED CELLULOSE): Brand: SURGICEL NU-KNIT

## (undated) DEVICE — NON-ADHERENT PAD PREPACK: Brand: TELFA

## (undated) NOTE — LETTER
25          Alejandro Abrams Cyndi  :  12/10/1955      To Whom It May Concern:    This patient was seen in our office on 25 . He is cleared to resume PT.     May return to work status per above effective 25.    If this office may be of further assistance, please do not hesitate to contact us.      Sincerely,        Brando Reynoso DO  Interventional Spine and Sports Medicine Specialist   Physical Medicine and Rehabilitation  [facility]  [address] [place] [id number]    [address] [place] [address]        Brando Reynoso DO

## (undated) NOTE — Clinical Note
Initial assessment completed with patient.  Patient agrees to additional follow-up calls from nurse care manager.  Thank you!

## (undated) NOTE — LETTER
No referring provider defined for this encounter. 08/08/23        Patient: Briscoe Galeazzi   YOB: 1955   Date of Visit: 8/8/2023       Dear  Dr. Bryant Freed,      Thank you for referring Briscoe Galeazzi to my practice. Please find my assessment and plan below. As you know he is a 41-year-old male with a history of hypertension, bladder cancer, severe COPD, history of hypothyroidism, GERD and hypercholesterolemia who I now had the pleasure of seeing for evaluation of chronic hyponatremia. The patient's laboratory studies have been reviewed in epic. Back in September 2020 was sodium was 131. He typically runs in the low 130 range but will occasionally drop into the 120 range and when that occurs she becomes symptomatic with increased lethargy. Of note is that his sodium is 122 on June 29, 2023 resulting in a brief hospitalization. His sodium was 132 at time of discharge. However on July 6 again dropped down to 125 resulting in an ER visit. Renal consultation has now been advised. His past medical history is significant for hypertension for at least 12 or more years. He is a cigarette smoker and and has severe COPD followed by Dr. Kayla Santa. Has seen cardiology but there is no history of coronary artery disease. There is a history of bladder cancer currently in remission followed by urology. Patient does admit to chronic alcohol use as well. He has been drinking 6-7 beers per day but try to cut back and is now down to 2/day. Medications are as listed. He was started on Wellbutrin to help him quit smoking cigarettes but only took it for about a week as he thought it might be causing diarrhea. He has not been using this medication chronically. Social history patient is a chronic cigarette smoker. He has been trying to cut down his works his way down to less than a pack per day. Alcohol history as stated above. Family history is negative for renal pathology. Review of systems at the present time patient states he is feeling okay without any chest pain, increased shortness of breath, GI or urinary tract symptoms. On physical exam his blood pressure is 116/66 with a pulse of 98 and he weighed 101 pounds. His neck was supple without JVD. Lungs were clear. Heart revealed a regular rate and rhythm with an S4 but no gallops, murmurs or rubs. Abdomen was soft, flat, nontender without organomegaly, masses or bruits. Extremities revealed no clubbing, cyanosis or edema. Again reviewed his most recent laboratory studies done on July 9, 2023. Sodium 126 with a creatinine of 0.76. Blood sugar is 108. Hemoglobin 14.0. Liver panel was normal.    I therefore informed the patient that he does have chronic hyponatremia. He seems to be asymptomatic until his sodium drops  into the 120 range. I suspect he may have SIADH secondary to severe COPD. Excess alcohol intake may also be exacerbating factors. When he drinks light he tends not to eat very much. He states his appetite though has improved and he is eating better. Currently recommend a quart and 1/2/day fluid restriction. Will have him repeat a CBC, renal panel, urinalysis, urine for sodium, creatinine and osmolality. TSH and a.m. cortisol for completion sake. Reinforced the need to quit smoking cigarettes and discontinue alcohol consumption completely. Further impressions and recommendations will be forthcoming after reviewing the above. Thank you very much for allowing me to participate in the care of your patient. If you have any questions please were free to call.            Sincerely,   Arleen Bryson MD   [facility] MEDICAL [facility], [address] [place] [address]  [address] [place] [address] [phone] [id number] [facility] [address] [id number]    Document electronically generated by:  Arleen Bryson MD

## (undated) NOTE — LETTER
[place] [address]  Authorization for Invasive Procedures  Date: 7/20/2025           Time: 14:20    I hereby authorize Real Abreu MD, Ofelia Spain MD, my physician and his/her assistants (if applicable), which may include medical students, residents, and/or fellows, to perform the following surgical operation/ procedure and administer such anesthesia as may be determined necessary by my physician: Colonoscopy and esophagogastroduodenoscopy on Alejandro Hanna  2.   I recognize that during the surgical operation/procedure, unforeseen conditions may necessitate additional or different procedures than those listed above.  I, therefore, further authorize and request that the above-named surgeon, assistants, or designees perform such procedures as are, in their judgment, necessary and desirable.    3.   My surgeon/physician has discussed prior to my surgery the potential benefits, risks and side effects of this procedure; the likelihood of achieving goals; and potential problems that might occur during recuperation.  They also discussed reasonable alternatives to the procedure, including risks, benefits, and side effects related to the alternatives and risks related to not receiving this procedure.  I have had all my questions answered and I acknowledge that no guarantee has been made as to the result that may be obtained.    4.   Should the need arise during my operation/procedure, which includes change of level of care prior to discharge, I also consent to the administration of blood and/or blood products.  Further, I understand that despite careful testing and screening of blood or blood products by collecting agencies, I may still be subject to ill effects as a result of receiving a blood transfusion and/or blood products.  The following are some, but not all, of the potential risks that can occur: fever and allergic reactions, hemolytic reactions, transmission of diseases such  as Hepatitis, AIDS and Cytomegalovirus (CMV) and fluid overload.  In the event that I wish to have an autologous transfusion of my own blood, or a directed donor transfusion, I will discuss this with my physician.   Check only if Refusing Blood or Blood Products  I understand refusal of blood or blood products as deemed necessary by my physician may have serious consequences to my condition to include possible death. I hereby assume responsibility for my refusal and release the hospital, its personnel, and my physicians from any responsibility for the consequences of my refusal.         o  Refuse         5.   I authorize the use of any specimen, organs, tissues, body parts or foreign objects that may be removed from my body during the operation/procedure for diagnosis, research or teaching purposes and their subsequent disposal by hospital authorities.  I also authorize the release of specimen test results and/or written reports to my treating physician on the hospital medical staff or other referring or consulting physicians involved in my care, at the discretion of the Pathologist or my treating physician.    6.   I consent to the photographing or videotaping of the operations or procedures to be performed, including appropriate portions of my body for medical, scientific, or educational purposes, provided my identity is not revealed by the pictures or by descriptive texts accompanying them.  If the procedure has been photographed/videotaped, the surgeon will obtain the original picture, image, videotape or CD.  The hospital will not be responsible for storage, release or maintenance of the picture, image, tape or CD.    7.   I consent to the presence of a  or observers in the operating room as deemed necessary by my physician or their designees.    8.   I recognize that in the event my procedure results in extended X-Ray/fluoroscopy time, I may develop a skin reaction.    9. If I have a Do Not  Attempt Resuscitation (DNAR) order in place, that status will be suspended while in the operating room, procedural suite, and during the recovery period unless otherwise explicitly stated by me (or a person authorized to consent on my behalf). The surgeon or my attending physician will determine when the applicable recovery period ends for purposes of reinstating the DNAR order.  10. Patients having a sterilization procedure: I understand that if the procedure is successful the results will be permanent and it will therefore be impossible for me to inseminate, conceive, or bear children.  I also understand that the procedure is intended to result in sterility, although the result has not been guaranteed.   11. I acknowledge that my physician has explained sedation/analgesia administration to me including the risk and benefits I consent to the administration of sedation/analgesia as may be necessary or desirable in the judgment of my physician.    I CERTIFY THAT I HAVE READ AND FULLY UNDERSTAND THE ABOVE CONSENT TO OPERATION and/or OTHER PROCEDURE.        ____________________________________       _________________________________      ______________________________  Signature of Patient         Signature of Responsible Person        Printed Name of Responsible Person        ____________________________________      _________________________________      ______________________________       Signature of Witness          Relationship to Patient                       Date                                       Time  Patient Name: Alejandro Hanna  : 12/10/1955    Reviewed: 2024   Printed: 2025  Medical Record #: [id number] Page 1 of 2             STATEMENT OF PHYSICIAN My signature below affirms that prior to the time of the procedure; I have explained to the patient and/or his/her legal representative, the risks and benefits involved in the proposed treatment and any reasonable alternative to the  proposed treatment. I have also explained the risks and benefits involved in refusal of the proposed treatment and alternatives to the proposed treatment and have answered the patient's questions. If I have a significant financial interest in a co-management agreement or a significant financial interest in any product or implant, or other significant relationship used in this procedure/surgery, I have disclosed this and had a discussion with my patient.     _______________________________________________________________ _____________________________  (Signature of Physician)                                                                                         (Date)                                   (Time)  Patient Name: Alejandro Hanna  : 12/10/1955    Reviewed: 2024   Printed: 2025  Medical Record #: [id number] Page 2 of 2

## (undated) NOTE — LETTER
4/4/2023          [address]         Dear Hieu Nicole,    It has come to our attention that a required CT Chest test is due in May. Please call central scheduling at [phone] to schedule the test.     If you have any other questions, please contact our office at [phone]. Sincerely,    Calvin Scales.  Dedra Taylor MD  [facility] MEDICAL [facility], [address] [place] [address]  [phone] [phone] [facility] [address] [phone]

## (undated) NOTE — LETTER
[address]  [id number]  [phone]  Authorization for Imaging Procedure    1. I hereby authorize Dr. Mateo Virk physician and his/her assistants (if applicable), which may include medical students, residents, and/or fellows, to perform the following procedure and administer such anesthesia as may be determined necessary by my physician: ULTRASOUND GUIDED FINE NEEDLE ASPIRATION BIOPSY RIGHT SALIVA/PAROTID GLAND on Rohit Starkey. 2.  I recognize that during the procedure, unforeseen conditions may necessitate additional or different procedures than those listed above. I, therefore, further authorize and request that the above-named physician, assistants, or designees perform such procedures as are, in their judgment, necessary and desirable. 3.  My physician has discussed prior to my procedure the potential benefits, risks and side effects of this procedure; the likelihood of achieving goals; and potential problems that might occur during recuperation. They also discussed reasonable alternatives to the procedure, including risks, benefits, and side effects related to the alternatives and risks related to not receiving this procedure. I have had all my questions answered and I acknowledge that no guarantee has been made as to the result that may be obtained. 4.  Should the need arise during my procedure, which includes change of level of care prior to discharge, I also consent to the administration of blood and/or blood products. Further, I understand that despite careful testing and screening of blood or blood products by collecting agencies, I may still be subject to ill effects as a result of receiving a blood transfusion and/or blood products.  The following are some, but not all, of the potential risks that can occur: fever and allergic reactions, hemolytic reactions, transmission of diseases such as Hepatitis, AIDS and Cytomegalovirus (CMV) and fluid overload. In the event that I wish to have an autologous transfusion of my own blood, or a directed donor transfusion, I will discuss this with my physician. Check only if Refusing Blood or Blood Products  I understand refusal of blood or blood products as deemed necessary by my physician may have serious consequences to my condition to include possible death. I hereby assume responsibility for my refusal and release the hospital, its personnel, and my physicians from any responsibility for the consequences of my refusal.   [  ] Patient Refuses Blood      5. I authorize the use of any specimen, organs, tissues, body parts or foreign objects that may be removed from my body during the procedure for diagnosis, research or teaching purposes and their subsequent disposal by hospital authorities. I also authorize the release of specimen test results and/or written reports to my treating physician on the hospital medical staff or other referring or consulting physicians involved in my care, at the discretion of the Pathologist or my treating physician. 6.  I consent to the photographing or videotaping of the procedures to be performed, including appropriate portions of my body for medical, scientific, or educational purposes, provided my identity is not revealed by the pictures or by descriptive texts accompanying them. If the procedure has been photographed/videotaped, the physician will obtain the original picture, image, videotape or CD. The hospital will not be responsible for storage, release or maintenance of the picture, image, tape or CD.   7.  I consent to the presence of a  or observers in the operating room as deemed necessary by my physician or their designees. 8.  I recognize that in the event my procedure results in extended X-Ray/fluoroscopy time, I may develop a skin reaction. 9.   If I have a Do Not Attempt Resuscitation (DNAR) order in place, that status will be suspended while in the operating room, procedural suite, and during the recovery period unless otherwise explicitly stated by me (or a person authorized to consent on my behalf). The performing physician or my attending physician will determine when the applicable recovery period ends for purposes of reinstating the DNAR order. 10.  I acknowledge that my physician has explained sedation/analgesia administration to me including the risk and benefits I consent to the administration of sedation/analgesia as may be necessary or desirable in the judgment of my physician. I CERTIFY THAT I HAVE READ AND FULLY UNDERSTAND THE ABOVE CONSENT FOR THE PROCEDURE. Signature of Patient: _____________________________________________________________  Responsible person in case of minor, unconscious: ____________________________________  Relationship to patient:  __________________________________________________________  Signature of Witness: _______________________________Date: _________Time: __________    Statement of Physician: My signature below affirms that prior to the time of the procedure, I have explained to the patient and/or his guardian, the risks and benefits involved in the proposed treatment and any reasonable alternative to the proposed treatment. I have also explained the risks and benefits involved in the refusal of the proposed treatment and have answered the patient's questions. If I have a significant financial interest in a co-management agreement or a significant financial interest in any product or implant, or other significant relationship used in the procedure/surgery, I have disclosed this and had a discussion with my patient.   Signature of Physician:   _________________________________Date:_____________Time:________    Patient Name: Corky Hinojosa : 12/10/1955  Printed: 2023   Medical Record #: [id number]

## (undated) NOTE — Clinical Note
Mr. Hanna is doing well.  Please see attached note for an update on his operation and pathology. Please feel free to call me with any questions at [phone].  Thank you,  Khurram [facility]

## (undated) NOTE — LETTER
AUTHORIZATION FOR SURGICAL OPERATION OR OTHER PROCEDURE    1. I hereby authorize Dr. Yovani Gerardo and the [facility] staff assigned to my case to perform the following operation and/or procedure at the [facility]:    Ultrasound guided bilateral greater trochanteric bursa injection with corticosteroid     2.  My physician has explained the nature and purpose of the operation or other procedure, possible alternative methods of treatment, the risks involved, and the possibility of complication to me.  I acknowledge that no guarantee has been made as to the result that may be obtained.  3.  I recognize that, during the course of this operation, or other procedure, unforseen conditions may necessitate additional or different procedure than those listed above.  I, therefore, further authorize and request that the above named physician, his/her physician assistants or designees perform such procedures as are, in his/her professional opinion, necessary and desirable.  4.  Any tissue or organs removed in the operation or other procedure may be disposed of by and at the discretion of the [facility] staff and [facility].  5.  I understand that in the event of a medical emergency, I will be transported by local paramedics to [facility] or other hospital emergency department.  6.  I certify that I have read and fully understand the above consent to operation and/or other procedure.    7.  I acknowledge that my physician has explained sedation/analgesia administration to me including the risks and benefits.  I consent to the administration of sedation/analgesia as may be necessary or desirable in the judgement of my physician.    Witness signature: ___________________________________________________ Date:  ______/______/_____                    Time:  ________ A.M.  P.M.       Patient Name:  Alejandro Hanna  12/10/1955  [id number]         Patient signature:   ___________________________________________________               Statement of Physician  My signature below affirms that prior to the time of the procedure, I have explained to the patient and/or his/her guardian, the risks and benefits involved in the proposed treatment and any reasonable alternative to the proposed treatment.  I have also explained the risks and benefits involved in the refusal of the proposed treatment and have answered the patient's questions.                        Date:  ______/______/_______  Provider                      Signature:  __________________________________________________________       Time:  ___________ AHUI DE PAZ

## (undated) NOTE — ED AVS SNAPSHOT
Tyra Caal   MRN: [id number]    Department:  [facility] Emergency Department   Date of Visit:  2/1/2019           Disclosure     Insurance plans vary and the physician(s) referred by the ER may not be covered by your plan.  Please contact yo CARE PHYSICIAN AT ONCE OR RETURN IMMEDIATELY TO THE EMERGENCY DEPARTMENT. If you have been prescribed any medication(s), please fill your prescription right away and begin taking the medication(s) as directed.   If you believe that any of the medications

## (undated) NOTE — MR AVS SNAPSHOT
[address]  [address] [facility]  [phone]               Thank you for choosing us for your health care visit with Lokesh Fountain DO.   We are glad to serve you and happy to provide you with this summary schedule your appointment. Failure to obtain required authorization numbers can create reimbursement difficulties for you. HE IS ALSO AT THE [facility] LOCATION. Assoc Dx:  Chronic obstructive pulmonary disease, unspecified COPD type ([facility] Micah. 9] Commonly known as:  TRILIPIX           finasteride 5 MG Tabs   Take 1 tablet (5 mg total) by mouth daily.    Commonly known as:  PROSCAR           Fluticasone Propionate 50 MCG/ACT Susp   INSTILL 2 SPRAYS BY NASAL ROUTE ONCE DAILY   Commonly known as:  FLON

## (undated) NOTE — LETTER
Notifier: [employer]       Patient Name: Alejandro Hanna       Identification Number: [id number]                          Advance Beneficiary Notice of Noncoverage (ABN)   NOTE:  If Medicare doesn’t pay for D. Items/service(s) below, you may have to pay.  Medicare does not pay for everything, even some care that you or your health care provider have good reason to think you need. We expect Medicare may not pay for the D. items/service(s) below.  Items or Services  Ultrasound guided bilateral greater trochanteric bursa injection with corticosteroid  Reason Medicare May Not Pay: Estimated Cost   __EKG ($129.00)  __Pap smear ($48.23) __Pelvic/Breast ($65.00)  __ Ear Irrigation ($149)  __ Injection(s)  ___ Tdap ($70)       ___ Meningitis ($206)   __Prevnar ($285)  ___ Td ($51)            ___Shingrix ($215)        __Prevnar 20 ($309)  ___ Hep A ($156)   ___Prolia ($1827.00)     __ Xiaflex ($              )   ___ Hep B ($167)      __Pneumovax ($155)                                            ___ Vaccine Administration ($31)   __ Medicare does not cover this service      __ Medicare may not pay for this   item/service for your condition     __ Medicare may not pay for this item/service as often as this        WHAT YOU NEED TO DO NOW:  Read this notice, so you can make an informed decision about your care.  Ask us any questions that you may have after you finish reading.  Choose an option below about whether to receive the D. item/service(s)  listed above.  Note: If you choose Option 1 or 2, we may help you to use any other insurance that you might have, but Medicare cannot require us to do this.  OPTIONS: Check only one box.  We cannot choose a box for you.   OPTION 1. I want the D. item/service(s) listed above. You may ask to be paid now, but I also want Medicare billed for an official decision on payment, which is sent to me on a Medicare Summary Notice (MSN). I understand that if Medicare doesn’t pay, I am  responsible for payment, but I can appeal to Medicare by following the directions on the MSN. If Medicare does pay, you will refund any payments I made to you, less co-pays or deductibles.  OPTION 2. I want the D. item/service(s) listed above, but do not bill Medicare. You may ask to be paid now as I am responsible for payment. I cannot appeal if Medicare is not billed.  OPTION 3. I don't want the D. item/service(s) listed above. I understand with this choice I am not responsible for payment, and I cannot appeal to see if Medicare would pay.    H. Additional Information:    This notice gives our opinion, not an official Medicare decision. If you have other questions on this notice or Medicare billing, call 1-800-MEDICARE (1-476.100.3224/TTY: 1-476.886.8855). Signing below means that you have received and understand this notice. You also receive a copy.  Signature: Date:       You have the right to get Medicare information in an accessible format, like large print, Braille, or audio. You also have the right to file a complaint if you feel you’ve been discriminated against. Visit Medicare.gov/about- us/lvcuwghqlyptc-fypulqsdcgbuihdfa-riwalw.  According to the Paperwork Reduction Act of 1995, no persons are required to respond to a collection of information unless it displays a valid OMB control number. The valid OMB control number for this information collection is 3222-8176. The time required to complete this information collection is estimated to average 7 minutes per response, including the time to review instructions, search existing data resources, gather the data needed, and complete and review the information collection. If you have comments concerning the accuracy of the time estimate or suggestions for improving this form, please write to: CMS, [address]     Elissa, Attn: BOYD Reports Clearance Officer, [place], [place] [address].  Form CMS-R-131 (Exp. 1/31/2026) Form Approved OMB No.  [id number]

## (undated) NOTE — LETTER
No referring provider defined for this encounter. 09/06/23        Patient: Yareli Sena   YOB: 1955   Date of Visit: 9/5/2023       Dear  Dr. Santosh Lamar,      Thank you for referring Yareli Sena to my practice. Please find my assessment and plan below. As you know he is a 68-year-old male with a history of hypertension, bladder cancer, severe COPD, history of hypothyroidism, GERD and hypercholesterolemia who I now had the pleasure of seeing for follow-up of chronic hyponatremia. The patient just underwent a recent renal evaluation. Of note is that a cortisol and TSH level were normal.  His urine osmolality was quite low at 196 with a random urine sodium of 55. Finally a repeat sodium on August 12, 2023 was a bit better at 130. Creatinine normal at 0.72. Urinalysis was normal and his specific gravity again was quite low at 1004. I therefore informed patient he does have chronic hyponatremia. His urine is quite dilute and therefore polydipsia is playing a role. Patient states that he drank 5 beers the day before this test was done. He may also have a component of SIADH secondary to his severe COPD. Strongly advised him to follow a quart and 1/2/day fluid restriction. Cut back on alcohol consumption. Work on quitting smoking cigarettes. Discussed the potential seriousness of severe hyponatremia. Signs and symptoms were reviewed. He states he will work on all the above. Repeat renal panel in 1 month to ensure stability. He states he was on Wellbutrin but stopped it secondary to nightmares. He has resumed it recently. He was made aware that Wellbutrin can cause hyponatremia. We will see again in 6 months for follow-up or sooner if clinically indicated. Thank you again for allowing me to participate in the care of your patient. If you have any questions please feel free to call.            Sincerely,   Zaheer Harper MD   [facility], [address] [address]    Document electronically generated by:  Allison Solorio MD

## (undated) NOTE — LETTER
12/16/2019    [address] [facility]            Dear Elizabeth Bojorquez,      Our records indicate that you are due for an appointment for a Colonoscopy in February 2020, or shortly there after, with Myke Lombardi MD.

## (undated) NOTE — LETTER
No referring provider defined for this encounter.       05/16/25        Patient: Alejandro Hanna   YOB: 1955   Date of Visit: 5/16/2025       Dear  Dr. Ford, DO,      Thank you for referring Alejandro Hanna to my practice.  Please find my assessment and plan below.      As you know he is a 69-year-old male with a history of hypertension, bladder cancer followed by Dr. Shaffer, severe COPD, hypothyroidism, GERD and hypercholesterolemia who I now had the pleasure of seeing for follow-up of chronic hyponatremia.  Patient remains off of cigarettes.  He is trying to follow 48 ounce per day fluid restriction.  The patient though was recently hospitalized for pneumonia and then was subsequently readmitted on April 29, 2025 for generalized fatigue and weakness.  He is also been dealing with right lower extremity radiculopathy.  When the patient was admitted in April 29, 2025 he had a sodium of 129, potassium 3.4 creatinine 0.76 but magnesium is 0.6.  Patient received IV fluids and magnesium replacement.  Now here for follow-up.    Overall patient states he is feeling much better.  Appetite is good.  Strength is improving but he still is having problems ambulating significant distances.  Walking with a walker around the house.  Will be starting physical therapy.  He states intermittently he is having some watery stools but not on any daily basis.  He was advised in the hospital to stop omeprazole as it was thought that might be contributing to his hypomagnesemia.    On physical exam his blood pressure 115/76 with a pulse of 106 and he weighed 123 pounds.  His neck was supple without JVD.  Lungs were clear but with diminished breath sounds.  Heart revealed a regular rate and rhythm without gallops or murmurs.  Abdomen was soft, flat, nontender without organomegaly, masses or bruits.  Extremities revealed no edema.    I reviewed his follow-up labs done on May 12, 2025.  Creatinine crept up  slightly to 1.21 with an estimated GFR of 64 cc/min.  Potassium was 5.3 and sodium was 134.  Magnesium 1.7.  Hemoglobin 11.6.  Iron levels were normal.    I therefore reassured the patient and his wife that overall his electrolytes are much better.  Modest low potassium diet was recommended.  Continue a 48 ounce per day fluid restriction.  He states he is getting some breakthrough GERD symptoms since holding omeprazole.  Advised that he can try taking Pepcid but to let us know that is not controlling his symptoms adequately.  In 2 weeks we will repeat a renal panel, magnesium in a.m. cortisol level.  Will call with results and determine follow-up.    Thank you again for allowing me to participate in the care of your patient.  If you have any questions please feel free to call.           Sincerely,   Faisal Villar MD   [facility], [facility]  [address]  [facility] [address]    Document electronically generated by:  Faisal Villar MD

## (undated) NOTE — LETTER
No referring provider defined for this encounter.       02/04/25        Patient: Alejandro Hanna   YOB: 1955   Date of Visit: 2/4/2025       Dear  Dr. Logan DO,      Thank you for referring Alejandro Hanna to my practice.  Please find my assessment and plan below.      As you know he is a 69-year-old male with a history of hypertension, bladder cancer followed by Dr. Shaffer, severe COPD, hypothyroidism, GERD and hypercholesterolemia who I now had the pleasure of seeing for follow-up of chronic hyponatremia.  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  So far is doing well.  Following a 48 ounce per day fluid restriction.    On physical exam his blood pressure is 131/70 with a pulse of 76 and he weighed 125 pounds.  His neck was supple without JVD.  Lungs were clear.  Heart revealed a regular rate and rhythm with an S4 but no other gallops, murmurs or rubs.  Abdomen was soft, flat, nontender without organomegaly, masses or bruits.  Extremities revealed no edema.    I reviewed his most recent labs done on February 1, 2025.  Creatinine remains normal at 0.94.  Sodium likewise was good at 136.    I therefore reassured him that his sodium is in the normal range.  Continue a 48 ounce per day fluid restriction.  Commended him on quitting smoking cigarettes.  Repeat renal panel in 3 months.  Return in 6 months.    Thank you again for allowing me to participate in the care of your patient.  If you have any questions please feel free to call.           Sincerely,   Faisal Villar MD   [facility], [facility] [place]  [address]  [facility] [address]    Document electronically generated by:  Faisal Villar MD

## (undated) NOTE — Clinical Note
Thank you for the opportunity to participate in the care of this interesting patient. Please do contact me if I may be of any further assistance  Carlos Damian MD [facility] Hematology/Oncology

## (undated) NOTE — LETTER
Darlene Reeves, Do  [address]   [address]  [address] [facility], [address] [facility]       06/14/18        Patient: Sharon Giron   YOB: 1955   Date of Visit: 6/14/2018       Dear  Dr. Anay Gamboa,      Thank you for referring Sharon Giron to my prac

## (undated) NOTE — MR AVS SNAPSHOT
[place]  [address]  [phone]               Thank you for choosing us for your health care visit with Kathee Meigs, MD.  We are glad to serve you and happy to provide you with this summary Fluticasone Propionate 50 MCG/ACT Susp   INSTILL 2 SPRAYS BY NASAL ROUTE ONCE DAILY   Commonly known as:  FLONASE           FLUVIRIN Susp   Generic drug:  Influenza Vac Typ A&B Surf Ant   ADM 0.5ML IM UTD           SYMBICORT 160-4.5 MCG/ACT Aero   Generic

## (undated) NOTE — LETTER
4/4/2023          [address]         Dear Aleyda Lewis,    It has come to our attention that a required CT Chest test is due in May. Please call central scheduling at [phone] to schedule the test.     If you have any other questions, please contact our office at [phone]. Sincerely,    Rajat Narayan.  Romana Pellant, MD  [facility], [address] [place] [address]  [address] [facility]  [address]. [address] [facility] [id number] [phone]

## (undated) NOTE — LETTER
AUTHORIZATION FOR SURGICAL OPERATION OR OTHER PROCEDURE    1. I hereby authorize Dr. Brando Reynoso and the [facility] staff assigned to my case to perform the following operation and/or procedure at the [facility]:    Bilateral greater trochanteric bursa injections, ultrasound guidance, local anesthesia       2.  My physician has explained the nature and purpose of the operation or other procedure, possible alternative methods of treatment, the risks involved, and the possibility of complication to me.  I acknowledge that no guarantee has been made as to the result that may be obtained.  3.  I recognize that, during the course of this operation, or other procedure, unforseen conditions may necessitate additional or different procedure than those listed above.  I, therefore, further authorize and request that the above named physician, his/her physician assistants or designees perform such procedures as are, in his/her professional opinion, necessary and desirable.  4.  Any tissue or organs removed in the operation or other procedure may be disposed of by and at the discretion of the [facility] staff and [facility].  5.  I understand that in the event of a medical emergency, I will be transported by local paramedics to [facility] or other hospital emergency department.  6.  I certify that I have read and fully understand the above consent to operation and/or other procedure.    7.  I acknowledge that my physician has explained sedation/analgesia administration to me including the risks and benefits.  I consent to the administration of sedation/analgesia as may be necessary or desirable in the judgement of my physician.    Witness signature: ___________________________________________________ Date:  ______/______/_____                    Time:  ________ A.M.  P.MRanjith Hanna  12/10/1955  [id number]         Patient signature:   ___________________________________________________                 Statement of Physician  My signature below affirms that prior to the time of the procedure, I have explained to the patient and/or his/her guardian, the risks and benefits involved in the proposed treatment and any reasonable alternative to the proposed treatment.  I have also explained the risks and benefits involved in the refusal of the proposed treatment and have answered the patient's questions.                        Date:  ______/______/_______  Provider                      Signature:  __________________________________________________________       Time:  ___________ AHUI DE PAZ

## (undated) NOTE — LETTER
3/17/2022              [address] [facility]         Dear [facility] records indicate that the tests ordered for you by Taina Donato MD  have not been done. If you have, in fact, already completed the tests or you do not wish to have the tests done, please contact our office at [address] [place] [address]. Otherwise, please proceed with the testing.       CT LUNG LOW DOSE SCREENING        Sincerely,    Taina Donato MD  [address], [address] [place] [address]  [place] [address] [place] [address]  [address] [facility] [address] [phone]

## (undated) NOTE — LETTER
[facility]  Administration of Anesthesia  I, Alejandro Devineolivier agree to be cared for by a physician anesthesiologist alone and/or with a nurse anesthetist, who is specially trained to monitor me and give me medicine to put me to sleep or keep me comfortable during my procedure    I understand that my anesthesiologist and/or anesthetist is not an employee or agent of [facility] or [employer]. He or she works for [facility]    As the patient asking for anesthesia services, I agree to:  Allow the anesthesiologist (anesthesia doctor) to give me medicine and do additional procedures as necessary. Some examples are: Starting or using an “IV” to give me medicine, fluids or blood during my procedure, and having a breathing tube placed to help me breathe when I’m asleep (intubation). In the event that my heart stops working properly, I understand that my anesthesiologist will make every effort to sustain my life, unless otherwise directed by [facility] Do Not Resuscitate documents.  Tell my anesthesia doctor before my procedure:  If I am pregnant.  The last time that I ate or drank.  iii. All of the medicines I take (including prescriptions, herbal supplements, and pills I can buy without a prescription (including street drugs/illegal medications). Failure to inform my anesthesiologist about these medicines may increase my risk of anesthetic complications.  iv.If I am allergic to anything or have had a reaction to anesthesia before.  I understand how the anesthesia medicine will help me (benefits).  I understand that with any type of anesthesia medicine there are risks:  The most common risks are: nausea, vomiting, sore throat, muscle soreness, damage to my eyes, mouth, or teeth (from breathing tube placement).  Rare risks include: remembering what happened during my procedure, allergic reactions to medications, injury to my airway, heart, lungs, vision, nerves, or  muscles and in extremely rare instances death.  My doctor has explained to me other choices available to me for my care (alternatives).  Pregnant Patients (“epidural”):  I understand that the risks of having an epidural (medicine given into my back to help control pain during labor), include itching, low blood pressure, difficulty urinating, headache or slowing of the baby’s heart. Very rare risks include infection, bleeding, seizure, irregular heart rhythms and nerve injury.  Regional Anesthesia (“spinal”, “epidural”, & “nerve blocks”):  I understand that rare but potential complications include headache, bleeding, infection, seizure, irregular heart rhythms, and nerve injury.    _____________________________________________________________________________  Patient (or Representative) Signature/Relationship to Patient  Date   Time    _____________________________________________________________________________   Name (if used)    Language/Organization   Time    _____________________________________________________________________________  Nurse Anesthetist Signature     Date   Time  _____________________________________________________________________________  Anesthesiologist Signature     Date   Time  I have discussed the procedure and information above with the patient (or patient’s representative) and answered their questions. The patient or their representative has agreed to have anesthesia services.    _____________________________________________________________________________  Witness        Date   Time  I have verified that the signature is that of the patient or patient’s representative, and that it was signed before the procedure  Patient Name: Alejandro Hanna     : 12/10/1955                 Printed: 2025 at 8:47 AM    Medical Record #: [id number]                                            Page 1 of 1  ----------ANESTHESIA CONSENT----------

## (undated) NOTE — LETTER
3/19/2019          To Whom It May Concern:    Nimo Jones is currently under my medical care and may return to work on Monday 3/25/2019 in a sedentary position for the next 4 weeks. If you require additional information please contact our office.

## (undated) NOTE — Clinical Note
I saw your patient, Mr. Hanna.  Please see attached note for my assessment and plans moving forward.  Thank you for involving me in his care.  Please feel free to call me with any questions at [phone]  [facility]

## (undated) NOTE — LETTER
No referring provider defined for this encounter.       07/05/24        Patient: Alejandro Hanna   YOB: 1955   Date of Visit: 7/5/2024       Dear  Dr. Logan DO,      Thank you for referring Alejandro Hanna to my practice.  Please find my assessment and plan below.      As you know he is a 68-year-old male with a history of hypertension, bladder cancer followed by Dr. Shaffer, severe COPD, hypothyroidism, GERD and hypercholesterolemia who I now the pleasure of seeing for follow-up of chronic hyponatremia.  Overall the patient states he is doing well without any chest pain, increased shortness of breath, GI or urinary tract symptoms.  Nocturia x 1-2.  No evidence for bladder cancer recurrence.  Unfortunately still smoking cigarettes.  Following a 48 ounces per day fluid restriction well.    On physical exam his blood pressure is 131/75 with a pulse of 83 and he weighed 116 pounds.  His neck was supple without JVD.  Lungs were clear but with diminished breath sounds.  Heart revealed a regular rate and rhythm with an S4 but no gallops or murmurs.  Abdomen was soft, flat, nontender without organomegaly, masses or bruits.  Extremities revealed no edema.    Reviewed recent labs.  Sodium was down to 129 June 18, 2024.  However at that time his TSH was up to 18.  Now on levothyroxine 75 mcg daily.  Just had repeat labs done on July 2, 2024.  Sodium better up to 134.  Creatinine normal 0.93.    I therefore informed the patient that he still has mild hyponatremia but is in a safe range.  This is related to his COPD and hypothyroidism.  He knows to continue a 48 ounces per day fluid restriction.  Repeat labs in 3 months.  Will see again in 6 months for follow-up or sooner if clinically indicated.    Thank you again for allowing me to participate in the care of your patient.  If you have any questions please feel free to call.           Sincerely,   MD JOVANI Márquez [facility]  EZEQUIEL  [facility], [facility]  [address]  [facility] [address]    Document electronically generated by:  Faisal Villar MD

## (undated) NOTE — LETTER
8/6/2018              [address] [facility]         Dear Jacquelyn Fitzpatrick,    Your lung nodule has improved as seen on your most recent Chest CT.   An order has been placed to repeat the CT in 6 months, we will send out a

## (undated) NOTE — LETTER
ROE Notifier: [employer]. Patient Name: Alejandro Hanna Identification Number: [id number]                          Advance Beneficiary Notice of Noncoverage (ABN)   NOTE:  If Medicare doesn’t pay for D. item/service(s) below, you may have to pay.  Medicare does not pay for everything, even some care that you or your health care provider have good reason to think you need. We expect Medicare may not pay for the D. item/service(s) below.  D. Items or Services E. Reason Medicare May Not Pay: F. Estimated Cost   Bilateral greater trochanteric bursa injections, ultrasound guidance, local anesthesia      __ Medicare does not cover this service      __ Medicare may not pay for this   item/service for your condition     __ Medicare may not pay for this item/service as often as this        WHAT YOU NEED TO DO NOW:  Read this notice, so you can make an informed decision about your care.  Ask us any questions that you may have after you finish reading.  Choose an option below about whether to receive the D. item/service(s) listed above.  Note: If you choose Option 1 or 2, we may help you to use any other insurance that you might have, but Medicare cannot require us to do this.  G. OPTIONS: Check only one box.  We cannot choose a box for you.   OPTION 1. I want the D. item/service(s) listed above. You may ask to be paid now, but I also want Medicare billed for an official decision on payment, which is sent to me on a Medicare Summary Notice (MSN). I understand that if Medicare doesn’t pay, I am responsible for payment, but I can appeal to Medicare by following the directions on the MSN. If Medicare does pay, you will refund any payments I made to you, less co-pays or deductibles.  OPTION 2. I want the D. item/service(s) listed above, but do not bill Medicare. You may ask to be paid now as I am responsible for payment. I cannot appeal if Medicare is not billed.  OPTION 3. I don't want the D.  item/service(s) listed above. I understand with this choice I am not responsible for payment, and I cannot appeal to see if Medicare would pay.    H. Additional Information:    This notice gives our opinion, not an official Medicare decision. If you have other questions on this notice or Medicare billing, call 1-800-MEDICARE (1-773.494.2121/TTY: 1-255.970.6042). Signing below means that you have received and understand this notice. You also receive a copy.  I. Signature: J. Date:       You have the right to get Medicare information in an accessible format, like large print, Braille, or audio. You also have the right to file a complaint if you feel you’ve been discriminated against. Visit Medicare.gov/about- us/pnrfwemdfjwzb-gdtrttldppzgxgufy-wufoqx.  According to the Paperwork Reduction Act of 1995, no persons are required to respond to a collection of information unless it displays a valid OMB control number. The valid OMB control number for this information collection is 3725-0765. The time required to complete this information collection is estimated to average 7 minutes per response, including the time to review instructions, search existing data resources, gather the data needed, and complete and review the information collection. If you have comments concerning the accuracy of the time estimate or suggestions for improving this form, please write to: CMS, [facility] [employer]     [place], Attn: BOYD Reports Clearance Officer, [place], [place] [address].  Form CMS-R-131 (Exp. 1/31/2026) Form Approved OMB No. [id number]

## (undated) NOTE — LETTER
AUTHORIZATION FOR SURGICAL OPERATION OR OTHER PROCEDURE    1. I hereby authorize Dr. Donovan Lai , and [address] [place] [address] staff assigned to my case to perform the following operation and/or procedure at the [address] [place] [address]:    Cortisone injection in Bilateral hips   _______________________________________________________________________________________________      _______________________________________________________________________________________________    2. My physician has explained the nature and purpose of the operation or other procedure, possible alternative methods of treatment, the risks involved, and the possibility of complication to me. I acknowledge that no guarantee has been made as to the result that may be obtained. 3.  I recognize that, during the course of this operation, or other procedure, unforseen conditions may necessitate additional or different procedure than those listed above. I, therefore, further authorize and request that the above named physician, his/her physician assistants or designees perform such procedures as are, in his/her professional opinion, necessary and desirable. 4.  Any tissue or organs removed in the operation or other procedure may be disposed of by and at the discretion of the [address] [place] [address] and [facility]. 5.  I understand that in the event of a medical emergency, I will be transported by local paramedics to [facility] or other hospital emergency department. 6.  I certify that I have read and fully understand the above consent to operation and/or other procedure. 7.  I acknowledge that my physician has explained sedation/analgesia administration to me including the risks and benefits. I consent to the administration of sedation/analgesia as may be necessary or desirable in the judgement of my physician.     Witness signature: ___________________________________________________ Date: ______/______/_____                    Time:  ________ A. M.  P.M. Patient Name:  ______________________________________________________  (please print)      Patient signature:  ___________________________________________________             Relationship to Patient:           []  Parent    Responsible person                          []  Spouse  In case of minor or                    [] Other  _____________   Incompetent name:  __________________________________________________                               (please print)      _____________      Responsible person  In case of minor or  Incompetent signature:  _______________________________________________    Statement of Physician  My signature below affirms that prior to the time of the procedure, I have explained to the patient and/or his/her guardian, the risks and benefits involved in the proposed treatment and any reasonable alternative to the proposed treatment. I have also explained the risks and benefits involved in the refusal of the proposed treatment and have answered the patient's questions.                         Date:  ______/______/_______  Provider                      Signature:  __________________________________________________________       Time:  ___________ A.M    P.M.

## (undated) NOTE — LETTER
AUTHORIZATION FOR SURGICAL OPERATION OR OTHER PROCEDURE    1. I hereby authorize Dr. Yovani Gerardo and the [facility] staff assigned to my case to perform the following operation and/or procedure at the [facility]:      Ultrasound guided bilateral greater trochanteric bursa injection     2.  My physician has explained the nature and purpose of the operation or other procedure, possible alternative methods of treatment, the risks involved, and the possibility of complication to me.  I acknowledge that no guarantee has been made as to the result that may be obtained.  3.  I recognize that, during the course of this operation, or other procedure, unforseen conditions may necessitate additional or different procedure than those listed above.  I, therefore, further authorize and request that the above named physician, his/her physician assistants or designees perform such procedures as are, in his/her professional opinion, necessary and desirable.  4.  Any tissue or organs removed in the operation or other procedure may be disposed of by and at the discretion of the [facility] staff and [facility].  5.  I understand that in the event of a medical emergency, I will be transported by local paramedics to [facility] or other hospital emergency department.  6.  I certify that I have read and fully understand the above consent to operation and/or other procedure.    7.  I acknowledge that my physician has explained sedation/analgesia administration to me including the risks and benefits.  I consent to the administration of sedation/analgesia as may be necessary or desirable in the judgement of my physician.    Witness signature: ___________________________________________________ Date:  ______/______/_____                    Time:  ________ A.M.  P.M.       Patient Name:  Alejandro Hanna  12/10/1955  [id number]         Patient signature:   ___________________________________________________               Statement of Physician  My signature below affirms that prior to the time of the procedure, I have explained to the patient and/or his/her guardian, the risks and benefits involved in the proposed treatment and any reasonable alternative to the proposed treatment.  I have also explained the risks and benefits involved in the refusal of the proposed treatment and have answered the patient's questions.                        Date:  ______/______/_______  Provider                      Signature:  __________________________________________________________       Time:  ___________ AHUI DE PAZ

## (undated) NOTE — Clinical Note
Transitional Care Management call completed. A Transitional Care Management appointment is scheduled for 10/29/2024. Thank you.